# Patient Record
Sex: MALE | Race: ASIAN | Employment: UNEMPLOYED | URBAN - METROPOLITAN AREA
[De-identification: names, ages, dates, MRNs, and addresses within clinical notes are randomized per-mention and may not be internally consistent; named-entity substitution may affect disease eponyms.]

---

## 2017-03-31 ENCOUNTER — HOSPITAL ENCOUNTER (EMERGENCY)
Age: 52
Discharge: HOME | End: 2017-04-01
Attending: EMERGENCY MEDICINE
Payer: MEDICAID

## 2017-03-31 ENCOUNTER — APPOINTMENT (OUTPATIENT)
Dept: CT IMAGING | Age: 52
End: 2017-03-31
Attending: EMERGENCY MEDICINE
Payer: MEDICAID

## 2017-03-31 VITALS
SYSTOLIC BLOOD PRESSURE: 130 MMHG | OXYGEN SATURATION: 97 % | TEMPERATURE: 98.4 F | HEART RATE: 90 BPM | DIASTOLIC BLOOD PRESSURE: 69 MMHG | RESPIRATION RATE: 15 BRPM

## 2017-03-31 DIAGNOSIS — K83.8 PNEUMOBILIA: ICD-10-CM

## 2017-03-31 DIAGNOSIS — L02.91 PHLEGMON: ICD-10-CM

## 2017-03-31 DIAGNOSIS — R73.9 HYPERGLYCEMIA: ICD-10-CM

## 2017-03-31 DIAGNOSIS — E86.0 DEHYDRATION: ICD-10-CM

## 2017-03-31 DIAGNOSIS — R10.9 RIGHT SIDED ABDOMINAL PAIN: ICD-10-CM

## 2017-03-31 DIAGNOSIS — R10.13 ABDOMINAL PAIN, EPIGASTRIC: Primary | ICD-10-CM

## 2017-03-31 LAB
ALBUMIN SERPL BCP-MCNC: 3.8 G/DL (ref 3.4–5)
ALBUMIN/GLOB SERPL: 0.7 {RATIO} (ref 0.8–1.7)
ALP SERPL-CCNC: 206 U/L (ref 45–117)
ALT SERPL-CCNC: 21 U/L (ref 16–61)
AMPHET UR QL SCN: NEGATIVE
ANION GAP BLD CALC-SCNC: 10 MMOL/L (ref 3–18)
ANION GAP BLD CALC-SCNC: 15 MMOL/L (ref 10–20)
ANION GAP BLD CALC-SCNC: 17 MMOL/L (ref 10–20)
APPEARANCE UR: CLEAR
AST SERPL W P-5'-P-CCNC: 11 U/L (ref 15–37)
BACTERIA URNS QL MICRO: NEGATIVE /HPF
BARBITURATES UR QL SCN: NEGATIVE
BASOPHILS # BLD AUTO: 0 K/UL (ref 0–0.06)
BASOPHILS # BLD: 0 % (ref 0–2)
BENZODIAZ UR QL: NEGATIVE
BILIRUB SERPL-MCNC: 0.3 MG/DL (ref 0.2–1)
BILIRUB UR QL: NEGATIVE
BUN BLD-MCNC: 20 MG/DL (ref 7–18)
BUN BLD-MCNC: 27 MG/DL (ref 7–18)
BUN SERPL-MCNC: 23 MG/DL (ref 7–18)
BUN/CREAT SERPL: 15 (ref 12–20)
CA-I BLD-MCNC: 0.94 MMOL/L (ref 1.12–1.32)
CA-I BLD-MCNC: 1.2 MMOL/L (ref 1.12–1.32)
CALCIUM SERPL-MCNC: 9.8 MG/DL (ref 8.5–10.1)
CANNABINOIDS UR QL SCN: NEGATIVE
CHLORIDE BLD-SCNC: 103 MMOL/L (ref 100–108)
CHLORIDE BLD-SCNC: 107 MMOL/L (ref 100–108)
CHLORIDE SERPL-SCNC: 99 MMOL/L (ref 100–108)
CO2 BLD-SCNC: 19 MMOL/L (ref 19–24)
CO2 BLD-SCNC: 21 MMOL/L (ref 19–24)
CO2 SERPL-SCNC: 22 MMOL/L (ref 21–32)
COCAINE UR QL SCN: NEGATIVE
COLOR UR: YELLOW
CREAT SERPL-MCNC: 1.5 MG/DL (ref 0.6–1.3)
CREAT UR-MCNC: 1 MG/DL (ref 0.6–1.3)
CREAT UR-MCNC: 1.1 MG/DL (ref 0.6–1.3)
DIFFERENTIAL METHOD BLD: ABNORMAL
EOSINOPHIL # BLD: 0.3 K/UL (ref 0–0.4)
EOSINOPHIL NFR BLD: 2 % (ref 0–5)
EPITH CASTS URNS QL MICRO: NORMAL /LPF (ref 0–5)
ERYTHROCYTE [DISTWIDTH] IN BLOOD BY AUTOMATED COUNT: 14.2 % (ref 11.6–14.5)
GLOBULIN SER CALC-MCNC: 5.1 G/DL (ref 2–4)
GLUCOSE BLD STRIP.AUTO-MCNC: 256 MG/DL (ref 74–106)
GLUCOSE BLD STRIP.AUTO-MCNC: 257 MG/DL (ref 74–106)
GLUCOSE SERPL-MCNC: 321 MG/DL (ref 74–99)
GLUCOSE UR STRIP.AUTO-MCNC: >1000 MG/DL
HCT VFR BLD AUTO: 39.7 % (ref 36–48)
HCT VFR BLD CALC: 36 % (ref 36–49)
HCT VFR BLD CALC: 38 % (ref 36–49)
HDSCOM,HDSCOM: ABNORMAL
HGB BLD-MCNC: 12.2 G/DL (ref 12–16)
HGB BLD-MCNC: 12.9 G/DL (ref 12–16)
HGB BLD-MCNC: 13.2 G/DL (ref 13–16)
HGB UR QL STRIP: NEGATIVE
KETONES UR QL STRIP.AUTO: NEGATIVE MG/DL
LEUKOCYTE ESTERASE UR QL STRIP.AUTO: NEGATIVE
LIPASE SERPL-CCNC: 53 U/L (ref 73–393)
LYMPHOCYTES # BLD AUTO: 16 % (ref 21–52)
LYMPHOCYTES # BLD: 1.7 K/UL (ref 0.9–3.6)
MCH RBC QN AUTO: 27.8 PG (ref 24–34)
MCHC RBC AUTO-ENTMCNC: 33.2 G/DL (ref 31–37)
MCV RBC AUTO: 83.8 FL (ref 74–97)
METHADONE UR QL: NEGATIVE
MONOCYTES # BLD: 0.9 K/UL (ref 0.05–1.2)
MONOCYTES NFR BLD AUTO: 9 % (ref 3–10)
NEUTS SEG # BLD: 7.7 K/UL (ref 1.8–8)
NEUTS SEG NFR BLD AUTO: 73 % (ref 40–73)
NITRITE UR QL STRIP.AUTO: NEGATIVE
OPIATES UR QL: POSITIVE
PCP UR QL: NEGATIVE
PH UR STRIP: 5.5 [PH] (ref 5–8)
PLATELET # BLD AUTO: 319 K/UL (ref 135–420)
PMV BLD AUTO: 9 FL (ref 9.2–11.8)
POTASSIUM BLD-SCNC: 4.1 MMOL/L (ref 3.5–5.5)
POTASSIUM BLD-SCNC: 7.5 MMOL/L (ref 3.5–5.5)
POTASSIUM SERPL-SCNC: 4.2 MMOL/L (ref 3.5–5.5)
PROT SERPL-MCNC: 8.9 G/DL (ref 6.4–8.2)
PROT UR STRIP-MCNC: 30 MG/DL
RBC # BLD AUTO: 4.74 M/UL (ref 4.7–5.5)
RBC #/AREA URNS HPF: 0 /HPF (ref 0–5)
SODIUM BLD-SCNC: 132 MMOL/L (ref 136–145)
SODIUM BLD-SCNC: 136 MMOL/L (ref 136–145)
SODIUM SERPL-SCNC: 131 MMOL/L (ref 136–145)
SP GR UR REFRACTOMETRY: >1.03 (ref 1–1.03)
UROBILINOGEN UR QL STRIP.AUTO: 0.2 EU/DL (ref 0.2–1)
WBC # BLD AUTO: 10.6 K/UL (ref 4.6–13.2)
WBC URNS QL MICRO: NORMAL /HPF (ref 0–4)

## 2017-03-31 PROCEDURE — 85025 COMPLETE CBC W/AUTO DIFF WBC: CPT | Performed by: EMERGENCY MEDICINE

## 2017-03-31 PROCEDURE — 74011250637 HC RX REV CODE- 250/637: Performed by: EMERGENCY MEDICINE

## 2017-03-31 PROCEDURE — 81001 URINALYSIS AUTO W/SCOPE: CPT | Performed by: EMERGENCY MEDICINE

## 2017-03-31 PROCEDURE — 80047 BASIC METABLC PNL IONIZED CA: CPT

## 2017-03-31 PROCEDURE — 74011250636 HC RX REV CODE- 250/636: Performed by: EMERGENCY MEDICINE

## 2017-03-31 PROCEDURE — 74177 CT ABD & PELVIS W/CONTRAST: CPT

## 2017-03-31 PROCEDURE — 80053 COMPREHEN METABOLIC PANEL: CPT | Performed by: EMERGENCY MEDICINE

## 2017-03-31 PROCEDURE — 96375 TX/PRO/DX INJ NEW DRUG ADDON: CPT

## 2017-03-31 PROCEDURE — 80307 DRUG TEST PRSMV CHEM ANLYZR: CPT | Performed by: EMERGENCY MEDICINE

## 2017-03-31 PROCEDURE — 83690 ASSAY OF LIPASE: CPT | Performed by: EMERGENCY MEDICINE

## 2017-03-31 PROCEDURE — 99285 EMERGENCY DEPT VISIT HI MDM: CPT

## 2017-03-31 PROCEDURE — 74011636320 HC RX REV CODE- 636/320: Performed by: EMERGENCY MEDICINE

## 2017-03-31 PROCEDURE — 96374 THER/PROPH/DIAG INJ IV PUSH: CPT

## 2017-03-31 PROCEDURE — 96361 HYDRATE IV INFUSION ADD-ON: CPT

## 2017-03-31 RX ORDER — CHOLECALCIFEROL (VITAMIN D3) 125 MCG
2000 CAPSULE ORAL DAILY
Status: ON HOLD | COMMUNITY
End: 2017-08-09

## 2017-03-31 RX ORDER — POTASSIUM CITRATE 10 MEQ/1
20 TABLET, EXTENDED RELEASE ORAL
COMMUNITY
End: 2017-07-17

## 2017-03-31 RX ORDER — MIDODRINE HYDROCHLORIDE 10 MG/1
TABLET ORAL 3 TIMES DAILY
Status: ON HOLD | COMMUNITY
End: 2017-08-09

## 2017-03-31 RX ORDER — HYDROCODONE BITARTRATE AND ACETAMINOPHEN 5; 325 MG/1; MG/1
1 TABLET ORAL ONCE
Status: COMPLETED | OUTPATIENT
Start: 2017-03-31 | End: 2017-03-31

## 2017-03-31 RX ORDER — ONDANSETRON 2 MG/ML
4 INJECTION INTRAMUSCULAR; INTRAVENOUS
Status: COMPLETED | OUTPATIENT
Start: 2017-03-31 | End: 2017-03-31

## 2017-03-31 RX ORDER — INSULIN LISPRO 100 [IU]/ML
12 INJECTION, SOLUTION INTRAVENOUS; SUBCUTANEOUS
Status: ON HOLD | COMMUNITY
End: 2017-08-09

## 2017-03-31 RX ORDER — LORAZEPAM 0.5 MG/1
TABLET ORAL
Status: ON HOLD | COMMUNITY
End: 2017-08-09

## 2017-03-31 RX ORDER — LIDOCAINE HYDROCHLORIDE 20 MG/ML
JELLY TOPICAL AS NEEDED
COMMUNITY
End: 2017-07-17

## 2017-03-31 RX ORDER — LANOLIN ALCOHOL/MO/W.PET/CERES
3 CREAM (GRAM) TOPICAL
Status: ON HOLD | COMMUNITY
End: 2017-08-09

## 2017-03-31 RX ORDER — SUCRALFATE 1 G/1
1 TABLET ORAL 4 TIMES DAILY
Status: ON HOLD | COMMUNITY
End: 2017-08-09

## 2017-03-31 RX ORDER — OXYCODONE AND ACETAMINOPHEN 10; 325 MG/1; MG/1
TABLET ORAL
COMMUNITY
End: 2017-07-17

## 2017-03-31 RX ORDER — MORPHINE SULFATE 60 MG/1
TABLET, FILM COATED, EXTENDED RELEASE ORAL EVERY 12 HOURS
Status: ON HOLD | COMMUNITY
End: 2017-08-05

## 2017-03-31 RX ORDER — MORPHINE SULFATE 4 MG/ML
4 INJECTION, SOLUTION INTRAMUSCULAR; INTRAVENOUS
Status: COMPLETED | OUTPATIENT
Start: 2017-03-31 | End: 2017-03-31

## 2017-03-31 RX ADMIN — SODIUM CHLORIDE 500 ML: 900 INJECTION, SOLUTION INTRAVENOUS at 21:43

## 2017-03-31 RX ADMIN — IOPAMIDOL 100 ML: 612 INJECTION, SOLUTION INTRAVENOUS at 20:42

## 2017-03-31 RX ADMIN — HYDROCODONE BITARTRATE AND ACETAMINOPHEN 1 TABLET: 5; 325 TABLET ORAL at 21:45

## 2017-03-31 RX ADMIN — ONDANSETRON 4 MG: 2 INJECTION INTRAMUSCULAR; INTRAVENOUS at 20:15

## 2017-03-31 RX ADMIN — Medication 4 MG: at 20:15

## 2017-03-31 RX ADMIN — SODIUM CHLORIDE 1000 ML: 900 INJECTION, SOLUTION INTRAVENOUS at 21:22

## 2017-03-31 NOTE — ED NOTES
Patient states he has burning from a previous NATALIE drain site. Patient states that part of the NATALIE drain was left behind. From previous site urology is aware and it is planned on being removed some time in May. Purposeful rounding completed:    Side rails up x 1:  YES  Bed low and wheels and locked: YES  Call bell in reach: YES  Comfort addressed: YES    Toileting needs addressed: YES  Plan of care reviewed/updated with patient and or family members: YES  IV site assessed: N/A  Pain assessed and addressed: YES, 10.

## 2017-03-31 NOTE — ED PROVIDER NOTES
HPI Comments: 7:26 PM Evita Montano is a 46 y.o. male with noted PMH who presents to the ED via EMS c/o R>L sided abdominal pain X 2 days. He states the R side of his abdomen mccormick. Pt is a resident at [address] [place] [address] and he states that the nursing removed his colectomy bag, because the contents of the bag was filling up to quickly. He also state that last week he had radiological imaging done at his GI office and they saw pieces of a NATALIE drain left behind in his abdomen. He states that \"the drain fell out a long time ago, and it was not working\". Today, the patient presents with dark brown fluids coming out of his stoma located on the RLQ side of his abdomen. He reports having an abscess in his mid-abdomen area that drains out of his stoma. He states \"its not poop, it's drainage from the abscess\". Pt states that he went to see GI, Dr. Cameron Hutson last week and he did not have his colostomy bag on, \"because the area was just spotting\". He reports having a gauze placed on top of the stoma. He reports that his GI doctor thinks he needs abdominal surgery to remove the rest of his NATALIE tube. He also c/o decreased appetite, vomiting last night but none today, and nausea is present. He denies fever, trauma, cp, sob, constipation and diarrhea. No other associated symptoms or modifying factors at this time. The history is provided by the patient.         Past Medical History:   Diagnosis Date    Abdominal pain, generalized     Chronic pancreatitis ([address]     Diabetes ([facility]     Encounter for long-term (current) use of other medications     Essential hypertension     Pain in the abdomen 11/2/2010    Pancreatitis     Tobacco abuse        Past Surgical History:   Procedure Laterality Date    HX CHOLECYSTECTOMY           Family History:   Problem Relation Age of Onset    Heart Disease Father        Social History     Social History    Marital status: LEGALLY      Spouse name: N/A    Number of children: N/A    Years of education: N/A     Occupational History    Not on file. Social History Main Topics    Smoking status: Former Smoker     Packs/day: 0.50     Years: 0.00     Types: Cigarettes    Smokeless tobacco: Not on file    Alcohol use No    Drug use: No    Sexual activity: Yes     Birth control/ protection: None     Other Topics Concern    Not on file     Social History Narrative         ALLERGIES: Ampicillin-sulbactam; Pcn [penicillins]; Piperacillin-tazobactam; Pollen extracts; and Seafood [shellfish containing products]    Review of Systems   Constitutional: Positive for appetite change (decreased). Negative for chills, fatigue and fever. HENT: Negative for sore throat. Eyes: Negative for redness and visual disturbance. Respiratory: Negative for cough, shortness of breath and wheezing. Cardiovascular: Negative for chest pain, palpitations and leg swelling. Gastrointestinal: Positive for abdominal pain, nausea and vomiting. Negative for abdominal distention, blood in stool, constipation and diarrhea. Endocrine: Negative for polyuria. Genitourinary: Negative for difficulty urinating and dysuria. Musculoskeletal: Negative for arthralgias, myalgias, neck pain and neck stiffness. Skin: Negative for rash. Neurological: Negative for dizziness and headaches. All other systems reviewed and are negative. Vitals:    03/31/17 1841 03/31/17 1845 03/31/17 1900 03/31/17 1915   BP:  (!) 151/102 (!) 178/155 (!) 196/168   Pulse:  (!) 108 (!) 102 (!) 109   Resp:  18 19 16   Temp: 98.4 °F (36.9 °C)      SpO2:  99% 98% 98%            Physical Exam   Constitutional: He is oriented to person, place, and time. He appears well-developed and well-nourished. No distress. HENT:   Head: Normocephalic and atraumatic. Nose: Nose normal.   Mouth/Throat: Oropharynx is clear and moist and mucous membranes are normal.   Eyes: Conjunctivae are normal. Pupils are equal, round, and reactive to light. No scleral icterus. Neck: Normal range of motion. Neck supple. Cardiovascular: Intact distal pulses. Tachycardia present. Capillary refill < 3 seconds  No lower extremity edema   Pulmonary/Chest: Effort normal and breath sounds normal. No respiratory distress. He has no wheezes. 17 respiration rate on monitor  Lungs clear   Abdominal: Soft. Bowel sounds are normal. He exhibits no distension. There is tenderness in the epigastric area. There is no guarding. Pink stoma on R side of abdomen. It has brownish material seeping out of stoma. There is tenderness around the stoma area. No pus drainage at stoma site. There is no surrounding erythema. Musculoskeletal: Normal range of motion. He exhibits no edema. Lymphadenopathy:     He has no cervical adenopathy. Neurological: He is alert and oriented to person, place, and time. No cranial nerve deficit. He exhibits normal muscle tone. Skin: Skin is warm and dry. He is not diaphoretic. Psychiatric: Thought content normal.   Nursing note and vitals reviewed. MDM  Number of Diagnoses or Management Options  Abdominal pain, epigastric:   Dehydration:   Hyperglycemia:   Phlegmon:   Pneumobilia:   Right sided abdominal pain:   Diagnosis management comments: ddx infectious, metabolic, dehydration, phlegmon, pancreatitis, biliary    Labs, CT abd, analgesia, IVF  , CO2 wnl, Cr 1.5  Reassessed and pain improving. I have reassessed the patient. I have discussed the workup, results and plan with the patient and patient is in agreement. Patient is feeling better. Patient was discharge in stable condition. Patient was given outpatient follow up. Patient is to return to emergency department if any new or worsening condition.         Amount and/or Complexity of Data Reviewed  Clinical lab tests: ordered and reviewed  Tests in the radiology section of CPT®: ordered and reviewed  Tests in the medicine section of CPT®: ordered and reviewed  Discussion of test results with the performing providers: yes  Discuss the patient with other providers: yes    Risk of Complications, Morbidity, and/or Mortality  Presenting problems: moderate  Diagnostic procedures: moderate  Management options: moderate    Patient Progress  Patient progress: improved    ED Course       Procedures  Vitals:  Patient Vitals for the past 12 hrs:   Temp Pulse Resp BP SpO2   03/31/17 1915 - (!) 109 16 (!) 196/168 98 %   03/31/17 1900 - (!) 102 19 (!) 178/155 98 %   03/31/17 1845 - (!) 108 18 (!) 151/102 99 %   03/31/17 1841 98.4 °F (36.9 °C) - - - -     Medications ordered:   Medications   morphine injection 4 mg (4 mg IntraVENous Given 3/31/17 2015)   ondansetron (ZOFRAN) injection 4 mg (4 mg IntraVENous Given 3/31/17 2015)   iopamidol (ISOVUE 300) 61 % contrast injection 100 mL (100 mL IntraVENous Given 3/31/17 2042)   sodium chloride 0.9 % bolus infusion 1,000 mL (1,000 mL IntraVENous New Bag 3/31/17 2122)   HYDROcodone-acetaminophen (NORCO) 5-325 mg per tablet 1 Tab (1 Tab Oral Given 3/31/17 2145)   sodium chloride 0.9 % bolus infusion 500 mL (500 mL IntraVENous New Bag 3/31/17 2143)         Lab findings:  Recent Results (from the past 12 hour(s))   CBC WITH AUTOMATED DIFF    Collection Time: 03/31/17  8:00 PM   Result Value Ref Range    WBC 10.6 4.6 - 13.2 K/uL    RBC 4.74 4.70 - 5.50 M/uL    HGB 13.2 13.0 - 16.0 g/dL    HCT 39.7 36.0 - 48.0 %    MCV 83.8 74.0 - 97.0 FL    MCH 27.8 24.0 - 34.0 PG    MCHC 33.2 31.0 - 37.0 g/dL    RDW 14.2 11.6 - 14.5 %    PLATELET 235 116 - 089 K/uL    MPV 9.0 (L) 9.2 - 11.8 FL    NEUTROPHILS 73 40 - 73 %    LYMPHOCYTES 16 (L) 21 - 52 %    MONOCYTES 9 3 - 10 %    EOSINOPHILS 2 0 - 5 %    BASOPHILS 0 0 - 2 %    ABS. NEUTROPHILS 7.7 1.8 - 8.0 K/UL    ABS. LYMPHOCYTES 1.7 0.9 - 3.6 K/UL    ABS. MONOCYTES 0.9 0.05 - 1.2 K/UL    ABS. EOSINOPHILS 0.3 0.0 - 0.4 K/UL    ABS.  BASOPHILS 0.0 0.0 - 0.06 K/UL    DF AUTOMATED     METABOLIC PANEL, COMPREHENSIVE Collection Time: 03/31/17  8:00 PM   Result Value Ref Range    Sodium 131 (L) 136 - 145 mmol/L    Potassium 4.2 3.5 - 5.5 mmol/L    Chloride 99 (L) 100 - 108 mmol/L    CO2 22 21 - 32 mmol/L    Anion gap 10 3.0 - 18 mmol/L    Glucose 321 (H) 74 - 99 mg/dL    BUN 23 (H) 7.0 - 18 MG/DL    Creatinine 1.50 (H) 0.6 - 1.3 MG/DL    BUN/Creatinine ratio 15 12 - 20      GFR est AA 60 (L) >60 ml/min/1.73m2    GFR est non-AA 49 (L) >60 ml/min/1.73m2    Calcium 9.8 8.5 - 10.1 MG/DL    Bilirubin, total 0.3 0.2 - 1.0 MG/DL    ALT (SGPT) 21 16 - 61 U/L    AST (SGOT) 11 (L) 15 - 37 U/L    Alk.  phosphatase 206 (H) 45 - 117 U/L    Protein, total 8.9 (H) 6.4 - 8.2 g/dL    Albumin 3.8 3.4 - 5.0 g/dL    Globulin 5.1 (H) 2.0 - 4.0 g/dL    A-G Ratio 0.7 (L) 0.8 - 1.7     LIPASE    Collection Time: 03/31/17  8:00 PM   Result Value Ref Range    Lipase 53 (L) 73 - 393 U/L   URINALYSIS W/ RFLX MICROSCOPIC    Collection Time: 03/31/17  8:18 PM   Result Value Ref Range    Color YELLOW      Appearance CLEAR      Specific gravity >1.030 (H) 1.005 - 1.030    pH (UA) 5.5 5.0 - 8.0      Protein 30 (A) NEG mg/dL    Glucose >1000 (A) NEG mg/dL    Ketone NEGATIVE  NEG mg/dL    Bilirubin NEGATIVE  NEG      Blood NEGATIVE  NEG      Urobilinogen 0.2 0.2 - 1.0 EU/dL    Nitrites NEGATIVE  NEG      Leukocyte Esterase NEGATIVE  NEG     DRUG SCREEN, URINE    Collection Time: 03/31/17  8:18 PM   Result Value Ref Range    BENZODIAZEPINE NEGATIVE  NEG      BARBITURATES NEGATIVE  NEG      THC (TH-CANNABINOL) NEGATIVE  NEG      OPIATES POSITIVE (A) NEG      PCP(PHENCYCLIDINE) NEGATIVE  NEG      COCAINE NEGATIVE  NEG      AMPHETAMINE NEGATIVE  NEG      METHADONE NEGATIVE       HDSCOM (NOTE)    URINE MICROSCOPIC ONLY    Collection Time: 03/31/17  8:18 PM   Result Value Ref Range    WBC 3 to 6 0 - 4 /hpf    RBC 0 0 - 5 /hpf    Epithelial cells FEW 0 - 5 /lpf    Bacteria NEGATIVE  NEG /hpf   POC CHEM8    Collection Time: 03/31/17 11:05 PM   Result Value Ref Range    CO2 (POC) 19 19 - 24 MMOL/L    Glucose (POC) 256 (H) 74 - 106 MG/DL    BUN (POC) 27 (H) 7 - 18 MG/DL    Creatinine (POC) 1.1 0.6 - 1.3 MG/DL    GFR-AA (POC) >60 >60 ml/min/1.73m2    GFR, non-AA (POC) >60 >60 ml/min/1.73m2    Sodium (POC) 132 (L) 136 - 145 MMOL/L    Potassium (POC) 7.5 (HH) 3.5 - 5.5 MMOL/L    Calcium, ionized (POC) 0.94 (L) 1.12 - 1.32 MMOL/L    Chloride (POC) 107 100 - 108 MMOL/L    Anion gap (POC) 15 10 - 20      Hematocrit (POC) 36 36 - 49 %    Hemoglobin (POC) 12.2 12 - 16 G/DL   POC CHEM8    Collection Time: 03/31/17 11:20 PM   Result Value Ref Range    CO2 (POC) 21 19 - 24 MMOL/L    Glucose (POC) 257 (H) 74 - 106 MG/DL    BUN (POC) 20 (H) 7 - 18 MG/DL    Creatinine (POC) 1.0 0.6 - 1.3 MG/DL    GFR-AA (POC) >60 >60 ml/min/1.73m2    GFR, non-AA (POC) >60 >60 ml/min/1.73m2    Sodium (POC) 136 136 - 145 MMOL/L    Potassium (POC) 4.1 3.5 - 5.5 MMOL/L    Calcium, ionized (POC) 1.20 1. 12 - 1.32 MMOL/L    Chloride (POC) 103 100 - 108 MMOL/L    Anion gap (POC) 17 10 - 20      Hematocrit (POC) 38 36 - 49 %    Hemoglobin (POC) 12.9 12 - 16 G/DL     X-Ray, CT or other radiology findings or impressions:  CT ABD PELV W CONT     Findings:  -Right paracolic gutter phlegmon fluid may also tract into the distal psoas muscle    Findings:  Increased amount of pneumobilia compared with prior exam.  Stranding in the right paracolic gutter with locules of air, findings may suggest a residual phelgmon in this region, potentially measuring up to 4 x 5 cm.   -decreased enhancement in this are since prior exam 2016  -no enhancing collection to definitively suggest abscess  -several dystophic calcifications also noted in this area  -surrounding mesentric adenopathy, likely reactive  -appendic not visualized  -no bowel obstruction  -scarring/atelecatsis in the lingula and RLL   -hepatic steatosis  -cholecystectomy  -again noted pancreatic stent  -pancreatic calcifications likely related to chronic pancreatitis  -renal cyst, non-obstructive renal calculi  -scarring in the left kidney  -AVN of the femoral heads     Progress notes, Consult notes or additional Procedure notes:   10:20 PM Consult: Discussed care with Dr. Monty Price (GI). Standard discussion; including history of patients chief complaint, available diagnostic results, and treatment course. Recommends applying a colectomy like collecting bag over the stoma area and have the patient follow up with Dr. Jenni Aviles as an outpatient. 11:24 PM Placed a collecting bag over stoma. Patient can drain the bag throughout the day when it fills. I will have him follow up with GI. Repeat blood sugar was improved to 256 from 321. BUN is now 20 and CREATININE is 1.0, it has significantly improved from: BUN 23 and CREATININE 1.5. Repeat Sodium in now normal at 136.     11:35 PM I have reassessed the patient and discussed their results and diagnosis. Pt will be discharged in stable condition. Patient is to return to emergency department if any new or worsening condition. Patient understands and verbalizes agreement with plan. Disposition:  Diagnosis:   1. Abdominal pain, epigastric    2. Right sided abdominal pain    3. Phlegmon    4. Pneumobilia    5. Hyperglycemia    6. Dehydration      Disposition: Discharged    Follow-up Information     Follow up With Details Comments Contact Info    Tereso Newman MD Call in 2 days follow up [address] [place] [address] of [address] [facility] EMERGENCY DEPT Go to As needed, If symptoms worsen [id number]  [phone]            Patient's Medications   Start Taking    No medications on file   Continue Taking    AMITRIPTYLINE (ELAVIL) 100 MG TABLET    Take 1 Tab by mouth nightly. CHOLECALCIFEROL, VITAMIN D3, 2,000 UNIT TAB    Take  by mouth. FERROUS FUMARATE 325 MG (106 MG IRON) TAB    Take 1 Tab by mouth daily.     FOLIC ACID (FOLVITE) 1 MG TABLET    Take 0.5 Tabs by mouth daily. INSULIN LISPRO (HUMALOG) 100 UNIT/ML INJECTION    by SubCUTAneous route. LIDOCAINE (XYLOCAINE) 2 % JELLY    Apply  to affected area as needed. LORAZEPAM (ATIVAN) 0.5 MG TABLET    Take  by mouth. MELATONIN 3 MG TABLET    Take 3 mg by mouth. MIDODRINE (PROAMITINE) 10 MG TABLET    Take  by mouth three (3) times daily. MORPHINE CR (MS CONTIN) 60 MG CR TABLET    Take  by mouth every twelve (12) hours. OMEPRAZOLE (PRILOSEC) 40 MG CAPSULE    Take 1 Cap by mouth daily. ONDANSETRON (ZOFRAN ODT) 4 MG DISINTEGRATING TABLET    Take 1 Tab by mouth every eight (8) hours as needed for Nausea. OXYCODONE-ACETAMINOPHEN (PERCOCET)  MG PER TABLET    Take  by mouth. POTASSIUM CITRATE (UROCIT-K10) 10 MEQ (1,080 MG) TBER    Take 20 mEq by mouth. PROMETHAZINE (PHENERGAN) 25 MG TABLET    Take 1 Tab by mouth every six (6) hours as needed for Nausea. SUCRALFATE (CARAFATE) 1 GRAM TABLET    Take 1 g by mouth four (4) times daily. These Medications have changed    No medications on file   Stop Taking    AMYLASE-LIPASE-PROTEASE (CREON) 24,000-76,000 -120,000 UNIT CAPSULE    Take 2 Caps by mouth Before breakfast, lunch, and dinner. FENTANYL (DURAGESIC) 12 MCG/HR PATCH    1 Patch by TransDERmal route every seventy-two (72) hours. Max Daily Amount: 1 Patch. INSULIN GLARGINE (LANTUS) 100 UNIT/ML INJECTION    15 Units by SubCUTAneous route nightly. SCRIBE ATTESTATION STATEMENT  Documented by: Vilma Clancy for, and in the presence of, Doris Lugo DO 7:27 PM    Signed by: Jose M Juan, 03/31/17 7:27 PM    PROVIDER ATTESTATION STATEMENT  I personally performed the services described in the documentation, reviewed the documentation, as recorded by the scribe in my presence, and it accurately and completely records my words and actions.   Doris Lugo DO

## 2017-04-01 NOTE — DISCHARGE INSTRUCTIONS
Dehydration: Care Instructions  Your Care Instructions  Dehydration happens when your body loses too much fluid. This might happen when you do not drink enough water or you lose large amounts of fluids from your body because of diarrhea, vomiting, or sweating. Severe dehydration can be life-threatening. Water and minerals called electrolytes help put your body fluids back in balance. Learn the early signs of fluid loss, and drink more fluids to prevent dehydration. Follow-up care is a key part of your treatment and safety. Be sure to make and go to all appointments, and call your doctor if you are having problems. It's also a good idea to know your test results and keep a list of the medicines you take. How can you care for yourself at home? · To prevent dehydration, drink plenty of fluids, enough so that your urine is light yellow or clear like water. Choose water and other caffeine-free clear liquids until you feel better. If you have kidney, heart, or liver disease and have to limit fluids, talk with your doctor before you increase the amount of fluids you drink. · If you do not feel like eating or drinking, try taking small sips of water, sports drinks, or other rehydration drinks. · Get plenty of rest.  To prevent dehydration  · Add more fluids to your diet and daily routine, unless your doctor has told you not to. · During hot weather, drink more fluids. Drink even more fluids if you exercise a lot. Stay away from drinks with alcohol or caffeine. · Watch for the symptoms of dehydration. These include:  ¨ A dry, sticky mouth. ¨ Dark yellow urine, and not much of it. ¨ Dry and sunken eyes. ¨ Feeling very tired. · Learn what problems can lead to dehydration. These include:  ¨ Diarrhea, fever, and vomiting. ¨ Any illness with a fever, such as pneumonia or the flu. ¨ Activities that cause heavy sweating, such as endurance races and heavy outdoor work in hot or humid weather.   ¨ Alcohol or drug abuse or withdrawal.  ¨ Certain medicines, such as cold and allergy pills (antihistamines), diet pills (diuretics), and laxatives. ¨ Certain diseases, such as diabetes, cancer, and heart or kidney disease. When should you call for help? Call 911 anytime you think you may need emergency care. For example, call if:  · You passed out (lost consciousness). Call your doctor now or seek immediate medical care if:  · You are confused and cannot think clearly. · You are dizzy or lightheaded, or you feel like you may faint. · You have signs of needing more fluids. You have sunken eyes and a dry mouth, and you pass only a little dark urine. · You cannot keep fluids down. Watch closely for changes in your health, and be sure to contact your doctor if:  · You are not making tears. · Your skin is very dry and sags slowly back into place after you pinch it. · Your mouth and eyes are very dry. Where can you learn more? Go to http://thiago-mikhail.info/. Enter K997 in the search box to learn more about \"Dehydration: Care Instructions. \"  Current as of: May 27, 2016  Content Version: 11.2  © 0273-2066 [employer]. Care instructions adapted under license by [employer] (which disclaims liability or warranty for this information). If you have questions about a medical condition or this instruction, always ask your healthcare professional. [place] [address] any warranty or liability for your use of this information. Abdominal Pain: Care Instructions  Your Care Instructions    Abdominal pain has many possible causes. Some aren't serious and get better on their own in a few days. Others need more testing and treatment. If your pain continues or gets worse, you need to be rechecked and may need more tests to find out what is wrong. You may need surgery to correct the problem.   Don't ignore new symptoms, such as fever, nausea and vomiting, urination problems, pain that gets worse, and dizziness. These may be signs of a more serious problem. Your doctor may have recommended a follow-up visit in the next 8 to 12 hours. If you are not getting better, you may need more tests or treatment. The doctor has checked you carefully, but problems can develop later. If you notice any problems or new symptoms, get medical treatment right away. Follow-up care is a key part of your treatment and safety. Be sure to make and go to all appointments, and call your doctor if you are having problems. It's also a good idea to know your test results and keep a list of the medicines you take. How can you care for yourself at home? · Rest until you feel better. · To prevent dehydration, drink plenty of fluids, enough so that your urine is light yellow or clear like water. Choose water and other caffeine-free clear liquids until you feel better. If you have kidney, heart, or liver disease and have to limit fluids, talk with your doctor before you increase the amount of fluids you drink. · If your stomach is upset, eat mild foods, such as rice, dry toast or crackers, bananas, and applesauce. Try eating several small meals instead of two or three large ones. · Wait until 48 hours after all symptoms have gone away before you have spicy foods, alcohol, and drinks that contain caffeine. · Do not eat foods that are high in fat. · Avoid anti-inflammatory medicines such as aspirin, ibuprofen (Advil, Motrin), and naproxen (Aleve). These can cause stomach upset. Talk to your doctor if you take daily aspirin for another health problem. When should you call for help? Call 911 anytime you think you may need emergency care. For example, call if:  · You passed out (lost consciousness). · You pass maroon or very bloody stools. · You vomit blood or what looks like coffee grounds. · You have new, severe belly pain.   Call your doctor now or seek immediate medical care if:  · Your pain gets worse, especially if it becomes focused in one area of your belly. · You have a new or higher fever. · Your stools are black and look like tar, or they have streaks of blood. · You have unexpected vaginal bleeding. · You have symptoms of a urinary tract infection. These may include:  ¨ Pain when you urinate. ¨ Urinating more often than usual.  ¨ Blood in your urine. · You are dizzy or lightheaded, or you feel like you may faint. Watch closely for changes in your health, and be sure to contact your doctor if:  · You are not getting better after 1 day (24 hours). Where can you learn more? Go to http://thiago-mikhail.info/. Enter X736 in the search box to learn more about \"Abdominal Pain: Care Instructions. \"  Current as of: May 27, 2016  Content Version: 11.2  © 2219-8713 [employer]. Care instructions adapted under license by [employer] (which disclaims liability or warranty for this information). If you have questions about a medical condition or this instruction, always ask your healthcare professional. [address] any warranty or liability for your use of this information. Learning About High Blood Sugar  What is high blood sugar? Your body turns the food you eat into glucose (sugar), which it uses for energy. But if your body isn't able to use the sugar right away, it can build up in your blood and lead to high blood sugar. When the amount of sugar in your blood stays too high for too much of the time, you may have diabetes. Diabetes is a disease that can cause serious health problems. The good news is that lifestyle changes may help you get your blood sugar back to normal and avoid or delay diabetes. What causes high blood sugar? Sugar (glucose) can build up in your blood if you:  · Are overweight. · Have a family history of diabetes. · Take certain medicines, such as steroids. What are the symptoms?   Having high blood sugar may not cause any symptoms at all. Or it may make you feel very thirsty or very hungry. You may also urinate more often than usual, have blurry vision, or lose weight without trying. How is high blood sugar treated? You can take steps to lower your blood sugar level if you understand what makes it get higher. Your doctor may want you to learn how to test your blood sugar level at home. Then you can see how illness, stress, or different kinds of food or medicine raise or lower your blood sugar level. Other tests may be needed to see if you have diabetes. How can you prevent high blood sugar? · Watch your weight. If you're overweight, losing just a small amount of weight may help. Reducing fat around your waist is most important. · Limit the amount of calories, sweets, and unhealthy fat you eat. Ask your doctor if a dietitian can help you. A registered dietitian can help you create meal plans that fit your lifestyle. · Get at least 30 minutes of exercise on most days of the week. Exercise helps control your blood sugar. It also helps you maintain a healthy weight. Walking is a good choice. You also may want to do other activities, such as running, swimming, cycling, or playing tennis or team sports. · If your doctor prescribed medicines, take them exactly as prescribed. Call your doctor if you think you are having a problem with your medicine. You will get more details on the specific medicines your doctor prescribes. Follow-up care is a key part of your treatment and safety. Be sure to make and go to all appointments, and call your doctor if you are having problems. It's also a good idea to know your test results and keep a list of the medicines you take. Where can you learn more? Go to http://thiago-mikhail.info/. Enter O108 in the search box to learn more about \"Learning About High Blood Sugar. \"  Current as of: May 23, 2016  Content Version: 11.2  © 5210-7662 [employer], [facility].  Care instructions adapted under license by [employer] (which disclaims liability or warranty for this information). If you have questions about a medical condition or this instruction, always ask your healthcare professional. [address] any warranty or liability for your use of this information.

## 2017-04-04 ENCOUNTER — PATIENT OUTREACH (OUTPATIENT)
Dept: INTERNAL MEDICINE CLINIC | Age: 52
End: 2017-04-04
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
## 2017-04-04 NOTE — PROGRESS NOTES
[facility] Transitions of care Episode of [facility] 5/31/16 - 6/14/16. Pt has not been seen by our office. Pt was transferred to [facility].

## 2017-07-17 PROBLEM — K52.9 COLITIS, ACUTE: Status: ACTIVE | Noted: 2017-07-17

## 2017-07-17 PROBLEM — E10.10 DKA, TYPE 1 (HCC): Status: ACTIVE | Noted: 2017-07-17

## 2017-08-05 PROBLEM — E86.0 DEHYDRATION: Status: ACTIVE | Noted: 2017-08-05

## 2017-08-05 PROBLEM — R11.10 VOMITING: Status: ACTIVE | Noted: 2017-08-05

## 2017-08-05 PROBLEM — E87.20 LACTIC ACIDOSIS: Status: ACTIVE | Noted: 2017-08-05

## 2018-04-15 PROBLEM — K86.0 CHRONIC PANCREATITIS DUE TO ACUTE ALCOHOL INTOXICATION (HCC): Status: ACTIVE | Noted: 2018-04-15

## 2018-04-15 PROBLEM — K70.10 ACUTE ALCOHOLIC HEPATITIS: Status: ACTIVE | Noted: 2018-04-15

## 2018-04-15 PROBLEM — F10.929 CHRONIC PANCREATITIS DUE TO ACUTE ALCOHOL INTOXICATION (HCC): Status: ACTIVE | Noted: 2018-04-15

## 2018-04-15 PROBLEM — E11.10 DKA (DIABETIC KETOACIDOSES): Status: ACTIVE | Noted: 2018-04-15

## 2018-04-24 PROBLEM — K52.9 ENTERITIS: Status: ACTIVE | Noted: 2018-04-24

## 2018-05-22 ENCOUNTER — HOSPITAL ENCOUNTER (INPATIENT)
Age: 53
LOS: 9 days | Discharge: HOME | DRG: 420 | End: 2018-05-31
Attending: EMERGENCY MEDICINE | Admitting: FAMILY MEDICINE
Payer: MEDICAID

## 2018-05-22 ENCOUNTER — APPOINTMENT (OUTPATIENT)
Dept: CT IMAGING | Age: 53
DRG: 420 | End: 2018-05-22
Attending: EMERGENCY MEDICINE
Payer: MEDICAID

## 2018-05-22 ENCOUNTER — APPOINTMENT (OUTPATIENT)
Dept: ULTRASOUND IMAGING | Age: 53
DRG: 420 | End: 2018-05-22
Attending: EMERGENCY MEDICINE
Payer: MEDICAID

## 2018-05-22 DIAGNOSIS — N17.9 ACUTE KIDNEY INJURY (HCC): ICD-10-CM

## 2018-05-22 DIAGNOSIS — G89.29 CHRONIC ABDOMINAL PAIN: Primary | ICD-10-CM

## 2018-05-22 DIAGNOSIS — K86.0 ALCOHOL-INDUCED CHRONIC PANCREATITIS (HCC): Chronic | ICD-10-CM

## 2018-05-22 DIAGNOSIS — R73.9 ACUTE HYPERGLYCEMIA: ICD-10-CM

## 2018-05-22 DIAGNOSIS — R10.9 CHRONIC ABDOMINAL PAIN: Primary | ICD-10-CM

## 2018-05-22 DIAGNOSIS — E86.0 DEHYDRATION: ICD-10-CM

## 2018-05-22 PROBLEM — N18.9 CHRONIC RENAL INSUFFICIENCY: Status: ACTIVE | Noted: 2018-05-22

## 2018-05-22 LAB
ADMINISTERED INITIALS, ADMINIT: NORMAL
ALBUMIN SERPL-MCNC: 3 G/DL (ref 3.4–5)
ALBUMIN/GLOB SERPL: 0.6 {RATIO} (ref 0.8–1.7)
ALP SERPL-CCNC: 232 U/L (ref 45–117)
ALT SERPL-CCNC: 45 U/L (ref 16–61)
AMPHET UR QL SCN: NEGATIVE
ANION GAP BLD CALC-SCNC: 18 MMOL/L (ref 10–20)
ANION GAP SERPL CALC-SCNC: 11 MMOL/L (ref 3–18)
ANION GAP SERPL CALC-SCNC: 13 MMOL/L (ref 3–18)
APPEARANCE UR: ABNORMAL
ARTERIAL PATENCY WRIST A: ABNORMAL
AST SERPL-CCNC: 46 U/L (ref 15–37)
BACTERIA URNS QL MICRO: ABNORMAL /HPF
BARBITURATES UR QL SCN: NEGATIVE
BASE DEFICIT BLD-SCNC: 16 MMOL/L
BASE DEFICIT BLD-SCNC: 17 MMOL/L
BASE DEFICIT BLDV-SCNC: 10 MMOL/L
BASOPHILS # BLD: 0 K/UL (ref 0–0.06)
BASOPHILS # BLD: 0 K/UL (ref 0–0.06)
BASOPHILS NFR BLD: 0 % (ref 0–2)
BASOPHILS NFR BLD: 0 % (ref 0–2)
BDY SITE: ABNORMAL
BENZODIAZ UR QL: NEGATIVE
BILIRUB SERPL-MCNC: 0.6 MG/DL (ref 0.2–1)
BILIRUB UR QL: NEGATIVE
BODY TEMPERATURE: 97.5
BUN BLD-MCNC: 13 MG/DL (ref 7–18)
BUN SERPL-MCNC: 12 MG/DL (ref 7–18)
BUN SERPL-MCNC: 12 MG/DL (ref 7–18)
BUN/CREAT SERPL: 5 (ref 12–20)
BUN/CREAT SERPL: 6 (ref 12–20)
CA-I BLD-MCNC: 1.14 MMOL/L (ref 1.12–1.32)
CALCIUM SERPL-MCNC: 6.9 MG/DL (ref 8.5–10.1)
CALCIUM SERPL-MCNC: 8.4 MG/DL (ref 8.5–10.1)
CANNABINOIDS UR QL SCN: NEGATIVE
CHLORIDE BLD-SCNC: 103 MMOL/L (ref 100–108)
CHLORIDE SERPL-SCNC: 100 MMOL/L (ref 100–108)
CHLORIDE SERPL-SCNC: 112 MMOL/L (ref 100–108)
CO2 BLD-SCNC: 16 MMOL/L (ref 19–24)
CO2 SERPL-SCNC: 14 MMOL/L (ref 21–32)
CO2 SERPL-SCNC: 17 MMOL/L (ref 21–32)
COCAINE UR QL SCN: NEGATIVE
COLOR UR: ABNORMAL
CREAT SERPL-MCNC: 1.93 MG/DL (ref 0.6–1.3)
CREAT SERPL-MCNC: 2.56 MG/DL (ref 0.6–1.3)
CREAT UR-MCNC: 2.5 MG/DL (ref 0.6–1.3)
D50 ADMINISTERED, D50ADM: 0 ML
D50 ORDER, D50ORD: 0 ML
DIFFERENTIAL METHOD BLD: ABNORMAL
DIFFERENTIAL METHOD BLD: ABNORMAL
EOSINOPHIL # BLD: 0.1 K/UL (ref 0–0.4)
EOSINOPHIL # BLD: 0.1 K/UL (ref 0–0.4)
EOSINOPHIL NFR BLD: 0 % (ref 0–5)
EOSINOPHIL NFR BLD: 1 % (ref 0–5)
EPITH CASTS URNS QL MICRO: ABNORMAL /LPF (ref 0–5)
ERYTHROCYTE [DISTWIDTH] IN BLOOD BY AUTOMATED COUNT: 13 % (ref 11.6–14.5)
ERYTHROCYTE [DISTWIDTH] IN BLOOD BY AUTOMATED COUNT: 13.1 % (ref 11.6–14.5)
EST. AVERAGE GLUCOSE BLD GHB EST-MCNC: 235 MG/DL
ETHANOL SERPL-MCNC: <3 MG/DL (ref 0–3)
GAS FLOW.O2 O2 DELIVERY SYS: ABNORMAL L/MIN
GLOBULIN SER CALC-MCNC: 4.7 G/DL (ref 2–4)
GLUCOSE BLD STRIP.AUTO-MCNC: 107 MG/DL (ref 70–110)
GLUCOSE BLD STRIP.AUTO-MCNC: 113 MG/DL (ref 70–110)
GLUCOSE BLD STRIP.AUTO-MCNC: 124 MG/DL (ref 70–110)
GLUCOSE BLD STRIP.AUTO-MCNC: 142 MG/DL (ref 70–110)
GLUCOSE BLD STRIP.AUTO-MCNC: 196 MG/DL (ref 70–110)
GLUCOSE BLD STRIP.AUTO-MCNC: 221 MG/DL (ref 70–110)
GLUCOSE BLD STRIP.AUTO-MCNC: 224 MG/DL (ref 70–110)
GLUCOSE BLD STRIP.AUTO-MCNC: 245 MG/DL (ref 70–110)
GLUCOSE BLD STRIP.AUTO-MCNC: 258 MG/DL (ref 70–110)
GLUCOSE BLD STRIP.AUTO-MCNC: 331 MG/DL (ref 70–110)
GLUCOSE BLD STRIP.AUTO-MCNC: 367 MG/DL (ref 70–110)
GLUCOSE BLD STRIP.AUTO-MCNC: 573 MG/DL (ref 74–106)
GLUCOSE SERPL-MCNC: 312 MG/DL (ref 74–99)
GLUCOSE SERPL-MCNC: 590 MG/DL (ref 74–99)
GLUCOSE UR STRIP.AUTO-MCNC: >1000 MG/DL
GLUCOSE, GLC: 107 MG/DL
GLUCOSE, GLC: 124 MG/DL
GLUCOSE, GLC: 142 MG/DL
GLUCOSE, GLC: 196 MG/DL
GLUCOSE, GLC: 224 MG/DL
GLUCOSE, GLC: 241 MG/DL
GLUCOSE, GLC: 245 MG/DL
GLUCOSE, GLC: 331 MG/DL
GRAN CASTS URNS QL MICRO: ABNORMAL /LPF
HBA1C MFR BLD: 9.8 % (ref 4.2–5.6)
HCO3 BLD-SCNC: 10.4 MMOL/L (ref 22–26)
HCO3 BLD-SCNC: 10.6 MMOL/L (ref 22–26)
HCO3 BLDV-SCNC: 15.5 MMOL/L (ref 23–28)
HCT VFR BLD AUTO: 28.7 % (ref 36–48)
HCT VFR BLD AUTO: 32.3 % (ref 36–48)
HCT VFR BLD CALC: 36 % (ref 36–49)
HDSCOM,HDSCOM: ABNORMAL
HGB BLD-MCNC: 11 G/DL (ref 13–16)
HGB BLD-MCNC: 12.2 G/DL (ref 12–16)
HGB BLD-MCNC: 9.5 G/DL (ref 13–16)
HGB UR QL STRIP: ABNORMAL
HIGH TARGET, HITG: 180 MG/DL
HYALINE CASTS URNS QL MICRO: ABNORMAL /LPF (ref 0–2)
INSULIN ADMINSTERED, INSADM: 0 UNITS/HOUR
INSULIN ADMINSTERED, INSADM: 0.6 UNITS/HOUR
INSULIN ADMINSTERED, INSADM: 1.8 UNITS/HOUR
INSULIN ADMINSTERED, INSADM: 1.9 UNITS/HOUR
INSULIN ADMINSTERED, INSADM: 2.7 UNITS/HOUR
INSULIN ADMINSTERED, INSADM: 5.4 UNITS/HOUR
INSULIN ORDER, INSORD: 0 UNITS/HOUR
INSULIN ORDER, INSORD: 0.6 UNITS/HOUR
INSULIN ORDER, INSORD: 1.8 UNITS/HOUR
INSULIN ORDER, INSORD: 1.9 UNITS/HOUR
INSULIN ORDER, INSORD: 2.7 UNITS/HOUR
INSULIN ORDER, INSORD: 5.4 UNITS/HOUR
KETONES UR QL STRIP.AUTO: ABNORMAL MG/DL
LEUKOCYTE ESTERASE UR QL STRIP.AUTO: ABNORMAL
LIPASE SERPL-CCNC: 45 U/L (ref 73–393)
LOW TARGET, LOT: 140 MG/DL
LYMPHOCYTES # BLD: 1.8 K/UL (ref 0.9–3.6)
LYMPHOCYTES # BLD: 1.8 K/UL (ref 0.9–3.6)
LYMPHOCYTES NFR BLD: 12 % (ref 21–52)
LYMPHOCYTES NFR BLD: 15 % (ref 21–52)
MAGNESIUM SERPL-MCNC: 1.6 MG/DL (ref 1.6–2.6)
MCH RBC QN AUTO: 30.8 PG (ref 24–34)
MCH RBC QN AUTO: 31.5 PG (ref 24–34)
MCHC RBC AUTO-ENTMCNC: 33.1 G/DL (ref 31–37)
MCHC RBC AUTO-ENTMCNC: 34.1 G/DL (ref 31–37)
MCV RBC AUTO: 92.6 FL (ref 74–97)
MCV RBC AUTO: 93.2 FL (ref 74–97)
METHADONE UR QL: NEGATIVE
MINUTES UNTIL NEXT BG, NBG: 60 MIN
MONOCYTES # BLD: 1 K/UL (ref 0.05–1.2)
MONOCYTES # BLD: 1.5 K/UL (ref 0.05–1.2)
MONOCYTES NFR BLD: 10 % (ref 3–10)
MONOCYTES NFR BLD: 8 % (ref 3–10)
MULTIPLIER, MUL: 0
MULTIPLIER, MUL: 0.01
MULTIPLIER, MUL: 0.02
MULTIPLIER, MUL: 0.02
NEUTS SEG # BLD: 11.9 K/UL (ref 1.8–8)
NEUTS SEG # BLD: 9.3 K/UL (ref 1.8–8)
NEUTS SEG NFR BLD: 77 % (ref 40–73)
NEUTS SEG NFR BLD: 77 % (ref 40–73)
NITRITE UR QL STRIP.AUTO: NEGATIVE
O2/TOTAL GAS SETTING VFR VENT: 0.21 %
OPIATES UR QL: POSITIVE
ORDER INITIALS, ORDINIT: NORMAL
PCO2 BLD: 22.8 MMHG (ref 35–45)
PCO2 BLD: 23.3 MMHG (ref 35–45)
PCO2 BLDV: 25.2 MMHG (ref 41–51)
PCP UR QL: NEGATIVE
PH BLD: 7.25 [PH] (ref 7.35–7.45)
PH BLD: 7.27 [PH] (ref 7.35–7.45)
PH BLDV: 7.39 [PH] (ref 7.32–7.42)
PH UR STRIP: 5.5 [PH] (ref 5–8)
PLATELET # BLD AUTO: 197 K/UL (ref 135–420)
PLATELET # BLD AUTO: 287 K/UL (ref 135–420)
PMV BLD AUTO: 10.3 FL (ref 9.2–11.8)
PMV BLD AUTO: 10.8 FL (ref 9.2–11.8)
PO2 BLD: 113 MMHG (ref 80–100)
PO2 BLD: 91 MMHG (ref 80–100)
PO2 BLDV: 25 MMHG (ref 25–40)
POTASSIUM BLD-SCNC: 4 MMOL/L (ref 3.5–5.5)
POTASSIUM SERPL-SCNC: 3.8 MMOL/L (ref 3.5–5.5)
POTASSIUM SERPL-SCNC: 4 MMOL/L (ref 3.5–5.5)
PROT SERPL-MCNC: 7.7 G/DL (ref 6.4–8.2)
PROT UR STRIP-MCNC: 100 MG/DL
RBC # BLD AUTO: 3.08 M/UL (ref 4.7–5.5)
RBC # BLD AUTO: 3.49 M/UL (ref 4.7–5.5)
RBC #/AREA URNS HPF: ABNORMAL /HPF (ref 0–5)
SAO2 % BLD: 96 % (ref 92–97)
SAO2 % BLD: 98 % (ref 92–97)
SAO2 % BLDV: 48 % (ref 65–88)
SERVICE CMNT-IMP: ABNORMAL
SODIUM BLD-SCNC: 132 MMOL/L (ref 136–145)
SODIUM SERPL-SCNC: 130 MMOL/L (ref 136–145)
SODIUM SERPL-SCNC: 137 MMOL/L (ref 136–145)
SP GR UR REFRACTOMETRY: 1.03 (ref 1–1.03)
SPECIMEN TYPE: ABNORMAL
TOTAL RESP. RATE, ITRR: 20
UROBILINOGEN UR QL STRIP.AUTO: 1 EU/DL (ref 0.2–1)
WBC # BLD AUTO: 12.2 K/UL (ref 4.6–13.2)
WBC # BLD AUTO: 15.3 K/UL (ref 4.6–13.2)
WBC URNS QL MICRO: ABNORMAL /HPF (ref 0–4)
YEAST URNS QL MICRO: ABNORMAL

## 2018-05-22 PROCEDURE — 82330 ASSAY OF CALCIUM: CPT | Performed by: NURSE PRACTITIONER

## 2018-05-22 PROCEDURE — 74011250637 HC RX REV CODE- 250/637: Performed by: EMERGENCY MEDICINE

## 2018-05-22 PROCEDURE — 74011250636 HC RX REV CODE- 250/636

## 2018-05-22 PROCEDURE — 74011000250 HC RX REV CODE- 250: Performed by: EMERGENCY MEDICINE

## 2018-05-22 PROCEDURE — 83735 ASSAY OF MAGNESIUM: CPT | Performed by: EMERGENCY MEDICINE

## 2018-05-22 PROCEDURE — 76705 ECHO EXAM OF ABDOMEN: CPT

## 2018-05-22 PROCEDURE — 87077 CULTURE AEROBIC IDENTIFY: CPT | Performed by: EMERGENCY MEDICINE

## 2018-05-22 PROCEDURE — 83036 HEMOGLOBIN GLYCOSYLATED A1C: CPT | Performed by: EMERGENCY MEDICINE

## 2018-05-22 PROCEDURE — 83036 HEMOGLOBIN GLYCOSYLATED A1C: CPT | Performed by: FAMILY MEDICINE

## 2018-05-22 PROCEDURE — 74011000258 HC RX REV CODE- 258: Performed by: EMERGENCY MEDICINE

## 2018-05-22 PROCEDURE — 80048 BASIC METABOLIC PNL TOTAL CA: CPT | Performed by: EMERGENCY MEDICINE

## 2018-05-22 PROCEDURE — 82803 BLOOD GASES ANY COMBINATION: CPT

## 2018-05-22 PROCEDURE — 82962 GLUCOSE BLOOD TEST: CPT

## 2018-05-22 PROCEDURE — 96365 THER/PROPH/DIAG IV INF INIT: CPT

## 2018-05-22 PROCEDURE — 93005 ELECTROCARDIOGRAM TRACING: CPT

## 2018-05-22 PROCEDURE — 74011250636 HC RX REV CODE- 250/636: Performed by: EMERGENCY MEDICINE

## 2018-05-22 PROCEDURE — 96376 TX/PRO/DX INJ SAME DRUG ADON: CPT

## 2018-05-22 PROCEDURE — 74011250637 HC RX REV CODE- 250/637: Performed by: NURSE PRACTITIONER

## 2018-05-22 PROCEDURE — 74176 CT ABD & PELVIS W/O CONTRAST: CPT

## 2018-05-22 PROCEDURE — 74011636637 HC RX REV CODE- 636/637: Performed by: EMERGENCY MEDICINE

## 2018-05-22 PROCEDURE — 87086 URINE CULTURE/COLONY COUNT: CPT | Performed by: EMERGENCY MEDICINE

## 2018-05-22 PROCEDURE — 82010 KETONE BODYS QUAN: CPT | Performed by: NURSE PRACTITIONER

## 2018-05-22 PROCEDURE — 77030032490 HC SLV COMPR SCD KNE COVD -B

## 2018-05-22 PROCEDURE — 85025 COMPLETE CBC W/AUTO DIFF WBC: CPT | Performed by: EMERGENCY MEDICINE

## 2018-05-22 PROCEDURE — 65660000005 HC RM ICU NEURO INTERMED STEPDOWN

## 2018-05-22 PROCEDURE — 81001 URINALYSIS AUTO W/SCOPE: CPT | Performed by: EMERGENCY MEDICINE

## 2018-05-22 PROCEDURE — 83690 ASSAY OF LIPASE: CPT | Performed by: EMERGENCY MEDICINE

## 2018-05-22 PROCEDURE — 74011000258 HC RX REV CODE- 258: Performed by: FAMILY MEDICINE

## 2018-05-22 PROCEDURE — 96366 THER/PROPH/DIAG IV INF ADDON: CPT

## 2018-05-22 PROCEDURE — 87186 SC STD MICRODIL/AGAR DIL: CPT | Performed by: EMERGENCY MEDICINE

## 2018-05-22 PROCEDURE — 99285 EMERGENCY DEPT VISIT HI MDM: CPT

## 2018-05-22 PROCEDURE — 96375 TX/PRO/DX INJ NEW DRUG ADDON: CPT

## 2018-05-22 PROCEDURE — 36415 COLL VENOUS BLD VENIPUNCTURE: CPT | Performed by: NURSE PRACTITIONER

## 2018-05-22 PROCEDURE — 80047 BASIC METABLC PNL IONIZED CA: CPT

## 2018-05-22 PROCEDURE — 80307 DRUG TEST PRSMV CHEM ANLYZR: CPT | Performed by: EMERGENCY MEDICINE

## 2018-05-22 PROCEDURE — 80053 COMPREHEN METABOLIC PANEL: CPT | Performed by: EMERGENCY MEDICINE

## 2018-05-22 PROCEDURE — 36600 WITHDRAWAL OF ARTERIAL BLOOD: CPT

## 2018-05-22 PROCEDURE — 74011250636 HC RX REV CODE- 250/636: Performed by: NURSE PRACTITIONER

## 2018-05-22 PROCEDURE — 96361 HYDRATE IV INFUSION ADD-ON: CPT

## 2018-05-22 PROCEDURE — 74011250636 HC RX REV CODE- 250/636: Performed by: FAMILY MEDICINE

## 2018-05-22 PROCEDURE — 80048 BASIC METABOLIC PNL TOTAL CA: CPT | Performed by: FAMILY MEDICINE

## 2018-05-22 RX ORDER — HEPARIN 100 UNIT/ML
500 SYRINGE INTRAVENOUS AS NEEDED
Status: DISCONTINUED | OUTPATIENT
Start: 2018-05-22 | End: 2018-05-22

## 2018-05-22 RX ORDER — SODIUM CHLORIDE 9 MG/ML
125 INJECTION, SOLUTION INTRAVENOUS CONTINUOUS
Status: DISCONTINUED | OUTPATIENT
Start: 2018-05-22 | End: 2018-05-23

## 2018-05-22 RX ORDER — DEXTROSE 50 % IN WATER (D50W) INTRAVENOUS SYRINGE
25-50 AS NEEDED
Status: DISCONTINUED | OUTPATIENT
Start: 2018-05-22 | End: 2018-05-31 | Stop reason: HOSPADM

## 2018-05-22 RX ORDER — LORAZEPAM 2 MG/ML
2 INJECTION INTRAMUSCULAR
Status: DISCONTINUED | OUTPATIENT
Start: 2018-05-22 | End: 2018-05-31 | Stop reason: HOSPADM

## 2018-05-22 RX ORDER — LORAZEPAM 1 MG/1
2 TABLET ORAL
Status: DISCONTINUED | OUTPATIENT
Start: 2018-05-22 | End: 2018-05-31 | Stop reason: HOSPADM

## 2018-05-22 RX ORDER — FENTANYL CITRATE 50 UG/ML
25 INJECTION, SOLUTION INTRAMUSCULAR; INTRAVENOUS
Status: COMPLETED | OUTPATIENT
Start: 2018-05-22 | End: 2018-05-22

## 2018-05-22 RX ORDER — HEPARIN 100 UNIT/ML
500 SYRINGE INTRAVENOUS AS NEEDED
Status: DISCONTINUED | OUTPATIENT
Start: 2018-05-22 | End: 2018-05-31 | Stop reason: HOSPADM

## 2018-05-22 RX ORDER — SODIUM CHLORIDE 0.9 % (FLUSH) 0.9 %
5-10 SYRINGE (ML) INJECTION EVERY 8 HOURS
Status: DISCONTINUED | OUTPATIENT
Start: 2018-05-22 | End: 2018-05-31 | Stop reason: HOSPADM

## 2018-05-22 RX ORDER — LORAZEPAM 2 MG/ML
1 INJECTION INTRAMUSCULAR
Status: DISCONTINUED | OUTPATIENT
Start: 2018-05-22 | End: 2018-05-31 | Stop reason: HOSPADM

## 2018-05-22 RX ORDER — SODIUM CHLORIDE 0.9 % (FLUSH) 0.9 %
5-10 SYRINGE (ML) INJECTION AS NEEDED
Status: DISCONTINUED | OUTPATIENT
Start: 2018-05-22 | End: 2018-05-31 | Stop reason: HOSPADM

## 2018-05-22 RX ORDER — ONDANSETRON 2 MG/ML
4 INJECTION INTRAMUSCULAR; INTRAVENOUS
Status: COMPLETED | OUTPATIENT
Start: 2018-05-22 | End: 2018-05-22

## 2018-05-22 RX ORDER — HYDROMORPHONE HYDROCHLORIDE 1 MG/ML
0.5 INJECTION, SOLUTION INTRAMUSCULAR; INTRAVENOUS; SUBCUTANEOUS
Status: DISCONTINUED | OUTPATIENT
Start: 2018-05-22 | End: 2018-05-23

## 2018-05-22 RX ORDER — DIPHENHYDRAMINE HYDROCHLORIDE 50 MG/ML
25 INJECTION, SOLUTION INTRAMUSCULAR; INTRAVENOUS
Status: COMPLETED | OUTPATIENT
Start: 2018-05-22 | End: 2018-05-22

## 2018-05-22 RX ORDER — HYDROCODONE BITARTRATE AND ACETAMINOPHEN 5; 325 MG/1; MG/1
2 TABLET ORAL
Status: COMPLETED | OUTPATIENT
Start: 2018-05-22 | End: 2018-05-22

## 2018-05-22 RX ORDER — HEPARIN SODIUM (PORCINE) LOCK FLUSH IV SOLN 100 UNIT/ML 100 UNIT/ML
500 SOLUTION INTRAVENOUS AS NEEDED
Status: DISCONTINUED | OUTPATIENT
Start: 2018-05-22 | End: 2018-05-22

## 2018-05-22 RX ORDER — SUCRALFATE 1 G/1
1 TABLET ORAL 4 TIMES DAILY
Qty: 30 TAB | Refills: 0 | Status: SHIPPED | OUTPATIENT
Start: 2018-05-22 | End: 2018-10-16

## 2018-05-22 RX ORDER — HYDROMORPHONE HYDROCHLORIDE 2 MG/ML
1 INJECTION, SOLUTION INTRAMUSCULAR; INTRAVENOUS; SUBCUTANEOUS
Status: ACTIVE | OUTPATIENT
Start: 2018-05-22 | End: 2018-05-22

## 2018-05-22 RX ORDER — LORAZEPAM 2 MG/ML
3 INJECTION INTRAMUSCULAR
Status: DISCONTINUED | OUTPATIENT
Start: 2018-05-22 | End: 2018-05-31 | Stop reason: HOSPADM

## 2018-05-22 RX ORDER — ONDANSETRON 2 MG/ML
4 INJECTION INTRAMUSCULAR; INTRAVENOUS
Status: DISCONTINUED | OUTPATIENT
Start: 2018-05-22 | End: 2018-05-31 | Stop reason: HOSPADM

## 2018-05-22 RX ORDER — LORAZEPAM 1 MG/1
1 TABLET ORAL
Status: DISCONTINUED | OUTPATIENT
Start: 2018-05-22 | End: 2018-05-31 | Stop reason: HOSPADM

## 2018-05-22 RX ORDER — DEXTROSE MONOHYDRATE AND SODIUM CHLORIDE 5; .45 G/100ML; G/100ML
75 INJECTION, SOLUTION INTRAVENOUS CONTINUOUS
Status: DISCONTINUED | OUTPATIENT
Start: 2018-05-22 | End: 2018-05-31 | Stop reason: HOSPADM

## 2018-05-22 RX ORDER — HYDROCODONE BITARTRATE AND ACETAMINOPHEN 5; 325 MG/1; MG/1
1 TABLET ORAL
Qty: 6 TAB | Refills: 0 | Status: SHIPPED | OUTPATIENT
Start: 2018-05-22 | End: 2018-05-31

## 2018-05-22 RX ORDER — HYDROMORPHONE HYDROCHLORIDE 1 MG/ML
1 INJECTION, SOLUTION INTRAMUSCULAR; INTRAVENOUS; SUBCUTANEOUS ONCE
Status: COMPLETED | OUTPATIENT
Start: 2018-05-22 | End: 2018-05-22

## 2018-05-22 RX ORDER — ACETAMINOPHEN 325 MG/1
650 TABLET ORAL
Status: DISCONTINUED | OUTPATIENT
Start: 2018-05-22 | End: 2018-05-31 | Stop reason: HOSPADM

## 2018-05-22 RX ORDER — HEPARIN SODIUM 5000 [USP'U]/ML
5000 INJECTION, SOLUTION INTRAVENOUS; SUBCUTANEOUS EVERY 8 HOURS
Status: DISCONTINUED | OUTPATIENT
Start: 2018-05-22 | End: 2018-05-31 | Stop reason: HOSPADM

## 2018-05-22 RX ORDER — MAGNESIUM SULFATE HEPTAHYDRATE 40 MG/ML
2 INJECTION, SOLUTION INTRAVENOUS ONCE
Status: COMPLETED | OUTPATIENT
Start: 2018-05-22 | End: 2018-05-22

## 2018-05-22 RX ORDER — FENTANYL CITRATE 50 UG/ML
50 INJECTION, SOLUTION INTRAMUSCULAR; INTRAVENOUS
Status: DISCONTINUED | OUTPATIENT
Start: 2018-05-22 | End: 2018-05-22

## 2018-05-22 RX ORDER — HYDROMORPHONE HYDROCHLORIDE 1 MG/ML
INJECTION, SOLUTION INTRAMUSCULAR; INTRAVENOUS; SUBCUTANEOUS
Status: COMPLETED
Start: 2018-05-22 | End: 2018-05-22

## 2018-05-22 RX ORDER — PROMETHAZINE HYDROCHLORIDE 25 MG/ML
INJECTION, SOLUTION INTRAMUSCULAR; INTRAVENOUS
Status: DISPENSED
Start: 2018-05-22 | End: 2018-05-22

## 2018-05-22 RX ORDER — PHENOBARBITAL WITH BELLADONNA ALKALOIDS 16.2; .1037; .0194; .0065 MG/5ML; MG/5ML; MG/5ML; MG/5ML
ELIXIR ORAL
Status: DISPENSED
Start: 2018-05-22 | End: 2018-05-22

## 2018-05-22 RX ORDER — MAGNESIUM SULFATE 100 %
4 CRYSTALS MISCELLANEOUS AS NEEDED
Status: DISCONTINUED | OUTPATIENT
Start: 2018-05-22 | End: 2018-05-31 | Stop reason: HOSPADM

## 2018-05-22 RX ORDER — LANOLIN ALCOHOL/MO/W.PET/CERES
325 CREAM (GRAM) TOPICAL 2 TIMES DAILY
Status: DISCONTINUED | OUTPATIENT
Start: 2018-05-22 | End: 2018-05-31 | Stop reason: HOSPADM

## 2018-05-22 RX ORDER — LIDOCAINE HYDROCHLORIDE 20 MG/ML
SOLUTION OROPHARYNGEAL
Status: DISPENSED
Start: 2018-05-22 | End: 2018-05-22

## 2018-05-22 RX ORDER — FENTANYL CITRATE 50 UG/ML
INJECTION, SOLUTION INTRAMUSCULAR; INTRAVENOUS
Status: DISPENSED
Start: 2018-05-22 | End: 2018-05-22

## 2018-05-22 RX ORDER — MAG HYDROX/ALUMINUM HYD/SIMETH 200-200-20
SUSPENSION, ORAL (FINAL DOSE FORM) ORAL
Status: DISPENSED
Start: 2018-05-22 | End: 2018-05-22

## 2018-05-22 RX ORDER — HYDROMORPHONE HYDROCHLORIDE 1 MG/ML
0.2 INJECTION, SOLUTION INTRAMUSCULAR; INTRAVENOUS; SUBCUTANEOUS
Status: DISCONTINUED | OUTPATIENT
Start: 2018-05-22 | End: 2018-05-22

## 2018-05-22 RX ORDER — HEPARIN 100 UNIT/ML
SYRINGE INTRAVENOUS
Status: DISPENSED
Start: 2018-05-22 | End: 2018-05-22

## 2018-05-22 RX ADMIN — DIPHENHYDRAMINE HYDROCHLORIDE 25 MG: 50 INJECTION, SOLUTION INTRAMUSCULAR; INTRAVENOUS at 06:27

## 2018-05-22 RX ADMIN — HYDROMORPHONE HYDROCHLORIDE 0.5 MG: 1 INJECTION, SOLUTION INTRAMUSCULAR; INTRAVENOUS; SUBCUTANEOUS at 22:11

## 2018-05-22 RX ADMIN — SODIUM CHLORIDE 1000 ML: 900 INJECTION, SOLUTION INTRAVENOUS at 01:19

## 2018-05-22 RX ADMIN — SODIUM CHLORIDE 1000 ML: 900 INJECTION, SOLUTION INTRAVENOUS at 06:00

## 2018-05-22 RX ADMIN — AMITRIPTYLINE HYDROCHLORIDE 75 MG: 50 TABLET, FILM COATED ORAL at 22:18

## 2018-05-22 RX ADMIN — HEPARIN SODIUM (PORCINE) LOCK FLUSH IV SOLN 100 UNIT/ML 500 UNITS: 100 SOLUTION at 05:30

## 2018-05-22 RX ADMIN — Medication 10 ML: at 13:18

## 2018-05-22 RX ADMIN — Medication 1 MG: at 08:59

## 2018-05-22 RX ADMIN — MAGNESIUM SULFATE HEPTAHYDRATE 2 G: 40 INJECTION, SOLUTION INTRAVENOUS at 02:07

## 2018-05-22 RX ADMIN — SODIUM CHLORIDE 1000 ML: 900 INJECTION, SOLUTION INTRAVENOUS at 02:48

## 2018-05-22 RX ADMIN — ONDANSETRON 4 MG: 2 INJECTION INTRAMUSCULAR; INTRAVENOUS at 17:43

## 2018-05-22 RX ADMIN — SODIUM CHLORIDE 1.8 UNITS/HR: 900 INJECTION, SOLUTION INTRAVENOUS at 10:19

## 2018-05-22 RX ADMIN — FENTANYL CITRATE 25 MCG: 50 INJECTION, SOLUTION INTRAMUSCULAR; INTRAVENOUS at 01:18

## 2018-05-22 RX ADMIN — PHENOBARBITAL, HYOSCYAMINE SULFATE, ATROPINE SULFATE, SCOPOLAMINE HYDROBROMIDE 50 ML: 16.2; .1037; .0194; .0065 ELIXIR ORAL at 03:45

## 2018-05-22 RX ADMIN — DEXTROSE MONOHYDRATE AND SODIUM CHLORIDE 150 ML/HR: 5; .45 INJECTION, SOLUTION INTRAVENOUS at 20:47

## 2018-05-22 RX ADMIN — PROMETHAZINE HYDROCHLORIDE 25 MG: 25 INJECTION INTRAMUSCULAR; INTRAVENOUS at 06:04

## 2018-05-22 RX ADMIN — SODIUM CHLORIDE 5.4 UNITS/HR: 900 INJECTION, SOLUTION INTRAVENOUS at 09:06

## 2018-05-22 RX ADMIN — PROMETHAZINE HYDROCHLORIDE 25 MG: 25 INJECTION INTRAMUSCULAR; INTRAVENOUS at 01:19

## 2018-05-22 RX ADMIN — Medication 1 MG: at 06:27

## 2018-05-22 RX ADMIN — HYDROCODONE BITARTRATE AND ACETAMINOPHEN 2 TABLET: 5; 325 TABLET ORAL at 03:41

## 2018-05-22 RX ADMIN — Medication 10 ML: at 22:18

## 2018-05-22 RX ADMIN — Medication 0.2 MG: at 13:16

## 2018-05-22 RX ADMIN — ONDANSETRON 4 MG: 2 SOLUTION INTRAMUSCULAR; INTRAVENOUS at 08:59

## 2018-05-22 RX ADMIN — HEPARIN SODIUM 5000 UNITS: 5000 INJECTION, SOLUTION INTRAVENOUS; SUBCUTANEOUS at 21:30

## 2018-05-22 RX ADMIN — SODIUM CHLORIDE 125 ML/HR: 900 INJECTION, SOLUTION INTRAVENOUS at 09:16

## 2018-05-22 RX ADMIN — HEPARIN SODIUM 5000 UNITS: 5000 INJECTION, SOLUTION INTRAVENOUS; SUBCUTANEOUS at 13:14

## 2018-05-22 RX ADMIN — HYDROMORPHONE HYDROCHLORIDE 0.5 MG: 1 INJECTION, SOLUTION INTRAMUSCULAR; INTRAVENOUS; SUBCUTANEOUS at 17:42

## 2018-05-22 RX ADMIN — INSULIN HUMAN 12 UNITS: 100 INJECTION, SOLUTION PARENTERAL at 01:22

## 2018-05-22 RX ADMIN — FOLIC ACID: 5 INJECTION, SOLUTION INTRAMUSCULAR; INTRAVENOUS; SUBCUTANEOUS at 02:48

## 2018-05-22 NOTE — IP AVS SNAPSHOT
[address] [address] 
[phone] Patient: Patti Khalil 
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 
[phone] Your Scheduled Appointments 2018  9:00 AM EDT TRANSITIONAL CARE MANAGEMENT with Priscilla Patino MD  
[facility]) [address] [id number]  
[phone] Discharge Orders None A check vidhi indicates which time of day the medication should be taken. My Medications START taking these medications Instructions Each Dose to Equal  
 Morning Noon Evening Bedtime  
 oxyCODONE-acetaminophen 5-325 mg per tablet Commonly known as:  PERCOCET Take 1 Tab by mouth every four (4) hours as needed for Pain. Max Daily Amount: 6 Tabs. 1 Tab CHANGE how you take these medications Instructions Each Dose to Equal  
 Morning Noon Evening Bedtime  
 insulin glargine 100 unit/mL injection Commonly known as:  LANTUS U-100 INSULIN What changed:  Another medication with the same name was removed.  Continue taking this medication, and follow the directions you see here. Your next dose is:  Evening 25 Units by SubCUTAneous route two (2) times a day. Indications: type 2 diabetes mellitus 25 Units CONTINUE taking these medications Instructions Each Dose to Equal  
 Morning Noon Evening Bedtime  
 amitriptyline 75 mg tablet Commonly known as:  ELAVIL Your next dose is: Tonight Take 1 Tab by mouth nightly. Indications: Depression 75 mg  
    
   
   
   
  
  
 ascorbic acid (vitamin C) 500 mg tablet Commonly known as:  VITAMIN C Your next dose is:  Tomorrow Take 1 Tab by mouth daily. Indications: VITAMIN C DEFICIENCY  
 500 mg  
    
  
   
   
   
  
 cholecalciferol (vitamin D3) 2,000 unit Tab Your next dose is:  Tomorrow Take 1 Tab by mouth daily. Indications: PREVENTION OF VITAMIN D DEFICIENCY  
 2000 Units CREON 12,000-38,000 -60,000 unit capsule Generic drug:  lipase-protease-amylase Your next dose is:  Evening Take 2 Caps by mouth three (3) times daily (with meals). Indications: exocrine pancreatic insufficiency, dosage change 2 Cap  
    
  
   
  
   
  
   
  
 cyanocobalamin 1,000 mcg sublingual tablet Commonly known as:  VITAMIN B-12 Your next dose is:  Tomorrow Take 2 Tabs by mouth daily. Indications: PREVENTION OF VITAMIN B12 DEFICIENCY  
 2000 mcg  
    
  
   
   
   
  
 ferrous sulfate 325 mg (65 mg iron) tablet Your next dose is:  Evening Take 1 Tab by mouth two (2) times a day. Indications: IRON DEFICIENCY ANEMIA  
 325 mg  
    
  
   
   
  
   
  
 folic acid 586 mcg tablet Your next dose is:  Tomorrow Take 1 Tab by mouth daily. 0.4 mg  
    
  
   
   
   
  
 insulin lispro 100 unit/mL injection Commonly known as:  HUMALOG  
   
 12 Units by SubCUTAneous route nightly. Indications: type 2 diabetes mellitus 12 Units melatonin 3 mg tablet Your next dose is: Tonight Take 1 Tab by mouth nightly. 3 mg  
    
   
   
   
  
  
 midodrine 10 mg tablet Commonly known as:  Vishnu Sails Your next dose is:  Evening Take 1 Tab by mouth three (3) times daily. Indications: Symptomatic Orthostatic Hypotension 10 mg  
    
  
   
  
   
  
   
  
 ondansetron 4 mg disintegrating tablet Commonly known as:  ZOFRAN ODT Take 1 Tab by mouth every eight (8) hours as needed for Nausea. 4 mg  
    
   
   
   
  
 pantoprazole 40 mg tablet Commonly known as:  PROTONIX Your next dose is:  Evening Take 1 Tab by mouth two (2) times a day. Indications: EROSIVE ESOPHAGITIS, gastroesophageal reflux disease 40 mg  
    
  
   
   
  
   
  
 promethazine 25 mg tablet Commonly known as:  PHENERGAN Take 1 Tab by mouth every six (6) hours as needed. 25 mg  
    
   
   
   
  
 sucralfate 1 gram tablet Commonly known as:  Donzell Handom Your next dose is:  Evening Take 1 Tab by mouth four (4) times daily. Indications: PREVENTION OF STRESS ULCER  
 1 g  
    
  
   
  
   
  
   
  
  
  
STOP taking these medications   
 acetaminophen 325 mg tablet Commonly known as:  TYLENOL  
   
  
 LORazepam 0.5 mg tablet Commonly known as:  ATIVAN  
   
  
 morphine CR 60 mg CR tablet Commonly known as:  MS CONTIN Where to Get Your Medications Information on where to get these meds will be given to you by the nurse or doctor. ! Ask your nurse or doctor about these medications  
  ondansetron 4 mg disintegrating tablet  
 oxyCODONE-acetaminophen 5-325 mg per tablet  
 promethazine 25 mg tablet  
 sucralfate 1 gram tablet Opioid Education  Prescription Opioids: What You Need to Know: 
 
 
What to do at Home: 
Recommended activity: Activity as tolerated, If you experience any of the following symptoms chest pain, nausea, vomiting or bleeding, please follow up with PCP. *  Please give a list of your current medications to your Primary Care Provider. *  Please update this list whenever your medications are discontinued, doses are 
    changed, or new medications (including over-the-counter products) are added. *  Please carry medication information at all times in case of emergency situations. These are general instructions for a healthy lifestyle: No smoking/ No tobacco products/ Avoid exposure to second hand smoke Surgeon General's Warning:  Quitting smoking now greatly reduces serious risk to your health. Obesity, smoking, and sedentary lifestyle greatly increases your risk for illness A healthy diet, regular physical exercise & weight monitoring are important for maintaining a healthy lifestyle You may be retaining fluid if you have a history of heart failure or if you experience any of the following symptoms:  Weight gain of 3 pounds or more overnight or 5 pounds in a week, increased swelling in our hands or feet or shortness of breath while lying flat in bed. Please call your doctor as soon as you notice any of these symptoms; do not wait until your next office visit. Recognize signs and symptoms of STROKE: 
 
F-face looks uneven A-arms unable to move or move unevenly S-speech slurred or non-existent T-time-call 911 as soon as signs and symptoms begin-DO NOT go Back to bed or wait to see if you get better-TIME IS BRAIN. Warning Signs of HEART ATTACK Call 911 if you have these symptoms: 
? Chest discomfort.  Most heart attacks involve discomfort in the center of the chest that lasts more than a few minutes, or that goes away and comes back. It can feel like uncomfortable pressure, squeezing, fullness, or pain. ? Discomfort in other areas of the upper body. Symptoms can include pain or discomfort in one or both arms, the back, neck, jaw, or stomach. ? Shortness of breath with or without chest discomfort. ? Other signs may include breaking out in a cold sweat, nausea, or lightheadedness. Don't wait more than five minutes to call [address]! Fast action can save your life. Calling 911 is almost always the fastest way to get lifesaving treatment. Emergency Medical Services staff can begin treatment when they arrive  up to an hour sooner than if someone gets to the hospital by car. The discharge information has been reviewed with the patient. The patient verbalized understanding. Discharge medications reviewed with the patient and appropriate educational materials and side effects teaching were provided. ___________________________________________________________________________________________________________________________________ Pancreatitis: Care Instructions Your Care Instructions The pancreas is an organ behind the stomach. It makes hormones and enzymes to help your body digest food. But if these enzymes attack the pancreas, it can get inflamed. This is called pancreatitis. Most cases are caused by gallstones or by heavy alcohol use. If you take care of yourself at home, it will help you get better. It will also help you avoid more problems with your pancreas. Follow-up care is a key part of your treatment and safety. Be sure to make and go to all appointments, and call your doctor if you are having problems. It's also a good idea to know your test results and keep a list of the medicines you take. How can you care for yourself at home? · Drink clear liquids and eat bland foods until you feel better.  Diann Perez foods include rice, dry toast, and crackers. They also include bananas and applesauce. · Eat a low-fat diet until your doctor says your pancreas is healed. · Do not drink alcohol. Tell your doctor if you need help to quit. Counseling, support groups, and sometimes medicines can help you stay sober. · Be safe with medicines. Read and follow all instructions on the label. ¨ If the doctor gave you a prescription medicine for pain, take it as prescribed. ¨ If you are not taking a prescription pain medicine, ask your doctor if you can take an over-the-counter medicine. · If your doctor prescribed antibiotics, take them as directed. Do not stop taking them just because you feel better. You need to take the full course of antibiotics. · Get extra rest until you feel better. To prevent future problems with your pancreas · Do not drink alcohol. · Tell your doctors and pharmacist that you've had pancreatitis. They can help you avoid medicines that may cause this problem again. When should you call for help? Call 911 anytime you think you may need emergency care. For example, call if: 
? · You vomit blood or what looks like coffee grounds. ? · Your stools are maroon or very bloody. ?Call your doctor now or seek immediate medical care if: 
? · You have new or worse belly pain. ? · Your stools are black and look like tar, or they have streaks of blood. ? · You are vomiting. ? Watch closely for changes in your health, and be sure to contact your doctor if: 
? · You do not get better as expected. Where can you learn more? Go to http://thiago-mikhail.info/. Enter D016 in the search box to learn more about \"Pancreatitis: Care Instructions. \" Current as of: May 12, 2017 Content Version: 11.4 © 0767-1241 [employer].  Care instructions adapted under license by [employer] (which disclaims liability or warranty for this information). If you have questions about a medical condition or this instruction, always ask your healthcare professional. [address] any warranty or liability for your use of this information. Introducing [facility]! [facility] introduces [email or web address] patient portal. Now you can access parts of your medical record, email your doctor's office, and request medication refills online. 1. In your internet browser, go to [email or web address] 2. Click on the First Time User? Click Here link in the Sign In box. You will see the New Member Sign Up page. 3. Enter your [email or web address] Access Code exactly as it appears below. You will not need to use this code after youve completed the sign-up process. If you do not sign up before the expiration date, you must request a new code. · [email or web address] Access Code: [email or web address] Expires: 7/14/2018  5:19 PM 
 
4. Enter the last four digits of your Social Security Number (xxxx) and Date of Birth (mm/dd/yyyy) as indicated and click Submit. You will be taken to the next sign-up page. 5. Create a [email or web address] ID. This will be your [email or web address] login ID and cannot be changed, so think of one that is secure and easy to remember. 6. Create a [email or web address] password. You can change your password at any time. 7. Enter your Password Reset Question and Answer. This can be used at a later time if you forget your password. 8. Enter your e-mail address. You will receive e-mail notification when new information is available in [address] 9. Click Sign Up. You can now view and download portions of your medical record. 10. Click the Download Summary menu link to download a portable copy of your medical information. If you have questions, please visit the Frequently Asked Questions section of the [email or web address] website. Remember, [email or web address] is NOT to be used for urgent needs. For medical emergencies, dial 911. Now available from your iPhone and Android! Introducing Michael Cherry As a [facility] patient, I wanted to make you aware of our electronic visit tool called Michael Cherry. [facility] allows you to connect within minutes with a medical provider 24 hours a day, seven days a week via a mobile device or tablet or logging into a secure website from your computer. You can access Michael Cherry from anywhere in the [place]. A virtual visit might be right for you when you have a simple condition and feel like you just dont want to get out of bed, or cant get away from work for an appointment, when your regular [facility] provider is not available (evenings, weekends or holidays), or when youre out of town and need minor care. Electronic visits cost only $49 and if the [facility] provider determines a prescription is needed to treat your condition, one can be electronically transmitted to a nearby pharmacy*. Please take a moment to enroll today if you have not already done so. The enrollment process is free and takes just a few minutes. To enroll, please download the [facility] akira to your tablet or phone, or visit [email or web address] to enroll on your computer. And, as an [address] [place] [address] patient with a [employer] account, the results of your visits will be scanned into your electronic medical record and your primary care provider will be able to view the scanned results. We urge you to continue to see your regular [facility] provider for your ongoing medical care. And while your primary care provider may not be the one available when you seek a Micheal Cherry virtual visit, the peace of mind you get from getting a real diagnosis real time can be priceless. For more information on Michael Cherry, view our Frequently Asked Questions (FAQs) at [email or web address]. Sincerely, 
 
Eleanor Ardon MD 
Chief Medical Officer [employer] *:  certain medications cannot be prescribed via Michael Cherry Providers Seen During Your Hospitalization Provider Specialty Primary office phone Mallory Keane MD Emergency Medicine [phone] Shoshana Koyanagi, MD Emergency Medicine [phone] Sia Ramírez DO Internal Medicine [phone] Seun Paige MD Internal Medicine [phone] Noel Gottlieb MD [facility] [phone] Janette Cruz MD [facility] [phone] Marko Evans MD Internal Medicine [phone] Your Primary Care Physician (PCP) Primary Care Physician Office Phone Office Fax [address] [address] [phone] You are allergic to the following Allergen Reactions Ampicillin-Sulbactam Rash Pcn (Penicillins) Itching Hives Piperacillin-Tazobactam Rash Pollen Extracts Rash Seafood (Shellfish Containing Products) Hives Other reaction(s): unknown Has tolerated iohexol (iodine-containing contrast) Only shrimp Shrimp Recent Documentation Height Weight BMI Smoking Status 1.829 m 61.2 kg 18.31 kg/m2 Current Every Day Smoker Emergency Contacts Name Discharge Info Relation Home Work Mobile Daphnie Carbajal DISCHARGE CAREGIVER [3] Guardian [19] [phone] Patient Belongings The following personal items are in your possession at time of discharge: 
     Visual Aid: None             Clothing: At bedside, Footwear, Pants, Shirt, Socks Discharge Instructions Attachments/References CHRONIC PAIN (ENGLISH) DEHYDRATION (ENGLISH) HYPERGLYCEMIA: GENERAL INFO (ENGLISH) GASTRITIS (ENGLISH) BILIARY DRAIN CARE: GENERAL INFO (ENGLISH) Patient Handouts Chronic Pain: Care Instructions Your Care Instructions Chronic pain is pain that lasts a long time (months or even years) and may or may not have a clear cause. It is different from acute pain, which usually does have a clear cause-like an injury or illness-and gets better over time. Chronic pain: 
· Lasts over time but may vary from day to day. · Does not go away despite efforts to end it. · May disrupt your sleep and lead to fatigue. · May cause depression or anxiety. · May make your muscles tense, causing more pain. · Can disrupt your work, hobbies, home life, and relationships with friends and family. Chronic pain is a very real condition. It is not just in your head. Treatment can help and usually includes several methods used together, such as medicines, physical therapy, exercise, and other treatments. Learning how to relax and changing negative thought patterns can also help you cope. Chronic pain is complex. Taking an active role in your treatment will help you better manage your pain. Tell your doctor if you have trouble dealing with your pain. You may have to try several things before you find what works best for you. Follow-up care is a key part of your treatment and safety. Be sure to make and go to all appointments, and call your doctor if you are having problems. It's also a good idea to know your test results and keep a list of the medicines you take. How can you care for yourself at home? · Pace yourself. Break up large jobs into smaller tasks. Save harder tasks for days when you have less pain, or go back and forth between hard tasks and easier ones. Take rest breaks. · Relax, and reduce stress. Relaxation techniques such as deep breathing or meditation can help. · Keep moving. Gentle, daily exercise can help reduce pain over the long run. Try low- or no-impact exercises such as walking, swimming, and stationary biking. Do stretches to stay flexible. · Try heat, cold packs, and massage. · Get enough sleep. Chronic pain can make you tired and drain your energy. Talk with your doctor if you have trouble sleeping because of pain. · Think positive. Your thoughts can affect your pain level. Do things that you enjoy to distract yourself when you have pain instead of focusing on the pain. See a movie, read a book, listen to music, or spend time with a friend. · If you think you are depressed, talk to your doctor about treatment. · Keep a daily pain diary. Record how your moods, thoughts, sleep patterns, activities, and medicine affect your pain. You may find that your pain is worse during or after certain activities or when you are feeling a certain emotion. Having a record of your pain can help you and your doctor find the best ways to treat your pain. · Take pain medicines exactly as directed. ¨ If the doctor gave you a prescription medicine for pain, take it as prescribed. ¨ If you are not taking a prescription pain medicine, ask your doctor if you can take an over-the-counter medicine. Reducing constipation caused by pain medicine · Include fruits, vegetables, beans, and whole grains in your diet each day. These foods are high in fiber. · Drink plenty of fluids, enough so that your urine is light yellow or clear like water. If you have kidney, heart, or liver disease and have to limit fluids, talk with your doctor before you increase the amount of fluids you drink. · If your doctor recommends it, get more exercise. Walking is a good choice. Bit by bit, increase the amount you walk every day. Try for at least 30 minutes on most days of the week. · Schedule time each day for a bowel movement. A daily routine may help. Take your time and do not strain when having a bowel movement. When should you call for help? Call your doctor now or seek immediate medical care if: 
? · Your pain gets worse or is out of control. ? · You feel down or blue, or you do not enjoy things like you once did. You may be depressed, which is common in people with chronic pain. Depression can be treated. ? · You have vomiting or cramps for more than 2 hours. ? Watch closely for changes in your health, and be sure to contact your doctor if: 
? · You cannot sleep because of pain. ? · You are very worried or anxious about your pain. ? · You have trouble taking your pain medicine. ? · You have any concerns about your pain medicine. ? · You have trouble with bowel movements, such as: 
¨ No bowel movement in 3 days. ¨ Blood in the anal area, in your stool, or on the toilet paper. ¨ Diarrhea for more than 24 hours. Where can you learn more? Go to http://thiago-mikhail.info/. Enter N004 in the search box to learn more about \"Chronic Pain: Care Instructions. \" Current as of: October 14, 2016 Content Version: 11.4 © 1128-9190 [employer]. Care instructions adapted under license by [employer] (which disclaims liability or warranty for this information). If you have questions about a medical condition or this instruction, always ask your healthcare professional. [address] [address] any warranty or liability for your use of this information. Dehydration: Care Instructions Your Care Instructions Dehydration happens when your body loses too much fluid. This might happen when you do not drink enough water or you lose large amounts of fluids from your body because of diarrhea, vomiting, or sweating. Severe dehydration can be life-threatening. Water and minerals called electrolytes help put your body fluids back in balance. Learn the early signs of fluid loss, and drink more fluids to prevent dehydration. Follow-up care is a key part of your treatment and safety. Be sure to make and go to all appointments, and call your doctor if you are having problems. It's also a good idea to know your test results and keep a list of the medicines you take. How can you care for yourself at home? · To prevent dehydration, drink plenty of fluids, enough so that your urine is light yellow or clear like water. Choose water and other caffeine-free clear liquids until you feel better. If you have kidney, heart, or liver disease and have to limit fluids, talk with your doctor before you increase the amount of fluids you drink. · If you do not feel like eating or drinking, try taking small sips of water, sports drinks, or other rehydration drinks. · Get plenty of rest. 
To prevent dehydration · Add more fluids to your diet and daily routine, unless your doctor has told you not to. · During hot weather, drink more fluids. Drink even more fluids if you exercise a lot. Stay away from drinks with alcohol or caffeine. · Watch for the symptoms of dehydration. These include: ¨ A dry, sticky mouth. ¨ Dark yellow urine, and not much of it. ¨ Dry and sunken eyes. ¨ Feeling very tired. · Learn what problems can lead to dehydration. These include: ¨ Diarrhea, fever, and vomiting. ¨ Any illness with a fever, such as pneumonia or the flu. ¨ Activities that cause heavy sweating, such as endurance races and heavy outdoor work in hot or humid weather. ¨ Alcohol or drug abuse or withdrawal. 
¨ Certain medicines, such as cold and allergy pills (antihistamines), diet pills (diuretics), and laxatives. ¨ Certain diseases, such as diabetes, cancer, and heart or kidney disease. When should you call for help? Call 911 anytime you think you may need emergency care. For example, call if: 
? · You passed out (lost consciousness). ?Call your doctor now or seek immediate medical care if: 
? · You are confused and cannot think clearly. ? · You are dizzy or lightheaded, or you feel like you may faint. ? · You have signs of needing more fluids. You have sunken eyes and a dry mouth, and you pass only a little dark urine. ? · You cannot keep fluids down. ?Watch closely for changes in your health, and be sure to contact your doctor if: 
? · You are not making tears. ? · Your skin is very dry and sags slowly back into place after you pinch it. ? · Your mouth and eyes are very dry. Where can you learn more? Go to http://thiago-mkihail.info/. Enter B828 in the search box to learn more about \"Dehydration: Care Instructions. \" Current as of: March 20, 2017 Content Version: 11.4 © 1339-8792 [employer]. Care instructions adapted under license by [employer] (which disclaims liability or warranty for this information). If you have questions about a medical condition or this instruction, always ask your healthcare professional. [address] any warranty or liability for your use of this information. Learning About High Blood Sugar What is high blood sugar? Your body turns the food you eat into glucose (sugar), which it uses for energy. But if your body isn't able to use the sugar right away, it can build up in your blood and lead to high blood sugar. When the amount of sugar in your blood stays too high for too much of the time, you may have diabetes. Diabetes is a disease that can cause serious health problems. The good news is that lifestyle changes may help you get your blood sugar back to normal and avoid or delay diabetes. What causes high blood sugar? Sugar (glucose) can build up in your blood if you: · Are overweight. · Have a family history of diabetes. · Take certain medicines, such as steroids. What are the symptoms? Having high blood sugar may not cause any symptoms at all. Or it may make you feel very thirsty or very hungry. You may also urinate more often than usual, have blurry vision, or lose weight without trying. How is high blood sugar treated?  
You can take steps to lower your blood sugar level if you understand what makes it get higher. Your doctor may want you to learn how to test your blood sugar level at home. Then you can see how illness, stress, or different kinds of food or medicine raise or lower your blood sugar level. Other tests may be needed to see if you have diabetes. How can you prevent high blood sugar? · Watch your weight. If you're overweight, losing just a small amount of weight may help. Reducing fat around your waist is most important. · Limit the amount of calories, sweets, and unhealthy fat you eat. Ask your doctor if a dietitian can help you. A registered dietitian can help you create meal plans that fit your lifestyle. · Get at least 30 minutes of exercise on most days of the week. Exercise helps control your blood sugar. It also helps you maintain a healthy weight. Walking is a good choice. You also may want to do other activities, such as running, swimming, cycling, or playing tennis or team sports. · If your doctor prescribed medicines, take them exactly as prescribed. Call your doctor if you think you are having a problem with your medicine. You will get more details on the specific medicines your doctor prescribes. Follow-up care is a key part of your treatment and safety. Be sure to make and go to all appointments, and call your doctor if you are having problems. It's also a good idea to know your test results and keep a list of the medicines you take. Where can you learn more? Go to http://thiago-mikhail.info/. Enter O108 in the search box to learn more about \"Learning About High Blood Sugar. \" Current as of: March 13, 2017 Content Version: 11.4 © 9968-9187 [employer]. Care instructions adapted under license by [employer] (which disclaims liability or warranty for this information).  If you have questions about a medical condition or this instruction, always ask your healthcare professional. [facility] disclaims any warranty or liability for your use of this information. Gastritis: Care Instructions Your Care Instructions Gastritis is a sore and upset stomach. It happens when something irritates the stomach lining. Many things can cause it. These include an infection such as the flu or something you ate or drank. Medicines or a sore on the lining of the stomach (ulcer) also can cause it. Your belly may bloat and ache. You may belch, vomit, and feel sick to your stomach. You should be able to relieve the problem by taking medicine. And it may help to change your diet. If gastritis lasts, your doctor may prescribe medicine. Follow-up care is a key part of your treatment and safety. Be sure to make and go to all appointments, and call your doctor if you are having problems. It's also a good idea to know your test results and keep a list of the medicines you take. How can you care for yourself at home? · If your doctor prescribed antibiotics, take them as directed. Do not stop taking them just because you feel better. You need to take the full course of antibiotics. · Be safe with medicines. If your doctor prescribed medicine to decrease stomach acid, take it as directed. Call your doctor if you think you are having a problem with your medicine. · Do not take any other medicine, including over-the-counter pain relievers, without talking to your doctor first. 
· If your doctor recommends over-the-counter medicine to reduce stomach acid, such as Pepcid AC, Prilosec, Tagamet HB, or Zantac 75, follow the directions on the label. · Drink plenty of fluids (enough so that your urine is light yellow or clear like water) to prevent dehydration. Choose water and other caffeine-free clear liquids. If you have kidney, heart, or liver disease and have to limit fluids, talk with your doctor before you increase the amount of fluids you drink. · Limit how much alcohol you drink. · Avoid coffee, tea, cola drinks, chocolate, and other foods with caffeine. They increase stomach acid. When should you call for help? Call 911 anytime you think you may need emergency care. For example, call if: 
? · You vomit blood or what looks like coffee grounds. ? · You pass maroon or very bloody stools. ?Call your doctor now or seek immediate medical care if: 
? · You start breathing fast and have not produced urine in the last 8 hours. ? · You cannot keep fluids down. ? Watch closely for changes in your health, and be sure to contact your doctor if: 
? · You do not get better as expected. Where can you learn more? Go to http://thiago-mikhail.info/. Enter [id number] in the search box to learn more about \"Gastritis: Care Instructions. \" Current as of: May 12, 2017 Content Version: 11.4 © 2292-2683 [employer]. Care instructions adapted under license by [employer] (which disclaims liability or warranty for this information). If you have questions about a medical condition or this instruction, always ask your healthcare professional. [place] [address] any warranty or liability for your use of this information. Learning About [address] What is a biliary drain? A biliary drain allows bile to flow out from a blocked bile duct into a collection bag outside the body. Bile is a liquid made by the liver. It helps digest fats. Blocked or narrowed bile ducts can stop the flow of bile and cause yellowing of the skin (jaundice) or an infection of the liver. The drain is a thin plastic tube (catheter) that the doctor places in the bile duct. From there the tube passes out through a drain site on your skin and into a collection bag. The bag may be attached to a belt or strapped to the leg. About 2 to 4 cups of bile will collect in the bag each day. The amount should be fairly constant from day to day. The bile that comes out of the drain may look bloody at first, but it will soon change to its normal yellow-green color. How long the drain stays in place depends on what caused the problem with your bile duct. Your doctor will discuss this with you. How can you care for your drain at home? Your doctor or ostomy nurse can answer any questions you have about caring for your drain or bag. Caring for the drain site You may have a bandage on your skin where the tube comes out of your body. Your doctor will tell you how often to change it. To change the bandage: 
1. Wash your hands with soap and water. 2. Take off the bandage from around the drain site. 3. Clean the drain site and the skin around it with soap and water. Use gauze or a cotton swab. 4. When the site is dry, you can put on a new bandage. First, cut a slit in the bandage, and then fit it around the drain site. Caring for the tube To keep the tube clear, flush it with sterile saline. Your doctor will tell you how and when to do this. Caring for the bag 
Empty the bile from your bag when it's about 2/3 full, or at least once a day. 1. Wash your hands with soap and water. 2. If your doctor requested it, make a note of the amount of bile in the bag. 
3. Open the drainage port at the bottom of the bag. 
4. Empty the contents of the bag into the toilet. 5. Clean the drainage port with soap and water, and close it. 6. Wash your hands again with soap and water. If the bag breaks or tears, replace it as soon as possible. When should you call for help? Call your doctor now or seek immediate medical care if: 
· You have symptoms of infection, such as: 
¨ Increased pain, swelling, warmth, or redness. ¨ Red streaks leading from the drain site. ¨ Pus draining from the drain site. ¨ A fever. · There is a new or increasing yellow tint to your skin or the whites of your eyes. · You see a sudden change in the color or smell of the drainage. · The tube is coming loose at the drain site. · You have new or worse belly pain. · You have a fever. · You are vomiting. · You cannot pass stools or gas. Watch closely for changes in your health, and be sure to contact your doctor if: 
· Drainage stops coming out of the tube. · You do not get better as expected. Follow-up care is a key part of your treatment and safety. Be sure to make and go to all appointments, and call your doctor if you are having problems. It's also a good idea to know your test results and keep a list of the medicines you take. Where can you learn more? Go to http://thiago-mikhail.info/. Enter R750 in the search box to learn more about \"Learning About Biliary Drain Care. \" Current as of: May 12, 2017 Content Version: 11.4 © 1397-5867 Healthwise, Incorporated. Care instructions adapted under license by [employer] (which disclaims liability or warranty for this information). If you have questions about a medical condition or this instruction, always ask your healthcare professional. [address] any warranty or liability for your use of this information. Please provide this summary of care documentation to your next provider. Signatures-by signing, you are acknowledging that this After Visit Summary has been reviewed with you and you have received a copy. Patient Signature:  ____________________________________________________________ Date:  ____________________________________________________________  
  
Chris Newby Provider Signature:  ____________________________________________________________ Date:  ____________________________________________________________

## 2018-05-22 NOTE — ED TRIAGE NOTES
Patient arrived via EMS. Patient found sitting against wall at [address]. C/O feeling weak, has n/v/d and abdominal pain x 3-4 days.

## 2018-05-22 NOTE — ED NOTES
Changed patient's linen, NATALIE drain to right flank area with yellow drainage coming from around tube, DR Fremont Schwab to bedside and aware.

## 2018-05-22 NOTE — ED NOTES
TRANSFER - ED to INPATIENT REPORT:    SBAR report made available to receiving floor on this patient being transferred to  [phone]  for routine progression of care       Admitting diagnosis Hyperglycemia    Information from the following report(s) ED Summary was made available to receiving floor. Lines:   Venous Access Device left chest mediport  Upper chest (subclavicular area), left (Active)   Central Line Being Utilized Yes 5/22/2018  1:00 AM   Site Assessment Clean, dry, & intact 5/22/2018  1:00 AM   Dressing Status Clean, dry, & intact 5/22/2018  1:00 AM   Dressing Type Transparent 5/22/2018  1:00 AM   Date Accessed (Medial Site) 05/22/18 5/22/2018  1:00 AM   Access Time (Medial Site) 0100 5/22/2018  1:00 AM   Access Needle Length (Medial Site) 0.75 inches 5/22/2018  1:00 AM   Positive Blood Return (Medial Site) Yes 5/22/2018  1:00 AM   Action Taken (Medial Site) Blood drawn;Flushed 5/22/2018  1:00 AM        Medication list unable to confirm    Opportunity for questions and clarification was provided.       Patient is oriented to time, place, person and situation High alert med  Patient is  continent and ambulatory without assist     Valuables transported with patient     Patient transported with:   Registered Nurse  Tech

## 2018-05-22 NOTE — ED NOTES
6:18 AM :Pt care assumed from Dr. Lolly Dillard , ED provider. Pt complaint(s), current treatment plan, progression and available diagnostic results have been discussed thoroughly. Rounding occurred: yes  Intended Disposition: TBD   Pending diagnostic reports and/or labs (please list): follow up on labs    Provider Notes/Consults:  Consult:  Discussed care with Dr. Liss Muñoz, Specialty: Hospitalist  Standard discussion; including history of patients chief complaint, available diagnostic results, and treatment course. Agrees with admission  8:31 AM, 5/22/2018     IV blood draw using US guidance     Labs reviewed CO2 decreasing will start glucose stabilizer continue fluids consider admission  Diagnosis:   1. Chronic abdominal pain    2. Acute kidney injury ([address]    3. Dehydration    4. Acute hyperglycemia          Disposition: admit      Follow-up Information     Follow up With Details Comments Contact Info    follow up with your regular doctors as soon as possible             Patient's Medications   Start Taking    HYDROCODONE-ACETAMINOPHEN (NORCO) 5-325 MG PER TABLET    Take 1 Tab by mouth every six (6) hours as needed for Pain. Max Daily Amount: 4 Tabs. Continue Taking    AMITRIPTYLINE (ELAVIL) 75 MG TABLET    Take 1 Tab by mouth nightly. Indications: Depression    ASCORBIC ACID, VITAMIN C, (VITAMIN C) 500 MG TABLET    Take 1 Tab by mouth daily. Indications: VITAMIN C DEFICIENCY    CHOLECALCIFEROL, VITAMIN D3, 2,000 UNIT TAB    Take 1 Tab by mouth daily. Indications: PREVENTION OF VITAMIN D DEFICIENCY    CYANOCOBALAMIN (VITAMIN B-12) 1,000 MCG SUBLINGUAL TABLET    Take 2 Tabs by mouth daily. Indications: PREVENTION OF VITAMIN B12 DEFICIENCY    FERROUS SULFATE 325 MG (65 MG IRON) TABLET    Take 1 Tab by mouth two (2) times a day. Indications: IRON DEFICIENCY ANEMIA    FOLIC ACID 027 MCG TABLET    Take 1 Tab by mouth daily.     INSULIN GLARGINE (LANTUS) 100 UNIT/ML INJECTION    25 Units by SubCUTAneous route two (2) times a day. Indications: type 2 diabetes mellitus    INSULIN GLARGINE (LANTUS) 100 UNIT/ML INJECTION    25 Units by SubCUTAneous route two (2) times a day. INSULIN LISPRO (HUMALOG) 100 UNIT/ML INJECTION    12 Units by SubCUTAneous route nightly. Indications: type 2 diabetes mellitus    LIPASE-PROTEASE-AMYLASE (CREON) 12,000-38,000 -60,000 UNIT CAPSULE    Take 2 Caps by mouth three (3) times daily (with meals). Indications: exocrine pancreatic insufficiency, dosage change    MELATONIN 3 MG TABLET    Take 1 Tab by mouth nightly. MIDODRINE (PROAMITINE) 10 MG TABLET    Take 1 Tab by mouth three (3) times daily. Indications: Symptomatic Orthostatic Hypotension    ONDANSETRON (ZOFRAN ODT) 4 MG DISINTEGRATING TABLET    Take 1 Tab by mouth every eight (8) hours as needed for Nausea. PANTOPRAZOLE (PROTONIX) 40 MG TABLET    Take 1 Tab by mouth two (2) times a day. Indications: EROSIVE ESOPHAGITIS, gastroesophageal reflux disease    PROMETHAZINE (PHENERGAN) 25 MG TABLET    Take 1 Tab by mouth every six (6) hours as needed. These Medications have changed    Modified Medication Previous Medication    SUCRALFATE (CARAFATE) 1 GRAM TABLET sucralfate (CARAFATE) 1 gram tablet       Take 1 Tab by mouth four (4) times daily. Indications: PREVENTION OF STRESS ULCER    Take 1 Tab by mouth four (4) times daily. Indications: PREVENTION OF STRESS ULCER   Stop Taking    ACETAMINOPHEN (TYLENOL) 325 MG TABLET    Take 1 Tab by mouth every four (4) hours as needed for Pain. LORAZEPAM (ATIVAN) 0.5 MG TABLET    Take 1 Tab by mouth every eight (8) hours as needed. Max Daily Amount: 1.5 mg. MORPHINE CR (MS CONTIN) 60 MG CR TABLET    Take 1 Tab by mouth every twelve (12) hours. Max Daily Amount: 120 mg.

## 2018-05-22 NOTE — H&P
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 He notes N/V however no hematemesis. He notes he has been taking his insulin. Does not see GI and per [facility]. Upon arrival patient was found to have BS in 500's. He deneis any sob, cp, fever, chills. [address] will be admitted for further evaluation. Past Medical History:   Diagnosis Date    Abdominal pain, generalized     Chronic pancreatitis ([address]     Diabetes ([facility] [address]     hypothyroid    Encounter for long-term (current) use of other medications     Essential hypertension     ETOH abuse     GERD (gastroesophageal reflux disease)     Heart failure (HCC)     Hyponatremia     Pain in the abdomen 11/2/2010    Pancreatitis     w/ abscess and pseudocyst    Pancreatitis     Psychiatric disorder     depression, anxiety    Tobacco abuse       Past Surgical History:   Procedure Laterality Date    HX ABDOMINAL LAPAROSCOPY      PANCREATIC STENT    HX CHOLECYSTECTOMY        Prior to Admission medications    Medication Sig Start Date End Date Taking? Authorizing Provider   sucralfate (CARAFATE) 1 gram tablet Take 1 Tab by mouth four (4) times daily. Indications: PREVENTION OF STRESS ULCER 5/22/18  Yes Leroy Thompson MD   HYDROcodone-acetaminophen [facility]) 5-325 mg per tablet Take 1 Tab by mouth every six (6) hours as needed for Pain. Max Daily Amount: 4 Tabs. 5/22/18  Yes Leroy Thompson MD   ondansetron (ZOFRAN ODT) 4 mg disintegrating tablet Take 1 Tab by mouth every eight (8) hours as needed for Nausea.  5/21/18   Sydni Kathleen MD   promethazine (PHENERGAN) 25 mg tablet Take 1 Tab by mouth every six (6) hours as needed. 5/21/18   Candy Davenport MD   insulin glargine (LANTUS) 100 unit/mL injection 25 Units by SubCUTAneous route two (2) times a day. 4/26/18   Darlene Olson DO   amitriptyline (ELAVIL) 75 mg tablet Take 1 Tab by mouth nightly. Indications: Depression 8/9/17   Sailaja Hendrickson NP   ascorbic acid, vitamin C, (VITAMIN C) 500 mg tablet Take 1 Tab by mouth daily. Indications: VITAMIN C DEFICIENCY 8/9/17   Sailaja Hendrickson NP   cholecalciferol, vitamin D3, 2,000 unit tab Take 1 Tab by mouth daily. Indications: PREVENTION OF VITAMIN D DEFICIENCY 8/9/17   Sailaja Hendrickson NP   cyanocobalamin (VITAMIN B-12) 1,000 mcg sublingual tablet Take 2 Tabs by mouth daily. Indications: PREVENTION OF VITAMIN B12 DEFICIENCY 8/9/17   Sailaja Hendrickson NP   ferrous sulfate 325 mg (65 mg iron) tablet Take 1 Tab by mouth two (2) times a day. Indications: IRON DEFICIENCY ANEMIA 8/9/17   Sailaja Hendrickson NP   folic acid 093 mcg tablet Take 1 Tab by mouth daily. 8/9/17   Sailaja Hendrickson NP   insulin glargine (LANTUS) 100 unit/mL injection 25 Units by SubCUTAneous route two (2) times a day. Indications: type 2 diabetes mellitus 8/9/17   Sailaja Hendrickson NP   insulin lispro (HUMALOG) 100 unit/mL injection 12 Units by SubCUTAneous route nightly. Indications: type 2 diabetes mellitus 8/9/17   Sailaja Hendrickson NP   melatonin 3 mg tablet Take 1 Tab by mouth nightly. 8/9/17   Sailaja Hendrickson NP   midodrine (PROAMITINE) 10 mg tablet Take 1 Tab by mouth three (3) times daily. Indications: Symptomatic Orthostatic Hypotension 8/9/17   Sailaja Hendrickson NP   lipase-protease-amylase (CREON) 12,000-38,000 -60,000 unit capsule Take 2 Caps by mouth three (3) times daily (with meals).  Indications: exocrine pancreatic insufficiency, dosage change 7/28/17   Menard Riser, NP   pantoprazole (PROTONIX) 40 mg tablet Take 1 Tab by mouth two (2) times a day. Indications: EROSIVE ESOPHAGITIS, gastroesophageal reflux disease 7/28/17   Helga Life, NP     Allergies   Allergen Reactions    Ampicillin-Sulbactam Rash    Pcn [Penicillins] Itching and Hives    Piperacillin-Tazobactam Rash    Pollen Extracts Rash    Seafood [Shellfish Containing Products] Hives     Other reaction(s): unknown  Has tolerated iohexol (iodine-containing contrast)  Only shrimp  Shrimp      Social History   Substance Use Topics    Smoking status: Current Every Day Smoker     Packs/day: 0.50     Years: 0.00     Types: Cigarettes    Smokeless tobacco: Never Used    Alcohol use Yes      Family History   Problem Relation Age of Onset    Heart Disease Father       Review of Systems   Constitutional: Positive for appetite change. Negative for diaphoresis, fatigue and fever. HENT: Negative. Eyes: Negative. Respiratory: Negative. Cardiovascular: Negative. Gastrointestinal: Positive for abdominal pain, diarrhea, nausea and vomiting. Negative for anal bleeding, blood in stool, constipation and rectal pain. Genitourinary: Negative. Skin: Negative. Neurological: Negative. Psychiatric/Behavioral: Negative.         Objective:          Patient Vitals for the past 8 hrs:   BP Pulse Resp SpO2   05/22/18 0902 113/74 (!) 102 19 98 %   05/22/18 0700 115/80 (!) 104 22 100 %   05/22/18 0645 107/79 (!) 108 22 100 %   05/22/18 0630 110/75 (!) 112 18 99 %   05/22/18 0615 112/76 (!) 107 23 100 %   05/22/18 0600 107/79 (!) 109 28 100 %   05/22/18 0545 109/72 (!) 101 21 100 %   05/22/18 0530 - 99 22 100 %   05/22/18 0515 102/69 98 25 100 %   05/22/18 0500 111/74 (!) 102 21 100 %   05/22/18 0445 114/73 99 23 100 %   05/22/18 0430 119/78 97 26 100 %   05/22/18 0415 122/77 96 24 100 %   05/22/18 0400 120/74 (!) 103 27 100 %   05/22/18 0345 133/78 (!) 104 22 100 %   05/22/18 0330 99/74 (!) 112 19 100 %   05/22/18 0315 110/76 (!) 104 28 100 %   05/22/18 0300 106/75 (!) 109 28 100 %   05/22/18 0245 109/70 (!) 112 28 100 %   05/22/18 0215 105/70 (!) 111 (!) 32 100 %   05/22/18 0200 99/68 (!) 123 23 100 %   05/22/18 0145 94/63 (!) 128 27 100 %     Physical Exam   Constitutional: He is oriented to person, place, and time. No distress. Eyes: Pupils are equal, round, and reactive to light. Cardiovascular: Regular rhythm. Tachycardia present. Abdominal: He exhibits no distension. There is tenderness. There is no rebound and no guarding. Musculoskeletal: Normal range of motion. Neurological: He is alert and oriented to person, place, and time. Skin: Skin is warm and dry. He is not diaphoretic. Labs:    Recent Results (from the past 24 hour(s))   CBC WITH AUTOMATED DIFF    Collection Time: 05/22/18  1:00 AM   Result Value Ref Range    WBC 15.3 (H) 4.6 - 13.2 K/uL    RBC 3.49 (L) 4.70 - 5.50 M/uL    HGB 11.0 (L) 13.0 - 16.0 g/dL    HCT 32.3 (L) 36.0 - 48.0 %    MCV 92.6 74.0 - 97.0 FL    MCH 31.5 24.0 - 34.0 PG    MCHC 34.1 31.0 - 37.0 g/dL    RDW 13.0 11.6 - 14.5 %    PLATELET 259 907 - 055 K/uL    MPV 10.8 9.2 - 11.8 FL    NEUTROPHILS 77 (H) 40 - 73 %    LYMPHOCYTES 12 (L) 21 - 52 %    MONOCYTES 10 3 - 10 %    EOSINOPHILS 1 0 - 5 %    BASOPHILS 0 0 - 2 %    ABS. NEUTROPHILS 11.9 (H) 1.8 - 8.0 K/UL    ABS. LYMPHOCYTES 1.8 0.9 - 3.6 K/UL    ABS. MONOCYTES 1.5 (H) 0.05 - 1.2 K/UL    ABS. EOSINOPHILS 0.1 0.0 - 0.4 K/UL    ABS.  BASOPHILS 0.0 0.0 - 0.06 K/UL    DF AUTOMATED     METABOLIC PANEL, COMPREHENSIVE    Collection Time: 05/22/18  1:00 AM   Result Value Ref Range    Sodium 130 (L) 136 - 145 mmol/L    Potassium 4.0 3.5 - 5.5 mmol/L    Chloride 100 100 - 108 mmol/L    CO2 17 (L) 21 - 32 mmol/L    Anion gap 13 3.0 - 18 mmol/L    Glucose 590 (HH) 74 - 99 mg/dL    BUN 12 7.0 - 18 MG/DL    Creatinine 2.56 (H) 0.6 - 1.3 MG/DL    BUN/Creatinine ratio 5 (L) 12 - 20      GFR est AA 32 (L) >60 ml/min/1.73m2    GFR est non-AA 27 (L) >60 ml/min/1.73m2    Calcium 8.4 (L) 8.5 - 10.1 MG/DL Bilirubin, total 0.6 0.2 - 1.0 MG/DL    ALT (SGPT) 45 16 - 61 U/L    AST (SGOT) 46 (H) 15 - 37 U/L    Alk. phosphatase 232 (H) 45 - 117 U/L    Protein, total 7.7 6.4 - 8.2 g/dL    Albumin 3.0 (L) 3.4 - 5.0 g/dL    Globulin 4.7 (H) 2.0 - 4.0 g/dL    A-G Ratio 0.6 (L) 0.8 - 1.7     LIPASE    Collection Time: 05/22/18  1:00 AM   Result Value Ref Range    Lipase 45 (L) 73 - 393 U/L   ETHYL ALCOHOL    Collection Time: 05/22/18  1:00 AM   Result Value Ref Range    ALCOHOL(ETHYL),SERUM <3 0 - 3 MG/DL   MAGNESIUM    Collection Time: 05/22/18  1:00 AM   Result Value Ref Range    Magnesium 1.6 1.6 - 2.6 mg/dL   POC VENOUS BLOOD GAS    Collection Time: 05/22/18  1:10 AM   Result Value Ref Range    Device: ROOM AIR      FIO2 (POC) 0.21 %    pH, venous (POC) 7.393 7.32 - 7.42      pCO2, venous (POC) 25.2 (L) 41 - 51 MMHG    pO2, venous (POC) 25 25 - 40 mmHg    HCO3, venous (POC) 15.5 (L) 23.0 - 28.0 MMOL/L    sO2, venous (POC) 48 (L) 65 - 88 %    Base deficit, venous (POC) 10 mmol/L    Allens test (POC) N/A      Total resp. rate 20      Site OTHER      Patient temp.  97.5      Specimen type (POC) VENOUS BLOOD      Performed by Dora Marshall    [facility]    Collection Time: 05/22/18  1:13 AM   Result Value Ref Range    CO2, POC 16 (L) 19 - 24 MMOL/L    Glucose,  (HH) 74 - 106 MG/DL    BUN, POC 13 7 - 18 MG/DL    Creatinine, POC 2.5 (H) 0.6 - 1.3 MG/DL    GFRAA, POC 33 (L) >60 ml/min/1.73m2    GFRNA, POC 27 (L) >60 ml/min/1.73m2    Sodium,  (L) 136 - 145 MMOL/L    Potassium, POC 4.0 3.5 - 5.5 MMOL/L    Calcium, ionized (POC) 1.14 1.12 - 1.32 mmol/L    Chloride,  100 - 108 MMOL/L    Anion gap, POC 18 10 - 20      Hematocrit, POC 36 36 - 49 %    Hemoglobin, POC 12.2 12 - 16 G/DL   EKG, 12 LEAD, INITIAL    Collection Time: 05/22/18  2:00 AM   Result Value Ref Range    Ventricular Rate 123 BPM    Atrial Rate 123 BPM    P-R Interval 138 ms    QRS Duration 80 ms    Q-T Interval 326 ms    QTC Calculation (Bezet) 466 ms    Calculated P Axis 75 degrees    Calculated R Axis -56 degrees    Calculated T Axis 85 degrees    Diagnosis       Sinus tachycardia  Possible Left atrial enlargement  Left axis deviation  Nonspecific ST and T wave abnormality  Abnormal ECG  When compared with ECG of 15-APR-2016 19:42,  T wave inversion more evident in Anterolateral leads     GLUCOSE, POC    Collection Time: 05/22/18  2:46 AM   Result Value Ref Range    Glucose (POC) 367 (H) 70 - 110 mg/dL   URINALYSIS W/ RFLX MICROSCOPIC    Collection Time: 05/22/18  3:35 AM   Result Value Ref Range    Color DARK YELLOW      Appearance CLOUDY      Specific gravity 1.026 1.005 - 1.030      pH (UA) 5.5 5.0 - 8.0      Protein 100 (A) NEG mg/dL    Glucose >1000 (A) NEG mg/dL    Ketone TRACE (A) NEG mg/dL    Bilirubin NEGATIVE  NEG      Blood SMALL (A) NEG      Urobilinogen 1.0 0.2 - 1.0 EU/dL    Nitrites NEGATIVE  NEG      Leukocyte Esterase TRACE (A) NEG     DRUG SCREEN, URINE    Collection Time: 05/22/18  3:35 AM   Result Value Ref Range    BENZODIAZEPINES NEGATIVE  NEG      BARBITURATES NEGATIVE  NEG      THC (TH-CANNABINOL) NEGATIVE  NEG      OPIATES POSITIVE (A) NEG      PCP(PHENCYCLIDINE) NEGATIVE  NEG      COCAINE NEGATIVE  NEG      AMPHETAMINES NEGATIVE  NEG      METHADONE NEGATIVE  NEG      HDSCOM (NOTE)    URINE MICROSCOPIC ONLY    Collection Time: 05/22/18  3:35 AM   Result Value Ref Range    WBC 11 to 20 0 - 4 /hpf    RBC 4 to 10 0 - 5 /hpf    Epithelial cells FEW 0 - 5 /lpf    Bacteria FEW (A) NEG /hpf    Hyaline cast 11 to 20 0 - 2 /lpf    Granular cast 4 to 10 NEG /lpf    Yeast FEW (A) NEG     CBC WITH AUTOMATED DIFF    Collection Time: 05/22/18  7:15 AM   Result Value Ref Range    WBC 12.2 4.6 - 13.2 K/uL    RBC 3.08 (L) 4.70 - 5.50 M/uL    HGB 9.5 (L) 13.0 - 16.0 g/dL    HCT 28.7 (L) 36.0 - 48.0 %    MCV 93.2 74.0 - 97.0 FL    MCH 30.8 24.0 - 34.0 PG    MCHC 33.1 31.0 - 37.0 g/dL    RDW 13.1 11.6 - 14.5 %    PLATELET 916 549 - 483 K/uL    MPV 10.3 9.2 - 11.8 FL    NEUTROPHILS 77 (H) 40 - 73 %    LYMPHOCYTES 15 (L) 21 - 52 %    MONOCYTES 8 3 - 10 %    EOSINOPHILS 0 0 - 5 %    BASOPHILS 0 0 - 2 %    ABS. NEUTROPHILS 9.3 (H) 1.8 - 8.0 K/UL    ABS. LYMPHOCYTES 1.8 0.9 - 3.6 K/UL    ABS. MONOCYTES 1.0 0.05 - 1.2 K/UL    ABS. EOSINOPHILS 0.1 0.0 - 0.4 K/UL    ABS.  BASOPHILS 0.0 0.0 - 0.06 K/UL    DF AUTOMATED     METABOLIC PANEL, BASIC    Collection Time: 05/22/18  7:15 AM   Result Value Ref Range    Sodium 137 136 - 145 mmol/L    Potassium 3.8 3.5 - 5.5 mmol/L    Chloride 112 (H) 100 - 108 mmol/L    CO2 14 (L) 21 - 32 mmol/L    Anion gap 11 3.0 - 18 mmol/L    Glucose 312 (H) 74 - 99 mg/dL    BUN 12 7.0 - 18 MG/DL    Creatinine 1.93 (H) 0.6 - 1.3 MG/DL    BUN/Creatinine ratio 6 (L) 12 - 20      GFR est AA 45 (L) >60 ml/min/1.73m2    GFR est non-AA 37 (L) >60 ml/min/1.73m2    Calcium 6.9 (L) 8.5 - 10.1 MG/DL   HEMOGLOBIN A1C WITH EAG    Collection Time: 05/22/18  7:15 AM   Result Value Ref Range    Hemoglobin A1c 9.8 (H) 4.2 - 5.6 %    Est. average glucose 235 mg/dL   GLUCOSE, POC    Collection Time: 05/22/18  8:37 AM   Result Value Ref Range    Glucose (POC) 331 (H) 70 - 110 mg/dL   GLUCOSTABILIZER    Collection Time: 05/22/18  8:54 AM   Result Value Ref Range    Glucose 331 mg/dL    Insulin order 5.4 units/hour    Insulin adminstered 5.4 units/hour    Multiplier 0.020     Low target 140 mg/dL    High target 180 mg/dL    D50 order 0.0 ml    D50 administered 0.00 ml    Minutes until next BG 60 min    Order initials jaycob     Administered initials lr            Assessment:  Principal Problem:    Hyperglycemia (5/22/2018)    Active Problems:    Chronic pancreatitis (HCC) ()      Overview: Continue PPI IV      IDDM (insulin dependent diabetes mellitus) ([facility] [address] (8/31/2013)      Abdominal pain (11/16/2015)      Moderate protein-calorie malnutrition (HCC) (11/21/2015)      Pancreatic fistula (3/23/2016)      H/O ETOH abuse (3/23/2016)      Dehydration (8/5/2017) Vomiting (8/5/2017)      Acute alcoholic hepatitis (8/49/2842)      Chronic pancreatitis due to acute alcohol intoxication ([facility] [address] (4/15/2018)      Chronic renal insufficiency (5/22/2018)        Plan:    Hyperglycemia with DKA  -BS improved was placed on insulin gtt by ED  -trace ketones with serum c02 14. GAP 11.  However ABG noting acidotic  -obtain beta hydroxy  -serial BMP  -IVF  -mulitple admissions for DKA and noncomliance    Chronic abd pain from chronic pancreatitis 2/2 alcohol  -minimize narcotics givne hx of narcotic abuse  -IVF  -NPO except meds  -antiemtics  -lipase ok  -CT abd shows no acute change    Hx of Duodenal cutaneous fistula  -hx of s/p indwelling draiange cath  -ct abd notes no change or obstruction  -wound care    ETOH  -CIWA  -folate, b12, thiamine    CHRISTI  -improved with fluids  -monitor  -IVF    Hypocalcemia  -will get Ionized CA    DVT prophylaxis  -scd/teds  -heparin sq    Signed:  Radha Walker NP 5/22/2018

## 2018-05-22 NOTE — ED NOTES
TRANSFER - ED to INPATIENT REPORT:    SBAR report made available to receiving floor on this patient being transferred to [facility] ICU [id number]  for routine progression of care       Admitting diagnosis Hyperglycemia    Information from the following report(s) ED Summary was made available to receiving floor. Lines:   Venous Access Device left chest mediport  Upper chest (subclavicular area), left (Active)   Central Line Being Utilized Yes 5/22/2018  1:00 AM   Site Assessment Clean, dry, & intact 5/22/2018  1:00 AM   Dressing Status Clean, dry, & intact 5/22/2018  1:00 AM   Dressing Type Transparent 5/22/2018  1:00 AM   Date Accessed (Medial Site) 05/22/18 5/22/2018  1:00 AM   Access Time (Medial Site) 0100 5/22/2018  1:00 AM   Access Needle Length (Medial Site) 0.75 inches 5/22/2018  1:00 AM   Positive Blood Return (Medial Site) Yes 5/22/2018  1:00 AM   Action Taken (Medial Site) Blood drawn;Flushed 5/22/2018  1:00 AM        Medication list unable to confirm    Opportunity for questions and clarification was provided.       Patient is oriented to time, place, person and situation N/A  Patient is  continent and ambulatory with assist     Valuables transported with patient     Patient transported with:   Monitor  Registered Nurse  Tech

## 2018-05-22 NOTE — PROGRESS NOTES
This is not a readmission for [facility]. Date of previous inpatient admission/ ED visit? 5/21/18- [facility] ED; 4/25/18- [facility] (IP); 4/15- [facility]- (IP)  What brought the patient back to ED? C/O feeling weak, has n/v/d and abdominal pain x 3-4 days    Did patient decline recommended services during last admission/ ED visit (if yes, what)? [facility]    Has patient seen a provider since their last inpatient admission/ED visit (if yes, when)? Unknown when saw PCP last. Chart review indicates noncompliant with follow up appointments    CM Interventions:  From previous inpatient admission/ED visit: None.  Pt declined Home Health  From current inpatient admission/ED visit: To be determined    Flako Jorgensen RN  Care-manager  High Risk and Readmissions Coordinator  [phone]  [phone]

## 2018-05-22 NOTE — IP AVS SNAPSHOT
[address] [address] 
[phone] Patient: Christos Shipley 
MRN: [id number] :1965 A check vidhi indicates which time of day the medication should be taken. My Medications START taking these medications Instructions Each Dose to Equal  
 Morning Noon Evening Bedtime  
 oxyCODONE-acetaminophen 5-325 mg per tablet Commonly known as:  PERCOCET Take 1 Tab by mouth every four (4) hours as needed for Pain. Max Daily Amount: 6 Tabs. 1 Tab CHANGE how you take these medications Instructions Each Dose to Equal  
 Morning Noon Evening Bedtime  
 insulin glargine 100 unit/mL injection Commonly known as:  LANTUS U-100 INSULIN What changed:  Another medication with the same name was removed. Continue taking this medication, and follow the directions you see here. Your next dose is:  Evening 25 Units by SubCUTAneous route two (2) times a day. Indications: type 2 diabetes mellitus 25 Units CONTINUE taking these medications Instructions Each Dose to Equal  
 Morning Noon Evening Bedtime  
 amitriptyline 75 mg tablet Commonly known as:  ELAVIL Your next dose is: Tonight Take 1 Tab by mouth nightly. Indications: Depression 75 mg  
    
   
   
   
  
  
 ascorbic acid (vitamin C) 500 mg tablet Commonly known as:  VITAMIN C Your next dose is:  Tomorrow Take 1 Tab by mouth daily. Indications: VITAMIN C DEFICIENCY  
 500 mg  
    
  
   
   
   
  
 cholecalciferol (vitamin D3) 2,000 unit Tab Your next dose is:  Tomorrow Take 1 Tab by mouth daily. Indications: PREVENTION OF VITAMIN D DEFICIENCY  
 2000 Units CREON 12,000-38,000 -60,000 unit capsule Generic drug:  lipase-protease-amylase Your next dose is:  Evening Take 2 Caps by mouth three (3) times daily (with meals). Indications: exocrine pancreatic insufficiency, dosage change 2 Cap  
    
  
   
  
   
  
   
  
 cyanocobalamin 1,000 mcg sublingual tablet Commonly known as:  VITAMIN B-12 Your next dose is:  Tomorrow Take 2 Tabs by mouth daily. Indications: PREVENTION OF VITAMIN B12 DEFICIENCY  
 2000 mcg  
    
  
   
   
   
  
 ferrous sulfate 325 mg (65 mg iron) tablet Your next dose is:  Evening Take 1 Tab by mouth two (2) times a day. Indications: IRON DEFICIENCY ANEMIA  
 325 mg  
    
  
   
   
  
   
  
 folic acid 910 mcg tablet Your next dose is:  Tomorrow Take 1 Tab by mouth daily. 0.4 mg  
    
  
   
   
   
  
 insulin lispro 100 unit/mL injection Commonly known as:  HUMALOG  
   
 12 Units by SubCUTAneous route nightly. Indications: type 2 diabetes mellitus 12 Units  
    
   
   
   
  
 melatonin 3 mg tablet Your next dose is: Tonight Take 1 Tab by mouth nightly. 3 mg  
    
   
   
   
  
  
 midodrine 10 mg tablet Commonly known as:  Keren Swain Your next dose is:  Evening Take 1 Tab by mouth three (3) times daily. Indications: Symptomatic Orthostatic Hypotension 10 mg  
    
  
   
  
   
  
   
  
 ondansetron 4 mg disintegrating tablet Commonly known as:  ZOFRAN ODT Take 1 Tab by mouth every eight (8) hours as needed for Nausea. 4 mg  
    
   
   
   
  
 pantoprazole 40 mg tablet Commonly known as:  PROTONIX Your next dose is:  Evening Take 1 Tab by mouth two (2) times a day. Indications: EROSIVE ESOPHAGITIS, gastroesophageal reflux disease 40 mg  
    
  
   
   
  
   
  
 promethazine 25 mg tablet Commonly known as:  PHENERGAN Take 1 Tab by mouth every six (6) hours as needed. 25 mg  
    
   
   
   
  
 sucralfate 1 gram tablet Commonly known as:  Derick Dec Your next dose is:  Evening Take 1 Tab by mouth four (4) times daily. Indications: PREVENTION OF STRESS ULCER  
 1 g  
    
  
   
  
   
  
   
  
  
  
STOP taking these medications   
 acetaminophen 325 mg tablet Commonly known as:  TYLENOL  
   
  
 LORazepam 0.5 mg tablet Commonly known as:  ATIVAN  
   
  
 morphine CR 60 mg CR tablet Commonly known as:  MS CONTIN Where to Get Your Medications Information on where to get these meds will be given to you by the nurse or doctor. ! Ask your nurse or doctor about these medications  
  ondansetron 4 mg disintegrating tablet  
 oxyCODONE-acetaminophen 5-325 mg per tablet  
 promethazine 25 mg tablet  
 sucralfate 1 gram tablet

## 2018-05-22 NOTE — ED PROVIDER NOTES
HPI Comments: Jonathan Melissa is a 46 y.o. Male with ho chronic pancreatitis, duodenal cutaneous fistula, iddm with c/o not being able to keep anything down for several days with continued upper abd pain. States he has been taking his insulin. Seen at [place] [id number] yesterday for same. Labs, done. Glucose improved. Dc home tolerating po. Sx are constant, severe worse with po intake. Admitted in April to [facility] for dka with no f/u since. Does not see gi either    The history is provided by the patient and medical records. Past Medical History:   Diagnosis Date    Abdominal pain, generalized     [facility] ([facility] [address]     Diabetes ([facility] [address]     hypothyroid    Encounter for long-term (current) use of other medications     Essential hypertension     ETOH abuse     GERD (gastroesophageal reflux disease)     Heart failure (HCC)     Hyponatremia     Pain in the abdomen 11/2/2010    Pancreatitis     w/ abscess and pseudocyst    Pancreatitis     Psychiatric disorder     depression, anxiety    Tobacco abuse        Past Surgical History:   Procedure Laterality Date    HX ABDOMINAL LAPAROSCOPY      PANCREATIC STENT    HX CHOLECYSTECTOMY           Family History:   Problem Relation Age of Onset    Heart Disease Father        Social History     Social History    Marital status: UNKNOWN     Spouse name: N/A    Number of children: N/A    Years of education: N/A     Occupational History    Not on file. Social History Main Topics    Smoking status: Current Every Day Smoker     Packs/day: 0.50     Years: 0.00     Types: Cigarettes    Smokeless tobacco: Never Used    Alcohol use Yes    Drug use: No    Sexual activity: Yes     Birth control/ protection: None     Other Topics Concern    Not on file     Social History Narrative         ALLERGIES: Ampicillin-sulbactam; Pcn [penicillins];  Piperacillin-tazobactam; Pollen extracts; and Seafood [shellfish containing products]    Review of Systems   Constitutional: Positive for appetite change. Negative for fever. HENT: Positive for trouble swallowing. Eyes: Negative for visual disturbance. Respiratory: Positive for shortness of breath. Cardiovascular: Negative for leg swelling. Gastrointestinal: Positive for abdominal pain. Endocrine: Negative for polyuria. Genitourinary: Negative for difficulty urinating. Musculoskeletal: Positive for gait problem (due to generalized weakness). Skin: Positive for wound. Pallor: chronic draining wound to right abd for which he was offered bag, other drainage device but refused. Allergic/Immunologic: Positive for food allergies. Neurological: Positive for weakness (generalized) and light-headedness. Psychiatric/Behavioral: Positive for sleep disturbance. Vitals:    05/22/18 0300 05/22/18 0315 05/22/18 0330 05/22/18 0345   BP: 106/75 110/76 99/74 133/78   Pulse: (!) 109 (!) 104 (!) 112 (!) 104   Resp: 28 28 19 22   Temp:       SpO2: 100% 100% 100% 100%   Weight:       Height:                Physical Exam   Constitutional: He is oriented to person, place, and time. Non-toxic appearance. He has a sickly appearance. He appears ill. He appears distressed. Chronic ill appearance     HENT:   Head: Normocephalic and atraumatic. Right Ear: External ear normal.   Left Ear: External ear normal.   Nose: Nose normal.   Mouth/Throat: Oropharynx is clear and moist. No oropharyngeal exudate. Eyes: Conjunctivae are normal.   Neck: Normal range of motion. Cardiovascular: Regular rhythm, normal heart sounds and intact distal pulses. Tachycardia present. Pulmonary/Chest: Effort normal and breath sounds normal. No respiratory distress. He has no wheezes. Abdominal: Soft. He exhibits no distension and no mass. There is tenderness in the epigastric area. There is guarding. There is no rebound. Musculoskeletal: Normal range of motion. He exhibits no edema.    Neurological: He is alert and oriented to person, place, and time. Skin: Skin is warm and dry. He is not diaphoretic. Psychiatric: His behavior is normal.   Nursing note and vitals reviewed.        [facility]      ED Course       Procedures  Vitals:  Patient Vitals for the past 12 hrs:   Temp Pulse Resp BP SpO2   05/22/18 0345 - (!) 104 22 133/78 100 %   05/22/18 0330 - (!) 112 19 99/74 100 %   05/22/18 0315 - (!) 104 28 110/76 100 %   05/22/18 0300 - (!) 109 28 106/75 100 %   05/22/18 0245 - (!) 112 28 109/70 100 %   05/22/18 0215 - (!) 111 (!) 32 105/70 100 %   05/22/18 0200 - (!) 123 23 99/68 100 %   05/22/18 0145 - (!) 128 27 94/63 100 %   05/22/18 0130 - (!) 129 22 (!) 89/59 100 %   05/22/18 0115 - (!) 126 23 - 100 %   05/22/18 0100 - (!) 128 23 (!) 79/57 100 %   05/22/18 0053 - (!) 133 23 - 96 %   05/22/18 0050 97.5 °F (36.4 °C) - - (!) 81/55 -         Medications ordered:   Medications   promethazine (PHENERGAN) 25 mg/mL injection (  Canceled Entry 5/22/18 0137)   fentaNYL citrate (PF) 50 mcg/mL injection (  Canceled Entry 5/22/18 0137)   insulin regular (NOVOLIN R, HUMULIN R) 100 unit/mL injection (  Canceled Entry 5/22/18 0137)   lidocaine (XYLOCAINE) 2 % viscous solution (  Canceled Entry 5/22/18 0344)   phenobarb-hyoscy-atropine-scop (DONNATAL) 16.2 mg-0.1037 mg/5 mL (5 mL) elixir (  Canceled Entry 5/22/18 0344)   alum-mag hydroxide-simeth (MYLANTA) 200-200-20 mg/5 mL oral suspension (  Canceled Entry 5/22/18 0344)   heparin (porcine) 100 unit/mL injection 500 Units (not administered)   heparin (porcine) pf 100 unit/mL (not administered)   sodium chloride 0.9 % bolus infusion 1,000 mL (not administered)   promethazine (PHENERGAN) 25 mg in 0.9% sodium chloride 50 mL IVPB (25 mg IntraVENous Given 5/22/18 0604)   diphenhydrAMINE (BENADRYL) injection 25 mg (not administered)   HYDROmorphone (DILAUDID) injection 1 mg (not administered)   promethazine (PHENERGAN) 25 mg in 0.9% sodium chloride 50 mL IVPB (25 mg IntraVENous Given 5/22/18 0119)   fentaNYL citrate (PF) injection 25 mcg (25 mcg IntraVENous Given 5/22/18 0118)   sodium chloride 0.9 % bolus infusion 1,000 mL (0 mL IntraVENous IV Completed 5/22/18 0245)     Followed by   sodium chloride 0.9 % bolus infusion 1,000 mL (1,000 mL IntraVENous New Bag 5/22/18 0248)   0.9% sodium chloride 1,000 mL with mvi, adult no. 4 with vit K 10 mL, thiamine 170 mg, folic acid 1 mg infusion ( IntraVENous New Bag 5/22/18 0248)   insulin regular (NOVOLIN R, HUMULIN R) injection 12 Units (12 Units IntraVENous Given 5/22/18 0122)   magnesium sulfate 2 g/50 ml IVPB (premix or compounded) (0 g IntraVENous IV Completed 5/22/18 0222)   mylanta/donnatal/viscous lidocaine (GI COCKTAIL) (50 mL Oral Given 5/22/18 0345)   HYDROcodone-acetaminophen (NORCO) 5-325 mg per tablet 2 Tab (2 Tabs Oral Given 5/22/18 0341)         Lab findings:  Recent Results (from the past 12 hour(s))   CBC WITH AUTOMATED DIFF    Collection Time: 05/22/18  1:00 AM   Result Value Ref Range    WBC 15.3 (H) 4.6 - 13.2 K/uL    RBC 3.49 (L) 4.70 - 5.50 M/uL    HGB 11.0 (L) 13.0 - 16.0 g/dL    HCT 32.3 (L) 36.0 - 48.0 %    MCV 92.6 74.0 - 97.0 FL    MCH 31.5 24.0 - 34.0 PG    MCHC 34.1 31.0 - 37.0 g/dL    RDW 13.0 11.6 - 14.5 %    PLATELET 392 678 - 916 K/uL    MPV 10.8 9.2 - 11.8 FL    NEUTROPHILS 77 (H) 40 - 73 %    LYMPHOCYTES 12 (L) 21 - 52 %    MONOCYTES 10 3 - 10 %    EOSINOPHILS 1 0 - 5 %    BASOPHILS 0 0 - 2 %    ABS. NEUTROPHILS 11.9 (H) 1.8 - 8.0 K/UL    ABS. LYMPHOCYTES 1.8 0.9 - 3.6 K/UL    ABS. MONOCYTES 1.5 (H) 0.05 - 1.2 K/UL    ABS. EOSINOPHILS 0.1 0.0 - 0.4 K/UL    ABS.  BASOPHILS 0.0 0.0 - 0.06 K/UL    DF AUTOMATED     METABOLIC PANEL, COMPREHENSIVE    Collection Time: 05/22/18  1:00 AM   Result Value Ref Range    Sodium 130 (L) 136 - 145 mmol/L    Potassium 4.0 3.5 - 5.5 mmol/L    Chloride 100 100 - 108 mmol/L    CO2 17 (L) 21 - 32 mmol/L    Anion gap 13 3.0 - 18 mmol/L    Glucose 590 (HH) 74 - 99 mg/dL    BUN 12 7.0 - 18 MG/DL    Creatinine 2.56 (H) 0.6 - 1.3 MG/DL    BUN/Creatinine ratio 5 (L) 12 - 20      GFR est AA 32 (L) >60 ml/min/1.73m2    GFR est non-AA 27 (L) >60 ml/min/1.73m2    Calcium 8.4 (L) 8.5 - 10.1 MG/DL    Bilirubin, total 0.6 0.2 - 1.0 MG/DL    ALT (SGPT) 45 16 - 61 U/L    AST (SGOT) 46 (H) 15 - 37 U/L    Alk. phosphatase 232 (H) 45 - 117 U/L    Protein, total 7.7 6.4 - 8.2 g/dL    Albumin 3.0 (L) 3.4 - 5.0 g/dL    Globulin 4.7 (H) 2.0 - 4.0 g/dL    A-G Ratio 0.6 (L) 0.8 - 1.7     LIPASE    Collection Time: 05/22/18  1:00 AM   Result Value Ref Range    Lipase 45 (L) 73 - 393 U/L   ETHYL ALCOHOL    Collection Time: 05/22/18  1:00 AM   Result Value Ref Range    ALCOHOL(ETHYL),SERUM <3 0 - 3 MG/DL   MAGNESIUM    Collection Time: 05/22/18  1:00 AM   Result Value Ref Range    Magnesium 1.6 1.6 - 2.6 mg/dL   POC VENOUS BLOOD GAS    Collection Time: 05/22/18  1:10 AM   Result Value Ref Range    Device: ROOM AIR      FIO2 (POC) 0.21 %    pH, venous (POC) 7.393 7.32 - 7.42      pCO2, venous (POC) 25.2 (L) 41 - 51 MMHG    pO2, venous (POC) 25 25 - 40 mmHg    HCO3, venous (POC) 15.5 (L) 23.0 - 28.0 MMOL/L    sO2, venous (POC) 48 (L) 65 - 88 %    Base deficit, venous (POC) 10 mmol/L    Allens test (POC) N/A      Total resp. rate 20      Site OTHER      Patient temp.  97.5      Specimen type (POC) VENOUS BLOOD      Performed by aCrrillo Limon    [facility]    Collection Time: 05/22/18  1:13 AM   Result Value Ref Range    CO2, POC 16 (L) 19 - 24 MMOL/L    Glucose,  (HH) 74 - 106 MG/DL    BUN, POC 13 7 - 18 MG/DL    Creatinine, POC 2.5 (H) 0.6 - 1.3 MG/DL    GFRAA, POC 33 (L) >60 ml/min/1.73m2    GFRNA, POC 27 (L) >60 ml/min/1.73m2    Sodium,  (L) 136 - 145 MMOL/L    Potassium, POC 4.0 3.5 - 5.5 MMOL/L    Calcium, ionized (POC) 1.14 1.12 - 1.32 mmol/L    Chloride,  100 - 108 MMOL/L    Anion gap, POC 18 10 - 20      Hematocrit, POC 36 36 - 49 %    Hemoglobin, POC 12.2 12 - 16 G/DL   EKG, 12 LEAD, INITIAL    Collection Time: 05/22/18  2:00 AM Result Value Ref Range    Ventricular Rate 123 BPM    Atrial Rate 123 BPM    P-R Interval 138 ms    QRS Duration 80 ms    Q-T Interval 326 ms    QTC Calculation (Bezet) 466 ms    Calculated P Axis 75 degrees    Calculated R Axis -56 degrees    Calculated T Axis 85 degrees    Diagnosis       Sinus tachycardia  Possible Left atrial enlargement  Left axis deviation  Nonspecific ST and T wave abnormality  Abnormal ECG  When compared with ECG of 15-APR-2016 19:42,  T wave inversion more evident in Anterolateral leads     GLUCOSE, POC    Collection Time: 05/22/18  2:46 AM   Result Value Ref Range    Glucose (POC) 367 (H) 70 - 110 mg/dL   URINALYSIS W/ RFLX MICROSCOPIC    Collection Time: 05/22/18  3:35 AM   Result Value Ref Range    Color DARK YELLOW      Appearance CLOUDY      Specific gravity 1.026 1.005 - 1.030      pH (UA) 5.5 5.0 - 8.0      Protein 100 (A) NEG mg/dL    Glucose >1000 (A) NEG mg/dL    Ketone TRACE (A) NEG mg/dL    Bilirubin NEGATIVE  NEG      Blood SMALL (A) NEG      Urobilinogen 1.0 0.2 - 1.0 EU/dL    Nitrites NEGATIVE  NEG      Leukocyte Esterase TRACE (A) NEG     DRUG SCREEN, URINE    Collection Time: 05/22/18  3:35 AM   Result Value Ref Range    BENZODIAZEPINES NEGATIVE  NEG      BARBITURATES NEGATIVE  NEG      THC (TH-CANNABINOL) NEGATIVE  NEG      OPIATES POSITIVE (A) NEG      PCP(PHENCYCLIDINE) NEGATIVE  NEG      COCAINE NEGATIVE  NEG      AMPHETAMINES NEGATIVE  NEG      METHADONE NEGATIVE  NEG      HDSCOM (NOTE)    URINE MICROSCOPIC ONLY    Collection Time: 05/22/18  3:35 AM   Result Value Ref Range    WBC 11 to 20 0 - 4 /hpf    RBC 4 to 10 0 - 5 /hpf    Epithelial cells FEW 0 - 5 /lpf    Bacteria FEW (A) NEG /hpf    Hyaline cast 11 to 20 0 - 2 /lpf    Granular cast 4 to 10 NEG /lpf    Yeast FEW (A) NEG         EKG interpretation by ED Physician:  Sinus tach with ns tw st changes  Rate 123, pr 138, qtc 466  Not sig changed      X-Ray, CT or other radiology findings or impressions:  CT ABD PELV WO CONT    (Results Pending)   c/w most recent at [facility] sig improved with dec in size of fluid collection    Progress notes, Consult notes or additional Procedure notes:   Bp, improved. Hr decreased. Doubt dka, need for admission, no acute infection, glucose improved  Will recheck labs and if improved can be d/c home  Already has rx for carafate, diflucan, ppi. Given rx for 20 norco on 5/16  I have discussed with patient and/or family/sig other the results, interpretation of any imaging if performed, suspected diagnosis and treatment plan to include instructions regarding the diagnoses listed to which understanding was expressed with all questions answered    ED Critical Care Note    System at risk for life threatening failure: cardiac, pulm, gi  Associated problems: hyperglycemia, renal insuffiency, tachycardia    Critical Care services provided: bedside management, consult, documentation,   Excluded procedures (time not included in critical care): ecg interp    Total Critical Care Time (in minutes) 46      Will turn over to dr Ángel Moe to f/u on labs; if showing improvement can be d/c home      Reevaluation of patient:   stable    Disposition:  Diagnosis:   1. Chronic abdominal pain    2. Acute kidney injury ([address]    3. Dehydration    4. Acute hyperglycemia        Disposition: home    Follow-up Information     Follow up With Details Comments Contact Info    follow up with your regular doctors as soon as possible               Patient's Medications   Start Taking    HYDROCODONE-ACETAMINOPHEN (NORCO) 5-325 MG PER TABLET    Take 1 Tab by mouth every six (6) hours as needed for Pain. Max Daily Amount: 4 Tabs. Continue Taking    AMITRIPTYLINE (ELAVIL) 75 MG TABLET    Take 1 Tab by mouth nightly. Indications: Depression    ASCORBIC ACID, VITAMIN C, (VITAMIN C) 500 MG TABLET    Take 1 Tab by mouth daily. Indications: VITAMIN C DEFICIENCY    CHOLECALCIFEROL, VITAMIN D3, 2,000 UNIT TAB    Take 1 Tab by mouth daily. Indications: PREVENTION OF VITAMIN D DEFICIENCY    CYANOCOBALAMIN (VITAMIN B-12) 1,000 MCG SUBLINGUAL TABLET    Take 2 Tabs by mouth daily. Indications: PREVENTION OF VITAMIN B12 DEFICIENCY    FERROUS SULFATE 325 MG (65 MG IRON) TABLET    Take 1 Tab by mouth two (2) times a day. Indications: IRON DEFICIENCY ANEMIA    FOLIC ACID 083 MCG TABLET    Take 1 Tab by mouth daily. INSULIN GLARGINE (LANTUS) 100 UNIT/ML INJECTION    25 Units by SubCUTAneous route two (2) times a day. Indications: type 2 diabetes mellitus    INSULIN GLARGINE (LANTUS) 100 UNIT/ML INJECTION    25 Units by SubCUTAneous route two (2) times a day. INSULIN LISPRO (HUMALOG) 100 UNIT/ML INJECTION    12 Units by SubCUTAneous route nightly. Indications: type 2 diabetes mellitus    LIPASE-PROTEASE-AMYLASE (CREON) 12,000-38,000 -60,000 UNIT CAPSULE    Take 2 Caps by mouth three (3) times daily (with meals). Indications: exocrine pancreatic insufficiency, dosage change    MELATONIN 3 MG TABLET    Take 1 Tab by mouth nightly. MIDODRINE (PROAMITINE) 10 MG TABLET    Take 1 Tab by mouth three (3) times daily. Indications: Symptomatic Orthostatic Hypotension    ONDANSETRON (ZOFRAN ODT) 4 MG DISINTEGRATING TABLET    Take 1 Tab by mouth every eight (8) hours as needed for Nausea. PANTOPRAZOLE (PROTONIX) 40 MG TABLET    Take 1 Tab by mouth two (2) times a day. Indications: EROSIVE ESOPHAGITIS, gastroesophageal reflux disease    PROMETHAZINE (PHENERGAN) 25 MG TABLET    Take 1 Tab by mouth every six (6) hours as needed. These Medications have changed    Modified Medication Previous Medication    SUCRALFATE (CARAFATE) 1 GRAM TABLET sucralfate (CARAFATE) 1 gram tablet       Take 1 Tab by mouth four (4) times daily. Indications: PREVENTION OF STRESS ULCER    Take 1 Tab by mouth four (4) times daily. Indications: PREVENTION OF STRESS ULCER   Stop Taking    ACETAMINOPHEN (TYLENOL) 325 MG TABLET    Take 1 Tab by mouth every four (4) hours as needed for Pain. LORAZEPAM (ATIVAN) 0.5 MG TABLET    Take 1 Tab by mouth every eight (8) hours as needed. Max Daily Amount: 1.5 mg. MORPHINE CR (MS CONTIN) 60 MG CR TABLET    Take 1 Tab by mouth every twelve (12) hours. Max Daily Amount: 120 mg.

## 2018-05-22 NOTE — PROGRESS NOTES
Care Management Interventions  PCP Verified by CM: Yes  Palliative Care Criteria Met (RRAT>21 & CHF Dx)?: No  Transition of Care Consult (CM Consult): Discharge Planning  Physical Therapy Consult: No  Occupational Therapy Consult: No  Speech Therapy Consult: No  Current Support Network:  (mother)  Plan discussed with Pt/Family/Caregiver: Yes    Reason for Readmission:     Acute hyperglycemia         RRAT Score and Risk Level:     22     Level of Readmission:    high      Care Conference scheduled:          Resources/supports as identified by patient/family:   He stated he lives with his mother but has a guardian from [place] [phone]       Top Challenges facing patient (as identified by patient/family and CM): Finances/Medication cost?   [address] Complete Care  Transportation     Medicaid cab  Support system or lack thereof? Living arrangements? Lives with mother per patient   Self-care/ADLs/Cognition? Independent with ADL's per patient       Current Advanced Directive/Advance Care Plan: On file but is [employer] about [address] for utilizing home health:   Has used home health in the past              Likelihood of additional readmission:   high             Transition of Care Plan:    Based on readmission, the patient's previous Plan of Care   has been evaluated and/or modified. The current Transition of Care Plan is:        Spoke with patient in ED, he stated that he lives with his mother but has [address] guardian Jimena Barcenas  [phone]. Per past notes patient has a hx of chronic pancreatitis, ETOH and narcotic seeking behavior. He noncompliant with his blood sugars and frequently seen with DKA. He has fistulas and drains he his noncompliant with follow up appointments. He was recently admitted to [facility] 4/24/18 to 4/26/18. He is independent with ADL's and has no DME's at this time.  He verified his address, insurance and phone # as correct on the facesheet. Patient has designated _____Sarahy cobb_____S___Guardian _[phone].__________ to participate in his/her discharge plan and to receive any needed information. He plans to return home upon discharge and transportation will be provided by family or medicaid cab.

## 2018-05-22 NOTE — PROGRESS NOTES
Problem: Discharge Planning  Goal: *Discharge to safe environment  Outcome: Progressing Towards Goal  Home possible hh if recommended

## 2018-05-22 NOTE — ED NOTES
Assumed care of patient, received report from ADILSON ORTIZ. Patient resting in bed on monitor with no complaints at this time.

## 2018-05-23 LAB
ADMINISTERED INITIALS, ADMINIT: NORMAL
ANION GAP SERPL CALC-SCNC: 10 MMOL/L (ref 3–18)
ANION GAP SERPL CALC-SCNC: 8 MMOL/L (ref 3–18)
ATRIAL RATE: 123 BPM
BUN SERPL-MCNC: 11 MG/DL (ref 7–18)
BUN SERPL-MCNC: 9 MG/DL (ref 7–18)
BUN/CREAT SERPL: 7 (ref 12–20)
BUN/CREAT SERPL: 7 (ref 12–20)
CA-I SERPL-SCNC: 1.21 MMOL/L (ref 1.12–1.32)
CALCIUM SERPL-MCNC: 7.2 MG/DL (ref 8.5–10.1)
CALCIUM SERPL-MCNC: 7.8 MG/DL (ref 8.5–10.1)
CALCULATED P AXIS, ECG09: 75 DEGREES
CALCULATED R AXIS, ECG10: -56 DEGREES
CALCULATED T AXIS, ECG11: 85 DEGREES
CHLORIDE SERPL-SCNC: 116 MMOL/L (ref 100–108)
CHLORIDE SERPL-SCNC: 119 MMOL/L (ref 100–108)
CO2 SERPL-SCNC: 15 MMOL/L (ref 21–32)
CO2 SERPL-SCNC: 15 MMOL/L (ref 21–32)
CREAT SERPL-MCNC: 1.23 MG/DL (ref 0.6–1.3)
CREAT SERPL-MCNC: 1.58 MG/DL (ref 0.6–1.3)
D50 ADMINISTERED, D50ADM: 0 ML
D50 ORDER, D50ORD: 0 ML
DIAGNOSIS, 93000: NORMAL
EST. AVERAGE GLUCOSE BLD GHB EST-MCNC: 229 MG/DL
GLUCOSE BLD STRIP.AUTO-MCNC: 107 MG/DL (ref 70–110)
GLUCOSE BLD STRIP.AUTO-MCNC: 126 MG/DL (ref 70–110)
GLUCOSE BLD STRIP.AUTO-MCNC: 132 MG/DL (ref 70–110)
GLUCOSE BLD STRIP.AUTO-MCNC: 133 MG/DL (ref 70–110)
GLUCOSE BLD STRIP.AUTO-MCNC: 145 MG/DL (ref 70–110)
GLUCOSE BLD STRIP.AUTO-MCNC: 170 MG/DL (ref 70–110)
GLUCOSE BLD STRIP.AUTO-MCNC: 176 MG/DL (ref 70–110)
GLUCOSE BLD STRIP.AUTO-MCNC: 180 MG/DL (ref 70–110)
GLUCOSE BLD STRIP.AUTO-MCNC: 216 MG/DL (ref 70–110)
GLUCOSE BLD STRIP.AUTO-MCNC: 226 MG/DL (ref 70–110)
GLUCOSE BLD STRIP.AUTO-MCNC: 271 MG/DL (ref 70–110)
GLUCOSE BLD STRIP.AUTO-MCNC: 72 MG/DL (ref 70–110)
GLUCOSE BLD STRIP.AUTO-MCNC: 90 MG/DL (ref 70–110)
GLUCOSE SERPL-MCNC: 105 MG/DL (ref 74–99)
GLUCOSE SERPL-MCNC: 63 MG/DL (ref 74–99)
GLUCOSE, GLC: 126 MG/DL
GLUCOSE, GLC: 132 MG/DL
GLUCOSE, GLC: 133 MG/DL
GLUCOSE, GLC: 145 MG/DL
GLUCOSE, GLC: 170 MG/DL
GLUCOSE, GLC: 180 MG/DL
GLUCOSE, GLC: 216 MG/DL
GLUCOSE, GLC: 226 MG/DL
GLUCOSE, GLC: 271 MG/DL
GLUCOSE, GLC: 72 MG/DL
GLUCOSE, GLC: 90 MG/DL
HBA1C MFR BLD: 9.6 % (ref 4.2–5.6)
HIGH TARGET, HITG: 180 MG/DL
INSULIN ADMINSTERED, INSADM: 0 UNITS/HOUR
INSULIN ADMINSTERED, INSADM: 0.1 UNITS/HOUR
INSULIN ADMINSTERED, INSADM: 1.4 UNITS/HOUR
INSULIN ADMINSTERED, INSADM: 2.1 UNITS/HOUR
INSULIN ADMINSTERED, INSADM: 3.3 UNITS/HOUR
INSULIN ADMINSTERED, INSADM: 4.7 UNITS/HOUR
INSULIN ORDER, INSORD: 0 UNITS/HOUR
INSULIN ORDER, INSORD: 0.1 UNITS/HOUR
INSULIN ORDER, INSORD: 1.4 UNITS/HOUR
INSULIN ORDER, INSORD: 2.1 UNITS/HOUR
INSULIN ORDER, INSORD: 3.3 UNITS/HOUR
INSULIN ORDER, INSORD: 4.7 UNITS/HOUR
LOW TARGET, LOT: 140 MG/DL
MINUTES UNTIL NEXT BG, NBG: 60 MIN
MULTIPLIER, MUL: 0
MULTIPLIER, MUL: 0.01
MULTIPLIER, MUL: 0.01
MULTIPLIER, MUL: 0.02
MULTIPLIER, MUL: 0.02
MULTIPLIER, MUL: 0.03
ORDER INITIALS, ORDINIT: NORMAL
P-R INTERVAL, ECG05: 138 MS
POTASSIUM SERPL-SCNC: 3.6 MMOL/L (ref 3.5–5.5)
POTASSIUM SERPL-SCNC: 3.7 MMOL/L (ref 3.5–5.5)
Q-T INTERVAL, ECG07: 326 MS
QRS DURATION, ECG06: 80 MS
QTC CALCULATION (BEZET), ECG08: 466 MS
SODIUM SERPL-SCNC: 141 MMOL/L (ref 136–145)
SODIUM SERPL-SCNC: 142 MMOL/L (ref 136–145)
VENTRICULAR RATE, ECG03: 123 BPM

## 2018-05-23 PROCEDURE — 65660000005 HC RM ICU NEURO INTERMED STEPDOWN

## 2018-05-23 PROCEDURE — 87077 CULTURE AEROBIC IDENTIFY: CPT | Performed by: NURSE PRACTITIONER

## 2018-05-23 PROCEDURE — 74011250637 HC RX REV CODE- 250/637: Performed by: NURSE PRACTITIONER

## 2018-05-23 PROCEDURE — 87186 SC STD MICRODIL/AGAR DIL: CPT | Performed by: NURSE PRACTITIONER

## 2018-05-23 PROCEDURE — 74011250636 HC RX REV CODE- 250/636: Performed by: FAMILY MEDICINE

## 2018-05-23 PROCEDURE — 74011636637 HC RX REV CODE- 636/637: Performed by: FAMILY MEDICINE

## 2018-05-23 PROCEDURE — 74011000250 HC RX REV CODE- 250: Performed by: FAMILY MEDICINE

## 2018-05-23 PROCEDURE — 82962 GLUCOSE BLOOD TEST: CPT

## 2018-05-23 PROCEDURE — 74011000258 HC RX REV CODE- 258: Performed by: FAMILY MEDICINE

## 2018-05-23 PROCEDURE — 77030032490 HC SLV COMPR SCD KNE COVD -B

## 2018-05-23 PROCEDURE — 74011250636 HC RX REV CODE- 250/636: Performed by: NURSE PRACTITIONER

## 2018-05-23 PROCEDURE — 87070 CULTURE OTHR SPECIMN AEROBIC: CPT | Performed by: NURSE PRACTITIONER

## 2018-05-23 PROCEDURE — 80048 BASIC METABOLIC PNL TOTAL CA: CPT | Performed by: NURSE PRACTITIONER

## 2018-05-23 RX ORDER — PROMETHAZINE HYDROCHLORIDE 25 MG/ML
25 INJECTION, SOLUTION INTRAMUSCULAR; INTRAVENOUS
Status: DISCONTINUED | OUTPATIENT
Start: 2018-05-23 | End: 2018-05-27

## 2018-05-23 RX ORDER — INSULIN GLARGINE 100 [IU]/ML
10 INJECTION, SOLUTION SUBCUTANEOUS DAILY
Status: DISCONTINUED | OUTPATIENT
Start: 2018-05-23 | End: 2018-05-24

## 2018-05-23 RX ORDER — LEVOFLOXACIN 5 MG/ML
750 INJECTION, SOLUTION INTRAVENOUS EVERY 24 HOURS
Status: DISCONTINUED | OUTPATIENT
Start: 2018-05-23 | End: 2018-05-27

## 2018-05-23 RX ORDER — METRONIDAZOLE 500 MG/100ML
500 INJECTION, SOLUTION INTRAVENOUS EVERY 8 HOURS
Status: DISCONTINUED | OUTPATIENT
Start: 2018-05-23 | End: 2018-05-24

## 2018-05-23 RX ORDER — HYDROMORPHONE HYDROCHLORIDE 1 MG/ML
0.5 INJECTION, SOLUTION INTRAMUSCULAR; INTRAVENOUS; SUBCUTANEOUS
Status: DISCONTINUED | OUTPATIENT
Start: 2018-05-23 | End: 2018-05-26

## 2018-05-23 RX ORDER — INSULIN LISPRO 100 [IU]/ML
INJECTION, SOLUTION INTRAVENOUS; SUBCUTANEOUS
Status: DISCONTINUED | OUTPATIENT
Start: 2018-05-23 | End: 2018-05-23

## 2018-05-23 RX ORDER — INSULIN LISPRO 100 [IU]/ML
INJECTION, SOLUTION INTRAVENOUS; SUBCUTANEOUS EVERY 6 HOURS
Status: DISCONTINUED | OUTPATIENT
Start: 2018-05-23 | End: 2018-05-30

## 2018-05-23 RX ORDER — MAGNESIUM SULFATE 100 %
4 CRYSTALS MISCELLANEOUS AS NEEDED
Status: DISCONTINUED | OUTPATIENT
Start: 2018-05-23 | End: 2018-05-23 | Stop reason: SDUPTHER

## 2018-05-23 RX ORDER — DEXTROSE 50 % IN WATER (D50W) INTRAVENOUS SYRINGE
25-50 AS NEEDED
Status: DISCONTINUED | OUTPATIENT
Start: 2018-05-23 | End: 2018-05-23 | Stop reason: SDUPTHER

## 2018-05-23 RX ADMIN — PROMETHAZINE HYDROCHLORIDE 25 MG: 25 INJECTION, SOLUTION INTRAMUSCULAR; INTRAVENOUS at 22:18

## 2018-05-23 RX ADMIN — METRONIDAZOLE 500 MG: 500 INJECTION, SOLUTION INTRAVENOUS at 17:27

## 2018-05-23 RX ADMIN — HYDROMORPHONE HYDROCHLORIDE 0.5 MG: 1 INJECTION, SOLUTION INTRAMUSCULAR; INTRAVENOUS; SUBCUTANEOUS at 02:28

## 2018-05-23 RX ADMIN — FERROUS SULFATE TAB 325 MG (65 MG ELEMENTAL FE) 325 MG: 325 (65 FE) TAB at 17:21

## 2018-05-23 RX ADMIN — HYDROMORPHONE HYDROCHLORIDE 0.5 MG: 1 INJECTION, SOLUTION INTRAMUSCULAR; INTRAVENOUS; SUBCUTANEOUS at 18:59

## 2018-05-23 RX ADMIN — PANCRELIPASE 2 CAPSULE: 10000; 34000; 55000 CAPSULE, DELAYED RELEASE ORAL at 10:01

## 2018-05-23 RX ADMIN — HYDROMORPHONE HYDROCHLORIDE 0.5 MG: 1 INJECTION, SOLUTION INTRAMUSCULAR; INTRAVENOUS; SUBCUTANEOUS at 10:01

## 2018-05-23 RX ADMIN — Medication 10 ML: at 21:18

## 2018-05-23 RX ADMIN — FERROUS SULFATE TAB 325 MG (65 MG ELEMENTAL FE) 325 MG: 325 (65 FE) TAB at 10:01

## 2018-05-23 RX ADMIN — PANCRELIPASE 2 CAPSULE: 10000; 34000; 55000 CAPSULE, DELAYED RELEASE ORAL at 13:13

## 2018-05-23 RX ADMIN — HYDROMORPHONE HYDROCHLORIDE 0.5 MG: 1 INJECTION, SOLUTION INTRAMUSCULAR; INTRAVENOUS; SUBCUTANEOUS at 13:00

## 2018-05-23 RX ADMIN — Medication 10 ML: at 17:21

## 2018-05-23 RX ADMIN — HYDROMORPHONE HYDROCHLORIDE 0.5 MG: 1 INJECTION, SOLUTION INTRAMUSCULAR; INTRAVENOUS; SUBCUTANEOUS at 22:19

## 2018-05-23 RX ADMIN — HEPARIN SODIUM 5000 UNITS: 5000 INJECTION, SOLUTION INTRAVENOUS; SUBCUTANEOUS at 21:17

## 2018-05-23 RX ADMIN — HYDROMORPHONE HYDROCHLORIDE 0.5 MG: 1 INJECTION, SOLUTION INTRAMUSCULAR; INTRAVENOUS; SUBCUTANEOUS at 16:03

## 2018-05-23 RX ADMIN — Medication 10 ML: at 17:20

## 2018-05-23 RX ADMIN — INSULIN GLARGINE 10 UNITS: 100 INJECTION, SOLUTION SUBCUTANEOUS at 10:02

## 2018-05-23 RX ADMIN — DEXTROSE MONOHYDRATE AND SODIUM CHLORIDE 150 ML/HR: 5; .45 INJECTION, SOLUTION INTRAVENOUS at 03:46

## 2018-05-23 RX ADMIN — INSULIN LISPRO 2 UNITS: 100 INJECTION, SOLUTION INTRAVENOUS; SUBCUTANEOUS at 13:21

## 2018-05-23 RX ADMIN — Medication 10 ML: at 06:31

## 2018-05-23 RX ADMIN — AMITRIPTYLINE HYDROCHLORIDE 75 MG: 50 TABLET, FILM COATED ORAL at 21:18

## 2018-05-23 RX ADMIN — METRONIDAZOLE 500 MG: 500 INJECTION, SOLUTION INTRAVENOUS at 21:18

## 2018-05-23 RX ADMIN — LEVOFLOXACIN 750 MG: 5 INJECTION, SOLUTION INTRAVENOUS at 18:56

## 2018-05-23 RX ADMIN — HYDROMORPHONE HYDROCHLORIDE 0.5 MG: 1 INJECTION, SOLUTION INTRAMUSCULAR; INTRAVENOUS; SUBCUTANEOUS at 06:31

## 2018-05-23 RX ADMIN — HEPARIN SODIUM 5000 UNITS: 5000 INJECTION, SOLUTION INTRAVENOUS; SUBCUTANEOUS at 04:49

## 2018-05-23 RX ADMIN — PROMETHAZINE HYDROCHLORIDE 25 MG: 25 INJECTION, SOLUTION INTRAMUSCULAR; INTRAVENOUS at 10:01

## 2018-05-23 RX ADMIN — HEPARIN SODIUM 5000 UNITS: 5000 INJECTION, SOLUTION INTRAVENOUS; SUBCUTANEOUS at 13:00

## 2018-05-23 NOTE — ROUTINE PROCESS
0740: Bedside shift change report given to Kiana Mills RN (oncoming nurse) by Pollo Sargent RN (offgoing nurse). Report included the following information SBAR, Kardex, ED Summary, Procedure Summary, Intake/Output, MAR, Accordion, Recent Results and Med Rec Status. 8782: Pt writhing in pain, stating the site where his bili drain was is burning. The site is squirting yellow drainage, unable to contain effectively, skin is macerated, red, irritated. Gauze applied. 0007: Dr. Destin Toney paged. [phone]: Paged again, orders received. 1900: Bedside shift change report given to TAMIKA Rivas RN  (oncoming nurse) by Kiana Mills RN (offgoing nurse). Report included the following information SBAR, Kardex, ED Summary, Procedure Summary, Intake/Output, MAR, Accordion, Recent Results and Med Rec Status.

## 2018-05-23 NOTE — WOUND CARE
Received IP WOUND CARE CONSULT per [place] NP, reviewed EMR, visited patient. Spoke with Jolie Hall RN, and she reported that the tube is out. She has been trying to contact [facility] and IR. Currently dressing is intact, but she reports the opening is leaking bile fluid. Offered dressing assistance but at this time the patient needs further intervention. Will follow up with patient in the afternoon. Encouraged Jolie Hall to call Wound Clinic for questions or assistance.

## 2018-05-23 NOTE — PROGRESS NOTES
Progress Note      Patient: Jonathan Melissa               Sex: male          DOA: 5/22/2018       YOB: 1965      Age:  46 y.o.        LOS:  LOS: 1 day               Subjective:   Jonathan Melissa is a 46 y.o. male  who presents with abdominal pain , nausea and vomiting. He was admitted with mild DKA. Now controlled. Patient reports that his NATALIE drain was not draining well however it fell out this AM. He continues to have pain LLQ. No fever, chills, sob, chest pain. Surgery consulted. Objective:      Visit Vitals    BP 97/67    Pulse 85    Temp 97.8 °F (36.6 °C)    Resp 18    Ht 6' (1.829 m)    Wt 61.2 kg (135 lb)    SpO2 100%    BMI 18.31 kg/m2           Physical Exam   Constitutional: He is oriented to person, place, and time. He appears well-developed. He appears distressed. HENT:   Head: Normocephalic and atraumatic. Mouth/Throat: No oropharyngeal exudate. Eyes: Conjunctivae are normal. No scleral icterus. Neck: Neck supple. Cardiovascular: Normal rate, regular rhythm and normal heart sounds. No murmur heard. Pulmonary/Chest: Effort normal and breath sounds normal. No respiratory distress. He has no wheezes. He has no rales. Abdominal: Soft. Bowel sounds are normal. He exhibits no distension. There is no tenderness. There is no guarding. NATALIE drain RLQ dislogded   Musculoskeletal: He exhibits no edema. Neurological: He is alert and oriented to person, place, and time. Skin: Skin is warm. No rash noted. No erythema. No pallor. Psychiatric: He has a normal mood and affect.        Intake and Output:  Current Shift:     Last three shifts:  05/21 1901 - 05/23 0700  In: 1548.1 [I.V.:1548.1]  Out: 400 [Urine:400]    Recent Results (from the past 48 hour(s))   CBC WITH AUTOMATED DIFF    Collection Time: 05/22/18  1:00 AM   Result Value Ref Range    WBC 15.3 (H) 4.6 - 13.2 K/uL    RBC 3.49 (L) 4.70 - 5.50 M/uL    HGB 11.0 (L) 13.0 - 16.0 g/dL    HCT 32.3 (L) 36.0 - 48.0 % MCV 92.6 74.0 - 97.0 FL    MCH 31.5 24.0 - 34.0 PG    MCHC 34.1 31.0 - 37.0 g/dL    RDW 13.0 11.6 - 14.5 %    PLATELET 104 450 - 945 K/uL    MPV 10.8 9.2 - 11.8 FL    NEUTROPHILS 77 (H) 40 - 73 %    LYMPHOCYTES 12 (L) 21 - 52 %    MONOCYTES 10 3 - 10 %    EOSINOPHILS 1 0 - 5 %    BASOPHILS 0 0 - 2 %    ABS. NEUTROPHILS 11.9 (H) 1.8 - 8.0 K/UL    ABS. LYMPHOCYTES 1.8 0.9 - 3.6 K/UL    ABS. MONOCYTES 1.5 (H) 0.05 - 1.2 K/UL    ABS. EOSINOPHILS 0.1 0.0 - 0.4 K/UL    ABS. BASOPHILS 0.0 0.0 - 0.06 K/UL    DF AUTOMATED     METABOLIC PANEL, COMPREHENSIVE    Collection Time: 05/22/18  1:00 AM   Result Value Ref Range    Sodium 130 (L) 136 - 145 mmol/L    Potassium 4.0 3.5 - 5.5 mmol/L    Chloride 100 100 - 108 mmol/L    CO2 17 (L) 21 - 32 mmol/L    Anion gap 13 3.0 - 18 mmol/L    Glucose 590 (HH) 74 - 99 mg/dL    BUN 12 7.0 - 18 MG/DL    Creatinine 2.56 (H) 0.6 - 1.3 MG/DL    BUN/Creatinine ratio 5 (L) 12 - 20      GFR est AA 32 (L) >60 ml/min/1.73m2    GFR est non-AA 27 (L) >60 ml/min/1.73m2    Calcium 8.4 (L) 8.5 - 10.1 MG/DL    Bilirubin, total 0.6 0.2 - 1.0 MG/DL    ALT (SGPT) 45 16 - 61 U/L    AST (SGOT) 46 (H) 15 - 37 U/L    Alk.  phosphatase 232 (H) 45 - 117 U/L    Protein, total 7.7 6.4 - 8.2 g/dL    Albumin 3.0 (L) 3.4 - 5.0 g/dL    Globulin 4.7 (H) 2.0 - 4.0 g/dL    A-G Ratio 0.6 (L) 0.8 - 1.7     LIPASE    Collection Time: 05/22/18  1:00 AM   Result Value Ref Range    Lipase 45 (L) 73 - 393 U/L   ETHYL ALCOHOL    Collection Time: 05/22/18  1:00 AM   Result Value Ref Range    ALCOHOL(ETHYL),SERUM <3 0 - 3 MG/DL   MAGNESIUM    Collection Time: 05/22/18  1:00 AM   Result Value Ref Range    Magnesium 1.6 1.6 - 2.6 mg/dL   POC VENOUS BLOOD GAS    Collection Time: 05/22/18  1:10 AM   Result Value Ref Range    Device: ROOM AIR      FIO2 (POC) 0.21 %    pH, venous (POC) 7.393 7.32 - 7.42      pCO2, venous (POC) 25.2 (L) 41 - 51 MMHG    pO2, venous (POC) 25 25 - 40 mmHg    HCO3, venous (POC) 15.5 (L) 23.0 - 28.0 MMOL/L sO2, venous (POC) 48 (L) 65 - 88 %    Base deficit, venous (POC) 10 mmol/L    Allens test (POC) N/A      Total resp. rate 20      Site OTHER      Patient temp.  97.5      Specimen type (POC) VENOUS BLOOD      Performed by Martha Lucas    [facility]    Collection Time: 05/22/18  1:13 AM   Result Value Ref Range    CO2, POC 16 (L) 19 - 24 MMOL/L    Glucose,  (HH) 74 - 106 MG/DL    BUN, POC 13 7 - 18 MG/DL    Creatinine, POC 2.5 (H) 0.6 - 1.3 MG/DL    GFRAA, POC 33 (L) >60 ml/min/1.73m2    GFRNA, POC 27 (L) >60 ml/min/1.73m2    Sodium,  (L) 136 - 145 MMOL/L    Potassium, POC 4.0 3.5 - 5.5 MMOL/L    Calcium, ionized (POC) 1.14 1.12 - 1.32 mmol/L    Chloride,  100 - 108 MMOL/L    Anion gap, POC 18 10 - 20      Hematocrit, POC 36 36 - 49 %    Hemoglobin, POC 12.2 12 - 16 G/DL   EKG, 12 LEAD, INITIAL    Collection Time: 05/22/18  2:00 AM   Result Value Ref Range    Ventricular Rate 123 BPM    Atrial Rate 123 BPM    P-R Interval 138 ms    QRS Duration 80 ms    Q-T Interval 326 ms    QTC Calculation (Bezet) 466 ms    Calculated P Axis 75 degrees    Calculated R Axis -56 degrees    Calculated T Axis 85 degrees    Diagnosis       Sinus tachycardia  Possible Left atrial enlargement  Left axis deviation  Nonspecific ST and T wave abnormality  Abnormal ECG  When compared with ECG of 15-APR-2016 19:42,  T wave inversion more evident in Anterolateral leads     GLUCOSE, POC    Collection Time: 05/22/18  2:46 AM   Result Value Ref Range    Glucose (POC) 367 (H) 70 - 110 mg/dL   URINALYSIS W/ RFLX MICROSCOPIC    Collection Time: 05/22/18  3:35 AM   Result Value Ref Range    Color DARK YELLOW      Appearance CLOUDY      Specific gravity 1.026 1.005 - 1.030      pH (UA) 5.5 5.0 - 8.0      Protein 100 (A) NEG mg/dL    Glucose >1000 (A) NEG mg/dL    Ketone TRACE (A) NEG mg/dL    Bilirubin NEGATIVE  NEG      Blood SMALL (A) NEG      Urobilinogen 1.0 0.2 - 1.0 EU/dL    Nitrites NEGATIVE  NEG      Leukocyte Esterase TRACE (A) NEG     DRUG SCREEN, URINE    Collection Time: 05/22/18  3:35 AM   Result Value Ref Range    BENZODIAZEPINES NEGATIVE  NEG      BARBITURATES NEGATIVE  NEG      THC (TH-CANNABINOL) NEGATIVE  NEG      OPIATES POSITIVE (A) NEG      PCP(PHENCYCLIDINE) NEGATIVE  NEG      COCAINE NEGATIVE  NEG      AMPHETAMINES NEGATIVE  NEG      METHADONE NEGATIVE  NEG      HDSCOM (NOTE)    URINE MICROSCOPIC ONLY    Collection Time: 05/22/18  3:35 AM   Result Value Ref Range    WBC 11 to 20 0 - 4 /hpf    RBC 4 to 10 0 - 5 /hpf    Epithelial cells FEW 0 - 5 /lpf    Bacteria FEW (A) NEG /hpf    Hyaline cast 11 to 20 0 - 2 /lpf    Granular cast 4 to 10 NEG /lpf    Yeast FEW (A) NEG     CBC WITH AUTOMATED DIFF    Collection Time: 05/22/18  7:15 AM   Result Value Ref Range    WBC 12.2 4.6 - 13.2 K/uL    RBC 3.08 (L) 4.70 - 5.50 M/uL    HGB 9.5 (L) 13.0 - 16.0 g/dL    HCT 28.7 (L) 36.0 - 48.0 %    MCV 93.2 74.0 - 97.0 FL    MCH 30.8 24.0 - 34.0 PG    MCHC 33.1 31.0 - 37.0 g/dL    RDW 13.1 11.6 - 14.5 %    PLATELET 802 255 - 974 K/uL    MPV 10.3 9.2 - 11.8 FL    NEUTROPHILS 77 (H) 40 - 73 %    LYMPHOCYTES 15 (L) 21 - 52 %    MONOCYTES 8 3 - 10 %    EOSINOPHILS 0 0 - 5 %    BASOPHILS 0 0 - 2 %    ABS. NEUTROPHILS 9.3 (H) 1.8 - 8.0 K/UL    ABS. LYMPHOCYTES 1.8 0.9 - 3.6 K/UL    ABS. MONOCYTES 1.0 0.05 - 1.2 K/UL    ABS. EOSINOPHILS 0.1 0.0 - 0.4 K/UL    ABS.  BASOPHILS 0.0 0.0 - 0.06 K/UL    DF AUTOMATED     METABOLIC PANEL, BASIC    Collection Time: 05/22/18  7:15 AM   Result Value Ref Range    Sodium 137 136 - 145 mmol/L    Potassium 3.8 3.5 - 5.5 mmol/L    Chloride 112 (H) 100 - 108 mmol/L    CO2 14 (L) 21 - 32 mmol/L    Anion gap 11 3.0 - 18 mmol/L    Glucose 312 (H) 74 - 99 mg/dL    BUN 12 7.0 - 18 MG/DL    Creatinine 1.93 (H) 0.6 - 1.3 MG/DL    BUN/Creatinine ratio 6 (L) 12 - 20      GFR est AA 45 (L) >60 ml/min/1.73m2    GFR est non-AA 37 (L) >60 ml/min/1.73m2    Calcium 6.9 (L) 8.5 - 10.1 MG/DL   HEMOGLOBIN A1C WITH EAG Collection Time: 05/22/18  7:15 AM   Result Value Ref Range    Hemoglobin A1c 9.8 (H) 4.2 - 5.6 %    Est. average glucose 235 mg/dL   GLUCOSE, POC    Collection Time: 05/22/18  8:37 AM   Result Value Ref Range    Glucose (POC) 331 (H) 70 - 110 mg/dL   GLUCOSTABILIZER    Collection Time: 05/22/18  8:54 AM   Result Value Ref Range    Glucose 331 mg/dL    Insulin order 5.4 units/hour    Insulin adminstered 5.4 units/hour    Multiplier 0.020     Low target 140 mg/dL    High target 180 mg/dL    D50 order 0.0 ml    D50 administered 0.00 ml    Minutes until next BG 60 min    Order initials jaycob     Administered initials lr    GLUCOSTABILIZER    Collection Time: 05/22/18 10:17 AM   Result Value Ref Range    Glucose 241 mg/dL    Insulin order 1.8 units/hour    Insulin adminstered 1.8 units/hour    Multiplier 0.010     Low target 140 mg/dL    High target 180 mg/dL    D50 order 0.0 ml    D50 administered 0.00 ml    Minutes until next BG 60 min    Order initials lh     Administered initials lh    POC G3    Collection Time: 05/22/18 10:51 AM   Result Value Ref Range    Device: ROOM AIR      FIO2 (POC) 0.21 %    pH (POC) 7.255 (L) 7.35 - 7.45      pCO2 (POC) 23.3 (L) 35.0 - 45.0 MMHG    pO2 (POC) 91 80 - 100 MMHG    HCO3 (POC) 10.4 (L) 22 - 26 MMOL/L    sO2 (POC) 96 92 - 97 %    Base deficit (POC) 17 mmol/L    Allens test (POC) N/A      Total resp. rate 20      Site RIGHT RADIAL      Patient temp.  97.5      Specimen type (POC) ARTERIAL      Performed by Cherylene Civatte, POC    Collection Time: 05/22/18 11:26 AM   Result Value Ref Range    Glucose (POC) 142 (H) 70 - 110 mg/dL   GLUCOSTABILIZER    Collection Time: 05/22/18 11:29 AM   Result Value Ref Range    Glucose 142 mg/dL    Insulin order 0.0 units/hour    Insulin adminstered 0.0 units/hour    Multiplier 0.000     Low target 140 mg/dL    High target 180 mg/dL    D50 order 0.0 ml    D50 administered 0.00 ml    Minutes until next BG 60 min    Order initials lh/akm Administered initials lh/akm    GLUCOSE, POC    Collection Time: 05/22/18 12:47 PM   Result Value Ref Range    Glucose (POC) 113 (H) 70 - 110 mg/dL   POC G3    Collection Time: 05/22/18  4:53 PM   Result Value Ref Range    Device: ROOM AIR      FIO2 (POC) 0.21 %    pH (POC) 7.271 (L) 7.35 - 7.45      pCO2 (POC) 22.8 (L) 35.0 - 45.0 MMHG    pO2 (POC) 113 (H) 80 - 100 MMHG    HCO3 (POC) 10.6 (L) 22 - 26 MMOL/L    sO2 (POC) 98 (H) 92 - 97 %    Base deficit (POC) 16 mmol/L    Allens test (POC) N/A      Total resp. rate 20      Site RIGHT RADIAL      Patient temp.  97.5      Specimen type (POC) ARTERIAL      Performed by Maty Bansal, POC    Collection Time: 05/22/18  5:47 PM   Result Value Ref Range    Glucose (POC) 221 (H) 70 - 110 mg/dL   GLUCOSE, POC    Collection Time: 05/22/18  6:39 PM   Result Value Ref Range    Glucose (POC) 224 (H) 70 - 110 mg/dL   GLUCOSTABILIZER    Collection Time: 05/22/18  7:00 PM   Result Value Ref Range    Glucose 224 mg/dL    Insulin order 0.0 units/hour    Insulin adminstered 0.0 units/hour    Multiplier 0.000     Low target 140 mg/dL    High target 180 mg/dL    D50 order 0.0 ml    D50 administered 0.00 ml    Minutes until next BG 60 min    Order initials lh     Administered initials lh    GLUCOSE, POC    Collection Time: 05/22/18  8:17 PM   Result Value Ref Range    Glucose (POC) 245 (H) 70 - 110 mg/dL   GLUCOSTABILIZER    Collection Time: 05/22/18  8:18 PM   Result Value Ref Range    Glucose 245 mg/dL    Insulin order 1.9 units/hour    Insulin adminstered 1.9 units/hour    Multiplier 0.010     Low target 140 mg/dL    High target 180 mg/dL    D50 order 0.0 ml    D50 administered 0.00 ml    Minutes until next BG 60 min    Order initials abh     Administered initials abh    GLUCOSE, POC    Collection Time: 05/22/18  8:30 PM   Result Value Ref Range    Glucose (POC) 258 (H) 70 - 110 mg/dL   GLUCOSE, POC    Collection Time: 05/22/18  9:20 PM   Result Value Ref Range Glucose (POC) 196 (H) 70 - 110 mg/dL   GLUCOSTABILIZER    Collection Time: 05/22/18  9:20 PM   Result Value Ref Range    Glucose 196 mg/dL    Insulin order 2.7 units/hour    Insulin adminstered 2.7 units/hour    Multiplier 0.020     Low target 140 mg/dL    High target 180 mg/dL    D50 order 0.0 ml    D50 administered 0.00 ml    Minutes until next BG 60 min    Order initials abh     Administered initials abh    GLUCOSE, POC    Collection Time: 05/22/18 10:20 PM   Result Value Ref Range    Glucose (POC) 124 (H) 70 - 110 mg/dL   GLUCOSTABILIZER    Collection Time: 05/22/18 10:21 PM   Result Value Ref Range    Glucose 124 mg/dL    Insulin order 0.6 units/hour    Insulin adminstered 0.6 units/hour    Multiplier 0.010     Low target 140 mg/dL    High target 180 mg/dL    D50 order 0.0 ml    D50 administered 0.00 ml    Minutes until next BG 60 min    Order initials abh     Administered initials abh    GLUCOSE, POC    Collection Time: 05/22/18 11:19 PM   Result Value Ref Range    Glucose (POC) 107 70 - 110 mg/dL   GLUCOSTABILIZER    Collection Time: 05/22/18 11:22 PM   Result Value Ref Range    Glucose 107 mg/dL    Insulin order 0.0 units/hour    Insulin adminstered 0.0 units/hour    Multiplier 0.000     Low target 140 mg/dL    High target 180 mg/dL    D50 order 0.0 ml    D50 administered 0.00 ml    Minutes until next BG 60 min    Order initials abh     Administered initials abh    CALCIUM, IONIZED    Collection Time: 05/22/18 11:30 PM   Result Value Ref Range    Ionized Calcium 1.21 1.12 - 9.98 MMOL/L   METABOLIC PANEL, BASIC    Collection Time: 05/22/18 11:30 PM   Result Value Ref Range    Sodium 141 136 - 145 mmol/L    Potassium 3.7 3.5 - 5.5 mmol/L    Chloride 116 (H) 100 - 108 mmol/L    CO2 15 (L) 21 - 32 mmol/L    Anion gap 10 3.0 - 18 mmol/L    Glucose 105 (H) 74 - 99 mg/dL    BUN 11 7.0 - 18 MG/DL    Creatinine 1.58 (H) 0.6 - 1.3 MG/DL    BUN/Creatinine ratio 7 (L) 12 - 20      GFR est AA 56 (L) >60 ml/min/1.73m2 GFR est non-AA 46 (L) >60 ml/min/1.73m2    Calcium 7.8 (L) 8.5 - 10.1 MG/DL   GLUCOSE, POC    Collection Time: 05/23/18 12:20 AM   Result Value Ref Range    Glucose (POC) 126 (H) 70 - 110 mg/dL   GLUCOSTABILIZER    Collection Time: 05/23/18 12:22 AM   Result Value Ref Range    Glucose 126 mg/dL    Insulin order 0.0 units/hour    Insulin adminstered 0.0 units/hour    Multiplier 0.000     Low target 140 mg/dL    High target 180 mg/dL    D50 order 0.0 ml    D50 administered 0.00 ml    Minutes until next BG 60 min    Order initials abh     Administered initials abh    GLUCOSE, POC    Collection Time: 05/23/18  1:26 AM   Result Value Ref Range    Glucose (POC) 145 (H) 70 - 110 mg/dL   GLUCOSTABILIZER    Collection Time: 05/23/18  1:26 AM   Result Value Ref Range    Glucose 145 mg/dL    Insulin order 0.0 units/hour    Insulin adminstered 0.0 units/hour    Multiplier 0.000     Low target 140 mg/dL    High target 180 mg/dL    D50 order 0.0 ml    D50 administered 0.00 ml    Minutes until next BG 60 min    Order initials abh     Administered initials abh    GLUCOSE, POC    Collection Time: 05/23/18  2:22 AM   Result Value Ref Range    Glucose (POC) 180 (H) 70 - 110 mg/dL   GLUCOSTABILIZER    Collection Time: 05/23/18  2:27 AM   Result Value Ref Range    Glucose 180 mg/dL    Insulin order 0.0 units/hour    Insulin adminstered 0.0 units/hour    Multiplier 0.000     Low target 140 mg/dL    High target 180 mg/dL    D50 order 0.0 ml    D50 administered 0.00 ml    Minutes until next BG 60 min    Order initials abh     Administered initials abh    GLUCOSE, POC    Collection Time: 05/23/18  3:41 AM   Result Value Ref Range    Glucose (POC) 271 (H) 70 - 110 mg/dL   GLUCOSTABILIZER    Collection Time: 05/23/18  3:42 AM   Result Value Ref Range    Glucose 271 mg/dL    Insulin order 2.1 units/hour    Insulin adminstered 2.1 units/hour    Multiplier 0.010     Low target 140 mg/dL    High target 180 mg/dL    D50 order 0.0 ml    D50 administered 0.00 ml    Minutes until next BG 60 min    Order initials abh     Administered initials abh    GLUCOSE, POC    Collection Time: 05/23/18  4:40 AM   Result Value Ref Range    Glucose (POC) 226 (H) 70 - 110 mg/dL   GLUCOSTABILIZER    Collection Time: 05/23/18  4:42 AM   Result Value Ref Range    Glucose 226 mg/dL    Insulin order 3.3 units/hour    Insulin adminstered 3.3 units/hour    Multiplier 0.020     Low target 140 mg/dL    High target 180 mg/dL    D50 order 0.0 ml    D50 administered 0.00 ml    Minutes until next BG 60 min    Order initials abh     Administered initials sbh    GLUCOSE, POC    Collection Time: 05/23/18  5:34 AM   Result Value Ref Range    Glucose (POC) 216 (H) 70 - 110 mg/dL   GLUCOSTABILIZER    Collection Time: 05/23/18  5:43 AM   Result Value Ref Range    Glucose 216 mg/dL    Insulin order 4.7 units/hour    Insulin adminstered 4.7 units/hour    Multiplier 0.030     Low target 140 mg/dL    High target 180 mg/dL    D50 order 0.0 ml    D50 administered 0.00 ml    Minutes until next BG 60 min    Order initials abh     Administered initials abh    GLUCOSE, POC    Collection Time: 05/23/18  6:41 AM   Result Value Ref Range    Glucose (POC) 132 (H) 70 - 110 mg/dL   GLUCOSTABILIZER    Collection Time: 05/23/18  6:43 AM   Result Value Ref Range    Glucose 132 mg/dL    Insulin order 1.4 units/hour    Insulin adminstered 1.4 units/hour    Multiplier 0.020     Low target 140 mg/dL    High target 180 mg/dL    D50 order 0.0 ml    D50 administered 0.00 ml    Minutes until next BG 60 min    Order initials abh     Administered initials abh          XRays were reviewed in past 24 hours    Medications Reviewed      Continued hospitalization is indicated due to dislodged NATALIE drain and Hyperglycemia .       Assessment/Plan     Principal Problem:    Hyperglycemia (5/22/2018)    Active Problems:    Chronic pancreatitis (HCC) ()      Overview: Continue PPI IV      IDDM (insulin dependent diabetes mellitus) ([facility] [address] (8/31/2013)      Abdominal pain (11/16/2015)      Moderate protein-calorie malnutrition ([address] (11/21/2015)      Pancreatic fistula (3/23/2016)      H/O ETOH abuse (3/23/2016)      Dehydration (8/5/2017)      Vomiting (8/5/2017)      Acute alcoholic hepatitis (1/24/7245)      Chronic pancreatitis due to acute alcohol intoxication ([facility] [address] (4/15/2018)      CHRISTI (acute kidney injury) ([facility] [address] (5/22/2018)      Dislodged NATALIE drain     Mild DKA   - RESOLVED   - Lantus   - SSI    Chronic pancreatitis   - Zenpep    Chronic abdominal pain  - pain control prn    Dislodged NATALIE drain  - Surgery consulted     CHRISTI  - RESOLVED     Resume chronic med as appropriate     DVT prophylaxis     Full code     Lima Bazan MD  May 23, 2018

## 2018-05-23 NOTE — DIABETES MGMT
NUTRITIONAL ASSESSMENT GLYCEMIC CONTROL/ PLAN OF CARE     Joseph Ya           46 y.o.           5/22/2018                 1. Chronic abdominal pain    2. Acute kidney injury ([address]    3. Dehydration    4. Acute hyperglycemia       INTERVENTIONS/PLAN:   1. Monitor glycemic control, labs, weights and diet progression. 2.  Pt will need prescription for glucometer and test strips at discharge. ASSESSMENT:   Nutritional Status:  Pt is 76% ideal wt with moderately depleted fat and somatic protein stores. Documented weights indicate pt has lost 10 lbs within past 2 months (7% weight loss). Documented weights show weight of 164 lbs on 7/18/17. Nutrition Diagnoses: Altered GI function due to chronic pancreatitis as evidenced by N/V/abdominal pain/use of Creon. Inadequate oral food and beverage intake due to abdominal pain/insulin drip as evidenced by  NPO orders. Underweight due to inadequate energy  intake as evidenced by BMI of 18.3 kg/m2. Diabetes Management:   Pt known from previous admissions and has received diabetes education at these admissions. Pt states his insurance covers his Lantus and Humalog pens but he is almost out of insulin. He states he has been taking his insulins daily as directed. Pt relates that he currently does not have a PCP; he is unclear as to when he last saw a PCP. D/w MD who plans to have case management make arrangements for PCP upon discharge. Unsure how pt has made his insulin last this long without obtaining an updated prescription. Noted pt received prescription for Lantus vial (25 units BID) at last admission at [facility] on 4/26/18 but no documentation of pt having prescription filled per chart review. He states he does not currently have glucometer and that he \"lost it\" when he moved about 2 weeks ago. Difficult at this time to sort out pt's home diabetes management and cause for poor glycemic control. Recent blood glucose:        Within target range (non-ICU: <140; ICU<180): [x] Yes   []  No    Current Insulin regimen:   GlucoStabilizer (last setting at 0 units/hr) and being transitioned to [place] insulin with pt just receiving his first injection of basal insulin (10 units Lantus/day) and orders written for corrective lispro,normal insulin sensitivity,  [facility] . Home medication/insulin regimen: per pt:  Lantus 25 units/day and sliding scale lispro  HbA1c: 9.8% - ave BG has been ~ 234 mg/dL over past 3 months. Adequate glycemic control PTA:  [] Yes  [x] No       SUBJECTIVE/OBJECTIVE:   Information obtained from: chart review, RN; at first attempt to obtain diet/weight history pt was yelling, relating he is was in pain. Pt now able to report diet/wt history and states he has lost \"tons of weight\" in past year. He reports his usual wt is 180 lbs and he last weight this amount about 1 year ago. He is allergic to shrimp. He lives with his mother who prepares meals. Pt insists he is taking his Creon with meals. Pt known from previous multiple hospital admissions for acute on chronic pancreatitis, abdominal pain, elevated glucose/DKA with PMHx of  pancreaticoduodenal cutaneous fistula, non-compliance and drug seeking issues.  Pt presented to ED with c/o nausea, vomiting and abdominal pain x 3 days and hyperglycemia. Diet: NPO    No data found.     Medications: [x]                Reviewed  Pertinent:  Creon with meals, FeSO4,    IVF:  D5 1/2 NS at 150 ml/hr    Most Recent POC Glucose:   Recent Labs      05/23/18   0825  05/22/18   2330  05/22/18   0715  05/22/18   0100   GLU  63*  105*  312*  590*         Labs:   Lab Results   Component Value Date/Time    Hemoglobin A1c 9.8 (H) 05/22/2018 07:15 AM     Lab Results   Component Value Date/Time    Sodium 142 05/23/2018 08:25 AM    Potassium 3.6 05/23/2018 08:25 AM    Chloride 119 (H) 05/23/2018 08:25 AM    CO2 15 (L) 05/23/2018 08:25 AM    Anion gap 8 05/23/2018 08:25 AM    Glucose 63 (L) 05/23/2018 08:25 AM    BUN 9 05/23/2018 08:25 AM    Creatinine 1.23 05/23/2018 08:25 AM    Calcium 7.2 (L) 05/23/2018 08:25 AM    Magnesium 1.6 05/22/2018 01:00 AM    Phosphorus 3.1 04/19/2018 09:39 AM    Albumin 3.0 (L) 05/22/2018 01:00 AM       Anthropometrics: IBW : 80.7 kg (178 lb), % IBW (Calculated): 75.84 %, BMI (calculated): 18.3  Wt Readings from Last 1 Encounters:   05/22/18 61.2 kg (135 lb)      Ht Readings from Last 1 Encounters:   05/22/18 6' (1.829 m)     Last Weight Metrics:  Weight Loss Metrics 5/22/2018 5/21/2018 4/24/2018 4/16/2018 8/5/2017 7/18/2017 6/14/2016   Today's Wt 135 lb 130 lb 130 lb 145 lb 11.6 oz 164 lb 164 lb 14.5 oz 138 lb 7.2 oz   BMI 18.31 kg/m2 17.15 kg/m2 17.15 kg/m2 19.23 kg/m2 22.24 kg/m2 22.37 kg/m2 18.77 kg/m2       Estimated Nutrition Needs:  2250 Kcals/day, Protein (g): 92 g Fluid (ml): 2300 ml  Based on:   [x]          Actual BW    []          ABW   []            Adjusted BW         Nutrition Interventions:  None at this time  Goal:   Blood glucose will be within target range of  mg/dL by 5/26/18. Provision of adequate nutrition by 5/28/18.          Nutrition Monitoring and Evaluation      []     Monitor po intake on meal rounds  [x]     Continue inpatient monitoring and intervention  []     Other:      Nutrition Risk:  [x]   High     []  Moderate    []  Minimal/Uncompromised    Latesha Sanford RD, CDE   Office:  [address] [place] [address] Pager:  [phone]

## 2018-05-23 NOTE — CONSULTS
Consult Note    Patient: Edwar Meyers               Sex: male          DOA: 5/22/2018         YOB: 1965      Age:  46 y.o.        LOS:  LOS: 1 day              HPI:     Edwar Meyers is a 46 y.o. Male with a history of chronic pancreatitis, chronic alcoholism, duodenal cutaneous fistula, and insulin dependent diabetes. The patient presents to the ED yesterday with a C/O food and fluid intolerance for one week, upper abdominal pain, and localized skin irritation at right lower quadrant. He was evaluated at [facility] for similar complaints of pain and vomiting, ultimately discharged home after diagnostic exam po challenge. The patient had a drain placed at [facility] for duodenalpancreatic fistula August 14, 2017. He report drained dislodged approximately 4 months ago, never evaluated for reinsertion. He report moderate drainage at right lower quadrant after drained dislodged which is now causing localized skin irritation and burning.       Past Medical History:   Diagnosis Date    Abdominal pain, generalized     Chronic pancreatitis ([address]     Diabetes ([address]     hypothyroid    Encounter for long-term (current) use of other medications     Essential hypertension     ETOH abuse     GERD (gastroesophageal reflux disease)     Heart failure (HCC)     Hyponatremia     Pain in the abdomen 11/2/2010    Pancreatitis     w/ abscess and pseudocyst    Pancreatitis     Psychiatric disorder     depression, anxiety    Tobacco abuse        Past Surgical History:   Procedure Laterality Date    HX ABDOMINAL LAPAROSCOPY      PANCREATIC STENT    HX CHOLECYSTECTOMY         Family History   Problem Relation Age of Onset    Heart Disease Father        Social History     Social History    Marital status: UNKNOWN     Spouse name: N/A    Number of children: N/A    Years of education: N/A     Social History Main Topics    Smoking status: Current Every Day Smoker     Packs/day: 0.50     Years: 0.00 Types: Cigarettes    Smokeless tobacco: Never Used    Alcohol use Yes    Drug use: No    Sexual activity: Yes     Birth control/ protection: None     Other Topics Concern    Not on file     Social History Narrative       Prior to Admission medications    Medication Sig Start Date End Date Taking? Authorizing Provider   sucralfate (CARAFATE) 1 gram tablet Take 1 Tab by mouth four (4) times daily. Indications: PREVENTION OF STRESS ULCER 5/22/18  Yes Fely Cummings MD   HYDROcodone-acetaminophen [facility]) 5-325 mg per tablet Take 1 Tab by mouth every six (6) hours as needed for Pain. Max Daily Amount: 4 Tabs. 5/22/18  Yes Fely Cummings MD   ondansetron (ZOFRAN ODT) 4 mg disintegrating tablet Take 1 Tab by mouth every eight (8) hours as needed for Nausea. 5/21/18   Sulaiman Gerardo MD   promethazine (PHENERGAN) 25 mg tablet Take 1 Tab by mouth every six (6) hours as needed. 5/21/18   Sulaiman Gerardo MD   insulin glargine (LANTUS) 100 unit/mL injection 25 Units by SubCUTAneous route two (2) times a day. 4/26/18   Shelby Ko DO   amitriptyline (ELAVIL) 75 mg tablet Take 1 Tab by mouth nightly. Indications: Depression 8/9/17   Giovany Li NP   ascorbic acid, vitamin C, (VITAMIN C) 500 mg tablet Take 1 Tab by mouth daily. Indications: VITAMIN C DEFICIENCY 8/9/17   Giovany Li NP   cholecalciferol, vitamin D3, 2,000 unit tab Take 1 Tab by mouth daily. Indications: PREVENTION OF VITAMIN D DEFICIENCY 8/9/17   Giovany Li NP   cyanocobalamin (VITAMIN B-12) 1,000 mcg sublingual tablet Take 2 Tabs by mouth daily. Indications: PREVENTION OF VITAMIN B12 DEFICIENCY 8/9/17   Giovany Li NP   ferrous sulfate 325 mg (65 mg iron) tablet Take 1 Tab by mouth two (2) times a day. Indications: IRON DEFICIENCY ANEMIA 8/9/17   Giovany Li NP   folic acid 328 mcg tablet Take 1 Tab by mouth daily.  8/9/17   Giovany Li NP   insulin glargine (LANTUS) 100 unit/mL injection 25 Units by SubCUTAneous route two (2) times a day. Indications: type 2 diabetes mellitus 8/9/17   Ignacio Santana NP   insulin lispro (HUMALOG) 100 unit/mL injection 12 Units by SubCUTAneous route nightly. Indications: type 2 diabetes mellitus 8/9/17   Ignacio Santana NP   melatonin 3 mg tablet Take 1 Tab by mouth nightly. 8/9/17   Ignacio Santana NP   midodrine (PROAMITINE) 10 mg tablet Take 1 Tab by mouth three (3) times daily. Indications: Symptomatic Orthostatic Hypotension 8/9/17   Ignacio Santana NP   lipase-protease-amylase (CREON) 12,000-38,000 -60,000 unit capsule Take 2 Caps by mouth three (3) times daily (with meals). Indications: exocrine pancreatic insufficiency, dosage change 7/28/17   Ignacio Santana NP   pantoprazole (PROTONIX) 40 mg tablet Take 1 Tab by mouth two (2) times a day.  Indications: EROSIVE ESOPHAGITIS, gastroesophageal reflux disease 7/28/17   Ignacio Santana NP       Allergies   Allergen Reactions    Ampicillin-Sulbactam Rash    Pcn [Penicillins] Itching and Hives    Piperacillin-Tazobactam Rash    Pollen Extracts Rash    Seafood [Shellfish Containing Products] Hives     Other reaction(s): unknown  Has tolerated iohexol (iodine-containing contrast)  Only shrimp  Shrimp       Review of Systems  Constitutional: negative  Respiratory: negative  Cardiovascular: positive for tachypnea  Gastrointestinal: positive for nausea, vomiting and abdominal pain  Integument/Breast: positive for redness and swelling right lower quadrant   Neurological: positive for dizziness and weakness  Behavioral/Psychiatric: positive for anxiety      Physical Exam:      Visit Vitals    BP 97/67    Pulse 85    Temp 97.6 °F (36.4 °C)    Resp 18    Ht 6' (1.829 m)    Wt 61.2 kg (135 lb)    SpO2 100%    BMI 18.31 kg/m2       Physical Exam:  Respiratory: positive for shortness of breath  Cardiovascular: positive for tachypnea  Gastrointestinal: positive for nausea, vomiting and abdominal pain  Integument/breast: skin irriation with redness and swelling RLQ    Labs Reviewed: All lab results for the last 24 hours reviewed.     Assessment/Plan     Principal Problem:    Hyperglycemia (5/22/2018)    Active Problems:    Chronic pancreatitis (HCC) ()      Overview: Continue PPI IV      IDDM (insulin dependent diabetes mellitus) ([facility] (8/31/2013)      Abdominal pain (11/16/2015)      Moderate protein-calorie malnutrition ([address] (11/21/2015)      Pancreatic fistula (3/23/2016)      H/O ETOH abuse (3/23/2016)      Dehydration (8/5/2017)      Vomiting (8/5/2017)      Acute alcoholic hepatitis (1/43/9548)      Chronic pancreatitis due to acute alcohol intoxication ([address] (4/15/2018)      CHRISTI (acute kidney injury) ([facility] (5/22/2018)        Plan: Recommendation  Culture wound (RLQ abdomen)  IR for CT guided duodenal fistula drain placement   IV zosyn for possible infection

## 2018-05-23 NOTE — CDMP QUERY
Please clarify if this patient is being treated/managed for:    =>Hyponatremia  =>Other Explanation of clinical findings  =>Unable to Determine (no explanation of clinical findings)    The medical record reflects the following:    Risk: etoh abuse, chronic pancreatitis, dm with dka,     Clinical Indicators: Sod  126     Treatment:  Ivf     Please clarify and document your clinical opinion in the progress notes and discharge summary including the definitive and/or presumptive diagnosis, (suspected or probable), related to the above clinical findings. Please include clinical findings supporting your diagnosis. If you DECLINE this query or would like to communicate with [facility], please utilize the \"[place] message box\" at the TOP of the Progress Note on the right.       Thank you,  Fredy Devries RN/CCDS  [phone]

## 2018-05-23 NOTE — ED NOTES
Patient was taken to [phone] by this nurse. Strip printed. Clothing and shoes were only belongings noted. Patient remains alert and oriented x4.

## 2018-05-23 NOTE — PROGRESS NOTES
05/22/2018 2015 Assumed care of pt after to 2604 from ED via stretcher. To bed and connected to monitor and NBP cuff, pulse ox. Monitor denotes NSR. SBP WNL. Neuro intact. AAO x 4. KHAN x 4. Lungs clear and pt on RA. Abd sodr, flat tender. Pt has rtmlower abd, side with biliary drain in  place and connected to NATALIE drain with small amount of greenish drainage in bulb and dressing over site saturated with bile drainage. Pt is NPO . Voiding in urinal as needed. Rt biliary drain site dressing changed. Skin around area is reddish and inflamed and very tender to touch in area of approximately 6 in x 4 in. Protective barrier cream applied to reddened areas and drainage sponges at drain with ABD pads over that and secured with paper tape. Tolerated procedure and states skin feels better now. Pt on glucostabilizer and Insulin drip as ordered. 2211 Dilaudid 0.5 mg IV given as ordered for abd pain. 2241 Pt resting after pain meds. 2300 Insulin drip on hold as per glucostabilizer. 05/23/2018 0100 Pt voided 400 cc lele urine in urinal. Pt status unchanged. 1961 Dilaudid 0.5 mg IV given as ordered for abd pain. Pt remains NPO. Nurse in to changed rt biliary drain dressing and found drain out and in bed with pt with drainage around site and on pt's side, abd, etc. Pt cleansed. Skin care given and nonadhering dressin applied directly to site and then reinforced with ABD pads and paper tape. Tolerated procedure and states feels better. 0400 Pt refused to have phlebotomist attempt to draw labs more than once. 0631 Dilaudid 0.5 mg IV given as ordered for c/o abd pain. 0701 Resting after earlier meds with both eyes closed.

## 2018-05-23 NOTE — PROGRESS NOTES
Problem: Falls - Risk of  Goal: *Absence of Falls  Document Serena Fall Risk and appropriate interventions in the flowsheet.    Outcome: Progressing Towards Goal  Fall Risk Interventions:            Medication Interventions: Bed/chair exit alarm, Evaluate medications/consider consulting pharmacy    Elimination Interventions: Call light in reach, Patient to call for help with toileting needs, Urinal in reach

## 2018-05-23 NOTE — PROGRESS NOTES
Problem: Pressure Injury - Risk of  Goal: *Prevention of pressure injury  Document Austin Scale and appropriate interventions in the flowsheet.    Outcome: Progressing Towards Goal  Pressure Injury Interventions:       Moisture Interventions: Absorbent underpads, Apply protective barrier, creams and emollients, Contain wound drainage, Moisture barrier    Activity Interventions: Pressure redistribution bed/mattress(bed type)    Mobility Interventions: HOB 30 degrees or less, Pressure redistribution bed/mattress (bed type)    Nutrition Interventions: Document food/fluid/supplement intake

## 2018-05-23 NOTE — ROUTINE PROCESS
0730 Bedside report given to Dara (oncoming nurse) per Noah Khan RN (offgoing nurse) utilizing SBAR, MAR, Kardex, I&O, results review, and EKG interpretation with rate and rhythm.

## 2018-05-23 NOTE — PROGRESS NOTES
[place] [address] NE[facility] SCI ICU  [address] [place] [id number]  [phone]  Colon and Rectal Surgery Progress Note      Patient: Ada Mcneal MRN: [id number]  SSN: [id number]    YOB: 1965  Age: 46 y.o. Sex: male      Admit Date: 5/22/2018    LOS: 1 day       I examined the patient independently and agree with evaluation as documented by the nurse practitioner, Tona Sy, NP    Will need drain replacement. IR drain placement ordered.         Claudia Fox MD, FACS, FASCRS  Colon and Rectal Surgery  [facility]  Office [phone]  Fax     [phone]  5/23/2018  7:40 PM

## 2018-05-23 NOTE — PROGRESS NOTES
attempted to visit patient. He was in pain, he declined visit. Chaplains will continue to follow and provide pastoral care as needed or requested.  recommends bedside caregivers page  on duty if patient shows signs of acute spiritual or emotional distress. The Rev.  [address] [facility] Matthew,   Anders Prasad [address]  [facility] [phone] / [facility] [phone]

## 2018-05-24 ENCOUNTER — APPOINTMENT (OUTPATIENT)
Dept: CARDIAC CATH/INVASIVE PROCEDURES | Age: 53
DRG: 420 | End: 2018-05-24
Attending: FAMILY MEDICINE
Payer: MEDICAID

## 2018-05-24 PROBLEM — N39.0 UTI (URINARY TRACT INFECTION): Status: ACTIVE | Noted: 2018-05-24

## 2018-05-24 LAB
ALBUMIN SERPL-MCNC: 2.1 G/DL (ref 3.4–5)
ALBUMIN/GLOB SERPL: 0.6 {RATIO} (ref 0.8–1.7)
ALP SERPL-CCNC: 155 U/L (ref 45–117)
ALT SERPL-CCNC: 26 U/L (ref 16–61)
ANION GAP SERPL CALC-SCNC: 8 MMOL/L (ref 3–18)
AST SERPL-CCNC: 26 U/L (ref 15–37)
BACTERIA SPEC CULT: ABNORMAL
BILIRUB SERPL-MCNC: 0.3 MG/DL (ref 0.2–1)
BUN SERPL-MCNC: 9 MG/DL (ref 7–18)
BUN/CREAT SERPL: 8 (ref 12–20)
CALCIUM SERPL-MCNC: 7.3 MG/DL (ref 8.5–10.1)
CHLORIDE SERPL-SCNC: 115 MMOL/L (ref 100–108)
CO2 SERPL-SCNC: 18 MMOL/L (ref 21–32)
CREAT SERPL-MCNC: 1.15 MG/DL (ref 0.6–1.3)
ERYTHROCYTE [DISTWIDTH] IN BLOOD BY AUTOMATED COUNT: 13.2 % (ref 11.6–14.5)
GLOBULIN SER CALC-MCNC: 3.4 G/DL (ref 2–4)
GLUCOSE BLD STRIP.AUTO-MCNC: 155 MG/DL (ref 70–110)
GLUCOSE BLD STRIP.AUTO-MCNC: 173 MG/DL (ref 70–110)
GLUCOSE BLD STRIP.AUTO-MCNC: 175 MG/DL (ref 70–110)
GLUCOSE BLD STRIP.AUTO-MCNC: 241 MG/DL (ref 70–110)
GLUCOSE BLD STRIP.AUTO-MCNC: 84 MG/DL (ref 70–110)
GLUCOSE BLD STRIP.AUTO-MCNC: 86 MG/DL (ref 70–110)
GLUCOSE BLD STRIP.AUTO-MCNC: 94 MG/DL (ref 70–110)
GLUCOSE SERPL-MCNC: 72 MG/DL (ref 74–99)
HCT VFR BLD AUTO: 27.2 % (ref 36–48)
HGB BLD-MCNC: 8.9 G/DL (ref 13–16)
MCH RBC QN AUTO: 30.3 PG (ref 24–34)
MCHC RBC AUTO-ENTMCNC: 32.7 G/DL (ref 31–37)
MCV RBC AUTO: 92.5 FL (ref 74–97)
PLATELET # BLD AUTO: 160 K/UL (ref 135–420)
PMV BLD AUTO: 10.1 FL (ref 9.2–11.8)
POTASSIUM SERPL-SCNC: 3.5 MMOL/L (ref 3.5–5.5)
POTASSIUM SERPL-SCNC: 3.6 MMOL/L (ref 3.5–5.5)
PROT SERPL-MCNC: 5.5 G/DL (ref 6.4–8.2)
RBC # BLD AUTO: 2.94 M/UL (ref 4.7–5.5)
SERVICE CMNT-IMP: ABNORMAL
SODIUM SERPL-SCNC: 141 MMOL/L (ref 136–145)
WBC # BLD AUTO: 6.6 K/UL (ref 4.6–13.2)

## 2018-05-24 PROCEDURE — 77030011230 HC DIL VESL COON COOK -B

## 2018-05-24 PROCEDURE — C1769 GUIDE WIRE: HCPCS

## 2018-05-24 PROCEDURE — 74011250636 HC RX REV CODE- 250/636: Performed by: NURSE PRACTITIONER

## 2018-05-24 PROCEDURE — 80053 COMPREHEN METABOLIC PANEL: CPT | Performed by: FAMILY MEDICINE

## 2018-05-24 PROCEDURE — 99152 MOD SED SAME PHYS/QHP 5/>YRS: CPT

## 2018-05-24 PROCEDURE — 74011000250 HC RX REV CODE- 250: Performed by: RADIOLOGY

## 2018-05-24 PROCEDURE — 99153 MOD SED SAME PHYS/QHP EA: CPT

## 2018-05-24 PROCEDURE — 74011250636 HC RX REV CODE- 250/636: Performed by: RADIOLOGY

## 2018-05-24 PROCEDURE — 74011636637 HC RX REV CODE- 636/637: Performed by: FAMILY MEDICINE

## 2018-05-24 PROCEDURE — 82962 GLUCOSE BLOOD TEST: CPT

## 2018-05-24 PROCEDURE — 36415 COLL VENOUS BLD VENIPUNCTURE: CPT | Performed by: FAMILY MEDICINE

## 2018-05-24 PROCEDURE — C1729 CATH, DRAINAGE: HCPCS

## 2018-05-24 PROCEDURE — 74011000250 HC RX REV CODE- 250: Performed by: FAMILY MEDICINE

## 2018-05-24 PROCEDURE — 77030027478 HC TBNG JP W BLB MRTM -B

## 2018-05-24 PROCEDURE — 49405 IMAGE CATH FLUID COLXN VISC: CPT

## 2018-05-24 PROCEDURE — 74011636320 HC RX REV CODE- 636/320: Performed by: RADIOLOGY

## 2018-05-24 PROCEDURE — 85027 COMPLETE CBC AUTOMATED: CPT | Performed by: FAMILY MEDICINE

## 2018-05-24 PROCEDURE — 65270000029 HC RM PRIVATE

## 2018-05-24 PROCEDURE — 74011250637 HC RX REV CODE- 250/637: Performed by: NURSE PRACTITIONER

## 2018-05-24 PROCEDURE — 74011250636 HC RX REV CODE- 250/636

## 2018-05-24 PROCEDURE — 0D2DX0Z CHANGE DRAINAGE DEVICE IN LOWER INTESTINAL TRACT, EXTERNAL APPROACH: ICD-10-PCS | Performed by: RADIOLOGY

## 2018-05-24 PROCEDURE — 74011000258 HC RX REV CODE- 258: Performed by: FAMILY MEDICINE

## 2018-05-24 PROCEDURE — 74011250636 HC RX REV CODE- 250/636: Performed by: FAMILY MEDICINE

## 2018-05-24 RX ORDER — MIDAZOLAM HYDROCHLORIDE 1 MG/ML
1-2 INJECTION, SOLUTION INTRAMUSCULAR; INTRAVENOUS
Status: DISCONTINUED | OUTPATIENT
Start: 2018-05-24 | End: 2018-05-24

## 2018-05-24 RX ORDER — INSULIN GLARGINE 100 [IU]/ML
6 INJECTION, SOLUTION SUBCUTANEOUS DAILY
Status: DISCONTINUED | OUTPATIENT
Start: 2018-05-25 | End: 2018-05-28

## 2018-05-24 RX ORDER — MIDAZOLAM HYDROCHLORIDE 1 MG/ML
INJECTION, SOLUTION INTRAMUSCULAR; INTRAVENOUS
Status: COMPLETED
Start: 2018-05-24 | End: 2018-05-24

## 2018-05-24 RX ORDER — METRONIDAZOLE 500 MG/100ML
500 INJECTION, SOLUTION INTRAVENOUS EVERY 12 HOURS
Status: DISCONTINUED | OUTPATIENT
Start: 2018-05-24 | End: 2018-05-29

## 2018-05-24 RX ORDER — HEPARIN SODIUM 200 [USP'U]/100ML
1000 INJECTION, SOLUTION INTRAVENOUS ONCE
Status: COMPLETED | OUTPATIENT
Start: 2018-05-24 | End: 2018-05-24

## 2018-05-24 RX ORDER — LIDOCAINE HYDROCHLORIDE 10 MG/ML
1-60 INJECTION INFILTRATION; PERINEURAL
Status: DISCONTINUED | OUTPATIENT
Start: 2018-05-24 | End: 2018-05-24

## 2018-05-24 RX ORDER — FENTANYL CITRATE 50 UG/ML
25-100 INJECTION, SOLUTION INTRAMUSCULAR; INTRAVENOUS
Status: DISCONTINUED | OUTPATIENT
Start: 2018-05-24 | End: 2018-05-24

## 2018-05-24 RX ORDER — POTASSIUM CHLORIDE 7.45 MG/ML
10 INJECTION INTRAVENOUS
Status: COMPLETED | OUTPATIENT
Start: 2018-05-24 | End: 2018-05-24

## 2018-05-24 RX ORDER — FENTANYL CITRATE 50 UG/ML
INJECTION, SOLUTION INTRAMUSCULAR; INTRAVENOUS
Status: COMPLETED
Start: 2018-05-24 | End: 2018-05-24

## 2018-05-24 RX ADMIN — PANCRELIPASE 2 CAPSULE: 10000; 34000; 55000 CAPSULE, DELAYED RELEASE ORAL at 16:09

## 2018-05-24 RX ADMIN — ONDANSETRON 4 MG: 2 INJECTION INTRAMUSCULAR; INTRAVENOUS at 08:49

## 2018-05-24 RX ADMIN — MIDAZOLAM HYDROCHLORIDE 0.5 MG: 1 INJECTION, SOLUTION INTRAMUSCULAR; INTRAVENOUS at 10:15

## 2018-05-24 RX ADMIN — PANCRELIPASE 2 CAPSULE: 10000; 34000; 55000 CAPSULE, DELAYED RELEASE ORAL at 12:02

## 2018-05-24 RX ADMIN — HYDROMORPHONE HYDROCHLORIDE 0.5 MG: 1 INJECTION, SOLUTION INTRAMUSCULAR; INTRAVENOUS; SUBCUTANEOUS at 23:11

## 2018-05-24 RX ADMIN — Medication 10 ML: at 22:42

## 2018-05-24 RX ADMIN — LEVOFLOXACIN 750 MG: 5 INJECTION, SOLUTION INTRAVENOUS at 16:09

## 2018-05-24 RX ADMIN — INSULIN LISPRO 2 UNITS: 100 INJECTION, SOLUTION INTRAVENOUS; SUBCUTANEOUS at 23:29

## 2018-05-24 RX ADMIN — HYDROMORPHONE HYDROCHLORIDE 0.5 MG: 1 INJECTION, SOLUTION INTRAMUSCULAR; INTRAVENOUS; SUBCUTANEOUS at 01:20

## 2018-05-24 RX ADMIN — Medication 10 ML: at 06:12

## 2018-05-24 RX ADMIN — FENTANYL CITRATE 25 MCG: 50 INJECTION, SOLUTION INTRAMUSCULAR; INTRAVENOUS at 10:18

## 2018-05-24 RX ADMIN — AMITRIPTYLINE HYDROCHLORIDE 75 MG: 50 TABLET, FILM COATED ORAL at 22:41

## 2018-05-24 RX ADMIN — Medication 10 ML: at 14:43

## 2018-05-24 RX ADMIN — FENTANYL CITRATE 50 MCG: 50 INJECTION, SOLUTION INTRAMUSCULAR; INTRAVENOUS at 10:12

## 2018-05-24 RX ADMIN — POTASSIUM CHLORIDE 10 MEQ: 7.46 INJECTION, SOLUTION INTRAVENOUS at 12:02

## 2018-05-24 RX ADMIN — DEXTROSE MONOHYDRATE AND SODIUM CHLORIDE 75 ML/HR: 5; .45 INJECTION, SOLUTION INTRAVENOUS at 03:09

## 2018-05-24 RX ADMIN — HEPARIN SODIUM 5000 UNITS: 5000 INJECTION, SOLUTION INTRAVENOUS; SUBCUTANEOUS at 22:40

## 2018-05-24 RX ADMIN — HEPARIN SODIUM 5000 UNITS: 5000 INJECTION, SOLUTION INTRAVENOUS; SUBCUTANEOUS at 13:18

## 2018-05-24 RX ADMIN — INSULIN GLARGINE 10 UNITS: 100 INJECTION, SOLUTION SUBCUTANEOUS at 09:00

## 2018-05-24 RX ADMIN — METRONIDAZOLE 500 MG: 500 INJECTION, SOLUTION INTRAVENOUS at 06:12

## 2018-05-24 RX ADMIN — POTASSIUM CHLORIDE 10 MEQ: 7.46 INJECTION, SOLUTION INTRAVENOUS at 07:12

## 2018-05-24 RX ADMIN — HYDROMORPHONE HYDROCHLORIDE 0.5 MG: 1 INJECTION, SOLUTION INTRAMUSCULAR; INTRAVENOUS; SUBCUTANEOUS at 20:00

## 2018-05-24 RX ADMIN — INSULIN LISPRO 2 UNITS: 100 INJECTION, SOLUTION INTRAVENOUS; SUBCUTANEOUS at 00:11

## 2018-05-24 RX ADMIN — HYDROMORPHONE HYDROCHLORIDE 0.5 MG: 1 INJECTION, SOLUTION INTRAMUSCULAR; INTRAVENOUS; SUBCUTANEOUS at 07:21

## 2018-05-24 RX ADMIN — PROMETHAZINE HYDROCHLORIDE 25 MG: 25 INJECTION, SOLUTION INTRAMUSCULAR; INTRAVENOUS at 23:14

## 2018-05-24 RX ADMIN — FENTANYL CITRATE 25 MCG: 50 INJECTION, SOLUTION INTRAMUSCULAR; INTRAVENOUS at 10:15

## 2018-05-24 RX ADMIN — HEPARIN SODIUM 2000 UNITS: 200 INJECTION, SOLUTION INTRAVENOUS at 10:20

## 2018-05-24 RX ADMIN — HYDROMORPHONE HYDROCHLORIDE 0.5 MG: 1 INJECTION, SOLUTION INTRAMUSCULAR; INTRAVENOUS; SUBCUTANEOUS at 10:49

## 2018-05-24 RX ADMIN — FERROUS SULFATE TAB 325 MG (65 MG ELEMENTAL FE) 325 MG: 325 (65 FE) TAB at 18:16

## 2018-05-24 RX ADMIN — HYDROMORPHONE HYDROCHLORIDE 0.5 MG: 1 INJECTION, SOLUTION INTRAMUSCULAR; INTRAVENOUS; SUBCUTANEOUS at 16:43

## 2018-05-24 RX ADMIN — MIDAZOLAM HYDROCHLORIDE 1 MG: 1 INJECTION, SOLUTION INTRAMUSCULAR; INTRAVENOUS at 10:12

## 2018-05-24 RX ADMIN — PANCRELIPASE 2 CAPSULE: 10000; 34000; 55000 CAPSULE, DELAYED RELEASE ORAL at 08:51

## 2018-05-24 RX ADMIN — LIDOCAINE HYDROCHLORIDE 20 ML: 10 INJECTION, SOLUTION INFILTRATION; PERINEURAL at 10:20

## 2018-05-24 RX ADMIN — HYDROMORPHONE HYDROCHLORIDE 0.5 MG: 1 INJECTION, SOLUTION INTRAMUSCULAR; INTRAVENOUS; SUBCUTANEOUS at 04:29

## 2018-05-24 RX ADMIN — POTASSIUM CHLORIDE 10 MEQ: 7.46 INJECTION, SOLUTION INTRAVENOUS at 08:53

## 2018-05-24 RX ADMIN — MIDAZOLAM HYDROCHLORIDE 0.5 MG: 1 INJECTION, SOLUTION INTRAMUSCULAR; INTRAVENOUS at 10:18

## 2018-05-24 RX ADMIN — IOPAMIDOL 15 ML: 612 INJECTION, SOLUTION INTRAVENOUS at 10:29

## 2018-05-24 RX ADMIN — FERROUS SULFATE TAB 325 MG (65 MG ELEMENTAL FE) 325 MG: 325 (65 FE) TAB at 08:51

## 2018-05-24 RX ADMIN — HYDROMORPHONE HYDROCHLORIDE 0.5 MG: 1 INJECTION, SOLUTION INTRAMUSCULAR; INTRAVENOUS; SUBCUTANEOUS at 13:49

## 2018-05-24 RX ADMIN — POTASSIUM CHLORIDE 10 MEQ: 7.46 INJECTION, SOLUTION INTRAVENOUS at 10:49

## 2018-05-24 RX ADMIN — METRONIDAZOLE 500 MG: 500 INJECTION, SOLUTION INTRAVENOUS at 18:16

## 2018-05-24 RX ADMIN — HEPARIN SODIUM 5000 UNITS: 5000 INJECTION, SOLUTION INTRAVENOUS; SUBCUTANEOUS at 04:29

## 2018-05-24 NOTE — PROGRESS NOTES
Physical Exam   Skin:          0745:  Report received from off-going RN, Sarabjit Heredia. Assessment as charted. Pt c/o 9/10 pain at drain site. Skin is bright red, excoriated, very painful. Loose gauze pads in place to catch/absorb drainage. Mepilex applied to bony sacrum, right edge folded over to avoid contact with excoriated skin around drain site. Pt is very pleasant, updated on plan for day after speaking with radiology RN. Pt remains NPO, will hold heparin. 0945:  Pt to IR for drain placement. 1100:  PT returned from IR with drain in place, draining large amts of thick green fluid. Emptied 80cc, recharged NATALIE bulb. Pt medicated with PRN dilaudid for 9/10 pain. Spoke with Damion Almendarez from wound care, suggested removing adhesive drsg and applying zinc to damaged skin and split gauze around drain. Pt has surgical bed orders, waiting bed assignment. 1120: Adhesive drsg removed, though it was not adhering to moist skin. Drain tube secured to intact, undamaged skin on upper thigh. Skin around drain covered with EPC, split gauze applied around drain tubing. Emptied additional 90cc of thick green drainage. 1300:  Drain emptied several more times, drainage is slowing down and transitioning from green/blue to yellow. Split gauze pads changed, very small amount of yellow drainage coming from drain site. Per surgery note, pt given small amount of ice chips. 1400:  Drain flushed with 10cc NS.     1459: TRANSFER - OUT REPORT:    Verbal report given to Sachin Negrete on [address]  being transferred to [facility] for routine progression of care       Report consisted of patients Situation, Background, Assessment and   Recommendations(SBAR). Information from the following report(s) Procedure Summary, Intake/Output, MAR and Recent Results was reviewed with the receiving nurse.     Lines:   Venous Access Device left chest mediport  Upper chest (subclavicular area), left (Active)   Central Line Being Utilized Yes 5/24/2018  8:19 AM   Criteria for Appropriate Use Limited/no vessel suitable for conventional peripheral access 5/24/2018  8:19 AM   Site Assessment Clean, dry, & intact 5/24/2018  2:00 PM   Date of Last Dressing Change 05/22/18 5/24/2018  8:19 AM   Dressing Status Clean, dry, & intact 5/24/2018  8:19 AM   Dressing Type Transparent 5/24/2018  8:19 AM   Action Taken Open ports on tubing capped 5/24/2018  8:19 AM   Date Accessed (Medial Site) 05/22/18 5/24/2018  8:19 AM   Access Time (Medial Site) 0100 5/22/2018  1:00 AM   Access Needle Length (Medial Site) 0.75 inches 5/22/2018  1:00 AM   Positive Blood Return ([facility]) Yes 5/22/2018  1:00 AM   Action Taken ([facility]) Blood drawn;Flushed 5/22/2018  1:00 AM   Alcohol Cap Used Yes 5/24/2018  4:00 AM        Opportunity for questions and clarification was provided.       Patient transported with:   Registered Nurse

## 2018-05-24 NOTE — PROGRESS NOTES
[place] [address] [facility] ICU  [address] [place] [id number]  [phone]  Colon and Rectal Surgery Progress Note      Patient: Eric Mijares MRN: [id number]  SSN: [id number]    YOB: 1965  Age: 46 y.o. Sex: male      Admit Date: 5/22/2018    LOS: 2 days     Subjective:   Drain placed by IR, moderate blue tinged drainage noted in NATALIE drain. Continues to complain of moderate abdominal pain upon palpation with some nausea    Objective:     Vitals:    05/24/18 0800 05/24/18 0900 05/24/18 1123 05/24/18 1200   BP: 115/76 107/68 113/62    Pulse: 82 94     Resp: 18 17 18    Temp: 98.1 °F (36.7 °C)   97.6 °F (36.4 °C)   SpO2: 99% 93% 99%    Weight:       Height:            Intake and Output:  Current Shift: 05/24 0701 - 05/24 1900  In: 200 [I.V.:200]  Out: 260 [Drains:260]  Last three shifts: 05/22 1901 - 05/24 0700  In: 3960.5 [I.V.:3960.5]  Out: 1700 [Urine:1700]    Physical Exam:   GENERAL: alert, cooperative, no distress, appears stated age  LUNG: clear to auscultation bilaterally  HEART: regular rate and rhythm, S1, S2 normal, no murmur, click, rub or gallop  ABDOMEN: soft, tenderness upon palpation, non distended. Bowel sounds normal. No masses,  no organomegaly. NATALIE draining well  SKIN: RLQ redness and pain due to moderate drainage,     Lab/Data Review: All lab results for the last 24 hours reviewed.        Assessment:     Principal Problem:    Hyperglycemia (5/22/2018)    Active Problems:    Chronic pancreatitis (HCC) ()      Overview: Continue PPI IV      IDDM (insulin dependent diabetes mellitus) ([facility] (8/31/2013)      Abdominal pain (11/16/2015)      Moderate protein-calorie malnutrition ([facility] (11/21/2015)      Pancreatic fistula (3/23/2016)      H/O ETOH abuse (3/23/2016)      Dehydration (8/5/2017)      Vomiting (8/5/2017)      Acute alcoholic hepatitis (7/89/6265)      Chronic pancreatitis due to acute alcohol intoxication ([facility] (4/15/2018)      CHRISTI (acute kidney injury) ([facility] (5/22/2018) UTI (urinary tract infection) (5/24/2018)        Plan:   May have ice chips as tolerated  Continue antibiotic therapy  Will continue to follow     Signed By: Brandy Lopez NP        May 24, 2018

## 2018-05-24 NOTE — PROGRESS NOTES
Problem: Falls - Risk of  Goal: *Absence of Falls  Document Serena Fall Risk and appropriate interventions in the flowsheet. Outcome: Progressing Towards Goal  Fall Risk Interventions:            Medication Interventions: Bed/chair exit alarm, Evaluate medications/consider consulting pharmacy    Elimination Interventions: Call light in reach, Patient to call for help with toileting needs, Urinal in reach             Problem: Pressure Injury - Risk of  Goal: *Prevention of pressure injury  Document Austin Scale and appropriate interventions in the flowsheet.    Outcome: Progressing Towards Goal  Pressure Injury Interventions:       Moisture Interventions: Absorbent underpads, Contain wound drainage    Activity Interventions: Assess need for specialty bed, Increase time out of bed, PT/OT evaluation    Mobility Interventions: Assess need for specialty bed, HOB 30 degrees or less, Pressure redistribution bed/mattress (bed type), PT/OT evaluation    Nutrition Interventions: Document food/fluid/supplement intake, Discuss nutritional consult with provider, Offer support with meals,snacks and hydration    Friction and Shear Interventions: HOB 30 degrees or less, Foam dressings/transparent film/skin sealants

## 2018-05-24 NOTE — PROGRESS NOTES
Progress Note      Patient: Daysi Tovar               Sex: male          DOA: 5/22/2018       YOB: 1965      Age:  46 y.o.        LOS:  LOS: 2 days               Subjective:   Daysi Tovar is a 46 y.o. male  who presents with DKA mild now resolved and Abdominal pain. He also had his JAYCOB drain dislodged yesterday and was inserted by IR today and draining blue colored fluid. He is on antibiotics flagyl and Levaquin pending wound culture. He is also UTI. Urine ctx GNR. .      Objective:      Visit Vitals    /76    Pulse 82    Temp 98.1 °F (36.7 °C)    Resp 18    Ht 6' (1.829 m)    Wt 61.2 kg (135 lb)    SpO2 99%    BMI 18.31 kg/m2           Physical Exam:  General:  alert, cooperative, no distress, appears stated age  Lungs:  clear to auscultation bilaterally  Heart:  regular rate and rhythm, S1, S2 normal, no murmur, click, rub or gallop  Abdomen:  soft, non-tender.  Bowel sounds normal. No masses,  no organomegaly, JAYCOB drain draining blue material  Cardiovascular:  Regular rate and rhythm, S1S2 present, without murmur or extra heart sounds, pedal pulses normal and no edema  Skin: discoloration skin RLQ    Intake and Output:  Current Shift:  05/24 0701 - 05/24 1900  In: 100 [I.V.:100]  Out: -   Last three shifts:  05/22 1901 - 05/24 0700  In: 3960.5 [I.V.:3960.5]  Out: 1700 [Urine:1700]    Recent Results (from the past 48 hour(s))   GLUCOSTABILIZER    Collection Time: 05/22/18  8:54 AM   Result Value Ref Range    Glucose 331 mg/dL    Insulin order 5.4 units/hour    Insulin adminstered 5.4 units/hour    Multiplier 0.020     Low target 140 mg/dL    High target 180 mg/dL    D50 order 0.0 ml    D50 administered 0.00 ml    Minutes until next BG 60 min    Order initials jaycob     Administered initials lr    GLUCOSTABILIZER    Collection Time: 05/22/18 10:17 AM   Result Value Ref Range    Glucose 241 mg/dL    Insulin order 1.8 units/hour    Insulin adminstered 1.8 units/hour    Multiplier 0.010     Low target 140 mg/dL    High target 180 mg/dL    D50 order 0.0 ml    D50 administered 0.00 ml    Minutes until next BG 60 min    Order initials      Administered initials     POC G3    Collection Time: 05/22/18 10:51 AM   Result Value Ref Range    Device: ROOM AIR      FIO2 (POC) 0.21 %    pH (POC) 7.255 (L) 7.35 - 7.45      pCO2 (POC) 23.3 (L) 35.0 - 45.0 MMHG    pO2 (POC) 91 80 - 100 MMHG    HCO3 (POC) 10.4 (L) 22 - 26 MMOL/L    sO2 (POC) 96 92 - 97 %    Base deficit (POC) 17 mmol/L    Allens test (POC) N/A      Total resp. rate 20      Site RIGHT RADIAL      Patient temp. 97.5      Specimen type (POC) ARTERIAL      Performed by Revonda Door, POC    Collection Time: 05/22/18 11:26 AM   Result Value Ref Range    Glucose (POC) 142 (H) 70 - 110 mg/dL   GLUCOSTABILIZER    Collection Time: 05/22/18 11:29 AM   Result Value Ref Range    Glucose 142 mg/dL    Insulin order 0.0 units/hour    Insulin adminstered 0.0 units/hour    Multiplier 0.000     Low target 140 mg/dL    High target 180 mg/dL    D50 order 0.0 ml    D50 administered 0.00 ml    Minutes until next BG 60 min    Order initials /akm     Administered initials /akm    GLUCOSE, POC    Collection Time: 05/22/18 12:47 PM   Result Value Ref Range    Glucose (POC) 113 (H) 70 - 110 mg/dL   POC G3    Collection Time: 05/22/18  4:53 PM   Result Value Ref Range    Device: ROOM AIR      FIO2 (POC) 0.21 %    pH (POC) 7.271 (L) 7.35 - 7.45      pCO2 (POC) 22.8 (L) 35.0 - 45.0 MMHG    pO2 (POC) 113 (H) 80 - 100 MMHG    HCO3 (POC) 10.6 (L) 22 - 26 MMOL/L    sO2 (POC) 98 (H) 92 - 97 %    Base deficit (POC) 16 mmol/L    Allens test (POC) N/A      Total resp. rate 20      Site RIGHT RADIAL      Patient temp.  97.5      Specimen type (POC) ARTERIAL      Performed by Revonda Door, POC    Collection Time: 05/22/18  5:47 PM   Result Value Ref Range    Glucose (POC) 221 (H) 70 - 110 mg/dL   GLUCOSE, POC    Collection Time: 05/22/18 6:39 PM   Result Value Ref Range    Glucose (POC) 224 (H) 70 - 110 mg/dL   GLUCOSTABILIZER    Collection Time: 05/22/18  7:00 PM   Result Value Ref Range    Glucose 224 mg/dL    Insulin order 0.0 units/hour    Insulin adminstered 0.0 units/hour    Multiplier 0.000     Low target 140 mg/dL    High target 180 mg/dL    D50 order 0.0 ml    D50 administered 0.00 ml    Minutes until next BG 60 min    Order initials lh     Administered initials lh    GLUCOSE, POC    Collection Time: 05/22/18  8:17 PM   Result Value Ref Range    Glucose (POC) 245 (H) 70 - 110 mg/dL   GLUCOSTABILIZER    Collection Time: 05/22/18  8:18 PM   Result Value Ref Range    Glucose 245 mg/dL    Insulin order 1.9 units/hour    Insulin adminstered 1.9 units/hour    Multiplier 0.010     Low target 140 mg/dL    High target 180 mg/dL    D50 order 0.0 ml    D50 administered 0.00 ml    Minutes until next BG 60 min    Order initials abh     Administered initials abh    GLUCOSE, POC    Collection Time: 05/22/18  8:30 PM   Result Value Ref Range    Glucose (POC) 258 (H) 70 - 110 mg/dL   GLUCOSE, POC    Collection Time: 05/22/18  9:20 PM   Result Value Ref Range    Glucose (POC) 196 (H) 70 - 110 mg/dL   GLUCOSTABILIZER    Collection Time: 05/22/18  9:20 PM   Result Value Ref Range    Glucose 196 mg/dL    Insulin order 2.7 units/hour    Insulin adminstered 2.7 units/hour    Multiplier 0.020     Low target 140 mg/dL    High target 180 mg/dL    D50 order 0.0 ml    D50 administered 0.00 ml    Minutes until next BG 60 min    Order initials abh     Administered initials abh    GLUCOSE, POC    Collection Time: 05/22/18 10:20 PM   Result Value Ref Range    Glucose (POC) 124 (H) 70 - 110 mg/dL   GLUCOSTABILIZER    Collection Time: 05/22/18 10:21 PM   Result Value Ref Range    Glucose 124 mg/dL    Insulin order 0.6 units/hour    Insulin adminstered 0.6 units/hour    Multiplier 0.010     Low target 140 mg/dL    High target 180 mg/dL    D50 order 0.0 ml    D50 administered 0.00 ml    Minutes until next BG 60 min    Order initials abh     Administered initials abh    GLUCOSE, POC    Collection Time: 05/22/18 11:19 PM   Result Value Ref Range    Glucose (POC) 107 70 - 110 mg/dL   GLUCOSTABILIZER    Collection Time: 05/22/18 11:22 PM   Result Value Ref Range    Glucose 107 mg/dL    Insulin order 0.0 units/hour    Insulin adminstered 0.0 units/hour    Multiplier 0.000     Low target 140 mg/dL    High target 180 mg/dL    D50 order 0.0 ml    D50 administered 0.00 ml    Minutes until next BG 60 min    Order initials abh     Administered initials abh    CALCIUM, IONIZED    Collection Time: 05/22/18 11:30 PM   Result Value Ref Range    Ionized Calcium 1.21 1.12 - 3.20 MMOL/L   METABOLIC PANEL, BASIC    Collection Time: 05/22/18 11:30 PM   Result Value Ref Range    Sodium 141 136 - 145 mmol/L    Potassium 3.7 3.5 - 5.5 mmol/L    Chloride 116 (H) 100 - 108 mmol/L    CO2 15 (L) 21 - 32 mmol/L    Anion gap 10 3.0 - 18 mmol/L    Glucose 105 (H) 74 - 99 mg/dL    BUN 11 7.0 - 18 MG/DL    Creatinine 1.58 (H) 0.6 - 1.3 MG/DL    BUN/Creatinine ratio 7 (L) 12 - 20      GFR est AA 56 (L) >60 ml/min/1.73m2    GFR est non-AA 46 (L) >60 ml/min/1.73m2    Calcium 7.8 (L) 8.5 - 10.1 MG/DL   GLUCOSE, POC    Collection Time: 05/23/18 12:20 AM   Result Value Ref Range    Glucose (POC) 126 (H) 70 - 110 mg/dL   GLUCOSTABILIZER    Collection Time: 05/23/18 12:22 AM   Result Value Ref Range    Glucose 126 mg/dL    Insulin order 0.0 units/hour    Insulin adminstered 0.0 units/hour    Multiplier 0.000     Low target 140 mg/dL    High target 180 mg/dL    D50 order 0.0 ml    D50 administered 0.00 ml    Minutes until next BG 60 min    Order initials abh     Administered initials abh    GLUCOSE, POC    Collection Time: 05/23/18  1:26 AM   Result Value Ref Range    Glucose (POC) 145 (H) 70 - 110 mg/dL   GLUCOSTABILIZER    Collection Time: 05/23/18  1:26 AM   Result Value Ref Range    Glucose 145 mg/dL    Insulin order 0.0 units/hour    Insulin adminstered 0.0 units/hour    Multiplier 0.000     Low target 140 mg/dL    High target 180 mg/dL    D50 order 0.0 ml    D50 administered 0.00 ml    Minutes until next BG 60 min    Order initials abh     Administered initials abh    GLUCOSE, POC    Collection Time: 05/23/18  2:22 AM   Result Value Ref Range    Glucose (POC) 180 (H) 70 - 110 mg/dL   GLUCOSTABILIZER    Collection Time: 05/23/18  2:27 AM   Result Value Ref Range    Glucose 180 mg/dL    Insulin order 0.0 units/hour    Insulin adminstered 0.0 units/hour    Multiplier 0.000     Low target 140 mg/dL    High target 180 mg/dL    D50 order 0.0 ml    D50 administered 0.00 ml    Minutes until next BG 60 min    Order initials abh     Administered initials abh    GLUCOSE, POC    Collection Time: 05/23/18  3:41 AM   Result Value Ref Range    Glucose (POC) 271 (H) 70 - 110 mg/dL   GLUCOSTABILIZER    Collection Time: 05/23/18  3:42 AM   Result Value Ref Range    Glucose 271 mg/dL    Insulin order 2.1 units/hour    Insulin adminstered 2.1 units/hour    Multiplier 0.010     Low target 140 mg/dL    High target 180 mg/dL    D50 order 0.0 ml    D50 administered 0.00 ml    Minutes until next BG 60 min    Order initials abh     Administered initials abh    GLUCOSE, POC    Collection Time: 05/23/18  4:40 AM   Result Value Ref Range    Glucose (POC) 226 (H) 70 - 110 mg/dL   GLUCOSTABILIZER    Collection Time: 05/23/18  4:42 AM   Result Value Ref Range    Glucose 226 mg/dL    Insulin order 3.3 units/hour    Insulin adminstered 3.3 units/hour    Multiplier 0.020     Low target 140 mg/dL    High target 180 mg/dL    D50 order 0.0 ml    D50 administered 0.00 ml    Minutes until next BG 60 min    Order initials abh     Administered initials sbh    GLUCOSE, POC    Collection Time: 05/23/18  5:34 AM   Result Value Ref Range    Glucose (POC) 216 (H) 70 - 110 mg/dL   GLUCOSTABILIZER    Collection Time: 05/23/18  5:43 AM   Result Value Ref Range    Glucose 216 mg/dL    Insulin order 4.7 units/hour    Insulin adminstered 4.7 units/hour    Multiplier 0.030     Low target 140 mg/dL    High target 180 mg/dL    D50 order 0.0 ml    D50 administered 0.00 ml    Minutes until next BG 60 min    Order initials abh     Administered initials abh    GLUCOSE, POC    Collection Time: 05/23/18  6:41 AM   Result Value Ref Range    Glucose (POC) 132 (H) 70 - 110 mg/dL   GLUCOSTABILIZER    Collection Time: 05/23/18  6:43 AM   Result Value Ref Range    Glucose 132 mg/dL    Insulin order 1.4 units/hour    Insulin adminstered 1.4 units/hour    Multiplier 0.020     Low target 140 mg/dL    High target 180 mg/dL    D50 order 0.0 ml    D50 administered 0.00 ml    Minutes until next BG 60 min    Order initials abh     Administered initials abh    GLUCOSE, POC    Collection Time: 05/23/18  7:44 AM   Result Value Ref Range    Glucose (POC) 72 70 - 110 mg/dL   GLUCOSTABILIZER    Collection Time: 05/23/18  7:44 AM   Result Value Ref Range    Glucose 72 mg/dL    Insulin order 0.1 units/hour    Insulin adminstered 0.1 units/hour    Multiplier 0.010     Low target 140 mg/dL    High target 180 mg/dL    D50 order 0.0 ml    D50 administered 0.00 ml    Minutes until next BG 60 min    Order initials cbg     Administered initials cbg    METABOLIC PANEL, BASIC    Collection Time: 05/23/18  8:25 AM   Result Value Ref Range    Sodium 142 136 - 145 mmol/L    Potassium 3.6 3.5 - 5.5 mmol/L    Chloride 119 (H) 100 - 108 mmol/L    CO2 15 (L) 21 - 32 mmol/L    Anion gap 8 3.0 - 18 mmol/L    Glucose 63 (L) 74 - 99 mg/dL    BUN 9 7.0 - 18 MG/DL    Creatinine 1.23 0.6 - 1.3 MG/DL    BUN/Creatinine ratio 7 (L) 12 - 20      GFR est AA >60 >60 ml/min/1.73m2    GFR est non-AA >60 >60 ml/min/1.73m2    Calcium 7.2 (L) 8.5 - 10.1 MG/DL   GLUCOSE, POC    Collection Time: 05/23/18  9:01 AM   Result Value Ref Range    Glucose (POC) 85 70 - 110 mg/dL   GLUCOSE, POC    Collection Time: 05/23/18  9:05 AM   Result Value Ref Range Glucose (POC) 90 70 - 110 mg/dL   GLUCOSTABILIZER    Collection Time: 05/23/18  9:05 AM   Result Value Ref Range    Glucose 90 mg/dL    Insulin order 0.0 units/hour    Insulin adminstered 0.0 units/hour    Multiplier 0.000     Low target 140 mg/dL    High target 180 mg/dL    D50 order 0.0 ml    D50 administered 0.00 ml    Minutes until next BG 60 min    Order initials as     Administered initials as    GLUCOSE, POC    Collection Time: 05/23/18 10:04 AM   Result Value Ref Range    Glucose (POC) 133 (H) 70 - 110 mg/dL   GLUCOSTABILIZER    Collection Time: 05/23/18 10:04 AM   Result Value Ref Range    Glucose 133 mg/dL    Insulin order 0.0 units/hour    Insulin adminstered 0.0 units/hour    Multiplier 0.000     Low target 140 mg/dL    High target 180 mg/dL    D50 order 0.0 ml    D50 administered 0.00 ml    Minutes until next BG 60 min    Order initials NA     Administered initials NA    GLUCOSE, POC    Collection Time: 05/23/18 11:11 AM   Result Value Ref Range    Glucose (POC) 170 (H) 70 - 110 mg/dL   GLUCOSTABILIZER    Collection Time: 05/23/18 11:12 AM   Result Value Ref Range    Glucose 170 mg/dL    Insulin order 0.0 units/hour    Insulin adminstered 0.0 units/hour    Multiplier 0.000     Low target 140 mg/dL    High target 180 mg/dL    D50 order 0.0 ml    D50 administered 0.00 ml    Minutes until next BG 60 min    Order initials cbg     Administered initials cbg    GLUCOSE, POC    Collection Time: 05/23/18 12:18 PM   Result Value Ref Range    Glucose (POC) 176 (H) 70 - 110 mg/dL   GLUCOSE, POC    Collection Time: 05/23/18  5:19 PM   Result Value Ref Range    Glucose (POC) 107 70 - 110 mg/dL   GLUCOSE, POC    Collection Time: 05/24/18 12:03 AM   Result Value Ref Range    Glucose (POC) 175 (H) 70 - 110 mg/dL   CBC W/O DIFF    Collection Time: 05/24/18  3:55 AM   Result Value Ref Range    WBC 6.6 4.6 - 13.2 K/uL    RBC 2.94 (L) 4.70 - 5.50 M/uL    HGB 8.9 (L) 13.0 - 16.0 g/dL    HCT 27.2 (L) 36.0 - 48.0 %    MCV 92.5 74.0 - 97.0 FL    MCH 30.3 24.0 - 34.0 PG    MCHC 32.7 31.0 - 37.0 g/dL    RDW 13.2 11.6 - 14.5 %    PLATELET 864 755 - 572 K/uL    MPV 10.1 9.2 - 93.8 FL   METABOLIC PANEL, COMPREHENSIVE    Collection Time: 05/24/18  3:55 AM   Result Value Ref Range    Sodium 141 136 - 145 mmol/L    Potassium 3.5 3.5 - 5.5 mmol/L    Chloride 115 (H) 100 - 108 mmol/L    CO2 18 (L) 21 - 32 mmol/L    Anion gap 8 3.0 - 18 mmol/L    Glucose 72 (L) 74 - 99 mg/dL    BUN 9 7.0 - 18 MG/DL    Creatinine 1.15 0.6 - 1.3 MG/DL    BUN/Creatinine ratio 8 (L) 12 - 20      GFR est AA >60 >60 ml/min/1.73m2    GFR est non-AA >60 >60 ml/min/1.73m2    Calcium 7.3 (L) 8.5 - 10.1 MG/DL    Bilirubin, total 0.3 0.2 - 1.0 MG/DL    ALT (SGPT) 26 16 - 61 U/L    AST (SGOT) 26 15 - 37 U/L    Alk. phosphatase 155 (H) 45 - 117 U/L    Protein, total 5.5 (L) 6.4 - 8.2 g/dL    Albumin 2.1 (L) 3.4 - 5.0 g/dL    Globulin 3.4 2.0 - 4.0 g/dL    A-G Ratio 0.6 (L) 0.8 - 1.7     GLUCOSE, POC    Collection Time: 05/24/18  6:11 AM   Result Value Ref Range    Glucose (POC) 94 70 - 110 mg/dL   GLUCOSE, POC    Collection Time: 05/24/18  7:46 AM   Result Value Ref Range    Glucose (POC) 84 70 - 110 mg/dL           XRays were reviewed in past 24 hours    Medications Reviewed      Continued hospitalization is indicated due to NATALIE drain dislodgement, UTI, Mild DKA resolved, abdominal pain.       Assessment/Plan     Principal Problem:    Hyperglycemia (5/22/2018)    Active Problems:    Chronic pancreatitis (HCC) ()      Overview: Continue PPI IV      IDDM (insulin dependent diabetes mellitus) ([facility] (8/31/2013)      Abdominal pain (11/16/2015)      Moderate protein-calorie malnutrition ([facility] (11/21/2015)      Pancreatic fistula (3/23/2016)      H/O ETOH abuse (3/23/2016)      Dehydration (8/5/2017)      Vomiting (8/5/2017)      Acute alcoholic hepatitis (2/62/7220)      Chronic pancreatitis due to acute alcohol intoxication ([facility] (4/15/2018)      CHRISTI (acute kidney injury) ([facility] (5/22/2018)      UTI (urinary tract infection) (5/24/2018)         Dislodged NATALIE drain   - IR Inserted NATALIE drain  - Surgery following    Abdominal pain   - r/o intraabdominal infection  - flagyl and Levaquin pending wound culture     UTI   - Culture + Klebsiella pneumoniae  - Levaquin every day x 5 d     Mild DKA   - RESOLVED   - Lantus   - SSI     Chronic pancreatitis   - Zenpep     Chronic abdominal pain  - pain control prn       CHRISTI  - RESOLVED      Resume chronic med as appropriate      DVT prophylaxis      Full code     Vee Adorno MD  May 24, 2018

## 2018-05-24 NOTE — ROUTINE PROCESS
1530 received pt from ICU.    1920 Bedside and Verbal shift change report given to nancy bhatia (oncoming nurse) by Jaylyn Shelton RN   (offgoing nurse). Report included the following information Kardex, MAR and Recent Results.

## 2018-05-24 NOTE — PROGRESS NOTES
Clinical Pharmacy Note: Metronidazole Dosing    Please note that the metronidazole dose for Josefina Evans has been changed to 500 mg IV q12h per [facility]-approved protocol. Please contact the pharmacy with any questions.     Lashell Lane, PHARMD

## 2018-05-24 NOTE — PROGRESS NOTES
The patient had a drain placed at [facility] for duodenalpancreatic fistula August 14, 2017. He report drained dislodged approximately 4 months ago, never evaluated for reinsertion. Patient for a IR today for drain reinsertion. Discharge plan is to return home with home care as indicated.  Kayode Mcdonald RN

## 2018-05-24 NOTE — PROGRESS NOTES
Problem: Falls - Risk of  Goal: *Absence of Falls  Document Serena Fall Risk and appropriate interventions in the flowsheet.    Outcome: Progressing Towards Goal  Fall Risk Interventions:            Medication Interventions: Evaluate medications/consider consulting pharmacy    Elimination Interventions: Call light in reach, Patient to call for help with toileting needs, Urinal in reach

## 2018-05-24 NOTE — DIABETES MGMT
GLYCEMIC CONTROL AND NUTRITION:  Blood glucose < 100 mg/dL today. Suggest lowering basal insulin from 10 units Lantus/day to 6 units Lantus/day.           Sawyer Reveles RD, CDE   Office:  [address] [place] [address] Pager:  [phone]

## 2018-05-24 NOTE — PROGRESS NOTES
2000 Assumed care of pt after bedside report with pt lying in bed with HOB elevated. Neuro intact. AAO x 4. KHAN x 4. Monitor denotes NSR. Lungs clear and pt on RA. Abd flat, soft, tender with rt abd, flank area of open fistual and drain site with dressing in place and site draining bile drainage. Pt voiding in urinal as needed. 2030 Nurse notified per  that previous wound culture specimen needed to be recollected. 2200 Rt abd dressing change, gown changed and bed pads changed due to bile drainage from site. Wound culture recollected and to lab. 2219 Pt c/o abd pain at site. Dilaudid 0.5 mg IV given as orderd. Also c/o nausea. Phenergan 25 mg IM given as ordered. 05/24/2018 0000 Accucheck result 175. Lispro insulin 2 units SQ given as per sliding scale protocol. 0120 Pt c/o abd pain and requests pain med. Dilaudid 0.5 mg IV given as ordered. 0200 Rt abd fistula, drain site dressing changed due to saturation with greenish drainage. DSD with gauze and ABD pads applied. Pt tolerated with minimal discomfort. 0400 AM labs drawn and sent to lab. 0429 Dilaudid 0.5 mg IV given for abd pain. Rt abd dressing changed due to saturation. Skin surrounding fistula site is reddened, discolored, inflamed, and very painful to touch. 0600 Accucheck result 94. No Lispro insulin given as per sliding scale coverage. 0715 Pt states that rt abd dsg came off and it is lying in the bed. Pt c/o rt abd pain and requests pain meds. 1204 Dilaudid 0.5 mg IV given for pain. Rt abd dressing changed and reaplied. Bed pads changed, linen changed, and gown changed. Tolerated fair. This nurse unable to complete PTA meds due to pt does not know names of meds and advised pt to have mother bring to hospital when she comes today.

## 2018-05-24 NOTE — PROCEDURES
Vascular & Interventional Radiology Brief Procedure Note    Interventional Radiologist: Radha Romero    Pre-operative Diagnosis: Abscess catheter pulled out    Post-operative Diagnosis: Same as pre-operative diagnosis    Procedure(s) Performed:  Replacement of drainage catheter    Anesthesia:  Local and Moderate Sedation    Findings: Fistulous tract to bowel opacified. New 15 F APD placed behind the right colon, at the same location as previous catheter. Recommend FU CT prior to DC. Complications: None    Estimated Blood Loss:  minimal    Tubes and Drains: None    Specimens: None    Condition: Good    Disposition: To floor    Leonel Romero, [address] [place] [address] [facility]    5/24/2018

## 2018-05-24 NOTE — PROGRESS NOTES
Problem: Pressure Injury - Risk of  Goal: *Prevention of pressure injury  Document Austin Scale and appropriate interventions in the flowsheet.    Outcome: Progressing Towards Goal  Pressure Injury Interventions:       Moisture Interventions: Absorbent underpads, Contain wound drainage    Activity Interventions: Pressure redistribution bed/mattress(bed type)    Mobility Interventions: HOB 30 degrees or less, Pressure redistribution bed/mattress (bed type)    Nutrition Interventions: Document food/fluid/supplement intake

## 2018-05-25 LAB
ALBUMIN SERPL-MCNC: 2.1 G/DL (ref 3.4–5)
ALBUMIN/GLOB SERPL: 0.7 {RATIO} (ref 0.8–1.7)
ALP SERPL-CCNC: 147 U/L (ref 45–117)
ALT SERPL-CCNC: 24 U/L (ref 16–61)
ANION GAP SERPL CALC-SCNC: 8 MMOL/L (ref 3–18)
AST SERPL-CCNC: 32 U/L (ref 15–37)
B-OH-BUTYR SERPL-MCNC: 7.4 MG/DL
BILIRUB SERPL-MCNC: 0.3 MG/DL (ref 0.2–1)
BUN SERPL-MCNC: 8 MG/DL (ref 7–18)
BUN/CREAT SERPL: 7 (ref 12–20)
CALCIUM SERPL-MCNC: 7.2 MG/DL (ref 8.5–10.1)
CHLORIDE SERPL-SCNC: 114 MMOL/L (ref 100–108)
CO2 SERPL-SCNC: 20 MMOL/L (ref 21–32)
CREAT SERPL-MCNC: 1.08 MG/DL (ref 0.6–1.3)
ERYTHROCYTE [DISTWIDTH] IN BLOOD BY AUTOMATED COUNT: 13.8 % (ref 11.6–14.5)
GLOBULIN SER CALC-MCNC: 3.2 G/DL (ref 2–4)
GLUCOSE BLD STRIP.AUTO-MCNC: 116 MG/DL (ref 70–110)
GLUCOSE BLD STRIP.AUTO-MCNC: 149 MG/DL (ref 70–110)
GLUCOSE BLD STRIP.AUTO-MCNC: 163 MG/DL (ref 70–110)
GLUCOSE BLD STRIP.AUTO-MCNC: 197 MG/DL (ref 70–110)
GLUCOSE BLD STRIP.AUTO-MCNC: 85 MG/DL (ref 70–110)
GLUCOSE SERPL-MCNC: 72 MG/DL (ref 74–99)
HCT VFR BLD AUTO: 24.7 % (ref 36–48)
HGB BLD-MCNC: 7.9 G/DL (ref 13–16)
MCH RBC QN AUTO: 30 PG (ref 24–34)
MCHC RBC AUTO-ENTMCNC: 32 G/DL (ref 31–37)
MCV RBC AUTO: 93.9 FL (ref 74–97)
PLATELET # BLD AUTO: 158 K/UL (ref 135–420)
PMV BLD AUTO: 10.3 FL (ref 9.2–11.8)
POTASSIUM SERPL-SCNC: 3.9 MMOL/L (ref 3.5–5.5)
PROT SERPL-MCNC: 5.3 G/DL (ref 6.4–8.2)
RBC # BLD AUTO: 2.63 M/UL (ref 4.7–5.5)
SODIUM SERPL-SCNC: 142 MMOL/L (ref 136–145)
WBC # BLD AUTO: 4.9 K/UL (ref 4.6–13.2)

## 2018-05-25 PROCEDURE — 85027 COMPLETE CBC AUTOMATED: CPT | Performed by: FAMILY MEDICINE

## 2018-05-25 PROCEDURE — 74011636637 HC RX REV CODE- 636/637: Performed by: FAMILY MEDICINE

## 2018-05-25 PROCEDURE — 82962 GLUCOSE BLOOD TEST: CPT

## 2018-05-25 PROCEDURE — 74011000258 HC RX REV CODE- 258: Performed by: FAMILY MEDICINE

## 2018-05-25 PROCEDURE — 80053 COMPREHEN METABOLIC PANEL: CPT | Performed by: FAMILY MEDICINE

## 2018-05-25 PROCEDURE — 74011250636 HC RX REV CODE- 250/636: Performed by: FAMILY MEDICINE

## 2018-05-25 PROCEDURE — 65270000029 HC RM PRIVATE

## 2018-05-25 PROCEDURE — 74011250637 HC RX REV CODE- 250/637: Performed by: NURSE PRACTITIONER

## 2018-05-25 PROCEDURE — 36415 COLL VENOUS BLD VENIPUNCTURE: CPT | Performed by: FAMILY MEDICINE

## 2018-05-25 PROCEDURE — 74011250636 HC RX REV CODE- 250/636: Performed by: NURSE PRACTITIONER

## 2018-05-25 PROCEDURE — 74011000250 HC RX REV CODE- 250: Performed by: FAMILY MEDICINE

## 2018-05-25 RX ORDER — HYDROCODONE BITARTRATE AND ACETAMINOPHEN 5; 325 MG/1; MG/1
1 TABLET ORAL
Status: DISCONTINUED | OUTPATIENT
Start: 2018-05-25 | End: 2018-05-31 | Stop reason: HOSPADM

## 2018-05-25 RX ADMIN — PANCRELIPASE 2 CAPSULE: 10000; 34000; 55000 CAPSULE, DELAYED RELEASE ORAL at 16:06

## 2018-05-25 RX ADMIN — Medication 10 ML: at 13:21

## 2018-05-25 RX ADMIN — HYDROMORPHONE HYDROCHLORIDE 0.5 MG: 1 INJECTION, SOLUTION INTRAMUSCULAR; INTRAVENOUS; SUBCUTANEOUS at 08:06

## 2018-05-25 RX ADMIN — HYDROMORPHONE HYDROCHLORIDE 0.5 MG: 1 INJECTION, SOLUTION INTRAMUSCULAR; INTRAVENOUS; SUBCUTANEOUS at 05:22

## 2018-05-25 RX ADMIN — DEXTROSE MONOHYDRATE AND SODIUM CHLORIDE 75 ML/HR: 5; .45 INJECTION, SOLUTION INTRAVENOUS at 00:08

## 2018-05-25 RX ADMIN — Medication 10 ML: at 13:22

## 2018-05-25 RX ADMIN — HYDROMORPHONE HYDROCHLORIDE 0.5 MG: 1 INJECTION, SOLUTION INTRAMUSCULAR; INTRAVENOUS; SUBCUTANEOUS at 11:12

## 2018-05-25 RX ADMIN — Medication 10 ML: at 21:43

## 2018-05-25 RX ADMIN — INSULIN LISPRO 2 UNITS: 100 INJECTION, SOLUTION INTRAVENOUS; SUBCUTANEOUS at 18:08

## 2018-05-25 RX ADMIN — PROMETHAZINE HYDROCHLORIDE 25 MG: 25 INJECTION, SOLUTION INTRAMUSCULAR; INTRAVENOUS at 08:07

## 2018-05-25 RX ADMIN — SODIUM CHLORIDE 1750 MG: 900 INJECTION, SOLUTION INTRAVENOUS at 12:06

## 2018-05-25 RX ADMIN — METRONIDAZOLE 500 MG: 500 INJECTION, SOLUTION INTRAVENOUS at 17:41

## 2018-05-25 RX ADMIN — FERROUS SULFATE TAB 325 MG (65 MG ELEMENTAL FE) 325 MG: 325 (65 FE) TAB at 17:20

## 2018-05-25 RX ADMIN — FERROUS SULFATE TAB 325 MG (65 MG ELEMENTAL FE) 325 MG: 325 (65 FE) TAB at 08:06

## 2018-05-25 RX ADMIN — INSULIN GLARGINE 6 UNITS: 100 INJECTION, SOLUTION SUBCUTANEOUS at 11:12

## 2018-05-25 RX ADMIN — HEPARIN SODIUM 5000 UNITS: 5000 INJECTION, SOLUTION INTRAVENOUS; SUBCUTANEOUS at 05:24

## 2018-05-25 RX ADMIN — SODIUM CHLORIDE 1000 MG: 900 INJECTION, SOLUTION INTRAVENOUS at 22:32

## 2018-05-25 RX ADMIN — HYDROMORPHONE HYDROCHLORIDE 0.5 MG: 1 INJECTION, SOLUTION INTRAMUSCULAR; INTRAVENOUS; SUBCUTANEOUS at 17:19

## 2018-05-25 RX ADMIN — INSULIN LISPRO 2 UNITS: 100 INJECTION, SOLUTION INTRAVENOUS; SUBCUTANEOUS at 13:20

## 2018-05-25 RX ADMIN — HEPARIN SODIUM 5000 UNITS: 5000 INJECTION, SOLUTION INTRAVENOUS; SUBCUTANEOUS at 12:06

## 2018-05-25 RX ADMIN — PROMETHAZINE HYDROCHLORIDE 25 MG: 25 INJECTION, SOLUTION INTRAMUSCULAR; INTRAVENOUS at 20:28

## 2018-05-25 RX ADMIN — HYDROMORPHONE HYDROCHLORIDE 0.5 MG: 1 INJECTION, SOLUTION INTRAMUSCULAR; INTRAVENOUS; SUBCUTANEOUS at 02:14

## 2018-05-25 RX ADMIN — Medication 10 ML: at 05:25

## 2018-05-25 RX ADMIN — LEVOFLOXACIN 750 MG: 5 INJECTION, SOLUTION INTRAVENOUS at 16:11

## 2018-05-25 RX ADMIN — AMITRIPTYLINE HYDROCHLORIDE 75 MG: 50 TABLET, FILM COATED ORAL at 21:42

## 2018-05-25 RX ADMIN — HYDROMORPHONE HYDROCHLORIDE 0.5 MG: 1 INJECTION, SOLUTION INTRAMUSCULAR; INTRAVENOUS; SUBCUTANEOUS at 14:21

## 2018-05-25 RX ADMIN — HYDROMORPHONE HYDROCHLORIDE 0.5 MG: 1 INJECTION, SOLUTION INTRAMUSCULAR; INTRAVENOUS; SUBCUTANEOUS at 23:51

## 2018-05-25 RX ADMIN — HEPARIN SODIUM 5000 UNITS: 5000 INJECTION, SOLUTION INTRAVENOUS; SUBCUTANEOUS at 21:43

## 2018-05-25 RX ADMIN — METRONIDAZOLE 500 MG: 500 INJECTION, SOLUTION INTRAVENOUS at 05:25

## 2018-05-25 RX ADMIN — HYDROMORPHONE HYDROCHLORIDE 0.5 MG: 1 INJECTION, SOLUTION INTRAMUSCULAR; INTRAVENOUS; SUBCUTANEOUS at 20:28

## 2018-05-25 NOTE — ROUTINE PROCESS
Bedside and Verbal shift change report given to Kelsie Nicholas (oncoming nurse) by Jennifer Barrios RN   (offgoing nurse). Report included the following information SBAR, Kardex, Intake/Output and MAR.

## 2018-05-25 NOTE — DIABETES MGMT
NUTRITIONAL ASSESSMENT GLYCEMIC CONTROL/ PLAN OF CARE     Joseph Ya           46 y.o.           5/22/2018                 1. Chronic abdominal pain    2. Acute kidney injury ([address]    3. Dehydration    4. Acute hyperglycemia       INTERVENTIONS/PLAN:   1. Monitor glycemic control, labs, weights and diet progression. 2.  Provided pt with Aga Matrix glucometer and 50 test strips to tie pt over until he obtains a new meter. Discussed retail location where more test strips can be purchased. 3.  Pt will need prescription for glucometer and test strips at discharge. ASSESSMENT:   Nutritional Status:  Pt is 76% ideal wt with moderately depleted fat and somatic protein stores. Documented weights indicate pt has lost 10 lbs within past 2 months (7% weight loss). Documented weights show weight of 164 lbs on 7/18/17 (now 135 lbs). Nutrition Diagnoses: Altered GI function due to chronic pancreatitis as evidenced by N/V/abdominal pain/use of Creon. Inadequate oral food and beverage intake due to abdominal pain/insulin drip as evidenced by clear liquid orders. Underweight due to inadequate energy  intake as evidenced by BMI of 18.3 kg/m2. Diabetes Management:   5/24/18:  Pt known from previous admissions and has received diabetes education at these admissions. Pt states his insurance covers his Lantus and Humalog pens but he is almost out of insulin. He states he has been taking his insulins daily as directed. Pt relates that he currently does not have a PCP; he is unclear as to when he last saw a PCP. D/w MD who plans to have case management make arrangements for PCP upon discharge. Unsure how pt has made his insulin last this long without obtaining an updated prescription. Noted pt received prescription for Lantus vial (25 units BID) at last admission at [facility] on 4/26/18 but no documentation of pt having prescription filled per chart review.   He states he does not currently have glucometer and that he \"lost it\" when he moved about 2 weeks ago. Difficult at this time to sort out pt's home diabetes management and cause for poor glycemic control. 5/25/18:  Pt states he has not been checking his BG due to not being able to find his meter. Discussed importance of glucose monitoring and medication compliance. Provided pt with Aga Matirx meter and 50 test strips in event pt does not obtain a new meter in a timely fashion at discharge. Pt states he has an ample supply of lancets at home. Pt again states he has a good supply of insulin pens at home (both Lantus nad Humalog pens). Recent blood glucose:     5/25/18:  Lab- 72;  POC - 116, 163  5/24/18:  175, 84, 86, 155, 173 - received 14 unit insulin (10 units Lantus and 4 units corrective lispro)  Within target range (non-ICU: <140; ICU<180): [x] Yes   []  No    Current Insulin regimen:   Lantus 7 units/day  Corrective lipsro normal insulin sensitivityACHS,   Home medication/insulin regimen: per pt:  Lantus 25 units/day BID and sliding scale lispro  HbA1c: 9.8% - ave BG has been ~ 234 mg/dL over past 3 months. Adequate glycemic control PTA:  [] Yes  [x] No       SUBJECTIVE/OBJECTIVE:   Information obtained from: chart review, pt  5/24/18:  Pt reported he has lost \"tons of weight\" in past year. He reports his usual wt is 180 lbs and he last weight this amount about 1 year ago. He is allergic to shrimp. He lives with his mother who prepares meals. Pt insists he is taking his Creon with meals. 5/25/18:  Pt reports he is felling much better today. Pt known from previous multiple hospital admissions for acute on chronic pancreatitis, abdominal pain, elevated glucose/DKA with PMHx of  pancreaticoduodenal cutaneous fistula, non-compliance and drug seeking issues.  Pt presented to ED with c/o nausea, vomiting and abdominal pain x 3 days and hyperglycemia. Diet: orders written for clear liquids    No data found.     Medications: [x] Reviewed  Pertinent:  Creon with meals, FeSO4,    IVF:  D5 1/2 NS at 150 ml/hr    Most Recent POC Glucose:   Recent Labs      05/25/18   0337  05/24/18   0355  05/23/18   0825  05/22/18   2330   GLU  72*  72*  63*  105*         Labs:   Lab Results   Component Value Date/Time    Hemoglobin A1c 9.8 (H) 05/22/2018 07:15 AM     Lab Results   Component Value Date/Time    Sodium 142 05/25/2018 03:37 AM    Potassium 3.9 05/25/2018 03:37 AM    Chloride 114 (H) 05/25/2018 03:37 AM    CO2 20 (L) 05/25/2018 03:37 AM    Anion gap 8 05/25/2018 03:37 AM    Glucose 72 (L) 05/25/2018 03:37 AM    BUN 8 05/25/2018 03:37 AM    Creatinine 1.08 05/25/2018 03:37 AM    Calcium 7.2 (L) 05/25/2018 03:37 AM    Magnesium 1.6 05/22/2018 01:00 AM    Phosphorus 3.1 04/19/2018 09:39 AM    Albumin 2.1 (L) 05/25/2018 03:37 AM       Anthropometrics: IBW : 80.7 kg (178 lb), % IBW (Calculated): 75.84 %, BMI (calculated): 18.3  Wt Readings from Last 1 Encounters:   05/22/18 61.2 kg (135 lb)      Ht Readings from Last 1 Encounters:   05/22/18 6' (1.829 m)     Last Weight Metrics:  Weight Loss Metrics 5/22/2018 5/21/2018 4/24/2018 4/16/2018 8/5/2017 7/18/2017 6/14/2016   Today's Wt 135 lb 130 lb 130 lb 145 lb 11.6 oz 164 lb 164 lb 14.5 oz 138 lb 7.2 oz   BMI 18.31 kg/m2 17.15 kg/m2 17.15 kg/m2 19.23 kg/m2 22.24 kg/m2 22.37 kg/m2 18.77 kg/m2       Estimated Nutrition Needs:  2250 Kcals/day, Protein (g): 92 g Fluid (ml): 2300 ml  Based on:   [x]          Actual BW    []          ABW   []            Adjusted BW         Nutrition Interventions:  None at this time  Goal:   Blood glucose will be within target range of  mg/dL by 5/26/18. Pt will consume >75% meals by 5/28/18.          Nutrition Monitoring and Evaluation      [x]     Monitor po intake on meal rounds  [x]     Continue inpatient monitoring and intervention  []     Other:      Nutrition Risk:  []   High     [x]  Moderate    []  Minimal/Uncompromised    Niki Marte RD, CDE   Office: [address] [place] [address] Pager:  [phone]

## 2018-05-25 NOTE — PROGRESS NOTES
Problem: Falls - Risk of  Goal: *Absence of Falls  Document Serena Fall Risk and appropriate interventions in the flowsheet.    Outcome: Progressing Towards Goal  Fall Risk Interventions:  Mobility Interventions: Patient to call before getting OOB         Medication Interventions: Patient to call before getting OOB, Teach patient to arise slowly    Elimination Interventions: Call light in reach, Patient to call for help with toileting needs

## 2018-05-25 NOTE — PROGRESS NOTES
[address] [facility]  [address] [address]  [phone]  Colon and Rectal Surgery Progress Note      Patient: Lizbeth Rosen MRN: [id number]  SSN: [id number]    YOB: 1965  Age: 46 y.o. Sex: male      Admit Date: 5/22/2018    LOS: 3 days     Subjective:   Continues to complain of pain and burning RLQ  \"Hungry\"    Objective:     Vitals:    05/24/18 2009 05/25/18 0522 05/25/18 0856 05/25/18 1204   BP: 113/77 102/67 102/65 102/70   Pulse: 96 72 89 88   Resp: 18 18 18 18   Temp: 97.9 °F (36.6 °C) 97.4 °F (36.3 °C) 98.2 °F (36.8 °C) 98.3 °F (36.8 °C)   SpO2: 99% 94% 97% 97%   Weight:       Height:            Intake and Output:  Current Shift: 05/25 0701 - 05/25 1900  In: -   Out: 600 [Urine:400; Drains:200]  Last three shifts: 05/23 1901 - 05/25 0700  In: 2997.5 [I.V.:2997.5]  Out: 7844 [Urine:2690; Drains:500]    Physical Exam:   GENERAL: alert, cooperative, no distress, appears stated age  LUNG: clear to auscultation bilaterally  HEART: regular rate and rhythm, S1, S2 normal, no murmur, click, rub or gallop  ABDOMEN: soft, mild abdominal tenderness upon palpation. Bowel sounds normal. No masses,  no organomegaly  SKIN: erythema noted RLQ due to drainage    Lab/Data Review: All lab results for the last 24 hours reviewed.        Assessment:     Principal Problem:    Hyperglycemia (5/22/2018)    Active Problems:    Chronic pancreatitis (HCC) ()      Overview: Continue PPI IV      IDDM (insulin dependent diabetes mellitus) ([facility] (8/31/2013)      Abdominal pain (11/16/2015)      Moderate protein-calorie malnutrition ([address] (11/21/2015)      Pancreatic fistula (3/23/2016)      H/O ETOH abuse (3/23/2016)      Dehydration (8/5/2017)      Vomiting (8/5/2017)      Acute alcoholic hepatitis (5/70/1464)      Chronic pancreatitis due to acute alcohol intoxication ([facility] (4/15/2018)      CHRISTI (acute kidney injury) ([facility] (5/22/2018)      UTI (urinary tract infection) (5/24/2018)        Plan:   Advance to clear liquid diet  No surgical intervention warranted at this time, will follow as needed.      Signed By: Arturo Rodriguez NP        May 25, 2018

## 2018-05-25 NOTE — PROGRESS NOTES
Pharmacy Dosing Services: Vancomycin    Indication: SSTI    Day of therapy: 0    Other Antimicrobials (Include dose, start day & day of therapy):   -Levofloxacin 750mg IV Q24H      Loading dose (date given): 1750mg  Current Maintenance dose: New Start    Goal Vancomycin Level: 15-20  (Trough 15-20 for most infections, 20 for meningitis/osteomyelitis, pre-HD level ~25)    Vancomycin Level (if drawn): New Start     Significant Cultures:    Urine - klebsiella pneumoniae   Wound - pending  Renal function stable? (unstable defined as SCr increase of 0.5 mg/dL or > 50% increase from baseline, whichever is greater) (Y/N): Y     CAPD, Hemodialysis or Renal Replacement Therapy (Y/N): N     Recent Labs      18   0337  18   0355  18   0825   CREA  1.08  1.15  1.23   BUN  8  9  9   WBC  4.9  6.6   --      Temp (24hrs), Av.8 °F (36.6 °C), Min:97.4 °F (36.3 °C), Max:98.2 °F (36.8 °C)    Creatinine Clearance (Creatinine Clearance (ml/min)): ~70 mL/min    Regimen assessment: New Start, will continue to monitor renal function due to previous instability  Maintenance dose: 1000mg IV Q12H  Next scheduled level:  @ 0930       Pharmacy will follow daily and adjust medications as appropriate for renal function and/or serum levels.     Thank you,  Shari Culp, PHARMD

## 2018-05-25 NOTE — PROGRESS NOTES
Problem: Pressure Injury - Risk of  Goal: *Prevention of pressure injury  Document Austin Scale and appropriate interventions in the flowsheet.    Outcome: Progressing Towards Goal  Pressure Injury Interventions:       Moisture Interventions: Absorbent underpads, Moisture barrier    Activity Interventions: Increase time out of bed, Pressure redistribution bed/mattress(bed type)    Mobility Interventions: HOB 30 degrees or less, Pressure redistribution bed/mattress (bed type)    Nutrition Interventions: Document food/fluid/supplement intake    Friction and Shear Interventions: HOB 30 degrees or less

## 2018-05-25 NOTE — PROGRESS NOTES
1908:  Received patient in bed awake,alert and oriented x4. No signs of distress. Bed low and locked. Call bell within reach. Will monitor. 0030: In bed resting quietly,no other concerns at this time. Call bell within reach. Will monitor. 0249: In bed resting quietly,patient  was offered oral pain medication aside from taking iv pain medication,pt refused  And  want to have IV pain medication for pain. Betha Lute 0: Flushed NATALIE 10 cc normal saline,tolerated well.  0600: Mediport is flushing well ,IV fluids is running well, but no blood return,no swelling,no redness  around the area,lungs is clear, will do draw blood peripherally,pt agreed . 0630: In bed watching television,no complaints voiced out,call bell  within reach. Will monitor

## 2018-05-25 NOTE — ANCILLARY DISCHARGE INSTRUCTIONS
[facility] does not accept patient's insurance.  Follow up appointment scheduled with Dr. Cherry Mccarthy on 5/29/18 @ 5:30pm.

## 2018-05-25 NOTE — PROGRESS NOTES
Problem: Falls - Risk of  Goal: *Absence of Falls  Document Serena Fall Risk and appropriate interventions in the flowsheet. Outcome: Progressing Towards Goal  Fall Risk Interventions:  Mobility Interventions: Communicate number of staff needed for ambulation/transfer, Patient to call before getting OOB         Medication Interventions: Patient to call before getting OOB, Evaluate medications/consider consulting pharmacy    Elimination Interventions: Patient to call for help with toileting needs, Call light in reach             Problem: Pressure Injury - Risk of  Goal: *Prevention of pressure injury  Document Austin Scale and appropriate interventions in the flowsheet.    Outcome: Progressing Towards Goal  Pressure Injury Interventions:       Moisture Interventions: Absorbent underpads    Activity Interventions: Increase time out of bed, Pressure redistribution bed/mattress(bed type)    Mobility Interventions: HOB 30 degrees or less, Pressure redistribution bed/mattress (bed type)    Nutrition Interventions: Document food/fluid/supplement intake    Friction and Shear Interventions: HOB 30 degrees or less

## 2018-05-25 NOTE — PROGRESS NOTES
[address] [facility]  [address] [place] [address]  [phone]  Colon and Rectal Surgery Progress Note      Patient: Salvatore Howard MRN: [id number]  SSN: [id number]    YOB: 1965  Age: 46 y.o. Sex: male      Admit Date: 5/22/2018    LOS: 3 days       I examined the patient independently and agree with evaluation as documented by the nurse practitioner, SUJATHA Thomas working well. The patient presents with a very complicated pancreatitis with pancreaticoduodenal fistula. For now no need for any urgent surgical intervention since the drain is controlling the fistula. The patient will eventually require major surgical repair procedure in the future, hopefully in a tertiary medical facility. Thank you for allowing me to have participated in the patient's care.               Lazara Castorena MD, FACS, FASCRS  Colon and Rectal Surgery  [facility]  Office [phone]  Fax     [phone]  5/25/2018  3:03 PM

## 2018-05-25 NOTE — PROGRESS NOTES
1935 Received bedside and berbal shift change report from [facility], RN report included the following information SBAR, Kardex, Intake/Output and MAR. Pt laying in the bed and watching tv a&o voiced no respiratory distress nor discomfort but voiced pain/burning sensation to drain site d/t leakage around NATALIE drain. Assessed and noted with yellowish drainage around NATALIE site, per report ok to apply Zinc barrier cream and split gauze to the site, presently applied by off-going nurse. Pt stable no acute distress call bell within reach. 2241 Due meds tolerated without complaint, no acute distress call bell within reach. 2338 Drain flushed with 10 mL of NS.       0214 Pt remain stable no acute distress cont. Complaining pain medicated as needed with available prn. Drain site care done applied zinc barrier cream and new split gauze, pt tolerated with minimal discomfort. Call bell within reach will continue to monitor. 0515 NATALIE drained 110 mL dark green, pt continue complaining burning sensation to the site d/t leakage around the drain      0628 Bedside and Verbal shift change report given to Lola Ambrose RN (oncoming nurse) by Terry Meraz RN (offgoing nurse). Report included the following information SBAR, Kardex, Intake/Output and MAR.

## 2018-05-25 NOTE — PROGRESS NOTES
Progress Note      Patient: Jonathan Melissa               Sex: male          DOA: 5/22/2018       YOB: 1965      Age:  46 y.o.        LOS:  LOS: 3 days               Subjective:   Jonathan Melissa is a 46 y.o. male  who presents with mild DKA and abdominal pain, nausea and vomiting. DKA now resolved however he had dislodged NATALIE drain Reinserted by IR 5/24 and draining well. Patient has UTI + Klebsiella Pneumoniae on Rocephin. Wound culture + GPC will start Vancomycin pending isolates. Objective:      Visit Vitals    /67    Pulse 72    Temp 97.4 °F (36.3 °C)    Resp 18    Ht 6' (1.829 m)    Wt 61.2 kg (135 lb)    SpO2 94%    BMI 18.31 kg/m2         Physical Exam:  General:  alert, cooperative, no distress, appears stated age  Lungs:  clear to auscultation bilaterally  Heart:  regular rate and rhythm, S1, S2 normal, no murmur, click, rub or gallop  Abdomen:  soft, non-tender.  Bowel sounds normal. No masses,  no organomegaly, NATALIE drain draining blue material  Cardiovascular:  Regular rate and rhythm, S1S2 present, without murmur or extra heart sounds, pedal pulses normal and no edema  Skin: discoloration skin RLQ    Intake and Output:  Current Shift:     Last three shifts:  05/23 1901 - 05/25 0700  In: 2997.5 [I.V.:2997.5]  Out: 7874 [Urine:2690; Drains:500]    Recent Results (from the past 48 hour(s))   GLUCOSE, POC    Collection Time: 05/23/18  9:01 AM   Result Value Ref Range    Glucose (POC) 85 70 - 110 mg/dL   GLUCOSE, POC    Collection Time: 05/23/18  9:05 AM   Result Value Ref Range    Glucose (POC) 90 70 - 110 mg/dL   GLUCOSTABILIZER    Collection Time: 05/23/18  9:05 AM   Result Value Ref Range    Glucose 90 mg/dL    Insulin order 0.0 units/hour    Insulin adminstered 0.0 units/hour    Multiplier 0.000     Low target 140 mg/dL    High target 180 mg/dL    D50 order 0.0 ml    D50 administered 0.00 ml    Minutes until next BG 60 min    Order initials as     Administered initials as    GLUCOSE, POC    Collection Time: 05/23/18 10:04 AM   Result Value Ref Range    Glucose (POC) 133 (H) 70 - 110 mg/dL   GLUCOSTABILIZER    Collection Time: 05/23/18 10:04 AM   Result Value Ref Range    Glucose 133 mg/dL    Insulin order 0.0 units/hour    Insulin adminstered 0.0 units/hour    Multiplier 0.000     Low target 140 mg/dL    High target 180 mg/dL    D50 order 0.0 ml    D50 administered 0.00 ml    Minutes until next BG 60 min    Order initials NA     Administered initials NA    GLUCOSE, POC    Collection Time: 05/23/18 11:11 AM   Result Value Ref Range    Glucose (POC) 170 (H) 70 - 110 mg/dL   GLUCOSTABILIZER    Collection Time: 05/23/18 11:12 AM   Result Value Ref Range    Glucose 170 mg/dL    Insulin order 0.0 units/hour    Insulin adminstered 0.0 units/hour    Multiplier 0.000     Low target 140 mg/dL    High target 180 mg/dL    D50 order 0.0 ml    D50 administered 0.00 ml    Minutes until next BG 60 min    Order initials cbg     Administered initials cbg    GLUCOSE, POC    Collection Time: 05/23/18 12:18 PM   Result Value Ref Range    Glucose (POC) 176 (H) 70 - 110 mg/dL   GLUCOSE, POC    Collection Time: 05/23/18  5:19 PM   Result Value Ref Range    Glucose (POC) 107 70 - 110 mg/dL   CULTURE, WOUND W GRAM STAIN    Collection Time: 05/23/18  9:30 PM   Result Value Ref Range    Special Requests: NO SPECIAL REQUESTS      GRAM STAIN MODERATE WBC'S      GRAM STAIN MODERATE YEAST      GRAM STAIN FEW GRAM POSITIVE COCCI      GRAM STAIN FEW GRAM NEGATIVE RODS      Culture result: PENDING    GLUCOSE, POC    Collection Time: 05/24/18 12:03 AM   Result Value Ref Range    Glucose (POC) 175 (H) 70 - 110 mg/dL   CBC W/O DIFF    Collection Time: 05/24/18  3:55 AM   Result Value Ref Range    WBC 6.6 4.6 - 13.2 K/uL    RBC 2.94 (L) 4.70 - 5.50 M/uL    HGB 8.9 (L) 13.0 - 16.0 g/dL    HCT 27.2 (L) 36.0 - 48.0 %    MCV 92.5 74.0 - 97.0 FL    MCH 30.3 24.0 - 34.0 PG    MCHC 32.7 31.0 - 37.0 g/dL    RDW 13.2 11.6 - 14.5 %    PLATELET 865 849 - 297 K/uL    MPV 10.1 9.2 - 42.1 FL   METABOLIC PANEL, COMPREHENSIVE    Collection Time: 05/24/18  3:55 AM   Result Value Ref Range    Sodium 141 136 - 145 mmol/L    Potassium 3.5 3.5 - 5.5 mmol/L    Chloride 115 (H) 100 - 108 mmol/L    CO2 18 (L) 21 - 32 mmol/L    Anion gap 8 3.0 - 18 mmol/L    Glucose 72 (L) 74 - 99 mg/dL    BUN 9 7.0 - 18 MG/DL    Creatinine 1.15 0.6 - 1.3 MG/DL    BUN/Creatinine ratio 8 (L) 12 - 20      GFR est AA >60 >60 ml/min/1.73m2    GFR est non-AA >60 >60 ml/min/1.73m2    Calcium 7.3 (L) 8.5 - 10.1 MG/DL    Bilirubin, total 0.3 0.2 - 1.0 MG/DL    ALT (SGPT) 26 16 - 61 U/L    AST (SGOT) 26 15 - 37 U/L    Alk.  phosphatase 155 (H) 45 - 117 U/L    Protein, total 5.5 (L) 6.4 - 8.2 g/dL    Albumin 2.1 (L) 3.4 - 5.0 g/dL    Globulin 3.4 2.0 - 4.0 g/dL    A-G Ratio 0.6 (L) 0.8 - 1.7     GLUCOSE, POC    Collection Time: 05/24/18  6:11 AM   Result Value Ref Range    Glucose (POC) 94 70 - 110 mg/dL   GLUCOSE, POC    Collection Time: 05/24/18  7:46 AM   Result Value Ref Range    Glucose (POC) 84 70 - 110 mg/dL   GLUCOSE, POC    Collection Time: 05/24/18 11:31 AM   Result Value Ref Range    Glucose (POC) 86 70 - 110 mg/dL   POTASSIUM    Collection Time: 05/24/18  5:03 PM   Result Value Ref Range    Potassium 3.6 3.5 - 5.5 mmol/L   GLUCOSE, POC    Collection Time: 05/24/18  5:24 PM   Result Value Ref Range    Glucose (POC) 155 (H) 70 - 110 mg/dL   GLUCOSE, POC    Collection Time: 05/24/18 11:16 PM   Result Value Ref Range    Glucose (POC) 173 (H) 70 - 110 mg/dL   CBC W/O DIFF    Collection Time: 05/25/18  3:37 AM   Result Value Ref Range    WBC 4.9 4.6 - 13.2 K/uL    RBC 2.63 (L) 4.70 - 5.50 M/uL    HGB 7.9 (L) 13.0 - 16.0 g/dL    HCT 24.7 (L) 36.0 - 48.0 %    MCV 93.9 74.0 - 97.0 FL    MCH 30.0 24.0 - 34.0 PG    MCHC 32.0 31.0 - 37.0 g/dL    RDW 13.8 11.6 - 14.5 %    PLATELET 207 639 - 864 K/uL    MPV 10.3 9.2 - 06.4 FL   METABOLIC PANEL, COMPREHENSIVE    Collection Time: 05/25/18  3:37 AM   Result Value Ref Range    Sodium 142 136 - 145 mmol/L    Potassium 3.9 3.5 - 5.5 mmol/L    Chloride 114 (H) 100 - 108 mmol/L    CO2 20 (L) 21 - 32 mmol/L    Anion gap 8 3.0 - 18 mmol/L    Glucose 72 (L) 74 - 99 mg/dL    BUN 8 7.0 - 18 MG/DL    Creatinine 1.08 0.6 - 1.3 MG/DL    BUN/Creatinine ratio 7 (L) 12 - 20      GFR est AA >60 >60 ml/min/1.73m2    GFR est non-AA >60 >60 ml/min/1.73m2    Calcium 7.2 (L) 8.5 - 10.1 MG/DL    Bilirubin, total 0.3 0.2 - 1.0 MG/DL    ALT (SGPT) 24 16 - 61 U/L    AST (SGOT) 32 15 - 37 U/L    Alk.  phosphatase 147 (H) 45 - 117 U/L    Protein, total 5.3 (L) 6.4 - 8.2 g/dL    Albumin 2.1 (L) 3.4 - 5.0 g/dL    Globulin 3.2 2.0 - 4.0 g/dL    A-G Ratio 0.7 (L) 0.8 - 1.7     GLUCOSE, POC    Collection Time: 05/25/18  6:38 AM   Result Value Ref Range    Glucose (POC) 116 (H) 70 - 110 mg/dL         XRays were reviewed in past 24 hours    Medications Reviewed      Continued hospitalization is indicated due to NATALIE drain dislodgement, UTI, Mild DKA resolved, abdominal pain      Assessment/Plan     Principal Problem:    Hyperglycemia (5/22/2018)    Active Problems:    Chronic pancreatitis (HCC) ()      Overview: Continue PPI IV      IDDM (insulin dependent diabetes mellitus) ([facility] (8/31/2013)      Abdominal pain (11/16/2015)      Moderate protein-calorie malnutrition ([address] (11/21/2015)      Pancreatic fistula (3/23/2016)      H/O ETOH abuse (3/23/2016)      Dehydration (8/5/2017)      Vomiting (8/5/2017)      Acute alcoholic hepatitis (9/37/2392)      Chronic pancreatitis due to acute alcohol intoxication ([facility] (4/15/2018)      CHRISTI (acute kidney injury) ([address] (5/22/2018)      UTI (urinary tract infection) (5/24/2018)        Dislodged NATALIE drain   - Per pancreaticoduodenal fistula   - IR Inserted NATALIE drain and draining with some leak  - Surgery following     Abdominal pain   - r/o intraabdominal infection  - flagyl and Levaquin pending wound culture + GNR      UTI   - Culture + Klebsiella pneumoniae  - Levaquin every day x 5 d      Mild DKA / IDDM  - Resolved  - Lantus   - SSI      Chronic pancreatitis   - Chromic ETOH  - Zenpep      Chronic abdominal pain  - pain control prn        CHRISTI  - RESOLVED       Resume chronic med as appropriate       DVT prophylaxis       Full code       Rowan John MD  May 25, 2018

## 2018-05-25 NOTE — PROGRESS NOTES
1505 bedside shift report received from RORY Amado,patient lying in bed quietly  jaycob emptied, protective cream applied to side, 4x4 applied   patient only took sips of clear liquids, stated when he drank broth it caused stomach pain  1815 lying in bed watching tv

## 2018-05-26 LAB
ANION GAP SERPL CALC-SCNC: 11 MMOL/L (ref 3–18)
BUN SERPL-MCNC: 7 MG/DL (ref 7–18)
BUN/CREAT SERPL: 7 (ref 12–20)
CALCIUM SERPL-MCNC: 6.9 MG/DL (ref 8.5–10.1)
CHLORIDE SERPL-SCNC: 112 MMOL/L (ref 100–108)
CO2 SERPL-SCNC: 20 MMOL/L (ref 21–32)
CREAT SERPL-MCNC: 1.03 MG/DL (ref 0.6–1.3)
ERYTHROCYTE [DISTWIDTH] IN BLOOD BY AUTOMATED COUNT: 14.1 % (ref 11.6–14.5)
GLUCOSE BLD STRIP.AUTO-MCNC: 180 MG/DL (ref 70–110)
GLUCOSE BLD STRIP.AUTO-MCNC: 191 MG/DL (ref 70–110)
GLUCOSE BLD STRIP.AUTO-MCNC: 194 MG/DL (ref 70–110)
GLUCOSE BLD STRIP.AUTO-MCNC: 208 MG/DL (ref 70–110)
GLUCOSE BLD STRIP.AUTO-MCNC: 252 MG/DL (ref 70–110)
GLUCOSE SERPL-MCNC: 170 MG/DL (ref 74–99)
HCT VFR BLD AUTO: 25.8 % (ref 36–48)
HGB BLD-MCNC: 8.2 G/DL (ref 13–16)
MCH RBC QN AUTO: 30.9 PG (ref 24–34)
MCHC RBC AUTO-ENTMCNC: 31.8 G/DL (ref 31–37)
MCV RBC AUTO: 97.4 FL (ref 74–97)
PLATELET # BLD AUTO: 141 K/UL (ref 135–420)
PMV BLD AUTO: 10.1 FL (ref 9.2–11.8)
POTASSIUM SERPL-SCNC: 3.2 MMOL/L (ref 3.5–5.5)
RBC # BLD AUTO: 2.65 M/UL (ref 4.7–5.5)
SODIUM SERPL-SCNC: 143 MMOL/L (ref 136–145)
WBC # BLD AUTO: 3.9 K/UL (ref 4.6–13.2)

## 2018-05-26 PROCEDURE — 74011000258 HC RX REV CODE- 258: Performed by: FAMILY MEDICINE

## 2018-05-26 PROCEDURE — 74011250636 HC RX REV CODE- 250/636: Performed by: NURSE PRACTITIONER

## 2018-05-26 PROCEDURE — 65270000029 HC RM PRIVATE

## 2018-05-26 PROCEDURE — 74011250636 HC RX REV CODE- 250/636: Performed by: FAMILY MEDICINE

## 2018-05-26 PROCEDURE — 77030019604 HC DRSG WND CA ALG S&N -A

## 2018-05-26 PROCEDURE — 74011250637 HC RX REV CODE- 250/637: Performed by: FAMILY MEDICINE

## 2018-05-26 PROCEDURE — 85027 COMPLETE CBC AUTOMATED: CPT | Performed by: FAMILY MEDICINE

## 2018-05-26 PROCEDURE — 74011250637 HC RX REV CODE- 250/637: Performed by: HOSPITALIST

## 2018-05-26 PROCEDURE — 82962 GLUCOSE BLOOD TEST: CPT

## 2018-05-26 PROCEDURE — 74011250636 HC RX REV CODE- 250/636: Performed by: HOSPITALIST

## 2018-05-26 PROCEDURE — 77030011251 HC DRSG HYDRCOIL S&N -A

## 2018-05-26 PROCEDURE — 80048 BASIC METABOLIC PNL TOTAL CA: CPT | Performed by: FAMILY MEDICINE

## 2018-05-26 PROCEDURE — 74011250637 HC RX REV CODE- 250/637: Performed by: NURSE PRACTITIONER

## 2018-05-26 PROCEDURE — 74011000250 HC RX REV CODE- 250: Performed by: FAMILY MEDICINE

## 2018-05-26 PROCEDURE — 36415 COLL VENOUS BLD VENIPUNCTURE: CPT | Performed by: FAMILY MEDICINE

## 2018-05-26 PROCEDURE — 74011636637 HC RX REV CODE- 636/637: Performed by: FAMILY MEDICINE

## 2018-05-26 RX ORDER — FLUCONAZOLE 2 MG/ML
200 INJECTION, SOLUTION INTRAVENOUS DAILY
Status: DISCONTINUED | OUTPATIENT
Start: 2018-05-26 | End: 2018-05-29

## 2018-05-26 RX ORDER — HYDROMORPHONE HYDROCHLORIDE 1 MG/ML
1 INJECTION, SOLUTION INTRAMUSCULAR; INTRAVENOUS; SUBCUTANEOUS
Status: DISCONTINUED | OUTPATIENT
Start: 2018-05-26 | End: 2018-05-31 | Stop reason: HOSPADM

## 2018-05-26 RX ORDER — POTASSIUM CHLORIDE 20 MEQ/1
40 TABLET, EXTENDED RELEASE ORAL 2 TIMES DAILY
Status: DISCONTINUED | OUTPATIENT
Start: 2018-05-26 | End: 2018-05-31

## 2018-05-26 RX ADMIN — DEXTROSE MONOHYDRATE AND SODIUM CHLORIDE 75 ML/HR: 5; .45 INJECTION, SOLUTION INTRAVENOUS at 10:53

## 2018-05-26 RX ADMIN — FERROUS SULFATE TAB 325 MG (65 MG ELEMENTAL FE) 325 MG: 325 (65 FE) TAB at 17:11

## 2018-05-26 RX ADMIN — Medication 1 MG: at 12:45

## 2018-05-26 RX ADMIN — HEPARIN SODIUM 5000 UNITS: 5000 INJECTION, SOLUTION INTRAVENOUS; SUBCUTANEOUS at 12:43

## 2018-05-26 RX ADMIN — POTASSIUM CHLORIDE 40 MEQ: 1500 TABLET, EXTENDED RELEASE ORAL at 12:45

## 2018-05-26 RX ADMIN — INSULIN LISPRO 0.04 UNITS: 100 INJECTION, SOLUTION INTRAVENOUS; SUBCUTANEOUS at 23:42

## 2018-05-26 RX ADMIN — Medication 1 MG: at 20:17

## 2018-05-26 RX ADMIN — Medication 10 ML: at 17:01

## 2018-05-26 RX ADMIN — HYDROMORPHONE HYDROCHLORIDE 0.5 MG: 1 INJECTION, SOLUTION INTRAMUSCULAR; INTRAVENOUS; SUBCUTANEOUS at 05:50

## 2018-05-26 RX ADMIN — LEVOFLOXACIN 750 MG: 5 INJECTION, SOLUTION INTRAVENOUS at 17:00

## 2018-05-26 RX ADMIN — INSULIN LISPRO 2 UNITS: 100 INJECTION, SOLUTION INTRAVENOUS; SUBCUTANEOUS at 06:33

## 2018-05-26 RX ADMIN — Medication 1 MG: at 23:42

## 2018-05-26 RX ADMIN — HYDROMORPHONE HYDROCHLORIDE 0.5 MG: 1 INJECTION, SOLUTION INTRAMUSCULAR; INTRAVENOUS; SUBCUTANEOUS at 02:57

## 2018-05-26 RX ADMIN — INSULIN GLARGINE 6 UNITS: 100 INJECTION, SOLUTION SUBCUTANEOUS at 09:09

## 2018-05-26 RX ADMIN — FLUCONAZOLE 200 MG: 2 INJECTION, SOLUTION INTRAVENOUS at 12:46

## 2018-05-26 RX ADMIN — HYDROMORPHONE HYDROCHLORIDE 0.5 MG: 1 INJECTION, SOLUTION INTRAMUSCULAR; INTRAVENOUS; SUBCUTANEOUS at 09:26

## 2018-05-26 RX ADMIN — METRONIDAZOLE 500 MG: 500 INJECTION, SOLUTION INTRAVENOUS at 05:50

## 2018-05-26 RX ADMIN — Medication 10 ML: at 05:50

## 2018-05-26 RX ADMIN — HYDROCODONE BITARTRATE AND ACETAMINOPHEN 1 TABLET: 5; 325 TABLET ORAL at 14:57

## 2018-05-26 RX ADMIN — HEPARIN SODIUM 5000 UNITS: 5000 INJECTION, SOLUTION INTRAVENOUS; SUBCUTANEOUS at 05:30

## 2018-05-26 RX ADMIN — AMITRIPTYLINE HYDROCHLORIDE 75 MG: 50 TABLET, FILM COATED ORAL at 21:46

## 2018-05-26 RX ADMIN — INSULIN LISPRO 2 UNITS: 100 INJECTION, SOLUTION INTRAVENOUS; SUBCUTANEOUS at 18:15

## 2018-05-26 RX ADMIN — PANCRELIPASE 2 CAPSULE: 10000; 34000; 55000 CAPSULE, DELAYED RELEASE ORAL at 14:56

## 2018-05-26 RX ADMIN — INSULIN LISPRO 2 UNITS: 100 INJECTION, SOLUTION INTRAVENOUS; SUBCUTANEOUS at 12:44

## 2018-05-26 RX ADMIN — POTASSIUM CHLORIDE 40 MEQ: 1500 TABLET, EXTENDED RELEASE ORAL at 17:11

## 2018-05-26 RX ADMIN — HEPARIN SODIUM 5000 UNITS: 5000 INJECTION, SOLUTION INTRAVENOUS; SUBCUTANEOUS at 20:17

## 2018-05-26 RX ADMIN — HYDROCODONE BITARTRATE AND ACETAMINOPHEN 1 TABLET: 5; 325 TABLET ORAL at 21:46

## 2018-05-26 RX ADMIN — Medication 1 MG: at 16:52

## 2018-05-26 RX ADMIN — PANCRELIPASE 2 CAPSULE: 10000; 34000; 55000 CAPSULE, DELAYED RELEASE ORAL at 17:01

## 2018-05-26 RX ADMIN — ONDANSETRON 4 MG: 2 INJECTION INTRAMUSCULAR; INTRAVENOUS at 09:08

## 2018-05-26 RX ADMIN — METRONIDAZOLE 500 MG: 500 INJECTION, SOLUTION INTRAVENOUS at 18:36

## 2018-05-26 RX ADMIN — HYDROCODONE BITARTRATE AND ACETAMINOPHEN 1 TABLET: 5; 325 TABLET ORAL at 10:36

## 2018-05-26 RX ADMIN — Medication 10 ML: at 21:47

## 2018-05-26 RX ADMIN — SODIUM CHLORIDE 1000 MG: 900 INJECTION, SOLUTION INTRAVENOUS at 11:30

## 2018-05-26 RX ADMIN — PANCRELIPASE 2 CAPSULE: 10000; 34000; 55000 CAPSULE, DELAYED RELEASE ORAL at 09:06

## 2018-05-26 RX ADMIN — FERROUS SULFATE TAB 325 MG (65 MG ELEMENTAL FE) 325 MG: 325 (65 FE) TAB at 09:05

## 2018-05-26 NOTE — PROGRESS NOTES
Progress Note      Patient: Khushi Qureshi               Sex: male          DOA: 5/22/2018       YOB: 1965      Age:  46 y.o.        LOS:  LOS: 4 days             CHIEF COMPLAINT:    Subjective:     He complains of persistent mid abdominal pain but denies fever, chills, CP, or SOB. Objective:      Visit Vitals    /76 (BP 1 Location: Right arm, BP Patient Position: At rest)    Pulse 93    Temp 98.2 °F (36.8 °C)    Resp 18    Ht 6' (1.829 m)    Wt 135 lb (61.2 kg)    SpO2 95%    BMI 18.31 kg/m2       Physical Exam:  GEN: AAO X 3, NAD  CVS: Normal S1S2, RRR and Mediport on left chest  RESP: CTAB  ABD: Soft, mid abdominal tenderness noted, NATALIE drain in place and draining, +BS  EXT: No C/C/E  NEURO: CN 2 - 12 intact with no focal deficits noted.   Skin: Some erythema and discoloration of skin on RLQ from drainage      Lab/Data Reviewed:  CMP:   Lab Results   Component Value Date/Time     05/26/2018 09:04 AM    K 3.2 (L) 05/26/2018 09:04 AM     (H) 05/26/2018 09:04 AM    CO2 20 (L) 05/26/2018 09:04 AM    AGAP 11 05/26/2018 09:04 AM     (H) 05/26/2018 09:04 AM    BUN 7 05/26/2018 09:04 AM    CREA 1.03 05/26/2018 09:04 AM    GFRAA >60 05/26/2018 09:04 AM    GFRNA >60 05/26/2018 09:04 AM    CA 6.9 (L) 05/26/2018 09:04 AM     CBC:   Lab Results   Component Value Date/Time    WBC 3.9 (L) 05/26/2018 09:04 AM    HGB 8.2 (L) 05/26/2018 09:04 AM    HCT 25.8 (L) 05/26/2018 09:04 AM     05/26/2018 09:04 AM           Assessment/Plan     Principal Problem:    Hyperglycemia (5/22/2018)    Active Problems:    Chronic pancreatitis (HCC) ()      Overview: Continue PPI IV      [facility] dependent [facility] mellitus) [facility] (8/31/2013)      Abdominal pain (11/16/2015)      Moderate protein-calorie malnutrition ([address] (11/21/2015)      Pancreatic fistula (3/23/2016)      H/O ETOH abuse (3/23/2016)      Dehydration (8/5/2017)      Vomiting (8/5/2017)      Acute alcoholic hepatitis (4/15/2018)      Chronic pancreatitis due to acute alcohol intoxication ([facility] (4/15/2018)      CHRISTI (acute kidney injury) ([facility] (5/22/2018)      UTI (urinary tract infection) (5/24/2018)        Plan:  Dislodged NATALIE drain   Per pancreaticoduodenal fistula   IR Inserted NATALIE drain and draining with some leak  Colorectal surgery following and appreciate help      Abdominal pain   Intraabdominal infection  Wound culture with non hemolytic strep and yeast  Continue Levaquin and Flagyl  Diflucan started today for yeast  Continue analgesics as needed and Dilaudid increased from 0.5 to 1 mg IV Q 3 as needed for pain      UTI    Klebsiella pneumoniae on UC  Continue Levaquin       IDDM  On Lantus and correctional insulin     Chronic pancreatitis   Chronic ETOH  On Zenpep      Hypokalemia  K at 3.2 today  Replete and BMP in AM      CHRISTI  Resolved      DVT prophylaxis   Heparin      Fidel Perez DO, MPH  Internal Medicine

## 2018-05-26 NOTE — ROUTINE PROCESS
Bedside and Verbal shift change report given to Amira Kyle (oncoming nurse) by Jenita Dandy (offgoing nurse). Report included the following information SBAR, Kardex, MAR and Recent Results.

## 2018-05-26 NOTE — ROUTINE PROCESS
Bedside and Verbal shift change report given to Brianna Layton RN (oncoming nurse) by Nicol Garcia RN (offgoing nurse). Report included the following information SBAR, Kardex and MAR.

## 2018-05-26 NOTE — PROGRESS NOTES
0800  Received pt on bed, right NATALIE intact , skin around NATALIE excoriated around NATALIE almost there is a hole, told her I will put sterile dressing  Around, to apply cream and cover with  Duederm, pt refused he said it was not been done before he wants it  open, because the doctor had  not told him to place cover. Explained to  prevent infection, he wants it open. 1000  NATALIE continuous drainage , greenish there was one time it was reddish after eating jello. 1200  respiration regular, pulse ox 97 %, medicated with oral alternating with IV , per pt relief  for 2 hours, but looks he is comfortable. 1400  mediport  Infusing well, flushed well no blood return, no redness and no swelling at site, lungs clear, informed about the transfer to  3000. Flushed NATALIE with 10 cc with out resistance.

## 2018-05-26 NOTE — ROUTINE PROCESS
Patient transferred to [id number] from [facility] via [facility] - alert and oriented - known from previous admissions.   c/o constant pain in abdomen - has NATALIE drain from RLQ with light green drainage - settled in bed     1700 - medicated for pain and nausea per PRN orders

## 2018-05-26 NOTE — PROGRESS NOTES
Shift report given by Suzan Echols RN. Pt in bed w HOB elevated, bed locked at lowest position. Pt NATALIE drain to right flank area is intact, drainage noted on skin around NATALIE drain. Skin around NATALIE drain is raw, erythemic and tender. Mediport to Left upper chest IV infusing, flushed and patent, in intact, no swelling or erythema noted, no blood return noted. Pt denies SOB, dressing is clean, dry and intact. 0930 - NATALIE drainage red due to eating red jello. 1000 - NATALIE drainage green. 1040 - Dressing change done. Pt refused Duoderm cream application and tegraderm. 4X4 guaze and ABD pad and tape applied to site. Pt tolerated well. 1617 - Report given to Bonnie Graham RN on 3rd floor. Pt to be transported to room 3025.    1640 - Pt transferred off floor via wheelchair to 3rd floor.

## 2018-05-27 LAB
ALBUMIN SERPL-MCNC: 2.5 G/DL (ref 3.4–5)
ALBUMIN/GLOB SERPL: 0.6 {RATIO} (ref 0.8–1.7)
ALP SERPL-CCNC: 146 U/L (ref 45–117)
ALT SERPL-CCNC: 20 U/L (ref 16–61)
ANION GAP SERPL CALC-SCNC: 9 MMOL/L (ref 3–18)
AST SERPL-CCNC: 20 U/L (ref 15–37)
BASOPHILS # BLD: 0 K/UL (ref 0–0.06)
BASOPHILS NFR BLD: 0 % (ref 0–2)
BILIRUB SERPL-MCNC: 0.3 MG/DL (ref 0.2–1)
BUN SERPL-MCNC: 10 MG/DL (ref 7–18)
BUN/CREAT SERPL: 8 (ref 12–20)
CALCIUM SERPL-MCNC: 7.9 MG/DL (ref 8.5–10.1)
CHLORIDE SERPL-SCNC: 108 MMOL/L (ref 100–108)
CO2 SERPL-SCNC: 25 MMOL/L (ref 21–32)
CREAT SERPL-MCNC: 1.19 MG/DL (ref 0.6–1.3)
DATE LAST DOSE: ABNORMAL
DIFFERENTIAL METHOD BLD: ABNORMAL
EOSINOPHIL # BLD: 0.2 K/UL (ref 0–0.4)
EOSINOPHIL NFR BLD: 4 % (ref 0–5)
ERYTHROCYTE [DISTWIDTH] IN BLOOD BY AUTOMATED COUNT: 14.6 % (ref 11.6–14.5)
GLOBULIN SER CALC-MCNC: 4.2 G/DL (ref 2–4)
GLUCOSE BLD STRIP.AUTO-MCNC: 100 MG/DL (ref 70–110)
GLUCOSE BLD STRIP.AUTO-MCNC: 234 MG/DL (ref 70–110)
GLUCOSE BLD STRIP.AUTO-MCNC: 81 MG/DL (ref 70–110)
GLUCOSE SERPL-MCNC: 169 MG/DL (ref 74–99)
HCT VFR BLD AUTO: 29.7 % (ref 36–48)
HGB BLD-MCNC: 9.4 G/DL (ref 13–16)
LYMPHOCYTES # BLD: 1 K/UL (ref 0.9–3.6)
LYMPHOCYTES NFR BLD: 20 % (ref 21–52)
MCH RBC QN AUTO: 30.5 PG (ref 24–34)
MCHC RBC AUTO-ENTMCNC: 31.6 G/DL (ref 31–37)
MCV RBC AUTO: 96.4 FL (ref 74–97)
MONOCYTES # BLD: 0.6 K/UL (ref 0.05–1.2)
MONOCYTES NFR BLD: 11 % (ref 3–10)
NEUTS SEG # BLD: 3.4 K/UL (ref 1.8–8)
NEUTS SEG NFR BLD: 65 % (ref 40–73)
PLATELET # BLD AUTO: 228 K/UL (ref 135–420)
PMV BLD AUTO: 9.8 FL (ref 9.2–11.8)
POTASSIUM SERPL-SCNC: 3.5 MMOL/L (ref 3.5–5.5)
PROT SERPL-MCNC: 6.7 G/DL (ref 6.4–8.2)
RBC # BLD AUTO: 3.08 M/UL (ref 4.7–5.5)
REPORTED DOSE,DOSE: ABNORMAL UNITS
REPORTED DOSE/TIME,TMG: 2200
SODIUM SERPL-SCNC: 142 MMOL/L (ref 136–145)
VANCOMYCIN TROUGH SERPL-MCNC: 23.1 UG/ML (ref 10–20)
WBC # BLD AUTO: 5.2 K/UL (ref 4.6–13.2)

## 2018-05-27 PROCEDURE — 74011250636 HC RX REV CODE- 250/636: Performed by: NURSE PRACTITIONER

## 2018-05-27 PROCEDURE — 74011000250 HC RX REV CODE- 250: Performed by: FAMILY MEDICINE

## 2018-05-27 PROCEDURE — 80202 ASSAY OF VANCOMYCIN: CPT | Performed by: FAMILY MEDICINE

## 2018-05-27 PROCEDURE — 82962 GLUCOSE BLOOD TEST: CPT

## 2018-05-27 PROCEDURE — 74011250636 HC RX REV CODE- 250/636: Performed by: HOSPITALIST

## 2018-05-27 PROCEDURE — 85025 COMPLETE CBC W/AUTO DIFF WBC: CPT | Performed by: HOSPITALIST

## 2018-05-27 PROCEDURE — 74011250636 HC RX REV CODE- 250/636: Performed by: FAMILY MEDICINE

## 2018-05-27 PROCEDURE — 80053 COMPREHEN METABOLIC PANEL: CPT | Performed by: HOSPITALIST

## 2018-05-27 PROCEDURE — 74011000258 HC RX REV CODE- 258: Performed by: FAMILY MEDICINE

## 2018-05-27 PROCEDURE — 74011250637 HC RX REV CODE- 250/637: Performed by: NURSE PRACTITIONER

## 2018-05-27 PROCEDURE — 74011250637 HC RX REV CODE- 250/637: Performed by: HOSPITALIST

## 2018-05-27 PROCEDURE — 74011000258 HC RX REV CODE- 258: Performed by: HOSPITALIST

## 2018-05-27 PROCEDURE — 36415 COLL VENOUS BLD VENIPUNCTURE: CPT | Performed by: HOSPITALIST

## 2018-05-27 PROCEDURE — 65270000029 HC RM PRIVATE

## 2018-05-27 PROCEDURE — 74011636637 HC RX REV CODE- 636/637: Performed by: FAMILY MEDICINE

## 2018-05-27 RX ORDER — HEPARIN 100 UNIT/ML
SYRINGE INTRAVENOUS
Status: DISPENSED
Start: 2018-05-27 | End: 2018-05-27

## 2018-05-27 RX ORDER — PROMETHAZINE HYDROCHLORIDE 25 MG/ML
25 INJECTION, SOLUTION INTRAMUSCULAR; INTRAVENOUS
Status: DISCONTINUED | OUTPATIENT
Start: 2018-05-27 | End: 2018-05-31 | Stop reason: HOSPADM

## 2018-05-27 RX ORDER — HEPARIN 100 UNIT/ML
300 SYRINGE INTRAVENOUS AS NEEDED
Status: DISCONTINUED | OUTPATIENT
Start: 2018-05-27 | End: 2018-05-31 | Stop reason: HOSPADM

## 2018-05-27 RX ADMIN — Medication 10 ML: at 05:50

## 2018-05-27 RX ADMIN — PROMETHAZINE HYDROCHLORIDE 25 MG: 25 INJECTION INTRAMUSCULAR; INTRAVENOUS at 16:46

## 2018-05-27 RX ADMIN — ONDANSETRON 4 MG: 2 INJECTION INTRAMUSCULAR; INTRAVENOUS at 05:49

## 2018-05-27 RX ADMIN — SODIUM CHLORIDE 750 MG: 900 INJECTION, SOLUTION INTRAVENOUS at 17:31

## 2018-05-27 RX ADMIN — PANCRELIPASE 2 CAPSULE: 10000; 34000; 55000 CAPSULE, DELAYED RELEASE ORAL at 09:00

## 2018-05-27 RX ADMIN — Medication 10 ML: at 15:19

## 2018-05-27 RX ADMIN — Medication 1 MG: at 15:16

## 2018-05-27 RX ADMIN — DEXTROSE MONOHYDRATE AND SODIUM CHLORIDE 75 ML/HR: 5; .45 INJECTION, SOLUTION INTRAVENOUS at 11:27

## 2018-05-27 RX ADMIN — FLUCONAZOLE 200 MG: 2 INJECTION, SOLUTION INTRAVENOUS at 09:00

## 2018-05-27 RX ADMIN — Medication 1 MG: at 21:16

## 2018-05-27 RX ADMIN — Medication 1 MG: at 18:30

## 2018-05-27 RX ADMIN — AMITRIPTYLINE HYDROCHLORIDE 75 MG: 50 TABLET, FILM COATED ORAL at 21:15

## 2018-05-27 RX ADMIN — POTASSIUM CHLORIDE 40 MEQ: 1500 TABLET, EXTENDED RELEASE ORAL at 17:33

## 2018-05-27 RX ADMIN — Medication 10 ML: at 21:16

## 2018-05-27 RX ADMIN — INSULIN GLARGINE 6 UNITS: 100 INJECTION, SOLUTION SUBCUTANEOUS at 09:02

## 2018-05-27 RX ADMIN — Medication 1 MG: at 02:42

## 2018-05-27 RX ADMIN — FERROUS SULFATE TAB 325 MG (65 MG ELEMENTAL FE) 325 MG: 325 (65 FE) TAB at 09:00

## 2018-05-27 RX ADMIN — FERROUS SULFATE TAB 325 MG (65 MG ELEMENTAL FE) 325 MG: 325 (65 FE) TAB at 17:00

## 2018-05-27 RX ADMIN — SODIUM CHLORIDE 1000 MG: 900 INJECTION, SOLUTION INTRAVENOUS at 00:39

## 2018-05-27 RX ADMIN — Medication 1 MG: at 12:23

## 2018-05-27 RX ADMIN — Medication 1 MG: at 09:00

## 2018-05-27 RX ADMIN — Medication 300 UNITS: at 05:36

## 2018-05-27 RX ADMIN — GENTAMICIN SULFATE 388 MG: 40 INJECTION, SOLUTION INTRAMUSCULAR; INTRAVENOUS at 19:53

## 2018-05-27 RX ADMIN — HEPARIN SODIUM 5000 UNITS: 5000 INJECTION, SOLUTION INTRAVENOUS; SUBCUTANEOUS at 05:49

## 2018-05-27 RX ADMIN — HEPARIN SODIUM 5000 UNITS: 5000 INJECTION, SOLUTION INTRAVENOUS; SUBCUTANEOUS at 21:15

## 2018-05-27 RX ADMIN — Medication 1 MG: at 05:49

## 2018-05-27 RX ADMIN — METRONIDAZOLE 500 MG: 500 INJECTION, SOLUTION INTRAVENOUS at 05:50

## 2018-05-27 RX ADMIN — PANCRELIPASE 2 CAPSULE: 10000; 34000; 55000 CAPSULE, DELAYED RELEASE ORAL at 17:00

## 2018-05-27 RX ADMIN — POTASSIUM CHLORIDE 40 MEQ: 1500 TABLET, EXTENDED RELEASE ORAL at 09:08

## 2018-05-27 RX ADMIN — ONDANSETRON 4 MG: 2 INJECTION INTRAMUSCULAR; INTRAVENOUS at 12:23

## 2018-05-27 RX ADMIN — INSULIN LISPRO 4 UNITS: 100 INJECTION, SOLUTION INTRAVENOUS; SUBCUTANEOUS at 15:18

## 2018-05-27 NOTE — PROGRESS NOTES
Pharmacy Dosing Services: Vancomycin     Indication: SSTI    Day of therapy: Day 3/ of Vancomycin    Other Antimicrobials (Include dose, start day & day of therapy):   -Levofloxacin 750mg IV Q24H      Loading dose (date given): 1750mg  Current Maintenance dose: 1000mg IV Q12H    Goal Vancomycin Level: 15-20  (Trough 15-20 for most infections, 20 for meningitis/osteomyelitis, pre-HD level ~25)    Vancomycin Level (if drawn):    - 23.1 (12 hours post dose)    Significant Cultures:    Urine - klebsiella pneumoniae   Wound - few ecoli, mod e. Faecalis, few yeast    Renal function stable? (unstable defined as SCr increase of 0.5 mg/dL or > 50% increase from baseline, whichever is greater) (Y/N): Y     CAPD, Hemodialysis or Renal Replacement Therapy (Y/N): N     Recent Labs      18   1240  18   0904  18   0337   CREA  1.19  1.03  1.08   BUN  10  7  8   WBC  5.2  3.9*  4.9     Temp (24hrs), Av.8 °F (36.6 °C), Min:97.4 °F (36.3 °C), Max:98.1 °F (36.7 °C)    Creatinine Clearance (Creatinine Clearance (ml/min)): ~62 mL/min    Regimen assessment: Slightly supratherapeutic, will decrease dose for final 2 days of therapy  Maintenance dose: 750mg IV Q12H  Next scheduled level: Last day of therapy , schedule level for  @ 1530 if therapy extended beyond this date       Pharmacy will follow daily and adjust medications as appropriate for renal function and/or serum levels.     Thank you,  Micheline Marin, PHARMD

## 2018-05-27 NOTE — PROGRESS NOTES
1230 - Called phlebotomy to ask about the vanco trough. She stated that 2 techs tried with no success, but that she would come up to try. Krys from pharmacy notified.

## 2018-05-27 NOTE — PROGRESS NOTES
Problem: Falls - Risk of  Goal: *Absence of Falls  Document Serena Fall Risk and appropriate interventions in the flowsheet.    Fall Risk Interventions:  Mobility Interventions: Patient to call before getting OOB         Medication Interventions: Patient to call before getting OOB    Elimination Interventions: Call light in reach

## 2018-05-27 NOTE — PROGRESS NOTES
2000-no signs of distress. Ordered meds given throughout night. Unremarkable night. Bedside shift change report given to RN Zaheer Diaz (oncoming nurse) by Fox Maravilla (offgoing nurse). Report included the following information SBAR.

## 2018-05-27 NOTE — PROGRESS NOTES
Progress Note      Patient: Emiliano Aguilar               Sex: male          DOA: 5/22/2018       YOB: 1965      Age:  46 y.o.        LOS:  LOS: 5 days             CHIEF COMPLAINT:    Subjective:     He vomited after eating lunch and abdominal pain is improved with Dilaudid increased yesterday and he denies fever, chills, CP, or SOB. Nurse was at bedside.       Objective:      Visit Vitals    /71    Pulse 88    Temp 97.8 °F (36.6 °C)    Resp 16    Ht 6' (1.829 m)    Wt 135 lb (61.2 kg)    SpO2 99%    BMI 18.31 kg/m2       Physical Exam:  GEN: AAO X 3, NAD  CVS: Normal S1S2, RRR and Mediport on left chest  RESP: CTAB  ABD: Soft, non tender, non distended, NATALIE drain in place and draining, +BS  EXT: No C/C/E  NEURO: CN 2 - 12 intact with no focal deficits noted.   Skin: Some erythema and discoloration of skin on RLQ from drainage         Lab/Data Reviewed:  CMP:   Lab Results   Component Value Date/Time     05/27/2018 12:40 PM    K 3.5 05/27/2018 12:40 PM     05/27/2018 12:40 PM    CO2 25 05/27/2018 12:40 PM    AGAP 9 05/27/2018 12:40 PM     (H) 05/27/2018 12:40 PM    BUN 10 05/27/2018 12:40 PM    CREA 1.19 05/27/2018 12:40 PM    GFRAA >60 05/27/2018 12:40 PM    GFRNA >60 05/27/2018 12:40 PM    CA 7.9 (L) 05/27/2018 12:40 PM    ALB 2.5 (L) 05/27/2018 12:40 PM    TP 6.7 05/27/2018 12:40 PM    GLOB 4.2 (H) 05/27/2018 12:40 PM    AGRAT 0.6 (L) 05/27/2018 12:40 PM    SGOT 20 05/27/2018 12:40 PM    ALT 20 05/27/2018 12:40 PM     CBC:   Lab Results   Component Value Date/Time    WBC 5.2 05/27/2018 12:40 PM    HGB 9.4 (L) 05/27/2018 12:40 PM    HCT 29.7 (L) 05/27/2018 12:40 PM     05/27/2018 12:40 PM           Assessment/Plan     Principal Problem:    Hyperglycemia (5/22/2018)    Active Problems:    Chronic pancreatitis (HCC) ()      Overview: Continue PPI IV      IDDM (insulin dependent diabetes mellitus) ([email or web address] (8/31/2013)      Abdominal pain (11/16/2015)      Moderate protein-calorie malnutrition ([facility] (11/21/2015)      Pancreatic fistula (3/23/2016)      H/O ETOH abuse (3/23/2016)      Dehydration (8/5/2017)      Vomiting (8/5/2017)      Acute alcoholic hepatitis (6/58/6318)      Chronic pancreatitis due to acute alcohol intoxication ([facility] (4/15/2018)      CHRISTI (acute kidney injury) ([facility] (5/22/2018)      UTI (urinary tract infection) (5/24/2018)        Plan:  Dislodged NATALIE drain   Per pancreaticoduodenal fistula   IR Inserted NATALIE drain and draining with some leak  Colorectal surgery following and appreciate help      Abdominal pain  - improved    Intraabdominal infection - wound culture with E faecalis, ESBL Ecoli, and yeast  Continue Flagyl and Gentamicin started today for ESBL with pharmacy consulted to dose  Continue Vancomycin for E faecalis with pharmacy dosing  Diflucan started yesterday for yeast  Continue Dilaudid as needed for pain  Consult ID in AM      UTI   Klebsiella pneumoniae on UC  Levaquin discontinued and Gentamicin started today with pharmacy consulted to dose especially as ESBL E coli on wound culture also sensitive to Gentamicin. Of note, pt has allergy to Penicillin      IDDM  On Lantus and correctional insulin      Chronic pancreatitis with chronic ethanol use  On Zenpep and he was counseled to stop drinking.     Nausea and vomiting  Continue Zofran and Phenergan as needed      Hypokalemia  K improved at 3.5 today      CHRISTI  Resolved      DVT prophylaxis   Heparin        Tio Villafuerte DO, MPH  Internal Medicine

## 2018-05-28 LAB
ALBUMIN SERPL-MCNC: 2.4 G/DL (ref 3.4–5)
ALBUMIN/GLOB SERPL: 0.7 {RATIO} (ref 0.8–1.7)
ALP SERPL-CCNC: 133 U/L (ref 45–117)
ALT SERPL-CCNC: 18 U/L (ref 16–61)
ANION GAP SERPL CALC-SCNC: 9 MMOL/L (ref 3–18)
AST SERPL-CCNC: 21 U/L (ref 15–37)
BACTERIA SPEC CULT: ABNORMAL
BASOPHILS # BLD: 0 K/UL (ref 0–0.06)
BASOPHILS NFR BLD: 1 % (ref 0–2)
BILIRUB SERPL-MCNC: 0.3 MG/DL (ref 0.2–1)
BUN SERPL-MCNC: 11 MG/DL (ref 7–18)
BUN/CREAT SERPL: 11 (ref 12–20)
CALCIUM SERPL-MCNC: 7.8 MG/DL (ref 8.5–10.1)
CHLORIDE SERPL-SCNC: 105 MMOL/L (ref 100–108)
CO2 SERPL-SCNC: 31 MMOL/L (ref 21–32)
CREAT SERPL-MCNC: 1.02 MG/DL (ref 0.6–1.3)
DIFFERENTIAL METHOD BLD: ABNORMAL
EOSINOPHIL # BLD: 0.2 K/UL (ref 0–0.4)
EOSINOPHIL NFR BLD: 4 % (ref 0–5)
ERYTHROCYTE [DISTWIDTH] IN BLOOD BY AUTOMATED COUNT: 15 % (ref 11.6–14.5)
GENTAMICIN SERPL-MCNC: 4.6 UG/ML (ref 0.5–10)
GLOBULIN SER CALC-MCNC: 3.3 G/DL (ref 2–4)
GLUCOSE BLD STRIP.AUTO-MCNC: 116 MG/DL (ref 70–110)
GLUCOSE BLD STRIP.AUTO-MCNC: 167 MG/DL (ref 70–110)
GLUCOSE BLD STRIP.AUTO-MCNC: 216 MG/DL (ref 70–110)
GLUCOSE BLD STRIP.AUTO-MCNC: 226 MG/DL (ref 70–110)
GLUCOSE BLD STRIP.AUTO-MCNC: 367 MG/DL (ref 70–110)
GLUCOSE BLD STRIP.AUTO-MCNC: 47 MG/DL (ref 70–110)
GLUCOSE BLD STRIP.AUTO-MCNC: 76 MG/DL (ref 70–110)
GLUCOSE BLD STRIP.AUTO-MCNC: 79 MG/DL (ref 70–110)
GLUCOSE SERPL-MCNC: 75 MG/DL (ref 74–99)
GRAM STN SPEC: ABNORMAL
HCT VFR BLD AUTO: 25.8 % (ref 36–48)
HGB BLD-MCNC: 8.1 G/DL (ref 13–16)
LYMPHOCYTES # BLD: 1.1 K/UL (ref 0.9–3.6)
LYMPHOCYTES NFR BLD: 19 % (ref 21–52)
MCH RBC QN AUTO: 30.7 PG (ref 24–34)
MCHC RBC AUTO-ENTMCNC: 31.4 G/DL (ref 31–37)
MCV RBC AUTO: 97.7 FL (ref 74–97)
MONOCYTES # BLD: 0.7 K/UL (ref 0.05–1.2)
MONOCYTES NFR BLD: 12 % (ref 3–10)
NEUTS SEG # BLD: 3.7 K/UL (ref 1.8–8)
NEUTS SEG NFR BLD: 64 % (ref 40–73)
PLATELET # BLD AUTO: 243 K/UL (ref 135–420)
PMV BLD AUTO: 10 FL (ref 9.2–11.8)
POTASSIUM SERPL-SCNC: 3.7 MMOL/L (ref 3.5–5.5)
PROT SERPL-MCNC: 5.7 G/DL (ref 6.4–8.2)
RBC # BLD AUTO: 2.64 M/UL (ref 4.7–5.5)
SERVICE CMNT-IMP: ABNORMAL
SODIUM SERPL-SCNC: 145 MMOL/L (ref 136–145)
WBC # BLD AUTO: 5.8 K/UL (ref 4.6–13.2)

## 2018-05-28 PROCEDURE — 82962 GLUCOSE BLOOD TEST: CPT

## 2018-05-28 PROCEDURE — 80053 COMPREHEN METABOLIC PANEL: CPT | Performed by: HOSPITALIST

## 2018-05-28 PROCEDURE — 74011000250 HC RX REV CODE- 250: Performed by: FAMILY MEDICINE

## 2018-05-28 PROCEDURE — 80170 ASSAY OF GENTAMICIN: CPT | Performed by: HOSPITALIST

## 2018-05-28 PROCEDURE — 74011000258 HC RX REV CODE- 258: Performed by: HOSPITALIST

## 2018-05-28 PROCEDURE — 74011250637 HC RX REV CODE- 250/637: Performed by: NURSE PRACTITIONER

## 2018-05-28 PROCEDURE — 74011250636 HC RX REV CODE- 250/636: Performed by: NURSE PRACTITIONER

## 2018-05-28 PROCEDURE — 74011636637 HC RX REV CODE- 636/637: Performed by: FAMILY MEDICINE

## 2018-05-28 PROCEDURE — 74011250637 HC RX REV CODE- 250/637: Performed by: EMERGENCY MEDICINE

## 2018-05-28 PROCEDURE — 74011000258 HC RX REV CODE- 258: Performed by: FAMILY MEDICINE

## 2018-05-28 PROCEDURE — 65270000029 HC RM PRIVATE

## 2018-05-28 PROCEDURE — 74011250637 HC RX REV CODE- 250/637: Performed by: FAMILY MEDICINE

## 2018-05-28 PROCEDURE — 74011250636 HC RX REV CODE- 250/636: Performed by: HOSPITALIST

## 2018-05-28 PROCEDURE — 36415 COLL VENOUS BLD VENIPUNCTURE: CPT | Performed by: HOSPITALIST

## 2018-05-28 PROCEDURE — 74011250637 HC RX REV CODE- 250/637: Performed by: HOSPITALIST

## 2018-05-28 PROCEDURE — 85025 COMPLETE CBC W/AUTO DIFF WBC: CPT | Performed by: HOSPITALIST

## 2018-05-28 RX ORDER — INSULIN GLARGINE 100 [IU]/ML
4 INJECTION, SOLUTION SUBCUTANEOUS DAILY
Status: DISCONTINUED | OUTPATIENT
Start: 2018-05-29 | End: 2018-05-31 | Stop reason: HOSPADM

## 2018-05-28 RX ADMIN — SODIUM CHLORIDE 750 MG: 900 INJECTION, SOLUTION INTRAVENOUS at 18:35

## 2018-05-28 RX ADMIN — Medication 10 ML: at 17:34

## 2018-05-28 RX ADMIN — Medication 1 MG: at 20:37

## 2018-05-28 RX ADMIN — Medication 1 MG: at 04:02

## 2018-05-28 RX ADMIN — AMITRIPTYLINE HYDROCHLORIDE 75 MG: 50 TABLET, FILM COATED ORAL at 22:09

## 2018-05-28 RX ADMIN — PANCRELIPASE 2 CAPSULE: 10000; 34000; 55000 CAPSULE, DELAYED RELEASE ORAL at 14:22

## 2018-05-28 RX ADMIN — DEXTROSE MONOHYDRATE AND SODIUM CHLORIDE 75 ML/HR: 5; .45 INJECTION, SOLUTION INTRAVENOUS at 20:36

## 2018-05-28 RX ADMIN — PANCRELIPASE 2 CAPSULE: 10000; 34000; 55000 CAPSULE, DELAYED RELEASE ORAL at 09:11

## 2018-05-28 RX ADMIN — HEPARIN SODIUM 5000 UNITS: 5000 INJECTION, SOLUTION INTRAVENOUS; SUBCUTANEOUS at 04:02

## 2018-05-28 RX ADMIN — FERROUS SULFATE TAB 325 MG (65 MG ELEMENTAL FE) 325 MG: 325 (65 FE) TAB at 09:11

## 2018-05-28 RX ADMIN — HEPARIN SODIUM 5000 UNITS: 5000 INJECTION, SOLUTION INTRAVENOUS; SUBCUTANEOUS at 14:22

## 2018-05-28 RX ADMIN — Medication 1 MG: at 17:32

## 2018-05-28 RX ADMIN — Medication 1 MG: at 11:05

## 2018-05-28 RX ADMIN — POTASSIUM CHLORIDE 40 MEQ: 1500 TABLET, EXTENDED RELEASE ORAL at 17:33

## 2018-05-28 RX ADMIN — Medication 16 G: at 06:27

## 2018-05-28 RX ADMIN — INSULIN GLARGINE 6 UNITS: 100 INJECTION, SOLUTION SUBCUTANEOUS at 09:11

## 2018-05-28 RX ADMIN — INSULIN LISPRO 4 UNITS: 100 INJECTION, SOLUTION INTRAVENOUS; SUBCUTANEOUS at 17:56

## 2018-05-28 RX ADMIN — Medication 1 MG: at 14:22

## 2018-05-28 RX ADMIN — METRONIDAZOLE 500 MG: 500 INJECTION, SOLUTION INTRAVENOUS at 06:01

## 2018-05-28 RX ADMIN — HEPARIN SODIUM 5000 UNITS: 5000 INJECTION, SOLUTION INTRAVENOUS; SUBCUTANEOUS at 20:37

## 2018-05-28 RX ADMIN — POTASSIUM CHLORIDE 40 MEQ: 1500 TABLET, EXTENDED RELEASE ORAL at 09:11

## 2018-05-28 RX ADMIN — SODIUM CHLORIDE 750 MG: 900 INJECTION, SOLUTION INTRAVENOUS at 04:01

## 2018-05-28 RX ADMIN — PROMETHAZINE HYDROCHLORIDE 25 MG: 25 INJECTION INTRAMUSCULAR; INTRAVENOUS at 17:33

## 2018-05-28 RX ADMIN — METRONIDAZOLE 500 MG: 500 INJECTION, SOLUTION INTRAVENOUS at 17:34

## 2018-05-28 RX ADMIN — INSULIN LISPRO 10 UNITS: 100 INJECTION, SOLUTION INTRAVENOUS; SUBCUTANEOUS at 00:40

## 2018-05-28 RX ADMIN — PANCRELIPASE 2 CAPSULE: 10000; 34000; 55000 CAPSULE, DELAYED RELEASE ORAL at 17:33

## 2018-05-28 RX ADMIN — METRONIDAZOLE 500 MG: 500 INJECTION, SOLUTION INTRAVENOUS at 09:11

## 2018-05-28 RX ADMIN — Medication 10 ML: at 15:33

## 2018-05-28 RX ADMIN — HYDROCODONE BITARTRATE AND ACETAMINOPHEN 1 TABLET: 5; 325 TABLET ORAL at 09:52

## 2018-05-28 RX ADMIN — Medication 10 ML: at 06:01

## 2018-05-28 RX ADMIN — Medication 10 ML: at 22:14

## 2018-05-28 RX ADMIN — Medication 1 MG: at 00:40

## 2018-05-28 RX ADMIN — Medication 1 MG: at 07:49

## 2018-05-28 RX ADMIN — FERROUS SULFATE TAB 325 MG (65 MG ELEMENTAL FE) 325 MG: 325 (65 FE) TAB at 17:34

## 2018-05-28 RX ADMIN — GENTAMICIN SULFATE 300 MG: 40 INJECTION, SOLUTION INTRAMUSCULAR; INTRAVENOUS at 22:13

## 2018-05-28 RX ADMIN — FLUCONAZOLE 200 MG: 2 INJECTION, SOLUTION INTRAVENOUS at 09:53

## 2018-05-28 RX ADMIN — Medication 16 G: at 06:00

## 2018-05-28 RX ADMIN — Medication 10 ML: at 22:13

## 2018-05-28 RX ADMIN — PROMETHAZINE HYDROCHLORIDE 25 MG: 25 INJECTION INTRAMUSCULAR; INTRAVENOUS at 11:00

## 2018-05-28 RX ADMIN — HYDROCODONE BITARTRATE AND ACETAMINOPHEN 1 TABLET: 5; 325 TABLET ORAL at 22:21

## 2018-05-28 NOTE — PROGRESS NOTES
1600 - Vancomycin not available from pharmacy. 1700 - called pharmacy to inquire about the vancomycin. They stated it was still downstairs, but that it would be brought up shortly. Leila brought to the floor, together with gentamycin. 1731 - Vanco infusing, but pt has only one access. So other ABX will need retiming, or will be given late. 1953 - Gentamycin infusing. 2015 - Bedside and Verbal shift change report given to Kaykay Sood RN (oncoming nurse) by Kendall Barton RN (offgoing nurse). Report included the following information Intake/Output, Recent Results and Med Rec Status.

## 2018-05-28 NOTE — PROGRESS NOTES
[facility]  Hospitalist Division        Inpatient Daily Progress Note    Daily progress Note    Patient: Margarita Bain MRN: [id number]  [facility]: [id number]    YOB: 1965  Age: 46 y.o.   Sex: male    DOA: 5/22/2018 LOS:  LOS: 6 days                    Chief Complaint:  Pancreaticoduodenal fistula      Assessment/Plan:     Patient Active Problem List   Diagnosis Code    Chronic pancreatitis ([facility] K86.1    Tobacco abuse Z72.0    IDDM (insulin dependent diabetes mellitus) ([facility] E11.9, Z79.4    Gastroparesis K31.84    Perinephric abscess N15.1    Pneumobilia K83.8    Abdominal pain R10.9    Moderate protein-calorie malnutrition ([address] E44.0    Biliary drain displacement T85.520A    Hypokalemia E87.6    Pancreatic fistula K86.89    H/O ETOH abuse Z87.898    Chronic pain G89.29    Sepsis ([facility] A41.9    HCAP (healthcare-associated pneumonia) J18.9    Colitis, acute K52.9    DKA, type 1 ([address] E10.10    Dehydration E86.0    Lactic acidosis E87.2    Vomiting R11.10    Acute alcoholic hepatitis M79.85    DKA (diabetic ketoacidoses) (HCC) E13.10    Chronic pancreatitis due to acute alcohol intoxication ([facility] K86.0    Enteritis K52.9    Hyperglycemia R73.9    Chronic renal insufficiency N18.9    CHRISTI (acute kidney injury) (HCC) N17.9    UTI (urinary tract infection) N39.0       A/P:  Pancreaticoduodenal fistula  - IR replaced drain on 5/24/2018, recommend repeat imaging prior to d/c  - surgery available as needed, will eventually require major surgical repair procedure in the future hopefully in a tertiary medical facility  - continue IV abx, drain care  - ID consult in am    Abdominal Pain  - intra-abdominal infection   - pain control  - wound culture 5/23/2018 with ESBL, ESBL 2nd morphotype, moderate enterococcus faecalis group D, few candida glabrata  - continue IV Gentamycin, Vancomycin, IV Diflucan  - ID consult in am    UTI  - UA 5/22/2018 few bacteria, few yeast, trace leukocyte esteras  - urine culture 90,000 colonies Klebsiella Pneumonia  - continue IV Gentamycin    Diabetes  - correctional SSI, Lantus 4 units daily    Chronic pancreatitis, hx of ETOH abuse  - continue Zenpep  - IVF's, clear liquid diet  - CIWA protocol    DVT Prophylaxis  - Heparin subcutaneously TID, SCD's BLE      TOSIN TurnerP-C  2 [facility]  Hospitalist Division  Pager:  [phone]  Office:  [phone]        Subjective:      C/o abdominal pain and drainage surrounding NATALIE drain RLQ. Unable to tolerate po intake at this time. Objective:      Visit Vitals    /67 (BP 1 Location: Right arm, BP Patient Position: Sitting)    Pulse 98    Temp 98.2 °F (36.8 °C)    Resp 16    Ht 6' (1.829 m)    Wt 61.2 kg (135 lb)    SpO2 100%    BMI 18.31 kg/m2           Physical Exam:  General appearance: alert, cooperative, no distress, appears stated age  Head: Normocephalic, without obvious abnormality, atraumatic  Lungs: clear to auscultation bilaterally  Heart: regular rate and rhythm, S1, S2 normal, no murmur, click, rub or gallop  Abdomen: soft, non tender, non distended. Normoactive bowel sounds. NATALIE drain right quad intact, note drainage surrounding insertion site of drain  Extremities: extremities normal, atraumatic, no cyanosis or edema  Skin: Skin color, texture, turgor normal. No rashes or lesions  Neurologic: Grossly normal  PSY: Mood and affect normal, appropriately behaved        Intake and Output:  Current Shift:  05/28 0701 - 05/28 1900  In: -   Out: 380 [Drains:380]  Last three shifts:  05/26 1901 - 05/28 0700  In: 5102.5 [P.O.:1200;  I.V.:3901.5]  Out: 8760 [Urine:1700; Drains:7060]    Recent Results (from the past 24 hour(s))   CBC WITH AUTOMATED DIFF    Collection Time: 05/27/18 12:40 PM   Result Value Ref Range    WBC 5.2 4.6 - 13.2 K/uL    RBC 3.08 (L) 4.70 - 5.50 M/uL    HGB 9.4 (L) 13.0 - 16.0 g/dL    HCT 29.7 (L) 36.0 - 48.0 %    MCV 96.4 74.0 - 97.0 FL    MCH 30.5 24.0 - 34.0 PG    MCHC 31.6 31.0 - 37.0 g/dL    RDW 14.6 (H) 11.6 - 14.5 %    PLATELET 446 213 - 676 K/uL    MPV 9.8 9.2 - 11.8 FL    NEUTROPHILS 65 40 - 73 %    LYMPHOCYTES 20 (L) 21 - 52 %    MONOCYTES 11 (H) 3 - 10 %    EOSINOPHILS 4 0 - 5 %    BASOPHILS 0 0 - 2 %    ABS. NEUTROPHILS 3.4 1.8 - 8.0 K/UL    ABS. LYMPHOCYTES 1.0 0.9 - 3.6 K/UL    ABS. MONOCYTES 0.6 0.05 - 1.2 K/UL    ABS. EOSINOPHILS 0.2 0.0 - 0.4 K/UL    ABS. BASOPHILS 0.0 0.0 - 0.06 K/UL    DF AUTOMATED     METABOLIC PANEL, COMPREHENSIVE    Collection Time: 05/27/18 12:40 PM   Result Value Ref Range    Sodium 142 136 - 145 mmol/L    Potassium 3.5 3.5 - 5.5 mmol/L    Chloride 108 100 - 108 mmol/L    CO2 25 21 - 32 mmol/L    Anion gap 9 3.0 - 18 mmol/L    Glucose 169 (H) 74 - 99 mg/dL    BUN 10 7.0 - 18 MG/DL    Creatinine 1.19 0.6 - 1.3 MG/DL    BUN/Creatinine ratio 8 (L) 12 - 20      GFR est AA >60 >60 ml/min/1.73m2    GFR est non-AA >60 >60 ml/min/1.73m2    Calcium 7.9 (L) 8.5 - 10.1 MG/DL    Bilirubin, total 0.3 0.2 - 1.0 MG/DL    ALT (SGPT) 20 16 - 61 U/L    AST (SGOT) 20 15 - 37 U/L    Alk.  phosphatase 146 (H) 45 - 117 U/L    Protein, total 6.7 6.4 - 8.2 g/dL    Albumin 2.5 (L) 3.4 - 5.0 g/dL    Globulin 4.2 (H) 2.0 - 4.0 g/dL    A-G Ratio 0.6 (L) 0.8 - 1.7     VANCOMYCIN, TROUGH    Collection Time: 05/27/18 12:40 PM   Result Value Ref Range    Vancomycin,trough 23.1 (HH) 10.0 - 20.0 ug/mL    Reported dose date: [id number]      Reported dose time: 2200      Reported dose: 1000MG UNITS   GLUCOSE, POC    Collection Time: 05/27/18  3:18 PM   Result Value Ref Range    Glucose (POC) 234 (H) 70 - 110 mg/dL   GLUCOSE, POC    Collection Time: 05/27/18  5:54 PM   Result Value Ref Range    Glucose (POC) 81 70 - 110 mg/dL   GLUCOSE, POC    Collection Time: 05/28/18 12:31 AM   Result Value Ref Range    Glucose (POC) 367 (H) 70 - 110 mg/dL   GLUCOSE, POC    Collection Time: 05/28/18  3:40 AM   Result Value Ref Range    Glucose (POC) 79 70 - 110 mg/dL   CBC WITH AUTOMATED DIFF    Collection Time: 05/28/18  4:25 AM   Result Value Ref Range    WBC 5.8 4.6 - 13.2 K/uL    RBC 2.64 (L) 4.70 - 5.50 M/uL    HGB 8.1 (L) 13.0 - 16.0 g/dL    HCT 25.8 (L) 36.0 - 48.0 %    MCV 97.7 (H) 74.0 - 97.0 FL    MCH 30.7 24.0 - 34.0 PG    MCHC 31.4 31.0 - 37.0 g/dL    RDW 15.0 (H) 11.6 - 14.5 %    PLATELET 429 336 - 819 K/uL    MPV 10.0 9.2 - 11.8 FL    NEUTROPHILS 64 40 - 73 %    LYMPHOCYTES 19 (L) 21 - 52 %    MONOCYTES 12 (H) 3 - 10 %    EOSINOPHILS 4 0 - 5 %    BASOPHILS 1 0 - 2 %    ABS. NEUTROPHILS 3.7 1.8 - 8.0 K/UL    ABS. LYMPHOCYTES 1.1 0.9 - 3.6 K/UL    ABS. MONOCYTES 0.7 0.05 - 1.2 K/UL    ABS. EOSINOPHILS 0.2 0.0 - 0.4 K/UL    ABS. BASOPHILS 0.0 0.0 - 0.06 K/UL    DF AUTOMATED     METABOLIC PANEL, COMPREHENSIVE    Collection Time: 05/28/18  4:25 AM   Result Value Ref Range    Sodium 145 136 - 145 mmol/L    Potassium 3.7 3.5 - 5.5 mmol/L    Chloride 105 100 - 108 mmol/L    CO2 31 21 - 32 mmol/L    Anion gap 9 3.0 - 18 mmol/L    Glucose 75 74 - 99 mg/dL    BUN 11 7.0 - 18 MG/DL    Creatinine 1.02 0.6 - 1.3 MG/DL    BUN/Creatinine ratio 11 (L) 12 - 20      GFR est AA >60 >60 ml/min/1.73m2    GFR est non-AA >60 >60 ml/min/1.73m2    Calcium 7.8 (L) 8.5 - 10.1 MG/DL    Bilirubin, total 0.3 0.2 - 1.0 MG/DL    ALT (SGPT) 18 16 - 61 U/L    AST (SGOT) 21 15 - 37 U/L    Alk.  phosphatase 133 (H) 45 - 117 U/L    Protein, total 5.7 (L) 6.4 - 8.2 g/dL    Albumin 2.4 (L) 3.4 - 5.0 g/dL    Globulin 3.3 2.0 - 4.0 g/dL    A-G Ratio 0.7 (L) 0.8 - 1.7     GLUCOSE, POC    Collection Time: 05/28/18  5:56 AM   Result Value Ref Range    Glucose (POC) 47 (LL) 70 - 110 mg/dL   GLUCOSE, POC    Collection Time: 05/28/18  6:24 AM   Result Value Ref Range    Glucose (POC) 76 70 - 110 mg/dL   GLUCOSE, POC    Collection Time: 05/28/18  7:01 AM   Result Value Ref Range    Glucose (POC) 167 (H) 70 - 110 mg/dL   GLUCOSE, POC    Collection Time: 05/28/18  7:28 AM   Result Value Ref Range    Glucose (POC) 226 (H) 70 - 110 mg/dL   GLUCOSE, POC    Collection Time: 05/28/18 11:13 AM   Result Value Ref Range    Glucose (POC) 116 (H) 70 - 110 mg/dL           Lab Results   Component Value Date/Time    Glucose 75 05/28/2018 04:25 AM    Glucose 169 (H) 05/27/2018 12:40 PM    Glucose 170 (H) 05/26/2018 09:04 AM    Glucose 72 (L) 05/25/2018 03:37 AM    Glucose 72 (L) 05/24/2018 03:55 AM        Imaging:  No results found.     Medication List Reviewed:  Current Facility-Administered Medications   Medication Dose Route Frequency    [START ON 5/29/2018] insulin glargine (LANTUS) injection 4 Units  4 Units SubCUTAneous DAILY    vancomycin (VANCOCIN) 750 mg in 0.9% sodium chloride (MBP/ADV) 250 mL ADV  750 mg IntraVENous Q12H    [START ON 5/30/2018] VANCOMYCIN INFORMATION NOTE   Other ONCE    gentamicin (GARAMYCIN) 300 mg in 0.9% sodium chloride 100 mL IVPB  300 mg IntraVENous Q24H    [START ON 5/29/2018] GENTAMICIN INFORMATION NOTE   Other ONCE    GENTAMICIN INFORMATION NOTE   Other Rx Dosing/Monitoring    heparin (porcine) pf 300 Units  300 Units InterCATHeter PRN    promethazine (PHENERGAN) 25 mg in 0.9% sodium chloride 50 mL IVPB  25 mg IntraVENous Q6H PRN    Or    promethazine (PHENERGAN) injection 25 mg  25 mg IntraMUSCular Q6H PRN    potassium chloride (K-DUR, KLOR-CON) SR tablet 40 mEq  40 mEq Oral BID    fluconazole (DIFLUCAN) 200mg/100 mL IVPB (premix)  200 mg IntraVENous DAILY    HYDROmorphone (DILAUDID) injection 1 mg  1 mg IntraVENous Q3H PRN    VANCOMYCIN INFORMATION NOTE 1 Each  1 Each Other Rx Dosing/Monitoring    HYDROcodone-acetaminophen (NORCO) 5-325 mg per tablet 1 Tab  1 Tab Oral Q6H PRN    metroNIDAZOLE (FLAGYL) IVPB premix 500 mg  500 mg IntraVENous Q12H    insulin lispro (HUMALOG) injection   SubCUTAneous Q6H    glucose chewable tablet 16 g  4 Tab Oral PRN    glucagon (GLUCAGEN) injection 1 mg  1 mg IntraMUSCular PRN    dextrose (D50W) injection syrg 12.5-25 g  25-50 mL IntraVENous PRN    amitriptyline (ELAVIL) tablet 75 mg  75 mg Oral QHS    ferrous sulfate tablet 325 mg  325 mg Oral BID    lipase-protease-amylase (ZENPEP 10,000) capsule 2 Cap  2 Cap Oral TID WITH MEALS    sodium chloride (NS) flush 5-10 mL  5-10 mL IntraVENous Q8H    sodium chloride (NS) flush 5-10 mL  5-10 mL IntraVENous PRN    heparin (porcine) injection 5,000 Units  5,000 Units SubCUTAneous Q8H    acetaminophen (TYLENOL) tablet 650 mg  650 mg Oral Q6H PRN    ondansetron (ZOFRAN) injection 4 mg  4 mg IntraVENous Q4H PRN    sodium chloride (NS) flush 5-10 mL  5-10 mL IntraVENous Q8H    sodium chloride (NS) flush 5-10 mL  5-10 mL IntraVENous PRN    LORazepam (ATIVAN) tablet 1 mg  1 mg Oral Q1H PRN    Or    LORazepam (ATIVAN) injection 1 mg  1 mg IntraVENous Q1H PRN    LORazepam (ATIVAN) tablet 2 mg  2 mg Oral Q1H PRN    Or    LORazepam (ATIVAN) injection 2 mg  2 mg IntraVENous Q1H PRN    LORazepam (ATIVAN) injection 3 mg  3 mg IntraVENous Q15MIN PRN    dextrose 5 % - 0.45% NaCl infusion  75 mL/hr IntraVENous CONTINUOUS    heparin (porcine) pf 500 Units  500 Units InterCATHeter PRN

## 2018-05-28 NOTE — PROGRESS NOTES
2000-no signs of distress. Ordered meds given throughout night. Unremarkable night. Bedside shift change report given to RN Juan (oncoming nurse) by Dai Rizvi (offgoing nurse). Report included the following information SBAR.

## 2018-05-28 NOTE — PROGRESS NOTES
Problem: Falls - Risk of  Goal: *Absence of Falls  Document Serena Fall Risk and appropriate interventions in the flowsheet. Outcome: Progressing Towards Goal  Fall Risk Interventions:  Mobility Interventions: Patient to call before getting OOB         Medication Interventions: Patient to call before getting OOB, Teach patient to arise slowly    Elimination Interventions: Call light in reach, Patient to call for help with toileting needs, Urinal in reach             Problem: Pressure Injury - Risk of  Goal: *Prevention of pressure injury  Document Austin Scale and appropriate interventions in the flowsheet.    Outcome: Progressing Towards Goal  Pressure Injury Interventions:       Moisture Interventions: Absorbent underpads    Activity Interventions: Pressure redistribution bed/mattress(bed type)    Mobility Interventions: Pressure redistribution bed/mattress (bed type)    Nutrition Interventions: Document food/fluid/supplement intake, Offer support with meals,snacks and hydration    Friction and Shear Interventions: HOB 30 degrees or less

## 2018-05-28 NOTE — DIABETES MGMT
Glycemic Control:  Recommend decreasing Lantus to 4 units q 24 hrs plus corrective lispro with hypoglycemia. Added Ensure Clear supplements to the clear liquid diet. Soni HERNANDEZ

## 2018-05-28 NOTE — PROGRESS NOTES
[facility] Dosing Services     Pharmacy dosing Gentamicin empirically for treatment of skin and soft tissue infection     Started/Was on an Extended interval dosing with 388 mg IV q24h. Day of Therapy: 1. Day 0: 5/27. Day 1: 5/28    Other Antibiotics: Fluconazole, Metronidazole, Vancomycin    Labs:   Jay Jay Cherry Level: not available. Bun/Serum Creatinine - 1.02 mg/dl   CrCl - 73.3 ml/min  WBC - 5.8  Tmax - 97.8    Cultures:    Wound: few E. Coli x2 (gent BALA <1), E. Faecalis: Vanco (BALA =1)  Urine: Klebsiella Pneu >900k (Gent BALA <1)    Plan: Extended Interval dosing per nomogram.  Patient's wt below IBW. Adjusted to 300 mg q24h. Check 10 hrs level tomorrow, 5/29, 0430. Pharmacy to follow and will make changes to dose and/or frequency based on clinical status. Thanks for the consult.

## 2018-05-28 NOTE — PROGRESS NOTES
Assume pt care, he is alert and oriented, c/o abdominal pain 9/10. PRN pain medication administered  1100: PRN dilaudid 1mg administered for abdominal pain. NATALIE drainage site cleansed with NS, site with yellowish drainage, fowl smelling. 1430: Pain medication administered upon pt's request, NATALIE drain patent and intact, see flow sheet. Bedside shift change report given to [place] RN (oncoming nurse) by May Edmondson RN (offgoing nurse). Report included the following information SBAR, Kardex, Intake/Output, MAR and Recent Results.

## 2018-05-29 LAB
ANION GAP SERPL CALC-SCNC: 6 MMOL/L (ref 3–18)
BUN SERPL-MCNC: 14 MG/DL (ref 7–18)
BUN/CREAT SERPL: 10 (ref 12–20)
CALCIUM SERPL-MCNC: 7.9 MG/DL (ref 8.5–10.1)
CHLORIDE SERPL-SCNC: 103 MMOL/L (ref 100–108)
CO2 SERPL-SCNC: 29 MMOL/L (ref 21–32)
CREAT SERPL-MCNC: 1.37 MG/DL (ref 0.6–1.3)
ERYTHROCYTE [DISTWIDTH] IN BLOOD BY AUTOMATED COUNT: 14.5 % (ref 11.6–14.5)
GENTAMICIN SERPL-MCNC: 4 UG/ML (ref 0.5–10)
GLUCOSE BLD STRIP.AUTO-MCNC: 110 MG/DL (ref 70–110)
GLUCOSE BLD STRIP.AUTO-MCNC: 129 MG/DL (ref 70–110)
GLUCOSE BLD STRIP.AUTO-MCNC: 144 MG/DL (ref 70–110)
GLUCOSE BLD STRIP.AUTO-MCNC: 202 MG/DL (ref 70–110)
GLUCOSE BLD STRIP.AUTO-MCNC: 374 MG/DL (ref 70–110)
GLUCOSE SERPL-MCNC: 104 MG/DL (ref 74–99)
HCT VFR BLD AUTO: 30.2 % (ref 36–48)
HGB BLD-MCNC: 9.4 G/DL (ref 13–16)
LIPASE SERPL-CCNC: 29 U/L (ref 73–393)
MCH RBC QN AUTO: 30.7 PG (ref 24–34)
MCHC RBC AUTO-ENTMCNC: 31.1 G/DL (ref 31–37)
MCV RBC AUTO: 98.7 FL (ref 74–97)
PLATELET # BLD AUTO: 331 K/UL (ref 135–420)
PMV BLD AUTO: 9.4 FL (ref 9.2–11.8)
POTASSIUM SERPL-SCNC: 3.9 MMOL/L (ref 3.5–5.5)
RBC # BLD AUTO: 3.06 M/UL (ref 4.7–5.5)
SODIUM SERPL-SCNC: 138 MMOL/L (ref 136–145)
WBC # BLD AUTO: 7.7 K/UL (ref 4.6–13.2)

## 2018-05-29 PROCEDURE — 74011250636 HC RX REV CODE- 250/636: Performed by: NURSE PRACTITIONER

## 2018-05-29 PROCEDURE — 65270000029 HC RM PRIVATE

## 2018-05-29 PROCEDURE — 85027 COMPLETE CBC AUTOMATED: CPT | Performed by: HOSPITALIST

## 2018-05-29 PROCEDURE — 74011250636 HC RX REV CODE- 250/636: Performed by: HOSPITALIST

## 2018-05-29 PROCEDURE — 74011636637 HC RX REV CODE- 636/637: Performed by: HOSPITALIST

## 2018-05-29 PROCEDURE — 74011000250 HC RX REV CODE- 250: Performed by: FAMILY MEDICINE

## 2018-05-29 PROCEDURE — 80048 BASIC METABOLIC PNL TOTAL CA: CPT | Performed by: HOSPITALIST

## 2018-05-29 PROCEDURE — 83690 ASSAY OF LIPASE: CPT | Performed by: FAMILY MEDICINE

## 2018-05-29 PROCEDURE — 74011636637 HC RX REV CODE- 636/637: Performed by: FAMILY MEDICINE

## 2018-05-29 PROCEDURE — 80170 ASSAY OF GENTAMICIN: CPT | Performed by: HOSPITALIST

## 2018-05-29 PROCEDURE — 74011250637 HC RX REV CODE- 250/637: Performed by: HOSPITALIST

## 2018-05-29 PROCEDURE — 74011250637 HC RX REV CODE- 250/637: Performed by: NURSE PRACTITIONER

## 2018-05-29 PROCEDURE — 82962 GLUCOSE BLOOD TEST: CPT

## 2018-05-29 PROCEDURE — 36415 COLL VENOUS BLD VENIPUNCTURE: CPT | Performed by: HOSPITALIST

## 2018-05-29 PROCEDURE — 74011000258 HC RX REV CODE- 258: Performed by: HOSPITALIST

## 2018-05-29 RX ADMIN — Medication 10 ML: at 21:08

## 2018-05-29 RX ADMIN — Medication 1 MG: at 11:40

## 2018-05-29 RX ADMIN — POTASSIUM CHLORIDE 40 MEQ: 1500 TABLET, EXTENDED RELEASE ORAL at 08:27

## 2018-05-29 RX ADMIN — POTASSIUM CHLORIDE 40 MEQ: 1500 TABLET, EXTENDED RELEASE ORAL at 18:10

## 2018-05-29 RX ADMIN — Medication 10 ML: at 05:16

## 2018-05-29 RX ADMIN — PANCRELIPASE 2 CAPSULE: 10000; 34000; 55000 CAPSULE, DELAYED RELEASE ORAL at 11:39

## 2018-05-29 RX ADMIN — Medication 1 MG: at 08:28

## 2018-05-29 RX ADMIN — Medication 1 MG: at 18:10

## 2018-05-29 RX ADMIN — INSULIN GLARGINE 4 UNITS: 100 INJECTION, SOLUTION SUBCUTANEOUS at 08:35

## 2018-05-29 RX ADMIN — Medication 1 MG: at 00:11

## 2018-05-29 RX ADMIN — FERROUS SULFATE TAB 325 MG (65 MG ELEMENTAL FE) 325 MG: 325 (65 FE) TAB at 08:27

## 2018-05-29 RX ADMIN — HEPARIN SODIUM 5000 UNITS: 5000 INJECTION, SOLUTION INTRAVENOUS; SUBCUTANEOUS at 04:14

## 2018-05-29 RX ADMIN — Medication 10 ML: at 18:10

## 2018-05-29 RX ADMIN — Medication 1 MG: at 14:49

## 2018-05-29 RX ADMIN — INSULIN LISPRO 10 UNITS: 100 INJECTION, SOLUTION INTRAVENOUS; SUBCUTANEOUS at 18:00

## 2018-05-29 RX ADMIN — PANCRELIPASE 2 CAPSULE: 10000; 34000; 55000 CAPSULE, DELAYED RELEASE ORAL at 18:12

## 2018-05-29 RX ADMIN — METRONIDAZOLE 500 MG: 500 INJECTION, SOLUTION INTRAVENOUS at 06:55

## 2018-05-29 RX ADMIN — FERROUS SULFATE TAB 325 MG (65 MG ELEMENTAL FE) 325 MG: 325 (65 FE) TAB at 18:12

## 2018-05-29 RX ADMIN — PROMETHAZINE HYDROCHLORIDE 25 MG: 25 INJECTION INTRAMUSCULAR; INTRAVENOUS at 18:00

## 2018-05-29 RX ADMIN — PROMETHAZINE HYDROCHLORIDE 25 MG: 25 INJECTION INTRAMUSCULAR; INTRAVENOUS at 08:30

## 2018-05-29 RX ADMIN — Medication 300 UNITS: at 04:29

## 2018-05-29 RX ADMIN — HEPARIN SODIUM 5000 UNITS: 5000 INJECTION, SOLUTION INTRAVENOUS; SUBCUTANEOUS at 21:04

## 2018-05-29 RX ADMIN — PROMETHAZINE HYDROCHLORIDE 25 MG: 25 INJECTION INTRAMUSCULAR; INTRAVENOUS at 00:14

## 2018-05-29 RX ADMIN — Medication 1 MG: at 04:14

## 2018-05-29 RX ADMIN — Medication 1 MG: at 21:04

## 2018-05-29 RX ADMIN — FLUCONAZOLE 200 MG: 2 INJECTION, SOLUTION INTRAVENOUS at 10:17

## 2018-05-29 RX ADMIN — INSULIN LISPRO 4 UNITS: 100 INJECTION, SOLUTION INTRAVENOUS; SUBCUTANEOUS at 00:15

## 2018-05-29 RX ADMIN — HEPARIN SODIUM 5000 UNITS: 5000 INJECTION, SOLUTION INTRAVENOUS; SUBCUTANEOUS at 14:47

## 2018-05-29 RX ADMIN — SODIUM CHLORIDE 750 MG: 900 INJECTION, SOLUTION INTRAVENOUS at 05:13

## 2018-05-29 RX ADMIN — PANCRELIPASE 2 CAPSULE: 10000; 34000; 55000 CAPSULE, DELAYED RELEASE ORAL at 08:27

## 2018-05-29 RX ADMIN — AMITRIPTYLINE HYDROCHLORIDE 75 MG: 50 TABLET, FILM COATED ORAL at 21:04

## 2018-05-29 RX ADMIN — Medication 10 ML: at 18:09

## 2018-05-29 NOTE — CONSULTS
Prelim ID Consult (see Dictation [id number] )    Patient: Yesenia Champion CSN: [id number]     YOB: 1965  Age: 46 y.o. 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 -skin around drain irritated from earlier leakage around drain, not cellulitic appearing   -5/23 wd culture ESBL E coli (history of incl 7/2017) E faecalis C glabrata-- suspect colonizers as not septic appearing no abscess afebrile wbc nl drain functioning  ->per Surgery. seen by Dr. Isabela Moon this admission, understand no urgent surgical intervention with drain controlling fistula, but will eventually require major surgical repair procedure, ?  At tertiary center- per Surgery  ->d/w patient concern selection further resistant isolates and toxicity or allergy (already PCN) so stop all abx and monitor off   ->skin protection, drain mgmt per others    h/o recurrent pancreatitis, pseudocysts- h/o EtOH    Chronic abd pain  ->mgmt per IM   IDDM - BS >500 POA ->bs control per IM    Elevated Cr,  1.4 today - range 1.0-2.6 this month apparently, up from yesterday 1.0  -poss multifactorial with DM dehydration contributing but potential for abx contribution also.   ->d/c abx incl vanco and genta and monitor  ->agree with bmp in am   ->per IM        PCN allergy w/ rash recorded      MICROBIOLOGY:   5/22 uctx Kleb 90k Kleb R amp  5/23 Wd ESBL E coli, 2 strains 1 R bactrim both R cipro S GM TM zosyn imipenem, C glabrata    LINES AND CATHETERS:   RUQ drain replaced 5/24  Rick     Thanks -- Dr. Yanni Tillman, Dr. Jamil Thorpe, Dr. Antonetta Brittle, and I will follow with you    JCSchwab, MD  [facility] AT THE [facility]  May 29, 2018  [phone]

## 2018-05-29 NOTE — PROGRESS NOTES
0785 Received report from off going RN. Patient lying in bed. Emptied NATALIE drain of yellowish drainage. Callbell within reach. 8498- Dilaudid 1mg IV given for pain and Phenergan mixed in normal saline given over 15 minutes for nausea  1030- Report given to Nakita Villasenor.

## 2018-05-29 NOTE — PROGRESS NOTES
Problem: Falls - Risk of  Goal: *Absence of Falls  Document Serena Fall Risk and appropriate interventions in the flowsheet. Outcome: Progressing Towards Goal  Fall Risk Interventions:  Mobility Interventions: Patient to call before getting OOB    Mentation Interventions: Door open when patient unattended    Medication Interventions: Patient to call before getting OOB, Teach patient to arise slowly    Elimination Interventions: Call light in reach             Problem: Pressure Injury - Risk of  Goal: *Prevention of pressure injury  Document Austin Scale and appropriate interventions in the flowsheet.    Outcome: Progressing Towards Goal  Pressure Injury Interventions:       Moisture Interventions: Absorbent underpads    Activity Interventions: Pressure redistribution bed/mattress(bed type)    Mobility Interventions: Pressure redistribution bed/mattress (bed type)    Nutrition Interventions: Document food/fluid/supplement intake, Offer support with meals,snacks and hydration    Friction and Shear Interventions: HOB 30 degrees or less

## 2018-05-29 NOTE — ROUTINE PROCESS
Bedside shift change report given to [place] Marie RN (oncoming nurse) by Teresa Tran RN (offgoing nurse). Report included the following information SBAR, Kardex, Intake/Output, MAR and Recent Results.

## 2018-05-29 NOTE — PROGRESS NOTES
Problem: Falls - Risk of  Goal: *Absence of Falls  Document Serena Fall Risk and appropriate interventions in the flowsheet. Outcome: Progressing Towards Goal  Fall Risk Interventions:  Mobility Interventions: Strengthening exercises (ROM-active/passive)    Mentation Interventions: Adequate sleep, hydration, pain control, Door open when patient unattended, Room close to nurse's station    Medication Interventions: Teach patient to arise slowly    Elimination Interventions: Call light in reach, Patient to call for help with toileting needs             Problem: Pressure Injury - Risk of  Goal: *Prevention of pressure injury  Document Austin Scale and appropriate interventions in the flowsheet.    Outcome: Progressing Towards Goal  Pressure Injury Interventions:       Moisture Interventions: Absorbent underpads    Activity Interventions: Pressure redistribution bed/mattress(bed type)    Mobility Interventions: Pressure redistribution bed/mattress (bed type)    Nutrition Interventions: Document food/fluid/supplement intake    Friction and Shear Interventions: HOB 30 degrees or less

## 2018-05-29 NOTE — PROGRESS NOTES
Late Entry from May 25, 2018:  Patient confirms his confirmation of his demographic information.  Hernan Drew RN

## 2018-05-29 NOTE — PROGRESS NOTES
Problem: Falls - Risk of  Goal: *Absence of Falls  Document Serena Fall Risk and appropriate interventions in the flowsheet. Outcome: Progressing Towards Goal  Fall Risk Interventions:  Mobility Interventions: PT Consult for assist device competence, Strengthening exercises (ROM-active/passive)    Mentation Interventions: Door open when patient unattended, Room close to nurse's station    Medication Interventions: Teach patient to arise slowly    Elimination Interventions: Call light in reach, Patient to call for help with toileting needs             Problem: Pressure Injury - Risk of  Goal: *Prevention of pressure injury  Document Austin Scale and appropriate interventions in the flowsheet.    Outcome: Progressing Towards Goal  Pressure Injury Interventions:       Moisture Interventions: Absorbent underpads    Activity Interventions: Pressure redistribution bed/mattress(bed type)    Mobility Interventions: Pressure redistribution bed/mattress (bed type)    Nutrition Interventions: Document food/fluid/supplement intake    Friction and Shear Interventions: HOB 30 degrees or less

## 2018-05-29 NOTE — ROUTINE PROCESS
1945  Bedside and Verbal shift change report given to Caridad Thompson (oncoming nurse) by Navid Mccormack (offgoing nurse). Report included the following information SBAR, Kardex, Intake/Output and MAR. Pt awake and alert at this time    2030  Shift assessment complete and due meds given. Pt c/o pain 9/10, PRN dilaudid given. No further needs at this time. Call bell within reach    0230  Reassessment complete. No change in pt condition    0715  Bedside and Verbal shift change report given to James Polanco (oncoming nurse) by Caridad Thompson (offgoing nurse). Report included the following information SBAR, Kardex, Intake/Output and MAR.

## 2018-05-29 NOTE — PROGRESS NOTES
Progress Note      Patient: Jonathan Melissa               Sex: male          DOA: 5/22/2018       YOB: 1965      Age:  46 y.o.        LOS:  LOS: 7 days               Subjective:   Jonathan Melissa is a 46 y.o. male  who presents with Pancreatitis and pancreaticoduodenal fistula with drainage problem. S/p NATALIE drain placement. Objective:      Visit Vitals    /67    Pulse 91    Temp 97.6 °F (36.4 °C)    Resp 18    Ht 6' (1.829 m)    Wt 61.3 kg (135 lb 2.3 oz)    SpO2 100%    BMI 18.33 kg/m2         Physical Exam:  General appearance: alert, cooperative, no distress, appears stated age  Head: Normocephalic, without obvious abnormality, atraumatic  Lungs: clear to auscultation bilaterally  Heart: regular rate and rhythm, S1, S2 normal, no murmur, click, rub or gallop  Abdomen: soft, non tender, non distended. Normoactive bowel sounds. NATALIE drain right quad intact, note drainage surrounding insertion site of drain  Extremities: extremities normal, atraumatic, no cyanosis or edema  Skin: Skin color, texture, turgor normal. No rashes or lesions  Neurologic: Grossly normal  PSY: Mood and affect normal, appropriately behaved    Intake and Output:  Current Shift:  05/29 0701 - 05/29 1900  In: 240 [P.O.:240]  Out: -   Last three shifts:  05/27 1901 - 05/29 0700  In: 3163 [P.O.:240; I.V.:4300]  Out: 7010 [Urine:1600; Drains:5410]    Recent Results (from the past 48 hour(s))   CBC WITH AUTOMATED DIFF    Collection Time: 05/27/18 12:40 PM   Result Value Ref Range    WBC 5.2 4.6 - 13.2 K/uL    RBC 3.08 (L) 4.70 - 5.50 M/uL    HGB 9.4 (L) 13.0 - 16.0 g/dL    HCT 29.7 (L) 36.0 - 48.0 %    MCV 96.4 74.0 - 97.0 FL    MCH 30.5 24.0 - 34.0 PG    MCHC 31.6 31.0 - 37.0 g/dL    RDW 14.6 (H) 11.6 - 14.5 %    PLATELET 211 341 - 203 K/uL    MPV 9.8 9.2 - 11.8 FL    NEUTROPHILS 65 40 - 73 %    LYMPHOCYTES 20 (L) 21 - 52 %    MONOCYTES 11 (H) 3 - 10 %    EOSINOPHILS 4 0 - 5 %    BASOPHILS 0 0 - 2 %    ABS. NEUTROPHILS 3.4 1.8 - 8.0 K/UL    ABS. LYMPHOCYTES 1.0 0.9 - 3.6 K/UL    ABS. MONOCYTES 0.6 0.05 - 1.2 K/UL    ABS. EOSINOPHILS 0.2 0.0 - 0.4 K/UL    ABS. BASOPHILS 0.0 0.0 - 0.06 K/UL    DF AUTOMATED     METABOLIC PANEL, COMPREHENSIVE    Collection Time: 05/27/18 12:40 PM   Result Value Ref Range    Sodium 142 136 - 145 mmol/L    Potassium 3.5 3.5 - 5.5 mmol/L    Chloride 108 100 - 108 mmol/L    CO2 25 21 - 32 mmol/L    Anion gap 9 3.0 - 18 mmol/L    Glucose 169 (H) 74 - 99 mg/dL    BUN 10 7.0 - 18 MG/DL    Creatinine 1.19 0.6 - 1.3 MG/DL    BUN/Creatinine ratio 8 (L) 12 - 20      GFR est AA >60 >60 ml/min/1.73m2    GFR est non-AA >60 >60 ml/min/1.73m2    Calcium 7.9 (L) 8.5 - 10.1 MG/DL    Bilirubin, total 0.3 0.2 - 1.0 MG/DL    ALT (SGPT) 20 16 - 61 U/L    AST (SGOT) 20 15 - 37 U/L    Alk.  phosphatase 146 (H) 45 - 117 U/L    Protein, total 6.7 6.4 - 8.2 g/dL    Albumin 2.5 (L) 3.4 - 5.0 g/dL    Globulin 4.2 (H) 2.0 - 4.0 g/dL    A-G Ratio 0.6 (L) 0.8 - 1.7     VANCOMYCIN, TROUGH    Collection Time: 05/27/18 12:40 PM   Result Value Ref Range    Vancomycin,trough 23.1 (HH) 10.0 - 20.0 ug/mL    Reported dose date: [id number]      Reported dose time: 2200      Reported dose: 1000MG UNITS   GLUCOSE, POC    Collection Time: 05/27/18  3:18 PM   Result Value Ref Range    Glucose (POC) 234 (H) 70 - 110 mg/dL   GLUCOSE, POC    Collection Time: 05/27/18  5:54 PM   Result Value Ref Range    Glucose (POC) 81 70 - 110 mg/dL   GLUCOSE, POC    Collection Time: 05/28/18 12:31 AM   Result Value Ref Range    Glucose (POC) 367 (H) 70 - 110 mg/dL   GLUCOSE, POC    Collection Time: 05/28/18  3:40 AM   Result Value Ref Range    Glucose (POC) 79 70 - 110 mg/dL   CBC WITH AUTOMATED DIFF    Collection Time: 05/28/18  4:25 AM   Result Value Ref Range    WBC 5.8 4.6 - 13.2 K/uL    RBC 2.64 (L) 4.70 - 5.50 M/uL    HGB 8.1 (L) 13.0 - 16.0 g/dL    HCT 25.8 (L) 36.0 - 48.0 %    MCV 97.7 (H) 74.0 - 97.0 FL    MCH 30.7 24.0 - 34.0 PG    MCHC 31.4 31.0 - 37.0 g/dL    RDW 15.0 (H) 11.6 - 14.5 %    PLATELET 833 844 - 394 K/uL    MPV 10.0 9.2 - 11.8 FL    NEUTROPHILS 64 40 - 73 %    LYMPHOCYTES 19 (L) 21 - 52 %    MONOCYTES 12 (H) 3 - 10 %    EOSINOPHILS 4 0 - 5 %    BASOPHILS 1 0 - 2 %    ABS. NEUTROPHILS 3.7 1.8 - 8.0 K/UL    ABS. LYMPHOCYTES 1.1 0.9 - 3.6 K/UL    ABS. MONOCYTES 0.7 0.05 - 1.2 K/UL    ABS. EOSINOPHILS 0.2 0.0 - 0.4 K/UL    ABS. BASOPHILS 0.0 0.0 - 0.06 K/UL    DF AUTOMATED     METABOLIC PANEL, COMPREHENSIVE    Collection Time: 05/28/18  4:25 AM   Result Value Ref Range    Sodium 145 136 - 145 mmol/L    Potassium 3.7 3.5 - 5.5 mmol/L    Chloride 105 100 - 108 mmol/L    CO2 31 21 - 32 mmol/L    Anion gap 9 3.0 - 18 mmol/L    Glucose 75 74 - 99 mg/dL    BUN 11 7.0 - 18 MG/DL    Creatinine 1.02 0.6 - 1.3 MG/DL    BUN/Creatinine ratio 11 (L) 12 - 20      GFR est AA >60 >60 ml/min/1.73m2    GFR est non-AA >60 >60 ml/min/1.73m2    Calcium 7.8 (L) 8.5 - 10.1 MG/DL    Bilirubin, total 0.3 0.2 - 1.0 MG/DL    ALT (SGPT) 18 16 - 61 U/L    AST (SGOT) 21 15 - 37 U/L    Alk.  phosphatase 133 (H) 45 - 117 U/L    Protein, total 5.7 (L) 6.4 - 8.2 g/dL    Albumin 2.4 (L) 3.4 - 5.0 g/dL    Globulin 3.3 2.0 - 4.0 g/dL    A-G Ratio 0.7 (L) 0.8 - 1.7     GLUCOSE, POC    Collection Time: 05/28/18  5:56 AM   Result Value Ref Range    Glucose (POC) 47 (LL) 70 - 110 mg/dL   GLUCOSE, POC    Collection Time: 05/28/18  6:24 AM   Result Value Ref Range    Glucose (POC) 76 70 - 110 mg/dL   GLUCOSE, POC    Collection Time: 05/28/18  7:01 AM   Result Value Ref Range    Glucose (POC) 167 (H) 70 - 110 mg/dL   GLUCOSE, POC    Collection Time: 05/28/18  7:28 AM   Result Value Ref Range    Glucose (POC) 226 (H) 70 - 110 mg/dL   GENTAMICIN, RANDOM    Collection Time: 05/28/18  8:15 AM   Result Value Ref Range    Gentamicin,random 4.6 0.5 - 10 ug/ml   GLUCOSE, POC    Collection Time: 05/28/18 11:13 AM   Result Value Ref Range    Glucose (POC) 116 (H) 70 - 110 mg/dL   GLUCOSE, POC Collection Time: 05/28/18  5:01 PM   Result Value Ref Range    Glucose (POC) 216 (H) 70 - 110 mg/dL   GLUCOSE, POC    Collection Time: 05/29/18 12:03 AM   Result Value Ref Range    Glucose (POC) 202 (H) 70 - 756 mg/dL   METABOLIC PANEL, BASIC    Collection Time: 05/29/18  6:45 AM   Result Value Ref Range    Sodium 138 136 - 145 mmol/L    Potassium 3.9 3.5 - 5.5 mmol/L    Chloride 103 100 - 108 mmol/L    CO2 29 21 - 32 mmol/L    Anion gap 6 3.0 - 18 mmol/L    Glucose 104 (H) 74 - 99 mg/dL    BUN 14 7.0 - 18 MG/DL    Creatinine 1.37 (H) 0.6 - 1.3 MG/DL    BUN/Creatinine ratio 10 (L) 12 - 20      GFR est AA >60 >60 ml/min/1.73m2    GFR est non-AA 55 (L) >60 ml/min/1.73m2    Calcium 7.9 (L) 8.5 - 10.1 MG/DL   CBC W/O DIFF    Collection Time: 05/29/18  6:45 AM   Result Value Ref Range    WBC 7.7 4.6 - 13.2 K/uL    RBC 3.06 (L) 4.70 - 5.50 M/uL    HGB 9.4 (L) 13.0 - 16.0 g/dL    HCT 30.2 (L) 36.0 - 48.0 %    MCV 98.7 (H) 74.0 - 97.0 FL    MCH 30.7 24.0 - 34.0 PG    MCHC 31.1 31.0 - 37.0 g/dL    RDW 14.5 11.6 - 14.5 %    PLATELET 871 972 - 582 K/uL    MPV 9.4 9.2 - 11.8 FL   GLUCOSE, POC    Collection Time: 05/29/18  6:53 AM   Result Value Ref Range    Glucose (POC) 110 70 - 110 mg/dL   GLUCOSE, POC    Collection Time: 05/29/18  8:02 AM   Result Value Ref Range    Glucose (POC) 144 (H) 70 - 110 mg/dL         XRays were reviewed in past 24 hours    Medications Reviewed      Continued hospitalization is indicated due to ESBL wound infection and UTI.       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)    Active Problems:    Chronic pancreatitis (HCC) ()      Overview: Continue PPI IV      IDDM (insulin dependent diabetes mellitus) ([facility] (8/31/2013)      Abdominal pain (11/16/2015)      Moderate protein-calorie malnutrition ([facility] (11/21/2015)      H/O ETOH abuse (3/23/2016)      Dehydration (8/5/2017)      Vomiting (8/5/2017)      Acute alcoholic hepatitis (8/02/7352)      Chronic pancreatitis due to acute alcohol intoxication ([facility] (4/15/2018)      Hyperglycemia (5/22/2018)      CHRISTI (acute kidney injury) ([facility] (5/22/2018)      UTI (urinary tract infection) (5/24/2018)        A/P:  Pancreaticoduodenal fistula  - IR replaced drain on 5/24/2018, recommend repeat imaging prior to d/c  - surgery available as needed, will eventually require major surgical repair procedure in the future hopefully in a tertiary medical facility  - continue IV abx, drain care  - ID consulted Today     Abdominal Pain  - intra-abdominal infection   - pain control  - wound culture 5/23/2018 with ESBL, ESBL 2nd morphotype, moderate enterococcus faecalis group D, few candida glabrata  - continue IV Gentamycin, Vancomycin, IV Diflucan  - ID consult in am     UTI  - UA 5/22/2018 few bacteria, few yeast, trace leukocyte esteras  - urine culture 90,000 colonies Klebsiella Pneumonia  - continue IV Gentamycin     Diabetes  - correctional SSI, Lantus 4 units daily     Chronic pancreatitis, hx of ETOH abuse  - continue Zenpep  - IVF's, clear liquid diet  - CIWA protocol     DVT Prophylaxis  - Heparin subcutaneously TID, SCD's BLE     Junior Gamez MD  May 29, 2018

## 2018-05-29 NOTE — PROGRESS NOTES
completed follow up visit with patient in room 3025 and a Spiritual assessment of patient was done. Patient says he is doing better just still a bit sore form the surgery. . Chaplains will continue to follow and will provide pastoral care on an as needed/requested basis    Chaplain Alex Will   Board Certified [address] [place] [address]   [phone]

## 2018-05-29 NOTE — MANAGEMENT PLAN
Discharge/Transition Planning    1250: Spoke with patient. He stated he was not living at address listed, but at his Mom's house at [facility] by the [facility] and phone number is [phone] is his cell phone. Notified him his Medicaid  had been trying to get a hold of him and he may lose insurance cause they have been mailing things and calling with no answer. He stated she can call him. He states he no longer is under guardianship as of approx 1 month ago. 1430: Milady  called and she was updated. She wanted a care conference on Thursday at [address] [address] with myself and her supervisor for safe d/c plan. She stated he keeps giving different addresses at different places. Gave her pt room # to speak with patient  [phone]: Notified pt CM would be calling. He now states he has been staying at [address] [facility] on [address] with mother. Gave number to [facility] at [phone]. He states they are only staying for another week and moving with his sister, Dee Hyman in [place]. He does not have address. Asked if he wanted to go back to a facility and he said NO. He can manage it all himself. Pt started appearing quite agitated and phone rang, present CM left room.         Malcolm Sun RN BSN  Outcomes Manager  Pager # [phone]

## 2018-05-29 NOTE — INTERDISCIPLINARY ROUNDS
IDR Summary      Patient: Edwar Meyers MRN: [id number]    Age: 46 y.o.  : 1965     Admit Diagnosis: Hyperglycemia      Problems pertinent to hospital stay:     Consults:Case Management     Testing due for patient today? NO    Nutrition plan:Yes     Mobility needs: Yes      Lines/Tubes:   IV: YES   Needed: YES  Keita: NO  Needed:NO  Central Line: YES Needed: YES      VTE Prophylaxis: Mechanical      Care Management:  Discharge plan:    PCP: Peter Villalpando MD    : NO  Financial concerns:No   Interventions:       LOS: 7 days     Expected days until discharge: TBD       Signed:      ODALIS Quintanilla [facility]  Hospitalist Division  Pager:  [phone]  Office:  [phone]

## 2018-05-30 LAB
ANION GAP SERPL CALC-SCNC: 5 MMOL/L (ref 3–18)
BUN SERPL-MCNC: 13 MG/DL (ref 7–18)
BUN/CREAT SERPL: 12 (ref 12–20)
CALCIUM SERPL-MCNC: 7.8 MG/DL (ref 8.5–10.1)
CHLORIDE SERPL-SCNC: 106 MMOL/L (ref 100–108)
CO2 SERPL-SCNC: 28 MMOL/L (ref 21–32)
CREAT SERPL-MCNC: 1.11 MG/DL (ref 0.6–1.3)
ERYTHROCYTE [DISTWIDTH] IN BLOOD BY AUTOMATED COUNT: 14.6 % (ref 11.6–14.5)
GLUCOSE BLD STRIP.AUTO-MCNC: 124 MG/DL (ref 70–110)
GLUCOSE BLD STRIP.AUTO-MCNC: 225 MG/DL (ref 70–110)
GLUCOSE BLD STRIP.AUTO-MCNC: 232 MG/DL (ref 70–110)
GLUCOSE BLD STRIP.AUTO-MCNC: 343 MG/DL (ref 70–110)
GLUCOSE BLD STRIP.AUTO-MCNC: 61 MG/DL (ref 70–110)
GLUCOSE BLD STRIP.AUTO-MCNC: 99 MG/DL (ref 70–110)
GLUCOSE SERPL-MCNC: 69 MG/DL (ref 74–99)
HCT VFR BLD AUTO: 27 % (ref 36–48)
HGB BLD-MCNC: 8.4 G/DL (ref 13–16)
MCH RBC QN AUTO: 30.4 PG (ref 24–34)
MCHC RBC AUTO-ENTMCNC: 31.1 G/DL (ref 31–37)
MCV RBC AUTO: 97.8 FL (ref 74–97)
PLATELET # BLD AUTO: 271 K/UL (ref 135–420)
PMV BLD AUTO: 9.6 FL (ref 9.2–11.8)
POTASSIUM SERPL-SCNC: 4.5 MMOL/L (ref 3.5–5.5)
RBC # BLD AUTO: 2.76 M/UL (ref 4.7–5.5)
SODIUM SERPL-SCNC: 139 MMOL/L (ref 136–145)
WBC # BLD AUTO: 6.2 K/UL (ref 4.6–13.2)

## 2018-05-30 PROCEDURE — 74011250636 HC RX REV CODE- 250/636: Performed by: HOSPITALIST

## 2018-05-30 PROCEDURE — 74011250637 HC RX REV CODE- 250/637: Performed by: HOSPITALIST

## 2018-05-30 PROCEDURE — 74011250637 HC RX REV CODE- 250/637: Performed by: FAMILY MEDICINE

## 2018-05-30 PROCEDURE — 74011000258 HC RX REV CODE- 258: Performed by: FAMILY MEDICINE

## 2018-05-30 PROCEDURE — 74011636637 HC RX REV CODE- 636/637: Performed by: FAMILY MEDICINE

## 2018-05-30 PROCEDURE — 74011250636 HC RX REV CODE- 250/636: Performed by: NURSE PRACTITIONER

## 2018-05-30 PROCEDURE — 85027 COMPLETE CBC AUTOMATED: CPT | Performed by: HOSPITALIST

## 2018-05-30 PROCEDURE — 82962 GLUCOSE BLOOD TEST: CPT

## 2018-05-30 PROCEDURE — 65270000029 HC RM PRIVATE

## 2018-05-30 PROCEDURE — 74011636637 HC RX REV CODE- 636/637: Performed by: HOSPITALIST

## 2018-05-30 PROCEDURE — 74011000258 HC RX REV CODE- 258: Performed by: HOSPITALIST

## 2018-05-30 PROCEDURE — 74011250637 HC RX REV CODE- 250/637: Performed by: NURSE PRACTITIONER

## 2018-05-30 PROCEDURE — 36415 COLL VENOUS BLD VENIPUNCTURE: CPT | Performed by: HOSPITALIST

## 2018-05-30 PROCEDURE — 80048 BASIC METABOLIC PNL TOTAL CA: CPT | Performed by: HOSPITALIST

## 2018-05-30 RX ORDER — INSULIN LISPRO 100 [IU]/ML
INJECTION, SOLUTION INTRAVENOUS; SUBCUTANEOUS
Status: DISCONTINUED | OUTPATIENT
Start: 2018-05-30 | End: 2018-05-31

## 2018-05-30 RX ADMIN — INSULIN LISPRO 4 UNITS: 100 INJECTION, SOLUTION INTRAVENOUS; SUBCUTANEOUS at 00:10

## 2018-05-30 RX ADMIN — FERROUS SULFATE TAB 325 MG (65 MG ELEMENTAL FE) 325 MG: 325 (65 FE) TAB at 09:49

## 2018-05-30 RX ADMIN — PANCRELIPASE 2 CAPSULE: 10000; 34000; 55000 CAPSULE, DELAYED RELEASE ORAL at 09:48

## 2018-05-30 RX ADMIN — ONDANSETRON 4 MG: 2 INJECTION INTRAMUSCULAR; INTRAVENOUS at 18:21

## 2018-05-30 RX ADMIN — AMITRIPTYLINE HYDROCHLORIDE 75 MG: 50 TABLET, FILM COATED ORAL at 21:20

## 2018-05-30 RX ADMIN — PROMETHAZINE HYDROCHLORIDE 25 MG: 25 INJECTION INTRAMUSCULAR; INTRAVENOUS at 23:39

## 2018-05-30 RX ADMIN — Medication 1 MG: at 00:10

## 2018-05-30 RX ADMIN — PROMETHAZINE HYDROCHLORIDE 25 MG: 25 INJECTION INTRAMUSCULAR; INTRAVENOUS at 06:32

## 2018-05-30 RX ADMIN — PANCRELIPASE 2 CAPSULE: 10000; 34000; 55000 CAPSULE, DELAYED RELEASE ORAL at 12:25

## 2018-05-30 RX ADMIN — HEPARIN SODIUM 5000 UNITS: 5000 INJECTION, SOLUTION INTRAVENOUS; SUBCUTANEOUS at 12:24

## 2018-05-30 RX ADMIN — Medication 1 MG: at 18:22

## 2018-05-30 RX ADMIN — Medication 10 ML: at 18:22

## 2018-05-30 RX ADMIN — Medication 1 MG: at 09:49

## 2018-05-30 RX ADMIN — POTASSIUM CHLORIDE 40 MEQ: 1500 TABLET, EXTENDED RELEASE ORAL at 09:48

## 2018-05-30 RX ADMIN — Medication 10 ML: at 06:32

## 2018-05-30 RX ADMIN — POTASSIUM CHLORIDE 40 MEQ: 1500 TABLET, EXTENDED RELEASE ORAL at 18:21

## 2018-05-30 RX ADMIN — FERROUS SULFATE TAB 325 MG (65 MG ELEMENTAL FE) 325 MG: 325 (65 FE) TAB at 18:21

## 2018-05-30 RX ADMIN — Medication 1 MG: at 06:31

## 2018-05-30 RX ADMIN — Medication 10 ML: at 21:21

## 2018-05-30 RX ADMIN — HEPARIN SODIUM 5000 UNITS: 5000 INJECTION, SOLUTION INTRAVENOUS; SUBCUTANEOUS at 21:20

## 2018-05-30 RX ADMIN — PANCRELIPASE 2 CAPSULE: 10000; 34000; 55000 CAPSULE, DELAYED RELEASE ORAL at 18:21

## 2018-05-30 RX ADMIN — HEPARIN SODIUM 5000 UNITS: 5000 INJECTION, SOLUTION INTRAVENOUS; SUBCUTANEOUS at 06:33

## 2018-05-30 RX ADMIN — HYDROCODONE BITARTRATE AND ACETAMINOPHEN 1 TABLET: 5; 325 TABLET ORAL at 01:23

## 2018-05-30 RX ADMIN — DEXTROSE MONOHYDRATE AND SODIUM CHLORIDE 75 ML/HR: 5; .45 INJECTION, SOLUTION INTRAVENOUS at 22:24

## 2018-05-30 RX ADMIN — INSULIN GLARGINE 4 UNITS: 100 INJECTION, SOLUTION SUBCUTANEOUS at 09:00

## 2018-05-30 RX ADMIN — INSULIN LISPRO 8 UNITS: 100 INJECTION, SOLUTION INTRAVENOUS; SUBCUTANEOUS at 18:21

## 2018-05-30 RX ADMIN — INSULIN LISPRO 4 UNITS: 100 INJECTION, SOLUTION INTRAVENOUS; SUBCUTANEOUS at 12:25

## 2018-05-30 RX ADMIN — Medication 1 MG: at 03:30

## 2018-05-30 RX ADMIN — HYDROCODONE BITARTRATE AND ACETAMINOPHEN 1 TABLET: 5; 325 TABLET ORAL at 14:26

## 2018-05-30 RX ADMIN — Medication 1 MG: at 21:20

## 2018-05-30 RX ADMIN — Medication 1 MG: at 12:24

## 2018-05-30 RX ADMIN — Medication 1 MG: at 15:32

## 2018-05-30 NOTE — ROUTINE PROCESS
1920  Bedside and Verbal shift change report given to Brody Tariq (oncoming nurse) by Violeta Wells (offgoing nurse). Report included the following information SBAR, Kardex, Intake/Output and MAR. Pt awake and alert at this time. 2030  Shift assessment complete. 0200  Reassessment complete. No change in pt condition    Bedside and Verbal shift change report given to Joan Valverde (oncoming nurse) by Brody Tariq (offgoing nurse). Report included the following information SBAR, Kardex, Intake/Output and MAR.

## 2018-05-30 NOTE — INTERDISCIPLINARY ROUNDS
IDR Summary      Patient: Edwar Meyers MRN: [id number]    Age: 46 y.o.  : 1965     Admit Diagnosis: Hyperglycemia      Problems pertinent to hospital stay:     Consults:Case Management     Testing due for patient today? NO    Nutrition plan:Yes     Mobility needs: Yes      Lines/Tubes:   IV: YES   Needed: YES  Keita: NO  Needed:NO  Central Line: YES Needed: YES      VTE Prophylaxis: Mechanical      Care Management:  Discharge plan:    PCP: Peter Villalpando MD    : NO  Financial concerns:No   Interventions:       LOS: 8 days     Expected days until discharge: TBD       Signed:      AIMEE QuintanillaC  [facility]  Hospitalist Division  Pager:  [phone]  Office:  [phone]

## 2018-05-30 NOTE — PROGRESS NOTES
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 -skin around drain irritated from earlier leakage around drain, not cellulitic appearing   -5/23 wd culture ESBL E coli (history of incl 7/2017) E faecalis C glabrata-- suspect colonizers as not septic appearing no abscess afebrile wbc nl drain functioning  ->monitor off abx -- ID wise ok for discharge when ok with all.    ->mgmt plan per Surgery.   Seen by Dr. Fariba Hathaway this admission, understand no urgent surgical intervention with drain controlling fistula, but will eventually require major surgical repair procedure  ->d/w patient concern selection further resistant isolates and toxicity or allergy (already PCN)   ->skin protection, drain mgmt per others    h/o recurrent pancreatitis, pseudocysts- h/o EtOH    Chronic abd pain  ->mgmt per IM   IDDM - BS >500 POA ->bs control per IM    Elevated Cr,  1.4->1.1 today - range 1.0-2.6 this month apparently, up from yesterday 1.0  -poss multifactorial with DM dehydration contributing but risk for abx contribution also.   ->monitor off abx and per IM        PCN allergy w/ rash recorded      MICROBIOLOGY:   5/22 uctx Kleb 90k Kleb R amp  5/23 Wd ESBL E coli, 2 strains 1 R bactrim both R cipro S GM TM zosyn imipenem, C glabrata    LINES AND CATHETERS:   RUQ drain replaced 5/24 23 Vandana Mccauley Problems    Diagnosis Date Noted    UTI (urinary tract infection) 05/24/2018    Hyperglycemia 05/22/2018  CHRISTI (acute kidney injury) ([facility] 05/22/2018    Acute alcoholic hepatitis 32/55/7405    Chronic pancreatitis due to acute alcohol intoxication ([facility] 04/15/2018    Dehydration 08/05/2017    Vomiting 08/05/2017    H/O ETOH abuse 03/23/2016    Duodenal fistula 03/23/2016    Moderate protein-calorie malnutrition ([facility] 11/21/2015    Abdominal pain 11/16/2015    IDDM (insulin dependent diabetes mellitus) ([facility] 08/31/2013    Chronic pancreatitis (HCC)      Continue PPI IV           Subjective: Interval notes reviewed. Pt says appetite better off abx (flagyl, etc), cleaned up says looks good outside but insides still messed up, d/w him understand surgery indicates eventual need of OR, but timing etc deferred to them along with drain mgmt. No fever no rash d/w him Cr better today, off vanco, genta.       Current Facility-Administered Medications   Medication Dose Route Frequency    insulin glargine (LANTUS) injection 4 Units  4 Units SubCUTAneous DAILY    heparin (porcine) pf 300 Units  300 Units InterCATHeter PRN    promethazine (PHENERGAN) 25 mg in 0.9% sodium chloride 50 mL IVPB  25 mg IntraVENous Q6H PRN    Or    promethazine (PHENERGAN) injection 25 mg  25 mg IntraMUSCular Q6H PRN    potassium chloride (K-DUR, KLOR-CON) SR tablet 40 mEq  40 mEq Oral BID    HYDROmorphone (DILAUDID) injection 1 mg  1 mg IntraVENous Q3H PRN    HYDROcodone-acetaminophen (NORCO) 5-325 mg per tablet 1 Tab  1 Tab Oral Q6H PRN    insulin lispro (HUMALOG) injection   SubCUTAneous Q6H    glucose chewable tablet 16 g  4 Tab Oral PRN    glucagon (GLUCAGEN) injection 1 mg  1 mg IntraMUSCular PRN    dextrose (D50W) injection syrg 12.5-25 g  25-50 mL IntraVENous PRN    amitriptyline (ELAVIL) tablet 75 mg  75 mg Oral QHS    ferrous sulfate tablet 325 mg  325 mg Oral BID    lipase-protease-amylase (ZENPEP 10,000) capsule 2 Cap  2 Cap Oral TID WITH MEALS    sodium chloride (NS) flush 5-10 mL  5-10 mL IntraVENous Q8H    sodium chloride (NS) flush 5-10 mL  5-10 mL IntraVENous PRN    heparin (porcine) injection 5,000 Units  5,000 Units SubCUTAneous Q8H    acetaminophen (TYLENOL) tablet 650 mg  650 mg Oral Q6H PRN    ondansetron (ZOFRAN) injection 4 mg  4 mg IntraVENous Q4H PRN    sodium chloride (NS) flush 5-10 mL  5-10 mL IntraVENous Q8H    sodium chloride (NS) flush 5-10 mL  5-10 mL IntraVENous PRN    LORazepam (ATIVAN) tablet 1 mg  1 mg Oral Q1H PRN    Or    LORazepam (ATIVAN) injection 1 mg  1 mg IntraVENous Q1H PRN    LORazepam (ATIVAN) tablet 2 mg  2 mg Oral Q1H PRN    Or    LORazepam (ATIVAN) injection 2 mg  2 mg IntraVENous Q1H PRN    LORazepam (ATIVAN) injection 3 mg  3 mg IntraVENous Q15MIN PRN    dextrose 5 % - 0.45% NaCl infusion  75 mL/hr IntraVENous CONTINUOUS    heparin (porcine) pf 500 Units  500 Units InterCATHeter PRN         Objective:     Visit Vitals    /69 (BP 1 Location: Right arm, BP Patient Position: At rest)    Pulse 88    Temp 98.9 °F (37.2 °C)    Resp 18    Ht 6' (1.829 m)    Wt 61.3 kg (135 lb 2.3 oz)    SpO2 100%    BMI 18.33 kg/m2       Temp (24hrs), Av.1 °F (36.7 °C), Min:97.8 °F (36.6 °C), Max:98.9 °F (37.2 °C)    GEN: pleasant male appears stated age sitting up in bed NAD  HEENT: anicteric  NECK: L chest mediport accessed  CHEST: no rales rhonchi or wheeze  CVS: RRR no murmurs  ABD: non-distended guarding epigastrium unchanged, R lat abd drain bilious colored fluid  EXT: no edema   NEURO: awake alert cooperative     Labs: Results:   Chemistry Recent Labs      18   0451  18   0645  18   0425  18   1240   GLU  69*  104*  75  169*   NA  139  138  145  142   K  4.5  3.9  3.7  3.5   CL  106  103  105  108   CO2  28  29  31  25   BUN  13  14  11  10   CREA  1.11  1.37*  1.02  1.19   CA  7.8*  7.9*  7.8*  7.9*   AGAP  5  6  9  9   BUCR  12  10*  11*  8*   AP   --    --   133*  146*   TP   --    --   5.7*  6.7   ALB   --    --   2.4*  2.5*   GLOB   -- --   3.3  4.2*   AGRAT   --    --   0.7*  0.6*      CBC w/Diff Recent Labs      05/30/18   0451  05/29/18   0645  05/28/18   0425  05/27/18   1240   WBC  6.2  7.7  5.8  5.2   RBC  2.76*  3.06*  2.64*  3.08*   HGB  8.4*  9.4*  8.1*  9.4*   HCT  27.0*  30.2*  25.8*  29.7*   PLT  271  331  243  228   GRANS   --    --   64  65   LYMPH   --    --   19*  20*   EOS   --    --   4  4            Microbiology Results  No results for input(s): SDES, CULT in the last 72 hours.     Vj Braswell MD  May 30, 2018  [facility] AT THE [facility]  [phone]

## 2018-05-30 NOTE — PROGRESS NOTES
Received patient approximately 1100. No signs of distress. Ordered meds given throughout day shift. Unremarkable day. Bedside shift change report given to EVERTON Velasco (oncoming nurse) by Hakeem Melo (offgoing nurse). Report included the following information SBAR.

## 2018-05-30 NOTE — PROGRESS NOTES
0125--Norco 1 tab administered for pain in lower abdomen. Pt rated pain 8 out 10. Will continue to monitor    0331--Medicated for pain.  Will monitor

## 2018-05-30 NOTE — ROUTINE PROCESS
2862- Assumed care. Pt in bed awake. NAD.     0945- Due meds given. Tolerated well. Pt is alert and oriented x 4. C/o of abdominal pain 9/10. Prn pain meds given. No respiratory distress noted. Call light in reach. 1100- Report given to [employer].

## 2018-05-30 NOTE — MANAGEMENT PLAN
Discharge/Transition Planning    1000:  Called and left voicemail for Holden Mcgraw to return present CM call regarding whether [facility] still has guardianship over pt.  1330: Spoke with [facility]. They released guardianship on April 13th. Last she was aware, pt was living with mother, Bishnu Antonio in [facility] the last 3-4 months. Mother had been living at [place] [address] and was discharged due to refusal to pay. Appears pt and mother are motel, \"hopping\". There is no consistent address or phone number in which to track pt. [facility] also expresses pt has strong addiction and seeks IV dilaudid as much as possible.  Would be beneficial for pt to give at least a family member address and phone number in order for communication from his insurance and for follow up appointments with physicians      Elaine Silva RN BSN  Outcomes Manager  Pager # [phone]

## 2018-05-30 NOTE — PROGRESS NOTES
NUTRITION follow-up/Plan of care    RECOMMENDATIONS:     1. GI Soft diet; No concentrated sweets  2. Monitor weight, labs and PO intake  3. RD to follow    GOALS:     1. Met/Ongoing: PO intake meets >75% of protein/calorie needs by 6/4  2. Ongoing: Gradual weight gain (1-2 lb by 6/6)    ASSESSMENT:     Weight status is classified as underweight per BMI of 18.3. Patient with 7% weight loss x past 2 months and 17.7% weight loss over past 10 months. Tolerating diet and with improved intake. Labs noted. BG range from  over past 24 hours. Nutrition recommendations listed. RD to follow. Nutrition Risk:  []   High [x]  Moderate [] Low    SUBJECTIVE/OBJECTIVE:     Pt known from previous multiple hospital admissions for acute on chronic pancreatitis, abdominal pain, elevated glucose/DKA with PMHx of  pancreaticoduodenal cutaneous fistula, non-compliance and drug seeking issues.  Pt presented to ED with c/o nausea, vomiting and abdominal pain x 3 days and hyperglycemia. S/P IR drain placement on 5/24.     5/24/18:  Pt reported he has lost \"tons of weight\" in past year. He reports his usual wt is 180 lbs and he last weight this amount about 1 year ago. He is allergic to shrimp. He lives with his mother who prepares meals. Pt insists he is taking his Creon with meals. 5/25/18:  Pt reports he is felling much better today. 5/30/18: Patient reports improved appetite and happy diet has been advanced. Observed 75% intake of lunch tray. Patient not drinking Ensure Clear supplements and asked for them to be discontinued. Complains of still having abdominal pain and nausea but is controlled with medication. Encouraged adequate intake. Will monitor.     Information Obtained From:   [x] Chart Review  [x] Patient  [] Family/Caregiver  [] Nurse/Physician   [] Patient Rounds/Interdisciplinary Meeting    Diet: GI Soft diet  Patient Vitals for the past 100 hrs:   % Diet Eaten   05/28/18 1801 75 %   05/28/18 1402 75 % 05/26/18 1854 100 %   05/26/18 1243 50 %   05/26/18 0900 50 %     Medications: [x] Reviewed   Encounter Diagnoses     ICD-10-CM ICD-9-CM   1. Chronic abdominal pain R10.9 789.00    G89.29 338.29   2. Acute kidney injury ([facility] N17.9 584.9   3. Dehydration E86.0 276.51   4.  Acute hyperglycemia R73.9 790.29     Past Medical History:   Diagnosis Date    Abdominal pain, generalized     Chronic pancreatitis ([facility]     [facility]     hypothyroid    Encounter for long-term (current) use of other medications     Essential hypertension     ETOH abuse     GERD (gastroesophageal reflux disease)     Heart failure (HCC)     Hyponatremia     Pain in the abdomen 11/2/2010    Pancreatitis     w/ abscess and pseudocyst    Pancreatitis     Psychiatric disorder     depression, anxiety    Tobacco abuse      Labs:  Lab Results   Component Value Date/Time    Sodium 139 05/30/2018 04:51 AM    Potassium 4.5 05/30/2018 04:51 AM    Chloride 106 05/30/2018 04:51 AM    CO2 28 05/30/2018 04:51 AM    Anion gap 5 05/30/2018 04:51 AM    Glucose 69 (L) 05/30/2018 04:51 AM    BUN 13 05/30/2018 04:51 AM    Creatinine 1.11 05/30/2018 04:51 AM    Calcium 7.8 (L) 05/30/2018 04:51 AM    Magnesium 1.6 05/22/2018 01:00 AM    Phosphorus 3.1 04/19/2018 09:39 AM    Albumin 2.4 (L) 05/28/2018 04:25 AM     Anthropometrics: BMI (calculated): 18.3 Last 3 Recorded Weights in this Encounter    05/22/18 0049 05/29/18 0514 05/30/18 0522   Weight: 61.2 kg (135 lb) 61.3 kg (135 lb 2.3 oz) 61.3 kg (135 lb 2.3 oz)    Ht Readings from Last 1 Encounters:   05/22/18 6' (1.829 m)     Weight Loss Metrics 5/22/2018 5/21/2018 4/24/2018 4/16/2018 8/5/2017 7/18/2017 6/14/2016   Today's Wt 135 lb 130 lb 130 lb 145 lb 11.6 oz 164 lb 164 lb 14.5 oz 138 lb 7.2 oz   BMI 18.31 kg/m2 17.15 kg/m2 17.15 kg/m2 19.23 kg/m2 22.24 kg/m2 22.37 kg/m2 18.77 kg/m2      []  Weight Loss  []  Weight Gain  [x]  Weight Stable   []  New wt n/a on record     Estimated Nutrition Needs: 2250 Kcals/day  Protein (g): 92 g    Nutrition Problems Identified:   [x] Suboptimal PO intake   [x] Food Allergies  [] Difficulty chewing/swallowing/poor dentition  [x] Diarrhea   [x] Nausea/Vomiting   [] None  [] Other:     Plan:   [x] Therapeutic Diet  [x]  Obtained/adjusted food preferences/tolerances and/or snacks options   [] Dietary supplements   [] Occupational therapy following for feeding techniques  []  HS snack added   []  Modify diet texture   [x]  Modify diet for food allergies   []  Assist with menu selection   [x]  Monitor PO intake on meal rounds   [x]  Continue inpatient monitoring and intervention   []  Participated in discharge planning/Interdisciplinary rounds/Team meetings   []  Other:     Education Needs:   [] Not appropriate for teaching at this time due to:   [x] Identified and addressed    Nutrition Monitoring and Evaluation:   [x] Continue inpatient monitoring and interventions    [] Other:     Sophie Bullard

## 2018-05-30 NOTE — PROGRESS NOTES
[facility]  Hospitalist Division        Inpatient Daily Progress Note    Daily progress Note    Patient: Nakul Kirkpatrick MRN: [id number]  [facility]: [id number]    YOB: 1965  Age: 46 y.o.   Sex: male    DOA: 5/22/2018 LOS:  LOS: 8 days                    Chief Complaint:  Pancreaticoduodenal fistula      Assessment/Plan:     Patient Active Problem List   Diagnosis Code    Chronic pancreatitis ([facility] K86.1    Tobacco abuse Z72.0    IDDM (insulin dependent diabetes mellitus) ([facility] E11.9, Z79.4    Gastroparesis K31.84    Perinephric abscess N15.1    Pneumobilia K83.8    Abdominal pain R10.9    Moderate protein-calorie malnutrition (HCC) E44.0    Biliary drain displacement T85.520A    Hypokalemia E87.6    Duodenal fistula K31.6    H/O ETOH abuse Z87.898    Chronic pain G89.29    Sepsis ([facility] A41.9    HCAP (healthcare-associated pneumonia) J18.9    Colitis, acute K52.9    DKA, type 1 (HCC) E10.10    Dehydration E86.0    Lactic acidosis E87.2    Vomiting R11.10    Acute alcoholic hepatitis I06.18    DKA (diabetic ketoacidoses) ([facility] E13.10    Chronic pancreatitis due to acute alcohol intoxication ([facility] K86.0    Enteritis K52.9    Hyperglycemia R73.9    Chronic renal insufficiency N18.9    CHRISTI (acute kidney injury) ([facility] N17.9    UTI (urinary tract infection) N39.0       A/P:  Pancreaticoduodenal fistula  - IR replaced drain on 5/24/2018, recommend repeat imaging prior to d/c  - surgery available as needed, will eventually require major surgical repair procedure in the future hopefully in a tertiary medical facility  - ID following, appreciate input  - monitor off abx    Abdominal Pain  - intra-abdominal infection   - pain control  - wound culture 5/23/2018 with ESBL, ESBL 2nd morphotype, moderate enterococcus faecalis group D, few candida glabrata  - ID following, monitor off abx      UTI  - UA 5/22/2018 few bacteria, few yeast, trace leukocyte esteras  - urine culture 90,000 colonies Klebsiella Pneumonia  - completed five days of IV Gentamycin    Diabetes  - correctional SSI, Lantus 4 units daily    Chronic pancreatitis, hx of ETOH abuse  - continue Zenpep  - IVF's, clear liquid diet  - CIWA protocol    DVT Prophylaxis  - Heparin subcutaneously TID, SCD's BLE    Disposition  - d/c tomorrow  - case management following, HOWARD no longer guardian as of April 2018  - pt living at [facility] on [address] in [place] with mother for the remainder of week then he will be moving with his sister Jh Rivas in [place] [address], [facility]  Pager:  [phone]  Office:  [phone]        Subjective:      Advanced to GI soft diet, no difference in abdominal pain or amount of NATALIE drain. Objective:      Visit Vitals    /69 (BP 1 Location: Right arm, BP Patient Position: At rest)    Pulse 88    Temp 98.9 °F (37.2 °C)    Resp 18    Ht 6' (1.829 m)    Wt 61.3 kg (135 lb 2.3 oz)    SpO2 100%    BMI 18.33 kg/m2           Physical Exam:  General appearance: alert, cooperative, no distress, appears stated age  Head: Normocephalic, without obvious abnormality, atraumatic  Lungs: clear to auscultation bilaterally  Heart: regular rate and rhythm, S1, S2 normal, no murmur, click, rub or gallop  Abdomen: soft, non tender, non distended. Normoactive bowel sounds. NATALIE drain right quad intact, note drainage surrounding insertion site of drain  Extremities: extremities normal, atraumatic, no cyanosis or edema  Skin: Skin color, texture, turgor normal. No rashes or lesions  Neurologic: Grossly normal  PSY: Mood and affect normal, appropriately behaved        Intake and Output:  Current Shift:     Last three shifts:  05/28 1901 - 05/30 0700  In: 2640 [P.O.:240;  I.V.:2400]  Out: 2710 [Urine:600; Drains:2110]    Recent Results (from the past 24 hour(s))   GENTAMICIN, RANDOM    Collection Time: 05/29/18  9:30 AM   Result Value Ref Range    Gentamicin,random 4.0 0.5 - 10 ug/ml   GLUCOSE, POC    Collection Time: 05/29/18 11:38 AM   Result Value Ref Range    Glucose (POC) 129 (H) 70 - 110 mg/dL   GLUCOSE, POC    Collection Time: 05/29/18  5:23 PM   Result Value Ref Range    Glucose (POC) 374 (H) 70 - 110 mg/dL   GLUCOSE, POC    Collection Time: 05/30/18 12:02 AM   Result Value Ref Range    Glucose (POC) 225 (H) 70 - 187 mg/dL   METABOLIC PANEL, BASIC    Collection Time: 05/30/18  4:51 AM   Result Value Ref Range    Sodium 139 136 - 145 mmol/L    Potassium 4.5 3.5 - 5.5 mmol/L    Chloride 106 100 - 108 mmol/L    CO2 28 21 - 32 mmol/L    Anion gap 5 3.0 - 18 mmol/L    Glucose 69 (L) 74 - 99 mg/dL    BUN 13 7.0 - 18 MG/DL    Creatinine 1.11 0.6 - 1.3 MG/DL    BUN/Creatinine ratio 12 12 - 20      GFR est AA >60 >60 ml/min/1.73m2    GFR est non-AA >60 >60 ml/min/1.73m2    Calcium 7.8 (L) 8.5 - 10.1 MG/DL   CBC W/O DIFF    Collection Time: 05/30/18  4:51 AM   Result Value Ref Range    WBC 6.2 4.6 - 13.2 K/uL    RBC 2.76 (L) 4.70 - 5.50 M/uL    HGB 8.4 (L) 13.0 - 16.0 g/dL    HCT 27.0 (L) 36.0 - 48.0 %    MCV 97.8 (H) 74.0 - 97.0 FL    MCH 30.4 24.0 - 34.0 PG    MCHC 31.1 31.0 - 37.0 g/dL    RDW 14.6 (H) 11.6 - 14.5 %    PLATELET 516 250 - 525 K/uL    MPV 9.6 9.2 - 11.8 FL   GLUCOSE, POC    Collection Time: 05/30/18  6:27 AM   Result Value Ref Range    Glucose (POC) 99 70 - 110 mg/dL           Lab Results   Component Value Date/Time    Glucose 69 (L) 05/30/2018 04:51 AM    Glucose 104 (H) 05/29/2018 06:45 AM    Glucose 75 05/28/2018 04:25 AM    Glucose 169 (H) 05/27/2018 12:40 PM    Glucose 170 (H) 05/26/2018 09:04 AM        Imaging:  No results found.     Medication List Reviewed:  Current Facility-Administered Medications   Medication Dose Route Frequency    insulin glargine (LANTUS) injection 4 Units  4 Units SubCUTAneous DAILY    heparin (porcine) pf 300 Units  300 Units InterCATHeter PRN    promethazine (PHENERGAN) 25 mg in 0.9% sodium chloride 50 mL IVPB  25 mg IntraVENous Q6H PRN    Or    promethazine (PHENERGAN) injection 25 mg  25 mg IntraMUSCular Q6H PRN    potassium chloride (K-DUR, KLOR-CON) SR tablet 40 mEq  40 mEq Oral BID    HYDROmorphone (DILAUDID) injection 1 mg  1 mg IntraVENous Q3H PRN    HYDROcodone-acetaminophen (NORCO) 5-325 mg per tablet 1 Tab  1 Tab Oral Q6H PRN    insulin lispro (HUMALOG) injection   SubCUTAneous Q6H    glucose chewable tablet 16 g  4 Tab Oral PRN    glucagon (GLUCAGEN) injection 1 mg  1 mg IntraMUSCular PRN    dextrose (D50W) injection syrg 12.5-25 g  25-50 mL IntraVENous PRN    amitriptyline (ELAVIL) tablet 75 mg  75 mg Oral QHS    ferrous sulfate tablet 325 mg  325 mg Oral BID    lipase-protease-amylase (ZENPEP 10,000) capsule 2 Cap  2 Cap Oral TID WITH MEALS    sodium chloride (NS) flush 5-10 mL  5-10 mL IntraVENous Q8H    sodium chloride (NS) flush 5-10 mL  5-10 mL IntraVENous PRN    heparin (porcine) injection 5,000 Units  5,000 Units SubCUTAneous Q8H    acetaminophen (TYLENOL) tablet 650 mg  650 mg Oral Q6H PRN    ondansetron (ZOFRAN) injection 4 mg  4 mg IntraVENous Q4H PRN    sodium chloride (NS) flush 5-10 mL  5-10 mL IntraVENous Q8H    sodium chloride (NS) flush 5-10 mL  5-10 mL IntraVENous PRN    LORazepam (ATIVAN) tablet 1 mg  1 mg Oral Q1H PRN    Or    LORazepam (ATIVAN) injection 1 mg  1 mg IntraVENous Q1H PRN    LORazepam (ATIVAN) tablet 2 mg  2 mg Oral Q1H PRN    Or    LORazepam (ATIVAN) injection 2 mg  2 mg IntraVENous Q1H PRN    LORazepam (ATIVAN) injection 3 mg  3 mg IntraVENous Q15MIN PRN    dextrose 5 % - 0.45% NaCl infusion  75 mL/hr IntraVENous CONTINUOUS    heparin (porcine) pf 500 Units  500 Units InterCATHeter PRN

## 2018-05-31 VITALS
RESPIRATION RATE: 19 BRPM | HEIGHT: 72 IN | BODY MASS INDEX: 18.28 KG/M2 | HEART RATE: 84 BPM | OXYGEN SATURATION: 100 % | DIASTOLIC BLOOD PRESSURE: 67 MMHG | SYSTOLIC BLOOD PRESSURE: 104 MMHG | WEIGHT: 135 LBS | TEMPERATURE: 98.2 F

## 2018-05-31 LAB
ANION GAP SERPL CALC-SCNC: 8 MMOL/L (ref 3–18)
BUN SERPL-MCNC: 13 MG/DL (ref 7–18)
BUN/CREAT SERPL: 11 (ref 12–20)
CALCIUM SERPL-MCNC: 7.8 MG/DL (ref 8.5–10.1)
CHLORIDE SERPL-SCNC: 103 MMOL/L (ref 100–108)
CO2 SERPL-SCNC: 25 MMOL/L (ref 21–32)
CREAT SERPL-MCNC: 1.17 MG/DL (ref 0.6–1.3)
ERYTHROCYTE [DISTWIDTH] IN BLOOD BY AUTOMATED COUNT: 14.5 % (ref 11.6–14.5)
GLUCOSE BLD STRIP.AUTO-MCNC: 139 MG/DL (ref 70–110)
GLUCOSE BLD STRIP.AUTO-MCNC: 280 MG/DL (ref 70–110)
GLUCOSE SERPL-MCNC: 178 MG/DL (ref 74–99)
HCT VFR BLD AUTO: 28.1 % (ref 36–48)
HGB BLD-MCNC: 8.7 G/DL (ref 13–16)
MCH RBC QN AUTO: 30.4 PG (ref 24–34)
MCHC RBC AUTO-ENTMCNC: 31 G/DL (ref 31–37)
MCV RBC AUTO: 98.3 FL (ref 74–97)
PLATELET # BLD AUTO: 258 K/UL (ref 135–420)
PMV BLD AUTO: 9.7 FL (ref 9.2–11.8)
POTASSIUM SERPL-SCNC: 5.1 MMOL/L (ref 3.5–5.5)
RBC # BLD AUTO: 2.86 M/UL (ref 4.7–5.5)
SODIUM SERPL-SCNC: 136 MMOL/L (ref 136–145)
WBC # BLD AUTO: 6.3 K/UL (ref 4.6–13.2)

## 2018-05-31 PROCEDURE — 74011636637 HC RX REV CODE- 636/637: Performed by: INTERNAL MEDICINE

## 2018-05-31 PROCEDURE — 74011250636 HC RX REV CODE- 250/636: Performed by: NURSE PRACTITIONER

## 2018-05-31 PROCEDURE — 74011250637 HC RX REV CODE- 250/637: Performed by: NURSE PRACTITIONER

## 2018-05-31 PROCEDURE — 85027 COMPLETE CBC AUTOMATED: CPT | Performed by: HOSPITALIST

## 2018-05-31 PROCEDURE — 82962 GLUCOSE BLOOD TEST: CPT

## 2018-05-31 PROCEDURE — 74011250637 HC RX REV CODE- 250/637: Performed by: HOSPITALIST

## 2018-05-31 PROCEDURE — 74011636637 HC RX REV CODE- 636/637: Performed by: HOSPITALIST

## 2018-05-31 PROCEDURE — 74011250636 HC RX REV CODE- 250/636: Performed by: HOSPITALIST

## 2018-05-31 PROCEDURE — 80048 BASIC METABOLIC PNL TOTAL CA: CPT | Performed by: HOSPITALIST

## 2018-05-31 PROCEDURE — 74011000258 HC RX REV CODE- 258: Performed by: HOSPITALIST

## 2018-05-31 PROCEDURE — 36415 COLL VENOUS BLD VENIPUNCTURE: CPT | Performed by: HOSPITALIST

## 2018-05-31 PROCEDURE — 74011250637 HC RX REV CODE- 250/637: Performed by: FAMILY MEDICINE

## 2018-05-31 RX ORDER — INSULIN LISPRO 100 [IU]/ML
INJECTION, SOLUTION INTRAVENOUS; SUBCUTANEOUS
Status: DISCONTINUED | OUTPATIENT
Start: 2018-05-31 | End: 2018-05-31 | Stop reason: HOSPADM

## 2018-05-31 RX ORDER — ONDANSETRON 4 MG/1
4 TABLET, ORALLY DISINTEGRATING ORAL
Qty: 9 TAB | Refills: 0 | Status: ON HOLD | OUTPATIENT
Start: 2018-05-31 | End: 2018-09-21

## 2018-05-31 RX ORDER — PROMETHAZINE HYDROCHLORIDE 25 MG/1
25 TABLET ORAL
Qty: 12 TAB | Refills: 0 | Status: ON HOLD | OUTPATIENT
Start: 2018-05-31 | End: 2018-06-19

## 2018-05-31 RX ORDER — OXYCODONE AND ACETAMINOPHEN 5; 325 MG/1; MG/1
1 TABLET ORAL
Qty: 18 TAB | Refills: 0 | Status: SHIPPED | OUTPATIENT
Start: 2018-05-31 | End: 2018-06-09

## 2018-05-31 RX ADMIN — Medication 1 MG: at 11:50

## 2018-05-31 RX ADMIN — PANCRELIPASE 2 CAPSULE: 10000; 34000; 55000 CAPSULE, DELAYED RELEASE ORAL at 08:45

## 2018-05-31 RX ADMIN — INSULIN LISPRO 6 UNITS: 100 INJECTION, SOLUTION INTRAVENOUS; SUBCUTANEOUS at 13:00

## 2018-05-31 RX ADMIN — PANCRELIPASE 2 CAPSULE: 10000; 34000; 55000 CAPSULE, DELAYED RELEASE ORAL at 13:01

## 2018-05-31 RX ADMIN — INSULIN GLARGINE 4 UNITS: 100 INJECTION, SOLUTION SUBCUTANEOUS at 08:47

## 2018-05-31 RX ADMIN — Medication 10 ML: at 07:01

## 2018-05-31 RX ADMIN — PROMETHAZINE HYDROCHLORIDE 25 MG: 25 INJECTION INTRAMUSCULAR; INTRAVENOUS at 08:45

## 2018-05-31 RX ADMIN — HEPARIN SODIUM 5000 UNITS: 5000 INJECTION, SOLUTION INTRAVENOUS; SUBCUTANEOUS at 05:30

## 2018-05-31 RX ADMIN — HYDROCODONE BITARTRATE AND ACETAMINOPHEN 1 TABLET: 5; 325 TABLET ORAL at 03:45

## 2018-05-31 RX ADMIN — FERROUS SULFATE TAB 325 MG (65 MG ELEMENTAL FE) 325 MG: 325 (65 FE) TAB at 08:45

## 2018-05-31 RX ADMIN — POTASSIUM CHLORIDE 40 MEQ: 1500 TABLET, EXTENDED RELEASE ORAL at 08:45

## 2018-05-31 RX ADMIN — Medication 1 MG: at 08:45

## 2018-05-31 RX ADMIN — Medication 1 MG: at 01:54

## 2018-05-31 RX ADMIN — Medication 1 MG: at 05:30

## 2018-05-31 NOTE — DISCHARGE SUMMARY
[facility]    Discharge Summary    Patient: Codi Parsons MRN: [id number]  CSN: [id number]    YOB: 1965  Age: 46 y.o.   Sex: male    DOA: 5/22/2018 LOS:  LOS: 9 days   Discharge Date: 5/31/2018     Admission Diagnoses: Hyperglycemia    Discharge Diagnoses:    Problem List as of 5/31/2018  Date Reviewed: 4/20/2018          Codes Class Noted - Resolved    UTI (urinary tract infection) ICD-10-CM: N39.0  ICD-9-CM: 599.0  5/24/2018 - Present        Hyperglycemia ICD-10-CM: R73.9  ICD-9-CM: 790.29  5/22/2018 - Present        Chronic renal insufficiency ICD-10-CM: N18.9  ICD-9-CM: 585.9  5/22/2018 - Present        CHRISTI (acute kidney injury) ([facility] ICD-10-CM: N17.9  ICD-9-CM: 584.9  5/22/2018 - Present        Enteritis ICD-10-CM: K52.9  ICD-9-CM: 558.9  4/24/2018 - Present        Acute alcoholic hepatitis [facility]-25-NB: K70.10  ICD-9-CM: 571.1  4/15/2018 - Present        DKA (diabetic ketoacidoses) ([facility] ICD-10-CM: E13.10  ICD-9-CM: 250.10  4/15/2018 - Present        Chronic pancreatitis due to acute alcohol intoxication ([facility] ICD-10-CM: K86.0  ICD-9-CM: 577.1, 303.00  4/15/2018 - Present        Dehydration ICD-10-CM: E86.0  ICD-9-CM: 276.51  8/5/2017 - Present        Lactic acidosis ICD-10-CM: E87.2  ICD-9-CM: 276.2  8/5/2017 - Present        Vomiting ICD-10-CM: R11.10  ICD-9-CM: 787.03  8/5/2017 - Present        Colitis, acute ICD-10-CM: K52.9  ICD-9-CM: 558.9  7/17/2017 - Present        DKA, type 1 ([facility] ICD-10-CM: E10.10  ICD-9-CM: 250.13  7/17/2017 - Present        HCAP (healthcare-associated pneumonia) ICD-10-CM: J18.9  ICD-9-CM: 391  5/31/2016 - Present        Sepsis ([address] ICD-10-CM: A41.9  ICD-9-CM: 038.9, 995.91  4/15/2016 - Present        Biliary drain displacement ICD-10-CM: T85.520A  ICD-9-CM: 996.59  3/23/2016 - Present        Hypokalemia ICD-10-CM: E87.6  ICD-9-CM: 276.8  3/23/2016 - Present        * (Principal)Duodenal fistula ICD-10-CM: K31.6  ICD-9-CM: 537.4  3/23/2016 - Present        H/O ETOH abuse (Chronic) ICD-10-CM: F37.798  ICD-9-CM: 305.03  3/23/2016 - Present        Chronic pain (Chronic) ICD-10-CM: K09.12  ICD-9-CM: 338.29  3/23/2016 - Present        Moderate protein-calorie malnutrition (HCC) (Chronic) ICD-10-CM: E44.0  ICD-9-CM: 263.0  11/21/2015 - Present        Abdominal pain ICD-10-CM: R10.9  ICD-9-CM: 789.00  11/16/2015 - Present        Perinephric abscess (Chronic) ICD-10-CM: N15.1  ICD-9-CM: 590.2  10/22/2015 - Present        Pneumobilia (Chronic) ICD-10-CM: K83.8  ICD-9-CM: 576.8  10/22/2015 - Present        Gastroparesis (Chronic) ICD-10-CM: K31.84  ICD-9-CM: 536.3  5/21/2015 - Present        IDDM (insulin dependent diabetes mellitus) ([facility] (Chronic) ICD-10-CM: E11.9, Z79.4  ICD-9-CM: 250.00, V58.67  8/31/2013 - Present        Tobacco abuse (Chronic) ICD-10-CM: Z72.0  ICD-9-CM: 305.1  Unknown - Present        Chronic pancreatitis (HCC) (Chronic) ICD-10-CM: K86.1  ICD-9-CM: 577.1  Unknown - Present    Overview Signed 6/12/2013  9:31 AM by Nellie Cullen MD     Continue PPI IV             RESOLVED: Protein calorie malnutrition ([facility] ICD-10-CM: E46  ICD-9-CM: 263.9  11/19/2015 - 3/23/2016        RESOLVED: HCAP (healthcare-associated pneumonia) ICD-10-CM: J18.9  ICD-9-CM: 486  11/18/2015 - 3/23/2016        RESOLVED: Wrist drop ICD-10-CM: M21.339  ICD-9-CM: 736.05  11/17/2015 - 3/23/2016        RESOLVED: Acute radial nerve palsy of right upper extremity ICD-10-CM: G56.31  ICD-9-CM: 354.3  11/17/2015 - 3/23/2016        RESOLVED: SIRS (systemic inflammatory response syndrome) ([facility] ICD-10-CM: R65.10  ICD-9-CM: 995.90  11/16/2015 - 11/16/2015        RESOLVED: Tachycardia ICD-10-CM: R00.0  ICD-9-CM: 785.0  11/16/2015 - 3/23/2016        RESOLVED: Anemia ICD-10-CM: D64.9  ICD-9-CM: 285.9  11/16/2015 - 3/23/2016        RESOLVED: Hyperglycemia ICD-10-CM: R73.9  ICD-9-CM: 790.29  11/16/2015 - 3/23/2016        RESOLVED: Fever ICD-10-CM: R50.9  ICD-9-CM: 780.60  11/16/2015 - 3/23/2016        RESOLVED: Sepsis ([place] [address].) ICD-10-CM: A41.9  ICD-9-CM: 038.9, 995.91  11/16/2015 - 3/23/2016        RESOLVED: Malnutrition ([place] [address].) (Chronic) ICD-10-CM: E46  ICD-9-CM: 263.9  10/25/2015 - 3/23/2016        RESOLVED: Severe sepsis ([place] [address].) ICD-10-CM: A41.9, R65.20  ICD-9-CM: 038.9, 995.92  10/22/2015 - 11/16/2015        RESOLVED: Hypophosphatemia ICD-10-CM: E83.39  ICD-9-CM: 275.3  5/23/2015 - 11/16/2015        RESOLVED: DKA (diabetic ketoacidoses) ([place] [address] ICD-10-CM: E13.10  ICD-9-CM: 250.10  5/21/2015 - 11/16/2015        RESOLVED: CHRISTI (acute kidney injury) ([place] [address] ICD-10-CM: N17.9  ICD-9-CM: 584.9  5/21/2015 - 11/16/2015        RESOLVED: Acute pancreatitis ICD-10-CM: K85.90  ICD-9-CM: 577.0  5/21/2015 - 11/16/2015        RESOLVED: Hypertriglyceridemia (Chronic) ICD-10-CM: E78.1  ICD-9-CM: 272.1  5/21/2015 - 3/23/2016        RESOLVED: Pneumonia ICD-10-CM: J18.9  ICD-9-CM: 486  5/21/2015 - 11/16/2015        RESOLVED: Abdominal pain ICD-10-CM: R10.9  ICD-9-CM: 789.00  9/21/2013 - 11/16/2015        RESOLVED: DKA (diabetic ketoacidoses) ([place] [address] ICD-10-CM: E13.10  ICD-9-CM: 250.10  8/26/2013 - 8/31/2013        RESOLVED: Drug-seeking behavior ICD-10-CM: Z76.5  ICD-9-CM: 305.90  6/23/2013 - 11/16/2015        RESOLVED: Abdominal abscess ICD-10-CM: LQF1059  ICD-9-CM: FDY2017  6/19/2013 - 11/16/2015    Overview Signed 7/9/2013 10:39 AM by Юлия Barraza MD     Abdominal abscess - most likely infected fluid collection from duodenal fistula related to pancreatic disease. Doing well with drainage and antibiotics. Fistulogram on Friday noted continued fistula to the duodenum/pancreas. Will need referral to pancreatic surgeon.                   RESOLVED: Disorder of electrolytes ICD-10-CM: E87.8  ICD-9-CM: 276.9  6/16/2013 - 11/16/2015    Overview Signed 6/16/2013 10:06 AM by Юлия Barraza MD     Hypokalemia hypomagnesemia hypocalcemia, replete and monitor              RESOLVED: Hypokalemia ICD-10-CM: E87.6  ICD-9-CM: 276.8  6/12/2013 - 11/16/2015 RESOLVED: Abdominal pain, generalized ICD-10-CM: R10.84  ICD-9-CM: 789.07  Unknown - 11/16/2015    Overview Addendum 7/9/2013 10:48 AM by El Roth MD     Consult GI for abdominal pain, Dr Justina Swartz IR for percutaneous drainage of fluid collection Catherine Armor  S/p 6/12/13 CT Guided Abscess Drainage of 20cc purulent sanguinous fluid for Right lower quadrant recurrent abscess  Cx 6/121/3 + ESBL  Will Leave drain to J-P suction. Repeat CT in a few days post placement. Appreciate general surgery  Fistulagram  6/17/13 Small residual abscess cavity in location of the pigtail drain. Fistulous communication with duodenum at the junction of second and third portion of the duodenum, similar to prior study of 04/06/10. Opacification of tubular structure extending superior medially from the medial aspect of the duodenal loop likely retrograde opacification of nondilated common bile duct. Afebrile, normal WBC/diff on admission   Normal LFTs amylase lipase   Consult ID for antibiotics and abdominal abscess                RESOLVED: Encounter for long-term (current) use of other medications ICD-10-CM: Z79.899  ICD-9-CM: V58.69  Unknown - 11/16/2015        RESOLVED: Follow-up examination, chronic pancreatitis ICD-10-CM: O63  ICD-9-CM: V67.9  11/2/2010 - 11/16/2015        RESOLVED: Pain in the abdomen ICD-10-CM: R10.9  ICD-9-CM: 789.00  11/2/2010 - 11/16/2015              Discharge Condition: Stable    Discharge To: Home    Consults: General Surgery, Hospitalist and Infectious Disease    Hospital Course: 45 y/o male with hx of chronic alcoholic pancreatitis, chronic abdominal pain, 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 Pt reports compliance with taking insulin, of note glucose > 500 on admission.   Also, pt reports his drain dislodged approximately 4 months ago and had not been evaluated for re-insertion. He is not followed by GI and was discharged from [facility] for non-compliance. Of note, multiple admissions to various hospitals for DKA, drain leakages and abdominal pain, most recent in April 2018. Work up in the ED with WBC 12.1 - -> 15.3 - -> 12.2 (and continued to remain normal throughout hospitalization). UA with trace leukocyte esterase, few bacteria, few yeast, WBC 11 to 20. Hyponatremia with sodium of 130, creatinine 2.56, A1C 9.6%, ALT 45 / AST 46 / Alk Phos 232, lipase 45. RUQ U/S showed post CCY, stable dilated CBD w/o evidence of intrahepatic biliary ductal dilatation, lengthy pancreatic duct stent present on recent CT, increased hepatic echotexture likely hepatic steatosis, simple appearing right renal cyst, probable right renal lower pole non-obstructive calculus. 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 He was started on IV abx and admitted for further management of care. General surgery consulted. Urine culture with Klebsiella UTI. Wound culture obtained, ESBL E. Coli E faecalis C glabrata (suspect colonizers as not septic appearing per ID note) . IR for CT guided duodenal fistula drain placement on 5/24/2018. He was started on ice chips on 5/24/2018 and advanced to clear liquids 5/25/2018. Per surgery note, pt with very complicated pancreatitis with pancreaticoduodenal fistula. For now no need for any urgent surgical intervention since the drain is controlling the fistula. The patient will eventually require major surgical repair procedure in the future, hopefully in a tertiary medical facility.   Surgery signed off on 5/25/2018. ID consulted on 5/29/2018 to assist with abx management. IV Gentamycin dc'd as pt received five days of IV Gentamycin for Klebsiella UTI. All abx were stopped on 5/29/2018 with concern selection further resistant isolates and toxicity or allergy, plan to monitor off abx. He was advanced to GI soft diet on 5/30/2018 and tolerated well. No fever or leukocytosis. Continued to have significant drainage from drain. Case management following, [address] no longer guardian as of 4/2018. Pt currently residing with mother at [facility] [address] in [place] with mother for the remainder of week then he will be moving with his sister Filemon Hunter in [place]. He is appropriate for discharge home, to follow up with PCP within one week. Physical Exam:  General appearance: alert, cooperative, no distress, appears stated age, mediport left chest wall  Head: Normocephalic, without obvious abnormality, atraumatic  Lungs: clear to auscultation bilaterally  Heart: regular rate and rhythm, S1, S2 normal, no murmur, click, rub or gallop  Abdomen: soft, non tender, non distended. Normoactive bowel sounds.  NATALIE drain right quad intact, note drainage surrounding insertion site of drain  Extremities: extremities normal, atraumatic, no cyanosis or edema  Skin: Skin color, texture, turgor normal. No rashes or lesions  Neurologic: Grossly normal  PSY: Mood and affect normal, appropriately behaved    Significant Diagnostic Studies: labs:   Recent Results (from the past 24 hour(s))   GLUCOSE, POC    Collection Time: 05/30/18  4:51 PM   Result Value Ref Range    Glucose (POC) 343 (H) 70 - 110 mg/dL   GLUCOSE, POC    Collection Time: 05/30/18  8:58 PM   Result Value Ref Range    Glucose (POC) 61 (L) 70 - 110 mg/dL   GLUCOSE, POC    Collection Time: 05/30/18  9:19 PM   Result Value Ref Range    Glucose (POC) 124 (H) 70 - 269 mg/dL   METABOLIC PANEL, BASIC    Collection Time: 05/31/18  4:10 AM   Result Value Ref Range    Sodium 136 136 - 145 mmol/L    Potassium 5.1 3.5 - 5.5 mmol/L    Chloride 103 100 - 108 mmol/L    CO2 25 21 - 32 mmol/L    Anion gap 8 3.0 - 18 mmol/L    Glucose 178 (H) 74 - 99 mg/dL    BUN 13 7.0 - 18 MG/DL    Creatinine 1.17 0.6 - 1.3 MG/DL    BUN/Creatinine ratio 11 (L) 12 - 20      GFR est AA >60 >60 ml/min/1.73m2    GFR est non-AA >60 >60 ml/min/1.73m2    Calcium 7.8 (L) 8.5 - 10.1 MG/DL   CBC W/O DIFF    Collection Time: 05/31/18  4:10 AM   Result Value Ref Range    WBC 6.3 4.6 - 13.2 K/uL    RBC 2.86 (L) 4.70 - 5.50 M/uL    HGB 8.7 (L) 13.0 - 16.0 g/dL    HCT 28.1 (L) 36.0 - 48.0 %    MCV 98.3 (H) 74.0 - 97.0 FL    MCH 30.4 24.0 - 34.0 PG    MCHC 31.0 31.0 - 37.0 g/dL    RDW 14.5 11.6 - 14.5 %    PLATELET 546 968 - 653 K/uL    MPV 9.7 9.2 - 11.8 FL   GLUCOSE, POC    Collection Time: 05/31/18  8:33 AM   Result Value Ref Range    Glucose (POC) 139 (H) 70 - 110 mg/dL   GLUCOSE, POC    Collection Time: 05/31/18 11:52 AM   Result Value Ref Range    Glucose (POC) 280 (H) 70 - 110 mg/dL         Discharge Medications:     Discharge Medication List as of 5/31/2018 12:04 PM      START taking these medications    Details   oxyCODONE-acetaminophen (PERCOCET) 5-325 mg per tablet Take 1 Tab by mouth every four (4) hours as needed for Pain. Max Daily Amount: 6 Tabs., Print, Disp-18 Tab, R-0         CONTINUE these medications which have CHANGED    Details   promethazine (PHENERGAN) 25 mg tablet Take 1 Tab by mouth every six (6) hours as needed. , Print, Disp-12 Tab, R-0      ondansetron (ZOFRAN ODT) 4 mg disintegrating tablet Take 1 Tab by mouth every eight (8) hours as needed for Nausea. , Print, Disp-9 Tab, R-0      sucralfate (CARAFATE) 1 gram tablet Take 1 Tab by mouth four (4) times daily.  Indications: PREVENTION OF STRESS ULCER, Print, Disp-30 Tab, R-0         CONTINUE these medications which have NOT CHANGED    Details   amitriptyline (ELAVIL) 75 mg tablet Take 1 Tab by mouth nightly. Indications: Depression, Print, Disp-30 Tab, R-0      ascorbic acid, vitamin C, (VITAMIN C) 500 mg tablet Take 1 Tab by mouth daily. Indications: VITAMIN C DEFICIENCY, Print, Disp-30 Tab, R-0      cholecalciferol, vitamin D3, 2,000 unit tab Take 1 Tab by mouth daily. Indications: PREVENTION OF VITAMIN D DEFICIENCY, Print, Disp-30 Tab, R-0      cyanocobalamin (VITAMIN B-12) 1,000 mcg sublingual tablet Take 2 Tabs by mouth daily. Indications: PREVENTION OF VITAMIN B12 DEFICIENCY, Print, Disp-30 Tab, R-0      ferrous sulfate 325 mg (65 mg iron) tablet Take 1 Tab by mouth two (2) times a day. Indications: IRON DEFICIENCY ANEMIA, Print, Disp-60 Tab, R-0      folic acid 839 mcg tablet Take 1 Tab by mouth daily. , Print, Disp-30 Tab, R-0      insulin glargine (LANTUS) 100 unit/mL injection 25 Units by SubCUTAneous route two (2) times a day. Indications: type 2 diabetes mellitus, Print, Disp-1 Vial, R-0      insulin lispro (HUMALOG) 100 unit/mL injection 12 Units by SubCUTAneous route nightly. Indications: type 2 diabetes mellitus, Print, Disp-1 Vial, R-0      melatonin 3 mg tablet Take 1 Tab by mouth nightly. , Print, Disp-30 Tab, R-0      midodrine (PROAMITINE) 10 mg tablet Take 1 Tab by mouth three (3) times daily. Indications: Symptomatic Orthostatic Hypotension, Print, Disp-60 Tab, R-0      lipase-protease-amylase (CREON) 12,000-38,000 -60,000 unit capsule Take 2 Caps by mouth three (3) times daily (with meals). Indications: exocrine pancreatic insufficiency, dosage change, Historical Med, Disp-180 Cap, R-0      pantoprazole (PROTONIX) 40 mg tablet Take 1 Tab by mouth two (2) times a day.  Indications: EROSIVE ESOPHAGITIS, gastroesophageal reflux disease, No Print, Disp-60 Tab, R-0         STOP taking these medications       acetaminophen (TYLENOL) 325 mg tablet Comments:   Reason for Stopping:         LORazepam (ATIVAN) 0.5 mg tablet Comments:   Reason for Stopping:         morphine CR (MS CONTIN) 60 mg CR tablet Comments:   Reason for Stopping:                   Activity: Activity as tolerated    Diet: Low fat, Low cholesterol    Wound Care: Keep wound clean and dry    Follow-up: Follow up with PCP within 3-5 days.     Discharge time: > 35 mins  José Manuel Douglas NP  5/31/2018, 3:15 PM

## 2018-05-31 NOTE — MANAGEMENT PLAN
Discharge/Transition Planning    Spoke with  for pt insurance. They will attempt to follow up with patient. He has no consistent address or phone number. Gave CM follow up appt with Dr Deedee Dangelo. Pt with very poor to little adherence to health and no compliance with following up in physician office. Pt drug seeks IV dilaudid. Very high risk of readmission. Care Management Interventions  PCP Verified by CM: Yes  Palliative Care Criteria Met (RRAT>21 & CHF Dx)?: No  Transition of Care Consult (CM Consult): Discharge Planning  Physical Therapy Consult: No  Occupational Therapy Consult: No  Speech Therapy Consult: No  Current Support Network:  (mother)  Plan discussed with Pt/Family/Caregiver: Yes     Transition of Care (JAZMINE) Plan: Motel     JAZMINE Transportation:       How is patient being transported at discharge? Cab or friend or family     When?per pt     Is transport scheduled? Follow-up appointment and transportation:     PCP? Dr Deedee Dangelo     Specialist?     Time/Date? 6/4/18 at [address] [place] [address]     Who is transporting to the follow-up appointment?cab      Is transport for follow up appointment scheduled?per pt  Communication plan (with patient/family): Who is being called? Patient or Next of Kin? Responsible party? PATIENT      What number(s) is to be used? HAS NO PHONE NUMBER TO CALL      What service provider is calling for [facility] services? When are they calling? Readmission Risk?   (Green/Low; Yellow/Moderate; Red/High): RED/HIGH      Ranjan Patterson RN BSN  Outcomes Manager  Pager # [phone]

## 2018-05-31 NOTE — ROUTINE PROCESS
1920  Bedside and Verbal shift change report given to Kobi Haley (oncoming nurse) by Alma Delia Quinonez (offgoing nurse). Report included the following information SBAR, Kardex, Intake/Output and MAR. Pt awake and alert at this time    2100  Shift assessment complete and due meds given. Pt has no complaints at this time. Call bell within reach    0230  Reassessment complete. No change in pt condition    0740  Bedside and Verbal shift change report given to Rodrigo Bill (oncoming nurse) by Kobi Haley (offgoing nurse). Report included the following information SBAR, Kardex, Intake/Output and MAR.

## 2018-05-31 NOTE — PROGRESS NOTES
18- Report received from previous nurse, Krista TOSCANO. Patient up ad eileen in his room. 07:55- Assessment completed- charting. Call light in reach-Continue to monitor. 12:25- Discharge instructions reviewed and E-signed by patient. Patient reported that he does not have a ride until 2 P. M today. 13:30-- Noted by staff member that patient was not in his room and belongings gone. Patient was fully prepared for discharge.

## 2018-05-31 NOTE — PROGRESS NOTES
Problem: Falls - Risk of  Goal: *Absence of Falls  Document Serena Fall Risk and appropriate interventions in the flowsheet. Outcome: Progressing Towards Goal  Fall Risk Interventions:  Mobility Interventions: Patient to call before getting OOB    Mentation Interventions: Door open when patient unattended    Medication Interventions: Patient to call before getting OOB    Elimination Interventions: Call light in reach             Problem: Pressure Injury - Risk of  Goal: *Prevention of pressure injury  Document Austin Scale and appropriate interventions in the flowsheet.    Outcome: Progressing Towards Goal  Pressure Injury Interventions:       Moisture Interventions: Absorbent underpads    Activity Interventions: Pressure redistribution bed/mattress(bed type)    Mobility Interventions: Pressure redistribution bed/mattress (bed type)    Nutrition Interventions: Document food/fluid/supplement intake    Friction and Shear Interventions: HOB 30 degrees or less

## 2018-05-31 NOTE — DISCHARGE INSTRUCTIONS
Armband removed and shredded     DISCHARGE SUMMARY from Nurse    PATIENT INSTRUCTIONS:    What to do at Home:  Recommended activity: Activity as tolerated,     If you experience any of the following symptoms chest pain, nausea, vomiting or bleeding, please follow up with PCP. *  Please give a list of your current medications to your Primary Care Provider. *  Please update this list whenever your medications are discontinued, doses are      changed, or new medications (including over-the-counter products) are added. *  Please carry medication information at all times in case of emergency situations. These are general instructions for a healthy lifestyle:    No smoking/ No tobacco products/ Avoid exposure to second hand smoke  Surgeon General's Warning:  Quitting smoking now greatly reduces serious risk to your health. Obesity, smoking, and sedentary lifestyle greatly increases your risk for illness    A healthy diet, regular physical exercise & weight monitoring are important for maintaining a healthy lifestyle    You may be retaining fluid if you have a history of heart failure or if you experience any of the following symptoms:  Weight gain of 3 pounds or more overnight or 5 pounds in a week, increased swelling in our hands or feet or shortness of breath while lying flat in bed. Please call your doctor as soon as you notice any of these symptoms; do not wait until your next office visit. Recognize signs and symptoms of STROKE:    F-face looks uneven    A-arms unable to move or move unevenly    S-speech slurred or non-existent    T-time-call 911 as soon as signs and symptoms begin-DO NOT go       Back to bed or wait to see if you get better-TIME IS BRAIN. Warning Signs of HEART ATTACK     Call 911 if you have these symptoms:   Chest discomfort. Most heart attacks involve discomfort in the center of the chest that lasts more than a few minutes, or that goes away and comes back.  It can feel like uncomfortable pressure, squeezing, fullness, or pain.  Discomfort in other areas of the upper body. Symptoms can include pain or discomfort in one or both arms, the back, neck, jaw, or stomach.  Shortness of breath with or without chest discomfort.  Other signs may include breaking out in a cold sweat, nausea, or lightheadedness. Don't wait more than five minutes to call 911 - MINUTES MATTER! Fast action can save your life. Calling 911 is almost always the fastest way to get lifesaving treatment. Emergency Medical Services staff can begin treatment when they arrive -- up to an hour sooner than if someone gets to the hospital by car. The discharge information has been reviewed with the patient. The patient verbalized understanding. Discharge medications reviewed with the patient and appropriate educational materials and side effects teaching were provided. ___________________________________________________________________________________________________________________________________               Pancreatitis: Care Instructions  Your Care Instructions    The pancreas is an organ behind the stomach. It makes hormones and enzymes to help your body digest food. But if these enzymes attack the pancreas, it can get inflamed. This is called pancreatitis. Most cases are caused by gallstones or by heavy alcohol use. If you take care of yourself at home, it will help you get better. It will also help you avoid more problems with your pancreas. Follow-up care is a key part of your treatment and safety. Be sure to make and go to all appointments, and call your doctor if you are having problems. It's also a good idea to know your test results and keep a list of the medicines you take. How can you care for yourself at home? · Drink clear liquids and eat bland foods until you feel better. [place] foods include rice, dry toast, and crackers. They also include bananas and applesauce.   · Eat a low-fat diet until your doctor says your pancreas is healed. · Do not drink alcohol. Tell your doctor if you need help to quit. Counseling, support groups, and sometimes medicines can help you stay sober. · Be safe with medicines. Read and follow all instructions on the label. ¨ If the doctor gave you a prescription medicine for pain, take it as prescribed. ¨ If you are not taking a prescription pain medicine, ask your doctor if you can take an over-the-counter medicine. · If your doctor prescribed antibiotics, take them as directed. Do not stop taking them just because you feel better. You need to take the full course of antibiotics. · Get extra rest until you feel better. To prevent future problems with your pancreas  · Do not drink alcohol. · Tell your doctors and pharmacist that you've had pancreatitis. They can help you avoid medicines that may cause this problem again. When should you call for help? Call 911 anytime you think you may need emergency care. For example, call if:  ? · You vomit blood or what looks like coffee grounds. ? · Your stools are maroon or very bloody. ?Call your doctor now or seek immediate medical care if:  ? · You have new or worse belly pain. ? · Your stools are black and look like tar, or they have streaks of blood. ? · You are vomiting. ? Watch closely for changes in your health, and be sure to contact your doctor if:  ? · You do not get better as expected. Where can you learn more? Go to http://thiago-mikhail.info/. Enter R835 in the search box to learn more about \"Pancreatitis: Care Instructions. \"  Current as of: May 12, 2017  Content Version: 11.4  © 1925-7030 [employer]. Care instructions adapted under license by [employer] (which disclaims liability or warranty for this information).  If you have questions about a medical condition or this instruction, always ask your healthcare professional. [address]  any warranty or liability for your use of this information.

## 2018-06-02 PROBLEM — N17.9 ACUTE KIDNEY INJURY (NONTRAUMATIC) (HCC): Status: ACTIVE | Noted: 2018-06-02

## 2018-06-02 PROBLEM — R55 SYNCOPE: Status: ACTIVE | Noted: 2018-06-02

## 2018-06-02 PROBLEM — S14.129A CENTRAL CORD SYNDROME (HCC): Status: ACTIVE | Noted: 2018-06-02

## 2018-06-09 PROBLEM — N17.9 ACUTE KIDNEY INJURY (NONTRAUMATIC) (HCC): Status: RESOLVED | Noted: 2018-06-02 | Resolved: 2018-06-09

## 2018-06-09 PROBLEM — R55 SYNCOPE: Status: RESOLVED | Noted: 2018-06-02 | Resolved: 2018-06-09

## 2018-06-09 PROBLEM — R11.10 VOMITING: Status: RESOLVED | Noted: 2017-08-05 | Resolved: 2018-06-09

## 2018-06-16 ENCOUNTER — APPOINTMENT (OUTPATIENT)
Dept: GENERAL RADIOLOGY | Age: 53
DRG: 420 | End: 2018-06-16
Attending: EMERGENCY MEDICINE
Payer: MEDICAID

## 2018-06-16 ENCOUNTER — APPOINTMENT (OUTPATIENT)
Dept: CT IMAGING | Age: 53
DRG: 420 | End: 2018-06-16
Attending: EMERGENCY MEDICINE
Payer: MEDICAID

## 2018-06-16 ENCOUNTER — HOSPITAL ENCOUNTER (INPATIENT)
Age: 53
LOS: 3 days | Discharge: HOME HEALTH | DRG: 420 | End: 2018-06-19
Attending: EMERGENCY MEDICINE | Admitting: HOSPITALIST
Payer: MEDICAID

## 2018-06-16 DIAGNOSIS — K56.7 ILEUS (HCC): ICD-10-CM

## 2018-06-16 DIAGNOSIS — R73.9 HYPERGLYCEMIA: Primary | ICD-10-CM

## 2018-06-16 DIAGNOSIS — T85.520D BILIARY DRAIN DISPLACEMENT, SUBSEQUENT ENCOUNTER: ICD-10-CM

## 2018-06-16 DIAGNOSIS — S14.129A CENTRAL CORD SYNDROME, INITIAL ENCOUNTER (HCC): ICD-10-CM

## 2018-06-16 DIAGNOSIS — K86.0 CHRONIC PANCREATITIS DUE TO ACUTE ALCOHOL INTOXICATION (HCC): ICD-10-CM

## 2018-06-16 DIAGNOSIS — F10.929 CHRONIC PANCREATITIS DUE TO ACUTE ALCOHOL INTOXICATION (HCC): ICD-10-CM

## 2018-06-16 LAB
ADMINISTERED INITIALS, ADMINIT: NORMAL
ALBUMIN SERPL-MCNC: 3.2 G/DL (ref 3.4–5)
ALBUMIN/GLOB SERPL: 0.9 {RATIO} (ref 0.8–1.7)
ALP SERPL-CCNC: 277 U/L (ref 45–117)
ALT SERPL-CCNC: 124 U/L (ref 16–61)
ANION GAP SERPL CALC-SCNC: 10 MMOL/L (ref 3–18)
APPEARANCE UR: CLEAR
AST SERPL-CCNC: 47 U/L (ref 15–37)
BACTERIA URNS QL MICRO: NEGATIVE /HPF
BASOPHILS # BLD: 0 K/UL (ref 0–0.06)
BASOPHILS NFR BLD: 0 % (ref 0–2)
BILIRUB SERPL-MCNC: 0.7 MG/DL (ref 0.2–1)
BILIRUB UR QL: NEGATIVE
BUN SERPL-MCNC: 23 MG/DL (ref 7–18)
BUN/CREAT SERPL: 15 (ref 12–20)
CALCIUM SERPL-MCNC: 8.1 MG/DL (ref 8.5–10.1)
CHLORIDE SERPL-SCNC: 94 MMOL/L (ref 100–108)
CK MB CFR SERPL CALC: ABNORMAL % (ref 0–4)
CK MB SERPL-MCNC: <1 NG/ML (ref 5–25)
CK SERPL-CCNC: 24 U/L (ref 39–308)
CO2 SERPL-SCNC: 24 MMOL/L (ref 21–32)
COLOR UR: YELLOW
CREAT SERPL-MCNC: 1.55 MG/DL (ref 0.6–1.3)
D50 ADMINISTERED, D50ADM: 0 ML
D50 ORDER, D50ORD: 0 ML
DIFFERENTIAL METHOD BLD: ABNORMAL
EOSINOPHIL # BLD: 0.1 K/UL (ref 0–0.4)
EOSINOPHIL NFR BLD: 1 % (ref 0–5)
EPITH CASTS URNS QL MICRO: NORMAL /LPF (ref 0–5)
ERYTHROCYTE [DISTWIDTH] IN BLOOD BY AUTOMATED COUNT: 13.5 % (ref 11.6–14.5)
EST. AVERAGE GLUCOSE BLD GHB EST-MCNC: 177 MG/DL
GLOBULIN SER CALC-MCNC: 3.4 G/DL (ref 2–4)
GLUCOSE BLD STRIP.AUTO-MCNC: 244 MG/DL (ref 70–110)
GLUCOSE BLD STRIP.AUTO-MCNC: 520 MG/DL (ref 70–110)
GLUCOSE BLD STRIP.AUTO-MCNC: >600 MG/DL (ref 70–110)
GLUCOSE SERPL-MCNC: 791 MG/DL (ref 74–99)
GLUCOSE UR STRIP.AUTO-MCNC: >1000 MG/DL
GLUCOSE, GLC: 244 MG/DL
GLUCOSE, GLC: 520 MG/DL
GLUCOSE, GLC: 601 MG/DL
HBA1C MFR BLD: 7.8 % (ref 4.2–5.6)
HCT VFR BLD AUTO: 33.9 % (ref 36–48)
HGB BLD-MCNC: 11.2 G/DL (ref 13–16)
HGB UR QL STRIP: ABNORMAL
HIGH TARGET, HITG: 180 MG/DL
INSULIN ADMINSTERED, INSADM: 1.8 UNITS/HOUR
INSULIN ADMINSTERED, INSADM: 10.8 UNITS/HOUR
INSULIN ADMINSTERED, INSADM: 4.6 UNITS/HOUR
INSULIN ORDER, INSORD: 1.8 UNITS/HOUR
INSULIN ORDER, INSORD: 10.8 UNITS/HOUR
INSULIN ORDER, INSORD: 4.6 UNITS/HOUR
KETONES UR QL STRIP.AUTO: NEGATIVE MG/DL
LACTATE BLD-SCNC: 1.8 MMOL/L (ref 0.4–2)
LEUKOCYTE ESTERASE UR QL STRIP.AUTO: NEGATIVE
LIPASE SERPL-CCNC: 48 U/L (ref 73–393)
LOW TARGET, LOT: 140 MG/DL
LYMPHOCYTES # BLD: 1.4 K/UL (ref 0.9–3.6)
LYMPHOCYTES NFR BLD: 10 % (ref 21–52)
MCH RBC QN AUTO: 32 PG (ref 24–34)
MCHC RBC AUTO-ENTMCNC: 33 G/DL (ref 31–37)
MCV RBC AUTO: 96.9 FL (ref 74–97)
MINUTES UNTIL NEXT BG, NBG: 60 MIN
MONOCYTES # BLD: 0.9 K/UL (ref 0.05–1.2)
MONOCYTES NFR BLD: 6 % (ref 3–10)
MULTIPLIER, MUL: 0.01
MULTIPLIER, MUL: 0.01
MULTIPLIER, MUL: 0.02
NEUTS SEG # BLD: 11.3 K/UL (ref 1.8–8)
NEUTS SEG NFR BLD: 83 % (ref 40–73)
NITRITE UR QL STRIP.AUTO: NEGATIVE
ORDER INITIALS, ORDINIT: NORMAL
PH UR STRIP: 5.5 [PH] (ref 5–8)
PLATELET # BLD AUTO: 246 K/UL (ref 135–420)
PMV BLD AUTO: 9.8 FL (ref 9.2–11.8)
POTASSIUM SERPL-SCNC: 5.1 MMOL/L (ref 3.5–5.5)
PROT SERPL-MCNC: 6.6 G/DL (ref 6.4–8.2)
PROT UR STRIP-MCNC: NEGATIVE MG/DL
RBC # BLD AUTO: 3.5 M/UL (ref 4.7–5.5)
RBC #/AREA URNS HPF: NORMAL /HPF (ref 0–5)
SODIUM SERPL-SCNC: 128 MMOL/L (ref 136–145)
SP GR UR REFRACTOMETRY: >1.03 (ref 1–1.03)
TROPONIN I SERPL-MCNC: <0.02 NG/ML (ref 0–0.04)
UROBILINOGEN UR QL STRIP.AUTO: 0.2 EU/DL (ref 0.2–1)
WBC # BLD AUTO: 13.6 K/UL (ref 4.6–13.2)
WBC URNS QL MICRO: NORMAL /HPF (ref 0–4)

## 2018-06-16 PROCEDURE — 82962 GLUCOSE BLOOD TEST: CPT

## 2018-06-16 PROCEDURE — 74011250636 HC RX REV CODE- 250/636: Performed by: EMERGENCY MEDICINE

## 2018-06-16 PROCEDURE — 96375 TX/PRO/DX INJ NEW DRUG ADDON: CPT

## 2018-06-16 PROCEDURE — 83036 HEMOGLOBIN GLYCOSYLATED A1C: CPT | Performed by: EMERGENCY MEDICINE

## 2018-06-16 PROCEDURE — 74176 CT ABD & PELVIS W/O CONTRAST: CPT

## 2018-06-16 PROCEDURE — 84484 ASSAY OF TROPONIN QUANT: CPT | Performed by: EMERGENCY MEDICINE

## 2018-06-16 PROCEDURE — 93005 ELECTROCARDIOGRAM TRACING: CPT

## 2018-06-16 PROCEDURE — 99285 EMERGENCY DEPT VISIT HI MDM: CPT

## 2018-06-16 PROCEDURE — 74011636637 HC RX REV CODE- 636/637: Performed by: EMERGENCY MEDICINE

## 2018-06-16 PROCEDURE — 83690 ASSAY OF LIPASE: CPT | Performed by: EMERGENCY MEDICINE

## 2018-06-16 PROCEDURE — 81001 URINALYSIS AUTO W/SCOPE: CPT | Performed by: EMERGENCY MEDICINE

## 2018-06-16 PROCEDURE — 74011000258 HC RX REV CODE- 258: Performed by: EMERGENCY MEDICINE

## 2018-06-16 PROCEDURE — 77030021352 HC CBL LD SYS DISP COVD -B

## 2018-06-16 PROCEDURE — 85025 COMPLETE CBC W/AUTO DIFF WBC: CPT | Performed by: EMERGENCY MEDICINE

## 2018-06-16 PROCEDURE — 96365 THER/PROPH/DIAG IV INF INIT: CPT

## 2018-06-16 PROCEDURE — 71045 X-RAY EXAM CHEST 1 VIEW: CPT

## 2018-06-16 PROCEDURE — 65270000029 HC RM PRIVATE

## 2018-06-16 PROCEDURE — 96361 HYDRATE IV INFUSION ADD-ON: CPT

## 2018-06-16 PROCEDURE — 80053 COMPREHEN METABOLIC PANEL: CPT | Performed by: EMERGENCY MEDICINE

## 2018-06-16 PROCEDURE — 70450 CT HEAD/BRAIN W/O DYE: CPT

## 2018-06-16 PROCEDURE — 83605 ASSAY OF LACTIC ACID: CPT

## 2018-06-16 RX ORDER — MAGNESIUM SULFATE 100 %
4 CRYSTALS MISCELLANEOUS AS NEEDED
Status: DISCONTINUED | OUTPATIENT
Start: 2018-06-16 | End: 2018-06-19 | Stop reason: HOSPADM

## 2018-06-16 RX ORDER — MORPHINE SULFATE 10 MG/ML
5 INJECTION, SOLUTION INTRAMUSCULAR; INTRAVENOUS ONCE
Status: COMPLETED | OUTPATIENT
Start: 2018-06-16 | End: 2018-06-16

## 2018-06-16 RX ORDER — DEXTROSE 50 % IN WATER (D50W) INTRAVENOUS SYRINGE
25-50 AS NEEDED
Status: DISCONTINUED | OUTPATIENT
Start: 2018-06-16 | End: 2018-06-19 | Stop reason: HOSPADM

## 2018-06-16 RX ORDER — MORPHINE SULFATE 10 MG/ML
4 INJECTION, SOLUTION INTRAMUSCULAR; INTRAVENOUS
Status: COMPLETED | OUTPATIENT
Start: 2018-06-16 | End: 2018-06-16

## 2018-06-16 RX ORDER — FENTANYL CITRATE 50 UG/ML
50 INJECTION, SOLUTION INTRAMUSCULAR; INTRAVENOUS
Status: COMPLETED | OUTPATIENT
Start: 2018-06-16 | End: 2018-06-16

## 2018-06-16 RX ORDER — SODIUM CHLORIDE 9 MG/ML
1000 INJECTION, SOLUTION INTRAVENOUS ONCE
Status: COMPLETED | OUTPATIENT
Start: 2018-06-16 | End: 2018-06-16

## 2018-06-16 RX ORDER — ONDANSETRON 2 MG/ML
4 INJECTION INTRAMUSCULAR; INTRAVENOUS
Status: COMPLETED | OUTPATIENT
Start: 2018-06-16 | End: 2018-06-16

## 2018-06-16 RX ADMIN — FENTANYL CITRATE 50 MCG: 50 INJECTION, SOLUTION INTRAMUSCULAR; INTRAVENOUS at 20:30

## 2018-06-16 RX ADMIN — MORPHINE SULFATE 5 MG: 10 INJECTION INTRAMUSCULAR; INTRAVENOUS; SUBCUTANEOUS at 22:45

## 2018-06-16 RX ADMIN — SODIUM CHLORIDE 10.8 UNITS/HR: 900 INJECTION, SOLUTION INTRAVENOUS at 20:57

## 2018-06-16 RX ADMIN — SODIUM CHLORIDE 1000 ML: 900 INJECTION, SOLUTION INTRAVENOUS at 19:10

## 2018-06-16 RX ADMIN — ONDANSETRON 4 MG: 2 INJECTION INTRAMUSCULAR; INTRAVENOUS at 19:09

## 2018-06-16 RX ADMIN — MORPHINE SULFATE 4 MG: 10 INJECTION INTRAMUSCULAR; INTRAVENOUS; SUBCUTANEOUS at 19:09

## 2018-06-16 RX ADMIN — PROMETHAZINE HYDROCHLORIDE 25 MG: 25 INJECTION INTRAMUSCULAR; INTRAVENOUS at 20:30

## 2018-06-16 NOTE — IP AVS SNAPSHOT
[address] [address] 
[phone] Patient: Yessenia Singer 
MRN: [id number] :1965 A check vidhi indicates which time of day the medication should be taken. My Medications CONTINUE taking these medications Instructions Each Dose to Equal  
 Morning Noon Evening Bedtime  
 amitriptyline 75 mg tablet Commonly known as:  ELAVIL Take 1 Tab by mouth nightly. Indications: Depression 75 mg  
    
   
   
   
  
  
 ascorbic acid (vitamin C) 500 mg tablet Commonly known as:  VITAMIN C Take 1 Tab by mouth daily. Indications: VITAMIN C DEFICIENCY  
 500 mg  
    
  
   
   
   
  
 cholecalciferol (vitamin D3) 2,000 unit Tab Take 1 Tab by mouth daily. Indications: PREVENTION OF VITAMIN D DEFICIENCY  
 2000 Units CREON 12,000-38,000 -60,000 unit capsule Generic drug:  lipase-protease-amylase Take 2 Caps by mouth three (3) times daily (with meals). Indications: exocrine pancreatic insufficiency, dosage change 2 Cap  
    
  
   
  
   
  
   
  
 cyanocobalamin 1,000 mcg sublingual tablet Commonly known as:  VITAMIN B-12 Take 2 Tabs by mouth daily. Indications: PREVENTION OF VITAMIN B12 DEFICIENCY  
 2000 mcg  
    
  
   
   
   
  
 dexamethasone 1 mg tablet Commonly known as:  DECADRON Take 4mg (4 tablets) by mouth twice a day for 2 days, then 2mg BID for 2 days, then 1mg BID for 2 days, then 1 mg daily for 2 days until gone. Indications: DIAGNOSTIC TEST FOR CUSHING'S SYNDROME, cervical stenosis  
     
   
   
   
  
 ferrous sulfate 325 mg (65 mg iron) tablet Take 1 Tab by mouth two (2) times a day. Indications: IRON DEFICIENCY ANEMIA  
 325 mg  
    
  
   
   
  
   
  
 folic acid 025 mcg tablet Take 1 Tab by mouth daily. 0.4 mg  
    
  
   
   
   
  
 gabapentin 300 mg capsule Commonly known as:  NEURONTIN  
   
 Take 2 Caps by mouth three (3) times daily for 30 days. Indications: NEUROPATHIC PAIN  
 600 mg  
    
  
   
  
   
  
   
  
 insulin lispro 100 unit/mL injection Commonly known as:  HUMALOG  
   
 12 Units by SubCUTAneous route nightly. Indications: type 2 diabetes mellitus 12 Units  
    
   
   
   
  
  
 ondansetron 4 mg disintegrating tablet Commonly known as:  ZOFRAN ODT Take 1 Tab by mouth every eight (8) hours as needed for Nausea. 4 mg  
    
   
   
   
  
 oxyCODONE-acetaminophen  mg per tablet Commonly known as:  PERCOCET Take 1 Tab by mouth every six (6) hours as needed for Pain. Max Daily Amount: 4 Tabs. Indications: Pain 1 Tab  
    
   
   
   
  
 pantoprazole 40 mg tablet Commonly known as:  PROTONIX Take 1 Tab by mouth two (2) times a day. Indications: EROSIVE ESOPHAGITIS, gastroesophageal reflux disease 40 mg  
    
  
   
   
   
  
 promethazine 25 mg tablet Commonly known as:  PHENERGAN Take 1 Tab by mouth every six (6) hours as needed. 25 mg  
    
   
   
   
  
 sucralfate 1 gram tablet Commonly known as:  Deepa [place] Take 1 Tab by mouth four (4) times daily. Indications: PREVENTION OF STRESS ULCER  
 1 g Where to Get Your Medications Information on where to get these meds will be given to you by the nurse or doctor. ! Ask your nurse or doctor about these medications  
  insulin lispro 100 unit/mL injection  
 oxyCODONE-acetaminophen  mg per tablet  
 promethazine 25 mg tablet

## 2018-06-16 NOTE — ED PROVIDER NOTES
EMERGENCY DEPARTMENT HISTORY AND PHYSICAL EXAM    7:07 PM      Date: 6/16/2018  Patient Name: Mireille Shannon    History of Presenting Illness     Chief Complaint   Patient presents with    Lethargy    Headache    Chest Pain    Fall    Fever         History Provided By: Patient    Chief Complaint: pain   Duration:  2 days  Timing:  Constant  Location: generalized but worse in posterior neck, bilateral upper extremities,   Quality: shooting pain  Severity: Severe  Modifying Factors: had syncopal episode one day ago and struck head against wall   Associated Symptoms: upper abd pain, emesis, diarrhea, dark stool, HA, dehydrated       Additional History (Context): Mireille Shannon, a 48 y.o. Male, smoker, occasional alcohol drinker, nonsubstance abuser with h/o DM and chronic neck pain, on 22 units of insulin qsh and on 16 units of lantus qday, allergic to penicillin, with a syncopal episode 1 day ago during which he fell and hit his head against a wall, presents to the ED with severe, constant pain to his entire body, particularly to his posterior neck and bilateral upper extremities, x 2 days that is not associated with fever, dyspnea or cp but that is associated with emesis, diarrhea, dehydration, upper abd pain and dark stool (has had dark stool since this afternoon). He has taken advil at home but no other pain medication. He also presents to the ED with hyperglycemia that is not associated with urinary sx. No other complaints/concerns are reported at this time.        PCP: Delia Slaughter MD    Current Facility-Administered Medications   Medication Dose Route Frequency Provider Last Rate Last Dose    insulin regular (NOVOLIN R, HUMULIN R) 100 Units in 0.9% sodium chloride 100 mL infusion  0-50 Units/hr IntraVENous TITRATE Britney Embs, DO 10.8 mL/hr at 06/16/18 2057 10.8 Units/hr at 06/16/18 2057    glucose chewable tablet 16 g  4 Tab Oral PRN Britney Embs, DO        glucagon [facility]) injection 1 mg  1 mg IntraMUSCular PRN Darylene Arsalan, DO        dextrose (D50W) injection syrg 12.5-25 g  25-50 mL IntraVENous PRN Darylene Arsalan, DO        insulin regular (Novolin,Humulin) premix infusion              Current Outpatient Prescriptions   Medication Sig Dispense Refill    gabapentin (NEURONTIN) 300 mg capsule Take 2 Caps by mouth three (3) times daily for 30 days. Indications: NEUROPATHIC PAIN 180 Cap 0    oxyCODONE-acetaminophen (PERCOCET)  mg per tablet Take 1 Tab by mouth every six (6) hours as needed for Pain. Max Daily Amount: 4 Tabs. Indications: Pain 20 Tab 0    dexamethasone (DECADRON) 1 mg tablet Take 4mg (4 tablets) by mouth twice a day for 2 days, then 2mg BID for 2 days, then 1mg BID for 2 days, then 1 mg daily for 2 days until gone. Indications: DIAGNOSTIC TEST FOR CUSHING'S SYNDROME, cervical stenosis 30 Tab 0    promethazine (PHENERGAN) 25 mg tablet Take 1 Tab by mouth every six (6) hours as needed. 12 Tab 0    ondansetron (ZOFRAN ODT) 4 mg disintegrating tablet Take 1 Tab by mouth every eight (8) hours as needed for Nausea. 9 Tab 0    sucralfate (CARAFATE) 1 gram tablet Take 1 Tab by mouth four (4) times daily. Indications: PREVENTION OF STRESS ULCER 30 Tab 0    amitriptyline (ELAVIL) 75 mg tablet Take 1 Tab by mouth nightly. Indications: Depression 30 Tab 0    ascorbic acid, vitamin C, (VITAMIN C) 500 mg tablet Take 1 Tab by mouth daily. Indications: VITAMIN C DEFICIENCY 30 Tab 0    cholecalciferol, vitamin D3, 2,000 unit tab Take 1 Tab by mouth daily. Indications: PREVENTION OF VITAMIN D DEFICIENCY 30 Tab 0    cyanocobalamin (VITAMIN B-12) 1,000 mcg sublingual tablet Take 2 Tabs by mouth daily. Indications: PREVENTION OF VITAMIN B12 DEFICIENCY 30 Tab 0    ferrous sulfate 325 mg (65 mg iron) tablet Take 1 Tab by mouth two (2) times a day. Indications: IRON DEFICIENCY ANEMIA 60 Tab 0    folic acid 244 mcg tablet Take 1 Tab by mouth daily.  30 Tab 0    insulin lispro (HUMALOG) 100 unit/mL injection 12 Units by SubCUTAneous route nightly. Indications: type 2 diabetes mellitus 1 Vial 0    lipase-protease-amylase (CREON) 12,000-38,000 -60,000 unit capsule Take 2 Caps by mouth three (3) times daily (with meals). Indications: exocrine pancreatic insufficiency, dosage change 180 Cap 0    pantoprazole (PROTONIX) 40 mg tablet Take 1 Tab by mouth two (2) times a day. Indications: EROSIVE ESOPHAGITIS, gastroesophageal reflux disease 60 Tab 0       Past History     Past Medical History:  Past Medical History:   Diagnosis Date    Abdominal pain, generalized     Chronic pancreatitis ([facility]     Diabetes ([facility] [facility]     hypothyroid    Encounter for long-term (current) use of other medications     Essential hypertension     ETOH abuse     GERD (gastroesophageal reflux disease)     Heart failure (HCC)     Hyponatremia     Pain in the abdomen 11/2/2010    Pancreatitis     w/ abscess and pseudocyst    Pancreatitis     Psychiatric disorder     depression, anxiety    Tobacco abuse        Past Surgical History:  Past Surgical History:   Procedure Laterality Date    HX ABDOMINAL LAPAROSCOPY      PANCREATIC STENT    HX CHOLECYSTECTOMY         Family History:  Family History   Problem Relation Age of Onset    Heart Disease Father        Social History:  Social History   Substance Use Topics    Smoking status: Current Every Day Smoker     Packs/day: 0.50     Years: 0.00     Types: Cigarettes    Smokeless tobacco: Never Used    Alcohol use Yes       Allergies: Allergies   Allergen Reactions    Ampicillin-Sulbactam Rash    Pcn [Penicillins] Itching and Hives    Piperacillin-Tazobactam Rash    Pollen Extracts Rash    Seafood [Shellfish Containing Products] Hives     Other reaction(s): unknown  Has tolerated iohexol (iodine-containing contrast)  Only shrimp  Shrimp         Review of Systems       Review of Systems   Constitutional: Negative for fever.         Dehydrated   Elevated blood glucose      Respiratory: Negative for shortness of breath. Cardiovascular: Negative for chest pain. Gastrointestinal: Positive for abdominal pain (upper abd pain ), diarrhea and vomiting. Dark stool    Genitourinary: Negative for dysuria, frequency and hematuria. Musculoskeletal: Positive for neck pain. Generalized body aches including neck pain and bilateral upper extremity pain    Neurological: Positive for headaches. All other systems reviewed and are negative. Physical Exam     Visit Vitals    /76 (BP 1 Location: Left arm, BP Patient Position: At rest)    Pulse 95    Temp 97.8 °F (36.6 °C)    Resp 17    Ht 6' (1.829 m)    Wt 68 kg (150 lb)    SpO2 100%    BMI 20.34 kg/m2         Physical Exam   Constitutional: He is oriented to person, place, and time. Appears chronically ill    HENT:   Head: Normocephalic and atraumatic. Neck: Neck supple. No JVD present. Cardiovascular: Normal rate and regular rhythm. Pulmonary/Chest: Effort normal and breath sounds normal. No respiratory distress. left chest wall port in place   Abdominal: Soft. He exhibits no distension. There is tenderness (generalized). There is no rebound and no guarding. Genitourinary:   Genitourinary Comments: rectal: no external hemorrhoid, brown stool, guaiac positive, no gross blood     Musculoskeletal: He exhibits no edema. Neurological: He is alert and oriented to person, place, and time. Skin: Skin is warm and dry. No erythema.    Psychiatric: Judgment normal.         Diagnostic Study Results     Labs -  Recent Results (from the past 12 hour(s))   CBC WITH AUTOMATED DIFF    Collection Time: 06/16/18  6:10 PM   Result Value Ref Range    WBC 13.6 (H) 4.6 - 13.2 K/uL    RBC 3.50 (L) 4.70 - 5.50 M/uL    HGB 11.2 (L) 13.0 - 16.0 g/dL    HCT 33.9 (L) 36.0 - 48.0 %    MCV 96.9 74.0 - 97.0 FL    MCH 32.0 24.0 - 34.0 PG    MCHC 33.0 31.0 - 37.0 g/dL    RDW 13.5 11.6 - 14.5 %    PLATELET 246 135 - 420 K/uL    MPV 9.8 9.2 - 11.8 FL    NEUTROPHILS 83 (H) 40 - 73 %    LYMPHOCYTES 10 (L) 21 - 52 %    MONOCYTES 6 3 - 10 %    EOSINOPHILS 1 0 - 5 %    BASOPHILS 0 0 - 2 %    ABS. NEUTROPHILS 11.3 (H) 1.8 - 8.0 K/UL    ABS. LYMPHOCYTES 1.4 0.9 - 3.6 K/UL    ABS. MONOCYTES 0.9 0.05 - 1.2 K/UL    ABS. EOSINOPHILS 0.1 0.0 - 0.4 K/UL    ABS. BASOPHILS 0.0 0.0 - 0.06 K/UL    DF AUTOMATED     METABOLIC PANEL, COMPREHENSIVE    Collection Time: 06/16/18  6:10 PM   Result Value Ref Range    Sodium 128 (L) 136 - 145 mmol/L    Potassium 5.1 3.5 - 5.5 mmol/L    Chloride 94 (L) 100 - 108 mmol/L    CO2 24 21 - 32 mmol/L    Anion gap 10 3.0 - 18 mmol/L    Glucose 791 (HH) 74 - 99 mg/dL    BUN 23 (H) 7.0 - 18 MG/DL    Creatinine 1.55 (H) 0.6 - 1.3 MG/DL    BUN/Creatinine ratio 15 12 - 20      GFR est AA 57 (L) >60 ml/min/1.73m2    GFR est non-AA 47 (L) >60 ml/min/1.73m2    Calcium 8.1 (L) 8.5 - 10.1 MG/DL    Bilirubin, total 0.7 0.2 - 1.0 MG/DL    ALT (SGPT) 124 (H) 16 - 61 U/L    AST (SGOT) 47 (H) 15 - 37 U/L    Alk.  phosphatase 277 (H) 45 - 117 U/L    Protein, total 6.6 6.4 - 8.2 g/dL    Albumin 3.2 (L) 3.4 - 5.0 g/dL    Globulin 3.4 2.0 - 4.0 g/dL    A-G Ratio 0.9 0.8 - 1.7     CARDIAC PANEL,(CK, CKMB & TROPONIN)    Collection Time: 06/16/18  6:10 PM   Result Value Ref Range    CK 24 (L) 39 - 308 U/L    CK - MB <1.0 <3.6 ng/ml    CK-MB Index  0.0 - 4.0 %     CALCULATION NOT PERFORMED WHEN RESULT IS BELOW LINEAR LIMIT    Troponin-I, Qt. <0.02 0.0 - 0.045 NG/ML   LIPASE    Collection Time: 06/16/18  6:10 PM   Result Value Ref Range    Lipase 48 (L) 73 - 393 U/L   HEMOGLOBIN A1C WITH EAG    Collection Time: 06/16/18  6:10 PM   Result Value Ref Range    Hemoglobin A1c 7.8 (H) 4.2 - 5.6 %    Est. average glucose 177 mg/dL   POC LACTIC ACID    Collection Time: 06/16/18  6:34 PM   Result Value Ref Range    Lactic Acid (POC) 1.8 0.4 - 2.0 mmol/L   EKG, 12 LEAD, INITIAL    Collection Time: 06/16/18  7:07 PM   Result Value Ref Range    Ventricular Rate 98 BPM    Atrial Rate 98 BPM    P-R Interval 130 ms    QRS Duration 84 ms    Q-T Interval 340 ms    QTC Calculation (Bezet) 434 ms    Calculated P Axis 65 degrees    Calculated R Axis -41 degrees    Calculated T Axis 70 degrees    Diagnosis       Normal sinus rhythm  Left axis deviation  Abnormal ECG  When compared with ECG of 22-MAY-2018 02:00,  ST no longer depressed in Inferior leads  ST no longer depressed in Anterolateral leads  Nonspecific T wave abnormality no longer evident in Lateral leads     GLUCOSE, POC    Collection Time: 06/16/18  7:10 PM   Result Value Ref Range    Glucose (POC) >600 (HH) 70 - 110 mg/dL   URINALYSIS W/ RFLX MICROSCOPIC    Collection Time: 06/16/18  8:52 PM   Result Value Ref Range    Color YELLOW      Appearance CLEAR      Specific gravity >1.030 (H) 1.005 - 1.030    pH (UA) 5.5 5.0 - 8.0      Protein NEGATIVE  NEG mg/dL    Glucose >1000 (A) NEG mg/dL    Ketone NEGATIVE  NEG mg/dL    Bilirubin NEGATIVE  NEG      Blood TRACE (A) NEG      Urobilinogen 0.2 0.2 - 1.0 EU/dL    Nitrites NEGATIVE  NEG      Leukocyte Esterase NEGATIVE  NEG     GLUCOSTABILIZER    Collection Time: 06/16/18  8:58 PM   Result Value Ref Range    Glucose 601 mg/dL    Insulin order 10.8 units/hour    Insulin adminstered 10.8 units/hour    Multiplier 0.020     Low target 140 mg/dL    High target 180 mg/dL    D50 order 0.0 ml    D50 administered 0.00 ml    Minutes until next BG 60 min    Order initials OV     Administered initials OV        Radiologic Studies -   CT HEAD WO CONT    (Results Pending)   XR CHEST PORT    (Results Pending)   CT ABD PELV WO CONT    (Results Pending)     XR CHEST PORT  -------------------------------------------  Impression by Dr. Angi Olivier at 8:12 PM   No acute process    CTD-HEAD without IV CONTRAST  ---------------------------------------------------------  Preliminary Reading at 20:17    Findings:   1. No acute intracranial abnormality  2.  Right maxillary mucous retention cyst.       CTD-CT ABD/PELV W/O CON  -----------------------------------------------------------  Preliminary Reading at 20:30    Findings:   1. Stable appearance of the duodenal thickening with inflammation surrounding the tract to the skin surface along the right flank. No evidence of drainable fluid collection. 2. Stable appearance of the pancreas with stable placement of the pancreatic stent. 3. Multiple fluid filled loops of small bowel in the right abdomen and right pelvis may reprsent a degree of mild illeus related to inflammation of the fistulous tract although these also appear stable compared to prior. Medical Decision Making   I am the first provider for this patient. I reviewed the vital signs, available nursing notes, past medical history, past surgical history, family history and social history. Vital Signs-Reviewed the patient's vital signs. Pulse Oximetry Analysis -  100% on room air (Interpretation) Normal     Cardiac Monitor:  Rate: 95  Rhythm:  Normal Sinus Rhythm     EKG: Interpreted by the EP. Time Interpreted: 19:07   Rate: 98   Rhythm: Normal Sinus Rhythm    Interpretation: Left axis, normal intervals, no st changes, no brugada, no delta wave   Comparison: Unchanged from prior on 5/22/18    Records Reviewed: Nursing Notes and Old Medical Records (Time of Review: 7:07 PM)    ED Course: Progress Notes, Reevaluation, and Consults:    Provider Notes (Medical Decision Making): 47 yo presents with reported syncope 1 day ago, where he struck head. he seems poor historia, will eval for ICH with CT. has chronic central core syndrome. Do not feel new spinal imaging is indicated at this time as he doesn't have new deficits. has elevated blood sugar, will eval for DKA, give fluid bolus, check basic labs. EKG to eval for arrhythmia because of reported syncope. No DVT signs. Pt with hyperglycemia, not in dka, also ileus and vomiting so will admit.  Started on glucosestabilizer  Noted elevated wbc so ct to eval.     9:08 PM Consult: I discussed care with Dr. Neli Fuller (hospitalist). It was a standard discussion including patient history, chief complaint, available diagnostic results, and predicted treatment course. Will admit pt. For Hospitalized Patients:    1. Hospitalization Decision Time:  The decision to hospitalize the patient was made by Dr. Katey Mckeon at 9:10 PM on 6/16/2018    2. Aspirin: Aspirin was not given because the patient did not present with a stroke at the time of their Emergency Department evaluation    Diagnosis     Clinical Impression:   1. Hyperglycemia    2. Ileus ([address]        Disposition: Admitted      Attestations:  Clayton Saba acting as a scribe for and in the presence of Edwar Tinajero DO      June 16, 2018 at 7:07 PM       Provider Attestation:      I personally performed the services described in the documentation, reviewed the documentation, as recorded by the scribe in my presence, and it accurately and completely records my words and actions.  June 16, 2018 at 7:07 PM - Edwar Tinajero DO    _______________________________

## 2018-06-16 NOTE — IP AVS SNAPSHOT
[address] [address] 
[phone] Patient: George Guerrero 
MRN: [id number] :[id number] About your hospitalization You were admitted on:  2018 You last received care in the:  [address] [place] [address] Road You were discharged on:  2018 Why you were hospitalized Your primary diagnosis was:  Not on File Your diagnoses also included:  Dka (Diabetic Ketoacidoses) (Hcc) Follow-up Information Follow up With Details Comments Contact Info Destin Bradford MD On 2018 8:30am [address] [address] 
[phone] Your Scheduled Appointments   8:30 AM EDT TRANSITIONAL CARE MANAGEMENT with Destin Bradford MD  
[facility]) [address] [address]  
[phone] 2018 10:00 AM EDT  
IR CHNG ABSCESS/CYST CATH with [facility] ANGIO/SPECIALS, [facility] IR VAS SURGEON, [facility] NO ANESTHESIA, [facility] CATH LAB HOLDING  
[facility] RAD ANGIO ([facility]) [address] [address] [phone] DIET RESTRICTIONS - Do not eat for 6 hours prior to procedure. - You may take meds with sips of water. For meds to be taken with food, take the night before your procedure. GENERAL INSTRUCTIONS - If you are on a blood thinner, you must contact your doctor to determine if you may discontinue your blood thinner prior to your exam.  Failure to contact your doctor may result in a reschedule of your appointment. - You must arrange for a  to take you home after your procedure. - You will need someone to stay with you at home during your recovery period. - Leave all valuables at home. - Bring an updated list of your medications including over the Counter and herbal therapies.  - If you  have a hand-written prescription for this exam, you must bring it with you to your appointment. - You may be responsible for any co-payments and deductibles as indicated by your insurance carrier. - Only patients will be allowed in the exam room, including children. - Children under the age of 15 may not be left unattended. - Bring all relevant prior images from facilities outside of [facility]. Failure to provide images may delay reading by Radiologist. - [address] patients must have an armband and be accompanied by a caregiver or family member. APPOINTMENT CANCELLATION - To reschedule or cancel an appointment, please contact the Scheduling department at [phone]. Check into Main Entrance Outpatient Registration 90 minutes prior to your appointment time. Discharge Orders None A check vidhi indicates which time of day the medication should be taken. My Medications CONTINUE taking these medications Instructions Each Dose to Equal  
 Morning Noon Evening Bedtime  
 amitriptyline 75 mg tablet Commonly known as:  ELAVIL Take 1 Tab by mouth nightly. Indications: Depression 75 mg  
    
   
   
   
  
  
 ascorbic acid (vitamin C) 500 mg tablet Commonly known as:  VITAMIN C Take 1 Tab by mouth daily. Indications: VITAMIN C DEFICIENCY  
 500 mg  
    
  
   
   
   
  
 cholecalciferol (vitamin D3) 2,000 unit Tab Take 1 Tab by mouth daily. Indications: PREVENTION OF VITAMIN D DEFICIENCY  
 2000 Units CREON 12,000-38,000 -60,000 unit capsule Generic drug:  lipase-protease-amylase Take 2 Caps by mouth three (3) times daily (with meals). Indications: exocrine pancreatic insufficiency, dosage change 2 Cap  
    
  
   
  
   
  
   
  
 cyanocobalamin 1,000 mcg sublingual tablet Commonly known as:  VITAMIN B-12 Take 2 Tabs by mouth daily.  Indications: PREVENTION OF VITAMIN B12 DEFICIENCY  
 2000 mcg  
    
  
   
   
   
  
 dexamethasone 1 mg tablet Commonly known as:  DECADRON Take 4mg (4 tablets) by mouth twice a day for 2 days, then 2mg BID for 2 days, then 1mg BID for 2 days, then 1 mg daily for 2 days until gone. Indications: DIAGNOSTIC TEST FOR CUSHING'S SYNDROME, cervical stenosis  
     
   
   
   
  
 ferrous sulfate 325 mg (65 mg iron) tablet Take 1 Tab by mouth two (2) times a day. Indications: IRON DEFICIENCY ANEMIA  
 325 mg  
    
  
   
   
  
   
  
 folic acid 664 mcg tablet Take 1 Tab by mouth daily. 0.4 mg  
    
  
   
   
   
  
 gabapentin 300 mg capsule Commonly known as:  NEURONTIN Take 2 Caps by mouth three (3) times daily for 30 days. Indications: NEUROPATHIC PAIN  
 600 mg  
    
  
   
  
   
  
   
  
 insulin lispro 100 unit/mL injection Commonly known as:  HUMALOG  
   
 12 Units by SubCUTAneous route nightly. Indications: type 2 diabetes mellitus 12 Units  
    
   
   
   
  
  
 ondansetron 4 mg disintegrating tablet Commonly known as:  ZOFRAN ODT Take 1 Tab by mouth every eight (8) hours as needed for Nausea. 4 mg  
    
   
   
   
  
 oxyCODONE-acetaminophen  mg per tablet Commonly known as:  PERCOCET Take 1 Tab by mouth every six (6) hours as needed for Pain. Max Daily Amount: 4 Tabs. Indications: Pain 1 Tab  
    
   
   
   
  
 pantoprazole 40 mg tablet Commonly known as:  PROTONIX Take 1 Tab by mouth two (2) times a day. Indications: EROSIVE ESOPHAGITIS, gastroesophageal reflux disease 40 mg  
    
  
   
   
   
  
 promethazine 25 mg tablet Commonly known as:  PHENERGAN Take 1 Tab by mouth every six (6) hours as needed. 25 mg  
    
   
   
   
  
 sucralfate 1 gram tablet Commonly known as:  Merlin Boy Take 1 Tab by mouth four (4) times daily.  Indications: PREVENTION OF STRESS ULCER  
 1 g  
    
  
   
  
   
  
   
  
  
  
  
 Where to Get Your Medications Information on where to get these meds will be given to you by the nurse or doctor. ! Ask your nurse or doctor about these medications  
  insulin lispro 100 unit/mL injection  
 oxyCODONE-acetaminophen  mg per tablet  
 promethazine 25 mg tablet Opioid Education Prescription Opioids: What You Need to Know: 
 
 
After general anesthesia or intravenous sedation, for 24 hours or while taking prescription Narcotics: · Limit your activities · Do not drive and operate hazardous machinery · Do not make important personal or business decisions · Do  not drink alcoholic beverages · If you have not urinated within 8 hours after discharge, please contact your surgeon on call. Report the following to your surgeon: 
· Excessive pain, swelling, redness or odor of or around the surgical area · Temperature over 100.5 · Nausea and vomiting lasting longer than 4 hours or if unable to take medications · Any signs of decreased circulation or nerve impairment to extremity: change in color, persistent  numbness, tingling, coldness or increase pain · Any questions What to do at Home: 
Recommended activity: Activity as tolerated. If you experience any of the following symptoms increasing/uncontrolled pain, please follow up with primary care physician. *  Please give a list of your current medications to your Primary Care Provider. *  Please update this list whenever your medications are discontinued, doses are 
    changed, or new medications (including over-the-counter products) are added.  
 
*  Please carry medication information at all times in case of emergency situations. These are general instructions for a healthy lifestyle: No smoking/ No tobacco products/ Avoid exposure to second hand smoke Surgeon General's Warning:  Quitting smoking now greatly reduces serious risk to your health. Obesity, smoking, and sedentary lifestyle greatly increases your risk for illness A healthy diet, regular physical exercise & weight monitoring are important for maintaining a healthy lifestyle You may be retaining fluid if you have a history of heart failure or if you experience any of the following symptoms:  Weight gain of 3 pounds or more overnight or 5 pounds in a week, increased swelling in our hands or feet or shortness of breath while lying flat in bed. Please call your doctor as soon as you notice any of these symptoms; do not wait until your next office visit. Recognize signs and symptoms of STROKE: 
 
F-face looks uneven A-arms unable to move or move unevenly S-speech slurred or non-existent T-time-call 911 as soon as signs and symptoms begin-DO NOT go Back to bed or wait to see if you get better-TIME IS BRAIN. Warning Signs of HEART ATTACK Call 911 if you have these symptoms: 
? Chest discomfort. Most heart attacks involve discomfort in the center of the chest that lasts more than a few minutes, or that goes away and comes back. It can feel like uncomfortable pressure, squeezing, fullness, or pain. ? Discomfort in other areas of the upper body. Symptoms can include pain or discomfort in one or both arms, the back, neck, jaw, or stomach. ? Shortness of breath with or without chest discomfort. ? Other signs may include breaking out in a cold sweat, nausea, or lightheadedness. Don't wait more than five minutes to call [address]! Fast action can save your life. Calling 911 is almost always the fastest way to get lifesaving treatment.  Emergency Medical Services staff can begin treatment when they arrive  up to an hour sooner than if someone gets to the hospital by car. The discharge information has been reviewed with the patient. The patient verbalized understanding. Discharge medications reviewed with the patient and appropriate educational materials and side effects teaching were provided. Patient armband removed and shredded. Simple [employer] Announcement We are excited to announce that we are making your provider's discharge notes available to you in PodPonics. You will see these notes when they are completed and signed by the physician that discharged you from your recent hospital stay. If you have any questions or concerns about any information you see in PodPonics, please call the Health Information Department where you were seen or reach out to your Primary Care Provider for more information about your plan of care. Introducing [facility]! Danae Hermosillo introduces PodPonics patient portal. Now you can access parts of your medical record, email your doctor's office, and request medication refills online. 1. In your internet browser, go to [email or web address] 2. Click on the First Time User? Click Here link in the Sign In box. You will see the New Member Sign Up page. 3. Enter your PodPonics Access Code exactly as it appears below. You will not need to use this code after youve completed the sign-up process. If you do not sign up before the expiration date, you must request a new code. · PodPonics Access Code: [email or web address] Expires: 7/14/2018  5:19 PM 
 
4. Enter the last four digits of your Social Security Number (xxxx) and Date of Birth (mm/dd/yyyy) as indicated and click Submit. You will be taken to the next sign-up page. 5. Create a RadioRxt ID. This will be your PodPonics login ID and cannot be changed, so think of one that is secure and easy to remember. 6. Create a ThinkSuit password. You can change your password at any time. 7. Enter your Password Reset Question and Answer. This can be used at a later time if you forget your password. 8. Enter your e-mail address. You will receive e-mail notification when new information is available in [address] 9. Click Sign Up. You can now view and download portions of your medical record. 10. Click the Download Summary menu link to download a portable copy of your medical information. If you have questions, please visit the Frequently Asked Questions section of the ThinkSuit website. Remember, ThinkSuit is NOT to be used for urgent needs. For medical emergencies, dial 911. Now available from your iPhone and Android! Introducing Michael Cherry As a [employer] patient, I wanted to make you aware of our electronic visit tool called Michael Cherry. [employer] 24/7 allows you to connect within minutes with a medical provider 24 hours a day, seven days a week via a mobile device or tablet or logging into a secure website from your computer. You can access Michael Cherry from anywhere in the [place]. A virtual visit might be right for you when you have a simple condition and feel like you just dont want to get out of bed, or cant get away from work for an appointment, when your regular [employer] provider is not available (evenings, weekends or holidays), or when youre out of town and need minor care. Electronic visits cost only $49 and if the [employer] 24/7 provider determines a prescription is needed to treat your condition, one can be electronically transmitted to a nearby pharmacy*. Please take a moment to enroll today if you have not already done so. The enrollment process is free and takes just a few minutes. To enroll, please download the [employer] 24/7 akira to your tablet or phone, or visit [email or web address] to enroll on your computer. And, as an [address] [place] [address] patient with a Discourse account, the results of your visits will be scanned into your electronic medical record and your primary care provider will be able to view the scanned results. We urge you to continue to see your regular [facility] provider for your ongoing medical care. And while your primary care provider may not be the one available when you seek a Michael Tenorioaidafin virtual visit, the peace of mind you get from getting a real diagnosis real time can be priceless. For more information on Michael Tenorioaidafin, view our Frequently Asked Questions (FAQs) at [email or web address]. Sincerely, 
 
Roxi Black MD 
Chief Medical Officer Jia Cano *:  certain medications cannot be prescribed via Michael Tenoriojessica Unresulted tests-please follow up with your PCP on these results Procedure/Test Authorizing Provider CARDIAC PANEL,(CK, CKMB & TROPONIN) Jaleesa [place], DO  
 CARDIAC PANEL,(CK, CKMB & TROPONIN) Aj Saldana, DO  
 CBC WITH AUTOMATED DIFF Jaleesa Florida, DO  
 CBC WITH AUTOMATED DIFF Aj Saldana, DO  
 CBC WITH AUTOMATED DIFF Chinmay Snowball, DO  
 CT ABD PELV WO ELIANA Castro MD  
 CT HEAD WO ELIANA Castro MD  
 EKG, 12 LEAD, INITIAL Jaleesa Florida, DO  
 EKG, 12 LEAD, [facility], DO  
 GLUCOSTABILIZER Historical Provider [place] Historical Provider [place] Historical Provider [place] Historical Provider [place] Historical Provider [place] Historical Provider [place] Historical Provider [place] Historical Provider [place] Historical Provider [place] Historical Provider [facility] Provider [facility] Provider Arnol Saini Historical Provider HEMOGLOBIN A1C WITH Ahsan Nice, DO  
 HEPATIC FUNCTION PANEL Collette Sousa, MD  
 IR GUIDE CATH/PERC DRAIN FLUID W [address] [facility],[address], [address] Subramanian, DO  
 METABOLIC PANEL, [address], DO  
 METABOLIC PANEL, COMPREHENSIVE Reg Sutherland, DO  
 METABOLIC PANEL, COMPREHENSIVE Aj Hameed, DO  
 URINALYSIS W/ RFLX MICROSCOPIC Steven [place], DO  
 URINE MICROSCOPIC ONLY Steven [place], DO  
 XR CHEST PORT Collette Sousa, MD  
  
Providers Seen During Your Hospitalization Provider Specialty Primary office phone Steven Ball DO Emergency Medicine [phone] Collette Sousa, MD Internal Medicine [phone] Reg Sutherland DO Internal Medicine [phone] Joan Morelos MD Family Practice [phone] Your Primary Care Physician (PCP) Primary Care Physician Office Phone Office Fax [address] [address] [phone] You are allergic to the following Allergen Reactions Ampicillin-Sulbactam Rash Pcn (Penicillins) Itching Hives Piperacillin-Tazobactam Rash Pollen Extracts Rash Seafood (Shellfish Containing Products) Hives Other reaction(s): unknown Has tolerated iohexol (iodine-containing contrast) Only shrimp Shrimp Recent Documentation Height Weight BMI Smoking Status 1.829 m 68 kg 20.34 kg/m2 Current Every Day Smoker Emergency Contacts Name Discharge Info Relation Home Work Mobile Unknown,Unknown N/A  AT THIS TIME [6] Mother [14] [phone] Patient Belongings The following personal items are in your possession at time of discharge: 
  Dental Appliances: None  Visual Aid: None      Home Medications: None   Jewelry: None  Clothing: Pants, Shirt, Undergarments, With patient Discharge Instructions Attachments/References HYPERGLYCEMIA: GENERAL INFO (ENGLISH) DIABETES: BLOOD SUGAR EMERGENCIES (ENGLISH) OXYCODONE/ACETAMINOPHEN (BY MOUTH) (ENGLISH) MEFS - PROMETHAZINE (PHENERGAN) - (BY INJECTION) (ENGLISH) Patient Handouts Learning About High Blood Sugar What is high blood sugar? Your body turns the food you eat into glucose (sugar), which it uses for energy. But if your body isn't able to use the sugar right away, it can build up in your blood and lead to high blood sugar. When the amount of sugar in your blood stays too high for too much of the time, you may have diabetes. Diabetes is a disease that can cause serious health problems. The good news is that lifestyle changes may help you get your blood sugar back to normal and avoid or delay diabetes. What causes high blood sugar? Sugar (glucose) can build up in your blood if you: · Are overweight. · Have a family history of diabetes. · Take certain medicines, such as steroids. What are the symptoms? Having high blood sugar may not cause any symptoms at all. Or it may make you feel very thirsty or very hungry. You may also urinate more often than usual, have blurry vision, or lose weight without trying. How is high blood sugar treated? You can take steps to lower your blood sugar level if you understand what makes it get higher. Your doctor may want you to learn how to test your blood sugar level at home. Then you can see how illness, stress, or different kinds of food or medicine raise or lower your blood sugar level. Other tests may be needed to see if you have diabetes. How can you prevent high blood sugar? · Watch your weight. If you're overweight, losing just a small amount of weight may help. Reducing fat around your waist is most important. · Limit the amount of calories, sweets, and unhealthy fat you eat. Ask your doctor if a dietitian can help you. A registered dietitian can help you create meal plans that fit your lifestyle. · Get at least 30 minutes of exercise on most days of the week. Exercise helps control your blood sugar. It also helps you maintain a healthy weight. Walking is a good choice. You also may want to do other activities, such as running, swimming, cycling, or playing tennis or team sports. · If your doctor prescribed medicines, take them exactly as prescribed. Call your doctor if you think you are having a problem with your medicine. You will get more details on the specific medicines your doctor prescribes. Follow-up care is a key part of your treatment and safety. Be sure to make and go to all appointments, and call your doctor if you are having problems. It's also a good idea to know your test results and keep a list of the medicines you take. Where can you learn more? Go to http://thiago-mikhail.info/. Enter O108 in the search box to learn more about \"Learning About High Blood Sugar. \" Current as of: March 13, 2017 Content Version: 11.4 © 0327-8292 [employer]ent[employer]. Care instructions adapted under license by [employer] (which disclaims liability or warranty for this information). If you have questions about a medical condition or this instruction, always ask your healthcare professional. [address] any warranty or liability for your use of this information. Diabetes Blood Sugar Emergencies: Your Action Plan How can you prevent a blood sugar emergency? An important part of living with diabetes is keeping your blood sugar in your target range. You'll need to know what to do if it's too high or too low. Managing your blood sugar levels helps you avoid emergencies. This care sheet will teach you about the signs of high and low blood sugar. It will help you make an action plan with your doctor for when these signs occur.  
Low blood sugar is more likely to happen if you take certain medicines for diabetes. It can also happen if you skip a meal, drink alcohol, or exercise more than usual. 
You may get high blood sugar if you eat differently than you normally do. One example is eating more carbohydrate than usual. Having a cold, the flu, or other sudden illness can also cause high blood sugar levels. Levels can also rise if you miss a dose of medicine. Any change in how you take your medicine may affect your blood sugar level. So it's important to work with your doctor before you make any changes. Check your blood sugar Work with your doctor to fill in the blank spaces below that apply to you. Track your levels, know your target range, and write down ways you can get your blood sugar back in your target range. A log book can help you track your levels. Take the book to all of your medical appointments. · Check your blood sugar _____ times a day, at these times:________________________________________________. (For example: Before meals, at bedtime, before exercise, during exercise, other.) · Your blood sugar target range before a meal is ___________________. Your blood sugar target range after a meal is _______________________. · Do pjzy-___________________________________________________-ch get your blood sugar back within your safe range if your blood sugar results are _________________________________________. (For example: Less than 70 or above 250 mg/dL.) Call your doctor when your blood sugar results are ___________________________________. (For example: Less than 70 or above 250 mg/dL.) What are the symptoms of low and high blood sugar? Common symptoms of low blood sugar are sweating and feeling shaky, weak, hungry, or confused. Symptoms can start quickly. Common symptoms of high blood sugar are feeling very thirsty or very hungry. You may also pass urine more often than usual. You may have blurry vision and may lose weight without trying. But some people may have high or low blood sugar without having any symptoms. That's a good reason to check your blood sugar on a regular schedule. What should you do if you have symptoms? Work with your doctor to fill in the blank spaces below that apply to you. Low blood sugar If you have symptoms of low blood sugar, check your blood sugar. If it's below _____ ( for example, below 70), eat or drink a quick-sugar food that has about 15 grams of carbohydrate. Your goal is to get your level back to your safe range. Check your blood sugar again 15 minutes later. If it's still not in your target range, take another 15 grams of carbohydrate and check your blood sugar again in 15 minutes. Repeat this until you reach your target. Then go back to your regular testing schedule. When you have low blood sugar, it's best to stop or reduce any physical activity until your blood sugar is back in your target range and is stable. If you must stay active, eat or drink 30 grams of carbohydrate. Then check your blood sugar again in 15 minutes. If it's not in your target range, take another 30 grams of carbohydrates. Check your blood sugar again in 15 minutes. Keep doing this until you reach your target. You can then go back to your regular testing schedule. If your symptoms or blood sugar levels are getting worse or have not improved after 15 minutes, seek medical care right away. Here are some examples of quick-sugar foods with 15 grams of carbohydrate: · 3 or 4 glucose tablets · 1 tube of glucose gel · Hard candy (such as 3 Jolly Ranchers or 5 to [employer]) · ½ cup to ¾ cup (4 to 6 ounces) of fruit juice or regular (not diet) soda High blood sugar If you have symptoms of high blood sugar, check your blood sugar. Your goal is to get your level back to your target range. If it's above ______ ( for example, above 250), follow these steps: · If you missed a dose of your diabetes medicine, take it now.  Take only the amount of medicine that you have been prescribed. Do not take more or less medicine. · Give yourself insulin if your doctor has prescribed it for high blood sugar. · Test for ketones, if the doctor told you to do so. If the results of the ketone test show a moderate-to-large amount of ketones, call the doctor for advice. · Wait 30 minutes after you take the extra insulin or the missed medicine. Check your blood sugar again. If your symptoms or blood sugar levels are getting worse or have not improved after taking these steps, seek medical care right away. Follow-up care is a key part of your treatment and safety. Be sure to make and go to all appointments, and call your doctor if you are having problems. It's also a good idea to know your test results and keep a list of the medicines you take. Where can you learn more? Go to http://thiago-mikhail.info/. Enter U882 in the search box to learn more about \"Diabetes Blood Sugar Emergencies: Your Action Plan. \" Current as of: March 13, 2017 Content Version: 11.4 © 6526-3960 [employer]. Care instructions adapted under license by [employer] (which disclaims liability or warranty for this information). If you have questions about a medical condition or this instruction, always ask your healthcare professional. [address] any warranty or liability for your use of this information. Oxycodone/Acetaminophen (By mouth) Acetaminophen (f-eutg-x-MIN-oh-fen), Oxycodone Hydrochloride (ed-v-VRK-done chan-droe-KLOR-ramses) Treats moderate to moderately severe pain. This medicine is a narcotic pain reliever. Brand Name(s): Endocet, Percocet, Primlev, Xartemis XR There may be other brand names for this medicine. When This Medicine Should Not Be Used: This medicine is not right for everyone.  Do not use it if you had an allergic reaction to acetaminophen or oxycodone, or if you have serious breathing problems or paralytic ileus. How to Use This Medicine:  
Capsule, Liquid, Tablet, Long Acting Tablet · Your doctor will tell you how much medicine to use. Do not use more than directed. · An overdose can be dangerous. Follow directions carefully so you do not get too much medicine at one time. · Oral liquid: Measure the oral liquid medicine with a marked measuring spoon, oral syringe, or medicine cup. · Swallow the extended-release tablet whole. Do not crush, break, or chew it. Do not lick or wet the tablet before placing it in your mouth. Do not give this medicine through a feeding tube. · This medicine should come with a Medication Guide. Ask your pharmacist for a copy if you do not have one. · Missed dose: If you miss a dose of this medicine, skip the missed dose and go back to your regular dosing schedule. Do not double doses. · Store the medicine in a closed container at room temperature, away from heat, moisture, and direct light. Ask your pharmacist about the best way to dispose of medicine you do not use. Drugs and Foods to Avoid: Ask your doctor or pharmacist before using any other medicine, including over-the-counter medicines, vitamins, and herbal products. · Do not use Xartemis XR if you are using or have used an MAO inhibitor in the past 14 days. · Some medicines can affect how this medicine works. Tell your doctor if you are using any of the following: ¨ Carbamazepine, erythromycin, ketoconazole, lamotrigine, mirtazapine, naltrexone, phenytoin, propranolol, rifampin, ritonavir, tramadol, trazodone, or zidovudine ¨ Birth control pills ¨ Diuretic (water pill) ¨ Medicine to treat depression ¨ Phenothiazine medicine ¨ Triptan medicine to treat migraine headaches · Do not drink alcohol while you are using this medicine. Acetaminophen can damage your liver, and alcohol can increase this risk.  Do not take acetaminophen without asking your doctor if you have 3 or more drinks of alcohol every day. · Tell your doctor if you use anything else that makes you sleepy. Some examples are allergy medicine, narcotic pain medicine, and alcohol. Tell your doctor if you are using buprenorphine, butorphanol, nalbuphine, pentazocine, a benzodiazepine, or a muscle relaxer. Warnings While Using This Medicine: · Tell your doctor if you are pregnant or breastfeeding, or if you have kidney disease, liver disease, heart disease, low blood pressure, breathing problems or lung disease (such as asthma, COPD), thyroid problems, [place] disease, pancreas or gallbladder problems, prostate problems, trouble urinating, or a stomach problems, or a history of head injury or brain damage, seizures, or alcohol or drug abuse. Tell your doctor if you are allergic to codeine. · This medicine may cause the following problems: 
¨ High risk of overdose, which can lead to death ¨ Respiratory depression (serious breathing problem that can be life-threatening) ¨ Liver problems ¨ Serious skin reactions ¨ Serotonin syndrome (when used with certain medicines) · This medicine may make you dizzy or drowsy. Do not drive or do anything that could be dangerous until you know how this medicine affects you. Sit or lie down if you feel dizzy. Stand up carefully. · This medicine contains acetaminophen. Read the labels of all other medicines you are using to see if they also contain acetaminophen, or ask your doctor or pharmacist. Valente Quintero not use more than 4 grams (4,000 milligrams) total of acetaminophen in one day. · This medicine can be habit-forming. Do not use more than your prescribed dose. Call your doctor if you think your medicine is not working. · Do not stop using this medicine suddenly. Your doctor will need to slowly decrease your dose before you stop it completely. · This medicine could cause infertility. Talk with your doctor before using this medicine if you plan to have children. · This medicine may cause constipation, especially with long-term use. Ask your doctor if you should use a laxative to prevent and treat constipation. · Keep all medicine out of the reach of children. Never share your medicine with anyone. Possible Side Effects While Using This Medicine:  
Call your doctor right away if you notice any of these side effects: · Allergic reaction: Itching or hives, swelling in your face or hands, swelling or tingling in your mouth or throat, chest tightness, trouble breathing · Anxiety, restlessness, fast heartbeat, fever, muscle spasms, twitching, diarrhea, seeing or hearing things that are not there · Blistering, peeling, red skin rash · Blue lips, fingernails, or skin · Dark urine or pale stools, loss of appetite, stomach pain, yellow skin or eyes · Extreme weakness, shallow breathing, uneven heartbeat, seizures, sweating, or cold or clammy skin · Severe confusion, lightheadedness, dizziness, or fainting · Severe constipation, nausea, or vomiting · Trouble breathing or slow breathing If you notice these less serious side effects, talk with your doctor:  
· Headache · Mild constipation, nausea, or vomiting · Mild sleepiness or drowsiness If you notice other side effects that you think are caused by this medicine, tell your doctor. Call your doctor for medical advice about side effects. You may report side effects to FDA at 2-896-FDA-0570 © 2017 [address] Information is for End User's use only and may not be sold, redistributed or otherwise used for commercial purposes. The above information is an  only. It is not intended as medical advice for individual conditions or treatments. Talk to your doctor, nurse or pharmacist before following any medical regimen to see if it is safe and effective for you. Promethazine (Phenergan) - (By injection) Why this medicine is used: Treats allergies and motion sickness. Also used before and after surgery and other procedures as a sedative and to control pain or nausea and vomiting. Contact a nurse or doctor right away if you have: 
· Fast, slow, pounding, or uneven heartbeat; lightheadedness or fainting · Trouble breathing or speaking, extreme tiredness or weakness, · Fever, sweating, confusion, muscle stiffness · Twitching, muscle movements you cannot control · Pain, burning, redness, or swelling where the needle is placed Common side effects: 
· Drowsiness · Nausea, vomiting, constipation, dry mouth © 2017 [address] Information is for End User's use only and may not be sold, redistributed or otherwise used for commercial purposes. Please provide this summary of care documentation to your next provider. Signatures-by signing, you are acknowledging that this After Visit Summary has been reviewed with you and you have received a copy. Patient Signature:  ____________________________________________________________ Date:  ____________________________________________________________  
  
Ethel Juarez Provider Signature:  ____________________________________________________________ Date:  ____________________________________________________________

## 2018-06-16 NOTE — ED TRIAGE NOTES
Patient arrived via ems. Patient states that he has not been feeling good and has \"pain all over my body\" since yesterday when he fell.  States he was watching tv when he got up suddenly and \"blacked out\"

## 2018-06-17 LAB
ADMINISTERED INITIALS, ADMINIT: NORMAL
D50 ADMINISTERED, D50ADM: 0 ML
D50 ORDER, D50ORD: 0 ML
GLUCOSE BLD STRIP.AUTO-MCNC: 110 MG/DL (ref 70–110)
GLUCOSE BLD STRIP.AUTO-MCNC: 111 MG/DL (ref 70–110)
GLUCOSE BLD STRIP.AUTO-MCNC: 137 MG/DL (ref 70–110)
GLUCOSE BLD STRIP.AUTO-MCNC: 156 MG/DL (ref 70–110)
GLUCOSE BLD STRIP.AUTO-MCNC: 158 MG/DL (ref 70–110)
GLUCOSE BLD STRIP.AUTO-MCNC: 164 MG/DL (ref 70–110)
GLUCOSE BLD STRIP.AUTO-MCNC: 172 MG/DL (ref 70–110)
GLUCOSE BLD STRIP.AUTO-MCNC: 225 MG/DL (ref 70–110)
GLUCOSE BLD STRIP.AUTO-MCNC: 242 MG/DL (ref 70–110)
GLUCOSE BLD STRIP.AUTO-MCNC: 327 MG/DL (ref 70–110)
GLUCOSE BLD STRIP.AUTO-MCNC: 371 MG/DL (ref 70–110)
GLUCOSE BLD STRIP.AUTO-MCNC: 521 MG/DL (ref 70–110)
GLUCOSE BLD STRIP.AUTO-MCNC: 90 MG/DL (ref 70–110)
GLUCOSE, GLC: 110 MG/DL
GLUCOSE, GLC: 111 MG/DL
GLUCOSE, GLC: 137 MG/DL
GLUCOSE, GLC: 158 MG/DL
GLUCOSE, GLC: 167 MG/DL
GLUCOSE, GLC: 172 MG/DL
GLUCOSE, GLC: 225 MG/DL
GLUCOSE, GLC: 242 MG/DL
GLUCOSE, GLC: 327 MG/DL
GLUCOSE, GLC: 90 MG/DL
HIGH TARGET, HITG: 180 MG/DL
INSULIN ADMINSTERED, INSADM: 0 UNITS/HOUR
INSULIN ADMINSTERED, INSADM: 0.5 UNITS/HOUR
INSULIN ADMINSTERED, INSADM: 1.7 UNITS/HOUR
INSULIN ADMINSTERED, INSADM: 2.7 UNITS/HOUR
INSULIN ADMINSTERED, INSADM: 3.6 UNITS/HOUR
INSULIN ORDER, INSORD: 0 UNITS/HOUR
INSULIN ORDER, INSORD: 0.5 UNITS/HOUR
INSULIN ORDER, INSORD: 1.7 UNITS/HOUR
INSULIN ORDER, INSORD: 2.7 UNITS/HOUR
INSULIN ORDER, INSORD: 3.6 UNITS/HOUR
LOW TARGET, LOT: 140 MG/DL
MINUTES UNTIL NEXT BG, NBG: 120 MIN
MINUTES UNTIL NEXT BG, NBG: 60 MIN
MULTIPLIER, MUL: 0
MULTIPLIER, MUL: 0.01
MULTIPLIER, MUL: 0.02
ORDER INITIALS, ORDINIT: NORMAL

## 2018-06-17 PROCEDURE — 65660000000 HC RM CCU STEPDOWN

## 2018-06-17 PROCEDURE — 74011250637 HC RX REV CODE- 250/637: Performed by: HOSPITALIST

## 2018-06-17 PROCEDURE — 74011636637 HC RX REV CODE- 636/637: Performed by: HOSPITALIST

## 2018-06-17 PROCEDURE — 74011636637 HC RX REV CODE- 636/637

## 2018-06-17 PROCEDURE — 74011250636 HC RX REV CODE- 250/636: Performed by: HOSPITALIST

## 2018-06-17 PROCEDURE — 74011250636 HC RX REV CODE- 250/636

## 2018-06-17 PROCEDURE — 77030021352 HC CBL LD SYS DISP COVD -B

## 2018-06-17 RX ORDER — HYDROMORPHONE HYDROCHLORIDE 1 MG/ML
1 INJECTION, SOLUTION INTRAMUSCULAR; INTRAVENOUS; SUBCUTANEOUS
Status: DISCONTINUED | OUTPATIENT
Start: 2018-06-17 | End: 2018-06-17

## 2018-06-17 RX ORDER — GABAPENTIN 300 MG/1
600 CAPSULE ORAL 3 TIMES DAILY
Status: DISCONTINUED | OUTPATIENT
Start: 2018-06-17 | End: 2018-06-19 | Stop reason: HOSPADM

## 2018-06-17 RX ORDER — SODIUM CHLORIDE 9 MG/ML
100 INJECTION, SOLUTION INTRAVENOUS CONTINUOUS
Status: DISCONTINUED | OUTPATIENT
Start: 2018-06-17 | End: 2018-06-18

## 2018-06-17 RX ORDER — INSULIN GLARGINE 100 [IU]/ML
INJECTION, SOLUTION SUBCUTANEOUS
Status: DISPENSED
Start: 2018-06-17 | End: 2018-06-18

## 2018-06-17 RX ORDER — HYDROMORPHONE HYDROCHLORIDE 1 MG/ML
INJECTION, SOLUTION INTRAMUSCULAR; INTRAVENOUS; SUBCUTANEOUS
Status: COMPLETED
Start: 2018-06-17 | End: 2018-06-17

## 2018-06-17 RX ORDER — HEPARIN SODIUM 5000 [USP'U]/ML
5000 INJECTION, SOLUTION INTRAVENOUS; SUBCUTANEOUS EVERY 8 HOURS
Status: DISCONTINUED | OUTPATIENT
Start: 2018-06-17 | End: 2018-06-19 | Stop reason: HOSPADM

## 2018-06-17 RX ORDER — INSULIN GLARGINE 100 [IU]/ML
10 INJECTION, SOLUTION SUBCUTANEOUS
Status: COMPLETED | OUTPATIENT
Start: 2018-06-17 | End: 2018-06-17

## 2018-06-17 RX ORDER — HYDROMORPHONE HYDROCHLORIDE 1 MG/ML
1 INJECTION, SOLUTION INTRAMUSCULAR; INTRAVENOUS; SUBCUTANEOUS
Status: DISCONTINUED | OUTPATIENT
Start: 2018-06-17 | End: 2018-06-19 | Stop reason: HOSPADM

## 2018-06-17 RX ORDER — AMITRIPTYLINE HYDROCHLORIDE 50 MG/1
75 TABLET, FILM COATED ORAL
Status: DISCONTINUED | OUTPATIENT
Start: 2018-06-17 | End: 2018-06-19 | Stop reason: HOSPADM

## 2018-06-17 RX ORDER — ASCORBIC ACID 250 MG
500 TABLET ORAL DAILY
Status: DISCONTINUED | OUTPATIENT
Start: 2018-06-18 | End: 2018-06-19 | Stop reason: HOSPADM

## 2018-06-17 RX ORDER — INSULIN LISPRO 100 [IU]/ML
INJECTION, SOLUTION INTRAVENOUS; SUBCUTANEOUS
Status: DISCONTINUED | OUTPATIENT
Start: 2018-06-17 | End: 2018-06-19 | Stop reason: HOSPADM

## 2018-06-17 RX ORDER — ACETAMINOPHEN 325 MG/1
650 TABLET ORAL
Status: DISCONTINUED | OUTPATIENT
Start: 2018-06-17 | End: 2018-06-19 | Stop reason: HOSPADM

## 2018-06-17 RX ORDER — ONDANSETRON 2 MG/ML
4 INJECTION INTRAMUSCULAR; INTRAVENOUS
Status: DISCONTINUED | OUTPATIENT
Start: 2018-06-17 | End: 2018-06-19 | Stop reason: HOSPADM

## 2018-06-17 RX ORDER — PANTOPRAZOLE SODIUM 40 MG/1
40 TABLET, DELAYED RELEASE ORAL 2 TIMES DAILY
Status: DISCONTINUED | OUTPATIENT
Start: 2018-06-17 | End: 2018-06-19 | Stop reason: HOSPADM

## 2018-06-17 RX ADMIN — Medication 1 MG: at 20:28

## 2018-06-17 RX ADMIN — SODIUM CHLORIDE 100 ML/HR: 900 INJECTION, SOLUTION INTRAVENOUS at 01:00

## 2018-06-17 RX ADMIN — AMITRIPTYLINE HYDROCHLORIDE 75 MG: 50 TABLET, FILM COATED ORAL at 23:49

## 2018-06-17 RX ADMIN — Medication 1 MG: at 16:26

## 2018-06-17 RX ADMIN — PANCRELIPASE 2 CAPSULE: 10000; 34000; 55000 CAPSULE, DELAYED RELEASE ORAL at 18:27

## 2018-06-17 RX ADMIN — HEPARIN SODIUM 5000 UNITS: 5000 INJECTION, SOLUTION INTRAVENOUS; SUBCUTANEOUS at 09:25

## 2018-06-17 RX ADMIN — HEPARIN SODIUM 5000 UNITS: 5000 INJECTION, SOLUTION INTRAVENOUS; SUBCUTANEOUS at 01:56

## 2018-06-17 RX ADMIN — PANTOPRAZOLE SODIUM 40 MG: 40 TABLET, DELAYED RELEASE ORAL at 18:28

## 2018-06-17 RX ADMIN — Medication 1 MG: at 11:48

## 2018-06-17 RX ADMIN — Medication 1 MG: at 06:00

## 2018-06-17 RX ADMIN — HEPARIN SODIUM 5000 UNITS: 5000 INJECTION, SOLUTION INTRAVENOUS; SUBCUTANEOUS at 17:28

## 2018-06-17 RX ADMIN — GABAPENTIN 600 MG: 300 CAPSULE ORAL at 23:50

## 2018-06-17 RX ADMIN — INSULIN GLARGINE 10 UNITS: 100 INJECTION, SOLUTION SUBCUTANEOUS at 17:26

## 2018-06-17 RX ADMIN — INSULIN LISPRO 10 UNITS: 100 INJECTION, SOLUTION INTRAVENOUS; SUBCUTANEOUS at 23:48

## 2018-06-17 RX ADMIN — ONDANSETRON 4 MG: 2 INJECTION INTRAMUSCULAR; INTRAVENOUS at 17:27

## 2018-06-17 RX ADMIN — Medication 3.6 UNITS: at 00:22

## 2018-06-17 RX ADMIN — Medication 1 MG: at 02:00

## 2018-06-17 NOTE — CONSULTS
[facility]  Colon and Rectal Surgery  [address] [address], [address] [place] [address]                Colon and Rectal Surgery Consult    Subjective:      Regan Soto is a 48 y.o. male who has a history of chronic pancreatitis, chronic alcoholism, duodenopancreaticcocutaneous fistula, insulin dependent diabetes, chronic pain and drug seeking behavior. The patient had a percutaneous drain placed at [facility] for the  duodenopancreaticcocutaneous fistula August 14, 2017. He presented to ED and was noted to have diabetic ketoacidosis. Also he states that he fell and his drain fell out. The patient was just recently admitted to [facility] (5/22/2018 to 5/31/2018 and 6/2/2018 to 6/9/2018) for similar problems, and he has had numerous percutaneous drains placed by IR. Yet he claims that the drains just keep coming out.       Patient Active Problem List    Diagnosis Date Noted    Central cord syndrome ([address] 06/02/2018    UTI (urinary tract infection) 05/24/2018    Hyperglycemia 05/22/2018    Chronic renal insufficiency 05/22/2018    CHRISTI (acute kidney injury) ([address] 05/22/2018    Enteritis 04/24/2018    Acute alcoholic hepatitis 27/99/7704    DKA (diabetic ketoacidoses) ([address] 04/15/2018    Chronic pancreatitis due to acute alcohol intoxication ([address] 04/15/2018    Dehydration 08/05/2017    Lactic acidosis 08/05/2017    Colitis, acute 07/17/2017    DKA, type 1 ([address] 07/17/2017    HCAP (healthcare-associated pneumonia) 05/31/2016    Sepsis ([address] 04/15/2016    Biliary drain displacement 03/23/2016    Hypokalemia 03/23/2016    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    H/O ETOH abuse 03/23/2016    Chronic pain 03/23/2016    Moderate protein-calorie malnutrition ([address] 11/21/2015    Abdominal pain 11/16/2015    Perinephric abscess 10/22/2015    Pneumobilia 10/22/2015    Gastroparesis 05/21/2015    IDDM (insulin dependent diabetes mellitus) ([address] 08/31/2013    Tobacco abuse     Chronic pancreatitis [facility])      Past Medical History:   Diagnosis Date    Abdominal pain, generalized     [facility]     Diabetes ([facility]     hypothyroid    Encounter for long-term (current) use of other medications     Essential hypertension     ETOH abuse     GERD (gastroesophageal reflux disease)     Heart failure (HCC)     Hyponatremia     Pain in the abdomen 11/2/2010    Pancreatitis     w/ abscess and pseudocyst    Pancreatitis     Psychiatric disorder     depression, anxiety    Tobacco abuse       Past Surgical History:   Procedure Laterality Date    HX ABDOMINAL LAPAROSCOPY      PANCREATIC STENT    HX CHOLECYSTECTOMY        Social History   Substance Use Topics    Smoking status: Current Every Day Smoker     Packs/day: 0.50     Years: 0.00     Types: Cigarettes    Smokeless tobacco: Never Used    Alcohol use Yes      Family History   Problem Relation Age of Onset    Heart Disease Father       Prior to Admission medications    Medication Sig Start Date End Date Taking? Authorizing Provider   gabapentin (NEURONTIN) 300 mg capsule Take 2 Caps by mouth three (3) times daily for 30 days. Indications: NEUROPATHIC PAIN 6/9/18 7/9/18  Liudmila Crane MD   oxyCODONE-acetaminophen (PERCOCET)  mg per tablet Take 1 Tab by mouth every six (6) hours as needed for Pain. Max Daily Amount: 4 Tabs. Indications: Pain 6/9/18   Liudmila Crane MD   dexamethasone (DECADRON) 1 mg tablet Take 4mg (4 tablets) by mouth twice a day for 2 days, then 2mg BID for 2 days, then 1mg BID for 2 days, then 1 mg daily for 2 days until gone. Indications: DIAGNOSTIC TEST FOR CUSHING'S SYNDROME, cervical stenosis 6/9/18   Liudmila Crane MD   promethazine (PHENERGAN) 25 mg tablet Take 1 Tab by mouth every six (6) hours as needed. 5/31/18   Amparo Alejandre NP   ondansetron (ZOFRAN ODT) 4 mg disintegrating tablet Take 1 Tab by mouth every eight (8) hours as needed for Nausea.  5/31/18   Amparo Alejandre NP sucralfate (CARAFATE) 1 gram tablet Take 1 Tab by mouth four (4) times daily. Indications: PREVENTION OF STRESS ULCER 5/22/18   Amber Mcclelland MD   amitriptyline (ELAVIL) 75 mg tablet Take 1 Tab by mouth nightly. Indications: Depression 8/9/17   Sheba Lisa NP   ascorbic acid, vitamin C, (VITAMIN C) 500 mg tablet Take 1 Tab by mouth daily. Indications: VITAMIN C DEFICIENCY 8/9/17   Sheba Lisa NP   cholecalciferol, vitamin D3, 2,000 unit tab Take 1 Tab by mouth daily. Indications: PREVENTION OF VITAMIN D DEFICIENCY 8/9/17   Stacyor SUJATHA Lisa   cyanocobalamin (VITAMIN B-12) 1,000 mcg sublingual tablet Take 2 Tabs by mouth daily. Indications: PREVENTION OF VITAMIN B12 DEFICIENCY 8/9/17   Sheba Lisa NP   ferrous sulfate 325 mg (65 mg iron) tablet Take 1 Tab by mouth two (2) times a day. Indications: IRON DEFICIENCY ANEMIA 8/9/17   Sheba Lisa NP   folic acid 095 mcg tablet Take 1 Tab by mouth daily. 8/9/17   Sheba Lisa NP   insulin lispro (HUMALOG) 100 unit/mL injection 12 Units by SubCUTAneous route nightly. Indications: type 2 diabetes mellitus 8/9/17   Sheba Lisa NP   lipase-protease-amylase (CREON) 12,000-38,000 -60,000 unit capsule Take 2 Caps by mouth three (3) times daily (with meals). Indications: exocrine pancreatic insufficiency, dosage change 7/28/17   Sheba Lisa NP   pantoprazole (PROTONIX) 40 mg tablet Take 1 Tab by mouth two (2) times a day.  Indications: EROSIVE ESOPHAGITIS, gastroesophageal reflux disease 7/28/17   Sheba Lisa NP     Current Facility-Administered Medications   Medication Dose Route Frequency    acetaminophen (TYLENOL) tablet 650 mg  650 mg Oral Q6H PRN    ondansetron (ZOFRAN) injection 4 mg  4 mg IntraVENous Q6H PRN    0.9% sodium chloride infusion  100 mL/hr IntraVENous CONTINUOUS    heparin (porcine) injection 5,000 Units  5,000 Units SubCUTAneous Q8H    HYDROmorphone (DILAUDID) injection 1 mg  1 mg IntraVENous Q4H PRN    insulin regular (NOVOLIN R, HUMULIN R) 100 Units in 0.9% sodium chloride 100 mL infusion  0-50 Units/hr IntraVENous TITRATE    glucose chewable tablet 16 g  4 Tab Oral PRN    glucagon (GLUCAGEN) injection 1 mg  1 mg IntraMUSCular PRN    dextrose (D50W) injection syrg 12.5-25 g  25-50 mL IntraVENous PRN      Allergies   Allergen Reactions    Ampicillin-Sulbactam Rash    Pcn [Penicillins] Itching and Hives    Piperacillin-Tazobactam Rash    Pollen Extracts Rash    Seafood [Shellfish Containing Products] Hives     Other reaction(s): unknown  Has tolerated iohexol (iodine-containing contrast)  Only shrimp  Shrimp       Review of Systems:    Pertinent items are noted in the History of Present Illness. Objective:        Visit Vitals    /65 (BP 1 Location: Left arm, BP Patient Position: Head of bed elevated (Comment degrees))    Pulse 88    Temp 97.7 °F (36.5 °C)    Resp 18    Ht 6' (1.829 m)    Wt 68 kg (150 lb)    SpO2 98%    BMI 20.34 kg/m2       Physical Exam:   GENERAL: alert, cooperative, no distress, appears stated age  LUNG: clear to auscultation bilaterally  HEART: regular rate and rhythm, S1, S2 normal, no murmur, click, rub or gallop  ABDOMEN: soft, non-distended. Bowel sounds normal. No masses,  no organomegaly. Minimal tenderness in RUQ/epigastrium without peritoneal signs. EXTREMITIES:  extremities normal, atraumatic, no cyanosis or edema       Imaging:  reviewed  CT of Abdomen and Pelvis (6/16/2018)  IMPRESSION:  1. Evidence of interval removal of pigtail catheter within known chronic  enterocutaneous fistulous communication between the duodenum and the right  abdominal wall. Minimal gas and fluid density now present along the tract. 2. Stable pancreatic ductal stent. Stable prominent intrahepatic and extra  hepatic bile ducts. Prior cholecystectomy. 3. Prominent fluid-filled distal small bowel loops, slightly increased, perhaps  mild ileus.  No free fluid or new fluid collection. 4. Stable bronchiectasis. Lab/Data Review:  CMP:   Lab Results   Component Value Date/Time     (L) 06/16/2018 06:10 PM    K 5.1 06/16/2018 06:10 PM    CL 94 (L) 06/16/2018 06:10 PM    CO2 24 06/16/2018 06:10 PM    AGAP 10 06/16/2018 06:10 PM     (HH) 06/16/2018 06:10 PM    BUN 23 (H) 06/16/2018 06:10 PM    CREA 1.55 (H) 06/16/2018 06:10 PM    GFRAA 57 (L) 06/16/2018 06:10 PM    GFRNA 47 (L) 06/16/2018 06:10 PM    CA 8.1 (L) 06/16/2018 06:10 PM    ALB 3.2 (L) 06/16/2018 06:10 PM    TP 6.6 06/16/2018 06:10 PM    GLOB 3.4 06/16/2018 06:10 PM    AGRAT 0.9 06/16/2018 06:10 PM    SGOT 47 (H) 06/16/2018 06:10 PM     (H) 06/16/2018 06:10 PM     CBC:   Lab Results   Component Value Date/Time    WBC 13.6 (H) 06/16/2018 06:10 PM    HGB 11.2 (L) 06/16/2018 06:10 PM    HCT 33.9 (L) 06/16/2018 06:10 PM     06/16/2018 06:10 PM     All Cardiac Markers in the last 24 hours:   Lab Results   Component Value Date/Time    CPK 24 (L) 06/16/2018 06:10 PM    CKMB <1.0 06/16/2018 06:10 PM    CKND1  06/16/2018 06:10 PM     CALCULATION NOT PERFORMED WHEN RESULT IS BELOW LINEAR LIMIT    Carmella Rogue <0.02 06/16/2018 06:10 PM     Recent Glucose Results:   Lab Results   Component Value Date/Time     (HH) 06/16/2018 06:10 PM     Liver Panel:   Lab Results   Component Value Date/Time    ALB 3.2 (L) 06/16/2018 06:10 PM    TP 6.6 06/16/2018 06:10 PM    GLOB 3.4 06/16/2018 06:10 PM    AGRAT 0.9 06/16/2018 06:10 PM    SGOT 47 (H) 06/16/2018 06:10 PM     (H) 06/16/2018 06:10 PM     (H) 06/16/2018 06:10 PM     Pancreatic Markers:   Lab Results   Component Value Date/Time    LPSE 48 (L) 06/16/2018 06:10 PM         Assessment:     Anu Danielle is a 48 y.o. male with a history of chronic pancreatitis, chronic alcoholism, duodenopancreaticcocutaneous fistula who will require replacement of the drain which appears to be dislodged/removed quite frequently.   He claims that all these incidents were accidental.    Plan:     IR consulted to replace the percutaneous drain tomorrow. Thank you for allowing me to participate in the patient's care.           Nat Curling, MD, FACS, FASCRS  Colon and Rectal Surgery  [facility]  Office [phone]  Fax     [phone]  6/17/2018  1:40 PM

## 2018-06-17 NOTE — PROGRESS NOTES
I talked with Ms Chris Koo at [place], and she states they are no longer guardians. CM last admit to us stated that, but [facility] stated they were. Ms Kelby Plunkett will fax over the papers to be scanned into the chart. Of note, pt lied to me and said she was his guardian and that he was going to call her this afternoon to let her know she was inpt again. 1345: Release of Guardianship papers ( from [address] [place] [address])  placed in front of patients chart.

## 2018-06-17 NOTE — PROGRESS NOTES
Physical Exam   Skin: Skin is warm and dry. Bedside shift change report was given to me, Paulo Almanza RN  (oncoming nurse), by Korina Go RN (offgoing nurse). Report included the following information:  SBAR, kardex, consultations, hemodynamic monitoring, intake/output, MAR, lab results, VS trends, cardiac rhythm noted NSR/ST Skin, neuro, respiratory status, and order verification have been dually noted and verified at the bedside with: Korina Go RN                                                                     3 [facility] Telemetry Nurse's Note:    0030: Pt is awake, alert x 4. Pt independently moves all extremities without observable deficits. However, pt states that he has significant pain, rated 9-10/10 in the region of the posterior neck and radiating down to BUE and abdomen that limits his movement and restricts his comfort level. He requires minimal assist with all ADLs and repositioning. Regular insulin gtt is presently infusing at 3.6 units/hr. Right lower quadrant abdominal open incision site, former location of dislodged biliary drain, was draining a moderate amount of yellow/green drainage with a pungent odor. RLQ abdomen was cleansed with dermal wound cleanser. Gauze, abd pad and hypafix tape applied to draining RLQ wound. Pt tolerated drsg application with expressed hypersensitivity, discomfort rated 8/10. Bed is locked and in lowest position, side rail up x 1, exit alarm activated, call bell and bedside table are within reach. Neurological: as noted in assessment, no deficits noted. Bilateral  and pedal pushes are strong and equal.    Cardiovascular:  NSR/ST on the monitor. Pulmonary: breath sounds diminished, clear on room air, saturations maintained at %    GI/: Abdomen is semi-soft, flat, non-distended, tender to touch. Pt indicates ongoing epigastric discomfort with nausea. He has not produced any emesis as of this time.  Last BM noted prior to admission 06/16/2018. Pt describes stools as having been frequent and loose when he was at home. Pt uses the urinal independently and has adequate, lele colored urine output. IVF/Critical gtts: IV maintenance fluid NS @ 100 ml/hr, Regular insulin gtt    Skin: as noted above      0120: Glucostabilizer accucheck noted 110, insulin gtt decreased from 3.6 units/hr to 0.5 units/hr. 0200: Pt requested and was medicated at this time with 1mg IV Dilaudid for ongoing generalized pain rated 10/10 to posterior neck, BUE, and abdomen. He also tolerated his scheduled SQ heparin as prescribed. He denies further c/o.    0225: Dois Lively noted 90, insulin gtt stopped at this time x 60 minutes per insulin gtt protocol. 0325: Dois Lively noted 111, continue to hold insulin gtt at this time, and repeat accuheck in 120 minutes, at 0525, per insulin gtt protocol. Pt states only moderate pain relief was achieved. He states that he is hopeful that the \"morning doctor will agree to increase the dose and frequency of the pain medicine for me\". 0400: VS at baseline, temp 97.7 orally, HR 83, resp 16, Saturations 93% on room air, BP 95/65 right arm. Pt is resting quietly in bed, watching TV. Bed is locked and in lowest position, side rail up x 1, exit alarm activated, call bell and bedside table are within reach. 2712: Dois Lively noted 172, continue to hold insulin gtt at this time, repeat accuheck in 120 minutes, at 0728, per insulin gtt protocol. 0600: Pt requested and was medicated at this time with IV Dilaudid for ongoing generalized abdominal, neck, BUE pain rated 9/10. He denies current s/s active N/V. Pt remains NPO at this time per orders. He is in no acute distress. Bed is locked and in lowest position, side rail up x 1, exit alarm activated, call bell and bedside table are within reach.      1993: Glucostabilizer accucheck noted 225, insulin gtt was restarted at this time at 1.7 units/hr. Pt states that his pain is only mildly relieved and rates it a 7/10.  Bedside change of shift report given to: Denney Duane, RN

## 2018-06-17 NOTE — PROGRESS NOTES
Physical Exam   Skin:           0720- assumed care of patient. See doc flow sheets and MAR throughout shift for medications and care interventions.   1515- report to be given to [facility]

## 2018-06-17 NOTE — PROGRESS NOTES
Problem: Diabetes Self-Management  Goal: *Disease process and treatment process  Define diabetes and identify own type of diabetes; list 3 options for treating diabetes. Outcome: Progressing Towards Goal  Pt has longstanding hx of frequent hospital admissions for hyperglycemia and non-compliance  Goal: *Incorporating nutritional management into lifestyle  Describe effect of type, amount and timing of food on blood glucose; list 3 methods for planning meals. Outcome: Progressing Towards Goal  Needs reinforcement    Problem: Pressure Injury - Risk of  Goal: *Prevention of pressure injury  Document Austin Scale and appropriate interventions in the flowsheet. Outcome: Progressing Towards Goal  Pressure Injury Interventions:       Moisture Interventions: Contain wound drainage, Maintain skin hydration (lotion/cream), Minimize layers, Moisture barrier, Offer toileting Q_hr, Absorbent underpads    Activity Interventions: Pressure redistribution bed/mattress(bed type)         Nutrition Interventions: Document food/fluid/supplement intake, Discuss nutritional consult with provider, Offer support with meals,snacks and hydration                    Problem: Falls - Risk of  Goal: *Absence of Falls  Document Serena Fall Risk and appropriate interventions in the flowsheet.   Outcome: Progressing Towards Goal  Fall Risk Interventions:  Mobility Interventions: Assess mobility with egress test, Strengthening exercises (ROM-active/passive), Bed/chair exit alarm         Medication Interventions: Bed/chair exit alarm, Evaluate medications/consider consulting pharmacy, Patient to call before getting OOB         History of Falls Interventions: Bed/chair exit alarm, Door open when patient unattended, Evaluate medications/consider consulting pharmacy, Room close to nurse's station        Problem: Impaired Skin Integrity/Pressure Injury Treatment  Goal: *Prevention of pressure injury  Document Austin Scale and appropriate interventions in the flowsheet.   Outcome: Progressing Towards Goal  Pressure Injury Interventions:       Moisture Interventions: Contain wound drainage, Maintain skin hydration (lotion/cream), Minimize layers, Moisture barrier, Offer toileting Q_hr, Absorbent underpads    Activity Interventions: Pressure redistribution bed/mattress(bed type)         Nutrition Interventions: Document food/fluid/supplement intake, Discuss nutritional consult with provider, Offer support with meals,snacks and hydration

## 2018-06-17 NOTE — PROGRESS NOTES
1618  Received pt on bed on Insulin drip , right now BS is 164, Insulin drip stop per protocol, double check with Los Fletcher, charge RN. NPo maintained per pt he is going for procedure tomorrow. 1657  I called Dr Chelsea Ruff, informed about pt labs, order to  To give Lantus    1710  Given Lantus 10 units, Insulin was stop at 1610,but at 1810  I will  Removed the Insulin set up, pain under control at this time, NPO instructed for procedure tomorrow, dressing dry and intact, no nausea. 1832  Blood sugar 156 , refused Insulin coverage, pt had eaten  About  Half of his tray.

## 2018-06-17 NOTE — PROGRESS NOTES
Chart reviewed and pt verified demographics. He has been here and [facility] 6-7 times in the last year. He has complex medical history. He has chronic pancreatitis and has had numerous duodenal drains. His abdomen is a bunch of scars. He has a med port to left upper chest for antibiotics, but says he is no longer on them. He was in [facility]  Earlier this month for cervical cord stenosis. Now he is in for DKA. But he says he is in because he fell and hit his head on forehead and now both his arms hurt. He says he has been controlling his diet. Chart  says he has [address] [phone] and he said he was going to call her this afternoon. But in our last visit here, it stated she was no longer to be his guardian. I called the answering service and gave my name and number to call back. In previous hospitalizations, it notes some drug seeking behavior. He has refused wound care  In the past.    Reason for Readmission:   DKA, but pt says a fall. RRAT Score and Risk Level:     Red, high at least 2 admissions in the last 30 days. Level of Readmission:    high      Care Conference scheduled:   TBD, I think mtg with mom,pt and guardian would be helpful. Resources/supports as identified by patient/family:  Pt says he lives with his mom. He says he sees his PCP, last 3-4 months ago, none recent        Top Challenges facing patient (as identified by patient/family and CM): Finances/Medication cost?   He has [employer]   His mom can arrange rides he says. Support system or lack thereof? Mom, Maybe guardian  Living arrangements? Lives with mom in [place]       Self-care/ADLs/Cognition? He was able to talk about his medical issues but kept coming back to his fall and that  He hurts. Current Advanced Directive/Advance Care Plan:  none           Plan for utilizing home health: It does not look like he has home health at least recently.  He could go home with skilled nurse for disease management and medication education             Likelihood of additional readmission:  high              Transition of Care Plan:    Based on readmission, the patient's previous Plan of Care   has been evaluated and/or modified. The current Transition of Care Plan is:     To go home, hopefully this time with Home Health

## 2018-06-17 NOTE — H&P
[facility]  Hospitalist Division      History & Physical    Patient: Buzz Hall MRN: [id number]  CSN: [id number]    YOB: 1965  Age: 48 y.o. Sex: male    DOA: 6/16/2018 LOS:  LOS: 1 day        DOA: 6/16/2018        Assessment/Plan     Active Problems:    DKA (diabetic ketoacidoses) ([facility] [address] (4/15/2018)        Plan:  1. DKA - insulin stabilizer protocol , monitor BS - ? Etiology of elevated sugars - steroids - recent admission to [place] - dx'd to have central cord syndrome with cervical stenosis - discharged on PO decadron   2. Duodenal drain - fell out yet again - chart reviewed - has had drain fall out multiple times - recently replaced in June 2018 - will consult IR vs Gen surg to replace   3. Chronic Pancreatitis 2y to Alcohol abuse - resume creon when eating   4. Chronic Pain - dilaudid prn   5. H/o T2DM - poorly controlled   6. Non compliance with meds   7.  GERD - protonix   DVT Px - heparin   FC                 HPI:     Buzz Hall is a 48 y.o. 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     Past Medical History:   Diagnosis Date    Abdominal pain, generalized     Chronic pancreatitis ([facility]     Diabetes ([facility] [facility]     hypothyroid    Encounter for long-term (current) use of other medications     Essential hypertension     ETOH abuse     GERD (gastroesophageal reflux disease)     Heart failure (HCC)     Hyponatremia     Pain in the abdomen 11/2/2010    Pancreatitis     w/ abscess and pseudocyst    Pancreatitis     Psychiatric disorder     depression, anxiety    Tobacco abuse        Past Surgical History:   Procedure Laterality Date    HX ABDOMINAL LAPAROSCOPY      PANCREATIC STENT    HX CHOLECYSTECTOMY         Family History   Problem Relation Age of Onset    Heart Disease Father        Social History     Social History    Marital status: UNKNOWN     Spouse name: N/A    Number of children: N/A    Years of education: N/A     Social History Main Topics    Smoking status: Current Every Day Smoker     Packs/day: 0.50     Years: 0.00     Types: Cigarettes    Smokeless tobacco: Never Used    Alcohol use Yes    Drug use: No    Sexual activity: Yes     Birth control/ protection: None     Other Topics Concern    None     Social History Narrative       Prior to Admission medications    Medication Sig Start Date End Date Taking? Authorizing Provider   gabapentin (NEURONTIN) 300 mg capsule Take 2 Caps by mouth three (3) times daily for 30 days. Indications: NEUROPATHIC PAIN 6/9/18 7/9/18  Theodora Hendrix MD   oxyCODONE-acetaminophen (PERCOCET)  mg per tablet Take 1 Tab by mouth every six (6) hours as needed for Pain. Max Daily Amount: 4 Tabs. Indications: Pain 6/9/18   Theodora Hendrix MD   dexamethasone (DECADRON) 1 mg tablet Take 4mg (4 tablets) by mouth twice a day for 2 days, then 2mg BID for 2 days, then 1mg BID for 2 days, then 1 mg daily for 2 days until gone. Indications: DIAGNOSTIC TEST FOR CUSHING'S SYNDROME, cervical stenosis 6/9/18   Theodora Hendrix MD   promethazine (PHENERGAN) 25 mg tablet Take 1 Tab by mouth every six (6) hours as needed.  5/31/18   Yulissa Kirk, NP   ondansetron (ZOFRAN ODT) 4 mg disintegrating tablet Take 1 Tab by mouth every eight (8) hours as needed for Nausea. 5/31/18   Kye Dacosta, SUJATHA   sucralfate (CARAFATE) 1 gram tablet Take 1 Tab by mouth four (4) times daily. Indications: PREVENTION OF STRESS ULCER 5/22/18   Abner Lawrence MD   amitriptyline (ELAVIL) 75 mg tablet Take 1 Tab by mouth nightly. Indications: Depression 8/9/17   Martywyn Antu, NP   ascorbic acid, vitamin C, (VITAMIN C) 500 mg tablet Take 1 Tab by mouth daily. Indications: VITAMIN C DEFICIENCY 8/9/17   Keturah Silvau, NP   cholecalciferol, vitamin D3, 2,000 unit tab Take 1 Tab by mouth daily. Indications: PREVENTION OF VITAMIN D DEFICIENCY 8/9/17   Keturah Barragan NP   cyanocobalamin (VITAMIN B-12) 1,000 mcg sublingual tablet Take 2 Tabs by mouth daily. Indications: PREVENTION OF VITAMIN B12 DEFICIENCY 8/9/17   Keturah Silvau, NP   ferrous sulfate 325 mg (65 mg iron) tablet Take 1 Tab by mouth two (2) times a day. Indications: IRON DEFICIENCY ANEMIA 8/9/17   Keturah Silvau, NP   folic acid 939 mcg tablet Take 1 Tab by mouth daily. 8/9/17   Keturah Barragan NP   insulin lispro (HUMALOG) 100 unit/mL injection 12 Units by SubCUTAneous route nightly. Indications: type 2 diabetes mellitus 8/9/17   Keturah Barragan NP   lipase-protease-amylase (CREON) 12,000-38,000 -60,000 unit capsule Take 2 Caps by mouth three (3) times daily (with meals). Indications: exocrine pancreatic insufficiency, dosage change 7/28/17   Keturah Barragan NP   pantoprazole (PROTONIX) 40 mg tablet Take 1 Tab by mouth two (2) times a day.  Indications: EROSIVE ESOPHAGITIS, gastroesophageal reflux disease 7/28/17   Keturah Barragan NP       Allergies   Allergen Reactions    Ampicillin-Sulbactam Rash    Pcn [Penicillins] Itching and Hives    Piperacillin-Tazobactam Rash    Pollen Extracts Rash    Seafood [Shellfish Containing Products] Hives     Other reaction(s): unknown  Has tolerated iohexol (iodine-containing contrast)  Only shrimp  Shrimp       Review of Systems  A comprehensive review of systems was negative except for that written in the History of Present Illness. Physical Exam:      Visit Vitals    BP 97/65 (BP 1 Location: Left arm, BP Patient Position: At rest)    Pulse 96    Temp 97.4 °F (36.3 °C)    Resp 18    Ht 6' (1.829 m)    Wt 68 kg (150 lb)    SpO2 100%    BMI 20.34 kg/m2       Physical Exam:    Gen: In general, this is a poorly nourished, cachexic male in no acute distress  HEENT: Sclerae nonicteric. Oral mucous membranes dry. Dentition poor  Neck: Supple with midline trachea. CV: RRR without murmur or rub appreciated. Resp:Respirations are unlabored without use of accessory muscles. Lung fields bilaterally without wheezes or rhonchi. Abd: Soft, nontender, nondistended. Extrem: Extremities are warm, without cyanosis or clubbing. No pitting pretibial edema  Skin: Warm, no visible rashes. Neuro: Patient is alert, oriented, and cooperative. No obvious focal defects. Moves all 4 extremities. Labs Reviewed:    Recent Results (from the past 24 hour(s))   CBC WITH AUTOMATED DIFF    Collection Time: 06/16/18  6:10 PM   Result Value Ref Range    WBC 13.6 (H) 4.6 - 13.2 K/uL    RBC 3.50 (L) 4.70 - 5.50 M/uL    HGB 11.2 (L) 13.0 - 16.0 g/dL    HCT 33.9 (L) 36.0 - 48.0 %    MCV 96.9 74.0 - 97.0 FL    MCH 32.0 24.0 - 34.0 PG    MCHC 33.0 31.0 - 37.0 g/dL    RDW 13.5 11.6 - 14.5 %    PLATELET 340 944 - 183 K/uL    MPV 9.8 9.2 - 11.8 FL    NEUTROPHILS 83 (H) 40 - 73 %    LYMPHOCYTES 10 (L) 21 - 52 %    MONOCYTES 6 3 - 10 %    EOSINOPHILS 1 0 - 5 %    BASOPHILS 0 0 - 2 %    ABS. NEUTROPHILS 11.3 (H) 1.8 - 8.0 K/UL    ABS. LYMPHOCYTES 1.4 0.9 - 3.6 K/UL    ABS. MONOCYTES 0.9 0.05 - 1.2 K/UL    ABS. EOSINOPHILS 0.1 0.0 - 0.4 K/UL    ABS.  BASOPHILS 0.0 0.0 - 0.06 K/UL    DF AUTOMATED     METABOLIC PANEL, COMPREHENSIVE    Collection Time: 06/16/18  6:10 PM   Result Value Ref Range    Sodium 128 (L) 136 - 145 mmol/L    Potassium 5.1 3.5 - 5.5 mmol/L    Chloride 94 (L) 100 - 108 mmol/L    CO2 24 21 - 32 mmol/L    Anion gap 10 3.0 - 18 mmol/L    Glucose 791 (HH) 74 - 99 mg/dL    BUN 23 (H) 7.0 - 18 MG/DL    Creatinine 1.55 (H) 0.6 - 1.3 MG/DL    BUN/Creatinine ratio 15 12 - 20      GFR est AA 57 (L) >60 ml/min/1.73m2    GFR est non-AA 47 (L) >60 ml/min/1.73m2    Calcium 8.1 (L) 8.5 - 10.1 MG/DL    Bilirubin, total 0.7 0.2 - 1.0 MG/DL    ALT (SGPT) 124 (H) 16 - 61 U/L    AST (SGOT) 47 (H) 15 - 37 U/L    Alk.  phosphatase 277 (H) 45 - 117 U/L    Protein, total 6.6 6.4 - 8.2 g/dL    Albumin 3.2 (L) 3.4 - 5.0 g/dL    Globulin 3.4 2.0 - 4.0 g/dL    A-G Ratio 0.9 0.8 - 1.7     CARDIAC PANEL,(CK, CKMB & TROPONIN)    Collection Time: 06/16/18  6:10 PM   Result Value Ref Range    CK 24 (L) 39 - 308 U/L    CK - MB <1.0 <3.6 ng/ml    CK-MB Index  0.0 - 4.0 %     CALCULATION NOT PERFORMED WHEN RESULT IS BELOW LINEAR LIMIT    Troponin-I, Qt. <0.02 0.0 - 0.045 NG/ML   LIPASE    Collection Time: 06/16/18  6:10 PM   Result Value Ref Range    Lipase 48 (L) 73 - 393 U/L   HEMOGLOBIN A1C WITH EAG    Collection Time: 06/16/18  6:10 PM   Result Value Ref Range    Hemoglobin A1c 7.8 (H) 4.2 - 5.6 %    Est. average glucose 177 mg/dL   POC LACTIC ACID    Collection Time: 06/16/18  6:34 PM   Result Value Ref Range    Lactic Acid (POC) 1.8 0.4 - 2.0 mmol/L   EKG, 12 LEAD, INITIAL    Collection Time: 06/16/18  7:07 PM   Result Value Ref Range    Ventricular Rate 98 BPM    Atrial Rate 98 BPM    P-R Interval 130 ms    QRS Duration 84 ms    Q-T Interval 340 ms    QTC Calculation (Bezet) 434 ms    Calculated P Axis 65 degrees    Calculated R Axis -41 degrees    Calculated T Axis 70 degrees    Diagnosis       Normal sinus rhythm  Left axis deviation  Abnormal ECG  When compared with ECG of 22-MAY-2018 02:00,  ST no longer depressed in Inferior leads  ST no longer depressed in Anterolateral leads  Nonspecific T wave abnormality no longer evident in Lateral leads     GLUCOSE, POC Collection Time: 06/16/18  7:10 PM   Result Value Ref Range    Glucose (POC) >600 (HH) 70 - 110 mg/dL   URINALYSIS W/ RFLX MICROSCOPIC    Collection Time: 06/16/18  8:52 PM   Result Value Ref Range    Color YELLOW      Appearance CLEAR      Specific gravity >1.030 (H) 1.005 - 1.030    pH (UA) 5.5 5.0 - 8.0      Protein NEGATIVE  NEG mg/dL    Glucose >1000 (A) NEG mg/dL    Ketone NEGATIVE  NEG mg/dL    Bilirubin NEGATIVE  NEG      Blood TRACE (A) NEG      Urobilinogen 0.2 0.2 - 1.0 EU/dL    Nitrites NEGATIVE  NEG      Leukocyte Esterase NEGATIVE  NEG     URINE MICROSCOPIC ONLY    Collection Time: 06/16/18  8:52 PM   Result Value Ref Range    WBC 0 to 3 0 - 4 /hpf    RBC 0 to 3 0 - 5 /hpf    Epithelial cells FEW 0 - 5 /lpf    Bacteria NEGATIVE  NEG /hpf   GLUCOSTABILIZER    Collection Time: 06/16/18  8:58 PM   Result Value Ref Range    Glucose 601 mg/dL    Insulin order 10.8 units/hour    Insulin adminstered 10.8 units/hour    Multiplier 0.020     Low target 140 mg/dL    High target 180 mg/dL    D50 order 0.0 ml    D50 administered 0.00 ml    Minutes until next BG 60 min    Order initials OV     Administered initials OV    GLUCOSE, POC    Collection Time: 06/16/18 10:03 PM   Result Value Ref Range    Glucose (POC) 520 (HH) 70 - 110 mg/dL   GLUCOSTABILIZER    Collection Time: 06/16/18 10:08 PM   Result Value Ref Range    Glucose 520 mg/dL    Insulin order 4.6 units/hour    Insulin adminstered 4.6 units/hour    Multiplier 0.010     Low target 140 mg/dL    High target 180 mg/dL    D50 order 0.0 ml    D50 administered 0.00 ml    Minutes until next BG 60 min    Order initials OV     Administered initials OV    GLUCOSE, POC    Collection Time: 06/16/18 11:10 PM   Result Value Ref Range    Glucose (POC) 244 (H) 70 - 110 mg/dL   GLUCOSTABILIZER    Collection Time: 06/16/18 11:17 PM   Result Value Ref Range    Glucose 244 mg/dL    Insulin order 1.8 units/hour    Insulin adminstered 1.8 units/hour    Multiplier 0.010     Low target 140 mg/dL    High target 180 mg/dL    D50 order 0.0 ml    D50 administered 0.00 ml    Minutes until next BG 60 min    Order initials ov     Administered initials ov    GLUCOSE, POC    Collection Time: 06/17/18 12:18 AM   Result Value Ref Range    Glucose (POC) 242 (H) 70 - 110 mg/dL   GLUCOSTABILIZER    Collection Time: 06/17/18 12:19 AM   Result Value Ref Range    Glucose 242 mg/dL    Insulin order 3.6 units/hour    Insulin adminstered 3.6 units/hour    Multiplier 0.020     Low target 140 mg/dL    High target 180 mg/dL    D50 order 0.0 ml    D50 administered 0.00 ml    Minutes until next BG 60 min    Order initials nurse     Administered initials nurse    GLUCOSE, POC    Collection Time: 06/17/18  1:12 AM   Result Value Ref Range    Glucose (POC) 110 70 - 110 mg/dL   GLUCOSTABILIZER    Collection Time: 06/17/18  1:20 AM   Result Value Ref Range    Glucose 110 mg/dL    Insulin order 0.5 units/hour    Insulin adminstered 0.5 units/hour    Multiplier 0.010     Low target 140 mg/dL    High target 180 mg/dL    D50 order 0.0 ml    D50 administered 0.00 ml    Minutes until next BG 60 min    Order initials tsw     Administered initials tsw    GLUCOSE, POC    Collection Time: 06/17/18  2:15 AM   Result Value Ref Range    Glucose (POC) 90 70 - 110 mg/dL   GLUCOSTABILIZER    Collection Time: 06/17/18  2:22 AM   Result Value Ref Range    Glucose 90 mg/dL    Insulin order 0.0 units/hour    Insulin adminstered 0.0 units/hour    Multiplier 0.000     Low target 140 mg/dL    High target 180 mg/dL    D50 order 0.0 ml    D50 administered 0.00 ml    Minutes until next BG 60 min    Order initials tsw     Administered initials tsw    GLUCOSE, POC    Collection Time: 06/17/18  3:23 AM   Result Value Ref Range    Glucose (POC) 111 (H) 70 - 110 mg/dL   GLUCOSTABILIZER    Collection Time: 06/17/18  3:26 AM   Result Value Ref Range    Glucose 111 mg/dL    Insulin order 0.0 units/hour    Insulin adminstered 0.0 units/hour    Multiplier 0.000     Low target 140 mg/dL    High target 180 mg/dL    D50 order 0.0 ml    D50 administered 0.00 ml    Minutes until next  min    Order initials tsw     Administered initials tsw    GLUCOSE, POC    Collection Time: 06/17/18  5:22 AM   Result Value Ref Range    Glucose (POC) 172 (H) 70 - 110 mg/dL   GLUCOSTABILIZER    Collection Time: 06/17/18  5:28 AM   Result Value Ref Range    Glucose 172 mg/dL    Insulin order 0.0 units/hour    Insulin adminstered 0.0 units/hour    Multiplier 0.000     Low target 140 mg/dL    High target 180 mg/dL    D50 order 0.0 ml    D50 administered 0.00 ml    Minutes until next  min    Order initials tsw     Administered initials tsw        Imaging Reviewed:    CT Abd & pelvis - report pending           Jovanny Pinon MD  6/17/2018, 10:02 PM

## 2018-06-17 NOTE — PROGRESS NOTES
Progress Note      Patient: Arlyn Holguin               Sex: male          DOA: 6/16/2018       YOB: 1965      Age:  48 y.o.        LOS:  LOS: 1 day             CHIEF COMPLAINT:    Subjective:     He endorsed mild intermittent abdominal pain but none currently and he denies fever, chills, nausea, vomiting, CP, or SOB, and was about to eat dinner. Objective:      Visit Vitals    /69 (BP 1 Location: Left arm, BP Patient Position: Head of bed elevated (Comment degrees))    Pulse (!) 101    Temp 98 °F (36.7 °C)    Resp 18    Ht 6' (1.829 m)    Wt 150 lb (68 kg)    SpO2 99%    BMI 20.34 kg/m2       Physical Exam:  GEN: AAO X 3, NAD  CVS: Normal S1S2, RRR  RESP: CTAB  ABD: Soft, NT/ND, +BS  EXT: No edema noted  NEURO: CN 2 - 12 intact with no focal deficits noted. Lab/Data Reviewed:  CMP:   Lab Results   Component Value Date/Time     (L) 06/16/2018 06:10 PM    K 5.1 06/16/2018 06:10 PM    CL 94 (L) 06/16/2018 06:10 PM    CO2 24 06/16/2018 06:10 PM    AGAP 10 06/16/2018 06:10 PM     (HH) 06/16/2018 06:10 PM    BUN 23 (H) 06/16/2018 06:10 PM    CREA 1.55 (H) 06/16/2018 06:10 PM    GFRAA 57 (L) 06/16/2018 06:10 PM    GFRNA 47 (L) 06/16/2018 06:10 PM    CA 8.1 (L) 06/16/2018 06:10 PM    ALB 3.2 (L) 06/16/2018 06:10 PM    TP 6.6 06/16/2018 06:10 PM    GLOB 3.4 06/16/2018 06:10 PM    AGRAT 0.9 06/16/2018 06:10 PM    SGOT 47 (H) 06/16/2018 06:10 PM     (H) 06/16/2018 06:10 PM     CBC:   Lab Results   Component Value Date/Time    WBC 13.6 (H) 06/16/2018 06:10 PM    HGB 11.2 (L) 06/16/2018 06:10 PM    HCT 33.9 (L) 06/16/2018 06:10 PM     06/16/2018 06:10 PM           Assessment/Plan     Active Problems:    DKA (diabetic ketoacidoses) ([facility] [address] (4/15/2018)        Plan:  DKA - Resolved with most recent glucose improved at 164 and AG normal. Will give Lantus 20 units now and discontinue Insulin gtt one hour later and start him on Diabetic diet.  Correctional insulin also started. HBA1C this admission is 7.8 and diabetic educator consulted. 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 Colorectal surgery also consulted and Dr. Manny Osullivan consult reviewed and appreciate his help. NPO after midnight. Chronic Pancreatitis - Restarted Creon  Chronic Pain - On dilaudid prn   GERD - Home medication of Protonix 40 mg BID restarted as now taking PO  DVT prophylaxis.  Heparin  Restarted appropriate home medications as now taking orally        Gus Boston DO, MPH  Internal Medicine

## 2018-06-18 ENCOUNTER — HOSPITAL ENCOUNTER (INPATIENT)
Dept: CARDIAC CATH/INVASIVE PROCEDURES | Age: 53
Discharge: HOME | DRG: 420 | End: 2018-06-18
Attending: HOSPITALIST
Payer: MEDICAID

## 2018-06-18 LAB
ALBUMIN SERPL-MCNC: 2.4 G/DL (ref 3.4–5)
ALBUMIN/GLOB SERPL: 0.9 {RATIO} (ref 0.8–1.7)
ALP SERPL-CCNC: 183 U/L (ref 45–117)
ALT SERPL-CCNC: 67 U/L (ref 16–61)
ANION GAP SERPL CALC-SCNC: 6 MMOL/L (ref 3–18)
AST SERPL-CCNC: 63 U/L (ref 15–37)
ATRIAL RATE: 98 BPM
BASOPHILS # BLD: 0 K/UL (ref 0–0.06)
BASOPHILS NFR BLD: 0 % (ref 0–2)
BILIRUB SERPL-MCNC: 0.2 MG/DL (ref 0.2–1)
BUN SERPL-MCNC: 19 MG/DL (ref 7–18)
BUN/CREAT SERPL: 24 (ref 12–20)
CALCIUM SERPL-MCNC: 7.7 MG/DL (ref 8.5–10.1)
CALCULATED P AXIS, ECG09: 65 DEGREES
CALCULATED R AXIS, ECG10: -41 DEGREES
CALCULATED T AXIS, ECG11: 70 DEGREES
CHLORIDE SERPL-SCNC: 113 MMOL/L (ref 100–108)
CK MB CFR SERPL CALC: 3.9 % (ref 0–4)
CK MB SERPL-MCNC: 1.1 NG/ML (ref 5–25)
CK SERPL-CCNC: 28 U/L (ref 39–308)
CO2 SERPL-SCNC: 23 MMOL/L (ref 21–32)
CREAT SERPL-MCNC: 0.78 MG/DL (ref 0.6–1.3)
DIAGNOSIS, 93000: NORMAL
DIFFERENTIAL METHOD BLD: ABNORMAL
EOSINOPHIL # BLD: 0.1 K/UL (ref 0–0.4)
EOSINOPHIL NFR BLD: 2 % (ref 0–5)
ERYTHROCYTE [DISTWIDTH] IN BLOOD BY AUTOMATED COUNT: 13.9 % (ref 11.6–14.5)
GLOBULIN SER CALC-MCNC: 2.6 G/DL (ref 2–4)
GLUCOSE BLD STRIP.AUTO-MCNC: 105 MG/DL (ref 70–110)
GLUCOSE BLD STRIP.AUTO-MCNC: 112 MG/DL (ref 70–110)
GLUCOSE BLD STRIP.AUTO-MCNC: 117 MG/DL (ref 70–110)
GLUCOSE BLD STRIP.AUTO-MCNC: 380 MG/DL (ref 70–110)
GLUCOSE BLD STRIP.AUTO-MCNC: 46 MG/DL (ref 70–110)
GLUCOSE BLD STRIP.AUTO-MCNC: 559 MG/DL (ref 70–110)
GLUCOSE SERPL-MCNC: 52 MG/DL (ref 74–99)
HCT VFR BLD AUTO: 27 % (ref 36–48)
HGB BLD-MCNC: 8.7 G/DL (ref 13–16)
LYMPHOCYTES # BLD: 1.1 K/UL (ref 0.9–3.6)
LYMPHOCYTES NFR BLD: 20 % (ref 21–52)
MCH RBC QN AUTO: 31.5 PG (ref 24–34)
MCHC RBC AUTO-ENTMCNC: 32.2 G/DL (ref 31–37)
MCV RBC AUTO: 97.8 FL (ref 74–97)
MONOCYTES # BLD: 0.4 K/UL (ref 0.05–1.2)
MONOCYTES NFR BLD: 6 % (ref 3–10)
NEUTS SEG # BLD: 4.2 K/UL (ref 1.8–8)
NEUTS SEG NFR BLD: 72 % (ref 40–73)
P-R INTERVAL, ECG05: 130 MS
PLATELET # BLD AUTO: 118 K/UL (ref 135–420)
PMV BLD AUTO: 9.5 FL (ref 9.2–11.8)
POTASSIUM SERPL-SCNC: 4 MMOL/L (ref 3.5–5.5)
PROT SERPL-MCNC: 5 G/DL (ref 6.4–8.2)
Q-T INTERVAL, ECG07: 340 MS
QRS DURATION, ECG06: 84 MS
QTC CALCULATION (BEZET), ECG08: 434 MS
RBC # BLD AUTO: 2.76 M/UL (ref 4.7–5.5)
SODIUM SERPL-SCNC: 142 MMOL/L (ref 136–145)
TROPONIN I SERPL-MCNC: <0.02 NG/ML (ref 0–0.04)
VENTRICULAR RATE, ECG03: 98 BPM
WBC # BLD AUTO: 5.8 K/UL (ref 4.6–13.2)

## 2018-06-18 PROCEDURE — 74011250637 HC RX REV CODE- 250/637: Performed by: HOSPITALIST

## 2018-06-18 PROCEDURE — 74011636320 HC RX REV CODE- 636/320: Performed by: RADIOLOGY

## 2018-06-18 PROCEDURE — C1769 GUIDE WIRE: HCPCS

## 2018-06-18 PROCEDURE — 74011000258 HC RX REV CODE- 258

## 2018-06-18 PROCEDURE — 49405 IMAGE CATH FLUID COLXN VISC: CPT

## 2018-06-18 PROCEDURE — 80053 COMPREHEN METABOLIC PANEL: CPT | Performed by: HOSPITALIST

## 2018-06-18 PROCEDURE — 74011250636 HC RX REV CODE- 250/636: Performed by: HOSPITALIST

## 2018-06-18 PROCEDURE — 77030025702 HC BG URIN DRN MRTM -A

## 2018-06-18 PROCEDURE — 36415 COLL VENOUS BLD VENIPUNCTURE: CPT | Performed by: HOSPITALIST

## 2018-06-18 PROCEDURE — 93005 ELECTROCARDIOGRAM TRACING: CPT

## 2018-06-18 PROCEDURE — 0W9G30Z DRAINAGE OF PERITONEAL CAVITY WITH DRAINAGE DEVICE, PERCUTANEOUS APPROACH: ICD-10-PCS | Performed by: RADIOLOGY

## 2018-06-18 PROCEDURE — 85025 COMPLETE CBC W/AUTO DIFF WBC: CPT | Performed by: HOSPITALIST

## 2018-06-18 PROCEDURE — 74011636637 HC RX REV CODE- 636/637: Performed by: HOSPITALIST

## 2018-06-18 PROCEDURE — 82962 GLUCOSE BLOOD TEST: CPT

## 2018-06-18 PROCEDURE — 99152 MOD SED SAME PHYS/QHP 5/>YRS: CPT

## 2018-06-18 PROCEDURE — 74011250636 HC RX REV CODE- 250/636

## 2018-06-18 PROCEDURE — 82550 ASSAY OF CK (CPK): CPT | Performed by: HOSPITALIST

## 2018-06-18 PROCEDURE — C1729 CATH, DRAINAGE: HCPCS

## 2018-06-18 PROCEDURE — 74011000258 HC RX REV CODE- 258: Performed by: HOSPITALIST

## 2018-06-18 PROCEDURE — 65660000000 HC RM CCU STEPDOWN

## 2018-06-18 RX ORDER — FENTANYL CITRATE 50 UG/ML
INJECTION, SOLUTION INTRAMUSCULAR; INTRAVENOUS AS NEEDED
Status: DISCONTINUED | OUTPATIENT
Start: 2018-06-18 | End: 2018-06-22 | Stop reason: HOSPADM

## 2018-06-18 RX ORDER — LIDOCAINE HYDROCHLORIDE 10 MG/ML
INJECTION INFILTRATION; PERINEURAL AS NEEDED
Status: DISCONTINUED | OUTPATIENT
Start: 2018-06-18 | End: 2018-06-22 | Stop reason: HOSPADM

## 2018-06-18 RX ORDER — DEXTROSE MONOHYDRATE 25 G/50ML
INJECTION, SOLUTION INTRAVENOUS
Status: COMPLETED
Start: 2018-06-18 | End: 2018-06-18

## 2018-06-18 RX ORDER — SODIUM CHLORIDE 450 MG/100ML
75 INJECTION, SOLUTION INTRAVENOUS CONTINUOUS
Status: DISCONTINUED | OUTPATIENT
Start: 2018-06-18 | End: 2018-06-19 | Stop reason: HOSPADM

## 2018-06-18 RX ORDER — DEXTROSE, SODIUM CHLORIDE, AND POTASSIUM CHLORIDE 5; .45; .075 G/100ML; G/100ML; G/100ML
75 INJECTION INTRAVENOUS CONTINUOUS
Status: DISCONTINUED | OUTPATIENT
Start: 2018-06-18 | End: 2018-06-18

## 2018-06-18 RX ORDER — HEPARIN SODIUM 200 [USP'U]/100ML
1000 INJECTION, SOLUTION INTRAVENOUS ONCE
Status: DISPENSED | OUTPATIENT
Start: 2018-06-18 | End: 2018-06-19

## 2018-06-18 RX ADMIN — PANCRELIPASE 2 CAPSULE: 10000; 34000; 55000 CAPSULE, DELAYED RELEASE ORAL at 16:17

## 2018-06-18 RX ADMIN — FENTANYL CITRATE 50 MCG: 50 INJECTION, SOLUTION INTRAMUSCULAR; INTRAVENOUS at 12:24

## 2018-06-18 RX ADMIN — FENTANYL CITRATE 50 MCG: 50 INJECTION, SOLUTION INTRAMUSCULAR; INTRAVENOUS at 12:33

## 2018-06-18 RX ADMIN — Medication 1 MG: at 05:34

## 2018-06-18 RX ADMIN — GABAPENTIN 600 MG: 300 CAPSULE ORAL at 16:17

## 2018-06-18 RX ADMIN — Medication 1 MG: at 17:16

## 2018-06-18 RX ADMIN — DEXTROSE 50 % IN WATER (D50W) INTRAVENOUS SYRINGE 25 G: at 07:53

## 2018-06-18 RX ADMIN — ONDANSETRON 4 MG: 2 INJECTION INTRAMUSCULAR; INTRAVENOUS at 00:47

## 2018-06-18 RX ADMIN — DEXTROSE MONOHYDRATE, SODIUM CHLORIDE, AND POTASSIUM CHLORIDE 75 ML/HR: 50; 4.5; .745 INJECTION, SOLUTION INTRAVENOUS at 09:32

## 2018-06-18 RX ADMIN — Medication 1 MG: at 13:33

## 2018-06-18 RX ADMIN — PANCRELIPASE 2 CAPSULE: 10000; 34000; 55000 CAPSULE, DELAYED RELEASE ORAL at 13:32

## 2018-06-18 RX ADMIN — Medication 1 MG: at 00:43

## 2018-06-18 RX ADMIN — IOPAMIDOL 5 ML: 612 INJECTION, SOLUTION INTRAVENOUS at 13:08

## 2018-06-18 RX ADMIN — AMITRIPTYLINE HYDROCHLORIDE 75 MG: 50 TABLET, FILM COATED ORAL at 21:40

## 2018-06-18 RX ADMIN — PANTOPRAZOLE SODIUM 40 MG: 40 TABLET, DELAYED RELEASE ORAL at 17:17

## 2018-06-18 RX ADMIN — DEXTROSE MONOHYDRATE 25 G: 25 INJECTION, SOLUTION INTRAVENOUS at 07:53

## 2018-06-18 RX ADMIN — SODIUM CHLORIDE 75 ML/HR: 450 INJECTION, SOLUTION INTRAVENOUS at 17:17

## 2018-06-18 RX ADMIN — GABAPENTIN 600 MG: 300 CAPSULE ORAL at 21:40

## 2018-06-18 RX ADMIN — Medication 1 MG: at 21:41

## 2018-06-18 RX ADMIN — Medication 1 MG: at 09:32

## 2018-06-18 RX ADMIN — INSULIN LISPRO 15 UNITS: 100 INJECTION, SOLUTION INTRAVENOUS; SUBCUTANEOUS at 16:52

## 2018-06-18 RX ADMIN — LIDOCAINE HYDROCHLORIDE 4 ML: 10 INJECTION INFILTRATION; PERINEURAL at 12:31

## 2018-06-18 NOTE — DIABETES MGMT
GLYCEMIC CONTROL AND NUTRITION    Assessment/Recommendations:  Diabetes consult received. Pt known from previous admissions and tends to have labile blood glucose. At last admission 5/28/18 pt was receiving very low dose of Lantus (4 units Lantus/day) and corrective lispro due to hypoglycemia with higher doses of Lantus. Of note, pt received 10 units Lantus 6/17/18 when transitioned off insulin drip but it is now discontinued due to hypoglycemia this morning. Agree with using just corrective lispro for now and monitoring glycemic response. Complete nutrition assessment and care plan to follow 6/19. Most recent blood glucose values:        Current A1C of 7.8 % is equivalent to average blood glucose of 174 mg/dl over the past 2-3 months. Current hospital diabetes medications:   Corrective lispro normal insulin senstivity ACHS    Previous day's insulin requirements: 50.9 units (30.9 units regular insulin from insulin drip, 10 units Lantus and 10 untis lispro) but with hypoglycemia this morning.     Home diabetes medications:  Per pt at prior admission 5/18 (will confirm 6/19)  Lantus 25 units/day BID and sliding scale lispro    Diet: orders written for consistent CHO     Aidan Conklin RD, CDE   Office:  [address] [place] [address] Pager:  [phone]

## 2018-06-18 NOTE — PROCEDURES
Vascular & Interventional Radiology Brief Procedure Note    Interventional Radiologist: Richard    Pre-operative Diagnosis:  pancreaticoduodenalcutanous fistula    Post-operative Diagnosis: Same as pre-op dx    Procedure(s) Performed:  14 Fr APD drain placement into right peritoneal fistula cavity    Anesthesia:  Local and Moderate Sedation    Findings:  Persistent fistula    Complications: None    Estimated Blood Loss:  minimal    Specimens: None    Condition: Good    Disposition:  inpt    Plan: tube indefinite for now unless there is definitive surgical management      Barb Wells MD  [address] [address]  Vascular & Interventional Radiology  6/18/2018

## 2018-06-18 NOTE — PROGRESS NOTES
INTERVENTIONAL RADIOLOGY PROGRESS NOTE    I met with Mr. Gordon Dale this morning and discussed his drain with him. He has a pancreatico-duodenal-cutaneous fistula that has been treated with an indwelling pigtail drain. The drain periodically falls out, and did last week during a fall. Last time it fell out, we placed a 14 Fr APD here at [place] [address] 5/24/18. He and I talked about it, and he would like the drain to be replaced again, which is a good idea. He is on the schedule for the first available angio lab slot for today.     No Johnson MD  Interventional Radiology

## 2018-06-18 NOTE — PROGRESS NOTES
[facility] 3S CARDIAC TELE  [address] [place] [address]  [phone]  Colon and Rectal Surgery Progress Note      Patient: Abad Ahumada MRN: [id number]  SSN: [id number]    YOB: 1965  Age: 48 y.o. Sex: male      Admit Date: 6/16/2018    LOS: 2 days     Subjective:     Comfortable. DKA resolved. Awaiting IR drain placement. Objective:     Vitals:    06/17/18 2015 06/18/18 0028 06/18/18 0520 06/18/18 0908   BP: 113/76 110/71 106/70 108/64   Pulse: 99 88 84 86   Resp: 20 17 17 16   Temp: 97.8 °F (36.6 °C) 97.9 °F (36.6 °C) 98 °F (36.7 °C) 98.7 °F (37.1 °C)   SpO2: 96% 100% 100% 100%   Weight:       Height:            Intake and Output:  Current Shift:    Last three shifts: 06/16 1901 - 06/18 0700  In: 3699.2 [P.O.:670; I.V.:3029.2]  Out: 2225 [Urine:2225]    Physical Exam:   GENERAL: alert, cooperative, no distress, appears stated age  ABDOMEN: soft, non-distended. No masses,  no organomegaly.   Minimal tenderness in RUQ/epigastrium  EXTREMITIES:  extremities normal, atraumatic, no cyanosis or edema     Lab/Data Review:  CMP:   Lab Results   Component Value Date/Time     06/18/2018 06:45 AM    K 4.0 06/18/2018 06:45 AM     (H) 06/18/2018 06:45 AM    CO2 23 06/18/2018 06:45 AM    AGAP 6 06/18/2018 06:45 AM    GLU 52 (LL) 06/18/2018 06:45 AM    BUN 19 (H) 06/18/2018 06:45 AM    CREA 0.78 06/18/2018 06:45 AM    GFRAA >60 06/18/2018 06:45 AM    GFRNA >60 06/18/2018 06:45 AM    CA 7.7 (L) 06/18/2018 06:45 AM    ALB 2.4 (L) 06/18/2018 06:45 AM    TP 5.0 (L) 06/18/2018 06:45 AM    GLOB 2.6 06/18/2018 06:45 AM    AGRAT 0.9 06/18/2018 06:45 AM    SGOT 63 (H) 06/18/2018 06:45 AM    ALT 67 (H) 06/18/2018 06:45 AM     CBC:   Lab Results   Component Value Date/Time    WBC 5.8 06/18/2018 06:45 AM    HGB 8.7 (L) 06/18/2018 06:45 AM    HCT 27.0 (L) 06/18/2018 06:45 AM     (L) 06/18/2018 06:45 AM     Recent Glucose Results:   Lab Results   Component Value Date/Time    GLU 52 (LL) 06/18/2018 06:45 AM     COAGS: No results found for: APTT, PTP, INR  Liver Panel:   Lab Results   Component Value Date/Time    ALB 2.4 (L) 06/18/2018 06:45 AM    TP 5.0 (L) 06/18/2018 06:45 AM    GLOB 2.6 06/18/2018 06:45 AM    AGRAT 0.9 06/18/2018 06:45 AM    SGOT 63 (H) 06/18/2018 06:45 AM    ALT 67 (H) 06/18/2018 06:45 AM     (H) 06/18/2018 06:45 AM           Assessment:     Active Problems:    DKA (diabetic ketoacidoses) ([facility] [address] (4/15/2018)      Duodenopancreaticocutaneous fistula    Plan:     Plan for IR placement of drain. Discussed with IR staff.           Michael Laguna MD, FACS, FASCRS  Colon and Rectal Surgery  [facility]  Office [phone]  Fax     [phone]  6/18/2018  11:06 AM

## 2018-06-18 NOTE — Clinical Note
History and physical documented and up to date, allergies reviewed, lab results reviewed, pre-procedure education provided, patient verbalized understanding of procedure and procedural consent signed.

## 2018-06-18 NOTE — ROUTINE PROCESS
0- Assumed care. Pt in bed resting. NAD.     0745- Critical POC glucose of 46 reported by Zachary Claudio CNA. Arsalan YOANNA notifed and he states he \"will give D50\". 0750- D50 given. Will continue to monitor. 4988- Blood sugar recheck is 112. Pt is alert and oriented x 4. C/o pain 6/10 but states \"this is manageable and where I feel comfortable\". No respiratory distress noted. Oral meds held d/t NPO status. Call light in reach. 0930- Prn pain meds given. 1130- Pt taken to cath lab for IR placement of duodenal drain. 1300- Pt is back from procedure. 1330- Prn pain meds given. 1650- POC glucose 558. Dr. Rody Fairbanks notified. Orders received. Will continue to monitor. 1720- Prn pain meds given. 1800- Dr. Rody Fairbanks made aware of HR sustaining in 120's. EKG done. Anterior infarct now present. Dr Rody Fairbanks aware. Orders for cardiac enzymes Q6H x3. Will continue to monitor. Bedside and Verbal shift change report given to [address] [place] [address] (oncoming nurse) by Stephon Deluna RN   (offgoing nurse). Report included the following information SBAR, Kardex, Procedure Summary, Intake/Output, MAR, Recent Results and Med Rec Status.

## 2018-06-18 NOTE — PROGRESS NOTES
Progress Note      Patient: George Guerrero               Sex: male          DOA: 6/16/2018       YOB: 1965      Age:  48 y.o.        LOS:  LOS: 2 days             CHIEF COMPLAINT:    Subjective:     Blood glucose this morning was low at 52  that improved to 112 after receiving 1 amp D 50 because pt is currently NPO for placement of duodenal drain later today by IR. He endorsed mild intermittent abdominal pain but he denies fever, chills, nausea, vomiting, CP, or SOB. Objective:      Visit Vitals    /70    Pulse 84    Temp 98 °F (36.7 °C)    Resp 17    Ht 6' (1.829 m)    Wt 150 lb (68 kg)    SpO2 100%    BMI 20.34 kg/m2       Physical Exam:  GEN: AAO X 3, NAD  CVS: Normal S1S2, RRR  RESP: CTAB  ABD: Soft, mild tenderness RUQ and epigastric region, non distended, no rebound or guarding, +BS  EXT: No edema noted  NEURO: CN 2 - 12 intact with no focal deficits noted. Lab/Data Reviewed:  CMP:   Lab Results   Component Value Date/Time     06/18/2018 06:45 AM    K 4.0 06/18/2018 06:45 AM     (H) 06/18/2018 06:45 AM    CO2 23 06/18/2018 06:45 AM    AGAP 6 06/18/2018 06:45 AM    GLU 52 (LL) 06/18/2018 06:45 AM    BUN 19 (H) 06/18/2018 06:45 AM    CREA 0.78 06/18/2018 06:45 AM    GFRAA >60 06/18/2018 06:45 AM    GFRNA >60 06/18/2018 06:45 AM    CA 7.7 (L) 06/18/2018 06:45 AM    ALB 2.4 (L) 06/18/2018 06:45 AM    TP 5.0 (L) 06/18/2018 06:45 AM    GLOB 2.6 06/18/2018 06:45 AM    AGRAT 0.9 06/18/2018 06:45 AM    SGOT 63 (H) 06/18/2018 06:45 AM    ALT 67 (H) 06/18/2018 06:45 AM     CBC:   Lab Results   Component Value Date/Time    WBC 5.8 06/18/2018 06:45 AM    HGB 8.7 (L) 06/18/2018 06:45 AM    HCT 27.0 (L) 06/18/2018 06:45 AM     (L) 06/18/2018 06:45 AM           Assessment/Plan     Active Problems:    DKA (diabetic ketoacidoses) ([facility] [place] [facility] (4/15/2018)        Plan:  DKA - Resolved. HBA1C this admission is 7.8 and diabetic educator consulted. Hypoglycemia.  Glucose this morning was low at 52 because pt is NPO that improved to 112 after receiving 1 amp D 50. NS discontinued and D 5 0.45 NS at 75 cc/hr started to avoid subsequent hypoglycemia as he is currently NPO to replace duodenal drain by IR later today. Hyponatremia resolved with hydration as Na today improved and normal at 142. Thrombocytopenia. Platelet low today at 118 ? Cause. Continue to monitor and will repeat CBC in AM.  Duodenopancreaticcocutaneous fistula with duodenal drain recently dislodged/removed- IR consulted to replace drain later today. Colorectal surgery consulted and Dr. Raven Maurice following and appreciate his help. Chronic Pancreatitis - Restarted Creon and to continue after duodenal drain placed as he is currently NPO  Chronic Pain - On dilaudid prn   GERD - On Protonix  DVT prophylaxis.  Heparin             Robbie Reyes, MPH  Internal Medicine

## 2018-06-18 NOTE — PROGRESS NOTES
Problem: Pressure Injury - Risk of  Goal: *Prevention of pressure injury  Document Austin Scale and appropriate interventions in the flowsheet. Outcome: Progressing Towards Goal  Pressure Injury Interventions:       Moisture Interventions: Absorbent underpads    Activity Interventions: Increase time out of bed    Mobility Interventions: HOB 30 degrees or less    Nutrition Interventions: Document food/fluid/supplement intake                    Problem: Falls - Risk of  Goal: *Absence of Falls  Document Serena Fall Risk and appropriate interventions in the flowsheet. Outcome: Progressing Towards Goal  Fall Risk Interventions:  Mobility Interventions: Patient to call before getting OOB         Medication Interventions: Patient to call before getting OOB    Elimination Interventions: Call light in reach    History of Falls Interventions: Door open when patient unattended        Problem: Impaired Skin Integrity/Pressure Injury Treatment  Goal: *Prevention of pressure injury  Document Austin Scale and appropriate interventions in the flowsheet.    Outcome: Progressing Towards Goal  Pressure Injury Interventions:       Moisture Interventions: Absorbent underpads    Activity Interventions: Increase time out of bed    Mobility Interventions: HOB 30 degrees or less    Nutrition Interventions: Document food/fluid/supplement intake

## 2018-06-18 NOTE — PROGRESS NOTES
\"I feel drowsy right now. I think I'm going for a procedure in a little bit. \"    Bonnie Hunter conducted/attempted to conduct a Follow-up Visit and Spiritual Assessment  for Talia Fair, who is a 48 y. o.,male. Patient did not feel well enough to engage.  offered assurance of prayer and a possible return visit, which patient agreed to. Patient's chart was reviewed. Chaplains will continue to follow and provide pastoral care as needed or requested.  recommends bedside caregivers page  on duty if patient shows signs of acute spiritual or emotional distress. The Rev.  [address] [facility],   Anders Prasad [address]  [facility] [phone] / [facility] [phone]

## 2018-06-19 ENCOUNTER — HOME HEALTH ADMISSION (OUTPATIENT)
Dept: HOME HEALTH SERVICES | Facility: HOME HEALTH | Age: 53
End: 2018-06-19

## 2018-06-19 VITALS
RESPIRATION RATE: 18 BRPM | BODY MASS INDEX: 20.32 KG/M2 | DIASTOLIC BLOOD PRESSURE: 81 MMHG | HEIGHT: 72 IN | TEMPERATURE: 97.4 F | OXYGEN SATURATION: 100 % | WEIGHT: 150 LBS | HEART RATE: 101 BPM | SYSTOLIC BLOOD PRESSURE: 126 MMHG

## 2018-06-19 PROBLEM — E11.10 DKA (DIABETIC KETOACIDOSES): Status: RESOLVED | Noted: 2018-04-15 | Resolved: 2018-06-19

## 2018-06-19 PROBLEM — E86.0 DEHYDRATION: Status: RESOLVED | Noted: 2017-08-05 | Resolved: 2018-06-19

## 2018-06-19 PROBLEM — E10.10 DKA, TYPE 1 (HCC): Status: RESOLVED | Noted: 2017-07-17 | Resolved: 2018-06-19

## 2018-06-19 LAB
ALBUMIN SERPL-MCNC: 2.4 G/DL (ref 3.4–5)
ALBUMIN/GLOB SERPL: 0.9 {RATIO} (ref 0.8–1.7)
ALP SERPL-CCNC: 194 U/L (ref 45–117)
ALT SERPL-CCNC: 55 U/L (ref 16–61)
ANION GAP SERPL CALC-SCNC: 9 MMOL/L (ref 3–18)
AST SERPL-CCNC: 36 U/L (ref 15–37)
ATRIAL RATE: 128 BPM
BASOPHILS # BLD: 0 K/UL (ref 0–0.06)
BASOPHILS NFR BLD: 0 % (ref 0–2)
BILIRUB SERPL-MCNC: 0.2 MG/DL (ref 0.2–1)
BUN SERPL-MCNC: 13 MG/DL (ref 7–18)
BUN/CREAT SERPL: 9 (ref 12–20)
CALCIUM SERPL-MCNC: 7.5 MG/DL (ref 8.5–10.1)
CALCULATED P AXIS, ECG09: 49 DEGREES
CALCULATED R AXIS, ECG10: -22 DEGREES
CALCULATED T AXIS, ECG11: 47 DEGREES
CHLORIDE SERPL-SCNC: 112 MMOL/L (ref 100–108)
CK MB CFR SERPL CALC: ABNORMAL % (ref 0–4)
CK MB CFR SERPL CALC: ABNORMAL % (ref 0–4)
CK MB SERPL-MCNC: <1 NG/ML (ref 5–25)
CK MB SERPL-MCNC: <1 NG/ML (ref 5–25)
CK SERPL-CCNC: 27 U/L (ref 39–308)
CK SERPL-CCNC: 27 U/L (ref 39–308)
CO2 SERPL-SCNC: 20 MMOL/L (ref 21–32)
CREAT SERPL-MCNC: 1.47 MG/DL (ref 0.6–1.3)
DIAGNOSIS, 93000: NORMAL
DIFFERENTIAL METHOD BLD: ABNORMAL
EOSINOPHIL # BLD: 0.4 K/UL (ref 0–0.4)
EOSINOPHIL NFR BLD: 5 % (ref 0–5)
ERYTHROCYTE [DISTWIDTH] IN BLOOD BY AUTOMATED COUNT: 13.7 % (ref 11.6–14.5)
GLOBULIN SER CALC-MCNC: 2.7 G/DL (ref 2–4)
GLUCOSE BLD STRIP.AUTO-MCNC: 210 MG/DL (ref 70–110)
GLUCOSE BLD STRIP.AUTO-MCNC: 212 MG/DL (ref 70–110)
GLUCOSE BLD STRIP.AUTO-MCNC: 482 MG/DL (ref 70–110)
GLUCOSE SERPL-MCNC: 232 MG/DL (ref 74–99)
HCT VFR BLD AUTO: 28.1 % (ref 36–48)
HGB BLD-MCNC: 9.3 G/DL (ref 13–16)
LYMPHOCYTES # BLD: 1.4 K/UL (ref 0.9–3.6)
LYMPHOCYTES NFR BLD: 21 % (ref 21–52)
MCH RBC QN AUTO: 32.5 PG (ref 24–34)
MCHC RBC AUTO-ENTMCNC: 33.1 G/DL (ref 31–37)
MCV RBC AUTO: 98.3 FL (ref 74–97)
MONOCYTES # BLD: 0.3 K/UL (ref 0.05–1.2)
MONOCYTES NFR BLD: 5 % (ref 3–10)
NEUTS SEG # BLD: 4.8 K/UL (ref 1.8–8)
NEUTS SEG NFR BLD: 69 % (ref 40–73)
P-R INTERVAL, ECG05: 138 MS
PLATELET # BLD AUTO: 133 K/UL (ref 135–420)
PMV BLD AUTO: 10.1 FL (ref 9.2–11.8)
POTASSIUM SERPL-SCNC: 4.3 MMOL/L (ref 3.5–5.5)
PROT SERPL-MCNC: 5.1 G/DL (ref 6.4–8.2)
Q-T INTERVAL, ECG07: 308 MS
QRS DURATION, ECG06: 78 MS
QTC CALCULATION (BEZET), ECG08: 449 MS
RBC # BLD AUTO: 2.86 M/UL (ref 4.7–5.5)
SODIUM SERPL-SCNC: 141 MMOL/L (ref 136–145)
TROPONIN I SERPL-MCNC: <0.02 NG/ML (ref 0–0.04)
TROPONIN I SERPL-MCNC: <0.02 NG/ML (ref 0–0.04)
VENTRICULAR RATE, ECG03: 128 BPM
WBC # BLD AUTO: 6.9 K/UL (ref 4.6–13.2)

## 2018-06-19 PROCEDURE — 74011636637 HC RX REV CODE- 636/637: Performed by: HOSPITALIST

## 2018-06-19 PROCEDURE — 36415 COLL VENOUS BLD VENIPUNCTURE: CPT | Performed by: HOSPITALIST

## 2018-06-19 PROCEDURE — 74011000258 HC RX REV CODE- 258: Performed by: HOSPITALIST

## 2018-06-19 PROCEDURE — 82962 GLUCOSE BLOOD TEST: CPT

## 2018-06-19 PROCEDURE — 82550 ASSAY OF CK (CPK): CPT | Performed by: HOSPITALIST

## 2018-06-19 PROCEDURE — 80053 COMPREHEN METABOLIC PANEL: CPT | Performed by: HOSPITALIST

## 2018-06-19 PROCEDURE — 74011250636 HC RX REV CODE- 250/636: Performed by: HOSPITALIST

## 2018-06-19 PROCEDURE — 85025 COMPLETE CBC W/AUTO DIFF WBC: CPT | Performed by: HOSPITALIST

## 2018-06-19 PROCEDURE — 74011250637 HC RX REV CODE- 250/637: Performed by: HOSPITALIST

## 2018-06-19 RX ORDER — INSULIN LISPRO 100 [IU]/ML
12 INJECTION, SOLUTION INTRAVENOUS; SUBCUTANEOUS
Qty: 1 VIAL | Refills: 0 | Status: ON HOLD | OUTPATIENT
Start: 2018-06-19 | End: 2018-09-18

## 2018-06-19 RX ORDER — PROMETHAZINE HYDROCHLORIDE 25 MG/1
25 TABLET ORAL
Qty: 6 TAB | Refills: 0 | Status: ON HOLD | OUTPATIENT
Start: 2018-06-19 | End: 2018-06-29

## 2018-06-19 RX ORDER — OXYCODONE AND ACETAMINOPHEN 10; 325 MG/1; MG/1
1 TABLET ORAL
Qty: 13 TAB | Refills: 0 | Status: SHIPPED | OUTPATIENT
Start: 2018-06-19 | End: 2018-06-29

## 2018-06-19 RX ADMIN — Medication 1 MG: at 07:48

## 2018-06-19 RX ADMIN — HEPARIN SODIUM 5000 UNITS: 5000 INJECTION, SOLUTION INTRAVENOUS; SUBCUTANEOUS at 01:55

## 2018-06-19 RX ADMIN — PANTOPRAZOLE SODIUM 40 MG: 40 TABLET, DELAYED RELEASE ORAL at 09:32

## 2018-06-19 RX ADMIN — Medication 1 MG: at 03:08

## 2018-06-19 RX ADMIN — INSULIN LISPRO 6 UNITS: 100 INJECTION, SOLUTION INTRAVENOUS; SUBCUTANEOUS at 09:30

## 2018-06-19 RX ADMIN — Medication 1 MG: at 12:00

## 2018-06-19 RX ADMIN — GABAPENTIN 600 MG: 300 CAPSULE ORAL at 09:32

## 2018-06-19 RX ADMIN — Medication 500 MG: at 09:32

## 2018-06-19 RX ADMIN — INSULIN LISPRO 6 UNITS: 100 INJECTION, SOLUTION INTRAVENOUS; SUBCUTANEOUS at 12:30

## 2018-06-19 RX ADMIN — SODIUM CHLORIDE 75 ML/HR: 450 INJECTION, SOLUTION INTRAVENOUS at 07:48

## 2018-06-19 RX ADMIN — HEPARIN SODIUM 5000 UNITS: 5000 INJECTION, SOLUTION INTRAVENOUS; SUBCUTANEOUS at 09:30

## 2018-06-19 RX ADMIN — PANCRELIPASE 2 CAPSULE: 10000; 34000; 55000 CAPSULE, DELAYED RELEASE ORAL at 09:32

## 2018-06-19 RX ADMIN — PANCRELIPASE 2 CAPSULE: 10000; 34000; 55000 CAPSULE, DELAYED RELEASE ORAL at 12:30

## 2018-06-19 NOTE — PROGRESS NOTES
Assumed care of pt. Pt resting in bed. AxOx4. No c/o pain. NAD. Call bell within reach. Will continue to monitor. 2142- due meds given. Pt complaining of pain in abdomen region rated 9/10. Prn pain med given. Urinal emptied. NATALIE drain emptied. 2330- Pt asleep in bed. No other needs at this time. 0030-Reassessment complete. No changes in pt's condition. Will continue to monitor. 80- due med given    97 [phone]- pt complaining of pain rated 8/10. PRN pain med administered. 0750- pain med given new bag of fluids hung    Bedside shift change report given to Tasia Montoya RN (oncoming nurse) by Mliss Hodgkin, RN (offgoing nurse). Report included the following information SBAR, Kardex, Intake/Output, MAR, Accordion, Recent Results and Med Rec Status.

## 2018-06-19 NOTE — DISCHARGE SUMMARY
2 [facility]  Hospitalist Division    Discharge Summary    Patient: Barry Ramos MRN: [id number]  CSN: [id number]    YOB: 1965  Age: 48 y.o.   Sex: male    DOA: 6/16/2018 LOS:  LOS: 3 days   Discharge Date: 6/19/2018     Admission Diagnoses: DKA (diabetic ketoacidoses) [facility])    Discharge Diagnoses:    Problem List as of 6/19/2018  Date Reviewed: 6/9/2018          Codes Class Noted - Resolved    Central cord syndrome ([facility] ICD-10-CM: D14.961R  ICD-9-CM: 952.9  6/2/2018 - Present        UTI (urinary tract infection) ICD-10-CM: N39.0  ICD-9-CM: 599.0  5/24/2018 - Present        Hyperglycemia ICD-10-CM: R73.9  ICD-9-CM: 790.29  5/22/2018 - Present        Chronic renal insufficiency ICD-10-CM: N18.9  ICD-9-CM: 585.9  5/22/2018 - Present        CHRISTI (acute kidney injury) ([facility] ICD-10-CM: N17.9  ICD-9-CM: 584.9  5/22/2018 - Present        Enteritis ICD-10-CM: K52.9  ICD-9-CM: 558.9  4/24/2018 - Present        Acute alcoholic hepatitis XOZ-95-MP: K70.10  ICD-9-CM: 571.1  4/15/2018 - Present        Chronic pancreatitis due to acute alcohol intoxication ([facility] ICD-10-CM: K86.0  ICD-9-CM: 577.1, 303.00  4/15/2018 - Present        Lactic acidosis ICD-10-CM: E87.2  ICD-9-CM: 276.2  8/5/2017 - Present        Colitis, acute ICD-10-CM: K52.9  ICD-9-CM: 558.9  7/17/2017 - Present        HCAP (healthcare-associated pneumonia) ICD-10-CM: J18.9  ICD-9-CM: 486  5/31/2016 - Present        Sepsis ([facility] ICD-10-CM: A41.9  ICD-9-CM: 038.9, 995.91  4/15/2016 - Present        Biliary drain displacement ICD-10-CM: T85.520A  ICD-9-CM: 996.59  3/23/2016 - Present        Hypokalemia ICD-10-CM: E87.6  ICD-9-CM: 276.8  3/23/2016 - Present        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  ICD-9-CM: 751.5  3/23/2016 - Present        H/O ETOH abuse (Chronic) ICD-10-CM: L43.101  ICD-9-CM: 305.03  3/23/2016 - Present        Chronic pain (Chronic) ICD-10-CM: E77.50  ICD-9-CM: 338.29  3/23/2016 - Present        Moderate protein-calorie malnutrition ([facility] (Chronic) ICD-10-CM: E44.0  ICD-9-CM: 263.0  11/21/2015 - Present        Abdominal pain ICD-10-CM: R10.9  ICD-9-CM: 789.00  11/16/2015 - Present        Perinephric abscess (Chronic) ICD-10-CM: N15.1  ICD-9-CM: 590.2  10/22/2015 - Present        Pneumobilia (Chronic) ICD-10-CM: K83.8  ICD-9-CM: 576.8  10/22/2015 - Present        Gastroparesis (Chronic) ICD-10-CM: K31.84  ICD-9-CM: 536.3  5/21/2015 - Present        IDDM (insulin dependent diabetes mellitus) (HCC) (Chronic) ICD-10-CM: E11.9, Z79.4  ICD-9-CM: 250.00, V58.67  8/31/2013 - Present        Tobacco abuse (Chronic) ICD-10-CM: Z72.0  ICD-9-CM: 305.1  Unknown - Present        Chronic pancreatitis (HCC) (Chronic) ICD-10-CM: K86.1  ICD-9-CM: 577.1  Unknown - Present    Overview Signed 6/12/2013  9:31 AM by Kenzie Ortega MD     Continue PPI IV             RESOLVED: Syncope ICD-10-CM: R55  ICD-9-CM: 780.2  6/2/2018 - 6/9/2018        RESOLVED: Acute kidney injury (nontraumatic) ([facility] ICD-10-CM: N17.9  ICD-9-CM: 584.9  6/2/2018 - 6/9/2018        RESOLVED: DKA (diabetic ketoacidoses) ([facility] ICD-10-CM: E13.10  ICD-9-CM: 250.10  4/15/2018 - 6/19/2018        RESOLVED: Dehydration ICD-10-CM: E86.0  ICD-9-CM: 276.51  8/5/2017 - 6/19/2018        RESOLVED: Vomiting ICD-10-CM: R11.10  ICD-9-CM: 787.03  8/5/2017 - 6/9/2018        RESOLVED: DKA, type 1 ([facility] ICD-10-CM: E10.10  ICD-9-CM: 250.13  7/17/2017 - 6/19/2018        RESOLVED: Protein calorie malnutrition ([facility] ICD-10-CM: E46  ICD-9-CM: 263.9  11/19/2015 - 3/23/2016        RESOLVED: HCAP (healthcare-associated pneumonia) ICD-10-CM: J18.9  ICD-9-CM: 486  11/18/2015 - 3/23/2016        RESOLVED: Wrist drop ICD-10-CM: M21.339  ICD-9-CM: 736.05  11/17/2015 - 3/23/2016        RESOLVED: Acute radial nerve palsy of right upper extremity ICD-10-CM: G56.31  ICD-9-CM: 354.3  11/17/2015 - 3/23/2016        RESOLVED: SIRS (systemic inflammatory response syndrome) (HCC) ICD-10-CM: R65.10  ICD-9-CM: 995.90  11/16/2015 - 11/16/2015        RESOLVED: Tachycardia ICD-10-CM: R00.0  ICD-9-CM: 785.0  11/16/2015 - 3/23/2016        RESOLVED: Anemia ICD-10-CM: D64.9  ICD-9-CM: 285.9  11/16/2015 - 3/23/2016        RESOLVED: Hyperglycemia ICD-10-CM: R73.9  ICD-9-CM: 790.29  11/16/2015 - 3/23/2016        RESOLVED: Fever ICD-10-CM: R50.9  ICD-9-CM: 780.60  11/16/2015 - 3/23/2016        RESOLVED: Sepsis ([facility] 75.) ICD-10-CM: A41.9  ICD-9-CM: 038.9, 995.91  11/16/2015 - 3/23/2016        RESOLVED: Malnutrition ([facility] 75.) (Chronic) ICD-10-CM: E46  ICD-9-CM: 263.9  10/25/2015 - 3/23/2016        RESOLVED: Severe sepsis ([facility] 75.) ICD-10-CM: A41.9, R65.20  ICD-9-CM: 038.9, 995.92  10/22/2015 - 11/16/2015        RESOLVED: Hypophosphatemia ICD-10-CM: E83.39  ICD-9-CM: 275.3  5/23/2015 - 11/16/2015        RESOLVED: DKA (diabetic ketoacidoses) ([facility] 75.) ICD-10-CM: E13.10  ICD-9-CM: 250.10  5/21/2015 - 11/16/2015        RESOLVED: CHRISTI (acute kidney injury) ([facility] 75.) ICD-10-CM: N17.9  ICD-9-CM: 584.9  5/21/2015 - 11/16/2015        RESOLVED: Acute pancreatitis ICD-10-CM: K85.90  ICD-9-CM: 577.0  5/21/2015 - 11/16/2015        RESOLVED: Hypertriglyceridemia (Chronic) ICD-10-CM: E78.1  ICD-9-CM: 272.1  5/21/2015 - 3/23/2016        RESOLVED: Pneumonia ICD-10-CM: J18.9  ICD-9-CM: 486  5/21/2015 - 11/16/2015        RESOLVED: Abdominal pain ICD-10-CM: R10.9  ICD-9-CM: 789.00  9/21/2013 - 11/16/2015        RESOLVED: DKA (diabetic ketoacidoses) ([facility] ICD-10-CM: E13.10  ICD-9-CM: 250.10  8/26/2013 - 8/31/2013        RESOLVED: Drug-seeking behavior ICD-10-CM: Z76.5  ICD-9-CM: 305.90  6/23/2013 - 11/16/2015        RESOLVED: Abdominal abscess ICD-10-CM: BLQ5607  ICD-9-CM: Rosita Chrissie  6/19/2013 - 11/16/2015    Overview Signed 7/9/2013 10:39 AM by Anya Hernandez MD     Abdominal abscess - most likely infected fluid collection from duodenal fistula related to pancreatic disease. Doing well with drainage and antibiotics.  Fistulogram on Friday noted continued fistula to the duodenum/pancreas. Will need referral to pancreatic surgeon. RESOLVED: Disorder of electrolytes ICD-10-CM: E87.8  ICD-9-CM: 276.9  6/16/2013 - 11/16/2015    Overview Signed 6/16/2013 10:06 AM by [place], MD     Hypokalemia hypomagnesemia hypocalcemia, replete and monitor              RESOLVED: Hypokalemia ICD-10-CM: E87.6  ICD-9-CM: 276.8  6/12/2013 - 11/16/2015        RESOLVED: Abdominal pain, generalized ICD-10-CM: R10.84  ICD-9-CM: 789.07  Unknown - 11/16/2015    Overview Addendum 7/9/2013 10:48 AM by [place] Falls, MD     Consult GI for abdominal pain, Dr Beni Reyes IR for percutaneous drainage of fluid collection Martha Pinzon  S/p 6/12/13 CT Guided Abscess Drainage of 20cc purulent sanguinous fluid for Right lower quadrant recurrent abscess  Cx 6/121/3 + ESBL  Will Leave drain to J-P suction. Repeat CT in a few days post placement. Appreciate general surgery  Fistulagram  6/17/13 Small residual abscess cavity in location of the pigtail drain. Fistulous communication with duodenum at the junction of second and third portion of the duodenum, similar to prior study of 04/06/10. Opacification of tubular structure extending superior medially from the medial aspect of the duodenal loop likely retrograde opacification of nondilated common bile duct. Afebrile, normal WBC/diff on admission   Normal LFTs amylase lipase   Consult ID for antibiotics and abdominal abscess                RESOLVED: Encounter for long-term (current) use of other medications ICD-10-CM: Z79.899  ICD-9-CM: V58.69  Unknown - 11/16/2015        RESOLVED: Follow-up examination, chronic pancreatitis ICD-10-CM: E76  ICD-9-CM: V67.9  11/2/2010 - 11/16/2015        RESOLVED: Pain in the abdomen ICD-10-CM: R10.9  ICD-9-CM: 789.00  11/2/2010 - 11/16/2015              Discharge Condition: Stable    Discharge To:  Home    Consults: Hospitalist    Hospital Course: Adrianna Saenz is a 48 y.o. male who has multiple medical problems with multiple admissions between [facility] & [address]. Patient presented to the ER yesterday - stating he fell / passed out at home and his drain on the R side of his abdomen came out - not a new thing. 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 He has a mediport in place - left chest wall since he mentioned he is a hard stick. ER eval - pt noted to have elevated blood glucose - likely from non compliance and also that he was on steroids recently. Patient was started on glucose stabilizer and admitted for further evaluation. Patient was weaned from glucose stabilizer. IR placed 14 Fr APD drain into right peritoneal fistula cavity. Patient is appropriate for discharge home with instructions to follow up with PCP within 1 week. Physical Exam:  General appearance: alert, cooperative, no distress, appears stated age  Lungs: clear to auscultation bilaterally  Heart: regular rate and rhythm, S1, S2 normal, no murmur, click, rub or gallop  Abdomen: soft, mild TTP, non distended. Normoactive bowel sounds.    Extremities: extremities normal, atraumatic, no cyanosis or edema  Skin: Skin color, texture, turgor normal. No rashes or lesions  Neurologic: Grossly normal  PSY: Mood and affect normal, appropriately behaved    Significant Diagnostic Studies:   Recent Results (from the past 12 hour(s))   CBC WITH AUTOMATED DIFF    Collection Time: 06/19/18  3:05 AM   Result Value Ref Range    WBC 6.9 4.6 - 13.2 K/uL    RBC 2.86 (L) 4.70 - 5.50 M/uL    HGB 9.3 (L) 13.0 - 16.0 g/dL    HCT 28.1 (L) 36.0 - 48.0 %    MCV 98.3 (H) 74.0 - 97.0 FL    MCH 32.5 24.0 - 34.0 PG    MCHC 33.1 31.0 - 37.0 g/dL    RDW 13.7 11.6 - 14.5 %    PLATELET 756 (L) 956 - 420 K/uL    MPV 10.1 9.2 - 11.8 FL    NEUTROPHILS 69 40 - 73 % LYMPHOCYTES 21 21 - 52 %    MONOCYTES 5 3 - 10 %    EOSINOPHILS 5 0 - 5 %    BASOPHILS 0 0 - 2 %    ABS. NEUTROPHILS 4.8 1.8 - 8.0 K/UL    ABS. LYMPHOCYTES 1.4 0.9 - 3.6 K/UL    ABS. MONOCYTES 0.3 0.05 - 1.2 K/UL    ABS. EOSINOPHILS 0.4 0.0 - 0.4 K/UL    ABS. BASOPHILS 0.0 0.0 - 0.06 K/UL    DF AUTOMATED     METABOLIC PANEL, COMPREHENSIVE    Collection Time: 06/19/18  3:05 AM   Result Value Ref Range    Sodium 141 136 - 145 mmol/L    Potassium 4.3 3.5 - 5.5 mmol/L    Chloride 112 (H) 100 - 108 mmol/L    CO2 20 (L) 21 - 32 mmol/L    Anion gap 9 3.0 - 18 mmol/L    Glucose 232 (H) 74 - 99 mg/dL    BUN 13 7.0 - 18 MG/DL    Creatinine 1.47 (H) 0.6 - 1.3 MG/DL    BUN/Creatinine ratio 9 (L) 12 - 20      GFR est AA >60 >60 ml/min/1.73m2    GFR est non-AA 50 (L) >60 ml/min/1.73m2    Calcium 7.5 (L) 8.5 - 10.1 MG/DL    Bilirubin, total 0.2 0.2 - 1.0 MG/DL    ALT (SGPT) 55 16 - 61 U/L    AST (SGOT) 36 15 - 37 U/L    Alk.  phosphatase 194 (H) 45 - 117 U/L    Protein, total 5.1 (L) 6.4 - 8.2 g/dL    Albumin 2.4 (L) 3.4 - 5.0 g/dL    Globulin 2.7 2.0 - 4.0 g/dL    A-G Ratio 0.9 0.8 - 1.7     CARDIAC PANEL,(CK, CKMB & TROPONIN)    Collection Time: 06/19/18  3:05 AM   Result Value Ref Range    CK 27 (L) 39 - 308 U/L    CK - MB <1.0 <3.6 ng/ml    CK-MB Index  0.0 - 4.0 %     CALCULATION NOT PERFORMED WHEN RESULT IS BELOW LINEAR LIMIT    Troponin-I, Qt. <0.02 0.0 - 0.045 NG/ML   GLUCOSE, POC    Collection Time: 06/19/18  7:25 AM   Result Value Ref Range    Glucose (POC) 212 (H) 70 - 110 mg/dL   CARDIAC PANEL,(CK, CKMB & TROPONIN)    Collection Time: 06/19/18  8:55 AM   Result Value Ref Range    CK 27 (L) 39 - 308 U/L    CK - MB <1.0 <3.6 ng/ml    CK-MB Index  0.0 - 4.0 %     CALCULATION NOT PERFORMED WHEN RESULT IS BELOW LINEAR LIMIT    Troponin-I, Qt. <0.02 0.0 - 0.045 NG/ML   GLUCOSE, POC    Collection Time: 06/19/18 11:49 AM   Result Value Ref Range    Glucose (POC) 210 (H) 70 - 110 mg/dL         Discharge Medications: Current Discharge Medication List      CONTINUE these medications which have CHANGED    Details   promethazine (PHENERGAN) 25 mg tablet Take 1 Tab by mouth every six (6) hours as needed. Qty: 6 Tab, Refills: 0      oxyCODONE-acetaminophen (PERCOCET)  mg per tablet Take 1 Tab by mouth every six (6) hours as needed for Pain. Max Daily Amount: 4 Tabs. Indications: Pain  Qty: 13 Tab, Refills: 0    Associated Diagnoses: Central cord syndrome, initial encounter ([facility]      insulin lispro (HUMALOG) 100 unit/mL injection 12 Units by SubCUTAneous route nightly. Indications: type 2 diabetes mellitus  Qty: 1 Vial, Refills: 0         CONTINUE these medications which have NOT CHANGED    Details   gabapentin (NEURONTIN) 300 mg capsule Take 2 Caps by mouth three (3) times daily for 30 days. Indications: NEUROPATHIC PAIN  Qty: 180 Cap, Refills: 0      dexamethasone (DECADRON) 1 mg tablet Take 4mg (4 tablets) by mouth twice a day for 2 days, then 2mg BID for 2 days, then 1mg BID for 2 days, then 1 mg daily for 2 days until gone. Indications: DIAGNOSTIC TEST FOR CUSHING'S SYNDROME, cervical stenosis  Qty: 30 Tab, Refills: 0      ondansetron (ZOFRAN ODT) 4 mg disintegrating tablet Take 1 Tab by mouth every eight (8) hours as needed for Nausea. Qty: 9 Tab, Refills: 0      sucralfate (CARAFATE) 1 gram tablet Take 1 Tab by mouth four (4) times daily. Indications: PREVENTION OF STRESS ULCER  Qty: 30 Tab, Refills: 0      amitriptyline (ELAVIL) 75 mg tablet Take 1 Tab by mouth nightly. Indications: Depression  Qty: 30 Tab, Refills: 0      ascorbic acid, vitamin C, (VITAMIN C) 500 mg tablet Take 1 Tab by mouth daily. Indications: VITAMIN C DEFICIENCY  Qty: 30 Tab, Refills: 0      cholecalciferol, vitamin D3, 2,000 unit tab Take 1 Tab by mouth daily. Indications: PREVENTION OF VITAMIN D DEFICIENCY  Qty: 30 Tab, Refills: 0      cyanocobalamin (VITAMIN B-12) 1,000 mcg sublingual tablet Take 2 Tabs by mouth daily.  Indications: PREVENTION OF VITAMIN B12 DEFICIENCY  Qty: 30 Tab, Refills: 0      ferrous sulfate 325 mg (65 mg iron) tablet Take 1 Tab by mouth two (2) times a day. Indications: IRON DEFICIENCY ANEMIA  Qty: 60 Tab, Refills: 0      folic acid 877 mcg tablet Take 1 Tab by mouth daily. Qty: 30 Tab, Refills: 0      lipase-protease-amylase (CREON) 12,000-38,000 -60,000 unit capsule Take 2 Caps by mouth three (3) times daily (with meals). Indications: exocrine pancreatic insufficiency, dosage change  Qty: 180 Cap, Refills: 0      pantoprazole (PROTONIX) 40 mg tablet Take 1 Tab by mouth two (2) times a day.  Indications: EROSIVE ESOPHAGITIS, gastroesophageal reflux disease  Qty: 60 Tab, Refills: 0             Activity: Activity as tolerated    Diet: Resume previous diet    Wound Care: Keep wound clean and dry and Reinforce dressing PRN    Follow-up: PCP within 1 week     Discharge time: > 35 minutes   Audi Matson NP  6/19/2018, 12:29 PM

## 2018-06-19 NOTE — PROGRESS NOTES
Offered the ept [address] for home care, he chose [facility]. Pt verified the contact information on the face sheet is correct. Referral sent to intake via [facility] and called to the intake rep, Brie Shelton. Care Management Interventions  PCP Verified by CM: Yes (he says last seen 3-4 months ago)  Palliative Care Criteria Met (RRAT>21 & CHF Dx)?: No  Mode of Transport at Discharge: Other (see comment) (Mom to get ride home, but we may need to send him via cab)  Transition of Care Consult (CM Consult): [address]: Yes  MyChart Signup: No  Discharge Durable Medical Equipment: No  Physical Therapy Consult: No  Occupational Therapy Consult: No  Speech Therapy Consult: No  Current Support Network:  Other (lives with mom)  Confirm Follow Up Transport: Other (see comment) (he says mom to provide ride but transport has been arranged in the past- [facility])  Plan discussed with Pt/Family/Caregiver: Yes  Freedom of Choice Offered: Yes  [address] Provided?: No  Discharge Location  Discharge Placement: Home with home health

## 2018-06-19 NOTE — PROGRESS NOTES
Problem: Pressure Injury - Risk of  Goal: *Prevention of pressure injury  Document Austin Scale and appropriate interventions in the flowsheet. Outcome: Progressing Towards Goal  Pressure Injury Interventions:       Moisture Interventions: Absorbent underpads, Maintain skin hydration (lotion/cream)    Activity Interventions: Increase time out of bed, Pressure redistribution bed/mattress(bed type)    Mobility Interventions: Pressure redistribution bed/mattress (bed type)    Nutrition Interventions: Document food/fluid/supplement intake, Offer support with meals,snacks and hydration                    Problem: Falls - Risk of  Goal: *Absence of Falls  Document Serena Fall Risk and appropriate interventions in the flowsheet. Outcome: Progressing Towards Goal  Fall Risk Interventions:  Mobility Interventions: Patient to call before getting OOB         Medication Interventions: Patient to call before getting OOB, Teach patient to arise slowly    Elimination Interventions: Call light in reach, Toileting schedule/hourly rounds    History of Falls Interventions: Door open when patient unattended, Investigate reason for fall        Problem: Impaired Skin Integrity/Pressure Injury Treatment  Goal: *Prevention of pressure injury  Document Austin Scale and appropriate interventions in the flowsheet.    Outcome: Progressing Towards Goal  Pressure Injury Interventions:       Moisture Interventions: Absorbent underpads, Maintain skin hydration (lotion/cream)    Activity Interventions: Increase time out of bed, Pressure redistribution bed/mattress(bed type)    Mobility Interventions: Pressure redistribution bed/mattress (bed type)    Nutrition Interventions: Document food/fluid/supplement intake, Offer support with meals,snacks and hydration

## 2018-06-19 NOTE — DIABETES MGMT
Diabetes Patient/Family Education Record    Factors That  May Influence Patients Ability  to Learn or  Comply with Recommendations   []   Language barrier    []   Cultural needs   []   Motivation    []   Cognitive limitation    []   Physical   []   Education    []   Physiological factors   []   Hearing/vision/speaking impairment   []   Synagogue beliefs    []   Financial factors   []  Other:   []  No factors identified at this time.      Person Instructed:   [x]   Patient   []   Family   []  Other     Preference for Learning:   [x]   Verbal   []   Written   []  Demonstration     Level of Comprehension & Competence:    []  Good                                      [x] Fair                                     []  Poor                             []  Needs Reinforcement   [x]  Teachback completed    Education Component:   [x]  Medication management, including how to administer insulin (if appropriate) and potential medication interactions    [x]  Nutritional management    []  Exercise   [x]  Signs, symptoms, and treatment of hyperglycemia and hypoglycemia   [x] Prevention, recognition and treatment of hyperglycemia and hypoglycemia   []  Importance of blood glucose monitoring and how to obtain a blood glucose meter    [x]  Instruction on use of the blood glucose meter   [x]  Discuss the importance of HbA1C monitoring    []  Sick day guidelines   [x]  Proper use and disposal of lancets, needles, syringes or insulin pens (if appropriate)   [x]  Potential long-term complications (retinopathy, kidney disease, neuropathy, foot care)   [x] Information about whom to contact in case of emergency or for more information    [x]  Goal:  Patient/family will demonstrate understanding of Diabetes Self Management Skills by: (date) _6/29/18______  Plan for post-discharge education or self-management support:    [x] Outpatient class schedule provided            [] Patient Declined    [] Scheduled for outpatient classes (date) _______     Mauro Elise RD, CDE   Office:  [address] [place] [address] Pager:  [phone]

## 2018-06-19 NOTE — DISCHARGE INSTRUCTIONS
DISCHARGE SUMMARY from Nurse    PATIENT INSTRUCTIONS:    After general anesthesia or intravenous sedation, for 24 hours or while taking prescription Narcotics:  · Limit your activities  · Do not drive and operate hazardous machinery  · Do not make important personal or business decisions  · Do  not drink alcoholic beverages  · If you have not urinated within 8 hours after discharge, please contact your surgeon on call. Report the following to your surgeon:  · Excessive pain, swelling, redness or odor of or around the surgical area  · Temperature over 100.5  · Nausea and vomiting lasting longer than 4 hours or if unable to take medications  · Any signs of decreased circulation or nerve impairment to extremity: change in color, persistent  numbness, tingling, coldness or increase pain  · Any questions    What to do at Home:  Recommended activity: Activity as tolerated. If you experience any of the following symptoms increasing/uncontrolled pain, please follow up with primary care physician. *  Please give a list of your current medications to your Primary Care Provider. *  Please update this list whenever your medications are discontinued, doses are      changed, or new medications (including over-the-counter products) are added. *  Please carry medication information at all times in case of emergency situations. These are general instructions for a healthy lifestyle:    No smoking/ No tobacco products/ Avoid exposure to second hand smoke  Surgeon General's Warning:  Quitting smoking now greatly reduces serious risk to your health.     Obesity, smoking, and sedentary lifestyle greatly increases your risk for illness    A healthy diet, regular physical exercise & weight monitoring are important for maintaining a healthy lifestyle    You may be retaining fluid if you have a history of heart failure or if you experience any of the following symptoms:  Weight gain of 3 pounds or more overnight or 5 pounds in a week, increased swelling in our hands or feet or shortness of breath while lying flat in bed. Please call your doctor as soon as you notice any of these symptoms; do not wait until your next office visit. Recognize signs and symptoms of STROKE:    F-face looks uneven    A-arms unable to move or move unevenly    S-speech slurred or non-existent    T-time-call 911 as soon as signs and symptoms begin-DO NOT go       Back to bed or wait to see if you get better-TIME IS BRAIN. Warning Signs of HEART ATTACK     Call 911 if you have these symptoms:   Chest discomfort. Most heart attacks involve discomfort in the center of the chest that lasts more than a few minutes, or that goes away and comes back. It can feel like uncomfortable pressure, squeezing, fullness, or pain.  Discomfort in other areas of the upper body. Symptoms can include pain or discomfort in one or both arms, the back, neck, jaw, or stomach.  Shortness of breath with or without chest discomfort.  Other signs may include breaking out in a cold sweat, nausea, or lightheadedness. Don't wait more than five minutes to call 911 - MINUTES MATTER! Fast action can save your life. Calling 911 is almost always the fastest way to get lifesaving treatment. Emergency Medical Services staff can begin treatment when they arrive -- up to an hour sooner than if someone gets to the hospital by car. The discharge information has been reviewed with the patient. The patient verbalized understanding. Discharge medications reviewed with the patient and appropriate educational materials and side effects teaching were provided. Patient armband removed and shredded.

## 2018-06-19 NOTE — DIABETES MGMT
NUTRITIONAL ASSESSMENT GLYCEMIC CONTROL/ PLAN OF CARE     Johana Ya           48 y.o.           6/16/2018                 1. Hyperglycemia    2. Ileus ([address]    3. Biliary drain displacement, subsequent encounter    4. Chronic pancreatitis due to acute alcohol intoxication ([facility] [address]    5. Central cord syndrome, initial encounter [facility])       INTERVENTIONS/PLAN:   1. Provided pt with another 50 test strips to go with the Aga Matrix meter that was provided to him at last month's admission. 2.  Suggest advancing corrective lispro to vary insulin resistant dosing ACHS. 3.  On-going diabetes education - see DM education record for details. ASSESSMENT:   Nutritional Status:  Pt is  84% ideal weight;   BMI (calculated): 20.3 kg/m2 (normal weight classification however pt appears thin and with history of malnutrition). Current po intake is good. Pt known in the past to have varied weights. Nutrition Diagnoses: Altered nutrition related labs due to diabetes as evidenced by A1C of 7.8%, BG of 791 mg/dL at admission. Altered GI function due to chronic pancreatitis/Duodenopancreaticocutaneous fistula as evidenced by use of Creon. Diabetes Management:   Pt had  Received diabetes education at multiple prior admissions. He was provided with an Aga Matrix glucometer at prior admission 5/25/18 (and 50 test strips, 100 lancets). Pt states he still has the meter and uses it 3-4 times daily. He states his usual preprandial BG readings are 110-120 mg/dL. He states he has been taking his lantus daily and usually takes the lispro sliding scale. He states he took his insulins right up to this admission. He still has his Aga Marix meter which was provided to him on 5/25/18. He has not yet seen a PCP since his last admission in May 2018. Pt received insulin drip for hyperglycemia, now transitioned to corrective lispro (Lantus continues to be discontinued due to hypoglycemia).     Recent blood glucose:   6/19/18: 212, 210 - pt received 6 units corrective lispro  6/18/18:  46, 112, 117, 482, 559, 380, 105 - pt received 15 units corrective lispro and no other insulins. Within target range (non-ICU: <140; ICU<180): [] Yes   [x]  No    Current Insulin regimen:   Corrective lispro normal insulin sensitivity ACHS  Home medication/insulin regimen: per pt:  Lantus, 12 units q AM and 16 units q bedtime  Lispro sliding scale TID   HbA1c:  7.8% - ave BG has been ~ 174 mg/dL over past 3 months. Adequate glycemic control PTA:  [] Yes  [x] No, but improved       SUBJECTIVE/OBJECTIVE:   Information obtained from: chart review, pt (pt known from previous admissions)  Pt admitted with DKA, Duodenopancreaticocutaneous fistula and PMHx including T2DM, chronic pancreatitis, GERD. Noted allergy to seafood in chart, pt states he is allergic to shrimp. Diet: consistent CHO - pt took 100% lunch meal today per meal time observation.     Patient Vitals for the past 100 hrs:   % Diet Eaten   06/19/18 1025 100 %   06/17/18 1833 50 %   06/17/18 1036 0 %         Medications: [x]                Reviewed     Most Recent POC Glucose:   Recent Labs      06/19/18   0305  06/18/18   0645  06/16/18   1810   GLU  232*  52*  791*         Labs:   Lab Results   Component Value Date/Time    Hemoglobin A1c 7.8 (H) 06/16/2018 06:10 PM     Lab Results   Component Value Date/Time    Hemoglobin A1c 7.8 (H) 06/16/2018 06:10 PM    Hemoglobin A1c 8.3 (H) 06/03/2018 10:47 AM    Hemoglobin A1c 8.2 (H) 06/02/2018 08:00 AM     Lab Results   Component Value Date/Time    Sodium 141 06/19/2018 03:05 AM    Potassium 4.3 06/19/2018 03:05 AM    Chloride 112 (H) 06/19/2018 03:05 AM    CO2 20 (L) 06/19/2018 03:05 AM    Anion gap 9 06/19/2018 03:05 AM    Glucose 232 (H) 06/19/2018 03:05 AM    BUN 13 06/19/2018 03:05 AM    Creatinine 1.47 (H) 06/19/2018 03:05 AM    Calcium 7.5 (L) 06/19/2018 03:05 AM    Magnesium 1.6 06/06/2018 04:20 AM    Phosphorus 3.1 04/19/2018 09:39 AM Albumin 2.4 (L) 06/19/2018 03:05 AM       Anthropometrics: IBW : 80.7 kg (178 lb), % IBW (Calculated): 84.27 %, BMI (calculated): 20.3  Wt Readings from Last 1 Encounters:   06/17/18 68 kg (150 lb)      Ht Readings from Last 1 Encounters:   06/17/18 6' (1.829 m)     Last Weight Metrics:  Weight Loss Metrics 6/17/2018 6/9/2018 5/31/2018 5/21/2018 4/24/2018 4/16/2018 8/5/2017   Today's Wt 150 lb 153 lb 14.1 oz 135 lb 130 lb 130 lb 145 lb 11.6 oz 164 lb   BMI 20.34 kg/m2 20.87 kg/m2 18.31 kg/m2 17.15 kg/m2 17.15 kg/m2 19.23 kg/m2 22.24 kg/m2         Estimated Nutrition Needs:  2346 Kcals/day, Protein (g): 102 g Fluid (ml): 2400 ml  Based on:   [x]          Actual BW    []          ABW   []            Adjusted BW         Nutrition Interventions:  Diabetes education  Goal:   Blood glucose will be within target range of  mg/dL by 6/21/18. Pt will maintain weight (+/- 1-2 kg) or gain 1-2 lbs/week by 6/3018.         Nutrition Monitoring and Evaluation      [x]     Monitor po intake on meal rounds  [x]     Continue inpatient monitoring and intervention  []     Other:      Nutrition Risk:  []   High     [x]  Moderate    []  Minimal/Uncompromised    Fer Broderick RD, CDE   Office:  [address] [place] [address] Pager:  [phone]

## 2018-06-20 NOTE — ROUTINE PROCESS
Bedside, Verbal and Written shift change report given to Phillip Bosch RN (oncoming nurse) by Ron Covington RN (offgoing nurse). Report included the following information SBAR, Kardex, Intake/Output, MAR, Recent Results, Med Rec Status, Procedure Summary and Cardiac Rhythm NSR/ST. Pt c/o pain to abdomen, PRN Dilaudid administered, witness 1 mg being wasted.      4775 - Shift assessment completed. Pt alert and oriented x4. No respiratory distress noted. Call bell within reach, bed in low position. Will continue to monitor.      1200 - Pt c/o pain to abdomen, PRN Dilaudid administered, wasted 1 mg w/Daisy Velez RN.    8583 - Shift re-assessment completed, no change in pt condition. 1400 - I have reviewed discharge instructions with the patient. The patient verbalized understanding. Discharge medications reviewed with patient and appropriate educational materials and side effects teaching were provided. IV catheter discontinued intact. Site without signs and symptoms of complications. Dressing and pressure applied. Patient armband removed and shredded. 1555- Pt discharged to home via taxi cab voucher. All pt belongings went with him.

## 2018-06-21 ENCOUNTER — HOME CARE VISIT (OUTPATIENT)
Dept: HOME HEALTH SERVICES | Facility: HOME HEALTH | Age: 53
End: 2018-06-21

## 2018-06-22 ENCOUNTER — HOME CARE VISIT (OUTPATIENT)
Dept: HOME HEALTH SERVICES | Facility: HOME HEALTH | Age: 53
End: 2018-06-22

## 2018-06-22 NOTE — ANCILLARY DISCHARGE INSTRUCTIONS
[facility]  Discharge Phone Call       After-Care Discharge Phone Call Questions: no answer    Were you able to get your prescriptions filled? Comment:      [] Yes  []No    Comment if answer is \"No\"   Are you taking your medication(s) as your doctor ordered? Do you understand the purpose of your medications? Comment:    [] Yes  []No    Comment if answer is \"No\"   Are you taking any other medications that are not on the list?  Comment:      [] Yes  []No    Comment if answer is \"Yes\"   Do you have any questions about your medications? Are you aware of potential side effects? Comment:    [] Yes  []No    Comment if answer is \"Yes\"   Did you make your follow-up appointments (if the hospital did not do this before  discharge)? Comment:    [] Yes  []No    Comment if answer is \"No\"   Is there any reason you might not be able to keep your follow-up appointments? Comment:     [] Yes  []No    Comment if answer is \"Yes\"   Do you have any questions about your care plan? Are you aware of what health problems to be alert for? Comment:    [] Yes  []No    Comment if answer is \"Yes\"   Do you have a good understanding of how you should manage your health? Comment:    [] Yes  []No    Comment if answer is \"Yes\"   Do you know which symptoms to watch for that would mean you would need to call your doctor right away? Comment:      [] Yes  []No    Comment if answer is \"No\"   Do you have any questions about the follow up process or any instructions that we have provided? Comment:    [] Yes  []No    Comment if answer is \"Yes\"   Did staff take your preferences into account?         [] Yes  []No    Comment if answer is \"Yes\"

## 2018-06-28 ENCOUNTER — HOSPITAL ENCOUNTER (OUTPATIENT)
Age: 53
Setting detail: OBSERVATION
Discharge: HOME | End: 2018-06-29
Attending: EMERGENCY MEDICINE | Admitting: HOSPITALIST
Payer: MEDICAID

## 2018-06-28 ENCOUNTER — APPOINTMENT (OUTPATIENT)
Dept: CT IMAGING | Age: 53
End: 2018-06-28
Attending: EMERGENCY MEDICINE
Payer: MEDICAID

## 2018-06-28 DIAGNOSIS — R11.2 NON-INTRACTABLE VOMITING WITH NAUSEA, UNSPECIFIED VOMITING TYPE: ICD-10-CM

## 2018-06-28 DIAGNOSIS — R73.9 HYPERGLYCEMIA: ICD-10-CM

## 2018-06-28 DIAGNOSIS — G89.29 CHRONIC ABDOMINAL PAIN: Primary | ICD-10-CM

## 2018-06-28 DIAGNOSIS — R10.9 CHRONIC ABDOMINAL PAIN: Primary | ICD-10-CM

## 2018-06-28 DIAGNOSIS — K86.89 PANCREATIC FISTULA: ICD-10-CM

## 2018-06-28 LAB
ADMINISTERED INITIALS, ADMINIT: NORMAL
ADMINISTERED INITIALS, ADMINIT: NORMAL
ALBUMIN SERPL-MCNC: 2.9 G/DL (ref 3.4–5)
ALBUMIN/GLOB SERPL: 0.8 {RATIO} (ref 0.8–1.7)
ALP SERPL-CCNC: 170 U/L (ref 45–117)
ALT SERPL-CCNC: 22 U/L (ref 16–61)
ANION GAP SERPL CALC-SCNC: 10 MMOL/L (ref 3–18)
AST SERPL-CCNC: 14 U/L (ref 15–37)
BASOPHILS # BLD: 0 K/UL (ref 0–0.06)
BASOPHILS NFR BLD: 0 % (ref 0–2)
BILIRUB DIRECT SERPL-MCNC: 0.1 MG/DL (ref 0–0.2)
BILIRUB SERPL-MCNC: 0.5 MG/DL (ref 0.2–1)
BUN SERPL-MCNC: 10 MG/DL (ref 7–18)
BUN/CREAT SERPL: 10 (ref 12–20)
CALCIUM SERPL-MCNC: 8.2 MG/DL (ref 8.5–10.1)
CHLORIDE SERPL-SCNC: 104 MMOL/L (ref 100–108)
CO2 SERPL-SCNC: 21 MMOL/L (ref 21–32)
CREAT SERPL-MCNC: 1.04 MG/DL (ref 0.6–1.3)
D50 ADMINISTERED, D50ADM: 0 ML
D50 ADMINISTERED, D50ADM: 0 ML
D50 ORDER, D50ORD: 0 ML
D50 ORDER, D50ORD: 0 ML
DIFFERENTIAL METHOD BLD: ABNORMAL
EOSINOPHIL # BLD: 0.1 K/UL (ref 0–0.4)
EOSINOPHIL NFR BLD: 1 % (ref 0–5)
ERYTHROCYTE [DISTWIDTH] IN BLOOD BY AUTOMATED COUNT: 13.7 % (ref 11.6–14.5)
EST. AVERAGE GLUCOSE BLD GHB EST-MCNC: 183 MG/DL
GLOBULIN SER CALC-MCNC: 3.7 G/DL (ref 2–4)
GLUCOSE BLD STRIP.AUTO-MCNC: 143 MG/DL (ref 70–110)
GLUCOSE BLD STRIP.AUTO-MCNC: 179 MG/DL (ref 70–110)
GLUCOSE BLD STRIP.AUTO-MCNC: 348 MG/DL (ref 70–110)
GLUCOSE BLD STRIP.AUTO-MCNC: 369 MG/DL (ref 70–110)
GLUCOSE BLD STRIP.AUTO-MCNC: 518 MG/DL (ref 70–110)
GLUCOSE SERPL-MCNC: 473 MG/DL (ref 74–99)
GLUCOSE, GLC: 347 MG/DL
GLUCOSE, GLC: 369 MG/DL
HBA1C MFR BLD: 8 % (ref 4.2–5.6)
HCT VFR BLD AUTO: 35 % (ref 36–48)
HGB BLD-MCNC: 11.8 G/DL (ref 13–16)
HIGH TARGET, HITG: 180 MG/DL
HIGH TARGET, HITG: 180 MG/DL
INSULIN ADMINSTERED, INSADM: 6.2 UNITS/HOUR
INSULIN ADMINSTERED, INSADM: 8.6 UNITS/HOUR
INSULIN ORDER, INSORD: 6.2 UNITS/HOUR
INSULIN ORDER, INSORD: 8.6 UNITS/HOUR
LACTATE BLD-SCNC: 1 MMOL/L (ref 0.4–2)
LIPASE SERPL-CCNC: 40 U/L (ref 73–393)
LOW TARGET, LOT: 140 MG/DL
LOW TARGET, LOT: 140 MG/DL
LYMPHOCYTES # BLD: 1.3 K/UL (ref 0.9–3.6)
LYMPHOCYTES NFR BLD: 16 % (ref 21–52)
MAGNESIUM SERPL-MCNC: 1.7 MG/DL (ref 1.6–2.6)
MAGNESIUM SERPL-MCNC: 1.9 MG/DL (ref 1.6–2.6)
MCH RBC QN AUTO: 32.1 PG (ref 24–34)
MCHC RBC AUTO-ENTMCNC: 33.7 G/DL (ref 31–37)
MCV RBC AUTO: 95.1 FL (ref 74–97)
MINUTES UNTIL NEXT BG, NBG: 60 MIN
MINUTES UNTIL NEXT BG, NBG: 60 MIN
MONOCYTES # BLD: 0.6 K/UL (ref 0.05–1.2)
MONOCYTES NFR BLD: 7 % (ref 3–10)
MULTIPLIER, MUL: 0.02
MULTIPLIER, MUL: 0.03
NEUTS SEG # BLD: 5.9 K/UL (ref 1.8–8)
NEUTS SEG NFR BLD: 76 % (ref 40–73)
ORDER INITIALS, ORDINIT: NORMAL
ORDER INITIALS, ORDINIT: NORMAL
PLATELET # BLD AUTO: 257 K/UL (ref 135–420)
PMV BLD AUTO: 10.8 FL (ref 9.2–11.8)
POTASSIUM SERPL-SCNC: 3.8 MMOL/L (ref 3.5–5.5)
PROT SERPL-MCNC: 6.6 G/DL (ref 6.4–8.2)
RBC # BLD AUTO: 3.68 M/UL (ref 4.7–5.5)
SODIUM SERPL-SCNC: 135 MMOL/L (ref 136–145)
WBC # BLD AUTO: 7.8 K/UL (ref 4.6–13.2)

## 2018-06-28 PROCEDURE — 96375 TX/PRO/DX INJ NEW DRUG ADDON: CPT

## 2018-06-28 PROCEDURE — 74011636637 HC RX REV CODE- 636/637: Performed by: HOSPITALIST

## 2018-06-28 PROCEDURE — 74011250637 HC RX REV CODE- 250/637: Performed by: HOSPITALIST

## 2018-06-28 PROCEDURE — 65270000029 HC RM PRIVATE

## 2018-06-28 PROCEDURE — 85025 COMPLETE CBC W/AUTO DIFF WBC: CPT | Performed by: PHYSICIAN ASSISTANT

## 2018-06-28 PROCEDURE — 96365 THER/PROPH/DIAG IV INF INIT: CPT

## 2018-06-28 PROCEDURE — 74011250636 HC RX REV CODE- 250/636: Performed by: EMERGENCY MEDICINE

## 2018-06-28 PROCEDURE — 96366 THER/PROPH/DIAG IV INF ADDON: CPT

## 2018-06-28 PROCEDURE — 83690 ASSAY OF LIPASE: CPT | Performed by: PHYSICIAN ASSISTANT

## 2018-06-28 PROCEDURE — 74011250636 HC RX REV CODE- 250/636: Performed by: HOSPITALIST

## 2018-06-28 PROCEDURE — 74011000258 HC RX REV CODE- 258: Performed by: EMERGENCY MEDICINE

## 2018-06-28 PROCEDURE — 82248 BILIRUBIN DIRECT: CPT | Performed by: PHYSICIAN ASSISTANT

## 2018-06-28 PROCEDURE — 77030018836 HC SOL IRR NACL ICUM -A

## 2018-06-28 PROCEDURE — 83735 ASSAY OF MAGNESIUM: CPT | Performed by: HOSPITALIST

## 2018-06-28 PROCEDURE — 80053 COMPREHEN METABOLIC PANEL: CPT | Performed by: PHYSICIAN ASSISTANT

## 2018-06-28 PROCEDURE — 74011000258 HC RX REV CODE- 258: Performed by: HOSPITALIST

## 2018-06-28 PROCEDURE — 83605 ASSAY OF LACTIC ACID: CPT

## 2018-06-28 PROCEDURE — 96376 TX/PRO/DX INJ SAME DRUG ADON: CPT

## 2018-06-28 PROCEDURE — 74011636637 HC RX REV CODE- 636/637: Performed by: EMERGENCY MEDICINE

## 2018-06-28 PROCEDURE — 74176 CT ABD & PELVIS W/O CONTRAST: CPT

## 2018-06-28 PROCEDURE — 83735 ASSAY OF MAGNESIUM: CPT | Performed by: EMERGENCY MEDICINE

## 2018-06-28 PROCEDURE — 74011000258 HC RX REV CODE- 258

## 2018-06-28 PROCEDURE — 82962 GLUCOSE BLOOD TEST: CPT

## 2018-06-28 PROCEDURE — 99285 EMERGENCY DEPT VISIT HI MDM: CPT

## 2018-06-28 PROCEDURE — 83036 HEMOGLOBIN GLYCOSYLATED A1C: CPT | Performed by: HOSPITALIST

## 2018-06-28 RX ORDER — MORPHINE SULFATE 10 MG/ML
4 INJECTION, SOLUTION INTRAMUSCULAR; INTRAVENOUS
Status: COMPLETED | OUTPATIENT
Start: 2018-06-28 | End: 2018-06-28

## 2018-06-28 RX ORDER — PANTOPRAZOLE SODIUM 40 MG/1
40 TABLET, DELAYED RELEASE ORAL 2 TIMES DAILY
Status: DISCONTINUED | OUTPATIENT
Start: 2018-06-28 | End: 2018-06-29 | Stop reason: HOSPADM

## 2018-06-28 RX ORDER — DEXTROSE 50 % IN WATER (D50W) INTRAVENOUS SYRINGE
25-50 AS NEEDED
Status: DISCONTINUED | OUTPATIENT
Start: 2018-06-28 | End: 2018-06-29 | Stop reason: HOSPADM

## 2018-06-28 RX ORDER — SUCRALFATE 1 G/1
1 TABLET ORAL 4 TIMES DAILY
Status: DISCONTINUED | OUTPATIENT
Start: 2018-06-28 | End: 2018-06-29 | Stop reason: HOSPADM

## 2018-06-28 RX ORDER — NALOXONE HYDROCHLORIDE 0.4 MG/ML
0.4 INJECTION, SOLUTION INTRAMUSCULAR; INTRAVENOUS; SUBCUTANEOUS AS NEEDED
Status: DISCONTINUED | OUTPATIENT
Start: 2018-06-28 | End: 2018-06-29 | Stop reason: HOSPADM

## 2018-06-28 RX ORDER — SODIUM CHLORIDE 900 MG/100ML
INJECTION INTRAVENOUS
Status: COMPLETED
Start: 2018-06-28 | End: 2018-06-28

## 2018-06-28 RX ORDER — LANOLIN ALCOHOL/MO/W.PET/CERES
0.4 CREAM (GRAM) TOPICAL DAILY
Status: DISCONTINUED | OUTPATIENT
Start: 2018-06-29 | End: 2018-06-29 | Stop reason: HOSPADM

## 2018-06-28 RX ORDER — DEXTROSE 50 % IN WATER (D50W) INTRAVENOUS SYRINGE
25-50 AS NEEDED
Status: DISCONTINUED | OUTPATIENT
Start: 2018-06-28 | End: 2018-06-28 | Stop reason: SDUPTHER

## 2018-06-28 RX ORDER — DICYCLOMINE HYDROCHLORIDE 10 MG/1
10 CAPSULE ORAL 3 TIMES DAILY
Status: DISCONTINUED | OUTPATIENT
Start: 2018-06-28 | End: 2018-06-29 | Stop reason: HOSPADM

## 2018-06-28 RX ORDER — ONDANSETRON 2 MG/ML
4 INJECTION INTRAMUSCULAR; INTRAVENOUS
Status: COMPLETED | OUTPATIENT
Start: 2018-06-28 | End: 2018-06-28

## 2018-06-28 RX ORDER — GABAPENTIN 300 MG/1
600 CAPSULE ORAL 3 TIMES DAILY
Status: DISCONTINUED | OUTPATIENT
Start: 2018-06-28 | End: 2018-06-29 | Stop reason: HOSPADM

## 2018-06-28 RX ORDER — MORPHINE SULFATE 4 MG/ML
5 INJECTION INTRAVENOUS
Status: DISCONTINUED | OUTPATIENT
Start: 2018-06-28 | End: 2018-06-29 | Stop reason: HOSPADM

## 2018-06-28 RX ORDER — MAGNESIUM SULFATE 100 %
4 CRYSTALS MISCELLANEOUS AS NEEDED
Status: DISCONTINUED | OUTPATIENT
Start: 2018-06-28 | End: 2018-06-28 | Stop reason: SDUPTHER

## 2018-06-28 RX ORDER — MORPHINE SULFATE 10 MG/ML
6 INJECTION, SOLUTION INTRAMUSCULAR; INTRAVENOUS ONCE
Status: COMPLETED | OUTPATIENT
Start: 2018-06-28 | End: 2018-06-28

## 2018-06-28 RX ORDER — OXYCODONE AND ACETAMINOPHEN 10; 325 MG/1; MG/1
1 TABLET ORAL
Status: DISCONTINUED | OUTPATIENT
Start: 2018-06-28 | End: 2018-06-29 | Stop reason: HOSPADM

## 2018-06-28 RX ORDER — ENOXAPARIN SODIUM 100 MG/ML
40 INJECTION SUBCUTANEOUS EVERY 24 HOURS
Status: DISCONTINUED | OUTPATIENT
Start: 2018-06-28 | End: 2018-06-29 | Stop reason: HOSPADM

## 2018-06-28 RX ORDER — LANOLIN ALCOHOL/MO/W.PET/CERES
325 CREAM (GRAM) TOPICAL 2 TIMES DAILY
Status: DISCONTINUED | OUTPATIENT
Start: 2018-06-28 | End: 2018-06-29 | Stop reason: HOSPADM

## 2018-06-28 RX ORDER — INSULIN LISPRO 100 [IU]/ML
INJECTION, SOLUTION INTRAVENOUS; SUBCUTANEOUS
Status: DISCONTINUED | OUTPATIENT
Start: 2018-06-28 | End: 2018-06-29 | Stop reason: HOSPADM

## 2018-06-28 RX ORDER — ONDANSETRON 2 MG/ML
4 INJECTION INTRAMUSCULAR; INTRAVENOUS
Status: DISCONTINUED | OUTPATIENT
Start: 2018-06-28 | End: 2018-06-29 | Stop reason: HOSPADM

## 2018-06-28 RX ORDER — SODIUM CHLORIDE 9 MG/ML
100 INJECTION, SOLUTION INTRAVENOUS CONTINUOUS
Status: DISCONTINUED | OUTPATIENT
Start: 2018-06-28 | End: 2018-06-29 | Stop reason: HOSPADM

## 2018-06-28 RX ORDER — MAGNESIUM SULFATE 100 %
4 CRYSTALS MISCELLANEOUS AS NEEDED
Status: DISCONTINUED | OUTPATIENT
Start: 2018-06-28 | End: 2018-06-29 | Stop reason: HOSPADM

## 2018-06-28 RX ADMIN — SODIUM CHLORIDE 6.2 UNITS/HR: 900 INJECTION, SOLUTION INTRAVENOUS at 13:17

## 2018-06-28 RX ADMIN — GABAPENTIN 600 MG: 300 CAPSULE ORAL at 22:23

## 2018-06-28 RX ADMIN — FERROUS SULFATE TAB 325 MG (65 MG ELEMENTAL FE) 325 MG: 325 (65 FE) TAB at 19:21

## 2018-06-28 RX ADMIN — DICYCLOMINE HYDROCHLORIDE 10 MG: 10 CAPSULE ORAL at 22:23

## 2018-06-28 RX ADMIN — PANCRELIPASE 2 CAPSULE: 10000; 34000; 55000 CAPSULE, DELAYED RELEASE ORAL at 22:23

## 2018-06-28 RX ADMIN — INSULIN LISPRO 3 UNITS: 100 INJECTION, SOLUTION INTRAVENOUS; SUBCUTANEOUS at 22:24

## 2018-06-28 RX ADMIN — SODIUM CHLORIDE 1000 ML: 900 INJECTION, SOLUTION INTRAVENOUS at 11:57

## 2018-06-28 RX ADMIN — ENOXAPARIN SODIUM 40 MG: 100 INJECTION SUBCUTANEOUS at 19:19

## 2018-06-28 RX ADMIN — ONDANSETRON 4 MG: 2 INJECTION INTRAMUSCULAR; INTRAVENOUS at 11:57

## 2018-06-28 RX ADMIN — SODIUM CHLORIDE 100 ML/HR: 900 INJECTION, SOLUTION INTRAVENOUS at 19:15

## 2018-06-28 RX ADMIN — SUCRALFATE 1 G: 1 TABLET ORAL at 22:23

## 2018-06-28 RX ADMIN — PANTOPRAZOLE SODIUM 40 MG: 40 TABLET, DELAYED RELEASE ORAL at 19:21

## 2018-06-28 RX ADMIN — SODIUM CHLORIDE 50 ML: 900 INJECTION INTRAVENOUS at 23:01

## 2018-06-28 RX ADMIN — AMITRIPTYLINE HYDROCHLORIDE 75 MG: 50 TABLET, FILM COATED ORAL at 22:23

## 2018-06-28 RX ADMIN — MORPHINE SULFATE 6 MG: 10 INJECTION INTRAMUSCULAR; INTRAVENOUS; SUBCUTANEOUS at 12:29

## 2018-06-28 RX ADMIN — PROMETHAZINE HYDROCHLORIDE 12.5 MG: 25 INJECTION INTRAMUSCULAR; INTRAVENOUS at 13:04

## 2018-06-28 RX ADMIN — MORPHINE SULFATE 4 MG: 10 INJECTION INTRAMUSCULAR; INTRAVENOUS; SUBCUTANEOUS at 14:26

## 2018-06-28 RX ADMIN — MORPHINE SULFATE 5 MG: 4 INJECTION INTRAVENOUS at 23:02

## 2018-06-28 RX ADMIN — MORPHINE SULFATE 5 MG: 4 INJECTION INTRAVENOUS at 19:06

## 2018-06-28 RX ADMIN — PROMETHAZINE HYDROCHLORIDE 6.25 MG: 25 INJECTION INTRAMUSCULAR; INTRAVENOUS at 23:02

## 2018-06-28 RX ADMIN — SUCRALFATE 1 G: 1 TABLET ORAL at 19:20

## 2018-06-28 NOTE — ED PROVIDER NOTES
[address] NEURO MED      11:47 AM    Date: 6/28/2018  Patient Name: Nestor Calderon    History of Presenting Illness     Chief Complaint   Patient presents with    Abscess    Abdominal Pain       History Provided By: Patient    Chief Complaint: Abdominal pain  Duration:  Days  Timing:  Acute and Constant  Location: Right sided abdomen  Quality:   Severity: Moderate  Modifying Factors: None  Associated Symptoms: N/V/D and drainage around his pancreatic drain    48 y.o. male 1/5 PPD smoker with noted past medical history who presents to the emergency department c/o right sided abdominal pain for the past several days. He notes associated N/V/D for the past three days. He reports that he has had a pancreatic stent with a pancreatic drain in place intermitently over te past two years for treatment of a pancreatic pseudocyst and pancreatitis. The drain that he currently has in place was placed 2.5 weeks ago and was placed in the unclosed wound of the prior drain. A few days ago fluid started draining from the drain site and that fluid is causing burning pain of the skin around the drain. He has been taking Phenergan for his nausea with improvement of the nausea, though he has now run out of his Phenergan. He admits to occasional alcohol use. He denies fever, chest pain, blood in his stool, blood in his vomit, and any further complaints. No other complaints. Nursing notes regarding the HPI and triage nursing notes were reviewed. Prior medical records were reviewed.      Current Facility-Administered Medications   Medication Dose Route Frequency Provider Last Rate Last Dose    glucose chewable tablet 16 g  4 Tab Oral PRN Mikey Martin MD        glucagon [facility]) injection 1 mg  1 mg IntraMUSCular PRN Mikey Martni MD        dextrose (D50W) injection syrg 12.5-25 g  25-50 mL IntraVENous PRN Mikey Martin MD        insulin regular (Novolin,Humulin) premix infusion        Stopped at 06/28/18 1340    amitriptyline (ELAVIL) tablet 75 mg  75 mg Oral QHS Ghada Mas MD        dicyclomine (BENTYL) capsule 10 mg  10 mg Oral TID Ghada Mas MD        ferrous sulfate tablet 325 mg  325 mg Oral BID Ghada Mas MD   325 mg at 06/28/18 1921    [START ON 0/60/4702] folic acid tablet 0.4 mg  0.4 mg Oral DAILY Ghada Mas MD        gabapentin (NEURONTIN) capsule 600 mg  600 mg Oral TID Ghada Mas MD        oxyCODONE-acetaminophen (PERCOCET 10)  mg per tablet 1 Tab  1 Tab Oral Q6H PRN Ghada Mas MD        pantoprazole (PROTONIX) tablet 40 mg  40 mg Oral BID Ghada Mas MD   40 mg at 06/28/18 1921    sucralfate (CARAFATE) tablet 1 g  1 g Oral QID Ghada Mas MD   1 g at 06/28/18 1920    lipase-protease-amylase (ZENPEP 10,000) capsule 2 Cap  2 Cap Oral TID Ghada Mas MD        insulin lispro (HUMALOG) injection   SubCUTAneous AC&HS Ghada Mas MD        0.9% sodium chloride infusion  100 mL/hr IntraVENous CONTINUOUS Ghada Mas  mL/hr at 06/28/18 1915 100 mL/hr at 06/28/18 1915    ondansetron (ZOFRAN) injection 4 mg  4 mg IntraVENous Q4H PRN Ghada Mas MD        naloxone [facility]) injection 0.4 mg  0.4 mg IntraVENous PRN Ghada Mas MD        morphine injection 5 mg  5 mg IntraVENous Q4H PRN Ghada Mas MD   5 mg at 06/28/18 1906    enoxaparin (LOVENOX) injection 40 mg  40 mg SubCUTAneous Q24H Ghada Mas MD   40 mg at 06/28/18 1919       Past History     Past Medical History:  Past Medical History:   Diagnosis Date    Abdominal pain, generalized     [facility] [address]     Diabetes ([facility] [address]     hypothyroid    Encounter for long-term (current) use of other medications     Essential hypertension     ETOH abuse     GERD (gastroesophageal reflux disease)     Heart failure ([facility] [address]     Hyponatremia     Pain in the abdomen 11/2/2010    Pancreatitis     w/ abscess and pseudocyst    Pancreatitis     Psychiatric disorder     depression, anxiety    Tobacco abuse        Past Surgical History:  Past Surgical History:   Procedure Laterality Date    HX ABDOMINAL LAPAROSCOPY      PANCREATIC STENT    HX CHOLECYSTECTOMY         Family History:  Family History   Problem Relation Age of Onset    Heart Disease Father        Social History:  Social History   Substance Use Topics    Smoking status: Current Every Day Smoker     Packs/day: 0.50     Years: 0.00     Types: Cigarettes    Smokeless tobacco: Never Used    Alcohol use Yes       Allergies: Allergies   Allergen Reactions    Ampicillin-Sulbactam Rash    Pcn [Penicillins] Itching and Hives    Piperacillin-Tazobactam Rash    Pollen Extracts Rash    Seafood [Shellfish Containing Products] Hives     Other reaction(s): unknown  Has tolerated iohexol (iodine-containing contrast)  Only shrimp  Shrimp       Patient's primary care provider (as noted in EPIC):  Aquiles Au MD    Review of Systems   Constitutional: Negative for diaphoresis. HENT: Negative for congestion. Eyes: Negative for discharge. Respiratory: Negative for stridor. Cardiovascular: Negative for palpitations. Gastrointestinal: Positive for abdominal pain, diarrhea, nausea and vomiting. Negative for blood in stool. Genitourinary: Negative for flank pain. Musculoskeletal: Negative for back pain. Skin:        Positive for burning pain   Neurological: Negative for weakness. Psychiatric/Behavioral: Negative for hallucinations. All other systems reviewed and are negative. Visit Vitals    BP 94/67 (BP 1 Location: Left arm, BP Patient Position: At rest)    Pulse 85    Temp 97.8 °F (36.6 °C)    Resp 19    Ht 6' (1.829 m)    Wt 63.5 kg (140 lb)    SpO2 95%    BMI 18.99 kg/m2       PHYSICAL EXAM:    CONSTITUTIONAL:  Alert, in no apparent distress;  well developed;  well nourished. HEAD:  Normocephalic, atraumatic. EYES:  EOMI. Non-icteric sclera. Normal conjunctiva.   ENTM: Nose:  no rhinorrhea. Throat:  no erythema or exudate, mucous membranes moist.  NECK:  No JVD. Supple  RESPIRATORY:  Chest clear, equal breath sounds, good air movement. CARDIOVASCULAR:  Regular rate and rhythm. No murmurs, rubs, or gallops. GI:  Normal bowel sounds, abdomen soft with moderate RLQ TTP. Biliary drain site in lateral inferior right abdomen has purulent yellow thick drainage around tube and yellow fluid in drainage bag,  No foul odor. No rebound or guarding. BACK:  Non-tender. UPPER EXT:  Normal inspection. LOWER EXT:  No edema, no calf tenderness. Distal pulses intact. NEURO:  Moves all four extremities, and grossly normal motor exam.  SKIN:  No rashes;  Normal for age. PSYCH:  Alert and normal affect. DIFFERENTIAL DIAGNOSES/ MEDICAL DECISION MAKING:  Gastritis, gerd, peptic ulcer disease, cholecystitis, pancreatitis, gastroenteritis, hepatitis, constipation related pain, appendicitis pain, diverticulitis, urinary tract infection, obstruction, abdominal wall pain, atypical cardiac (ami or anginal pain), referred pain from pulmonary process (pneumonia, empyema), or combination of the above versus many other processes. Diagnostic Study Results     Abnormal lab results from this emergency department encounter:  Labs Reviewed   CBC WITH AUTOMATED DIFF - Abnormal; Notable for the following:        Result Value    RBC 3.68 (*)     HGB 11.8 (*)     HCT 35.0 (*)     NEUTROPHILS 76 (*)     LYMPHOCYTES 16 (*)     All other components within normal limits   METABOLIC PANEL, COMPREHENSIVE - Abnormal; Notable for the following:     Sodium 135 (*)     Glucose 473 (*)     BUN/Creatinine ratio 10 (*)     Calcium 8.2 (*)     AST (SGOT) 14 (*)     Alk.  phosphatase 170 (*)     Albumin 2.9 (*)     All other components within normal limits   LIPASE - Abnormal; Notable for the following:     Lipase 40 (*)     All other components within normal limits   GLUCOSE, POC - Abnormal; Notable for the following:     Glucose (POC) 518 (*)     All other components within normal limits   GLUCOSE, POC - Abnormal; Notable for the following:     Glucose (POC) 369 (*)     All other components within normal limits   GLUCOSE, POC - Abnormal; Notable for the following:     Glucose (POC) 348 (*)     All other components within normal limits   GLUCOSE, POC - Abnormal; Notable for the following:     Glucose (POC) 143 (*)     All other components within normal limits   BILIRUBIN, DIRECT   MAGNESIUM   GLUCOSTABILIZER   GLUCOSTABILIZER   HEMOGLOBIN A1C WITH EAG   POC LACTIC ACID       Lab values for this patient within approximately the last 12 hours:  Recent Results (from the past 12 hour(s))   GLUCOSE, POC    Collection Time: 06/28/18 10:32 AM   Result Value Ref Range    Glucose (POC) 518 (HH) 70 - 110 mg/dL   CBC WITH AUTOMATED DIFF    Collection Time: 06/28/18 11:40 AM   Result Value Ref Range    WBC 7.8 4.6 - 13.2 K/uL    RBC 3.68 (L) 4.70 - 5.50 M/uL    HGB 11.8 (L) 13.0 - 16.0 g/dL    HCT 35.0 (L) 36.0 - 48.0 %    MCV 95.1 74.0 - 97.0 FL    MCH 32.1 24.0 - 34.0 PG    MCHC 33.7 31.0 - 37.0 g/dL    RDW 13.7 11.6 - 14.5 %    PLATELET 803 856 - 301 K/uL    MPV 10.8 9.2 - 11.8 FL    NEUTROPHILS 76 (H) 40 - 73 %    LYMPHOCYTES 16 (L) 21 - 52 %    MONOCYTES 7 3 - 10 %    EOSINOPHILS 1 0 - 5 %    BASOPHILS 0 0 - 2 %    ABS. NEUTROPHILS 5.9 1.8 - 8.0 K/UL    ABS. LYMPHOCYTES 1.3 0.9 - 3.6 K/UL    ABS. MONOCYTES 0.6 0.05 - 1.2 K/UL    ABS. EOSINOPHILS 0.1 0.0 - 0.4 K/UL    ABS.  BASOPHILS 0.0 0.0 - 0.06 K/UL    DF AUTOMATED     METABOLIC PANEL, COMPREHENSIVE    Collection Time: 06/28/18 11:40 AM   Result Value Ref Range    Sodium 135 (L) 136 - 145 mmol/L    Potassium 3.8 3.5 - 5.5 mmol/L    Chloride 104 100 - 108 mmol/L    CO2 21 21 - 32 mmol/L    Anion gap 10 3.0 - 18 mmol/L    Glucose 473 (HH) 74 - 99 mg/dL    BUN 10 7.0 - 18 MG/DL    Creatinine 1.04 0.6 - 1.3 MG/DL    BUN/Creatinine ratio 10 (L) 12 - 20      GFR est AA >60 >60 ml/min/1.73m2    GFR est non-AA >60 >60 ml/min/1.73m2    Calcium 8.2 (L) 8.5 - 10.1 MG/DL    Bilirubin, total 0.5 0.2 - 1.0 MG/DL    ALT (SGPT) 22 16 - 61 U/L    AST (SGOT) 14 (L) 15 - 37 U/L    Alk.  phosphatase 170 (H) 45 - 117 U/L    Protein, total 6.6 6.4 - 8.2 g/dL    Albumin 2.9 (L) 3.4 - 5.0 g/dL    Globulin 3.7 2.0 - 4.0 g/dL    A-G Ratio 0.8 0.8 - 1.7     LIPASE    Collection Time: 06/28/18 11:40 AM   Result Value Ref Range    Lipase 40 (L) 73 - 393 U/L   BILIRUBIN, DIRECT    Collection Time: 06/28/18 11:40 AM   Result Value Ref Range    Bilirubin, direct 0.1 0.0 - 0.2 MG/DL   MAGNESIUM    Collection Time: 06/28/18 11:40 AM   Result Value Ref Range    Magnesium 1.9 1.6 - 2.6 mg/dL   POC LACTIC ACID    Collection Time: 06/28/18 11:48 AM   Result Value Ref Range    Lactic Acid (POC) 1.0 0.4 - 2.0 mmol/L   GLUCOSE, POC    Collection Time: 06/28/18  1:43 PM   Result Value Ref Range    Glucose (POC) 369 (H) 70 - 110 mg/dL   GLUCOSTABILIZER    Collection Time: 06/28/18  1:47 PM   Result Value Ref Range    Glucose 369 mg/dL    Insulin order 6.2 units/hour    Insulin adminstered 6.2 units/hour    Multiplier 0.020     Low target 140 mg/dL    High target 180 mg/dL    D50 order 0.0 ml    D50 administered 0.00 ml    Minutes until next BG 60 min    Order initials PLO     Administered initials PLO    GLUCOSE, POC    Collection Time: 06/28/18  2:49 PM   Result Value Ref Range    Glucose (POC) 348 (H) 70 - 110 mg/dL   GLUCOSTABILIZER    Collection Time: 06/28/18  2:50 PM   Result Value Ref Range    Glucose 347 mg/dL    Insulin order 8.6 units/hour    Insulin adminstered 8.6 units/hour    Multiplier 0.030     Low target 140 mg/dL    High target 180 mg/dL    D50 order 0.0 ml    D50 administered 0.00 ml    Minutes until next BG 60 min    Order initials PLO     Administered initials PLO    GLUCOSE, POC    Collection Time: 06/28/18  4:01 PM   Result Value Ref Range    Glucose (POC) 143 (H) 70 - 110 mg/dL       Radiologist and cardiologist interpretations if available at time of this note:  Ct Abd Pelv Wo Cont    Result Date: 6/28/2018  CLINICAL HISTORY:   Abdominal abscess draining pus. 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 COMPARISON EXAMINATIONS:   Abdominal pelvic CT 6/16/2018. TECHNIQUE:   CT of the abdomen and pelvis without contrast administration. ---  CT ABDOMEN  --- LUNG BASES:   Mild lower lobe bronchial wall thickening. Mild cylindrical bronchiectasis. SOLID ABDOMINAL VISCERA:   Cholecystectomy. Mild to moderate biliary dilatation, with extrahepatic common duct measuring up to approximately 15 mm, not appreciably changed. Numerous pancreatic parenchymal calcifications, compatible with chronic pancreatitis. Pancreatic ductal stent, unchanged in position. Mild to moderate pancreatic ductal dilatation in the tail (up to 4.5 mm), not appreciably changed. Bilateral nonobstructive intrarenal calculi. No hydronephrosis or suspicious renal lesion. RETROPERITONEUM:   Mild to moderate atherosclerotic aortic calcification. No significantly enlarged retroperitoneal lymph nodes. OTHER:   No ascites or free intraperitoneal air. ---  CT PELVIS  --- RETROPERITONEUM:   No enlarged lymph nodes or retroperitoneal mass. BOWEL:   Slightly increased small and large bowel fluid with few mildly dilated small bowel loops. No colonic dilatation. OTHER:   Right lower quadrant pigtail catheter, coiled behind the ascending colon. Small amount of fluid with air surrounds catheter (up to 5 x 2 cm transverse, 4 cm craniocaudal). A portion of the duodenum appears tethered towards the abscess, and relationship between duodenum and abscess is not clear. Visible defect in the duodenal wall adjacent to abscess. OSSEOUS STRUCTURES:   Areas of subchondral sclerosis and both femoral heads, larger on the left, reflecting prior avascular necrosis. No evidence of subchondral collapse.  Mild lumbar scoliosis. --------------    IMPRESSION: -------------- 1. Percutaneous catheter coiled in small right lower quadrant abscess, slightly increased in size to comparison CT. The duodenum is tethered towards the abscess/catheter, and defect in duodenal wall is appreciated, compatible with clinical history. 2. Slightly increased small and large bowel fluid, nonspecific, though differential includes ileus and enterocolitis. No evidence of bowel obstruction. 3. Mild cylindrical bronchiectasis, likely with bronchitis. 4. Chronic calcific pancreatitis. Pancreatic ductal stent, in stable position. Pancreatic ductal dilatation in the tail likely reflects some degree of ductal obstruction by ductal stricture or intraductal calculus, not appreciably changed. 5. Mild to moderate biliary dilatation, not appreciably changed, and commonly seen following cholecystectomy.       Medication(s) ordered for patient during this emergency visit encounter:  Medications   glucose chewable tablet 16 g (not administered)   glucagon (GLUCAGEN) injection 1 mg (not administered)   dextrose (D50W) injection syrg 12.5-25 g (not administered)   insulin regular (Novolin,Humulin) premix infusion (  Held 6/28/18 1340)   amitriptyline (ELAVIL) tablet 75 mg (not administered)   dicyclomine (BENTYL) capsule 10 mg (not administered)   ferrous sulfate tablet 325 mg (325 mg Oral Given 0/14/02 0928)   folic acid tablet 0.4 mg (not administered)   gabapentin (NEURONTIN) capsule 600 mg (not administered)   oxyCODONE-acetaminophen (PERCOCET 10)  mg per tablet 1 Tab (not administered)   pantoprazole (PROTONIX) tablet 40 mg (40 mg Oral Given 6/28/18 1921)   sucralfate (CARAFATE) tablet 1 g (1 g Oral Given 6/28/18 1920)   lipase-protease-amylase (ZENPEP 10,000) capsule 2 Cap (not administered)   insulin lispro (HUMALOG) injection (not administered)   0.9% sodium chloride infusion (100 mL/hr IntraVENous New Bag 6/28/18 1915)   ondansetron (ZOFRAN) injection 4 mg (not administered)   naloxone ([employer]) injection 0.4 mg (not administered)   morphine injection 5 mg (5 mg IntraVENous Given 6/28/18 1906)   enoxaparin (LOVENOX) injection 40 mg (40 mg SubCUTAneous Given 6/28/18 1919)   sodium chloride 0.9 % bolus infusion 1,000 mL (1,000 mL IntraVENous New Bag 6/28/18 1157)   morphine injection 6 mg (6 mg IntraVENous Given 6/28/18 1229)   ondansetron (ZOFRAN) injection 4 mg (4 mg IntraVENous Given 6/28/18 1157)   promethazine (PHENERGAN) 12.5 mg in 0.9% sodium chloride 50 mL IVPB (12.5 mg IntraVENous Given 6/28/18 1304)   morphine injection 4 mg (4 mg IntraVENous Given 6/28/18 1426)       Medical Decision Making     I am the first provider for this patient. I reviewed the vital signs, available nursing notes, past medical history, past surgical history, family history and social history. Vital Signs:  Reviewed the patient's vital signs. ED COURSE:  The patient's presentation is consistent with an acute exacerbation of the patient's chronic abdominal pain and associated symptoms. 1:17 PM  Despite elevated glucose, normal lactic, AG and CO2. I do not believe pt is in DKA nor HHNK.    2:32 PM  CT a/p shows no acute changes except slight increase in abscess size. IMPRESSION AND MEDICAL DECISION MAKING:  Based upon the patient's presentation with noted HPI and PE, along with the work   up done in the emergency department, I believe that the patient is having abdominal pain of uncertain etiology. However, I do believe that the patient is stable and can be discharged home with further outpatient evaluation of the abdominal pain by the patient's primary doctor. DIAGNOSIS:  1. Acute exacerbation of chronic abdominal pain. SPECIFIC PATIENT INSTRUCTIONS FROM THE PHYSICIAN WHO TREATED YOU IN THE ER TODAY:  1. Return if any concerns or worsening of condition(s)  2. Take your PREVIOUSLY prescribed pain medication for pain.   3. If you have PREVIOUSLY prescribed nausea/vomiting medication or if you were prescribed Zofran in the emergency department today, then take that as prescribed for nausea and/or vomiting. 4. Follow up with your primary doctor in the next 2-4 days for reevaluation. IF you have a pain management doctor, then follow up with them as well in 2-4 days for reevaluation. Chronic Pain Management    We care about your pain and want what is best for you. Management of chronic pain is a carefully researched science and this protocol was developed using that research. If you have a chronic pain syndrome (chronic headache, back or neck pain, toothache, abdominal or pelvis pain without a specific diagnosis, or neuropathic pain such as fibromyalgia) or recurrent visits for the same condition without a specific diagnosis, you may be treated with one or more of the following medications. Either intravenous or intramuscular, or by mouth: Anti-inflammatory medication, Toradol, Nubain, Bentyl, Phenergan, Compazine, Haldol, Vistaril, Ultram, Fioricet, Fiorinal.    We will be unable to PRESCRIBE opioid/ narcotic medication(s) or only a limited number of opioid/ narcotic tablets. Some of these medications can impair your ability to drive, making you a danger to yourself and others. Therefore you must have a  present in the Emergency Department (ED) in order to receive those medications during your emergency department visit. Due to rebound pain and the addictive nature of narcotics, you should receive these medications from only one source, such as your Primary Care Physician (PCP), or the specialist managing your chronic pain. We are unable to refill your narcotic medication. Likewise, if you have received narcotics from more than one source in the last 2 months for any of the above stated conditions, we cannot provide you with a refill or different narcotic medication. If you have a chronic pain syndrome managed by your doctor, you should have provisions for break-through pain.   It is your responsibility to avoid running out of your medications. If you have a crisis, you should contact your physician and if he/she instructs you to come to the ED, have them call ahead and speak to our emergency department physician concerning your care. This protocol has been created to better serve you as the patient and create consistent care among physicians. Patient is improved, resting quietly and comfortably. The patient will be discharged home. The patient was reassured that these symptoms do not appear to represent a serious or life threatening condition at this time. Warning signs of worsening condition were discussed and understood by the patient. Based on patient's age, coexisting illness, exam, and the results of this ED evaluation, the decision to treat as an outpatient was made. Based on the information available at time of discharge, acute pathology requiring immediate intervention was deemed relative unlikely. While it is impossible to completely exclude the possibility of underlying serious disease or worsening of condition, I feel the relative likelihood is extremely low. I discussed this uncertainty with the patient, who understood ED evaluation and treatment and felt comfortable with the outpatient treatment plan. All questions regarding care, test results, and follow up were answered. The patient is stable and appropriate to discharge. They understand that they should return to the emergency department for any new or worsening symptoms. I stressed the importance of follow up for repeat assessment and possibly further evaluation/treatment. Coding Diagnoses     Clinical Impression:   1. Chronic abdominal pain    2. Non-intractable vomiting with nausea, unspecified vomiting type    3. Hyperglycemia        Disposition     Disposition:  Admit     Gianluca Weinberg M.D.   [facility] Board Certified Emergency Physician    Provider Attestation:  If a scribe was utilized in generation of this patient record, I personally performed the services described in the documentation, reviewed the documentation, as recorded by the scribe in my presence, and it accurately records the patient's history of presenting illness, review of systems, patient physical examination, and procedures performed by me as the attending physician. Ni Arrieta M.D. [facility] Board Certified Emergency Physician  6/28/2018.

## 2018-06-28 NOTE — ED NOTES
Pt pressing call bell requesting additional ice chips. RN informed pt she would speak to MD as unsure as to when drain would be changed. Pt stating, \"well if you just don't want to do it don't do it. I don't want you coming in here anymore any ways. \"

## 2018-06-28 NOTE — ED NOTES
Pt pressing the call bell again requesting pain medication, again RN spoke with MD and then pt. Pt aware MD has not written any orders for pain medication.

## 2018-06-28 NOTE — ED NOTES
TRANSFER - ED to INPATIENT REPORT:    SBAR report made available to receiving floor on this patient being transferred to [facility] ([id number])  for routine progression of care       Admitting diagnosis Pancreatic fistula    Information from the following report(s) ED Summary, MAR, Recent Results and Med Rec Status was made available to receiving floor. Lines:   Venous Access Device 06/07/17 Upper chest (subclavicular area), left (Active)     Opportunity for questions and clarification was provided.       Patient is oriented to time, place, person and situation   Patient is  continent and ambulatory without assist     Valuables transported with patient     Patient transported with:   [employer]

## 2018-06-28 NOTE — PROGRESS NOTES
Received patient from ED via wheelchair accompanied by ED Tech. Placed comfortably in bed. VS taken. Skin assessments done. Patient has a drain on right lower quadrant, with  greenish discharges. Patient is alert, oriented X4. Patient complained of pain, 10/10. Pain med to be given. Bed is locked and in lowest position and call bell is within reach. Not in acute distress.

## 2018-06-28 NOTE — PROGRESS NOTES
completed the initial Spiritual Assessment of the patient in bed 3 of the emergency room  and offered Pastoral Care support once again as he has been here multiple times in the past.Patient does not have any [facility]/cultural needs that will affect patients preferences in health care.  Chaplains will continue to follow and will provide pastoral care on an as needed/requested basis      Intern [address] [facility]   [phone]

## 2018-06-28 NOTE — ED NOTES
Blood Glucose checked and 143 resulted. Dr Goldberg Standing aware and Insulin Drip stopped. Reported to Randa Gu. Mediport flushed with 10ml of NS.

## 2018-06-28 NOTE — ED NOTES
Pt pressing the call bell repeatedly asking for pain medication. RN spoke with MD and pt. Pt will not be given pain medication in ED for his chronic pain. Pt aware.

## 2018-06-28 NOTE — ED TRIAGE NOTES
Has abscess on right side of abdomen w/ green pus draining. Burns skin.  Also meter running high for BS

## 2018-06-28 NOTE — H&P
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 He notes N/V however no hematemesis. He notes he has been taking his insulin. Does not see GI and per notes was [facility]. Upon arrival patient was found to have BS in 500's. He deneis any sob, cp, fever, chills. Of note has had multiple admissions for DKA and abd pain noting recent discharge for drain falling out after a fall with recent IR placed 14 Fr APD drain into right peritoneal fistula cavity about a week ago. Noting it was placed in the unclosed wound noting increased leakage with burning pain to skin around drain. Ila Chavez will be admitted for further evaluation. CT abd showed in significant change however increase in abcess size. Surgery has been consulted. Patient demanding narcotics. Past Medical History:   Diagnosis Date    Abdominal pain, generalized     Chronic pancreatitis ([address]     Diabetes ([address]     hypothyroid    Encounter for long-term (current) use of other medications     Essential hypertension     ETOH abuse     GERD (gastroesophageal reflux disease)     Heart failure (HCC)     Hyponatremia     Pain in the abdomen 11/2/2010    Pancreatitis     w/ abscess and pseudocyst    Pancreatitis     Psychiatric disorder     depression, anxiety    Tobacco abuse       Past Surgical History:   Procedure Laterality Date    HX ABDOMINAL LAPAROSCOPY      PANCREATIC STENT    HX CHOLECYSTECTOMY        Prior to Admission medications    Medication Sig Start Date End Date Taking?  Authorizing Provider   dicyclomine (BENTYL) 10 mg capsule Take 1 Cap by mouth three (3) times daily for 15 doses. 6/25/18 6/30/18  Beatriz Kaur MD   promethazine (PHENERGAN) 25 mg tablet Take 1 Tab by mouth every six (6) hours as needed for Nausea. 6/25/18   Beatriz Kaur MD   promethazine (PHENERGAN) 25 mg tablet Take 1 Tab by mouth every six (6) hours as needed. 6/19/18   Misha Wynn NP   oxyCODONE-acetaminophen (PERCOCET)  mg per tablet Take 1 Tab by mouth every six (6) hours as needed for Pain. Max Daily Amount: 4 Tabs. Indications: Pain 6/19/18   Misha Wynn NP   insulin lispro (HUMALOG) 100 unit/mL injection 12 Units by SubCUTAneous route nightly. Indications: type 2 diabetes mellitus 6/19/18   Misha Wynn NP   gabapentin (NEURONTIN) 300 mg capsule Take 2 Caps by mouth three (3) times daily for 30 days. Indications: NEUROPATHIC PAIN 6/9/18 7/9/18  Tevin Rhoades MD   dexamethasone (DECADRON) 1 mg tablet Take 4mg (4 tablets) by mouth twice a day for 2 days, then 2mg BID for 2 days, then 1mg BID for 2 days, then 1 mg daily for 2 days until gone. Indications: DIAGNOSTIC TEST FOR CUSHING'S SYNDROME, cervical stenosis 6/9/18   Tevin Rhoades MD   ondansetron (ZOFRAN ODT) 4 mg disintegrating tablet Take 1 Tab by mouth every eight (8) hours as needed for Nausea. 5/31/18   Hemant Arrieta NP   sucralfate (CARAFATE) 1 gram tablet Take 1 Tab by mouth four (4) times daily. Indications: PREVENTION OF STRESS ULCER 5/22/18   Jorge Giles MD   amitriptyline (ELAVIL) 75 mg tablet Take 1 Tab by mouth nightly. Indications: Depression 8/9/17   Regi Oliver NP   ascorbic acid, vitamin C, (VITAMIN C) 500 mg tablet Take 1 Tab by mouth daily. Indications: VITAMIN C DEFICIENCY 8/9/17   Regi Oliver NP   cholecalciferol, vitamin D3, 2,000 unit tab Take 1 Tab by mouth daily.  Indications: PREVENTION OF VITAMIN D DEFICIENCY 8/9/17   Regi Oliver NP   cyanocobalamin (VITAMIN B-12) 1,000 mcg sublingual tablet Take 2 Tabs by mouth daily. Indications: PREVENTION OF VITAMIN B12 DEFICIENCY 8/9/17   Shawn Pia, NP   ferrous sulfate 325 mg (65 mg iron) tablet Take 1 Tab by mouth two (2) times a day. Indications: IRON DEFICIENCY ANEMIA 8/9/17   Shawn Axe, NP   folic acid 285 mcg tablet Take 1 Tab by mouth daily. 8/9/17   Shawn Axe NP   lipase-protease-amylase (CREON) 12,000-38,000 -60,000 unit capsule Take 2 Caps by mouth three (3) times daily (with meals). Indications: exocrine pancreatic insufficiency, dosage change 7/28/17   Shawn Axe, NP   pantoprazole (PROTONIX) 40 mg tablet Take 1 Tab by mouth two (2) times a day. Indications: EROSIVE ESOPHAGITIS, gastroesophageal reflux disease 7/28/17   Shawn Axe, NP     Allergies   Allergen Reactions    Ampicillin-Sulbactam Rash    Pcn [Penicillins] Itching and Hives    Piperacillin-Tazobactam Rash    Pollen Extracts Rash    Seafood [Shellfish Containing Products] Hives     Other reaction(s): unknown  Has tolerated iohexol (iodine-containing contrast)  Only shrimp  Shrimp      Social History   Substance Use Topics    Smoking status: Current Every Day Smoker     Packs/day: 0.50     Years: 0.00     Types: Cigarettes    Smokeless tobacco: Never Used    Alcohol use Yes      Family History   Problem Relation Age of Onset    Heart Disease Father       Review of Systems   Constitutional: Positive for fatigue, fever and unexpected weight change. HENT: Negative. Eyes: Negative. Respiratory: Negative. Gastrointestinal: Positive for abdominal pain and nausea. Negative for anal bleeding, blood in stool and constipation. Neurological: Negative.         Objective:          Patient Vitals for the past 8 hrs:   BP Temp Pulse Resp SpO2 Height Weight   06/28/18 1500 91/65 - 89 18 100 % - -   06/28/18 1445 98/64 - 83 19 100 % - -   06/28/18 1430 102/69 - 91 18 100 % - -   06/28/18 1415 95/69 - 86 18 100 % - - 06/28/18 1400 104/72 - 86 23 100 % - -   06/28/18 1345 104/74 - 77 23 100 % - -   06/28/18 1330 113/73 - 77 18 100 % - -   06/28/18 1315 109/75 - 78 18 100 % - -   06/28/18 1308 - - 82 20 91 % - -   06/28/18 1306 (!) 125/91 - - - - - -   06/28/18 1230 109/69 - 87 18 100 % - -   06/28/18 1215 100/72 - 91 25 100 % - -   06/28/18 1200 91/67 - 99 18 96 % - -   06/28/18 1145 92/66 - 96 24 - - -   06/28/18 1130 94/72 - (!) 104 16 - - -   06/28/18 1029 94/73 97.8 °F (36.6 °C) 92 17 96 % 6' (1.829 m) 63.5 kg (140 lb)     Physical Exam   Constitutional: He appears distressed. HENT:   Head: Normocephalic. Eyes: Pupils are equal, round, and reactive to light. Neck: Normal range of motion. Cardiovascular: Normal rate. Pulmonary/Chest: Effort normal and breath sounds normal. No respiratory distress. He has no wheezes. He has no rales. He exhibits no tenderness. Abdominal: There is tenderness. There is no rebound and no guarding. Musculoskeletal: Normal range of motion. Neurological: He is alert. Skin: Skin is warm and dry. Labs:    Recent Results (from the past 24 hour(s))   GLUCOSE, POC    Collection Time: 06/28/18 10:32 AM   Result Value Ref Range    Glucose (POC) 518 (HH) 70 - 110 mg/dL   CBC WITH AUTOMATED DIFF    Collection Time: 06/28/18 11:40 AM   Result Value Ref Range    WBC 7.8 4.6 - 13.2 K/uL    RBC 3.68 (L) 4.70 - 5.50 M/uL    HGB 11.8 (L) 13.0 - 16.0 g/dL    HCT 35.0 (L) 36.0 - 48.0 %    MCV 95.1 74.0 - 97.0 FL    MCH 32.1 24.0 - 34.0 PG    MCHC 33.7 31.0 - 37.0 g/dL    RDW 13.7 11.6 - 14.5 %    PLATELET 023 734 - 436 K/uL    MPV 10.8 9.2 - 11.8 FL    NEUTROPHILS 76 (H) 40 - 73 %    LYMPHOCYTES 16 (L) 21 - 52 %    MONOCYTES 7 3 - 10 %    EOSINOPHILS 1 0 - 5 %    BASOPHILS 0 0 - 2 %    ABS. NEUTROPHILS 5.9 1.8 - 8.0 K/UL    ABS. LYMPHOCYTES 1.3 0.9 - 3.6 K/UL    ABS. MONOCYTES 0.6 0.05 - 1.2 K/UL    ABS. EOSINOPHILS 0.1 0.0 - 0.4 K/UL    ABS.  BASOPHILS 0.0 0.0 - 0.06 K/UL    DF AUTOMATED METABOLIC PANEL, COMPREHENSIVE    Collection Time: 06/28/18 11:40 AM   Result Value Ref Range    Sodium 135 (L) 136 - 145 mmol/L    Potassium 3.8 3.5 - 5.5 mmol/L    Chloride 104 100 - 108 mmol/L    CO2 21 21 - 32 mmol/L    Anion gap 10 3.0 - 18 mmol/L    Glucose 473 (HH) 74 - 99 mg/dL    BUN 10 7.0 - 18 MG/DL    Creatinine 1.04 0.6 - 1.3 MG/DL    BUN/Creatinine ratio 10 (L) 12 - 20      GFR est AA >60 >60 ml/min/1.73m2    GFR est non-AA >60 >60 ml/min/1.73m2    Calcium 8.2 (L) 8.5 - 10.1 MG/DL    Bilirubin, total 0.5 0.2 - 1.0 MG/DL    ALT (SGPT) 22 16 - 61 U/L    AST (SGOT) 14 (L) 15 - 37 U/L    Alk.  phosphatase 170 (H) 45 - 117 U/L    Protein, total 6.6 6.4 - 8.2 g/dL    Albumin 2.9 (L) 3.4 - 5.0 g/dL    Globulin 3.7 2.0 - 4.0 g/dL    A-G Ratio 0.8 0.8 - 1.7     LIPASE    Collection Time: 06/28/18 11:40 AM   Result Value Ref Range    Lipase 40 (L) 73 - 393 U/L   BILIRUBIN, DIRECT    Collection Time: 06/28/18 11:40 AM   Result Value Ref Range    Bilirubin, direct 0.1 0.0 - 0.2 MG/DL   POC LACTIC ACID    Collection Time: 06/28/18 11:48 AM   Result Value Ref Range    Lactic Acid (POC) 1.0 0.4 - 2.0 mmol/L   GLUCOSE, POC    Collection Time: 06/28/18  1:43 PM   Result Value Ref Range    Glucose (POC) 369 (H) 70 - 110 mg/dL   GLUCOSTABILIZER    Collection Time: 06/28/18  1:47 PM   Result Value Ref Range    Glucose 369 mg/dL    Insulin order 6.2 units/hour    Insulin adminstered 6.2 units/hour    Multiplier 0.020     Low target 140 mg/dL    High target 180 mg/dL    D50 order 0.0 ml    D50 administered 0.00 ml    Minutes until next BG 60 min    Order initials PLO     Administered initials PLO    GLUCOSE, POC    Collection Time: 06/28/18  2:49 PM   Result Value Ref Range    Glucose (POC) 348 (H) 70 - 110 mg/dL   GLUCOSTABILIZER    Collection Time: 06/28/18  2:50 PM   Result Value Ref Range    Glucose 347 mg/dL    Insulin order 8.6 units/hour    Insulin adminstered 8.6 units/hour    Multiplier 0.030     Low target 140 mg/dL    High target 180 mg/dL    D50 order 0.0 ml    D50 administered 0.00 ml    Minutes until next BG 60 min    Order initials PLO     Administered initials PLO    GLUCOSE, POC    Collection Time: 06/28/18  4:01 PM   Result Value Ref Range    Glucose (POC) 143 (H) 70 - 110 mg/dL           Assessment:  Principal Problem:    Pancreatic fistula (6/28/2018)    Active Problems:    Chronic pancreatitis ([facility] ()      Overview: Continue PPI IV      IDDM (insulin dependent diabetes mellitus) ([facility] (8/31/2013)      Abdominal pain (11/16/2015)      Biliary drain displacement (3/23/2016)      H/O ETOH abuse (3/23/2016)      Chronic pain (3/23/2016)      Acute alcoholic hepatitis (6/32/7938)      Chronic pancreatitis due to acute alcohol intoxication ([facility] (4/15/2018)        Plan:    Hyperglycemia without DKA  -BS improved was placed on insulin gtt by ED  -not entirely convince HHNK either  -Anion gap ok  -insulin gtt  -serum c02 ok  -obtain beta hydroxy  -renal function ok  -serial BMP  -IVF  -mulitple admissions for DKA and noncomliance     Chronic abd pain from chronic pancreatitis 2/2 alcohol  -would avoid giving Narcotics   -IVF  -NPO except meds  -antiemtics  -lipase ok  -CT abd shows increase in abscess size  -surgery consult     Hx of Duodenal cutaneous fistula  -hx of s/p indwelling draiange cath  -ct abd notes no change or obstruction  -wound care     ETOH  -CIWA  -folate, b12, thiamine     Hypocalcemia  -will get Ionized CA     DVT prophylaxis  -scd/teds  -heparin sq    Signed:  Leonor Park NP 6/28/2018

## 2018-06-28 NOTE — IP AVS SNAPSHOT
[address] [address] 
[phone] Patient: Viji Kothari 
MRN: [id number] :[id number] About your hospitalization You were admitted on:  2018 You last received care in the:  [address] [facility] MED You were discharged on:  2018 Why you were hospitalized Your primary diagnosis was:  Pancreatic Fistula Your diagnoses also included:  Abdominal Pain, Acute Alcoholic Hepatitis, Biliary Drain Displacement, Chronic Pain, Chronic Pancreatitis (Hcc), Chronic Pancreatitis Due To Acute Alcohol Intoxication (Hcc), H/O Etoh Abuse, Iddm (Insulin Dependent Diabetes Mellitus) (Hcc) Follow-up Information Follow up With Details Comments Contact Info Janett Joy MD On 2018 8am [address] [address] 
[phone] Your Scheduled Appointments   8:00 AM EDT New Patient with Janett Joy MD  
[facility] [address] [id number]  
[phone] 2018 10:00 AM EDT  
IR CHNG ABSCESS/CYST CATH with [facility] ANGIO/SPECIALS, [facility] IR VAS SURGEON, [facility] NO ANESTHESIA, [facility] C[facility]  
[facility] RAD ANGIO ([facility]) [address] [address] [phone] DIET RESTRICTIONS - Do not eat for 6 hours prior to procedure. - You may take meds with sips of water. For meds to be taken with food, take the night before your procedure. GENERAL INSTRUCTIONS - If you are on a blood thinner, you must contact your doctor to determine if you may discontinue your blood thinner prior to your exam.  Failure to contact your doctor may result in a reschedule of your appointment. - You must arrange for a  to take you home after your procedure.  - You will need someone to stay with you at home during your recovery period. - Leave all valuables at home. - Bring an updated list of your medications including over the Counter and herbal therapies. - If you  have a hand-written prescription for this exam, you must bring it with you to your appointment. - You may be responsible for any co-payments and deductibles as indicated by your insurance carrier. - Only patients will be allowed in the exam room, including children. - Children under the age of 15 may not be left unattended. - Bring all relevant prior images from facilities outside of [facility]. Failure to provide images may delay reading by Radiologist. - [facility] patients must have an armband and be accompanied by a caregiver or family member. APPOINTMENT CANCELLATION - To reschedule or cancel an appointment, please contact the Scheduling department at [phone]. Check into Main Entrance Outpatient Registration 90 minutes prior to your appointment time. Discharge Orders None A check vidhi indicates which time of day the medication should be taken. My Medications CHANGE how you take these medications Instructions Each Dose to Equal  
 Morning Noon Evening Bedtime  
 oxyCODONE-acetaminophen  mg per tablet Commonly known as:  PERCOCET What changed:  reasons to take this Your last dose was: Your next dose is: Take 1 Tab by mouth every six (6) hours as needed. Max Daily Amount: 4 Tabs. Indications: Pain 1 Tab  
    
   
   
   
  
 promethazine 25 mg tablet Commonly known as:  PHENERGAN What changed:  Another medication with the same name was removed. Continue taking this medication, and follow the directions you see here. Your last dose was: Your next dose is: Take 1 Tab by mouth every six (6) hours as needed.   
 25 mg  
    
   
   
   
  
  
 CONTINUE taking these medications Instructions Each Dose to Equal  
 Morning Noon Evening Bedtime  
 amitriptyline 75 mg tablet Commonly known as:  ELAVIL Your last dose was: Your next dose is: Take 1 Tab by mouth nightly. Indications: Depression 75 mg  
    
   
   
   
  
 ascorbic acid (vitamin C) 500 mg tablet Commonly known as:  VITAMIN C Your last dose was: Your next dose is: Take 1 Tab by mouth daily. Indications: VITAMIN C DEFICIENCY  
 500 mg  
    
   
   
   
  
 cholecalciferol (vitamin D3) 2,000 unit Tab Your last dose was: Your next dose is: Take 1 Tab by mouth daily. Indications: PREVENTION OF VITAMIN D DEFICIENCY  
 2000 Units CREON 12,000-38,000 -60,000 unit capsule Generic drug:  lipase-protease-amylase Your last dose was: Your next dose is: Take 2 Caps by mouth three (3) times daily (with meals). Indications: exocrine pancreatic insufficiency, dosage change 2 Cap  
    
   
   
   
  
 cyanocobalamin 1,000 mcg sublingual tablet Commonly known as:  VITAMIN B-12 Your last dose was: Your next dose is: Take 2 Tabs by mouth daily. Indications: PREVENTION OF VITAMIN B12 DEFICIENCY  
 2000 mcg  
    
   
   
   
  
 dicyclomine 10 mg capsule Commonly known as:  BENTYL Your last dose was: Your next dose is: Take 1 Cap by mouth three (3) times daily for 15 doses. 10 mg  
    
   
   
   
  
 ferrous sulfate 325 mg (65 mg iron) tablet Your last dose was: Your next dose is: Take 1 Tab by mouth two (2) times a day. Indications: IRON DEFICIENCY ANEMIA  
 325 mg  
    
   
   
   
  
 folic acid 574 mcg tablet Your last dose was: Your next dose is: Take 1 Tab by mouth daily. 0.4 mg  
    
   
   
   
  
 gabapentin 300 mg capsule Commonly known as:  NEURONTIN Your last dose was: Your next dose is: Take 2 Caps by mouth three (3) times daily for 30 days. Indications: NEUROPATHIC PAIN  
 600 mg  
    
   
   
   
  
 insulin lispro 100 unit/mL injection Commonly known as:  HUMALOG Your last dose was: Your next dose is:    
   
   
 12 Units by SubCUTAneous route nightly. Indications: type 2 diabetes mellitus 12 Units  
    
   
   
   
  
 ondansetron 4 mg disintegrating tablet Commonly known as:  ZOFRAN ODT Your last dose was: Your next dose is: Take 1 Tab by mouth every eight (8) hours as needed for Nausea. 4 mg  
    
   
   
   
  
 pantoprazole 40 mg tablet Commonly known as:  PROTONIX Your last dose was: Your next dose is: Take 1 Tab by mouth two (2) times a day. Indications: EROSIVE ESOPHAGITIS, gastroesophageal reflux disease 40 mg  
    
   
   
   
  
 sucralfate 1 gram tablet Commonly known as:  Adah Dory Your last dose was: Your next dose is: Take 1 Tab by mouth four (4) times daily. Indications: PREVENTION OF STRESS ULCER  
 1 g  
    
   
   
   
  
  
STOP taking these medications   
 dexamethasone 1 mg tablet Commonly known as:  DECADRON Where to Get Your Medications Information on where to get these meds will be given to you by the nurse or doctor. ! Ask your nurse or doctor about these medications  
  oxyCODONE-acetaminophen  mg per tablet  
 promethazine 25 mg tablet Opioid Education Prescription Opioids: What You Need to Know: 
 
 
 
F-face looks uneven A-arms unable to move or move unevenly S-speech slurred or non-existent T-time-call 911 as soon as signs and symptoms begin-DO NOT go Back to bed or wait to see if you get better-TIME IS BRAIN. Warning Signs of HEART ATTACK Call 911 if you have these symptoms: 
? Chest discomfort. Most heart attacks involve discomfort in the center of the chest that lasts more than a few minutes, or that goes away and comes back. It can feel like uncomfortable pressure, squeezing, fullness, or pain. ? Discomfort in other areas of the upper body. Symptoms can include pain or discomfort in one or both arms, the back, neck, jaw, or stomach. ? Shortness of breath with or without chest discomfort. ? Other signs may include breaking out in a cold sweat, nausea, or lightheadedness. Don't wait more than five minutes to call [address]! Fast action can save your life. Calling 911 is almost always the fastest way to get lifesaving treatment. Emergency Medical Services staff can begin treatment when they arrive  up to an hour sooner than if someone gets to the hospital by car. Patient armband removed and shredded The discharge information has been reviewed with the patient. The patient verbalized understanding. Discharge medications reviewed with the patient and appropriate educational materials and side effects teaching were provided. ___________________________________________________________________________________________________________________________________ MyChart Announcement We are excited to announce that we are making your provider's discharge notes available to you in [employer]. You will see these notes when they are completed and signed by the physician that discharged you from your recent hospital stay. If you have any questions or concerns about any information you see in [employer], please call the Health Information Department where you were seen or reach out to your Primary Care Provider for more information about your plan of care. Introducing [facility]! [employer] introduces [employer] patient portal. Now you can access parts of your medical record, email your doctor's office, and request medication refills online. 1. In your internet browser, go to [email or web address] 2. Click on the First Time User? Click Here link in the Sign In box. You will see the New Member Sign Up page. 3. Enter your [employer] Access Code exactly as it appears below. You will not need to use this code after youve completed the sign-up process. If you do not sign up before the expiration date, you must request a new code. · [employer] Access Code: [email or web address] Expires: 7/14/2018  5:19 PM 
 
4. Enter the last four digits of your Social Security Number (xxxx) and Date of Birth (mm/dd/yyyy) as indicated and click Submit. You will be taken to the next sign-up page. 5. Create a [employer] ID. This will be your [employer] login ID and cannot be changed, so think of one that is secure and easy to remember. 6. Create a [employer] password. You can change your password at any time. 7. Enter your Password Reset Question and Answer. This can be used at a later time if you forget your password. 8. Enter your e-mail address. You will receive e-mail notification when new information is available in [address] 9. Click Sign Up. You can now view and download portions of your medical record.  
10. Click the Download Summary menu link to download a portable copy of your medical information. If you have questions, please visit the Frequently Asked Questions section of the [employer] website. Remember, [employer] is NOT to be used for urgent needs. For medical emergencies, dial 911. Now available from your iPhone and Android! Introducing Michael Cherry As a [employer] patient, I wanted to make you aware of our electronic visit tool called Michael Cherry. [employer] 24/7 allows you to connect within minutes with a medical provider 24 hours a day, seven days a week via a mobile device or tablet or logging into a secure website from your computer. You can access Michael Cherry from anywhere in the [place]. A virtual visit might be right for you when you have a simple condition and feel like you just dont want to get out of bed, or cant get away from work for an appointment, when your regular [employer] provider is not available (evenings, weekends or holidays), or when youre out of town and need minor care. Electronic visits cost only $49 and if the [employer] 24/7 provider determines a prescription is needed to treat your condition, one can be electronically transmitted to a nearby pharmacy*. Please take a moment to enroll today if you have not already done so. The enrollment process is free and takes just a few minutes. To enroll, please download the [employer] 24/7 akira to your tablet or phone, or visit [email or web address] to enroll on your computer. And, as an [address] [place] [address] patient with a sourceasy account, the results of your visits will be scanned into your electronic medical record and your primary care provider will be able to view the scanned results. We urge you to continue to see your regular [employer] provider for your ongoing medical care.   And while your primary care provider may not be the one available when you seek a Michael Cherry virtual visit, the peace of mind you get from getting a real diagnosis real time can be priceless. For more information on Michael Jonajessica, view our Frequently Asked Questions (FAQs) at [email or web address]. Sincerely, 
 
Nimo Dao MD 
Chief Medical Officer [address] *:  certain medications cannot be prescribed via Michael Cherry Providers Seen During Your Hospitalization Provider Specialty Primary office phone Yossi Jimenez MD Emergency Medicine [phone] Yana King MD Internal Medicine [phone] Your Primary Care Physician (PCP) Primary Care Physician Office Phone Office Fax [address] [address] [phone] You are allergic to the following Allergen Reactions Ampicillin-Sulbactam Rash Pcn (Penicillins) Itching Hives Piperacillin-Tazobactam Rash Pollen Extracts Rash Seafood (Shellfish Containing Products) Hives Other reaction(s): unknown Has tolerated iohexol (iodine-containing contrast) Only shrimp Shrimp Recent Documentation Height Weight BMI Smoking Status 1.829 m 63.5 kg 18.99 kg/m2 Current Every Day Smoker Emergency Contacts Name Discharge Info Relation Home Work Mobile Teena Ya N/A  AT THIS TIME [6] Mother [14] [phone] Patient Belongings The following personal items are in your possession at time of discharge: 
  Dental Appliances: None  Visual Aid: None      Home Medications: None   Jewelry: None  Clothing: Pants, Footwear, Shirt    Other Valuables: None Discharge Instructions Attachments/References ABDOMINAL PAIN (ENGLISH) PANCREATITIS (ENGLISH) Patient Handouts Abdominal Pain: Care Instructions Your Care Instructions Abdominal pain has many possible causes. Some aren't serious and get better on their own in a few days. Others need more testing and treatment. If your pain continues or gets worse, you need to be rechecked and may need more tests to find out what is wrong. You may need surgery to correct the problem. Don't ignore new symptoms, such as fever, nausea and vomiting, urination problems, pain that gets worse, and dizziness. These may be signs of a more serious problem. Your doctor may have recommended a follow-up visit in the next 8 to 12 hours. If you are not getting better, you may need more tests or treatment. The doctor has checked you carefully, but problems can develop later. If you notice any problems or new symptoms, get medical treatment right away. Follow-up care is a key part of your treatment and safety. Be sure to make and go to all appointments, and call your doctor if you are having problems. It's also a good idea to know your test results and keep a list of the medicines you take. How can you care for yourself at home? · Rest until you feel better. · To prevent dehydration, drink plenty of fluids, enough so that your urine is light yellow or clear like water. Choose water and other caffeine-free clear liquids until you feel better. If you have kidney, heart, or liver disease and have to limit fluids, talk with your doctor before you increase the amount of fluids you drink. · If your stomach is upset, eat mild foods, such as rice, dry toast or crackers, bananas, and applesauce. Try eating several small meals instead of two or three large ones. · Wait until 48 hours after all symptoms have gone away before you have spicy foods, alcohol, and drinks that contain caffeine. · Do not eat foods that are high in fat. · Avoid anti-inflammatory medicines such as aspirin, ibuprofen (Advil, Motrin), and naproxen (Aleve). These can cause stomach upset. Talk to your doctor if you take daily aspirin for another health problem. When should you call for help? Call 911 anytime you think you may need emergency care. For example, call if: ? · You passed out (lost consciousness). ? · You pass maroon or very bloody stools. ? · You vomit blood or what looks like coffee grounds. ? · You have new, severe belly pain. ?Call your doctor now or seek immediate medical care if: 
? · Your pain gets worse, especially if it becomes focused in one area of your belly. ? · You have a new or higher fever. ? · Your stools are black and look like tar, or they have streaks of blood. ? · You have unexpected vaginal bleeding. ? · You have symptoms of a urinary tract infection. These may include: 
¨ Pain when you urinate. ¨ Urinating more often than usual. 
¨ Blood in your urine. ? · You are dizzy or lightheaded, or you feel like you may faint. ? Watch closely for changes in your health, and be sure to contact your doctor if: 
? · You are not getting better after 1 day (24 hours). Where can you learn more? Go to http://thiago-mikhail.info/. Enter S544 in the search box to learn more about \"Abdominal Pain: Care Instructions. \" Current as of: March 20, 2017 Content Version: 11.4 © 0178-8975 Client Outlook. Care instructions adapted under license by [employer] (which disclaims liability or warranty for this information). If you have questions about a medical condition or this instruction, always ask your healthcare professional. [address] any warranty or liability for your use of this information. Pancreatitis: Care Instructions Your Care Instructions The pancreas is an organ behind the stomach. It makes hormones and enzymes to help your body digest food. But if these enzymes attack the pancreas, it can get inflamed. This is called pancreatitis. Most cases are caused by gallstones or by heavy alcohol use. If you take care of yourself at home, it will help you get better. It will also help you avoid more problems with your pancreas. Follow-up care is a key part of your treatment and safety. Be sure to make and go to all appointments, and call your doctor if you are having problems. It's also a good idea to know your test results and keep a list of the medicines you take. How can you care for yourself at home? · Drink clear liquids and eat bland foods until you feel better. [place] foods include rice, dry toast, and crackers. They also include bananas and applesauce. · Eat a low-fat diet until your doctor says your pancreas is healed. · Do not drink alcohol. Tell your doctor if you need help to quit. Counseling, support groups, and sometimes medicines can help you stay sober. · Be safe with medicines. Read and follow all instructions on the label. ¨ If the doctor gave you a prescription medicine for pain, take it as prescribed. ¨ If you are not taking a prescription pain medicine, ask your doctor if you can take an over-the-counter medicine. · If your doctor prescribed antibiotics, take them as directed. Do not stop taking them just because you feel better. You need to take the full course of antibiotics. · Get extra rest until you feel better. To prevent future problems with your pancreas · Do not drink alcohol. · Tell your doctors and pharmacist that you've had pancreatitis. They can help you avoid medicines that may cause this problem again. When should you call for help? Call 911 anytime you think you may need emergency care. For example, call if: 
? · You vomit blood or what looks like coffee grounds. ? · Your stools are maroon or very bloody. ?Call your doctor now or seek immediate medical care if: 
? · You have new or worse belly pain. ? · Your stools are black and look like tar, or they have streaks of blood. ? · You are vomiting. ? Watch closely for changes in your health, and be sure to contact your doctor if: 
? · You do not get better as expected. Where can you learn more? Go to http://thiago-mikhail.info/. Enter Z911 in the search box to learn more about \"Pancreatitis: Care Instructions. \" Current as of: May 12, 2017 Content Version: 11.4 © 2006-2017 Healthwise[employer] Incorporated. Care instructions adapted under license by [employer] (which disclaims liability or warranty for this information). If you have questions about a medical condition or this instruction, always ask your healthcare professional. [address] any warranty or liability for your use of this information. Please provide this summary of care documentation to your next provider. Signatures-by signing, you are acknowledging that this After Visit Summary has been reviewed with you and you have received a copy. Patient Signature:  ____________________________________________________________ Date:  ____________________________________________________________  
  
Geena Sleight Provider Signature:  ____________________________________________________________ Date:  ____________________________________________________________

## 2018-06-28 NOTE — IP AVS SNAPSHOT
[address] [address] 
[phone] Patient: George Guerrero 
MRN: [id number] :1965 A check vidhi indicates which time of day the medication should be taken. My Medications CHANGE how you take these medications Instructions Each Dose to Equal  
 Morning Noon Evening Bedtime  
 oxyCODONE-acetaminophen  mg per tablet Commonly known as:  PERCOCET What changed:  reasons to take this Your last dose was: Your next dose is: Take 1 Tab by mouth every six (6) hours as needed. Max Daily Amount: 4 Tabs. Indications: Pain 1 Tab  
    
   
   
   
  
 promethazine 25 mg tablet Commonly known as:  PHENERGAN What changed:  Another medication with the same name was removed. Continue taking this medication, and follow the directions you see here. Your last dose was: Your next dose is: Take 1 Tab by mouth every six (6) hours as needed. 25 mg CONTINUE taking these medications Instructions Each Dose to Equal  
 Morning Noon Evening Bedtime  
 amitriptyline 75 mg tablet Commonly known as:  ELAVIL Your last dose was: Your next dose is: Take 1 Tab by mouth nightly. Indications: Depression 75 mg  
    
   
   
   
  
 ascorbic acid (vitamin C) 500 mg tablet Commonly known as:  VITAMIN C Your last dose was: Your next dose is: Take 1 Tab by mouth daily. Indications: VITAMIN C DEFICIENCY  
 500 mg  
    
   
   
   
  
 cholecalciferol (vitamin D3) 2,000 unit Tab Your last dose was: Your next dose is: Take 1 Tab by mouth daily. Indications: PREVENTION OF VITAMIN D DEFICIENCY  
 2000 Units CREON 12,000-38,000 -60,000 unit capsule Generic drug:  lipase-protease-amylase Your last dose was: Your next dose is: Take 2 Caps by mouth three (3) times daily (with meals). Indications: exocrine pancreatic insufficiency, dosage change 2 Cap  
    
   
   
   
  
 cyanocobalamin 1,000 mcg sublingual tablet Commonly known as:  VITAMIN B-12 Your last dose was: Your next dose is: Take 2 Tabs by mouth daily. Indications: PREVENTION OF VITAMIN B12 DEFICIENCY  
 2000 mcg  
    
   
   
   
  
 dicyclomine 10 mg capsule Commonly known as:  BENTYL Your last dose was: Your next dose is: Take 1 Cap by mouth three (3) times daily for 15 doses. 10 mg  
    
   
   
   
  
 ferrous sulfate 325 mg (65 mg iron) tablet Your last dose was: Your next dose is: Take 1 Tab by mouth two (2) times a day. Indications: IRON DEFICIENCY ANEMIA  
 325 mg  
    
   
   
   
  
 folic acid 936 mcg tablet Your last dose was: Your next dose is: Take 1 Tab by mouth daily. 0.4 mg  
    
   
   
   
  
 gabapentin 300 mg capsule Commonly known as:  NEURONTIN Your last dose was: Your next dose is: Take 2 Caps by mouth three (3) times daily for 30 days. Indications: NEUROPATHIC PAIN  
 600 mg  
    
   
   
   
  
 insulin lispro 100 unit/mL injection Commonly known as:  HUMALOG Your last dose was: Your next dose is:    
   
   
 12 Units by SubCUTAneous route nightly. Indications: type 2 diabetes mellitus 12 Units  
    
   
   
   
  
 ondansetron 4 mg disintegrating tablet Commonly known as:  ZOFRAN ODT Your last dose was: Your next dose is: Take 1 Tab by mouth every eight (8) hours as needed for Nausea. 4 mg  
    
   
   
   
  
 pantoprazole 40 mg tablet Commonly known as:  PROTONIX Your last dose was: Your next dose is: Take 1 Tab by mouth two (2) times a day. Indications: EROSIVE ESOPHAGITIS, gastroesophageal reflux disease 40 mg  
    
   
   
   
  
 sucralfate 1 gram tablet Commonly known as:  Tao Ambriz Your last dose was: Your next dose is: Take 1 Tab by mouth four (4) times daily. Indications: PREVENTION OF STRESS ULCER  
 1 g  
    
   
   
   
  
  
STOP taking these medications   
 dexamethasone 1 mg tablet Commonly known as:  DECADRON Where to Get Your Medications Information on where to get these meds will be given to you by the nurse or doctor. ! Ask your nurse or doctor about these medications  
  oxyCODONE-acetaminophen  mg per tablet  
 promethazine 25 mg tablet

## 2018-06-28 NOTE — ED NOTES
Pt pressing call bell stating drain is \"burning the hell out of my skin. \" RN explained to pt a surgeon has to replace the drain and it is not in a RNs scope of practice to do that. Jayson, [facility] tech at bedside changing linens pt is sitting on.

## 2018-06-29 ENCOUNTER — HOME HEALTH ADMISSION (OUTPATIENT)
Dept: HOME HEALTH SERVICES | Facility: HOME HEALTH | Age: 53
End: 2018-06-29

## 2018-06-29 ENCOUNTER — APPOINTMENT (OUTPATIENT)
Dept: CARDIAC CATH/INVASIVE PROCEDURES | Age: 53
End: 2018-06-29
Attending: NURSE PRACTITIONER
Payer: MEDICAID

## 2018-06-29 VITALS
SYSTOLIC BLOOD PRESSURE: 114 MMHG | WEIGHT: 140 LBS | BODY MASS INDEX: 18.96 KG/M2 | RESPIRATION RATE: 17 BRPM | TEMPERATURE: 97.7 F | HEART RATE: 92 BPM | OXYGEN SATURATION: 98 % | HEIGHT: 72 IN | DIASTOLIC BLOOD PRESSURE: 80 MMHG

## 2018-06-29 LAB
ANION GAP SERPL CALC-SCNC: 6 MMOL/L (ref 3–18)
BUN SERPL-MCNC: 10 MG/DL (ref 7–18)
BUN/CREAT SERPL: 11 (ref 12–20)
CALCIUM SERPL-MCNC: 7.9 MG/DL (ref 8.5–10.1)
CHLORIDE SERPL-SCNC: 108 MMOL/L (ref 100–108)
CO2 SERPL-SCNC: 25 MMOL/L (ref 21–32)
CREAT SERPL-MCNC: 0.88 MG/DL (ref 0.6–1.3)
ERYTHROCYTE [DISTWIDTH] IN BLOOD BY AUTOMATED COUNT: 13.9 % (ref 11.6–14.5)
GLUCOSE BLD STRIP.AUTO-MCNC: 148 MG/DL (ref 70–110)
GLUCOSE BLD STRIP.AUTO-MCNC: 180 MG/DL (ref 70–110)
GLUCOSE BLD STRIP.AUTO-MCNC: 240 MG/DL (ref 70–110)
GLUCOSE SERPL-MCNC: 64 MG/DL (ref 74–99)
HCT VFR BLD AUTO: 44.4 % (ref 36–48)
HGB BLD-MCNC: 15 G/DL (ref 13–16)
MAGNESIUM SERPL-MCNC: 1.7 MG/DL (ref 1.6–2.6)
MCH RBC QN AUTO: 32.3 PG (ref 24–34)
MCHC RBC AUTO-ENTMCNC: 33.8 G/DL (ref 31–37)
MCV RBC AUTO: 95.7 FL (ref 74–97)
PLATELET # BLD AUTO: 103 K/UL (ref 135–420)
PMV BLD AUTO: 9.9 FL (ref 9.2–11.8)
POTASSIUM SERPL-SCNC: 3.5 MMOL/L (ref 3.5–5.5)
RBC # BLD AUTO: 4.64 M/UL (ref 4.7–5.5)
SODIUM SERPL-SCNC: 139 MMOL/L (ref 136–145)
WBC # BLD AUTO: 3.3 K/UL (ref 4.6–13.2)

## 2018-06-29 PROCEDURE — 74011250636 HC RX REV CODE- 250/636: Performed by: HOSPITALIST

## 2018-06-29 PROCEDURE — 99152 MOD SED SAME PHYS/QHP 5/>YRS: CPT

## 2018-06-29 PROCEDURE — 74011250636 HC RX REV CODE- 250/636

## 2018-06-29 PROCEDURE — 74011636637 HC RX REV CODE- 636/637: Performed by: HOSPITALIST

## 2018-06-29 PROCEDURE — C1769 GUIDE WIRE: HCPCS

## 2018-06-29 PROCEDURE — 49405 IMAGE CATH FLUID COLXN VISC: CPT

## 2018-06-29 PROCEDURE — 80048 BASIC METABOLIC PNL TOTAL CA: CPT | Performed by: HOSPITALIST

## 2018-06-29 PROCEDURE — 82962 GLUCOSE BLOOD TEST: CPT

## 2018-06-29 PROCEDURE — 74011250637 HC RX REV CODE- 250/637: Performed by: HOSPITALIST

## 2018-06-29 PROCEDURE — 83735 ASSAY OF MAGNESIUM: CPT | Performed by: HOSPITALIST

## 2018-06-29 PROCEDURE — 74011250636 HC RX REV CODE- 250/636: Performed by: NURSE PRACTITIONER

## 2018-06-29 PROCEDURE — 74011000258 HC RX REV CODE- 258: Performed by: HOSPITALIST

## 2018-06-29 PROCEDURE — 99218 HC RM OBSERVATION: CPT

## 2018-06-29 PROCEDURE — C1729 CATH, DRAINAGE: HCPCS

## 2018-06-29 PROCEDURE — 85027 COMPLETE CBC AUTOMATED: CPT | Performed by: HOSPITALIST

## 2018-06-29 PROCEDURE — 74011000250 HC RX REV CODE- 250: Performed by: RADIOLOGY

## 2018-06-29 RX ORDER — FENTANYL CITRATE 50 UG/ML
25-100 INJECTION, SOLUTION INTRAMUSCULAR; INTRAVENOUS
Status: DISCONTINUED | OUTPATIENT
Start: 2018-06-29 | End: 2018-06-29 | Stop reason: HOSPADM

## 2018-06-29 RX ORDER — SODIUM CHLORIDE 9 MG/ML
INJECTION, SOLUTION INTRAVENOUS
Status: DISCONTINUED
Start: 2018-06-29 | End: 2018-06-29 | Stop reason: HOSPADM

## 2018-06-29 RX ORDER — LIDOCAINE HYDROCHLORIDE 10 MG/ML
1-60 INJECTION INFILTRATION; PERINEURAL
Status: DISCONTINUED | OUTPATIENT
Start: 2018-06-29 | End: 2018-06-29 | Stop reason: HOSPADM

## 2018-06-29 RX ORDER — LIDOCAINE HYDROCHLORIDE 10 MG/ML
1-60 INJECTION INFILTRATION; PERINEURAL
Status: DISCONTINUED | OUTPATIENT
Start: 2018-06-29 | End: 2018-06-29

## 2018-06-29 RX ORDER — HEPARIN 100 UNIT/ML
500 SYRINGE INTRAVENOUS ONCE
Status: COMPLETED | OUTPATIENT
Start: 2018-06-29 | End: 2018-06-29

## 2018-06-29 RX ORDER — LIDOCAINE HCL/PF 100 MG/5ML
100 SYRINGE (ML) INTRAVENOUS ONCE
Status: COMPLETED | OUTPATIENT
Start: 2018-06-29 | End: 2018-06-29

## 2018-06-29 RX ORDER — HEPARIN SODIUM 200 [USP'U]/100ML
1000 INJECTION, SOLUTION INTRAVENOUS ONCE
Status: DISCONTINUED | OUTPATIENT
Start: 2018-06-29 | End: 2018-06-29 | Stop reason: HOSPADM

## 2018-06-29 RX ORDER — OXYCODONE AND ACETAMINOPHEN 10; 325 MG/1; MG/1
1 TABLET ORAL
Qty: 12 TAB | Refills: 0 | Status: SHIPPED | OUTPATIENT
Start: 2018-06-29 | End: 2018-08-04

## 2018-06-29 RX ORDER — MAGNESIUM SULFATE HEPTAHYDRATE 40 MG/ML
2 INJECTION, SOLUTION INTRAVENOUS ONCE
Status: COMPLETED | OUTPATIENT
Start: 2018-06-29 | End: 2018-06-29

## 2018-06-29 RX ORDER — PROMETHAZINE HYDROCHLORIDE 25 MG/1
25 TABLET ORAL
Qty: 6 TAB | Refills: 0 | Status: ON HOLD | OUTPATIENT
Start: 2018-06-29 | End: 2018-09-21

## 2018-06-29 RX ADMIN — PANTOPRAZOLE SODIUM 40 MG: 40 TABLET, DELAYED RELEASE ORAL at 17:27

## 2018-06-29 RX ADMIN — OXYCODONE HYDROCHLORIDE AND ACETAMINOPHEN 1 TABLET: 10; 325 TABLET ORAL at 17:36

## 2018-06-29 RX ADMIN — INSULIN LISPRO 3 UNITS: 100 INJECTION, SOLUTION INTRAVENOUS; SUBCUTANEOUS at 12:37

## 2018-06-29 RX ADMIN — DICYCLOMINE HYDROCHLORIDE 10 MG: 10 CAPSULE ORAL at 09:54

## 2018-06-29 RX ADMIN — MORPHINE SULFATE 5 MG: 4 INJECTION INTRAVENOUS at 07:34

## 2018-06-29 RX ADMIN — INSULIN LISPRO 6 UNITS: 100 INJECTION, SOLUTION INTRAVENOUS; SUBCUTANEOUS at 17:27

## 2018-06-29 RX ADMIN — PANCRELIPASE 2 CAPSULE: 10000; 34000; 55000 CAPSULE, DELAYED RELEASE ORAL at 16:08

## 2018-06-29 RX ADMIN — Medication 500 UNITS: at 15:19

## 2018-06-29 RX ADMIN — FERROUS SULFATE TAB 325 MG (65 MG ELEMENTAL FE) 325 MG: 325 (65 FE) TAB at 17:27

## 2018-06-29 RX ADMIN — GABAPENTIN 600 MG: 300 CAPSULE ORAL at 09:54

## 2018-06-29 RX ADMIN — LIDOCAINE HYDROCHLORIDE 10 ML: 20 INJECTION, SOLUTION INTRAVENOUS at 11:08

## 2018-06-29 RX ADMIN — PANTOPRAZOLE SODIUM 40 MG: 40 TABLET, DELAYED RELEASE ORAL at 09:55

## 2018-06-29 RX ADMIN — MORPHINE SULFATE 5 MG: 4 INJECTION INTRAVENOUS at 11:45

## 2018-06-29 RX ADMIN — FOLIC ACID TAB 400 MCG 0.4 MG: 400 TAB at 09:55

## 2018-06-29 RX ADMIN — FERROUS SULFATE TAB 325 MG (65 MG ELEMENTAL FE) 325 MG: 325 (65 FE) TAB at 09:54

## 2018-06-29 RX ADMIN — MAGNESIUM SULFATE HEPTAHYDRATE 2 G: 40 INJECTION, SOLUTION INTRAVENOUS at 09:53

## 2018-06-29 RX ADMIN — PANCRELIPASE 2 CAPSULE: 10000; 34000; 55000 CAPSULE, DELAYED RELEASE ORAL at 09:54

## 2018-06-29 RX ADMIN — SUCRALFATE 1 G: 1 TABLET ORAL at 12:35

## 2018-06-29 RX ADMIN — DICYCLOMINE HYDROCHLORIDE 10 MG: 10 CAPSULE ORAL at 16:08

## 2018-06-29 RX ADMIN — PROMETHAZINE HYDROCHLORIDE 6.25 MG: 25 INJECTION INTRAMUSCULAR; INTRAVENOUS at 07:34

## 2018-06-29 RX ADMIN — MORPHINE SULFATE 5 MG: 4 INJECTION INTRAVENOUS at 03:18

## 2018-06-29 RX ADMIN — FENTANYL CITRATE 50 MCG: 50 INJECTION, SOLUTION INTRAMUSCULAR; INTRAVENOUS at 10:59

## 2018-06-29 RX ADMIN — PROMETHAZINE HYDROCHLORIDE 6.25 MG: 25 INJECTION INTRAMUSCULAR; INTRAVENOUS at 11:48

## 2018-06-29 RX ADMIN — SUCRALFATE 1 G: 1 TABLET ORAL at 17:26

## 2018-06-29 RX ADMIN — PROMETHAZINE HYDROCHLORIDE 6.25 MG: 25 INJECTION INTRAMUSCULAR; INTRAVENOUS at 03:16

## 2018-06-29 RX ADMIN — SUCRALFATE 1 G: 1 TABLET ORAL at 09:55

## 2018-06-29 RX ADMIN — SODIUM CHLORIDE 100 ML/HR: 900 INJECTION, SOLUTION INTRAVENOUS at 03:17

## 2018-06-29 RX ADMIN — GABAPENTIN 600 MG: 300 CAPSULE ORAL at 16:08

## 2018-06-29 NOTE — PROGRESS NOTES
1950: Bedside verbal shift report received from Kirsten Marin, [facility]. Pt is awake in bed, no signs of distress, instructed to press call light if assistance is needed, call light within reach. 2010: pt states that zofran does not work for him and would like some phenergan instead. Paged on call hospitalist.    2020: phenergan 6.5mg IVPB Q4 PRN ordered for this pt by Dr. Robert Flores: pt reported that his biliary drain came out. Paged Dr. Alessio Noguera. No orders received. Dressing in place. 6094: Pt took dressing off his biliary drain site. States that it is burning his skin. Left open to air. 0800: Bedside and Verbal shift change report given to Brian Saenz RN (oncoming nurse) by Jo Marion RN (offgoing nurse). Report included the following information SBAR, Kardex, Intake/Output, MAR and Recent Results.

## 2018-06-29 NOTE — PROGRESS NOTES
Problem: Falls - Risk of  Goal: *Absence of Falls  Document Serena Fall Risk and appropriate interventions in the flowsheet.    Outcome: Progressing Towards Goal  Fall Risk Interventions:

## 2018-06-29 NOTE — PROGRESS NOTES
Pt is agreeable for a [facility] visit and he verified the contact information on the face sheet is correct. Referral sent to intake via [facility] and called to intake rep,  Julia Alvarado. Care Management Interventions  PCP Verified by CM: Yes En Quinn MD)  Palliative Care Criteria Met (RRAT>21 & CHF Dx)?: Yes  Palliative Consult Recommended?: No  Reason Palliative Care Not Recommended?: Palliative Care not utilized by provider  Mode of Transport at Discharge: Other (see comment) ([employer] )  Transition of Care Consult (CM Consult): Home Health [facility])  [address]: Yes  Discharge Durable Medical Equipment: No  Physical Therapy Consult: No  Occupational Therapy Consult: No  Speech Therapy Consult: No  Current Support Network:  Other (resides with mother )  Confirm Follow Up Transport: Other (see comment) ([employer] )  Plan discussed with Pt/Family/Caregiver: Yes  Freedom of Choice Offered: Yes  Discharge Location  Discharge Placement: Home with home health

## 2018-06-29 NOTE — PROGRESS NOTES
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

## 2018-06-29 NOTE — ANCILLARY DISCHARGE INSTRUCTIONS
Call made to [facility] transportation, spoke with BIBI LOPEZ,  will return to the main entrance in 15 - 30 minutes, unit made aware.

## 2018-06-29 NOTE — ROUTINE PROCESS
Bedside and Verbal shift change report given to EVERTON Manuel (oshncoming nurse) by Graeme Crocker RN (offgoing nurse). Report included the folowing information SBAR, Kardex, Intake/Output, MAR and Recent Results.

## 2018-06-29 NOTE — PROGRESS NOTES
TRANSFER - OUT REPORT:    Verbal report given to Polo Mckeon RN on [facility] being transferred to [facility] for routine progression of care       Report consisted of patient's Situation, Background, Assessment and   Recommendations(SBAR). Information from the following report(s) SBAR, Procedure Summary, MAR and Cardiac Rhythm SR was reviewed with the receiving nurse. Opportunity for questions and clarification was provided.       Patient transported with:   [facility]

## 2018-06-29 NOTE — PROGRESS NOTES
Patient received in bed awake. Patient A&Ox4. Abd pain 7/10; see MAR. No further c/o having nausea. Has drainage from left flank where Biliary drain came out per Patient and refusing ABD dsg said \"it causes my skin to burn. \" Has blue pad in place. No distress noted. Frequently use items within reach. Bed locked in low position. Call bell within reach and Patient verbalized understanding of use for assistance and needs. [address] [facility] [address], NP to bedside made aware of am Mg 1.7 and that Phlebotomist to bedside to draw another Mg ordered every 6 hours. Also was asked if Mediport could to used for IVF, Lab draws and med's said \"they're already using it. \"     [phone]- Patient returned to room from Cath lab. Noted Billary drain (16 fr) in place to left flank; dsg CDI. Patient c/o having abd pain; see Pain 1 for assessment and c/o having nausea; see MAR.     1240- Patient has dc home orders and Phlebotomist arrived to bedside to draw Chem 7. Noted ordered every 12 hours and drawn 03:35 this am. Phlebotomist made aware will call to confirm if needed. Prakash Pond, NP was called said \"we don't need it. \"    2941- Patient to be discharged home has Mediport with miller needle. Dr. Oleg Daly was called T.O. received to remove miller needle prior to discharge; instill 500 units of heparin solution, following Miller Needle Removal protocol. (RBV)    1500- Dr. Oleg Daly to [facility] station V.O. received for Diabetic diet. Discharge planner inform this nurse that medicaid cab to arrive w/in a half hour. 1732- Patient c/o leakage around new Biliary drain 16 fr. Noted green drainage to ABD pad at right flank covering drain. Dr. Oleg Daly as called said to call Surgeon. Dr. Mary Tinoco # [phone]; prompt to call  [phone] and Dr. Troy Jeff to return call. 5117- IUPWUT [phone] again, and answering service to have MD return call. Alla Machuca return call made aware of new Biliary drain placed today d/t previous Biliary drain 14 fr had leakage.  New Biliary drain 16 fr currently in place and leakage same as this am. Abd saturated. No dgs place this am d/t Patient stated \"burning. Dr. Frances Kee said will call back. 18-  Dr. Frances Kee return call said that Patient has a gapping hole and nothing else can be done at this time. Patient to be given ABD dsg's; paper tape and gauzes. Call on Monday with Interventional Radiology # [phone] for follow up Biliary drain assessment. 1835- Patient dc'd via w/c accompanied by Lionel Alexandre, Natalia sec to er parking lot where [employer] awaits. Patient and Lionel Alexandre returned to unit, informed no ride available. [address] Medicaid van/ [place]ay [phone]; answering service said awaiting Provider. Danielrudykelly was called made aware of answering service said awaiting Provider. Faisal Parsons said to call [facility] ([address]) # [phone]; awaiting call back. [address] [facility] ([address] [facility]) returned call made aware of dc home ride still needed said will address and have . 1950- Pt discharge home; no distress noted. Dsg CDI to left flank, biliary drain. Accompanied by Natalia Reynolds sec and Scotty Castañeda CNA via  of w/c to front entrance to ride. Pt has discharge instructions; ABD dsg/ Gauze/ paper tape and belongings. Voiced understanding of discharge instructions.

## 2018-06-29 NOTE — DIABETES MGMT
NUTRITIONAL ASSESSMENT GLYCEMIC CONTROL/ PLAN OF CARE     Joseph Ya           48 y.o.           6/28/2018                 1. Chronic abdominal pain    2. Non-intractable vomiting with nausea, unspecified vomiting type    3. Hyperglycemia    4. Pancreatic fistula       INTERVENTIONS/PLAN:   1. Monitor feeding status, labs, weights and glycemic control. 2.  Add Ensure with meals (strawberry or chocolate) when diet is > clear liquids. ASSESSMENT:   Nutritional Status:  Pt is 79% ideal wt; BMI (calculated): 19.0 kg/m2 (low-normal weight classification however pt has very thin appearance). Nutrition diagnosis:  Inadequate oral food and beverage intake due to N/V as evidenced by pt stating he has not been able to eat anything for past 3-4 days. Unintentional weight loss due to inadequate energy intake as evidenced by pt with 10 lb weight loss in past 10 days (7% weight loss - severe) and overall 24 lb weight loss in past 10 months (15% weight loss) . Meets Criteria for Acute Malnutrition   []Moderate Malnutrition, as evidenced by:   [x] Mild muscle wasting, loss of subcutaneous fat   [] Nutritional intake <75% of recommended intake for >1 week   [x] Weight loss of 1-2% in 1 week, 5% in 1 month, 7.5% in 3 months, or 10% in 6 months   [] Mild edema       Diabetes Management:   Pt states he has been taking his insulin based on his BG readings but he is not clear on his method of dose adjusting. Pt is known from previous admissions where he has received diabetes education multiple times. Pt was last receiving corrective lispro only at last admission (6/19/18) after having hypoglycemia on very low doses of basal insulin. Pt was provided with 50 Aga Matrix test strips on 6/19/18 to go with the Aga Matrix glucometer that glycemic control provided at prior admission 5/25/18.   Recent blood glucose:     6/29/18:  148, 180  Lab - 64 (0335)  6/28/18:  518, 369, 348, 143, 179 - received 8.6 units regular insulin (briefly on insulin drip) and 3 units corrective lipsro  Within target range (non-ICU: <140; ICU<180): [] Yes   []  No    Current Insulin regimen: corrective lispro ACHS, very insulin resistant dosing  Home medication/insulin regimen:   Humalog 12 units q night and pt states he often takes it in the AM and at varied doses based on his BG reading. HbA1c: 8.0% - ave BG has been ~ 180 mg/dL over past 3 months  Adequate glycemic control PTA:  [] Yes  [x] No       SUBJECTIVE/OBJECTIVE:   Information obtained from: chart review, pt  Pt reports he was eating fine until few days ago when he started to have N/V. Pt states he is allergic to just shrimp however chart states shellfish. He denies problems with chewing or swallowing. He suspects he has lost weight. He states he has been taking his Creon as prescribed. Pt likes and is known to consume Ensure (stw or froylan) and would like to have it sent when diet allows. Pt admitted with abdominal pain, N/V, hyperlglycemia, biliary drain displacement, pancreatic fistula and PMHx including chronic pancreatitis with abscess and pseudocyst, T2DM,    Diet: NPO    No data found.     Medications: [x]                Reviewed   Pertinent:  Bentyl, FeSO4, folic acid, Creon  IVF: NS at 100 ml/hr   Most Recent POC Glucose:   Recent Labs      06/29/18   0335  06/28/18   1140   GLU  64*  473*         Labs:   Lab Results   Component Value Date/Time    Hemoglobin A1c 8.0 (H) 06/28/2018 09:15 PM     Lab Results   Component Value Date/Time    Sodium 139 06/29/2018 03:35 AM    Potassium 3.5 06/29/2018 03:35 AM    Chloride 108 06/29/2018 03:35 AM    CO2 25 06/29/2018 03:35 AM    Anion gap 6 06/29/2018 03:35 AM    Glucose 64 (L) 06/29/2018 03:35 AM    BUN 10 06/29/2018 03:35 AM    Creatinine 0.88 06/29/2018 03:35 AM    Calcium 7.9 (L) 06/29/2018 03:35 AM    Magnesium 1.7 06/29/2018 03:35 AM    Phosphorus 3.1 04/19/2018 09:39 AM    Albumin 2.9 (L) 06/28/2018 11:40 AM       Anthropometrics: IBW : 80.7 kg (178 lb), % IBW (Calculated): 78.65 %, BMI (calculated): 19  Wt Readings from Last 1 Encounters:   06/28/18 63.5 kg (140 lb)      Last Weight Metrics:  Weight Loss Metrics 6/28/2018 6/25/2018 6/17/2018 6/9/2018 5/31/2018 5/21/2018 4/24/2018   Today's Wt 140 lb 140 lb 150 lb 153 lb 14.1 oz 135 lb 130 lb 130 lb   BMI 18.99 kg/m2 18.99 kg/m2 20.34 kg/m2 20.87 kg/m2 18.31 kg/m2 17.15 kg/m2 17.15 kg/m2     8/15/17 weight - 164 lbs    Ht Readings from Last 1 Encounters:   06/28/18 6' (1.829 m)     Estimated Nutrition Needs:  2279 Kcals/day, Protein (g): 95 g Fluid (ml): 2300 ml  Based on:   [x]          Actual BW    []          ABW   []            Adjusted BW         Nutrition Interventions:  None at this time - NPO  Goal:   Blood glucose will be within target range of  mg/dL by 7/2/18. Pt will consume >75% meals by 7/4. Weight maintenance (+/- 1-2 kg) or weight gain of 1-2 lbs by 7/10/18.         Nutrition Monitoring and Evaluation      []     Monitor po intake on meal rounds  [x]     Continue inpatient monitoring and intervention  []     Other:      Nutrition Risk:  [x]   High     []  Moderate    []  Minimal/Uncompromised    Abdirahman Newman RD, CDE   Office:  [address] [place] [address] Pager:  [phone]

## 2018-06-29 NOTE — PROGRESS NOTES
Transportation at Discharge: 6/29/2018    Transport Company/Representative: Judy Rios / [employer] number: [phone]  Reference number: [id number]    Estimated pick-up time: Immediate  - is to call nursing station when on the way.      Destination: Home Address  [facility] [id number]    Method of Transport:  Medicaid Cab or Ly[employer]    Insurance Info: Milady WALL [facility]  Authorization:  [facility] transportation arrangements at the request of [address] [facility], [address] [facility] Specialist ext [phone]

## 2018-06-29 NOTE — DISCHARGE SUMMARY
[facility]    Discharge Summary    Patient: Soniya Paulino MRN: [id number]  CSN: [id number]    YOB: 1965  Age: 48 y.o.   Sex: male    DOA: 6/28/2018 LOS:  LOS: 1 day   Discharge Date:      Admission Diagnoses: Pancreatic fistula    Discharge Diagnoses:    Problem List as of 6/29/2018  Date Reviewed: 6/9/2018          Codes Class Noted - Resolved    * (Principal)Pancreatic fistula ICD-10-CM: K86.89  ICD-9-CM: 577.8  6/28/2018 - Present        Central cord syndrome ([facility] ICD-10-CM: U84.358W  ICD-9-CM: 952.9  6/2/2018 - Present        UTI (urinary tract infection) ICD-10-CM: N39.0  ICD-9-CM: 599.0  5/24/2018 - Present        Hyperglycemia ICD-10-CM: R73.9  ICD-9-CM: 790.29  5/22/2018 - Present        Chronic renal insufficiency ICD-10-CM: N18.9  ICD-9-CM: 585.9  5/22/2018 - Present        CHRISTI (acute kidney injury) ([facility] ICD-10-CM: N17.9  ICD-9-CM: 584.9  5/22/2018 - Present        Enteritis ICD-10-CM: K52.9  ICD-9-CM: 558.9  4/24/2018 - Present        Acute alcoholic hepatitis UR-31-OA: K70.10  ICD-9-CM: 571.1  4/15/2018 - Present        Chronic pancreatitis due to acute alcohol intoxication ([facility] ICD-10-CM: K86.0  ICD-9-CM: 577.1, 303.00  4/15/2018 - Present        Lactic acidosis ICD-10-CM: E87.2  ICD-9-CM: 276.2  8/5/2017 - Present        Colitis, acute ICD-10-CM: K52.9  ICD-9-CM: 558.9  7/17/2017 - Present        HCAP (healthcare-associated pneumonia) ICD-10-CM: J18.9  ICD-9-CM: 015  5/31/2016 - Present        Sepsis ([facility] ICD-10-CM: A41.9  ICD-9-CM: 038.9, 995.91  4/15/2016 - Present        Biliary drain displacement ICD-10-CM: T85.520A  ICD-9-CM: 996.59  3/23/2016 - Present        Hypokalemia ICD-10-CM: E87.6  ICD-9-CM: 276.8  3/23/2016 - Present        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  ICD-9-CM: 751.5  3/23/2016 - Present        H/O ETOH abuse (Chronic) ICD-10-CM: K98.262  ICD-9-CM: 305.03  3/23/2016 - Present        Chronic pain (Chronic) ICD-10-CM: L86.73  ICD-9-CM: 338.29  3/23/2016 - Present Moderate protein-calorie malnutrition (HCC) (Chronic) ICD-10-CM: E44.0  ICD-9-CM: 263.0  11/21/2015 - Present        Abdominal pain ICD-10-CM: R10.9  ICD-9-CM: 789.00  11/16/2015 - Present        Perinephric abscess (Chronic) ICD-10-CM: N15.1  ICD-9-CM: 590.2  10/22/2015 - Present        Pneumobilia (Chronic) ICD-10-CM: K83.8  ICD-9-CM: 576.8  10/22/2015 - Present        Gastroparesis (Chronic) ICD-10-CM: K31.84  ICD-9-CM: 536.3  5/21/2015 - Present        IDDM (insulin dependent diabetes mellitus) (HCC) (Chronic) ICD-10-CM: E11.9, Z79.4  ICD-9-CM: 250.00, V58.67  8/31/2013 - Present        Tobacco abuse (Chronic) ICD-10-CM: Z72.0  ICD-9-CM: 305.1  Unknown - Present        Chronic pancreatitis (HCC) (Chronic) ICD-10-CM: K86.1  ICD-9-CM: 577.1  Unknown - Present    Overview Signed 6/12/2013  9:31 AM by Rony Suárez MD     Continue PPI IV             RESOLVED: Syncope ICD-10-CM: R55  ICD-9-CM: 780.2  6/2/2018 - 6/9/2018        RESOLVED: Acute kidney injury (nontraumatic) ([facility] ICD-10-CM: N17.9  ICD-9-CM: 584.9  6/2/2018 - 6/9/2018        RESOLVED: DKA (diabetic ketoacidoses) ([facility] ICD-10-CM: E13.10  ICD-9-CM: 250.10  4/15/2018 - 6/19/2018        RESOLVED: Dehydration ICD-10-CM: E86.0  ICD-9-CM: 276.51  8/5/2017 - 6/19/2018        RESOLVED: Vomiting ICD-10-CM: R11.10  ICD-9-CM: 787.03  8/5/2017 - 6/9/2018        RESOLVED: DKA, type 1 ([facility].) ICD-10-CM: E10.10  ICD-9-CM: 250.13  7/17/2017 - 6/19/2018        RESOLVED: Protein calorie malnutrition ([facility] ICD-10-CM: E46  ICD-9-CM: 263.9  11/19/2015 - 3/23/2016        RESOLVED: HCAP (healthcare-associated pneumonia) ICD-10-CM: J18.9  ICD-9-CM: 486  11/18/2015 - 3/23/2016        RESOLVED: Wrist drop ICD-10-CM: M21.339  ICD-9-CM: 736.05  11/17/2015 - 3/23/2016        RESOLVED: Acute radial nerve palsy of right upper extremity ICD-10-CM: G56.31  ICD-9-CM: 354.3  11/17/2015 - 3/23/2016        RESOLVED: SIRS (systemic inflammatory response syndrome) ([facility] ICD-10-CM: R65.10  ICD-9-CM: 995.90  11/16/2015 - 11/16/2015        RESOLVED: Tachycardia ICD-10-CM: R00.0  ICD-9-CM: 785.0  11/16/2015 - 3/23/2016        RESOLVED: Anemia ICD-10-CM: D64.9  ICD-9-CM: 285.9  11/16/2015 - 3/23/2016        RESOLVED: Hyperglycemia ICD-10-CM: R73.9  ICD-9-CM: 790.29  11/16/2015 - 3/23/2016        RESOLVED: Fever ICD-10-CM: R50.9  ICD-9-CM: 780.60  11/16/2015 - 3/23/2016        RESOLVED: Sepsis ([facility].) ICD-10-CM: A41.9  ICD-9-CM: 038.9, 995.91  11/16/2015 - 3/23/2016        RESOLVED: Malnutrition ([facility].) (Chronic) ICD-10-CM: E46  ICD-9-CM: 263.9  10/25/2015 - 3/23/2016        RESOLVED: Severe sepsis ([facility].) ICD-10-CM: A41.9, R65.20  ICD-9-CM: 038.9, 995.92  10/22/2015 - 11/16/2015        RESOLVED: Hypophosphatemia ICD-10-CM: E83.39  ICD-9-CM: 275.3  5/23/2015 - 11/16/2015        RESOLVED: DKA (diabetic ketoacidoses) ([facility].) ICD-10-CM: E13.10  ICD-9-CM: 250.10  5/21/2015 - 11/16/2015        RESOLVED: CHRISTI (acute kidney injury) ([facility].) ICD-10-CM: N17.9  ICD-9-CM: 584.9  5/21/2015 - 11/16/2015        RESOLVED: Acute pancreatitis ICD-10-CM: K85.90  ICD-9-CM: 577.0  5/21/2015 - 11/16/2015        RESOLVED: Hypertriglyceridemia (Chronic) ICD-10-CM: E78.1  ICD-9-CM: 272.1  5/21/2015 - 3/23/2016        RESOLVED: Pneumonia ICD-10-CM: J18.9  ICD-9-CM: 486  5/21/2015 - 11/16/2015        RESOLVED: Abdominal pain ICD-10-CM: R10.9  ICD-9-CM: 789.00  9/21/2013 - 11/16/2015        RESOLVED: DKA (diabetic ketoacidoses) ([facility] ICD-10-CM: E13.10  ICD-9-CM: 250.10  8/26/2013 - 8/31/2013        RESOLVED: Drug-seeking behavior ICD-10-CM: Z76.5  ICD-9-CM: 305.90  6/23/2013 - 11/16/2015        RESOLVED: Abdominal abscess ICD-10-CM: CRQ2685  ICD-9-CM: Alveta Hammersmith  6/19/2013 - 11/16/2015    Overview Signed 7/9/2013 10:39 AM by Rony Suárez MD     Abdominal abscess - most likely infected fluid collection from duodenal fistula related to pancreatic disease. Doing well with drainage and antibiotics.  Fistulogram on Friday noted continued fistula to the duodenum/pancreas. Will need referral to pancreatic surgeon. RESOLVED: Disorder of electrolytes ICD-10-CM: E87.8  ICD-9-CM: 276.9  6/16/2013 - 11/16/2015    Overview Signed 6/16/2013 10:06 AM by Gm Babcock MD     Hypokalemia hypomagnesemia hypocalcemia, replete and monitor              RESOLVED: Hypokalemia ICD-10-CM: E87.6  ICD-9-CM: 276.8  6/12/2013 - 11/16/2015        RESOLVED: Abdominal pain, generalized ICD-10-CM: R10.84  ICD-9-CM: 789.07  Unknown - 11/16/2015    Overview Addendum 7/9/2013 10:48 AM by Gm Babcock MD     Consult GI for abdominal pain, Dr Leann Pryor IR for percutaneous drainage of fluid collection Kyrie Mojica  S/p 6/12/13 CT Guided Abscess Drainage of 20cc purulent sanguinous fluid for Right lower quadrant recurrent abscess  Cx 6/121/3 + ESBL  Will Leave drain to J-P suction. Repeat CT in a few days post placement. Appreciate general surgery  Fistulagram  6/17/13 Small residual abscess cavity in location of the pigtail drain. Fistulous communication with duodenum at the junction of second and third portion of the duodenum, similar to prior study of 04/06/10. Opacification of tubular structure extending superior medially from the medial aspect of the duodenal loop likely retrograde opacification of nondilated common bile duct. Afebrile, normal WBC/diff on admission   Normal LFTs amylase lipase   Consult ID for antibiotics and abdominal abscess                RESOLVED: Encounter for long-term (current) use of other medications ICD-10-CM: Z79.899  ICD-9-CM: V58.69  Unknown - 11/16/2015        RESOLVED: Follow-up examination, chronic pancreatitis ICD-10-CM: G73  ICD-9-CM: V67.9  11/2/2010 - 11/16/2015        RESOLVED: Pain in the abdomen ICD-10-CM: R10.9  ICD-9-CM: 789.00  11/2/2010 - 11/16/2015              Discharge Condition: Stable    Discharge To:  Home    Consults: Hospitalist and Interventional Radiology    Hospital Course: 45 YO M with PMH of chronic pancreatitis, 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 He notes N/V however no hematemesis. He notes he has been taking his insulin. Does not see GI and per notes was kicks from [facility] for noncompliance. Upon arrival patient was found to have BS in 500's. He deneis any sob, cp, fever, chills. Of note has had multiple admissions for DKA and abd pain noting recent discharge for drain falling out after a fall with recent IR placed 14 Fr APD drain into right peritoneal fistula cavity about a week ago. Noting it was placed in the unclosed wound noting increased leakage with burning pain to skin around drain. Kevan Villarreal will be admitted for further evaluation. CT abd showed in significant change however increase in abcess size. Surgery has been consulted but recommended IR, no surgical interventions. Patient demanding narcotics. Pt is s/p fluoro guided fistula tube placement- 16FR pigtail catheter placed into fistula cavity on 6/29/2018. Labs and vital signs remain WNL. He is appropriate for discharge home to follow up with PCP within one week- appt made for 7/5/2018. Physical Exam:  General appearance: alert, cooperative, no distress, appears stated age  Head: Normocephalic, without obvious abnormality, atraumatic  Lungs: clear to auscultation bilaterally  Heart: regular rate and rhythm, S1, S2 normal, no murmur, click, rub or gallop  Abdomen: soft, TTP epigastric and LUQ.   Bowel sounds normal.   Extremities: extremities normal, atraumatic, no cyanosis or edema  Skin: excoriation noted to fistula site, erythema  Neurologic: Grossly normal  PSY: Mood and affect normal, appropriately behaved    Significant Diagnostic Studies: labs:   Recent Results (from the past 24 hour(s))   GLUCOSE, POC    Collection Time: 06/28/18  4:01 PM   Result Value Ref Range    Glucose (POC) 143 (H) 70 - 110 mg/dL   MAGNESIUM    Collection Time: 06/28/18  9:15 PM   Result Value Ref Range    Magnesium 1.7 1.6 - 2.6 mg/dL   HEMOGLOBIN A1C WITH EAG    Collection Time: 06/28/18  9:15 PM   Result Value Ref Range    Hemoglobin A1c 8.0 (H) 4.2 - 5.6 %    Est. average glucose 183 mg/dL   GLUCOSE, POC    Collection Time: 06/28/18  9:24 PM   Result Value Ref Range    Glucose (POC) 179 (H) 70 - 579 mg/dL   METABOLIC PANEL, BASIC    Collection Time: 06/29/18  3:35 AM   Result Value Ref Range    Sodium 139 136 - 145 mmol/L    Potassium 3.5 3.5 - 5.5 mmol/L    Chloride 108 100 - 108 mmol/L    CO2 25 21 - 32 mmol/L    Anion gap 6 3.0 - 18 mmol/L    Glucose 64 (L) 74 - 99 mg/dL    BUN 10 7.0 - 18 MG/DL    Creatinine 0.88 0.6 - 1.3 MG/DL    BUN/Creatinine ratio 11 (L) 12 - 20      GFR est AA >60 >60 ml/min/1.73m2    GFR est non-AA >60 >60 ml/min/1.73m2    Calcium 7.9 (L) 8.5 - 10.1 MG/DL   CBC W/O DIFF    Collection Time: 06/29/18  3:35 AM   Result Value Ref Range    WBC 3.3 (L) 4.6 - 13.2 K/uL    RBC 4.64 (L) 4.70 - 5.50 M/uL    HGB 15.0 13.0 - 16.0 g/dL    HCT 44.4 36.0 - 48.0 %    MCV 95.7 74.0 - 97.0 FL    MCH 32.3 24.0 - 34.0 PG    MCHC 33.8 31.0 - 37.0 g/dL    RDW 13.9 11.6 - 14.5 %    PLATELET 948 (L) 605 - 420 K/uL    MPV 9.9 9.2 - 11.8 FL   MAGNESIUM    Collection Time: 06/29/18  3:35 AM   Result Value Ref Range    Magnesium 1.7 1.6 - 2.6 mg/dL   GLUCOSE, POC    Collection Time: 06/29/18  9:02 AM   Result Value Ref Range    Glucose (POC) 148 (H) 70 - 110 mg/dL   GLUCOSE, POC    Collection Time: 06/29/18 12:16 PM   Result Value Ref Range    Glucose (POC) 180 (H) 70 - 110 mg/dL         Discharge Medications:     Current Discharge Medication List      CONTINUE these medications which have CHANGED    Details promethazine (PHENERGAN) 25 mg tablet Take 1 Tab by mouth every six (6) hours as needed. Qty: 6 Tab, Refills: 0      oxyCODONE-acetaminophen (PERCOCET)  mg per tablet Take 1 Tab by mouth every six (6) hours as needed. Max Daily Amount: 4 Tabs. Indications: Pain  Qty: 12 Tab, Refills: 0    Associated Diagnoses: Pancreatic fistula         CONTINUE these medications which have NOT CHANGED    Details   insulin lispro (HUMALOG) 100 unit/mL injection 12 Units by SubCUTAneous route nightly. Indications: type 2 diabetes mellitus  Qty: 1 Vial, Refills: 0      gabapentin (NEURONTIN) 300 mg capsule Take 2 Caps by mouth three (3) times daily for 30 days. Indications: NEUROPATHIC PAIN  Qty: 180 Cap, Refills: 0      sucralfate (CARAFATE) 1 gram tablet Take 1 Tab by mouth four (4) times daily. Indications: PREVENTION OF STRESS ULCER  Qty: 30 Tab, Refills: 0      amitriptyline (ELAVIL) 75 mg tablet Take 1 Tab by mouth nightly. Indications: Depression  Qty: 30 Tab, Refills: 0      ascorbic acid, vitamin C, (VITAMIN C) 500 mg tablet Take 1 Tab by mouth daily. Indications: VITAMIN C DEFICIENCY  Qty: 30 Tab, Refills: 0      cholecalciferol, vitamin D3, 2,000 unit tab Take 1 Tab by mouth daily. Indications: PREVENTION OF VITAMIN D DEFICIENCY  Qty: 30 Tab, Refills: 0      cyanocobalamin (VITAMIN B-12) 1,000 mcg sublingual tablet Take 2 Tabs by mouth daily. Indications: PREVENTION OF VITAMIN B12 DEFICIENCY  Qty: 30 Tab, Refills: 0      ferrous sulfate 325 mg (65 mg iron) tablet Take 1 Tab by mouth two (2) times a day. Indications: IRON DEFICIENCY ANEMIA  Qty: 60 Tab, Refills: 0      folic acid 272 mcg tablet Take 1 Tab by mouth daily. Qty: 30 Tab, Refills: 0      lipase-protease-amylase (CREON) 12,000-38,000 -60,000 unit capsule Take 2 Caps by mouth three (3) times daily (with meals).  Indications: exocrine pancreatic insufficiency, dosage change  Qty: 180 Cap, Refills: 0      pantoprazole (PROTONIX) 40 mg tablet Take 1 Tab by mouth two (2) times a day. Indications: EROSIVE ESOPHAGITIS, gastroesophageal reflux disease  Qty: 60 Tab, Refills: 0      dicyclomine (BENTYL) 10 mg capsule Take 1 Cap by mouth three (3) times daily for 15 doses. Qty: 15 Cap, Refills: 0      ondansetron (ZOFRAN ODT) 4 mg disintegrating tablet Take 1 Tab by mouth every eight (8) hours as needed for Nausea.   Qty: 9 Tab, Refills: 0         STOP taking these medications       dexamethasone (DECADRON) 1 mg tablet Comments:   Reason for Stopping:               Activity: Activity as tolerated and No driving while on analgesics    Diet: Low fat, Low cholesterol    Wound Care: Keep wound clean and dry    Follow-up: Follow up with PCP as scheduled on 7/5/2018 @8am    Discharge time: > 35 mins  Myesha Garcia NP  6/29/2018, 10:42 AM

## 2018-06-29 NOTE — PROGRESS NOTES
Pt in peripheral vascular room 1, c/o burning at RUQ exit site, medicated per orders for pain control. Plan for pain control only at this time.

## 2018-06-29 NOTE — PROGRESS NOTES
Date of previous inpatient admission/ ED visit? IP  6/16/18 - 6/19/18 [facility])                                                                              ED 6/25/18- 6/26/18 ([facility])  In the past 6 months has had 5 IP admissions and 2 ED visits    What brought the patient back to ED? ABD Pain. Pt is a known drug seeker. Did patient decline recommended services during last admission/ ED visit (if yes, what)? Pt did not go to f/u PCP appt scheduled on 6/21/18. He also was a non-admit for [facility] as his phone was out of service and mother's VM box was full. Has patient seen a provider since their last inpatient admission/ED visit (if yes, when)? No    CM Interventions:  From previous inpatient admission/ED visit: Was set up with [facility] and f/u PCP appt scheduled for 6/21/18  From current inpatient admission/ED visit: PCP appt set for 7/5 ( Dr Abhishek Garcia). Other needs will be assessed.     Shea Toussaint RN  Care-manager  High Risk and Readmissions Coordinator  [phone]  [phone]

## 2018-06-29 NOTE — PROGRESS NOTES
Reason for Readmission:   Pancreatic fistula; Pancreatic fistula           RRAT Score and Risk Level:    29     Level of Readmission:  high        Care Conference scheduled:  n/a          Resources/supports as identified by patient/family:  None per patient         Top Challenges facing patient (as identified by patient/family and CM): None at this per patient        Finances/Medication cost?  Patient has medication coverage      Transportation:    medicaid cab needed    Support system or lack thereof?    family   Living arrangements? resides with his mother       Self-care/ADLs/Cognition? Able to care for self/       Independent   Current Advanced Directive/Advance Care Plan: On file            Plan for utilizing home health: HH arranged                 Likelihood of additional readmission:   High              Transition of Care Plan:    Based on readmission, the patient's previous Plan of Care   has been evaluated and/or modified. The current Transition of Care Plan is:   Home with Home Health           Met with the patient at bedside. All demographic information has been verified correct per patient. Patient states that he reside with his mother in [place]. [address]. Patient is unemployed and states that he is waiting for his disability application to be approved. Per patient, he does not have particular [facility] agency and doesn't have a preference. Medicaid cab to be arranged to transport patient to home address. Patient has designated _____Teena Ya___________________ to participate in his/her discharge plan and to receive any needed information. Name: Barry Ramos  Address: [address] [place], [place] [id number]  Phone number: [phone]      Care Management Interventions  PCP Verified by CM:  Yes Gwen Tucker MD)  Palliative Care Criteria Met (RRAT>21 & CHF Dx)?: Yes  Palliative Consult Recommended?: No  Reason Palliative Care Not Recommended?: Palliative Care not utilized by provider  Mode of Transport at Discharge: Other (see comment) ([employer] cab )  Transition of Care Consult (CM Consult): Discharge Planning  Discharge Durable Medical Equipment: No  Physical Therapy Consult: No  Occupational Therapy Consult: No  Speech Therapy Consult: No  Current Support Network:  Other (resides with mother )  Confirm Follow Up Transport: Other (see comment) ([employer] cab )  Plan discussed with Pt/Family/Caregiver: Yes  Discharge Location  Discharge Placement: Home with home health       [address] [place] [address]   [phone]

## 2018-06-29 NOTE — ROUTINE PROCESS
TRANSFER - IN REPORT:    Verbal report received from [address]  being received from Cath lab (unit) for ordered procedure      Report consisted of patients Situation, Background, Assessment and   Recommendations(SBAR). Information from the following report(s) SBAR, Kardex, Procedure Summary, Intake/Output and MAR was reviewed with the receiving nurse. Opportunity for questions and clarification was provided. Assessment will be completed upon patients arrival to unit and care assumed.

## 2018-06-29 NOTE — DISCHARGE INSTRUCTIONS
DISCHARGE SUMMARY from Nurse    PATIENT INSTRUCTIONS:    After general anesthesia or intravenous sedation, for 24 hours or while taking prescription Narcotics:  · Limit your activities  · Do not drive and operate hazardous machinery  · Do not make important personal or business decisions  · Do  not drink alcoholic beverages  · If you have not urinated within 8 hours after discharge, please contact your surgeon on call. Report the following to your surgeon:  · Excessive pain, swelling, redness or odor of or around the surgical area  · Temperature over 100.5  · Nausea and vomiting lasting longer than 4 hours or if unable to take medications  · Any signs of decreased circulation or nerve impairment to extremity: change in color, persistent  numbness, tingling, coldness or increase pain  · Any questions    What to do at Home:  * Recommended activity: Activity as tolerated     * If you experience any of the following symptoms as listed in your teaching for Pancreatitis and Abdominal pain under \"When Should You Call for Help?\", please follow up with your Primary Care Physician and/ or call 911. *  Please give a list of your current medications to your Primary Care Provider. *  Please update this list whenever your medications are discontinued, doses are      changed, or new medications (including over-the-counter products) are added. *  Please carry medication information at all times in case of emergency situations. These are general instructions for a healthy lifestyle:    No smoking/ No tobacco products/ Avoid exposure to second hand smoke  Surgeon General's Warning:  Quitting smoking now greatly reduces serious risk to your health.     Obesity, smoking, and sedentary lifestyle greatly increases your risk for illness    A healthy diet, regular physical exercise & weight monitoring are important for maintaining a healthy lifestyle    You may be retaining fluid if you have a history of heart failure or if you experience any of the following symptoms:  Weight gain of 3 pounds or more overnight or 5 pounds in a week, increased swelling in our hands or feet or shortness of breath while lying flat in bed. Please call your doctor as soon as you notice any of these symptoms; do not wait until your next office visit. Recognize signs and symptoms of STROKE:    F-face looks uneven    A-arms unable to move or move unevenly    S-speech slurred or non-existent    T-time-call 911 as soon as signs and symptoms begin-DO NOT go       Back to bed or wait to see if you get better-TIME IS BRAIN. Warning Signs of HEART ATTACK     Call 911 if you have these symptoms:   Chest discomfort. Most heart attacks involve discomfort in the center of the chest that lasts more than a few minutes, or that goes away and comes back. It can feel like uncomfortable pressure, squeezing, fullness, or pain.  Discomfort in other areas of the upper body. Symptoms can include pain or discomfort in one or both arms, the back, neck, jaw, or stomach.  Shortness of breath with or without chest discomfort.  Other signs may include breaking out in a cold sweat, nausea, or lightheadedness. Don't wait more than five minutes to call 911 - MINUTES MATTER! Fast action can save your life. Calling 911 is almost always the fastest way to get lifesaving treatment. Emergency Medical Services staff can begin treatment when they arrive -- up to an hour sooner than if someone gets to the hospital by car. Patient armband removed and shredded    The discharge information has been reviewed with the patient. The patient verbalized understanding. Discharge medications reviewed with the patient and appropriate educational materials and side effects teaching were provided.   ___________________________________________________________________________________________________________________________________

## 2018-06-29 NOTE — PROGRESS NOTES
Problem: Falls - Risk of  Goal: *Absence of Falls  Document Serena Fall Risk and appropriate interventions in the flowsheet.    Outcome: Progressing Towards Goal  Fall Risk Interventions:            Medication Interventions: Evaluate medications/consider consulting pharmacy    Elimination Interventions: Call light in reach, Patient to call for help with toileting needs    History of Falls Interventions: Evaluate medications/consider consulting pharmacy

## 2018-07-01 ENCOUNTER — APPOINTMENT (OUTPATIENT)
Dept: CARDIAC CATH/INVASIVE PROCEDURES | Age: 53
End: 2018-07-01
Attending: EMERGENCY MEDICINE
Payer: MEDICAID

## 2018-07-01 ENCOUNTER — HOSPITAL ENCOUNTER (EMERGENCY)
Age: 53
Discharge: HOME | End: 2018-07-01
Attending: EMERGENCY MEDICINE
Payer: MEDICAID

## 2018-07-01 VITALS
HEART RATE: 88 BPM | HEIGHT: 72 IN | WEIGHT: 140 LBS | BODY MASS INDEX: 18.96 KG/M2 | TEMPERATURE: 98.7 F | RESPIRATION RATE: 17 BRPM | OXYGEN SATURATION: 100 % | SYSTOLIC BLOOD PRESSURE: 101 MMHG | DIASTOLIC BLOOD PRESSURE: 63 MMHG

## 2018-07-01 DIAGNOSIS — R73.9 HYPERGLYCEMIA: ICD-10-CM

## 2018-07-01 DIAGNOSIS — K86.89 PANCREATIC FISTULA: Primary | ICD-10-CM

## 2018-07-01 LAB
ADMINISTERED INITIALS, ADMINIT: NORMAL
ADMINISTERED INITIALS, ADMINIT: NORMAL
ALBUMIN SERPL-MCNC: 2.5 G/DL (ref 3.4–5)
ALBUMIN/GLOB SERPL: 0.7 {RATIO} (ref 0.8–1.7)
ALP SERPL-CCNC: 129 U/L (ref 45–117)
ALT SERPL-CCNC: 14 U/L (ref 16–61)
ANION GAP SERPL CALC-SCNC: 8 MMOL/L (ref 3–18)
AST SERPL-CCNC: 10 U/L (ref 15–37)
BASOPHILS # BLD: 0 K/UL (ref 0–0.06)
BASOPHILS NFR BLD: 0 % (ref 0–2)
BILIRUB SERPL-MCNC: 0.3 MG/DL (ref 0.2–1)
BUN SERPL-MCNC: 18 MG/DL (ref 7–18)
BUN/CREAT SERPL: 14 (ref 12–20)
CALCIUM SERPL-MCNC: 7.9 MG/DL (ref 8.5–10.1)
CHLORIDE SERPL-SCNC: 106 MMOL/L (ref 100–108)
CO2 SERPL-SCNC: 22 MMOL/L (ref 21–32)
CREAT SERPL-MCNC: 1.27 MG/DL (ref 0.6–1.3)
D50 ADMINISTERED, D50ADM: 0 ML
D50 ADMINISTERED, D50ADM: 0 ML
D50 ORDER, D50ORD: 0 ML
D50 ORDER, D50ORD: 0 ML
DIFFERENTIAL METHOD BLD: ABNORMAL
EOSINOPHIL # BLD: 0.1 K/UL (ref 0–0.4)
EOSINOPHIL NFR BLD: 2 % (ref 0–5)
ERYTHROCYTE [DISTWIDTH] IN BLOOD BY AUTOMATED COUNT: 13.4 % (ref 11.6–14.5)
EST. AVERAGE GLUCOSE BLD GHB EST-MCNC: 180 MG/DL
GLOBULIN SER CALC-MCNC: 3.6 G/DL (ref 2–4)
GLUCOSE BLD STRIP.AUTO-MCNC: 234 MG/DL (ref 70–110)
GLUCOSE BLD STRIP.AUTO-MCNC: 350 MG/DL (ref 70–110)
GLUCOSE BLD STRIP.AUTO-MCNC: 449 MG/DL (ref 70–110)
GLUCOSE SERPL-MCNC: 458 MG/DL (ref 74–99)
GLUCOSE, GLC: 350 MG/DL
GLUCOSE, GLC: 449 MG/DL
HBA1C MFR BLD: 7.9 % (ref 4.2–5.6)
HCT VFR BLD AUTO: 29.9 % (ref 36–48)
HGB BLD-MCNC: 9.9 G/DL (ref 13–16)
HIGH TARGET, HITG: 180 MG/DL
HIGH TARGET, HITG: 180 MG/DL
INSULIN ADMINSTERED, INSADM: 2.9 UNITS/HOUR
INSULIN ADMINSTERED, INSADM: 7.8 UNITS/HOUR
INSULIN ORDER, INSORD: 2.9 UNITS/HOUR
INSULIN ORDER, INSORD: 7.8 UNITS/HOUR
LIPASE SERPL-CCNC: 34 U/L (ref 73–393)
LOW TARGET, LOT: 140 MG/DL
LOW TARGET, LOT: 140 MG/DL
LYMPHOCYTES # BLD: 1 K/UL (ref 0.9–3.6)
LYMPHOCYTES NFR BLD: 17 % (ref 21–52)
MCH RBC QN AUTO: 31.2 PG (ref 24–34)
MCHC RBC AUTO-ENTMCNC: 33.1 G/DL (ref 31–37)
MCV RBC AUTO: 94.3 FL (ref 74–97)
MINUTES UNTIL NEXT BG, NBG: 60 MIN
MINUTES UNTIL NEXT BG, NBG: 60 MIN
MONOCYTES # BLD: 0.6 K/UL (ref 0.05–1.2)
MONOCYTES NFR BLD: 10 % (ref 3–10)
MULTIPLIER, MUL: 0.01
MULTIPLIER, MUL: 0.02
NEUTS SEG # BLD: 4.1 K/UL (ref 1.8–8)
NEUTS SEG NFR BLD: 71 % (ref 40–73)
ORDER INITIALS, ORDINIT: NORMAL
ORDER INITIALS, ORDINIT: NORMAL
PLATELET # BLD AUTO: 233 K/UL (ref 135–420)
PMV BLD AUTO: 9.3 FL (ref 9.2–11.8)
POTASSIUM SERPL-SCNC: 4.1 MMOL/L (ref 3.5–5.5)
PROT SERPL-MCNC: 6.1 G/DL (ref 6.4–8.2)
RBC # BLD AUTO: 3.17 M/UL (ref 4.7–5.5)
SODIUM SERPL-SCNC: 136 MMOL/L (ref 136–145)
WBC # BLD AUTO: 5.8 K/UL (ref 4.6–13.2)

## 2018-07-01 PROCEDURE — 83690 ASSAY OF LIPASE: CPT | Performed by: EMERGENCY MEDICINE

## 2018-07-01 PROCEDURE — 96365 THER/PROPH/DIAG IV INF INIT: CPT

## 2018-07-01 PROCEDURE — 77030025702 HC BG URIN DRN MRTM -A

## 2018-07-01 PROCEDURE — C1769 GUIDE WIRE: HCPCS

## 2018-07-01 PROCEDURE — 99284 EMERGENCY DEPT VISIT MOD MDM: CPT

## 2018-07-01 PROCEDURE — 74011250637 HC RX REV CODE- 250/637: Performed by: EMERGENCY MEDICINE

## 2018-07-01 PROCEDURE — 74011250636 HC RX REV CODE- 250/636: Performed by: EMERGENCY MEDICINE

## 2018-07-01 PROCEDURE — 80053 COMPREHEN METABOLIC PANEL: CPT | Performed by: EMERGENCY MEDICINE

## 2018-07-01 PROCEDURE — 74011636320 HC RX REV CODE- 636/320: Performed by: RADIOLOGY

## 2018-07-01 PROCEDURE — 96375 TX/PRO/DX INJ NEW DRUG ADDON: CPT

## 2018-07-01 PROCEDURE — 74011250636 HC RX REV CODE- 250/636: Performed by: RADIOLOGY

## 2018-07-01 PROCEDURE — 77030004565 HC CATH ANGI DX TMPO CARD -B

## 2018-07-01 PROCEDURE — 77030002916 HC SUT ETHLN J&J -A

## 2018-07-01 PROCEDURE — C1729 CATH, DRAINAGE: HCPCS

## 2018-07-01 PROCEDURE — 85025 COMPLETE CBC W/AUTO DIFF WBC: CPT | Performed by: EMERGENCY MEDICINE

## 2018-07-01 PROCEDURE — 96361 HYDRATE IV INFUSION ADD-ON: CPT

## 2018-07-01 PROCEDURE — 82962 GLUCOSE BLOOD TEST: CPT

## 2018-07-01 PROCEDURE — 96366 THER/PROPH/DIAG IV INF ADDON: CPT

## 2018-07-01 PROCEDURE — 74011636637 HC RX REV CODE- 636/637: Performed by: EMERGENCY MEDICINE

## 2018-07-01 PROCEDURE — 83036 HEMOGLOBIN GLYCOSYLATED A1C: CPT | Performed by: EMERGENCY MEDICINE

## 2018-07-01 PROCEDURE — 74011000250 HC RX REV CODE- 250: Performed by: RADIOLOGY

## 2018-07-01 PROCEDURE — 75989 ABSCESS DRAINAGE UNDER X-RAY: CPT

## 2018-07-01 PROCEDURE — 77030003560 HC NDL HUBR BARD -A

## 2018-07-01 PROCEDURE — 74011000258 HC RX REV CODE- 258: Performed by: EMERGENCY MEDICINE

## 2018-07-01 RX ORDER — ONDANSETRON 2 MG/ML
4 INJECTION INTRAMUSCULAR; INTRAVENOUS
Status: COMPLETED | OUTPATIENT
Start: 2018-07-01 | End: 2018-07-01

## 2018-07-01 RX ORDER — MAGNESIUM SULFATE 100 %
4 CRYSTALS MISCELLANEOUS AS NEEDED
Status: DISCONTINUED | OUTPATIENT
Start: 2018-07-01 | End: 2018-07-02 | Stop reason: HOSPADM

## 2018-07-01 RX ORDER — HEPARIN 100 UNIT/ML
300 SYRINGE INTRAVENOUS
Status: COMPLETED | OUTPATIENT
Start: 2018-07-01 | End: 2018-07-01

## 2018-07-01 RX ORDER — HEPARIN 100 UNIT/ML
300 SYRINGE INTRAVENOUS ONCE
Status: DISCONTINUED | OUTPATIENT
Start: 2018-07-01 | End: 2018-07-02 | Stop reason: HOSPADM

## 2018-07-01 RX ORDER — LIDOCAINE HYDROCHLORIDE 10 MG/ML
1-60 INJECTION INFILTRATION; PERINEURAL
Status: DISCONTINUED | OUTPATIENT
Start: 2018-07-01 | End: 2018-07-02 | Stop reason: HOSPADM

## 2018-07-01 RX ORDER — DEXTROSE 50 % IN WATER (D50W) INTRAVENOUS SYRINGE
25-50 AS NEEDED
Status: DISCONTINUED | OUTPATIENT
Start: 2018-07-01 | End: 2018-07-02 | Stop reason: HOSPADM

## 2018-07-01 RX ORDER — OXYCODONE AND ACETAMINOPHEN 5; 325 MG/1; MG/1
1 TABLET ORAL
Status: COMPLETED | OUTPATIENT
Start: 2018-07-01 | End: 2018-07-01

## 2018-07-01 RX ORDER — ZINC OXIDE 20 G/100G
OINTMENT TOPICAL
Status: COMPLETED | OUTPATIENT
Start: 2018-07-01 | End: 2018-07-01

## 2018-07-01 RX ORDER — HYDROMORPHONE HYDROCHLORIDE 1 MG/ML
1 INJECTION, SOLUTION INTRAMUSCULAR; INTRAVENOUS; SUBCUTANEOUS ONCE
Status: COMPLETED | OUTPATIENT
Start: 2018-07-01 | End: 2018-07-01

## 2018-07-01 RX ORDER — HEPARIN SODIUM 200 [USP'U]/100ML
500 INJECTION, SOLUTION INTRAVENOUS ONCE
Status: COMPLETED | OUTPATIENT
Start: 2018-07-01 | End: 2018-07-01

## 2018-07-01 RX ADMIN — ZINC OXIDE: 200 OINTMENT TOPICAL at 19:11

## 2018-07-01 RX ADMIN — ONDANSETRON 4 MG: 2 INJECTION, SOLUTION INTRAMUSCULAR; INTRAVENOUS at 15:23

## 2018-07-01 RX ADMIN — IOPAMIDOL 9 ML: 612 INJECTION, SOLUTION INTRAVENOUS at 16:38

## 2018-07-01 RX ADMIN — OXYCODONE HYDROCHLORIDE AND ACETAMINOPHEN 1 TABLET: 5; 325 TABLET ORAL at 17:05

## 2018-07-01 RX ADMIN — LIDOCAINE HYDROCHLORIDE 6 ML: 10 INJECTION, SOLUTION INFILTRATION; PERINEURAL at 16:18

## 2018-07-01 RX ADMIN — HEPARIN SODIUM IN SODIUM CHLORIDE 1000 UNITS: 200 INJECTION INTRAVENOUS at 16:13

## 2018-07-01 RX ADMIN — Medication 300 UNITS: at 20:04

## 2018-07-01 RX ADMIN — Medication 1 MG: at 15:28

## 2018-07-01 RX ADMIN — SODIUM CHLORIDE 7.8 UNITS/HR: 900 INJECTION, SOLUTION INTRAVENOUS at 17:08

## 2018-07-01 RX ADMIN — SODIUM CHLORIDE 1000 ML: 900 INJECTION, SOLUTION INTRAVENOUS at 15:23

## 2018-07-01 NOTE — PROCEDURES
Vascular & Interventional Radiology Brief Procedure Note    Interventional Radiologist: Harvinder Weiner    Pre-operative Diagnosis:  Chronic enterocutaneous fistula. Post-operative Diagnosis: Same as pre-op dx    Procedure(s) Performed:  Reinsertion of RUQ drainage catheter. Anesthesia:  Local    Findings:  Successful reinsertion of RUQ drainage catheter adjacent to duodenum. Complications: None    Estimated Blood Loss:  None    Tubes and Drains: 16 Fr locking catheter. Specimens: None    Condition: Good    Disposition: To ED    Plan: Per ED. Patient will need to see surgeon to address fistula (has had drainage catheter for at least 5 years).       Melo Watson MD  [facility]  Vascular & Interventional Radiology  7/1/2018

## 2018-07-01 NOTE — ED PROVIDER NOTES
EMERGENCY DEPARTMENT HISTORY AND PHYSICAL EXAM    2:54 PM      Date: 7/1/2018  Patient Name: Yessenia Singer    History of Presenting Illness     Chief Complaint   Patient presents with    Abscess         History Provided By: Patient    Chief Complaint: Lila Madura drain problem   Duration:  Days  Timing:  Constant  Location: Right flank  Quality: Burning   Severity: Severe  Modifying Factors: Zinc Oxide w/ no relief  Associated Symptoms: denies any other associated signs or symptoms      Additional History (Context): Yessenia Singer is a 48 y.o. male with a h/o DM, pancreatitis, GERD, ETOH abuse who presents to the ED complaining of right flank NATALIE drain leakage x 3 days. He notes severe burning at the site. He explains that he had an outpatient procedure on Friday and after the procedure his NATALIE drain began to leak. He notes the the NATALIE fell out Saturday and has continued to drain. The patient notes feeling nauseous. He reports using zinc oxide cream around site with no pain relief. No other complaints or concerns at this time.        PCP: Andi Rodriguez MD    Current Facility-Administered Medications   Medication Dose Route Frequency Provider Last Rate Last Dose    insulin regular (NOVOLIN R, HUMULIN R) 100 Units in 0.9% sodium chloride 100 mL infusion  0-50 Units/hr IntraVENous TITRATE Venancio Benítez MD 7.8 mL/hr at 07/01/18 1708 7.8 Units/hr at 07/01/18 1708    glucose chewable tablet 16 g  4 Tab Oral PRN Venancio Benítez MD        glucagon (GLUCAGEN) injection 1 mg  1 mg IntraMUSCular PRN Venancio Benítez MD        dextrose (D50W) injection syrg 12.5-25 g  25-50 mL IntraVENous PRN Venancio Benítez MD        lidocaine (XYLOCAINE) 10 mg/mL (1 %) injection 1-60 mL  1-60 mL IntraDERMal Multiple May Carvalho MD   6 mL at 07/01/18 1618    insulin regular (Novolin,Humulin) premix infusion             zinc oxide-white petrolatum (SENSI-CARE) 15-49 % ointment   Topical ONCE Venancio Benítez MD         Current Outpatient Prescriptions   Medication Sig Dispense Refill    promethazine (PHENERGAN) 25 mg tablet Take 1 Tab by mouth every six (6) hours as needed. 6 Tab 0    oxyCODONE-acetaminophen (PERCOCET)  mg per tablet Take 1 Tab by mouth every six (6) hours as needed. Max Daily Amount: 4 Tabs. Indications: Pain 12 Tab 0    insulin lispro (HUMALOG) 100 unit/mL injection 12 Units by SubCUTAneous route nightly. Indications: type 2 diabetes mellitus 1 Vial 0    gabapentin (NEURONTIN) 300 mg capsule Take 2 Caps by mouth three (3) times daily for 30 days. Indications: NEUROPATHIC PAIN 180 Cap 0    ondansetron (ZOFRAN ODT) 4 mg disintegrating tablet Take 1 Tab by mouth every eight (8) hours as needed for Nausea. 9 Tab 0    sucralfate (CARAFATE) 1 gram tablet Take 1 Tab by mouth four (4) times daily. Indications: PREVENTION OF STRESS ULCER 30 Tab 0    amitriptyline (ELAVIL) 75 mg tablet Take 1 Tab by mouth nightly. Indications: Depression 30 Tab 0    ascorbic acid, vitamin C, (VITAMIN C) 500 mg tablet Take 1 Tab by mouth daily. Indications: VITAMIN C DEFICIENCY 30 Tab 0    cholecalciferol, vitamin D3, 2,000 unit tab Take 1 Tab by mouth daily. Indications: PREVENTION OF VITAMIN D DEFICIENCY 30 Tab 0    cyanocobalamin (VITAMIN B-12) 1,000 mcg sublingual tablet Take 2 Tabs by mouth daily. Indications: PREVENTION OF VITAMIN B12 DEFICIENCY 30 Tab 0    ferrous sulfate 325 mg (65 mg iron) tablet Take 1 Tab by mouth two (2) times a day. Indications: IRON DEFICIENCY ANEMIA 60 Tab 0    folic acid 979 mcg tablet Take 1 Tab by mouth daily. 30 Tab 0    lipase-protease-amylase (CREON) 12,000-38,000 -60,000 unit capsule Take 2 Caps by mouth three (3) times daily (with meals). Indications: exocrine pancreatic insufficiency, dosage change 180 Cap 0    pantoprazole (PROTONIX) 40 mg tablet Take 1 Tab by mouth two (2) times a day.  Indications: EROSIVE ESOPHAGITIS, gastroesophageal reflux disease 60 Tab 0       Past History Past Medical History:  Past Medical History:   Diagnosis Date    Abdominal pain, generalized     [facility]     Diabetes ([facility] [address] [facility]     hypothyroid    Encounter for long-term (current) use of other medications     Essential hypertension     ETOH abuse     GERD (gastroesophageal reflux disease)     Heart failure (HCC)     Hyponatremia     Pain in the abdomen 11/2/2010    Pancreatitis     w/ abscess and pseudocyst    Pancreatitis     Psychiatric disorder     depression, anxiety    Tobacco abuse        Past Surgical History:  Past Surgical History:   Procedure Laterality Date    HX ABDOMINAL LAPAROSCOPY      PANCREATIC STENT    HX CHOLECYSTECTOMY         Family History:  Family History   Problem Relation Age of Onset    Heart Disease Father        Social History:  Social History   Substance Use Topics    Smoking status: Current Every Day Smoker     Packs/day: 0.50     Years: 0.00     Types: Cigarettes    Smokeless tobacco: Never Used    Alcohol use Yes       Allergies: Allergies   Allergen Reactions    Ampicillin-Sulbactam Rash    Pcn [Penicillins] Itching and Hives    Piperacillin-Tazobactam Rash    Pollen Extracts Rash    Seafood [Shellfish Containing Products] Hives     Other reaction(s): unknown  Has tolerated iohexol (iodine-containing contrast)  Only shrimp  Shrimp         Review of Systems       Review of Systems   Constitutional: Negative for fever. Skin: Positive for wound (Pain a NATALIE site). All other systems reviewed and are negative. Physical Exam     Visit Vitals    /67    Pulse 86    Temp 98.7 °F (37.1 °C)    Resp 10    Ht 6' (1.829 m)    Wt 63.5 kg (140 lb)    SpO2 100%    BMI 18.99 kg/m2       Physical Exam   Constitutional: He is oriented to person, place, and time. He appears well-developed and well-nourished. No distress. HENT:   Head: Normocephalic and atraumatic.    Mouth/Throat: Oropharynx is clear and moist.   Eyes: Conjunctivae and EOM are normal. Pupils are equal, round, and reactive to light. No scleral icterus. Neck: Normal range of motion. Neck supple. Cardiovascular: Normal rate, regular rhythm and normal heart sounds. No murmur heard. Pulmonary/Chest: Effort normal and breath sounds normal. No respiratory distress. Abdominal: Soft. Bowel sounds are normal. He exhibits no distension. There is no tenderness. 2cm ostomy site right flank w/ active drainage of bilious fluid, 10cm area of surrounding erythema    Musculoskeletal: He exhibits no edema. Lymphadenopathy:     He has no cervical adenopathy. Neurological: He is alert and oriented to person, place, and time. Coordination normal.   Skin: Skin is warm and dry. No rash noted. Psychiatric: He has a normal mood and affect. His behavior is normal.   Nursing note and vitals reviewed. Diagnostic Study Results     Labs -  Recent Results (from the past 12 hour(s))   CBC WITH AUTOMATED DIFF    Collection Time: 07/01/18  3:24 PM   Result Value Ref Range    WBC 5.8 4.6 - 13.2 K/uL    RBC 3.17 (L) 4.70 - 5.50 M/uL    HGB 9.9 (L) 13.0 - 16.0 g/dL    HCT 29.9 (L) 36.0 - 48.0 %    MCV 94.3 74.0 - 97.0 FL    MCH 31.2 24.0 - 34.0 PG    MCHC 33.1 31.0 - 37.0 g/dL    RDW 13.4 11.6 - 14.5 %    PLATELET 118 911 - 413 K/uL    MPV 9.3 9.2 - 11.8 FL    NEUTROPHILS 71 40 - 73 %    LYMPHOCYTES 17 (L) 21 - 52 %    MONOCYTES 10 3 - 10 %    EOSINOPHILS 2 0 - 5 %    BASOPHILS 0 0 - 2 %    ABS. NEUTROPHILS 4.1 1.8 - 8.0 K/UL    ABS. LYMPHOCYTES 1.0 0.9 - 3.6 K/UL    ABS. MONOCYTES 0.6 0.05 - 1.2 K/UL    ABS. EOSINOPHILS 0.1 0.0 - 0.4 K/UL    ABS.  BASOPHILS 0.0 0.0 - 0.06 K/UL    DF AUTOMATED     LIPASE    Collection Time: 07/01/18  3:24 PM   Result Value Ref Range    Lipase 34 (L) 73 - 184 U/L   METABOLIC PANEL, COMPREHENSIVE    Collection Time: 07/01/18  3:24 PM   Result Value Ref Range    Sodium 136 136 - 145 mmol/L    Potassium 4.1 3.5 - 5.5 mmol/L    Chloride 106 100 - 108 mmol/L    CO2 22 21 - 32 mmol/L    Anion gap 8 3.0 - 18 mmol/L    Glucose 458 (HH) 74 - 99 mg/dL    BUN 18 7.0 - 18 MG/DL    Creatinine 1.27 0.6 - 1.3 MG/DL    BUN/Creatinine ratio 14 12 - 20      GFR est AA >60 >60 ml/min/1.73m2    GFR est non-AA 59 (L) >60 ml/min/1.73m2    Calcium 7.9 (L) 8.5 - 10.1 MG/DL    Bilirubin, total 0.3 0.2 - 1.0 MG/DL    ALT (SGPT) 14 (L) 16 - 61 U/L    AST (SGOT) 10 (L) 15 - 37 U/L    Alk. phosphatase 129 (H) 45 - 117 U/L    Protein, total 6.1 (L) 6.4 - 8.2 g/dL    Albumin 2.5 (L) 3.4 - 5.0 g/dL    Globulin 3.6 2.0 - 4.0 g/dL    A-G Ratio 0.7 (L) 0.8 - 1.7     GLUCOSE, POC    Collection Time: 07/01/18  4:58 PM   Result Value Ref Range    Glucose (POC) 449 (HH) 70 - 110 mg/dL   GLUCOSTABILIZER    Collection Time: 07/01/18  5:10 PM   Result Value Ref Range    Glucose 449 mg/dL    Insulin order 7.8 units/hour    Insulin adminstered 7.8 units/hour    Multiplier 0.020     Low target 140 mg/dL    High target 180 mg/dL    D50 order 0.0 ml    D50 administered 0.00 ml    Minutes until next BG 60 min    Order initials ed     Administered initials ed        Radiologic Studies -   IR DRAIN PROCEDURE W CATH   Final Result   IMPRESSION:     1. Successful reinsertion of right upper quadrant drainage catheter. Medical Decision Making   I am the first provider for this patient. I reviewed the vital signs, available nursing notes, past medical history, past surgical history, family history and social history. Vital Signs-Reviewed the patient's vital signs.     Pulse Oximetry Analysis -  100% on room air (Interpretation) WNL    Records Reviewed: Nursing Notes and Old Medical Records (Time of Review: 2:54 PM)    ED Course: Progress Notes, Reevaluation, and Consults:    Provider Notes (Medical Decision Making):   MDM  Number of Diagnoses or Management Options  Hyperglycemia:   Pancreatic fistula:   Diagnosis management comments: H/o recent drain placed for known pancreatic fistula states became dislodged yesterday now increased pain surrounding area due to skin breakdown from drainage     Pt taken to IR for drain placement noted elevated glucose     Insulin infusion started        Amount and/or Complexity of Data Reviewed  Clinical lab tests: ordered and reviewed          Diagnosis     Clinical Impression:   1. Pancreatic fistula    2. Hyperglycemia        Disposition: Home    Follow-up Information     Follow up With Details Comments Contact [facility] EMERGENCY DEPT  As needed, If symptoms worsen [address] [place] [address]    Delia Slaughter MD Schedule an appointment as soon as possible for a visit for ED follow up  Vandanaalejandroashley Paulson  [phone]             Patient's Medications   Start Taking    No medications on file   Continue Taking    AMITRIPTYLINE (ELAVIL) 75 MG TABLET    Take 1 Tab by mouth nightly. Indications: Depression    ASCORBIC ACID, VITAMIN C, (VITAMIN C) 500 MG TABLET    Take 1 Tab by mouth daily. Indications: VITAMIN C DEFICIENCY    CHOLECALCIFEROL, VITAMIN D3, 2,000 UNIT TAB    Take 1 Tab by mouth daily. Indications: PREVENTION OF VITAMIN D DEFICIENCY    CYANOCOBALAMIN (VITAMIN B-12) 1,000 MCG SUBLINGUAL TABLET    Take 2 Tabs by mouth daily. Indications: PREVENTION OF VITAMIN B12 DEFICIENCY    FERROUS SULFATE 325 MG (65 MG IRON) TABLET    Take 1 Tab by mouth two (2) times a day. Indications: IRON DEFICIENCY ANEMIA    FOLIC ACID 854 MCG TABLET    Take 1 Tab by mouth daily. GABAPENTIN (NEURONTIN) 300 MG CAPSULE    Take 2 Caps by mouth three (3) times daily for 30 days. Indications: NEUROPATHIC PAIN    INSULIN LISPRO (HUMALOG) 100 UNIT/ML INJECTION    12 Units by SubCUTAneous route nightly. Indications: type 2 diabetes mellitus    LIPASE-PROTEASE-AMYLASE (CREON) 12,000-38,000 -60,000 UNIT CAPSULE    Take 2 Caps by mouth three (3) times daily (with meals).  Indications: exocrine pancreatic insufficiency, dosage change    ONDANSETRON (ZOFRAN ODT) 4 MG DISINTEGRATING TABLET    Take 1 Tab by mouth every eight (8) hours as needed for Nausea. OXYCODONE-ACETAMINOPHEN (PERCOCET)  MG PER TABLET    Take 1 Tab by mouth every six (6) hours as needed. Max Daily Amount: 4 Tabs. Indications: Pain    PANTOPRAZOLE (PROTONIX) 40 MG TABLET    Take 1 Tab by mouth two (2) times a day. Indications: EROSIVE ESOPHAGITIS, gastroesophageal reflux disease    PROMETHAZINE (PHENERGAN) 25 MG TABLET    Take 1 Tab by mouth every six (6) hours as needed. SUCRALFATE (CARAFATE) 1 GRAM TABLET    Take 1 Tab by mouth four (4) times daily. Indications: PREVENTION OF STRESS ULCER   These Medications have changed    No medications on file   Stop Taking    No medications on file     _______________________________    Attestations:  Scribe [employer]asight acting as a scribe for and in the presence of Lupis Garcia MD      July 01, 2018 at 2:54 PM       Provider Attestation:      I personally performed the services described in the documentation, reviewed the documentation, as recorded by the scribe in my presence, and it accurately and completely records my words and actions. July 01, 2018 at 2:54 PM - Lupis Garcia MD    _______________________________    Note:  Assuming care of patient at beginning of shift    7:04 PM  I, Herman Thomas MD, assumed care of patient at the beginning of my shift.    7:04 PM    Date: 7/1/2018  Patient Name: Barry Ramos    History of Presenting Illness     Chief Complaint   Patient presents with    Abscess       Nursing notes regarding the HPI and triage nursing notes were reviewed. Prior medical records were reviewed.      Current Facility-Administered Medications   Medication Dose Route Frequency Provider Last Rate Last Dose    insulin regular (NOVOLIN R, HUMULIN R) 100 Units in 0.9% sodium chloride 100 mL infusion  0-50 Units/hr IntraVENous TITRATE Lupis Garcia MD 2.9 mL/hr at 07/01/18 1818 2.9 Units/hr at 07/01/18 1818    glucose chewable tablet 16 g  4 Tab Oral PRN Maggy Myers MD        glucagon (GLUCAGEN) injection 1 mg  1 mg IntraMUSCular PRN Maggy Myers MD        dextrose (D50W) injection syrg 12.5-25 g  25-50 mL IntraVENous PRN Maggy Myers MD        lidocaine (XYLOCAINE) 10 mg/mL (1 %) injection 1-60 mL  1-60 mL IntraDERMal Multiple Tatiana Kincaid MD   6 mL at 07/01/18 1618    insulin regular (Novolin,Humulin) premix infusion             zinc oxide-white petrolatum (SENSI-CARE) 15-49 % ointment   Topical ONCE Maggy Myers MD        zinc oxide 20 % ointment   Topical NOW Maggy Myers MD         Current Outpatient Prescriptions   Medication Sig Dispense Refill    promethazine (PHENERGAN) 25 mg tablet Take 1 Tab by mouth every six (6) hours as needed. 6 Tab 0    oxyCODONE-acetaminophen (PERCOCET)  mg per tablet Take 1 Tab by mouth every six (6) hours as needed. Max Daily Amount: 4 Tabs. Indications: Pain 12 Tab 0    insulin lispro (HUMALOG) 100 unit/mL injection 12 Units by SubCUTAneous route nightly. Indications: type 2 diabetes mellitus 1 Vial 0    gabapentin (NEURONTIN) 300 mg capsule Take 2 Caps by mouth three (3) times daily for 30 days. Indications: NEUROPATHIC PAIN 180 Cap 0    ondansetron (ZOFRAN ODT) 4 mg disintegrating tablet Take 1 Tab by mouth every eight (8) hours as needed for Nausea. 9 Tab 0    sucralfate (CARAFATE) 1 gram tablet Take 1 Tab by mouth four (4) times daily. Indications: PREVENTION OF STRESS ULCER 30 Tab 0    amitriptyline (ELAVIL) 75 mg tablet Take 1 Tab by mouth nightly. Indications: Depression 30 Tab 0    ascorbic acid, vitamin C, (VITAMIN C) 500 mg tablet Take 1 Tab by mouth daily. Indications: VITAMIN C DEFICIENCY 30 Tab 0    cholecalciferol, vitamin D3, 2,000 unit tab Take 1 Tab by mouth daily.  Indications: PREVENTION OF VITAMIN D DEFICIENCY 30 Tab 0    cyanocobalamin (VITAMIN B-12) 1,000 mcg sublingual tablet Take 2 Tabs by mouth daily. Indications: PREVENTION OF VITAMIN B12 DEFICIENCY 30 Tab 0    ferrous sulfate 325 mg (65 mg iron) tablet Take 1 Tab by mouth two (2) times a day. Indications: IRON DEFICIENCY ANEMIA 60 Tab 0    folic acid 246 mcg tablet Take 1 Tab by mouth daily. 30 Tab 0    lipase-protease-amylase (CREON) 12,000-38,000 -60,000 unit capsule Take 2 Caps by mouth three (3) times daily (with meals). Indications: exocrine pancreatic insufficiency, dosage change 180 Cap 0    pantoprazole (PROTONIX) 40 mg tablet Take 1 Tab by mouth two (2) times a day. Indications: EROSIVE ESOPHAGITIS, gastroesophageal reflux disease 60 Tab 0       Past History     Past Medical History:  Past Medical History:   Diagnosis Date    Abdominal pain, generalized     Chronic [address]itis ([address]     Diabetes ([facility] [address]     hypothyroid    Encounter for long-term (current) use of other medications     Essential hypertension     ETOH abuse     GERD (gastroesophageal reflux disease)     Heart failure (HCC)     Hyponatremia     Pain in the abdomen 11/2/2010    Pancreatitis     w/ abscess and pseudocyst    Pancreatitis     Psychiatric disorder     depression, anxiety    Tobacco abuse        Past Surgical History:  Past Surgical History:   Procedure Laterality Date    HX ABDOMINAL LAPAROSCOPY      PANCREATIC STENT    HX CHOLECYSTECTOMY         Family History:  Family History   Problem Relation Age of Onset    Heart Disease Father        Social History:  Social History   Substance Use Topics    Smoking status: Current Every Day Smoker     Packs/day: 0.50     Years: 0.00     Types: Cigarettes    Smokeless tobacco: Never Used    Alcohol use Yes       Allergies:   Allergies   Allergen Reactions    Ampicillin-Sulbactam Rash    Pcn [Penicillins] Itching and Hives    Piperacillin-Tazobactam Rash    Pollen Extracts Rash    Seafood [Shellfish Containing Products] Hives     Other reaction(s): unknown  Has tolerated iohexol (iodine-containing contrast)  Only shrimp  Shrimp       Patient's primary care provider (as noted in EPIC):  Janett Joy MD    Abnormal lab results from this emergency department encounter:  Labs Reviewed   CBC WITH AUTOMATED DIFF - Abnormal; Notable for the following:        Result Value    RBC 3.17 (*)     HGB 9.9 (*)     HCT 29.9 (*)     LYMPHOCYTES 17 (*)     All other components within normal limits   LIPASE - Abnormal; Notable for the following:     Lipase 34 (*)     All other components within normal limits   METABOLIC PANEL, COMPREHENSIVE - Abnormal; Notable for the following:     Glucose 458 (*)     GFR est non-AA 59 (*)     Calcium 7.9 (*)     ALT (SGPT) 14 (*)     AST (SGOT) 10 (*)     Alk. phosphatase 129 (*)     Protein, total 6.1 (*)     Albumin 2.5 (*)     A-G Ratio 0.7 (*)     All other components within normal limits   HEMOGLOBIN A1C WITH EAG - Abnormal; Notable for the following:     Hemoglobin A1c 7.9 (*)     All other components within normal limits   GLUCOSE, POC - Abnormal; Notable for the following:     Glucose (POC) 449 (*)     All other components within normal limits   GLUCOSE, POC - Abnormal; Notable for the following:     Glucose (POC) 350 (*)     All other components within normal limits   GLUCOSTABILIZER   GLUCOSTABILIZER       Lab values for this patient within approximately the last 12 hours:  Recent Results (from the past 12 hour(s))   CBC WITH AUTOMATED DIFF    Collection Time: 07/01/18  3:24 PM   Result Value Ref Range    WBC 5.8 4.6 - 13.2 K/uL    RBC 3.17 (L) 4.70 - 5.50 M/uL    HGB 9.9 (L) 13.0 - 16.0 g/dL    HCT 29.9 (L) 36.0 - 48.0 %    MCV 94.3 74.0 - 97.0 FL    MCH 31.2 24.0 - 34.0 PG    MCHC 33.1 31.0 - 37.0 g/dL    RDW 13.4 11.6 - 14.5 %    PLATELET 630 186 - 257 K/uL    MPV 9.3 9.2 - 11.8 FL    NEUTROPHILS 71 40 - 73 %    LYMPHOCYTES 17 (L) 21 - 52 %    MONOCYTES 10 3 - 10 %    EOSINOPHILS 2 0 - 5 %    BASOPHILS 0 0 - 2 %    ABS. NEUTROPHILS 4.1 1.8 - 8.0 K/UL    ABS.  LYMPHOCYTES 1.0 0.9 - 3.6 K/UL    ABS. MONOCYTES 0.6 0.05 - 1.2 K/UL    ABS. EOSINOPHILS 0.1 0.0 - 0.4 K/UL    ABS. BASOPHILS 0.0 0.0 - 0.06 K/UL    DF AUTOMATED     LIPASE    Collection Time: 07/01/18  3:24 PM   Result Value Ref Range    Lipase 34 (L) 73 - 637 U/L   METABOLIC PANEL, COMPREHENSIVE    Collection Time: 07/01/18  3:24 PM   Result Value Ref Range    Sodium 136 136 - 145 mmol/L    Potassium 4.1 3.5 - 5.5 mmol/L    Chloride 106 100 - 108 mmol/L    CO2 22 21 - 32 mmol/L    Anion gap 8 3.0 - 18 mmol/L    Glucose 458 (HH) 74 - 99 mg/dL    BUN 18 7.0 - 18 MG/DL    Creatinine 1.27 0.6 - 1.3 MG/DL    BUN/Creatinine ratio 14 12 - 20      GFR est AA >60 >60 ml/min/1.73m2    GFR est non-AA 59 (L) >60 ml/min/1.73m2    Calcium 7.9 (L) 8.5 - 10.1 MG/DL    Bilirubin, total 0.3 0.2 - 1.0 MG/DL    ALT (SGPT) 14 (L) 16 - 61 U/L    AST (SGOT) 10 (L) 15 - 37 U/L    Alk.  phosphatase 129 (H) 45 - 117 U/L    Protein, total 6.1 (L) 6.4 - 8.2 g/dL    Albumin 2.5 (L) 3.4 - 5.0 g/dL    Globulin 3.6 2.0 - 4.0 g/dL    A-G Ratio 0.7 (L) 0.8 - 1.7     HEMOGLOBIN A1C WITH EAG    Collection Time: 07/01/18  3:24 PM   Result Value Ref Range    Hemoglobin A1c 7.9 (H) 4.2 - 5.6 %    Est. average glucose 180 mg/dL   GLUCOSE, POC    Collection Time: 07/01/18  4:58 PM   Result Value Ref Range    Glucose (POC) 449 (HH) 70 - 110 mg/dL   GLUCOSTABILIZER    Collection Time: 07/01/18  5:10 PM   Result Value Ref Range    Glucose 449 mg/dL    Insulin order 7.8 units/hour    Insulin adminstered 7.8 units/hour    Multiplier 0.020     Low target 140 mg/dL    High target 180 mg/dL    D50 order 0.0 ml    D50 administered 0.00 ml    Minutes until next BG 60 min    Order initials ed     Administered initials ed    GLUCOSE, POC    Collection Time: 07/01/18  6:10 PM   Result Value Ref Range    Glucose (POC) 350 (H) 70 - 110 mg/dL   GLUCOSTABILIZER    Collection Time: 07/01/18  6:17 PM   Result Value Ref Range    Glucose 350 mg/dL    Insulin order 2.9 units/hour Insulin adminstered 2.9 units/hour    Multiplier 0.010     Low target 140 mg/dL    High target 180 mg/dL    D50 order 0.0 ml    D50 administered 0.00 ml    Minutes until next BG 60 min    Order initials flj     Administered initials radha        Radiologist and cardiologist interpretations if available at time of this note:  Radiology results:  Ir Drain Procedure W Cath    Result Date: 7/1/2018  Right upper quadrant drainage catheter insertion: INDICATION: Chronic enterocutaneous fistula. History of right upper quadrant drainage catheter for at least 5 years. Technique/findings: Written informed consent was obtained. Patient's right lateral abdominal wall was prepped and draped in sterile fashion and lidocaine was used for local anesthesia. Using a RingMDson wire and Perri catheter, the fistula tract was selected. Contrast was injected demonstrating communication with the duodenum. Over an Amplatz superstiff wire, a 16 Nepali locking catheter was placed adjacent to the duodenum. The catheter was sutured to the skin with several 2-0 Ethilon sutures. Patient tolerated the procedure well and there were no immediate complications. Preprocedure timeout: 1615 hours. Radiation dose (reference air kerma):  21 mGy. GUIDANCE: Fluoroscopy guidance was used to position (and confirm the position of) the catheter. Image(s) saved in PACS: Fluoroscopy     IMPRESSION: 1. Successful reinsertion of right upper quadrant drainage catheter.          Medication(s) ordered for patient during this emergency visit encounter:  Medications   insulin regular (NOVOLIN R, HUMULIN R) 100 Units in 0.9% sodium chloride 100 mL infusion (2.9 Units/hr IntraVENous Rate Change 7/1/18 1818)   glucose chewable tablet 16 g (not administered)   glucagon (GLUCAGEN) injection 1 mg (not administered)   dextrose (D50W) injection syrg 12.5-25 g (not administered)   lidocaine (XYLOCAINE) 10 mg/mL (1 %) injection 1-60 mL (6 mL IntraDERMal Given 7/1/18 1618) insulin regular (Novolin,Humulin) premix infusion (not administered)   zinc oxide-white petrolatum (SENSI-CARE) 15-49 % ointment (not administered)   zinc oxide 20 % ointment (not administered)   HYDROmorphone (DILAUDID) injection 1 mg (1 mg IntraVENous Given 7/1/18 1528)   ondansetron (ZOFRAN) injection 4 mg (4 mg IntraVENous Given 7/1/18 1523)   sodium chloride 0.9 % bolus infusion 1,000 mL (0 mL IntraVENous IV Completed 7/1/18 1819)   iopamidol (ISOVUE 300) 61 % contrast injection 1-150 mL (9 mL IntraVENous Given 7/1/18 1638)   heparinized saline 2 units/mL infusion 1,000 Units (1,000 Units Irrigation New Bag 7/1/18 1613)   oxyCODONE-acetaminophen (PERCOCET) 5-325 mg per tablet 1 Tab (1 Tab Oral Given 7/1/18 1705)       Pt care assumed from Dr. Francine Nicole , ED provider. Pt complaint(s), current treatment plan, progression and available diagnostic results have been discussed thoroughly. Rounding occurred: no  Intended Disposition: Home   Pending diagnostic reports and/or labs (please list): Improvement of FBS to an acceptable range. Patient is improved, resting quietly and comfortably. The patient will be discharged home. The patient was reassured that these symptoms do not appear to represent a serious or life threatening condition at this time. Warning signs of worsening condition were discussed and understood by the patient. Based on patient's age, coexisting illness, exam, and the results of this ED evaluation, the decision to treat as an outpatient was made. Based on the information available at time of discharge, acute pathology requiring immediate intervention was deemed relative unlikely. While it is impossible to completely exclude the possibility of underlying serious disease or worsening of condition, I feel the relative likelihood is extremely low.  I discussed this uncertainty with the patient, who understood ED evaluation and treatment and felt comfortable with the outpatient treatment plan. All questions regarding care, test results, and follow up were answered. The patient is stable and appropriate to discharge. They understand that they should return to the emergency department for any new or worsening symptoms. I stressed the importance of follow up for repeat assessment and possibly further evaluation/treatment. Coding Diagnoses     Clinical Impression:   1. Pancreatic fistula    2. Hyperglycemia        Disposition     Disposition:  Home. Deepthi Garza M.D.   [facility] Board Certified Emergency Physician

## 2018-07-01 NOTE — ED TRIAGE NOTES
Pt came in by EMS reports his NATALIE drain fell out. Pt reports that he has a pancreatic fistula that caused an abscess & had a NATALIE drain placed RLQ of abdomen on Friday, pt reports NATALIE drain fell out Saturday & pt decided to come to ER due to drainage & pain on skin around opening where drain was.

## 2018-07-02 NOTE — DISCHARGE INSTRUCTIONS
Learning About High Blood Sugar  What is high blood sugar? Your body turns the food you eat into glucose (sugar), which it uses for energy. But if your body isn't able to use the sugar right away, it can build up in your blood and lead to high blood sugar. When the amount of sugar in your blood stays too high for too much of the time, you may have diabetes. Diabetes is a disease that can cause serious health problems. The good news is that lifestyle changes may help you get your blood sugar back to normal and avoid or delay diabetes. What causes high blood sugar? Sugar (glucose) can build up in your blood if you:  · Are overweight. · Have a family history of diabetes. · Take certain medicines, such as steroids. What are the symptoms? Having high blood sugar may not cause any symptoms at all. Or it may make you feel very thirsty or very hungry. You may also urinate more often than usual, have blurry vision, or lose weight without trying. How is high blood sugar treated? You can take steps to lower your blood sugar level if you understand what makes it get higher. Your doctor may want you to learn how to test your blood sugar level at home. Then you can see how illness, stress, or different kinds of food or medicine raise or lower your blood sugar level. Other tests may be needed to see if you have diabetes. How can you prevent high blood sugar? · Watch your weight. If you're overweight, losing just a small amount of weight may help. Reducing fat around your waist is most important. · Limit the amount of calories, sweets, and unhealthy fat you eat. Ask your doctor if a dietitian can help you. A registered dietitian can help you create meal plans that fit your lifestyle. · Get at least 30 minutes of exercise on most days of the week. Exercise helps control your blood sugar. It also helps you maintain a healthy weight. Walking is a good choice.  You also may want to do other activities, such as running, swimming, cycling, or playing tennis or team sports. · If your doctor prescribed medicines, take them exactly as prescribed. Call your doctor if you think you are having a problem with your medicine. You will get more details on the specific medicines your doctor prescribes. Follow-up care is a key part of your treatment and safety. Be sure to make and go to all appointments, and call your doctor if you are having problems. It's also a good idea to know your test results and keep a list of the medicines you take. Where can you learn more? Go to http://thiago-mikhail.info/. Enter O108 in the search box to learn more about \"Learning About High Blood Sugar. \"  Current as of: March 13, 2017  Content Version: 11.4  © 6343-6030 [employer]. Care instructions adapted under license by [employer] ([employer]) (which disclaims liability or warranty for this information). If you have questions about a medical condition or this instruction, always ask your healthcare professional. [address] any warranty or liability for your use of this information.

## 2018-07-02 NOTE — ED NOTES
I have reviewed discharge instruction and prescriptions with patient. Patient verbalized understanding and has no further questions at this time. Education taught and patient verbalized understanding of education. Teach back method used. Mediport left chest deacessel. Armband removed and shredded per patients request.    Patients pain 3/10. Belongings given to pat;ient. Patient discharged with [facility] transport to home with mother.

## 2018-07-05 ENCOUNTER — HOME CARE VISIT (OUTPATIENT)
Dept: HOME HEALTH SERVICES | Facility: HOME HEALTH | Age: 53
End: 2018-07-05

## 2018-07-28 ENCOUNTER — HOSPITAL ENCOUNTER (INPATIENT)
Age: 53
LOS: 7 days | Discharge: HOME | DRG: 720 | End: 2018-08-04
Attending: EMERGENCY MEDICINE | Admitting: HOSPITALIST
Payer: MEDICAID

## 2018-07-28 ENCOUNTER — APPOINTMENT (OUTPATIENT)
Dept: CT IMAGING | Age: 53
DRG: 720 | End: 2018-07-28
Attending: PHYSICIAN ASSISTANT
Payer: MEDICAID

## 2018-07-28 ENCOUNTER — APPOINTMENT (OUTPATIENT)
Dept: GENERAL RADIOLOGY | Age: 53
DRG: 720 | End: 2018-07-28
Attending: PHYSICIAN ASSISTANT
Payer: MEDICAID

## 2018-07-28 DIAGNOSIS — N17.9 ACUTE KIDNEY INJURY (HCC): ICD-10-CM

## 2018-07-28 DIAGNOSIS — L02.91 ABSCESS: ICD-10-CM

## 2018-07-28 DIAGNOSIS — R53.1 WEAKNESS: ICD-10-CM

## 2018-07-28 DIAGNOSIS — K86.0 ALCOHOL-INDUCED CHRONIC PANCREATITIS (HCC): ICD-10-CM

## 2018-07-28 DIAGNOSIS — I95.9 HYPOTENSION, UNSPECIFIED HYPOTENSION TYPE: ICD-10-CM

## 2018-07-28 DIAGNOSIS — K63.2 ENTEROCUTANEOUS FISTULA: ICD-10-CM

## 2018-07-28 DIAGNOSIS — G89.29 OTHER CHRONIC PAIN: Chronic | ICD-10-CM

## 2018-07-28 DIAGNOSIS — R10.13 ABDOMINAL PAIN, EPIGASTRIC: ICD-10-CM

## 2018-07-28 DIAGNOSIS — A41.9 SEPSIS, DUE TO UNSPECIFIED ORGANISM: Primary | ICD-10-CM

## 2018-07-28 LAB
ALBUMIN SERPL-MCNC: 3.4 G/DL (ref 3.4–5)
ALBUMIN/GLOB SERPL: 0.6 {RATIO} (ref 0.8–1.7)
ALP SERPL-CCNC: 299 U/L (ref 45–117)
ALT SERPL-CCNC: 99 U/L (ref 16–61)
AMORPH CRY URNS QL MICRO: ABNORMAL
ANION GAP SERPL CALC-SCNC: 10 MMOL/L (ref 3–18)
ANION GAP SERPL CALC-SCNC: 8 MMOL/L (ref 3–18)
APPEARANCE UR: ABNORMAL
APTT PPP: 24.1 SEC (ref 23–36.4)
AST SERPL-CCNC: 34 U/L (ref 15–37)
BACTERIA URNS QL MICRO: ABNORMAL /HPF
BASOPHILS # BLD: 0 K/UL (ref 0–0.1)
BASOPHILS NFR BLD: 0 % (ref 0–2)
BILIRUB SERPL-MCNC: 0.3 MG/DL (ref 0.2–1)
BILIRUB UR QL: ABNORMAL
BUN SERPL-MCNC: 20 MG/DL (ref 7–18)
BUN SERPL-MCNC: 24 MG/DL (ref 7–18)
BUN/CREAT SERPL: 11 (ref 12–20)
BUN/CREAT SERPL: 13 (ref 12–20)
CALCIUM SERPL-MCNC: 8.1 MG/DL (ref 8.5–10.1)
CALCIUM SERPL-MCNC: 9.3 MG/DL (ref 8.5–10.1)
CHLORIDE SERPL-SCNC: 106 MMOL/L (ref 100–108)
CHLORIDE SERPL-SCNC: 112 MMOL/L (ref 100–108)
CK MB CFR SERPL CALC: NORMAL % (ref 0–4)
CK MB SERPL-MCNC: <1 NG/ML (ref 5–25)
CK SERPL-CCNC: 39 U/L (ref 39–308)
CO2 SERPL-SCNC: 16 MMOL/L (ref 21–32)
CO2 SERPL-SCNC: 19 MMOL/L (ref 21–32)
COLOR UR: ABNORMAL
CREAT SERPL-MCNC: 1.83 MG/DL (ref 0.6–1.3)
CREAT SERPL-MCNC: 1.87 MG/DL (ref 0.6–1.3)
DIFFERENTIAL METHOD BLD: ABNORMAL
EOSINOPHIL # BLD: 0.1 K/UL (ref 0–0.4)
EOSINOPHIL NFR BLD: 1 % (ref 0–5)
EPITH CASTS URNS QL MICRO: ABNORMAL /LPF (ref 0–5)
ERYTHROCYTE [DISTWIDTH] IN BLOOD BY AUTOMATED COUNT: 15.5 % (ref 11.6–14.5)
EST. AVERAGE GLUCOSE BLD GHB EST-MCNC: 180 MG/DL
ETHANOL SERPL-MCNC: <3 MG/DL (ref 0–3)
GLOBULIN SER CALC-MCNC: 5.5 G/DL (ref 2–4)
GLUCOSE BLD STRIP.AUTO-MCNC: 253 MG/DL (ref 70–110)
GLUCOSE SERPL-MCNC: 269 MG/DL (ref 74–99)
GLUCOSE SERPL-MCNC: 288 MG/DL (ref 74–99)
GLUCOSE UR STRIP.AUTO-MCNC: 100 MG/DL
HBA1C MFR BLD: 7.9 % (ref 4.2–5.6)
HCT VFR BLD AUTO: 39.6 % (ref 36–48)
HGB BLD-MCNC: 12.9 G/DL (ref 13–16)
HGB UR QL STRIP: NEGATIVE
INR PPP: 0.9 (ref 0.8–1.2)
KETONES UR QL STRIP.AUTO: ABNORMAL MG/DL
LACTATE BLD-SCNC: 1.5 MMOL/L (ref 0.4–2)
LEUKOCYTE ESTERASE UR QL STRIP.AUTO: ABNORMAL
LIPASE SERPL-CCNC: 48 U/L (ref 73–393)
LYMPHOCYTES # BLD: 1.6 K/UL (ref 0.9–3.6)
LYMPHOCYTES NFR BLD: 9 % (ref 21–52)
MCH RBC QN AUTO: 32.3 PG (ref 24–34)
MCHC RBC AUTO-ENTMCNC: 32.6 G/DL (ref 31–37)
MCV RBC AUTO: 99 FL (ref 74–97)
MONOCYTES # BLD: 0.8 K/UL (ref 0.05–1.2)
MONOCYTES NFR BLD: 5 % (ref 3–10)
NEUTS SEG # BLD: 15.1 K/UL (ref 1.8–8)
NEUTS SEG NFR BLD: 85 % (ref 40–73)
NITRITE UR QL STRIP.AUTO: NEGATIVE
PH UR STRIP: 5 [PH] (ref 5–8)
PLATELET # BLD AUTO: 508 K/UL (ref 135–420)
PMV BLD AUTO: 10.1 FL (ref 9.2–11.8)
POTASSIUM SERPL-SCNC: 6.2 MMOL/L (ref 3.5–5.5)
POTASSIUM SERPL-SCNC: 6.4 MMOL/L (ref 3.5–5.5)
PROT SERPL-MCNC: 8.9 G/DL (ref 6.4–8.2)
PROT UR STRIP-MCNC: 300 MG/DL
PROTHROMBIN TIME: 11.7 SEC (ref 11.5–15.2)
RBC # BLD AUTO: 4 M/UL (ref 4.7–5.5)
RBC #/AREA URNS HPF: 0 /HPF (ref 0–5)
SODIUM SERPL-SCNC: 135 MMOL/L (ref 136–145)
SODIUM SERPL-SCNC: 136 MMOL/L (ref 136–145)
SP GR UR REFRACTOMETRY: 1.02 (ref 1–1.03)
TROPONIN I SERPL-MCNC: <0.02 NG/ML (ref 0–0.04)
UROBILINOGEN UR QL STRIP.AUTO: 1 EU/DL (ref 0.2–1)
WBC # BLD AUTO: 17.7 K/UL (ref 4.6–13.2)
WBC URNS QL MICRO: ABNORMAL /HPF (ref 0–4)

## 2018-07-28 PROCEDURE — 83690 ASSAY OF LIPASE: CPT | Performed by: PHYSICIAN ASSISTANT

## 2018-07-28 PROCEDURE — 82962 GLUCOSE BLOOD TEST: CPT

## 2018-07-28 PROCEDURE — 99285 EMERGENCY DEPT VISIT HI MDM: CPT

## 2018-07-28 PROCEDURE — 96367 TX/PROPH/DG ADDL SEQ IV INF: CPT

## 2018-07-28 PROCEDURE — 74011636637 HC RX REV CODE- 636/637: Performed by: HOSPITALIST

## 2018-07-28 PROCEDURE — 83036 HEMOGLOBIN GLYCOSYLATED A1C: CPT | Performed by: HOSPITALIST

## 2018-07-28 PROCEDURE — 74011250636 HC RX REV CODE- 250/636: Performed by: HOSPITALIST

## 2018-07-28 PROCEDURE — 96361 HYDRATE IV INFUSION ADD-ON: CPT

## 2018-07-28 PROCEDURE — 74011000250 HC RX REV CODE- 250: Performed by: HOSPITALIST

## 2018-07-28 PROCEDURE — 74011250637 HC RX REV CODE- 250/637

## 2018-07-28 PROCEDURE — 96375 TX/PRO/DX INJ NEW DRUG ADDON: CPT

## 2018-07-28 PROCEDURE — 74011250636 HC RX REV CODE- 250/636: Performed by: PHYSICIAN ASSISTANT

## 2018-07-28 PROCEDURE — 85610 PROTHROMBIN TIME: CPT | Performed by: PHYSICIAN ASSISTANT

## 2018-07-28 PROCEDURE — 83605 ASSAY OF LACTIC ACID: CPT

## 2018-07-28 PROCEDURE — 71045 X-RAY EXAM CHEST 1 VIEW: CPT

## 2018-07-28 PROCEDURE — 85730 THROMBOPLASTIN TIME PARTIAL: CPT | Performed by: PHYSICIAN ASSISTANT

## 2018-07-28 PROCEDURE — 65660000001 HC RM ICU INTERMED STEPDOWN

## 2018-07-28 PROCEDURE — 82550 ASSAY OF CK (CPK): CPT | Performed by: PHYSICIAN ASSISTANT

## 2018-07-28 PROCEDURE — 74011000258 HC RX REV CODE- 258: Performed by: HOSPITALIST

## 2018-07-28 PROCEDURE — 80307 DRUG TEST PRSMV CHEM ANLYZR: CPT | Performed by: PHYSICIAN ASSISTANT

## 2018-07-28 PROCEDURE — 74011250637 HC RX REV CODE- 250/637: Performed by: PHYSICIAN ASSISTANT

## 2018-07-28 PROCEDURE — 96365 THER/PROPH/DIAG IV INF INIT: CPT

## 2018-07-28 PROCEDURE — 74011250637 HC RX REV CODE- 250/637: Performed by: HOSPITALIST

## 2018-07-28 PROCEDURE — 74176 CT ABD & PELVIS W/O CONTRAST: CPT

## 2018-07-28 PROCEDURE — 80053 COMPREHEN METABOLIC PANEL: CPT | Performed by: PHYSICIAN ASSISTANT

## 2018-07-28 PROCEDURE — 85025 COMPLETE CBC W/AUTO DIFF WBC: CPT | Performed by: PHYSICIAN ASSISTANT

## 2018-07-28 PROCEDURE — 74011000258 HC RX REV CODE- 258: Performed by: PHYSICIAN ASSISTANT

## 2018-07-28 PROCEDURE — 74011000250 HC RX REV CODE- 250: Performed by: PHYSICIAN ASSISTANT

## 2018-07-28 PROCEDURE — 87040 BLOOD CULTURE FOR BACTERIA: CPT | Performed by: PHYSICIAN ASSISTANT

## 2018-07-28 PROCEDURE — 70450 CT HEAD/BRAIN W/O DYE: CPT

## 2018-07-28 PROCEDURE — 93005 ELECTROCARDIOGRAM TRACING: CPT

## 2018-07-28 PROCEDURE — 81001 URINALYSIS AUTO W/SCOPE: CPT | Performed by: PHYSICIAN ASSISTANT

## 2018-07-28 RX ORDER — SODIUM POLYSTYRENE SULFONATE 15 G/60ML
30 SUSPENSION ORAL; RECTAL
Status: COMPLETED | OUTPATIENT
Start: 2018-07-28 | End: 2018-07-28

## 2018-07-28 RX ORDER — ACETAMINOPHEN 325 MG/1
650 TABLET ORAL
Status: DISCONTINUED | OUTPATIENT
Start: 2018-07-28 | End: 2018-07-29

## 2018-07-28 RX ORDER — LEVOFLOXACIN 5 MG/ML
750 INJECTION, SOLUTION INTRAVENOUS EVERY 24 HOURS
Status: DISCONTINUED | OUTPATIENT
Start: 2018-07-28 | End: 2018-07-31

## 2018-07-28 RX ORDER — MELATONIN
2000 DAILY
Status: DISCONTINUED | OUTPATIENT
Start: 2018-07-29 | End: 2018-08-04 | Stop reason: HOSPADM

## 2018-07-28 RX ORDER — SUCRALFATE 1 G/1
1 TABLET ORAL 4 TIMES DAILY
Status: DISCONTINUED | OUTPATIENT
Start: 2018-07-28 | End: 2018-08-04 | Stop reason: HOSPADM

## 2018-07-28 RX ORDER — MAGNESIUM SULFATE 100 %
4 CRYSTALS MISCELLANEOUS AS NEEDED
Status: DISCONTINUED | OUTPATIENT
Start: 2018-07-28 | End: 2018-08-04 | Stop reason: HOSPADM

## 2018-07-28 RX ORDER — SODIUM POLYSTYRENE SULFONATE 15 G/60ML
15 SUSPENSION ORAL; RECTAL
Status: COMPLETED | OUTPATIENT
Start: 2018-07-28 | End: 2018-07-28

## 2018-07-28 RX ORDER — METRONIDAZOLE 500 MG/100ML
500 INJECTION, SOLUTION INTRAVENOUS EVERY 8 HOURS
Status: DISCONTINUED | OUTPATIENT
Start: 2018-07-28 | End: 2018-07-31

## 2018-07-28 RX ORDER — FENTANYL CITRATE 50 UG/ML
25 INJECTION, SOLUTION INTRAMUSCULAR; INTRAVENOUS
Status: COMPLETED | OUTPATIENT
Start: 2018-07-28 | End: 2018-07-28

## 2018-07-28 RX ORDER — CALCIUM GLUCONATE 94 MG/ML
INJECTION, SOLUTION INTRAVENOUS
Status: DISPENSED
Start: 2018-07-28 | End: 2018-07-29

## 2018-07-28 RX ORDER — SODIUM CHLORIDE 0.9 % (FLUSH) 0.9 %
5-10 SYRINGE (ML) INJECTION AS NEEDED
Status: DISCONTINUED | OUTPATIENT
Start: 2018-07-28 | End: 2018-08-04 | Stop reason: HOSPADM

## 2018-07-28 RX ORDER — LANOLIN ALCOHOL/MO/W.PET/CERES
1000 CREAM (GRAM) TOPICAL DAILY
Status: DISCONTINUED | OUTPATIENT
Start: 2018-07-29 | End: 2018-08-04 | Stop reason: HOSPADM

## 2018-07-28 RX ORDER — DEXTROSE MONOHYDRATE AND SODIUM CHLORIDE 5; .45 G/100ML; G/100ML
150 INJECTION, SOLUTION INTRAVENOUS CONTINUOUS
Status: DISCONTINUED | OUTPATIENT
Start: 2018-07-28 | End: 2018-08-04 | Stop reason: HOSPADM

## 2018-07-28 RX ORDER — OXYCODONE AND ACETAMINOPHEN 10; 325 MG/1; MG/1
1 TABLET ORAL
Status: DISCONTINUED | OUTPATIENT
Start: 2018-07-28 | End: 2018-07-29

## 2018-07-28 RX ORDER — PROMETHAZINE HYDROCHLORIDE 25 MG/1
25 TABLET ORAL
Status: DISCONTINUED | OUTPATIENT
Start: 2018-07-28 | End: 2018-08-04 | Stop reason: HOSPADM

## 2018-07-28 RX ORDER — PANTOPRAZOLE SODIUM 40 MG/1
40 TABLET, DELAYED RELEASE ORAL 2 TIMES DAILY
Status: DISCONTINUED | OUTPATIENT
Start: 2018-07-29 | End: 2018-08-04 | Stop reason: HOSPADM

## 2018-07-28 RX ORDER — INSULIN LISPRO 100 [IU]/ML
INJECTION, SOLUTION INTRAVENOUS; SUBCUTANEOUS EVERY 6 HOURS
Status: DISCONTINUED | OUTPATIENT
Start: 2018-07-29 | End: 2018-07-29

## 2018-07-28 RX ORDER — FOLIC ACID 1 MG/1
1 TABLET ORAL DAILY
Status: DISCONTINUED | OUTPATIENT
Start: 2018-07-29 | End: 2018-08-04 | Stop reason: HOSPADM

## 2018-07-28 RX ORDER — LANOLIN ALCOHOL/MO/W.PET/CERES
325 CREAM (GRAM) TOPICAL 2 TIMES DAILY
Status: DISCONTINUED | OUTPATIENT
Start: 2018-07-29 | End: 2018-08-04 | Stop reason: HOSPADM

## 2018-07-28 RX ORDER — SUCRALFATE 1 G/1
TABLET ORAL
Status: COMPLETED
Start: 2018-07-28 | End: 2018-07-28

## 2018-07-28 RX ORDER — DEXTROSE 50 % IN WATER (D50W) INTRAVENOUS SYRINGE
25 ONCE
Status: COMPLETED | OUTPATIENT
Start: 2018-07-28 | End: 2018-07-28

## 2018-07-28 RX ORDER — DEXTROSE 50 % IN WATER (D50W) INTRAVENOUS SYRINGE
25-50 AS NEEDED
Status: DISCONTINUED | OUTPATIENT
Start: 2018-07-28 | End: 2018-08-04 | Stop reason: HOSPADM

## 2018-07-28 RX ORDER — SODIUM CHLORIDE 0.9 % (FLUSH) 0.9 %
5-10 SYRINGE (ML) INJECTION EVERY 8 HOURS
Status: DISCONTINUED | OUTPATIENT
Start: 2018-07-28 | End: 2018-08-04 | Stop reason: HOSPADM

## 2018-07-28 RX ORDER — ENOXAPARIN SODIUM 100 MG/ML
40 INJECTION SUBCUTANEOUS EVERY 24 HOURS
Status: DISCONTINUED | OUTPATIENT
Start: 2018-07-28 | End: 2018-08-04 | Stop reason: HOSPADM

## 2018-07-28 RX ORDER — ONDANSETRON 2 MG/ML
4 INJECTION INTRAMUSCULAR; INTRAVENOUS
Status: DISCONTINUED | OUTPATIENT
Start: 2018-07-28 | End: 2018-08-04 | Stop reason: HOSPADM

## 2018-07-28 RX ORDER — ASCORBIC ACID 250 MG
500 TABLET ORAL DAILY
Status: DISCONTINUED | OUTPATIENT
Start: 2018-07-29 | End: 2018-08-04 | Stop reason: HOSPADM

## 2018-07-28 RX ADMIN — Medication 10 ML: at 22:10

## 2018-07-28 RX ADMIN — SODIUM CHLORIDE 150 ML/HR: 900 INJECTION, SOLUTION INTRAVENOUS at 19:40

## 2018-07-28 RX ADMIN — LEVOFLOXACIN 750 MG: 5 INJECTION, SOLUTION INTRAVENOUS at 18:24

## 2018-07-28 RX ADMIN — OXYCODONE HYDROCHLORIDE AND ACETAMINOPHEN 1 TABLET: 10; 325 TABLET ORAL at 22:49

## 2018-07-28 RX ADMIN — ENOXAPARIN SODIUM 40 MG: 100 INJECTION SUBCUTANEOUS at 22:50

## 2018-07-28 RX ADMIN — FENTANYL CITRATE 25 MCG: 50 INJECTION, SOLUTION INTRAMUSCULAR; INTRAVENOUS at 18:46

## 2018-07-28 RX ADMIN — SUCRALFATE 1 G: 1 TABLET ORAL at 23:30

## 2018-07-28 RX ADMIN — Medication 1 G: at 22:51

## 2018-07-28 RX ADMIN — SODIUM POLYSTYRENE SULFONATE 30 G: 15 SUSPENSION ORAL; RECTAL at 22:49

## 2018-07-28 RX ADMIN — AZTREONAM 2 G: 2 INJECTION, POWDER, LYOPHILIZED, FOR SOLUTION INTRAMUSCULAR; INTRAVENOUS at 16:42

## 2018-07-28 RX ADMIN — INSULIN HUMAN 10 UNITS: 100 INJECTION, SOLUTION PARENTERAL at 22:49

## 2018-07-28 RX ADMIN — SODIUM CHLORIDE 770 ML: 900 INJECTION, SOLUTION INTRAVENOUS at 16:32

## 2018-07-28 RX ADMIN — DEXTROSE MONOHYDRATE 25 G: 25 INJECTION, SOLUTION INTRAVENOUS at 22:48

## 2018-07-28 RX ADMIN — DEXTROSE MONOHYDRATE AND SODIUM CHLORIDE 150 ML/HR: 5; .45 INJECTION, SOLUTION INTRAVENOUS at 22:53

## 2018-07-28 RX ADMIN — METRONIDAZOLE 500 MG: 500 INJECTION, SOLUTION INTRAVENOUS at 17:17

## 2018-07-28 RX ADMIN — SODIUM POLYSTYRENE SULFONATE 15 G: 15 SUSPENSION ORAL; RECTAL at 17:22

## 2018-07-28 RX ADMIN — SODIUM CHLORIDE 1000 ML: 900 INJECTION, SOLUTION INTRAVENOUS at 16:28

## 2018-07-28 NOTE — ED PROVIDER NOTES
EMERGENCY DEPARTMENT HISTORY AND PHYSICAL EXAM 
 
Date: 7/28/2018 Patient Name: Regan Soto 
 
History of Presenting Illness Chief Complaint Patient presents with  Extremity Weakness History Provided By: Patient, Patient's Mother and EMS Chief Complaint: extremity weakness Duration: 30-45  Minutes Timing:  Acute Location: bilateral legs, left arm Quality: numbness Severity: N/A Modifying Factors: hx of central cord compression, last admission for this at beginning of June Associated Symptoms: abdominal pain, inability to walk, hx of pancreatitis with pancreatic fistula Additional History (Context): Regan Soto is a 48 y.o. male with HTN, DM, hyponatremia, heart failure, DM, chronic pancreatitis, pancreatic abscess, ETOH abuse, depression, anxiety who presents with medics with C/O weakness to his left arm, bilaterally legs, with inability to walk in the past 30-45 minutes. Patient states he walked to a cab to go to a convenience store. States when he go to the convenience store, he could only walk a few steps and then found that his legs and his left arm were too weak. Medics were called, patient had concerning symptoms for stroke and + Martin and a code S was called/protocol started. Upon arrival, patient was not cooperative with a full neurological exam to include unwilling to hold his arms up to test for pronator drift and to test for leg drift. He has no speech changes or facial droop. Code S protocol was continued. After patient returned from CT, he started to complain of intense upper abdominal pain -- similar to his pancreatitis. He admits to drinking alcohol last week -- \"a couple of beers\". 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 This drain was pulled out several times over the course of June and eventually taken out and not reinserted in July.   Patient has not followed up with GI outpatient since his several admissions. He has been admitted to [place] [address] 5 times since the beginning of June for DKA, Pancreatitis complications. He denies chest pain, SOB, NVD, fevers, chills. PCP: Berenice Bragg MD 
 
Current Facility-Administered Medications Medication Dose Route Frequency Provider Last Rate Last Dose  sodium chloride (NS) flush 5-10 mL  5-10 mL IntraVENous PRN RACHEL Valenzuela      
 aztreonam (AZACTAM) 2 g in 0.9% sodium chloride (MBP/ADV) 100 mL MBP  2 g IntraVENous Q8H [place], [place]   Stopped at 07/28/18 9680  metroNIDAZOLE (FLAGYL) IVPB premix 500 mg  500 mg IntraVENous Q8H [place], [place]   Stopped at 07/28/18 3301  levoFLOXacin (LEVAQUIN) 750 mg in D5W IVPB  750 mg IntraVENous Q24H [place], [place] 100 mL/hr at 07/28/18 1824 750 mg at 07/28/18 1824 Current Outpatient Prescriptions Medication Sig Dispense Refill  promethazine (PHENERGAN) 25 mg tablet Take 1 Tab by mouth every six (6) hours as needed. 6 Tab 0  
 oxyCODONE-acetaminophen (PERCOCET)  mg per tablet Take 1 Tab by mouth every six (6) hours as needed. Max Daily Amount: 4 Tabs. Indications: Pain 12 Tab 0  
 insulin lispro (HUMALOG) 100 unit/mL injection 12 Units by SubCUTAneous route nightly. Indications: type 2 diabetes mellitus 1 Vial 0  
 ondansetron (ZOFRAN ODT) 4 mg disintegrating tablet Take 1 Tab by mouth every eight (8) hours as needed for Nausea. 9 Tab 0  
 sucralfate (CARAFATE) 1 gram tablet Take 1 Tab by mouth four (4) times daily. Indications: PREVENTION OF STRESS ULCER 30 Tab 0  
 amitriptyline (ELAVIL) 75 mg tablet Take 1 Tab by mouth nightly. Indications: Depression 30 Tab 0  
 ascorbic acid, vitamin C, (VITAMIN C) 500 mg tablet Take 1 Tab by mouth daily. Indications: VITAMIN C DEFICIENCY 30 Tab 0  cholecalciferol, vitamin D3, 2,000 unit tab Take 1 Tab by mouth daily.  Indications: PREVENTION OF VITAMIN D DEFICIENCY 30 Tab 0  
 cyanocobalamin (VITAMIN B-12) 1,000 mcg sublingual tablet Take 2 Tabs by mouth daily. Indications: PREVENTION OF VITAMIN B12 DEFICIENCY 30 Tab 0  
 ferrous sulfate 325 mg (65 mg iron) tablet Take 1 Tab by mouth two (2) times a day. Indications: IRON DEFICIENCY ANEMIA 60 Tab 0  
 folic acid 699 mcg tablet Take 1 Tab by mouth daily. 30 Tab 0  
 lipase-protease-amylase (CREON) 12,000-38,000 -60,000 unit capsule Take 2 Caps by mouth three (3) times daily (with meals). Indications: exocrine pancreatic insufficiency, dosage change 180 Cap 0  
 pantoprazole (PROTONIX) 40 mg tablet Take 1 Tab by mouth two (2) times a day. Indications: EROSIVE ESOPHAGITIS, gastroesophageal reflux disease 60 Tab 0 Past History Past Medical History: 
Past Medical History:  
Diagnosis Date  Abdominal pain, generalized  Chronic pancreatitis ([address]  Diabetes ([facility] [address]   
 hypothyroid  Encounter for long-term (current) use of other medications  Essential hypertension  ETOH abuse  GERD (gastroesophageal reflux disease)  Heart failure ([address]  Hyponatremia  Pain in the abdomen 11/2/2010  Pancreatitis w/ abscess and pseudocyst  
 Pancreatitis  Psychiatric disorder   
 depression, anxiety  Tobacco abuse Past Surgical History: 
Past Surgical History:  
Procedure Laterality Date  HX ABDOMINAL LAPAROSCOPY PANCREATIC STENT  
 HX CHOLECYSTECTOMY Family History: 
Family History Problem Relation Age of Onset  Heart Disease Father Social History: 
Social History Substance Use Topics  Smoking status: Current Every Day Smoker Packs/day: 0.50 Years: 0.00 Types: Cigarettes  Smokeless tobacco: Never Used  Alcohol use Yes Allergies: Allergies Allergen Reactions  Ampicillin-Sulbactam Rash  Pcn [Penicillins] Itching and Hives  Piperacillin-Tazobactam Rash  Pollen Extracts Rash  Seafood [Shellfish Containing Products] Hives   Other reaction(s): unknown Has tolerated iohexol (iodine-containing contrast) Only shrimp Shrimp Review of Systems Review of Systems Constitutional: Negative for chills and fever. Cardiovascular: Negative for chest pain and palpitations. Gastrointestinal: Positive for abdominal pain. Negative for diarrhea, nausea and vomiting. Genitourinary: Negative for dysuria, flank pain and frequency. Musculoskeletal: Negative. Skin: Negative. Neurological: Positive for weakness and numbness. All other systems reviewed and are negative. All Other Systems Negative Physical Exam  
 
Vitals:  
 07/28/18 1915 07/28/18 1930 07/28/18 2000 07/28/18 2044 BP: 111/83 (!) 85/61 92/62 105/71 Pulse: 87 91 90 Resp: 16 15 14 Temp:      
SpO2: 100% 100% 100% 100% Weight:      
Height:      
 
Physical Exam  
Constitutional: He appears well-developed. He appears distressed. Thin chronically ill appearing gentleman HENT:  
Head: Normocephalic and atraumatic. Eyes: EOM are normal. Pupils are equal, round, and reactive to light. No scleral icterus. Neck: Neck supple. Cardiovascular: Normal rate and regular rhythm. Exam reveals no gallop and no friction rub. No murmur heard. Pulmonary/Chest: Effort normal and breath sounds normal. No respiratory distress. He has no wheezes. He has no rales. He exhibits no tenderness. Port to the left upper chest  
Abdominal: Soft. Bowel sounds are normal. He exhibits no distension and no mass. There is tenderness. There is no rebound and no guarding.  
+ epigastric TTP. Hx of duodenal drain, none currently in place. Musculoskeletal: Normal range of motion. He exhibits no tenderness or deformity. Lymphadenopathy:  
  He has no cervical adenopathy. Neurological:  
Very poor effort during neuro exam.  Can move the arms and both legs, but will not cooperate with testing of pronator drift -- says he cannot.    Both arms falls almost immediately when testing. No facial droop, no dysarthria, pupils equal.   
Skin: Skin is warm and dry. No rash noted. He is not diaphoretic. No jaundice Psychiatric: He has a normal mood and affect. His behavior is normal.  
Nursing note and vitals reviewed. Diagnostic Study Results Labs - Recent Results (from the past 12 hour(s)) GLUCOSE, POC Collection Time: 07/28/18  4:01 PM  
Result Value Ref Range Glucose (POC) 253 (H) 70 - 110 mg/dL POC LACTIC ACID Collection Time: 07/28/18  4:23 PM  
Result Value Ref Range Lactic Acid (POC) 1.5 0.4 - 2.0 mmol/L  
CULTURE, BLOOD Collection Time: 07/28/18  4:25 PM  
Result Value Ref Range Special Requests: PERIPHERAL Culture result: PENDING   
CULTURE, BLOOD Collection Time: 07/28/18  4:25 PM  
Result Value Ref Range Special Requests: PERIPHERAL Culture result: PENDING   
ETHYL ALCOHOL Collection Time: 07/28/18  4:25 PM  
Result Value Ref Range ALCOHOL(ETHYL),SERUM <3 0 - 3 MG/DL  
CBC WITH AUTOMATED DIFF Collection Time: 07/28/18  4:28 PM  
Result Value Ref Range WBC 17.7 (H) 4.6 - 13.2 K/uL  
 RBC 4.00 (L) 4.70 - 5.50 M/uL  
 HGB 12.9 (L) 13.0 - 16.0 g/dL HCT 39.6 36.0 - 48.0 % MCV 99.0 (H) 74.0 - 97.0 FL  
 MCH 32.3 24.0 - 34.0 PG  
 MCHC 32.6 31.0 - 37.0 g/dL  
 RDW 15.5 (H) 11.6 - 14.5 % PLATELET 885 (H) 971 - 420 K/uL MPV 10.1 9.2 - 11.8 FL  
 NEUTROPHILS 85 (H) 40 - 73 % LYMPHOCYTES 9 (L) 21 - 52 % MONOCYTES 5 3 - 10 % EOSINOPHILS 1 0 - 5 % BASOPHILS 0 0 - 2 %  
 ABS. NEUTROPHILS 15.1 (H) 1.8 - 8.0 K/UL  
 ABS. LYMPHOCYTES 1.6 0.9 - 3.6 K/UL  
 ABS. MONOCYTES 0.8 0.05 - 1.2 K/UL  
 ABS. EOSINOPHILS 0.1 0.0 - 0.4 K/UL  
 ABS. BASOPHILS 0.0 0.0 - 0.1 K/UL  
 DF AUTOMATED METABOLIC PANEL, COMPREHENSIVE Collection Time: 07/28/18  4:28 PM  
Result Value Ref Range Sodium 135 (L) 136 - 145 mmol/L Potassium 6.2 (HH) 3.5 - 5.5 mmol/L  Chloride 106 100 - 108 mmol/L  
 CO2 19 (L) 21 - 32 mmol/L Anion gap 10 3.0 - 18 mmol/L Glucose 269 (H) 74 - 99 mg/dL BUN 20 (H) 7.0 - 18 MG/DL Creatinine 1.87 (H) 0.6 - 1.3 MG/DL  
 BUN/Creatinine ratio 11 (L) 12 - 20 GFR est AA 46 (L) >60 ml/min/1.73m2 GFR est non-AA 38 (L) >60 ml/min/1.73m2 Calcium 9.3 8.5 - 10.1 MG/DL Bilirubin, total 0.3 0.2 - 1.0 MG/DL  
 ALT (SGPT) 99 (H) 16 - 61 U/L  
 AST (SGOT) 34 15 - 37 U/L Alk. phosphatase 299 (H) 45 - 117 U/L Protein, total 8.9 (H) 6.4 - 8.2 g/dL Albumin 3.4 3.4 - 5.0 g/dL Globulin 5.5 (H) 2.0 - 4.0 g/dL A-G Ratio 0.6 (L) 0.8 - 1.7 CARDIAC PANEL,(CK, CKMB & TROPONIN) Collection Time: 07/28/18  4:28 PM  
Result Value Ref Range CK 39 39 - 308 U/L  
 CK - MB <1.0 <3.6 ng/ml CK-MB Index  0.0 - 4.0 % CALCULATION NOT PERFORMED WHEN RESULT IS BELOW LINEAR LIMIT Troponin-I, Qt. <0.02 0.0 - 0.045 NG/ML  
PROTHROMBIN TIME + INR Collection Time: 07/28/18  4:28 PM  
Result Value Ref Range Prothrombin time 11.7 11.5 - 15.2 sec INR 0.9 0.8 - 1.2 PTT Collection Time: 07/28/18  4:28 PM  
Result Value Ref Range aPTT 24.1 23.0 - 36.4 SEC  
LIPASE Collection Time: 07/28/18  4:28 PM  
Result Value Ref Range Lipase 48 (L) 73 - 393 U/L  
EKG, 12 LEAD, INITIAL Collection Time: 07/28/18  4:36 PM  
Result Value Ref Range Ventricular Rate 106 BPM  
 Atrial Rate 106 BPM  
 P-R Interval 128 ms QRS Duration 80 ms  
 Q-T Interval 328 ms QTC Calculation (Bezet) 435 ms Calculated P Axis 79 degrees Calculated R Axis -56 degrees Calculated T Axis 67 degrees Diagnosis Sinus tachycardia Right atrial enlargement Left axis deviation Pulmonary disease pattern Abnormal ECG When compared with ECG of 18-JUN-2018 18:02, 
Criteria for Inferior infarct are no longer present URINALYSIS W/ RFLX MICROSCOPIC Collection Time: 07/28/18  7:51 PM  
Result Value Ref Range Color DARK YELLOW Appearance CLOUDY  Specific gravity 1.025 1.005 - 1.030    
 pH (UA) 5.0 5.0 - 8.0 Protein 300 (A) NEG mg/dL Glucose 100 (A) NEG mg/dL Ketone TRACE (A) NEG mg/dL Bilirubin SMALL (A) NEG Blood NEGATIVE  NEG Urobilinogen 1.0 0.2 - 1.0 EU/dL Nitrites NEGATIVE  NEG Leukocyte Esterase TRACE (A) NEG URINE MICROSCOPIC ONLY Collection Time: 07/28/18  7:51 PM  
Result Value Ref Range WBC 2 to 4 0 - 4 /hpf  
 RBC 0 0 - 5 /hpf Epithelial cells FEW 0 - 5 /lpf Bacteria 1+ (A) NEG /hpf Amorphous Crystals 3+ (A) NEG Radiologic Studies -  
CT ABD PELV WO CONT Final Result XR CHEST PORT Final Result CT HEAD WO CONT Final Result CT Results  (Last 48 hours) 07/28/18 1736  CT ABD PELV WO CONT Final result Impression:  IMPRESSION:  
   
Decompressed inferior vena cava raising the possibility of hypovolemia Fluid-filled small and large bowel loops suggesting gastroenteritis without  
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  
right lateral abdomen wall with a small abscess collection along the right flank  
measuring 2.2 x 1.8 cm. Duodenum is tethered to the abscess. The previously  
noted pigtail catheter in this collection has been removed. Fistulous tract is  
visualized. Evidence of chronic calcific pancreatitis with a dilated pancreatic duct along  
the tail which may represent pancreatic stricturing or obstruction due to  
calculi. Gastric wall thickening which may reflect under distention versus gastritis Small hiatal hernia Nonobstructive renal calculi Avascular necrosis of the femoral heads Narrative:  EXAM: CT of the abdomen and pelvis INDICATION: Left-sided weakness COMPARISON: CT dated June 28, 2018 TECHNIQUE: Axial CT imaging of the abdomen and pelvis was performed without  
intravenous contrast. Multiplanar reformats were generated.   
   
DOSE REDUCTION: One or more dose reduction techniques were used on this CT:  
automated exposure control, adjustment of the mAs and/or kVp according to  
patient's size, and iterative reconstruction techniques. The specific techniques  
utilized on this CT exam have been documented in the patient's electronic  
medical record.  
   
_______________ FINDINGS: Initial read was given by resident on call. LOWER CHEST: Mild bronchiectasis is present laterally. No focal airspace  
disease. Small hiatal hernia. LIVER, BILIARY: Liver is normal. No biliary dilation. Cholecystectomy PANCREAS: Evidence of chronic calcific pancreatitis. Pancreatic stent in place. Dilated distal pancreatic duct. SPLEEN: Normal.  
   
ADRENALS: Mild left adrenal hyperplasia. The right adrenal is normal.. KIDNEYS/URETERS: There is scarring in the upper pole the left kidney with  
parenchymal calcification measuring 6 mm. Cortical cyst seen in the midpole the  
left kidney posteriorly measuring 11 mm. Small probable cyst seen in the lower  
pole the left kidney measuring 8 mm. Left kidneys otherwise unremarkable. Right kidney demonstrates 3 mm nonobstructive calculi in the lower pole. Right  
kidneys unremarkable otherwise. VASCULATURE: There is a decompressed inferior vena cava raising the possibility  
of hypovolemia. Calcific atherosclerosis present. LYMPH NODES: No enlarged lymph nodes seen GASTRO INTESTINAL TRACT: Small hiatal hernia. There is gastric wall thickening  
along the fundus and body posteriorly. This may reflect under distention however  
gastritis not completely excluded. Laura Ditty There is duodenal wall thickening along the second portion the duodenum with  
chronic stranding around the duodenum. Duodenum is tethered to a right flank  
abscess.  There is a chronic fistulous tract leading from the duodenum to a small  
abscess collection along the right flank with the abscess measures approximately 1.8 x 2.3 cm. The previously noted pigtail catheter in this abscess collection  
has been removed. Inflammatory changes and scarring seen around this abscess. There are fluid-filled small and large bowel loops throughout the abdomen. This  
is nonspecific but may represent gastroenteritis/ileus. No free air or  
pneumatosis seen. There is no bowel obstruction. PELVIC ORGANS/BLADDER: Urinary bladder is under distended therefore difficult to  
evaluate. No free fluid. BONES: There is avascular necrosis of both femoral heads left larger than right. There is osteopenia. Degenerative disc disease present at L5-S1 with a  
transitional vertebrae present. OTHER: None.  
   
_______________  
   
  
 07/28/18 1606  CT HEAD WO CONT Final result Impression:  IMPRESSION:  
   
   
1. No acute intracranial abnormality. Stable examination. 2. Mild diffuse volume loss, unchanged. 3. Small amount of fluid within the right maxillary sinus, decreased from prior  
head CT. Critical result: CODE S findings conveyed to Elmer Marsh PA-C in the ED  
prior to dictation at the time of the exam.  
  
 Narrative:  EXAM: CT head INDICATION: Potential stroke. Difficulty walking. COMPARISON: CT head June 16, 2018 TECHNIQUE: Axial CT imaging of the head was performed without intravenous  
contrast.  
   
One or more dose reduction techniques were used on this CT: automated exposure  
control, adjustment of the mAs and/or kVp according to patient's size, and  
iterative reconstruction techniques. The specific techniques utilized on this CT  
exam have been documented in the patient's electronic medical record.   
   
_______________ FINDINGS:  
   
BRAIN AND POSTERIOR FOSSA: There is mild cortical and cerebellar volume loss  
redemonstrated, similar in appearance to prior examination.  Ventricular size and  
configuration is normal. Basilar cisterns remain patent. There is no  
intracranial hemorrhage, mass effect, or midline shift. The gray-white matter  
differentiation remains within normal limits. EXTRA-AXIAL SPACES AND MENINGES: There are no abnormal extra-axial fluid  
collections. CALVARIUM: No acute osseous abnormality. SINUSES: There is mild mucosal thickening of the right maxillary sinus. OTHER: Atherosclerotic vascular calcifications are noted involving the carotid  
siphons bilaterally. _______________ CXR Results  (Last 48 hours) 07/28/18 1633  XR CHEST PORT Final result Impression:  Impression:  
Left IJ approach central venous catheter in stable position. No superimposed acute radiographic cardiopulmonary abnormality. Narrative:  Chest, single view Indication: Left-sided weakness. Comparison: Several prior exams, most recently June 16, 2018. Findings:  Portable upright AP view of the chest was obtained. There is a left IJ approach central venous port catheter with tip projecting over the superior  
cavoatrial junction, unchanged. Linear area of opacity at the left lung base  
likely in keeping with underlying atelectasis and/or scar formation. No focal  
pneumonic consolidation, pneumothorax or pleural effusion. Stable cardiac size  
and mediastinal contours. No acute osseous abnormality. CT abd prelim reading by resident: 1. No new intra-abdominal pathology since prior CT 6/28/18 2. Small residual ill defined RLQ abscess s/p pigtail cath removal 
3. Non specific small and large bowel dilation, as before could represent enterocolitis or ileus 4. Bilateral non obstructing renal stones 5. Mild degenerative changes in the lower lumbar spine 6. Small hiatal hernia 7. Cholecystectomy 8. Findings of chronic pancreatitis 9. Pancreatic duct dilation and stent, unchanged 10. Mild dilation of CBD, stable 11. dudodenal wall thickening, this could be sequelae of proximity to RLQ abscess to which the duodenum appears tethered, chronic pancreatitis or combination of both 12. Internal removal of pigtail cath from the RLQ abscess 13. Findings of bilateral femoral head AVN, as before. Attending reading requested. Medical Decision Making I am the first provider for this patient. I reviewed the vital signs, available nursing notes, past medical history, past surgical history, family history and social history. Vital Signs-Reviewed the patient's vital signs. EKG: Interpreted by the EP and PA [facility] Time Interpreted:  16:35 Rate:  106 Rhythm: sinus tachycardia Interpretation:  NO STEMI, right atrial enlargement, left axis deviation Records Reviewed: Nursing Notes and Old Medical Records Procedures: 
Procedures Provider Notes (Medical Decision Making): Consult:  Dr. Paul Guerrero. Dr. Paul Guerrero and I evaluated patient at the door when medics brought patient in for concern of stroke. Patient was relatively uncooperative with neuro exam.  Was sent to CT for Code S. Tele neuro notified. Consult:  Spoke with Dr. Janit Lombard about patient and clinical exam findings. Last normal per patient is between 30-45 minutes ago. Dr. Eli Coppola to see patient Consult:  Spoke with Radiologist, Dr. Farhan Kingsley. No acute process on Head CT. Consult:  Dr. Eli Coppola saw patient. Does not think that this is an acute stroke. Could be an aggravation of his central cord syndrome. He also noted BP, would like to evaluate for other reasons of his weakness to include sepsis, pancreatitis, etc.    
 
Consult:  Discussed with Dr. Paul Guerrero. Patient started on sepsis protocol with fluid bolus and Intra abdominal ABx ordered. Patient noted to continue to be hypotensive. Noted potassium. Discussed further with Dr. Paul Guerrero -- will give Kayexalate right now. Hold on other therapies. Patient still hypotensive -- 85/53.   Cuff size is correct, has had about half of his intended bolus. Complaining of bad abdominal pain. Explained to the patient several times that I could not give him pain meds while his BP was so low as it would make that worse, possibly making him even sicker, decreasing his respiratory rate and possible kill him. He continues to beg for pain relief. Discussed with Dr. Dangelo Isidro. We would like to get his pressure better so that we can give him pain meds. Discussed efficacy of IV Tylenol but both agree unlikely the Tylenol will work. Will continue to monitor closely. Noted leukocytosis. Sent to CT for further eval for pancreatitis or intraabdominal infection. Does have a hx of pancreatic abscess this past summer with pancreatic fistula tracking to the duodenum. Steven Herrera [place] Progress: Patient has significant improvement of his BP -- it was 103/86. Patient asking for pain meds. Gave 25 mcg of Fentanyl. Noted 2 hypotensive readings thereafter. He did have a good reading 111/83 thirty mins after pain meds. Will hold further pain meds for now, updated Dr. Thanh Lindsey. Do not think he needs pressors at this point. Noted resident reading of the CT of the abdomen with small residual ill defined RLQ abscess. Will obtain attending reading. Anticipate admission. Steven Herrera [place] Spoke with Dr. Ally Mccullough, GI. He knows this patient. Advised him of hypotension, leukocytosis, CT reading. He wants to be sure that patient had UA, urine cx, blood cultures, lactic acid, chest xr. Which patient has had. No UTI on UA. Spoke with Dr. Noah Echeverria, general surgery. Agrees with plan for admission. Will round on patient. Does not recommend anything further at this time. Aware of sepsis status and abx given as well as CT reading. Spoke with Dr. Cottrell How about patient. Knows patient well. Plans for admission to step down given hypotension, sepsis.    Aware about sepsis bolus, aware of potassium and kayexalate given in treatment for potassium, aware that Abx have been given. Will admit patient to step down. Steven Herrera, [id number] Sepsis Re-Assessment Documentation:  
 
Date: 7/28/2018 Time: 9:07 PM 
 
 
Vital Signs Level of Consciousness: Alert Temp: 97.7 °F (36.5 °C) Temp Source: Axillary Pulse (Heart Rate): 90 Resp Rate: 14 
BP: 105/71 MAP (Monitor): 79 MAP (Calculated): (!) 58 BP 1 Location: Left arm BP 1 Method: Automatic 
BP Patient Position: At rest  
 
Good cap refill on reexam, heart sounds and lungs are clear to auscultation, alert and oriented times four, no focal neuro deficit. Have discussed map and hypotension with hospitalist and attending ER physician, Dr. Thanh Lindsey -- all agree patient does not meet septic shock criteria and does not need pressors, at this time. Patient is responding to fluid bolus. He does not have an elevated lactic acid. Impression:  Sepsis, hypotension -- patient does not require pressors, has received appropriately fluid bolus, abx. Has intraabdominal abscess that seems pretty stable, but in the setting of hypotension, tachycardia, and leukcyctosis, will treat with Abx and monitor closely. Patient has epigastric abd pain. His pain has been difficult to manage due to his low blood pressure. Did give one dose of fentanyl, but blood pressure dropped. Will monitor for now. He will be admitted to the hospitalist service to the step down unit with surgery and GI consulting. RACHEL Borrego 
 
 
 
MED RECONCILIATION: 
Current Facility-Administered Medications Medication Dose Route Frequency  sodium chloride (NS) flush 5-10 mL  5-10 mL IntraVENous PRN  
 aztreonam (AZACTAM) 2 g in 0.9% sodium chloride (MBP/ADV) 100 mL MBP  2 g IntraVENous Q8H  
 metroNIDAZOLE (FLAGYL) IVPB premix 500 mg  500 mg IntraVENous Q8H  
 levoFLOXacin (LEVAQUIN) 750 mg in D5W IVPB  750 mg IntraVENous Q24H Current Outpatient Prescriptions Medication Sig  promethazine (PHENERGAN) 25 mg tablet Take 1 Tab by mouth every six (6) hours as needed.  oxyCODONE-acetaminophen (PERCOCET)  mg per tablet Take 1 Tab by mouth every six (6) hours as needed. Max Daily Amount: 4 Tabs. Indications: Pain  insulin lispro (HUMALOG) 100 unit/mL injection 12 Units by SubCUTAneous route nightly. Indications: type 2 diabetes mellitus  ondansetron (ZOFRAN ODT) 4 mg disintegrating tablet Take 1 Tab by mouth every eight (8) hours as needed for Nausea.  sucralfate (CARAFATE) 1 gram tablet Take 1 Tab by mouth four (4) times daily. Indications: PREVENTION OF STRESS ULCER  
 amitriptyline (ELAVIL) 75 mg tablet Take 1 Tab by mouth nightly. Indications: Depression  ascorbic acid, vitamin C, (VITAMIN C) 500 mg tablet Take 1 Tab by mouth daily. Indications: VITAMIN C DEFICIENCY  cholecalciferol, vitamin D3, 2,000 unit tab Take 1 Tab by mouth daily. Indications: PREVENTION OF VITAMIN D DEFICIENCY  cyanocobalamin (VITAMIN B-12) 1,000 mcg sublingual tablet Take 2 Tabs by mouth daily. Indications: PREVENTION OF VITAMIN B12 DEFICIENCY  ferrous sulfate 325 mg (65 mg iron) tablet Take 1 Tab by mouth two (2) times a day. Indications: IRON DEFICIENCY ANEMIA  folic acid 781 mcg tablet Take 1 Tab by mouth daily.  lipase-protease-amylase (CREON) 12,000-38,000 -60,000 unit capsule Take 2 Caps by mouth three (3) times daily (with meals). Indications: exocrine pancreatic insufficiency, dosage change  pantoprazole (PROTONIX) 40 mg tablet Take 1 Tab by mouth two (2) times a day. Indications: EROSIVE ESOPHAGITIS, gastroesophageal reflux disease Disposition: 
Discharged Core Measures: 
 
Critical Care Time:  
Critical Care Time:  
I have spent 63 minutes of critical care time involved in lab review, consultations with specialist, family decision-making, and documentation. During this entire length of time I was immediately available to the patient. 
  
Critical Care:   The reason for providing this level of medical care for this critically ill patient was due a critical illness that impaired one or more vital organ systems such that there was a high probability of imminent or life threatening deterioration in the patients condition. This care involved high complexity decision making to assess, manipulate, and support vital system functions, to treat this degreee vital organ system failure and to prevent further life threatening deterioration of the patients condition. Diagnosis Clinical Impression: 1. Sepsis, due to unspecified organism ([address] 2. Alcohol-induced chronic pancreatitis ([address] 3. Abdominal pain, epigastric 4. Weakness 5. Acute kidney injury ([address] 6. Hypotension, unspecified hypotension type

## 2018-07-28 NOTE — ED NOTES
Met ct tech in ct with patient. Pt observed on ct table with eyes closed and apparently resting comfortably. NAD.

## 2018-07-28 NOTE — ED NOTES
Patient was able to get into a cab to get to [facility]. Patient states when get got there he was feeling weak. Left side feels weaker than the right side. Last known well time 45 minutes ago.

## 2018-07-28 NOTE — ED NOTES
Pt requesting pain medicine at least 5 times since provider left bedside explaining to pt that his blood pressure is too low for pain medicine. Provider updated.

## 2018-07-28 NOTE — ED NOTES
Pt continues with low bp. Denies dizziness or anyother problems except for abdominal pain. Will continue to closely monitor.

## 2018-07-28 NOTE — ED NOTES
Pt continues to request pain medicine and moaning when at bedside. When not being directly observed, pt appears with NAD.

## 2018-07-28 NOTE — IP AVS SNAPSHOT
[id number] [address] 
[phone] Patient: Kris Machado 
MRN: [id number] :[id number] About your hospitalization You were admitted on:  2018 You last received care in the:  [address] [place] [address] Road You were discharged on:  2018 Why you were hospitalized Your primary diagnosis was:  Sepsis (Hcc) Your diagnoses also included:  Enterocutaneous Fistula, Abscess Of Abdominal Cavity (Hcc), Chronic Pain, Iddm (Insulin Dependent Diabetes Mellitus) (Hcc) Follow-up Information Follow up With Details Comments Contact Info Louis Hardin MD On 2018 10:30am [address] [place] [address] 
[phone] Your Scheduled Appointments 2018 10:30 AM EDT New Patient with Louis Hardin MD  
[facility] [address] [place] [address]  
[phone] Discharge Orders None A check vidhi indicates which time of day the medication should be taken. My Medications START taking these medications Instructions Each Dose to Equal  
 Morning Noon Evening Bedtime  
 linezolid 600 mg tablet Commonly known as:  Ward Nyhan Take 1 Tab by mouth two (2) times a day. 600 mg  
    
  
   
   
  
   
  
 lipase-protease-amylase capsule Commonly known as:  ZENPEP 10,000 Replaces:  CREON 12,000-38,000 -60,000 unit capsule Take 3 Caps by mouth three (3) times daily (with meals). 3 Cap  
    
  
   
  
   
  
   
  
 oxyCODONE-acetaminophen 5-325 mg per tablet Commonly known as:  PERCOCET Replaces:  oxyCODONE-acetaminophen  mg per tablet Take 1 Tab by mouth every six (6) hours as needed. Max Daily Amount: 4 Tabs. 1 Tab CONTINUE taking these medications  Instructions Each Dose to Equal  
 Morning Noon Evening Bedtime  
 amitriptyline 75 mg tablet Commonly known as:  ELAVIL Take 1 Tab by mouth nightly. Indications: Depression 75 mg  
    
   
   
  
   
  
 ascorbic acid (vitamin C) 500 mg tablet Commonly known as:  VITAMIN C Take 1 Tab by mouth daily. Indications: VITAMIN C DEFICIENCY  
 500 mg  
    
  
   
   
   
  
 cholecalciferol (vitamin D3) 2,000 unit Tab Take 1 Tab by mouth daily. Indications: PREVENTION OF VITAMIN D DEFICIENCY  
 2000 Units  
    
  
   
   
   
  
 cyanocobalamin 1,000 mcg sublingual tablet Commonly known as:  VITAMIN B-12 Take 2 Tabs by mouth daily. Indications: PREVENTION OF VITAMIN B12 DEFICIENCY  
 2000 mcg  
    
  
   
   
   
  
 ferrous sulfate 325 mg (65 mg iron) tablet Take 1 Tab by mouth two (2) times a day. Indications: IRON DEFICIENCY ANEMIA  
 325 mg  
    
  
   
   
  
   
  
 folic acid 075 mcg tablet Take 1 Tab by mouth daily. 0.4 mg  
    
  
   
   
   
  
 insulin lispro 100 unit/mL injection Commonly known as:  HUMALOG  
   
 12 Units by SubCUTAneous route nightly. Indications: type 2 diabetes mellitus 12 Units  
    
   
   
   
  
 ondansetron 4 mg disintegrating tablet Commonly known as:  ZOFRAN ODT Take 1 Tab by mouth every eight (8) hours as needed for Nausea. 4 mg  
    
   
   
   
  
 pantoprazole 40 mg tablet Commonly known as:  PROTONIX Take 1 Tab by mouth two (2) times a day. Indications: EROSIVE ESOPHAGITIS, gastroesophageal reflux disease 40 mg  
    
  
   
   
  
   
  
 promethazine 25 mg tablet Commonly known as:  PHENERGAN Take 1 Tab by mouth every six (6) hours as needed. 25 mg  
    
   
   
   
  
 sucralfate 1 gram tablet Commonly known as:  Colona Bi Take 1 Tab by mouth four (4) times daily. Indications: PREVENTION OF STRESS ULCER  
 1 g  
    
  
   
  
   
  
   
  
  
  
STOP taking these medications CREON 12,000-38,000 -60,000 unit capsule Generic drug:  lipase-protease-amylase Replaced by:  lipase-protease-amylase capsule  
   
  
 oxyCODONE-acetaminophen  mg per tablet Commonly known as:  PERCOCET Replaced by:  oxyCODONE-acetaminophen 5-325 mg per tablet Where to Get Your Medications Information on where to get these meds will be given to you by the nurse or doctor. ! Ask your nurse or doctor about these medications  
  linezolid 600 mg tablet  
 lipase-protease-amylase capsule  
 oxyCODONE-acetaminophen 5-325 mg per tablet Opioid Education Prescription Opioids: What You Need to Know: 
 
Prescription opioids can be used to help relieve moderate-to-severe pain and are often prescribed following a surgery or injury, or for certain health conditions. These medications can be an important part of treatment but also come with serious risks. Opioids are strong pain medicines. Examples include hydrocodone, oxycodone, fentanyl, and morphine. Heroin is an example of an illegal opioid. It is important to work with your health care provider to make sure you are getting the safest, most effective care. WHAT ARE THE RISKS AND SIDE EFFECTS OF OPIOID USE? Prescription opioids carry serious risks of addiction and overdose, especially with prolonged use. An opioid overdose, often marked by slow breathing, can cause sudden death. The use of prescription opioids can have a number of side effects as well, even when taken as directed. · Tolerance-meaning you might need to take more of a medication for the same pain relief · Physical dependence-meaning you have symptoms of withdrawal when the medication is stopped. Withdrawal symptoms can include nausea, sweating, chills, diarrhea, stomach cramps, and muscle aches. Withdrawal can last up to several weeks, depending on which drug you took and how long you took it. · Increased sensitivity to pain · Constipation · Nausea, vomiting, and dry mouth · Sleepiness and dizziness · Confusion · Depression · Low levels of testosterone that can result in lower sex drive, energy, and strength · Itching and sweating RISKS ARE GREATER WITH:      
· History of drug misuse, substance use disorder, or overdose · Mental health conditions (such as depression or anxiety) · Sleep apnea · Older age (72 years or older) · Pregnancy Avoid alcohol while taking prescription opioids. Also, unless specifically advised by your health care provider, medications to avoid include: · Benzodiazepines (such as Xanax or Valium) · Muscle relaxants (such as Soma or Flexeril) · Hypnotics (such as Ambien or Lunesta) · Other prescription opioids KNOW YOUR OPTIONS Talk to your health care provider about ways to manage your pain that don't involve prescription opioids. Some of these options may actually work better and have fewer risks and side effects. Options may include: 
· Pain relievers such as acetaminophen, ibuprofen, and naproxen · Some medications that are also used for depression or seizures · Physical therapy and exercise · Counseling to help patients learn how to cope better with triggers of pain and stress. · Application of heat or cold compress · Massage therapy · Relaxation techniques Be Informed Make sure you know the name of your medication, how much and how often to take it, and its potential risks & side effects. IF YOU ARE PRESCRIBED OPIOIDS FOR PAIN: 
· Never take opioids in greater amounts or more often than prescribed. Remember the goal is not to be pain-free but to manage your pain at a tolerable level. · Follow up with your primary care provider to: · Work together to create a plan on how to manage your pain. · Talk about ways to help manage your pain that don't involve prescription opioids. · Talk about any and all concerns and side effects. · Help prevent misuse and abuse. · Never sell or share prescription opioids · Help prevent misuse and abuse. · Store prescription opioids in a secure place and out of reach of others (this may include visitors, children, friends, and family). · Safely dispose of unused/unwanted prescription opioids: Find your community drug take-back program or your pharmacy mail-back program, or flush them down the toilet, following guidance from the Food and Drug Administration (www.fda.gov/Drugs/ResourcesForYou). · Visit www.cdc.gov/drugoverdose to learn about the risks of opioid abuse and overdose. · If you believe you may be struggling with addiction, tell your health care provider and ask for guidance or call [facility] at 4-258-247-VJNU. Discharge Instructions FOLLOW UP VISIT Appointment in: Other (Specify) Follow up with PCP X 3-5 DAYS Follow up with Gastroenterology as scheduled Abdominal Pain: Care Instructions Your Care Instructions Abdominal pain has many possible causes. Some aren't serious and get better on their own in a few days. Others need more testing and treatment. If your pain continues or gets worse, you need to be rechecked and may need more tests to find out what is wrong. You may need surgery to correct the problem. Don't ignore new symptoms, such as fever, nausea and vomiting, urination problems, pain that gets worse, and dizziness. These may be signs of a more serious problem. Your doctor may have recommended a follow-up visit in the next 8 to 12 hours. If you are not getting better, you may need more tests or treatment. The doctor has checked you carefully, but problems can develop later. If you notice any problems or new symptoms, get medical treatment right away. Follow-up care is a key part of your treatment and safety.  Be sure to make and go to all appointments, and call your doctor if you are having problems. It's also a good idea to know your test results and keep a list of the medicines you take. How can you care for yourself at home? · Rest until you feel better. · To prevent dehydration, drink plenty of fluids, enough so that your urine is light yellow or clear like water. Choose water and other caffeine-free clear liquids until you feel better. If you have kidney, heart, or liver disease and have to limit fluids, talk with your doctor before you increase the amount of fluids you drink. · If your stomach is upset, eat mild foods, such as rice, dry toast or crackers, bananas, and applesauce. Try eating several small meals instead of two or three large ones. · Wait until 48 hours after all symptoms have gone away before you have spicy foods, alcohol, and drinks that contain caffeine. · Do not eat foods that are high in fat. · Avoid anti-inflammatory medicines such as aspirin, ibuprofen (Advil, Motrin), and naproxen (Aleve). These can cause stomach upset. Talk to your doctor if you take daily aspirin for another health problem. When should you call for help? Call 911 anytime you think you may need emergency care. For example, call if: 
  · You passed out (lost consciousness).  
  · You pass maroon or very bloody stools.  
  · You vomit blood or what looks like coffee grounds.  
  · You have new, severe belly pain.  
 Call your doctor now or seek immediate medical care if: 
  · Your pain gets worse, especially if it becomes focused in one area of your belly.  
  · You have a new or higher fever.  
  · Your stools are black and look like tar, or they have streaks of blood.  
  · You have unexpected vaginal bleeding.  
  · You have symptoms of a urinary tract infection. These may include: 
¨ Pain when you urinate. ¨ Urinating more often than usual. 
¨ Blood in your urine.   · You are dizzy or lightheaded, or you feel like you may faint.  
 Watch closely for changes in your health, and be sure to contact your doctor if: 
  · You are not getting better after 1 day (24 hours). Where can you learn more? Go to http://thiago-mikhail.info/. Enter G489 in the search box to learn more about \"Abdominal Pain: Care Instructions. \" Current as of: November 20, 2017 Content Version: 11.7 © 0277-2239 [employer]. Care instructions adapted under license by [employer] (which disclaims liability or warranty for this information). If you have questions about a medical condition or this instruction, always ask your healthcare professional. [address] any warranty or liability for your use of this information. Skin Abscess: Care Instructions Your Care Instructions A skin abscess is a bacterial infection that forms a pocket of pus. A boil is a kind of skin abscess. The doctor may have cut an opening in the abscess so that the pus can drain out. You may have gauze in the cut so that the abscess will stay open and keep draining. You may need antibiotics. You will need to follow up with your doctor to make sure the infection has gone away. The doctor has checked you carefully, but problems can develop later. If you notice any problems or new symptoms, get medical treatment right away. Follow-up care is a key part of your treatment and safety. Be sure to make and go to all appointments, and call your doctor if you are having problems. It's also a good idea to know your test results and keep a list of the medicines you take. How can you care for yourself at home? · Apply warm and dry compresses, a heating pad set on low, or a hot water bottle 3 or 4 times a day for pain. Keep a cloth between the heat source and your skin. · If your doctor prescribed antibiotics, take them as directed.  Do not stop taking them just because you feel better. You need to take the full course of antibiotics. · Take pain medicines exactly as directed. ¨ If the doctor gave you a prescription medicine for pain, take it as prescribed. ¨ If you are not taking a prescription pain medicine, ask your doctor if you can take an over-the-counter medicine. · Keep your bandage clean and dry. Change the bandage whenever it gets wet or dirty, or at least one time a day. · If the abscess was packed with gauze: 
¨ Keep follow-up appointments to have the gauze changed or removed. If the doctor instructed you to remove the gauze, gently pull out all of the gauze when your doctor tells you to. ¨ After the gauze is removed, soak the area in warm water for 15 to 20 minutes 2 times a day, until the wound closes. When should you call for help? Call your doctor now or seek immediate medical care if: 
  · You have signs of worsening infection, such as: 
¨ Increased pain, swelling, warmth, or redness. ¨ Red streaks leading from the infected skin. ¨ Pus draining from the wound. ¨ A fever.  
 Watch closely for changes in your health, and be sure to contact your doctor if: 
  · You do not get better as expected. Where can you learn more? Go to http://thiago-mikhail.info/. Enter A090 in the search box to learn more about \"Skin Abscess: Care Instructions. \" Current as of: May 10, 2017 Content Version: 11.7 © 3617-9392 [employer]. Care instructions adapted under license by [employer] (which disclaims liability or warranty for this information). If you have questions about a medical condition or this instruction, always ask your healthcare professional. [address] [address] any warranty or liability for your use of this information. Chronic Pain: Care Instructions Your Care Instructions Chronic pain is pain that lasts a long time (months or even years) and may or may not have a clear cause. It is different from acute pain, which usually does have a clear cause-like an injury or illness-and gets better over time. Chronic pain: 
· Lasts over time but may vary from day to day. · Does not go away despite efforts to end it. · May disrupt your sleep and lead to fatigue. · May cause depression or anxiety. · May make your muscles tense, causing more pain. · Can disrupt your work, hobbies, home life, and relationships with friends and family. Chronic pain is a very real condition. It is not just in your head. Treatment can help and usually includes several methods used together, such as medicines, physical therapy, exercise, and other treatments. Learning how to relax and changing negative thought patterns can also help you cope. Chronic pain is complex. Taking an active role in your treatment will help you better manage your pain. Tell your doctor if you have trouble dealing with your pain. You may have to try several things before you find what works best for you. Follow-up care is a key part of your treatment and safety. Be sure to make and go to all appointments, and call your doctor if you are having problems. It's also a good idea to know your test results and keep a list of the medicines you take. How can you care for yourself at home? · Pace yourself. Break up large jobs into smaller tasks. Save harder tasks for days when you have less pain, or go back and forth between hard tasks and easier ones. Take rest breaks. · Relax, and reduce stress. Relaxation techniques such as deep breathing or meditation can help. · Keep moving. Gentle, daily exercise can help reduce pain over the long run. Try low- or no-impact exercises such as walking, swimming, and stationary biking. Do stretches to stay flexible. · Try heat, cold packs, and massage. · Get enough sleep. Chronic pain can make you tired and drain your energy. Talk with your doctor if you have trouble sleeping because of pain. · Think positive. Your thoughts can affect your pain level. Do things that you enjoy to distract yourself when you have pain instead of focusing on the pain. See a movie, read a book, listen to music, or spend time with a friend. · If you think you are depressed, talk to your doctor about treatment. · Keep a daily pain diary. Record how your moods, thoughts, sleep patterns, activities, and medicine affect your pain. You may find that your pain is worse during or after certain activities or when you are feeling a certain emotion. Having a record of your pain can help you and your doctor find the best ways to treat your pain. · Take pain medicines exactly as directed. ¨ If the doctor gave you a prescription medicine for pain, take it as prescribed. ¨ If you are not taking a prescription pain medicine, ask your doctor if you can take an over-the-counter medicine. Reducing constipation caused by pain medicine · Include fruits, vegetables, beans, and whole grains in your diet each day. These foods are high in fiber. · Drink plenty of fluids, enough so that your urine is light yellow or clear like water. If you have kidney, heart, or liver disease and have to limit fluids, talk with your doctor before you increase the amount of fluids you drink. · If your doctor recommends it, get more exercise. Walking is a good choice. Bit by bit, increase the amount you walk every day. Try for at least 30 minutes on most days of the week. · Schedule time each day for a bowel movement. A daily routine may help. Take your time and do not strain when having a bowel movement. When should you call for help? Call your doctor now or seek immediate medical care if: 
  · Your pain gets worse or is out of control.  
  · You feel down or blue, or you do not enjoy things like you once did. You may be depressed, which is common in people with chronic pain. Depression can be treated.  
  · You have vomiting or cramps for more than 2 hours.  
 Watch closely for changes in your health, and be sure to contact your doctor if: 
  · You cannot sleep because of pain.  
  · You are very worried or anxious about your pain.  
  · You have trouble taking your pain medicine.  
  · You have any concerns about your pain medicine.  
  · You have trouble with bowel movements, such as: 
¨ No bowel movement in 3 days. ¨ Blood in the anal area, in your stool, or on the toilet paper. ¨ Diarrhea for more than 24 hours. Where can you learn more? Go to http://thiago-mikhail.info/. Enter N004 in the search box to learn more about \"Chronic Pain: Care Instructions. \" Current as of: October 9, 2017 Content Version: 11.7 © 3491-2108 [facility]. Care instructions adapted under license by [employer] (which disclaims liability or warranty for this information). If you have questions about a medical condition or this instruction, always ask your healthcare professional. [address] [place] [address] any warranty or liability for your use of this information. Indigestion (Dyspepsia or Heartburn): Care Instructions Your Care Instructions Sometimes it can be hard to pinpoint the cause of indigestion. (It is also called dyspepsia or heartburn.) Most cases of an upset stomach with bloating, burning, burping, and nausea are minor and go away within several hours. Home treatment and over-the-counter medicine often are able to control symptoms. But if you take medicine to relieve your indigestion without making diet and lifestyle changes, your symptoms are likely to return again and again. If you get indigestion often, it may be a sign of a more serious medical problem. Be sure to follow up with your doctor, who may want to do tests to be sure of the cause of your indigestion. Follow-up care is a key part of your treatment and safety. Be sure to make and go to all appointments, and call your doctor if you are having problems. It's also a good idea to know your test results and keep a list of the medicines you take. How can you care for yourself at home? · Your doctor may recommend over-the-counter medicine. For mild or occasional indigestion, antacids such as Gaviscon, Mylanta, Maalox, or Tums, may help. Be safe with medicines. Be careful when you take over-the-counter antacid medicines. Many of these medicines have aspirin in them. Read the label to make sure that you are not taking more than the recommended dose. Too much aspirin can be harmful. · Your doctor also may recommend over-the-counter acid reducers, such as Pepcid AC, Tagamet HB, Zantac 75, or Prilosec. Read and follow all instructions on the label. If you use these medicines often, talk with your doctor. · Change your eating habits. ¨ It's best to eat several small meals instead of two or three large meals. ¨ After you eat, wait 2 to 3 hours before you lie down. ¨ Chocolate, mint, and alcohol can make GERD worse. ¨ Spicy foods, foods that have a lot of acid (like tomatoes and oranges), and coffee can make GERD symptoms worse in some people. If your symptoms are worse after you eat a certain food, you may want to stop eating that food to see if your symptoms get better. · Do not smoke or chew tobacco. Smoking can make GERD worse. If you need help quitting, talk to your doctor about stop-smoking programs and medicines. These can increase your chances of quitting for good. · If you have GERD symptoms at night, raise the head of your bed 6 to 8 inches. You can do this by putting the frame on blocks or placing a foam wedge under the head of your mattress. (Adding extra pillows does not work.) · Do not wear tight clothing around your middle. · Lose weight if you need to. Losing just 5 to 10 pounds can help. · Do not take anti-inflammatory medicines, such as aspirin, ibuprofen (Advil, Motrin), or naproxen (Aleve). These can irritate the stomach. If you need a pain medicine, try acetaminophen (Tylenol), which does not cause stomach upset. When should you call for help? Call your doctor now or seek immediate medical care if: 
  · You have new or worse belly pain.  
  · You are vomiting.  
 Watch closely for changes in your health, and be sure to contact your doctor if: 
  · You have new or worse symptoms of indigestion.  
  · You have trouble or pain swallowing.  
  · You are losing weight.  
  · You do not get better as expected. Where can you learn more? Go to http://thiago-mikhail.info/. Enter W868 in the search box to learn more about \"Indigestion (Dyspepsia or Heartburn): Care Instructions. \" Current as of: May 12, 2017 Content Version: 11.7 © 8650-8112 [employer]. Care instructions adapted under license by [employer] (which disclaims liability or warranty for this information). If you have questions about a medical condition or this instruction, always ask your healthcare professional. [address] any warranty or liability for your use of this information. Learning About Pain Control When You Have a History of Opioid Dependence How is pain managed when you have a history of opioid dependence? It can be really hard to be in severe pain and at the same time be afraid to take medicine for it because you could have a relapse. It's important to see a doctor when you're in severe pain. If you try to manage the pain yourself, you could fall back into your old habits with opioids. An important part of preventing a relapse is to make sure the doctor knows about your history of dependence. Even when you see a doctor for some other reason than pain, make sure he or she knows. You may feel embarrassed or ashamed to talk about it. But don't let these feelings  your way. Your doctor is there to help you. He or she needs to know about your history in order to get you the right treatment. It may be easier to bring it up if you write down a sentence or two about it ahead of time. For example, you could write what you were dependent on and for how long. You could say how long you have been in recovery. And you could say how important it is to you to avoid using opioids. You may also take someone with you who could help you explain your history. This could be a friend, a family member, or your Narcotics Anonymous sponsor. Can severe pain be treated without opioids? Your doctor can tell you about many other ways to manage pain. One option is non-opioid medicines, such as: · Acetaminophen. · NSAIDs. · Certain antidepressants. · Creams or oils applied to the skin. Pain treatment may include other things besides medicine. You may find relief from treatments such as ice or heat, meditation, and making sure you get enough sleep. Work with your doctor to explore different options for pain relief. It's also possible that you could take an opioid for pain for a short time. In that case, it will be very important to work closely with your doctor. Follow-up care is a key part of your treatment and safety. Be sure to make and go to all appointments, and call your doctor if you are having problems. It's also a good idea to know your test results and keep a list of the medicines you take. Where can you learn more? Go to http://thiago-mikhail.info/. Enter F138 in the search box to learn more about \"Learning About Pain Control When You Have a History of Opioid Dependence. \" Current as of: October 9, 2017 Content Version: 11.7 © 6995-4386 [employer].  Care instructions adapted under license by [employer]inksport (which disclaims liability or warranty for this information). If you have questions about a medical condition or this instruction, always ask your healthcare professional. [address] any warranty or liability for your use of this information. Learning About Managing Chronic Pain What is a plan for pain management? A pain management plan helps you find ways to control pain with side effects you can live with. Some diseases and injuries can cause pain that lasts a long time. Constant pain can make you depressed. It can cause stress and make it hard for you to eat and sleep. But you don't need to live with uncontrolled pain. How can you plan for managing your pain? You and your doctor will work to make your plan. Your plan can include more than one type of pain control. You may take prescription or over-the-counter drugs. You can also try physical treatments, like massage and acupuncture. Other things can help too, such as meditation or a type of therapy to change how you think about your pain. It's important to let your doctor know how you prefer to control your pain. Sometimes the goal of a pain management plan isn't to totally get rid of pain. Instead, it might be to reduce the pain enough that daily activities are easier. If your pain isn't controlled well enough, talk with your doctor. You may need to make a new plan. Or your doctor may refer you to a specialist. 
What medicines are used? Your doctor may prescribe medicine to help with your pain. If you aren't taking a prescription medicine, you may be able to take an over-the-counter one. Here are the main types of medicine for chronic pain. · Non-opioids. These are things like aspirin, acetaminophen, and nonsteroidal anti-inflammatory drugs (NSAIDs). They work by blocking (or reducing) the feeling of pain. And some also reduce swelling. They usually relieve pain for 6 to 12 hours at a time. · Opioids. Morphine, codeine, and oxycodone are some examples. Opioids relieve pain by changing the way your body feels pain and the way you feel about pain. · Other medicines. Some medicines seem to change the way your brain senses pain. These include things like: ¨ Antidepressants, anti-seizure medicines, and anti-anxiety medicines. ¨ Nerve block injections. Medicines are the most common treatment for pain. But to feel better, you'll need to do more than take medicine. You can also do things like reducing your stress level and changing how you think. How can you take medicine safely? Medicines can help you get better. But they can also be dangerous, especially if you don't take them the right way. Be safe with medicines. Read and follow all instructions on the label. If the medicine you take causes side effects such as constipation or nausea, you may need to take other medicines for those problems. Talk to your doctor about any side effects you have. If you were prescribed an opioid pain reliever, your care team will give you information on how to use it safely. You will also get directions for how to safely store the medicine and how to get rid of any that's left over. Follow these instructions carefully. What physical treatments can help? Physical treatments can be an important part of managing chronic pain. You may find that combining more than one treatment helps the most. 
These treatments can include: 
· Heat or cold. This can help arthritis, sore muscles, and other aches. · Hydrotherapy. It uses flowing water to relax muscles. · Massage. Massage involves rubbing the soft tissues of the body. It eases tension and pain. · Transcutaneous electrical nerve stimulation (TENS). This treatment uses a gentle electric current applied to the skin for pain relief. · Acupuncture. This is a form of traditional [facility] medicine. It uses very thin needles inserted into certain points of the body. · Physical therapy. This treatment uses stretches and exercises to reduce pain and help you move better. If you get physical therapy, make sure to do any home exercises or stretching your therapist has prescribed. Stay as active as you can. Try to get some physical activity every day. What other things can help? You can manage chronic pain by using things other than medicines or physical treatments. For example, you can keep track of your pain in a pain diary. It can help you understand how the things you do affect your pain. Reducing stress and tension can reduce pain. And being more aware of your thought patterns can be helpful. In some cases, shifting how you think about pain can affect how you feel. Here are some options to think about: · Breathing exercises and meditation. These techniques can help you focus your attention, relax, and get rid of tension. · Guided imagery. This is a series of thoughts and images that can focus your attention away from your pain. · Hypnosis. It's a state of focused concentration that makes you less aware of your surroundings. · Cognitive behavioral therapy. This type of counseling helps you change your thought patterns. · Yoga. Stretching and exercises can reduce stress and improve flexibility. If what you're doing to control your pain isn't working, or if you're feeling depressed, talk to your doctor. He or she can help you change your pain management plan and find resources for emotional support. Where can you learn more? Go to http://thiago-mikhail.info/. Enter P119 in the search box to learn more about \"Learning About Managing Chronic Pain. \" Current as of: December 11, 2017 Content Version: 11.7 © 1748-7512 [employer]Share. Care instructions adapted under license by [employer] (which disclaims liability or warranty for this information).  If you have questions about a medical condition or this instruction, always ask your healthcare professional. [address] any warranty or liability for your use of this information. Sepsis: Care Instructions Your Care Instructions Sepsis is an intense reaction to an infection. It can cause deadly damage to the body and lead to a dangerously low blood pressure. You may have inflammation across large areas of your body. It can damage tissue and even go deep into your organs. Infections that can lead to sepsis include: · A skin infection such as from a cut. · A lung infection like pneumonia. · A kidney infection. · A gut infection such as E. coli. It's important to care for yourself and try to avoid infections so that you don't get sepsis again. Follow-up care is a key part of your treatment and safety. Be sure to make and go to all appointments, and call your doctor if you are having problems. It's also a good idea to know your test results and keep a list of the medicines you take. How can you care for yourself at home? · If your doctor prescribed antibiotics, take them as directed. Do not stop taking them just because you feel better. You need to take the full course of antibiotics. · Help prevent infections that could lead to sepsis: ¨ Try to avoid colds and flu. If you must be around people who have a cold or the flu, wash your hands often. And get a flu vaccine every year. ¨ Get a pneumococcal vaccine shot (to prevent pneumonia, meningitis, and other infections). If you have had one before, ask your doctor if you need another dose. ¨ Clean any wounds or scrapes. · Do not smoke or use other tobacco products. When you quit smoking, you are less likely to get a cold, the flu, bronchitis, and pneumonia. If you need help quitting, talk to your doctor about stop-smoking programs and medicines. These can increase your chances of quitting for good. · To prevent dehydration, drink plenty of fluids.  Choose water and other caffeine-free clear liquids until you feel better. If you have kidney, heart, or liver disease and have to limit fluids, talk with your doctor before you increase the amount of fluids you drink. · Eat a healthy diet. Include fruits, vegetables, and whole grains in your diet every day. · If your doctor recommends it, try doing some physical activity. Walking is a good choice. Bit by bit, increase the amount you walk every day. When should you call for help? [id number] anytime you think you may need emergency care. For example, call if: 
  · You passed out (lost consciousness).  
 Call your doctor now or seek immediate medical care if: 
  · You have symptoms of sepsis. These may include: ¨ Shortness of breath. ¨ A fast heart rate. ¨ Cool, pale, or clammy skin. ¨ Feeling confused.  
  · You are dizzy or lightheaded, or you feel like you may faint.  
  · You have a fever or chills.  
 Watch closely for changes in your health, and be sure to contact your doctor if: 
  · You do not get better as expected. Where can you learn more? Go to http://thiagoKiwupmikhail.info/. Enter W452 in the search box to learn more about \"Sepsis: Care Instructions. \" Current as of: November 20, 2017 Content Version: 11.7 © 3207-8223 [employer]. Care instructions adapted under license by [employer] (which disclaims liability or warranty for this information). If you have questions about a medical condition or this instruction, always ask your healthcare professional. [place] [address] any warranty or liability for your use of this information. Learning About Peritonitis What is peritonitis? Peritonitis is an infection of the lining of the belly (peritoneum). It causes pain and swelling inside the belly. It may also cause a fever.  
It can be the result of medical problems in the belly, such as a burst appendix. It can also be the result of surgery or an injury in the belly. This can lead to an infection that is caused by bacteria. This infection is serious. It needs medical care right away. If the illness is not treated, it gets worse fast and can become life-threatening. What are the symptoms? Symptoms of peritonitis include: · Swelling of the belly, which may feel hard (rigid). · Severe pain and tenderness in the belly that gets worse when you move, cough, or press on the belly. The pain sometimes reaches into the shoulder. · Nausea and vomiting. · Diarrhea. · A rapid pulse. · Chills and fever. · Rapid breathing. · Confusion, memory loss, or feeling less alert. How is it treated? You may have a CT (computed tomography) scan or other imaging test. This can help find out if there is damage to an organ in the belly. If so, then you will likely need surgery to repair the damage. If your symptoms are severe, you may need surgery right away. Peritonitis will also be treated with antibiotics. If there is fluid in your belly, your doctor may do a procedure called paracentesis (say \"ukry-il-bfw-PRASHANTH-blayne\") to get a sample of the fluid. The fluid can be tested to help your doctor decide which antibiotics will work best to treat the infection. The test can also help find the cause of your symptoms. Follow-up care is a key part of your treatment and safety. Be sure to make and go to all appointments, and call your doctor if you are having problems. It's also a good idea to know your test results and keep a list of the medicines you take. Where can you learn more? Go to http://thiago-mikhail.info/. Enter P727 in the search box to learn more about \"Learning About Peritonitis. \" Current as of: May 12, 2017 Content Version: 11.7 © 7199-1749 [employer]orporated.  Care instructions adapted under license by Realm (which disclaims liability or warranty for this information). If you have questions about a medical condition or this instruction, always ask your healthcare professional. [address] any warranty or liability for your use of this information. Pancreatitis: Care Instructions Your Care Instructions The pancreas is an organ behind the stomach. It makes hormones and enzymes to help your body digest food. But if these enzymes attack the pancreas, it can get inflamed. This is called pancreatitis. Most cases are caused by gallstones or by heavy alcohol use. If you take care of yourself at home, it will help you get better. It will also help you avoid more problems with your pancreas. Follow-up care is a key part of your treatment and safety. Be sure to make and go to all appointments, and call your doctor if you are having problems. It's also a good idea to know your test results and keep a list of the medicines you take. How can you care for yourself at home? · Drink clear liquids and eat bland foods until you feel better. [place] foods include rice, dry toast, and crackers. They also include bananas and applesauce. · Eat a low-fat diet until your doctor says your pancreas is healed. · Do not drink alcohol. Tell your doctor if you need help to quit. Counseling, support groups, and sometimes medicines can help you stay sober. · Be safe with medicines. Read and follow all instructions on the label. ¨ If the doctor gave you a prescription medicine for pain, take it as prescribed. ¨ If you are not taking a prescription pain medicine, ask your doctor if you can take an over-the-counter medicine. · If your doctor prescribed antibiotics, take them as directed. Do not stop taking them just because you feel better. You need to take the full course of antibiotics. · Get extra rest until you feel better. To prevent future problems with your pancreas · Do not drink alcohol. · Tell your doctors and pharmacist that you've had pancreatitis. They can help you avoid medicines that may cause this problem again. When should you call for help? Call 911 anytime you think you may need emergency care. For example, call if: 
  · You vomit blood or what looks like coffee grounds.  
  · Your stools are maroon or very bloody.  
 Call your doctor now or seek immediate medical care if: 
  · You have new or worse belly pain.  
  · Your stools are black and look like tar, or they have streaks of blood.  
  · You are vomiting.  
 Watch closely for changes in your health, and be sure to contact your doctor if: 
  · You do not get better as expected. Where can you learn more? Go to http://thiago-mikhail.info/. Enter L573 in the search box to learn more about \"Pancreatitis: Care Instructions. \" Current as of: May 12, 2017 Content Version: 11.7 © 5143-5924 Exit41. Care instructions adapted under license by [employer]Simple (which disclaims liability or warranty for this information). If you have questions about a medical condition or this instruction, always ask your healthcare professional. [place] [address] any warranty or liability for your use of this information. Intelipost Announcement We are excited to announce that we are making your provider's discharge notes available to you in Intelipost. You will see these notes when they are completed and signed by the physician that discharged you from your recent hospital stay. If you have any questions or concerns about any information you see in Intelipost, please call the Health Information Department where you were seen or reach out to your Primary Care Provider for more information about your plan of care. Introducing \[facility] [place] [facility]\"" & HEALTH SERVICES!    
 Clarence Oseguera introduces Intelipost patient portal. Now you can access parts of your medical record, email your doctor's office, and request medication refills online. 1. In your internet browser, go to [email or web address] "[email or web address] 2. Click on the First Time User? Click Here link in the Sign In box. You will see the New Member Sign Up page. 3. Enter your Adeze Access Code exactly as it appears below. You will not need to use this code after youve completed the sign-up process. If you do not sign up before the expiration date, you must request a new code. · Adeze Access Code: [email or web address] Expires: 10/26/2018  4:03 PM 
 
4. Enter the last four digits of your Social Security Number (xxxx) and Date of Birth (mm/dd/yyyy) as indicated and click Submit. You will be taken to the next sign-up page. 5. Create a Adeze ID. This will be your Adeze login ID and cannot be changed, so think of one that is secure and easy to remember. 6. Create a Adeze password. You can change your password at any time. 7. Enter your Password Reset Question and Answer. This can be used at a later time if you forget your password. 8. Enter your e-mail address. You will receive e-mail notification when new information is available in [address] 9. Click Sign Up. You can now view and download portions of your medical record. 10. Click the Download Summary menu link to download a portable copy of your medical information. If you have questions, please visit the Frequently Asked Questions section of the Adeze website. Remember, Adeze is NOT to be used for urgent needs. For medical emergencies, dial 911. Now available from your iPhone and Android! Introducing Michael Cherry As a [facility] patient, I wanted to make you aware of our electronic visit tool called Michael Cherry. [facility] 24/7 allows you to connect within minutes with a medical provider 24 hours a day, seven days a week via a mobile device or tablet or logging into a secure website from your computer. You can access [employer] from anywhere in the [place]. A virtual visit might be right for you when you have a simple condition and feel like you just dont want to get out of bed, or cant get away from work for an appointment, when your regular [facility] provider is not available (evenings, weekends or holidays), or when youre out of town and need minor care. Electronic visits cost only $49 and if the [facility] 24/7 provider determines a prescription is needed to treat your condition, one can be electronically transmitted to a nearby pharmacy*. Please take a moment to enroll today if you have not already done so. The enrollment process is free and takes just a few minutes. To enroll, please download the [employer] 24/7 akira to your tablet or phone, or visit [email or web address] to enroll on your computer. And, as an [address] [place] [address] patient with a [employer] account, the results of your visits will be scanned into your electronic medical record and your primary care provider will be able to view the scanned results. We urge you to continue to see your regular [facility] provider for your ongoing medical care. And while your primary care provider may not be the one available when you seek a Michael [employer] virtual visit, the peace of mind you get from getting a real diagnosis real time can be priceless. For more information on [employer], view our Frequently Asked Questions (FAQs) at [email or web address]. Sincerely, 
 
Ailin Richmond MD 
Chief Medical Officer [address] *:  certain medications cannot be prescribed via [employer] Unresulted Labs-Please follow up with your PCP about these lab tests Order Current Status CULTURE, BODY FLUID W GRAM STAIN Preliminary result Providers Seen During Your Hospitalization Provider Specialty Primary office phone Antonio Dobbins MD Emergency Medicine [phone] Maria Fernanda Ferrell MD Emergency Medicine [phone] Susan Koenig MD Internal Medicine [phone] George Marie MD Internal Medicine [phone] Vielka Cohen MD [facility] [phone] Your Primary Care Physician (PCP) Primary Care Physician Office Phone Office Fax [phone] [facility] [place] [address] [phone] You are allergic to the following Allergen Reactions Ampicillin-Sulbactam Rash Pcn (Penicillins) Itching Hives Piperacillin-Tazobactam Rash Pollen Extracts Rash Seafood (Shellfish Containing Products) Hives Other reaction(s): unknown Has tolerated iohexol (iodine-containing contrast) Only shrimp Shrimp Recent Documentation Height Weight BMI Smoking Status 1.829 m 64 kg 18.6 kg/m2 Current Every Day Smoker Emergency Contacts Name Discharge Info Relation Home Work Mobile Teena Ya DISCHARGE CAREGIVER [3] Mother [14] [phone] Patient Belongings The following personal items are in your possession at time of discharge: 
  Dental Appliances: None  Visual Aid: None      Home Medications: None   Jewelry: None  Clothing: At bedside    Other Valuables: Other (comment) (shoes shit socks) Please provide this summary of care documentation to your next provider. Signatures-by signing, you are acknowledging that this After Visit Summary has been reviewed with you and you have received a copy. Patient Signature:  ____________________________________________________________ Date:  ____________________________________________________________  
  
Mario Police Provider Signature:  ____________________________________________________________ Date:  ____________________________________________________________

## 2018-07-28 NOTE — ED TRIAGE NOTES
Per ems first call, pt with new onset of being unable to walk, repeat call with left sided weakness and being positive on [place]. Pt immediately eval by md, taken to ct.

## 2018-07-28 NOTE — Clinical Note
Status[de-identified] Inpatient [101] Type of Bed: Telemetry [19] Inpatient Hospitalization Certified Necessary for the Following Reasons: 3. Patient receiving treatment that can only be provided in an inpatient setting (further clarification in H&P documentation) Admitting Diagnosis: Sepsis ([facility] [address] [[id number]] Admitting Physician: Fatoumata Medina [[id number]] Attending Physician: Fatoumata Medina [[id number]] Estimated Length of Stay: 3-4 Midnights Discharge Plan[de-identified] Home with Office Follow-up

## 2018-07-29 LAB
ANION GAP SERPL CALC-SCNC: 6 MMOL/L (ref 3–18)
ATRIAL RATE: 106 BPM
BUN SERPL-MCNC: 26 MG/DL (ref 7–18)
BUN/CREAT SERPL: 13 (ref 12–20)
CALCIUM SERPL-MCNC: 9.1 MG/DL (ref 8.5–10.1)
CALCULATED P AXIS, ECG09: 79 DEGREES
CALCULATED R AXIS, ECG10: -56 DEGREES
CALCULATED T AXIS, ECG11: 67 DEGREES
CHLORIDE SERPL-SCNC: 114 MMOL/L (ref 100–108)
CO2 SERPL-SCNC: 19 MMOL/L (ref 21–32)
CREAT SERPL-MCNC: 1.95 MG/DL (ref 0.6–1.3)
DIAGNOSIS, 93000: NORMAL
ERYTHROCYTE [DISTWIDTH] IN BLOOD BY AUTOMATED COUNT: 15.6 % (ref 11.6–14.5)
GLUCOSE BLD STRIP.AUTO-MCNC: 175 MG/DL (ref 70–110)
GLUCOSE BLD STRIP.AUTO-MCNC: 213 MG/DL (ref 70–110)
GLUCOSE BLD STRIP.AUTO-MCNC: 236 MG/DL (ref 70–110)
GLUCOSE BLD STRIP.AUTO-MCNC: 261 MG/DL (ref 70–110)
GLUCOSE SERPL-MCNC: 99 MG/DL (ref 74–99)
HCT VFR BLD AUTO: 35.2 % (ref 36–48)
HGB BLD-MCNC: 11.2 G/DL (ref 13–16)
MCH RBC QN AUTO: 31.5 PG (ref 24–34)
MCHC RBC AUTO-ENTMCNC: 31.8 G/DL (ref 31–37)
MCV RBC AUTO: 99.2 FL (ref 74–97)
P-R INTERVAL, ECG05: 128 MS
PLATELET # BLD AUTO: 427 K/UL (ref 135–420)
PMV BLD AUTO: 9.7 FL (ref 9.2–11.8)
POTASSIUM SERPL-SCNC: 5.4 MMOL/L (ref 3.5–5.5)
Q-T INTERVAL, ECG07: 328 MS
QRS DURATION, ECG06: 80 MS
QTC CALCULATION (BEZET), ECG08: 435 MS
RBC # BLD AUTO: 3.55 M/UL (ref 4.7–5.5)
SODIUM SERPL-SCNC: 139 MMOL/L (ref 136–145)
VENTRICULAR RATE, ECG03: 106 BPM
WBC # BLD AUTO: 14.6 K/UL (ref 4.6–13.2)

## 2018-07-29 PROCEDURE — 74011250636 HC RX REV CODE- 250/636: Performed by: INTERNAL MEDICINE

## 2018-07-29 PROCEDURE — 36591 DRAW BLOOD OFF VENOUS DEVICE: CPT

## 2018-07-29 PROCEDURE — 65660000001 HC RM ICU INTERMED STEPDOWN

## 2018-07-29 PROCEDURE — 74011000258 HC RX REV CODE- 258: Performed by: HOSPITALIST

## 2018-07-29 PROCEDURE — 74011000250 HC RX REV CODE- 250: Performed by: PHYSICIAN ASSISTANT

## 2018-07-29 PROCEDURE — 80048 BASIC METABOLIC PNL TOTAL CA: CPT | Performed by: HOSPITALIST

## 2018-07-29 PROCEDURE — 74011636637 HC RX REV CODE- 636/637: Performed by: INTERNAL MEDICINE

## 2018-07-29 PROCEDURE — 82962 GLUCOSE BLOOD TEST: CPT

## 2018-07-29 PROCEDURE — 74011250636 HC RX REV CODE- 250/636: Performed by: HOSPITALIST

## 2018-07-29 PROCEDURE — 74011250637 HC RX REV CODE- 250/637: Performed by: INTERNAL MEDICINE

## 2018-07-29 PROCEDURE — 74011250637 HC RX REV CODE- 250/637: Performed by: HOSPITALIST

## 2018-07-29 PROCEDURE — 74011250636 HC RX REV CODE- 250/636: Performed by: PHYSICIAN ASSISTANT

## 2018-07-29 PROCEDURE — 74011000258 HC RX REV CODE- 258: Performed by: PHYSICIAN ASSISTANT

## 2018-07-29 PROCEDURE — 74011636637 HC RX REV CODE- 636/637: Performed by: HOSPITALIST

## 2018-07-29 PROCEDURE — 74011000250 HC RX REV CODE- 250

## 2018-07-29 PROCEDURE — 85027 COMPLETE CBC AUTOMATED: CPT | Performed by: HOSPITALIST

## 2018-07-29 RX ORDER — HYDROMORPHONE HYDROCHLORIDE 1 MG/ML
1 INJECTION, SOLUTION INTRAMUSCULAR; INTRAVENOUS; SUBCUTANEOUS
Status: DISCONTINUED | OUTPATIENT
Start: 2018-07-29 | End: 2018-07-30 | Stop reason: CLARIF

## 2018-07-29 RX ORDER — HYDROMORPHONE HYDROCHLORIDE 1 MG/ML
2 INJECTION, SOLUTION INTRAMUSCULAR; INTRAVENOUS; SUBCUTANEOUS
Status: DISCONTINUED | OUTPATIENT
Start: 2018-07-29 | End: 2018-07-29

## 2018-07-29 RX ORDER — AZTREONAM 2 G/1
INJECTION, POWDER, LYOPHILIZED, FOR SOLUTION INTRAMUSCULAR; INTRAVENOUS
Status: COMPLETED
Start: 2018-07-29 | End: 2018-07-29

## 2018-07-29 RX ORDER — OXYCODONE AND ACETAMINOPHEN 5; 325 MG/1; MG/1
1 TABLET ORAL
Status: DISCONTINUED | OUTPATIENT
Start: 2018-07-29 | End: 2018-08-04 | Stop reason: HOSPADM

## 2018-07-29 RX ORDER — INSULIN LISPRO 100 [IU]/ML
INJECTION, SOLUTION INTRAVENOUS; SUBCUTANEOUS
Status: DISCONTINUED | OUTPATIENT
Start: 2018-07-29 | End: 2018-07-29

## 2018-07-29 RX ORDER — INSULIN GLARGINE 100 [IU]/ML
5 INJECTION, SOLUTION SUBCUTANEOUS
Status: DISCONTINUED | OUTPATIENT
Start: 2018-07-29 | End: 2018-08-02

## 2018-07-29 RX ORDER — INSULIN LISPRO 100 [IU]/ML
INJECTION, SOLUTION INTRAVENOUS; SUBCUTANEOUS EVERY 6 HOURS
Status: DISCONTINUED | OUTPATIENT
Start: 2018-07-29 | End: 2018-08-01

## 2018-07-29 RX ADMIN — AZTREONAM 2 G: 2 INJECTION, POWDER, LYOPHILIZED, FOR SOLUTION INTRAMUSCULAR; INTRAVENOUS at 00:33

## 2018-07-29 RX ADMIN — METRONIDAZOLE 500 MG: 500 INJECTION, SOLUTION INTRAVENOUS at 16:42

## 2018-07-29 RX ADMIN — INSULIN LISPRO 4 UNITS: 100 INJECTION, SOLUTION INTRAVENOUS; SUBCUTANEOUS at 00:18

## 2018-07-29 RX ADMIN — SUCRALFATE 1 G: 1 TABLET ORAL at 08:52

## 2018-07-29 RX ADMIN — PANCRELIPASE 2 CAPSULE: 10000; 34000; 55000 CAPSULE, DELAYED RELEASE ORAL at 08:53

## 2018-07-29 RX ADMIN — SUCRALFATE 1 G: 1 TABLET ORAL at 12:18

## 2018-07-29 RX ADMIN — INSULIN LISPRO 9 UNITS: 100 INJECTION, SOLUTION INTRAVENOUS; SUBCUTANEOUS at 17:20

## 2018-07-29 RX ADMIN — Medication 10 ML: at 06:03

## 2018-07-29 RX ADMIN — DEXTROSE MONOHYDRATE AND SODIUM CHLORIDE 150 ML/HR: 5; .45 INJECTION, SOLUTION INTRAVENOUS at 08:52

## 2018-07-29 RX ADMIN — AZTREONAM 2 G: 2 INJECTION, POWDER, LYOPHILIZED, FOR SOLUTION INTRAMUSCULAR; INTRAVENOUS at 01:35

## 2018-07-29 RX ADMIN — Medication 1 MG: at 16:22

## 2018-07-29 RX ADMIN — FOLIC ACID 1 MG: 1 TABLET ORAL at 08:53

## 2018-07-29 RX ADMIN — INSULIN LISPRO 4 UNITS: 100 INJECTION, SOLUTION INTRAVENOUS; SUBCUTANEOUS at 06:02

## 2018-07-29 RX ADMIN — ENOXAPARIN SODIUM 40 MG: 100 INJECTION SUBCUTANEOUS at 22:37

## 2018-07-29 RX ADMIN — Medication 1 MG: at 12:18

## 2018-07-29 RX ADMIN — INSULIN LISPRO 3 UNITS: 100 INJECTION, SOLUTION INTRAVENOUS; SUBCUTANEOUS at 12:18

## 2018-07-29 RX ADMIN — Medication 10 ML: at 16:05

## 2018-07-29 RX ADMIN — FERROUS SULFATE TAB 325 MG (65 MG ELEMENTAL FE) 325 MG: 325 (65 FE) TAB at 08:53

## 2018-07-29 RX ADMIN — METRONIDAZOLE 500 MG: 500 INJECTION, SOLUTION INTRAVENOUS at 00:26

## 2018-07-29 RX ADMIN — Medication 10 ML: at 22:37

## 2018-07-29 RX ADMIN — AZTREONAM 2 G: 2 INJECTION, POWDER, LYOPHILIZED, FOR SOLUTION INTRAMUSCULAR; INTRAVENOUS at 08:56

## 2018-07-29 RX ADMIN — PANTOPRAZOLE SODIUM 40 MG: 40 TABLET, DELAYED RELEASE ORAL at 08:53

## 2018-07-29 RX ADMIN — DEXTROSE MONOHYDRATE AND SODIUM CHLORIDE 150 ML/HR: 5; .45 INJECTION, SOLUTION INTRAVENOUS at 16:42

## 2018-07-29 RX ADMIN — SUCRALFATE 1 G: 1 TABLET ORAL at 17:20

## 2018-07-29 RX ADMIN — LEVOFLOXACIN 750 MG: 5 INJECTION, SOLUTION INTRAVENOUS at 16:42

## 2018-07-29 RX ADMIN — OXYCODONE HYDROCHLORIDE AND ACETAMINOPHEN 1 TABLET: 10; 325 TABLET ORAL at 04:42

## 2018-07-29 RX ADMIN — PANTOPRAZOLE SODIUM 40 MG: 40 TABLET, DELAYED RELEASE ORAL at 17:20

## 2018-07-29 RX ADMIN — CYANOCOBALAMIN TAB 1000 MCG 1000 MCG: 1000 TAB at 08:53

## 2018-07-29 RX ADMIN — Medication 1 MG: at 20:22

## 2018-07-29 RX ADMIN — SUCRALFATE 1 G: 1 TABLET ORAL at 22:37

## 2018-07-29 RX ADMIN — METRONIDAZOLE 500 MG: 500 INJECTION, SOLUTION INTRAVENOUS at 10:00

## 2018-07-29 RX ADMIN — VITAMIN D, TAB 1000IU (100/BT) 2000 UNITS: 25 TAB at 08:52

## 2018-07-29 RX ADMIN — Medication 500 MG: at 08:52

## 2018-07-29 RX ADMIN — PANCRELIPASE 3 CAPSULE: 10000; 34000; 55000 CAPSULE, DELAYED RELEASE ORAL at 20:21

## 2018-07-29 RX ADMIN — OXYCODONE HYDROCHLORIDE AND ACETAMINOPHEN 1 TABLET: 10; 325 TABLET ORAL at 10:00

## 2018-07-29 RX ADMIN — INSULIN GLARGINE 5 UNITS: 100 INJECTION, SOLUTION SUBCUTANEOUS at 16:07

## 2018-07-29 RX ADMIN — AZTREONAM 2 G: 2 INJECTION, POWDER, LYOPHILIZED, FOR SOLUTION INTRAMUSCULAR; INTRAVENOUS at 16:06

## 2018-07-29 RX ADMIN — PANCRELIPASE 2 CAPSULE: 10000; 34000; 55000 CAPSULE, DELAYED RELEASE ORAL at 16:06

## 2018-07-29 RX ADMIN — FERROUS SULFATE TAB 325 MG (65 MG ELEMENTAL FE) 325 MG: 325 (65 FE) TAB at 17:20

## 2018-07-29 NOTE — H&P
Medicine History and Physical 
 
Patient: Masha Graham   Age:  48 y.o. Assessment Principal Problem: 
  Sepsis ([facility] (4/15/2016) Active Problems: Abscess of abdominal cavity ([facility] [address] (6/19/2013) Overview: Abdominal abscess - most likely infected fluid collection from duodenal  
    fistula related to pancreatic disease. Doing well with drainage and  
    antibiotics. Fistulogram on Friday noted continued fistula to the  
    duodenum/pancreas. Will need referral to pancreatic surgeon. 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 
 - likely from abscess/fistula - CT abdomen done and prelim is not impressive doesn't look to be much change, final read pending - UA and xray negative 
 - triple abx for now. ID consult in am recommended - IV fluids 2)  Enterocutaneous fistula, chronic pancreatitis with abscess - GI and surgery consulted in ED 
 - NPO but meds, IVF, pain meds as bp can tolerate 3)  DM 
 - SSI , q 6 accucheck 4)  Chronic pain 
 - oral percocet only to start and only if sbp maintains >100 DISPO 
-Pt to be admitted  at this time for reasons addressed above, continued hospitalization for ongoing assessment and treatment indicated Anticipated Date of Discharge: 3 days Anticipated Disposition (home, SNF) : home Chief Complaint:  
Chief Complaint Patient presents with  Extremity Weakness HPI:  
Masha Graham is a 48y.o. year old male who presents with  Increasing abdominal pain starting today in addition to extremity weakness. 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 He has chronic pain syndrome as well. Code s called for extremity weakness but later cancelled, appears to be metabolically related.   Patient found to have wbc elevation and hypotension. He will be admitted for further work up. Review of Systems - positive responses in bold type Constitutional: Negative for fever, chills, diaphoresis and unexpected weight change. HENT: Negative for ear pain, congestion, sore throat, rhinorrhea, drooling, trouble swallowing, neck pain and tinnitus. Eyes: Negative for photophobia, pain, redness and visual disturbance. Respiratory: negative for shortness of breath, cough, choking, chest tightness, wheezing or stridor. Cardiovascular: Negative for chest pain, palpitations and leg swelling. Gastrointestinal: Negative for nausea, vomiting, abdominal pain, diarrhea, constipation, blood in stool, abdominal distention and anal bleeding. Genitourinary: Negative for dysuria, urgency, frequency, hematuria, flank pain and difficulty urinating. Musculoskeletal: Negative for back pain and arthralgias. Skin: Negative for color change, rash and wound. Neurological: Negative for dizziness, seizures, syncope, speech difficulty, light-headedness or headaches. Hematological: Does not bruise/bleed easily. Psychiatric/Behavioral: Negative for suicidal ideas, hallucinations, behavioral problems, self-injury or agitation Past Medical History: 
Past Medical History:  
Diagnosis Date  Abdominal pain, generalized  Chronic pancreatitis ([facility] [address]  Diabetes ([facility] [address]   
 hypothyroid  Encounter for long-term (current) use of other medications  Essential hypertension  ETOH abuse  GERD (gastroesophageal reflux disease)  Heart failure ([address]  Hyponatremia  Pain in the abdomen 11/2/2010  Pancreatitis w/ abscess and pseudocyst  
 Pancreatitis  Psychiatric disorder   
 depression, anxiety  Tobacco abuse Past Surgical History: 
Past Surgical History:  
Procedure Laterality Date  HX ABDOMINAL LAPAROSCOPY PANCREATIC STENT  
 HX CHOLECYSTECTOMY Family History: 
Family History Problem Relation Age of Onset  Heart Disease Father Social History: 
Social History Social History  Marital status: SINGLE Spouse name: N/A  
 Number of children: N/A  
 Years of education: N/A Social History Main Topics  Smoking status: Current Every Day Smoker Packs/day: 0.50 Years: 0.00 Types: Cigarettes  Smokeless tobacco: Never Used  Alcohol use Yes  Drug use: No  
 Sexual activity: Yes Birth control/ protection: None Other Topics Concern  None Social History Narrative Home Medications: 
Prior to Admission medications Medication Sig Start Date End Date Taking? Authorizing Provider  
promethazine (PHENERGAN) 25 mg tablet Take 1 Tab by mouth every six (6) hours as needed. 6/29/18   Chrystal Baptiste NP  
oxyCODONE-acetaminophen (PERCOCET)  mg per tablet Take 1 Tab by mouth every six (6) hours as needed. Max Daily Amount: 4 Tabs. Indications: Pain 6/29/18   Chrystal Baptiste NP  
insulin lispro (HUMALOG) 100 unit/mL injection 12 Units by SubCUTAneous route nightly. Indications: type 2 diabetes mellitus 6/19/18   Alonzo Dale NP  
ondansetron (ZOFRAN ODT) 4 mg disintegrating tablet Take 1 Tab by mouth every eight (8) hours as needed for Nausea. 5/31/18   Chrystal Baptiste NP  
sucralfate (CARAFATE) 1 gram tablet Take 1 Tab by mouth four (4) times daily. Indications: PREVENTION OF STRESS ULCER 5/22/18   Stacie Moy MD  
amitriptyline (ELAVIL) 75 mg tablet Take 1 Tab by mouth nightly. Indications: Depression 8/9/17   Mary Weiss NP  
ascorbic acid, vitamin C, (VITAMIN C) 500 mg tablet Take 1 Tab by mouth daily. Indications: VITAMIN C DEFICIENCY 8/9/17   Mary Weiss NP  
cholecalciferol, vitamin D3, 2,000 unit tab Take 1 Tab by mouth daily. Indications: PREVENTION OF VITAMIN D DEFICIENCY 8/9/17   Mary Weiss NP  
cyanocobalamin (VITAMIN B-12) 1,000 mcg sublingual tablet Take 2 Tabs by mouth daily. Indications: PREVENTION OF VITAMIN B12 DEFICIENCY 8/9/17   Sia Kim NP  
ferrous sulfate 325 mg (65 mg iron) tablet Take 1 Tab by mouth two (2) times a day. Indications: IRON DEFICIENCY ANEMIA 8/9/17   Sia Kim NP  
folic acid 696 mcg tablet Take 1 Tab by mouth daily. 8/9/17   Sia Kim NP  
lipase-protease-amylase (CREON) 12,000-38,000 -60,000 unit capsule Take 2 Caps by mouth three (3) times daily (with meals). Indications: exocrine pancreatic insufficiency, dosage change 7/28/17   Sia Kim NP  
pantoprazole (PROTONIX) 40 mg tablet Take 1 Tab by mouth two (2) times a day. Indications: EROSIVE ESOPHAGITIS, gastroesophageal reflux disease 7/28/17   Sia Kim NP Allergies: Allergies Allergen Reactions  Ampicillin-Sulbactam Rash  Pcn [Penicillins] Itching and Hives  Piperacillin-Tazobactam Rash  Pollen Extracts Rash  Seafood [Shellfish Containing Products] Hives Other reaction(s): unknown Has tolerated iohexol (iodine-containing contrast) Only shrimp Shrimp Physical Exam:  
 
Visit Vitals  /71  Pulse 90  Temp 97.7 °F (36.5 °C)  Resp 14  
 Ht 6' (1.829 m)  Wt 59 kg (130 lb)  SpO2 100%  BMI 17.63 kg/m2 Physical Exam: 
General appearance: alert, cooperative, no distress, appears stated age Head: Normocephalic, without obvious abnormality, atraumatic Neck: supple, trachea midline Lungs: clear to auscultation bilaterally Heart: regular rate and rhythm, S1, S2 normal, no murmur, click, rub or gallop Abdomen: soft, + bowel sounds Extremities: extremities normal, atraumatic, no cyanosis or edema Skin: Skin color, texture, turgor normal. No rashes or lesions Neurologic: Grossly normal 
 
Intake and Output: 
Current Shift:    
Last three shifts:    
 
Lab/Data Reviewed: 
CMP:  
Lab Results Component Value Date/Time   (L) 07/28/2018 04:28 PM  
 K 6.2 (HH) 07/28/2018 04:28 PM  
  07/28/2018 04:28 PM  
 CO2 19 (L) 07/28/2018 04:28 PM  
 AGAP 10 07/28/2018 04:28 PM  
  (H) 07/28/2018 04:28 PM  
 BUN 20 (H) 07/28/2018 04:28 PM  
 CREA 1.87 (H) 07/28/2018 04:28 PM  
 GFRAA 46 (L) 07/28/2018 04:28 PM  
 GFRNA 38 (L) 07/28/2018 04:28 PM  
 CA 9.3 07/28/2018 04:28 PM  
 ALB 3.4 07/28/2018 04:28 PM  
 TP 8.9 (H) 07/28/2018 04:28 PM  
 GLOB 5.5 (H) 07/28/2018 04:28 PM  
 AGRAT 0.6 (L) 07/28/2018 04:28 PM  
 SGOT 34 07/28/2018 04:28 PM  
 ALT 99 (H) 07/28/2018 04:28 PM  
 
CBC:  
Lab Results Component Value Date/Time WBC 17.7 (H) 07/28/2018 04:28 PM  
 HGB 12.9 (L) 07/28/2018 04:28 PM  
 HCT 39.6 07/28/2018 04:28 PM  
  (H) 07/28/2018 04:28 PM  
 
All Cardiac Markers in the last 24 hours:  
Lab Results Component Value Date/Time CPK 39 07/28/2018 04:28 PM  
 CKMB <1.0 07/28/2018 04:28 PM  
 CKND1  07/28/2018 04:28 PM  
  CALCULATION NOT PERFORMED WHEN RESULT IS BELOW LINEAR LIMIT  
 Douglas Bookbinder <0.02 07/28/2018 04:28 PM  
 
 
Sulaiman Javed MD 
 
July 28, 2018

## 2018-07-29 NOTE — ED NOTES
Patient assisted to wheelchair and into bathroom. Refused bedside commode. Patient with steady gait.

## 2018-07-29 NOTE — ED NOTES
Patient repeatedly asking for pain medicine. Provider informed. Provider at bedside speaking with patient.

## 2018-07-29 NOTE — PROGRESS NOTES
Problem: Falls - Risk of 
Goal: *Absence of Falls Document Carlos Bunting Fall Risk and appropriate interventions in the flowsheet. Outcome: Progressing Towards Goal 
Fall Risk Interventions: 
  
 
  
 
Medication Interventions: Bed/chair exit alarm, Evaluate medications/consider consulting pharmacy Elimination Interventions: Bed/chair exit alarm, Call light in reach, Patient to call for help with toileting needs, Urinal in reach

## 2018-07-29 NOTE — ROUTINE PROCESS
0725 Bedside report given to [address] [facility] [address] (oncoming nurse) per Joseph Kim RN (ofgoing nurse) utilizing SBAR, MAR, Kardex, I&O, results review and EKG interpretation with rate and rhythm.

## 2018-07-29 NOTE — MANAGEMENT PLAN
Discharge/Transition Planning Interviewed patient. Verified demographics listed on face sheet with patient; all information correct. Pt states living in [address] with mother. States that his his number, but unsure if phone paid. Patient stated their PCP is Dr Rebeca Coker and unsure of last appt. Pt stated he was at [facility] and d/c a couple days ago. Patient's NOK is. Patient independent with ADLs prior to admission. No use of DME prior to admission. Discharge plan is Home. Pt known to present CM. He is chronically non-compliant, often gives wrong addresses and does not follow up with physician, HH, medications. Uses ER and comes to hospital as pcp. Drug seeking. Patient has designated ___mother_____________________ to participate in his/her discharge plan and to receive any needed information. Name: Lexa Haro as pt Phone [phone] Care Management Interventions PCP Verified by CM: Yes (Dr Rebeca Coker) Mode of Transport at Discharge: Other (see comment) (medicaid cab) Transition of Care Consult (CM Consult): Discharge Planning Current Support Network: Other (apartment with mother) Confirm Follow Up Transport: Cab Plan discussed with Pt/Family/Caregiver: Yes Discharge Location Discharge Placement: Home Reason for Admission:   Sepsis RRAT Score:     29 Resources/supports as identified by patient/family:   tbd Top Challenges facing patient (as identified by patient/family and CM): Finances/Medication cost?      Has Shwetha David Transportation?  cab Support system or lack thereof?  mother Living arrangements? Self-care/ADLs/Cognition?  independent Current Advanced Directive/Advance Care Plan: On file Plan for utilizing home health:  [facility] if they are able to contact pt Likelihood of readmission:  Very High 
              
Transition of Care Plan:        Home Alma Sorto RN BSN Outcomes Manager Pager # [phone]

## 2018-07-29 NOTE — PROGRESS NOTES
Problem: Falls - Risk of 
Goal: *Absence of Falls Document Vane Jadaw Fall Risk and appropriate interventions in the flowsheet. Outcome: Progressing Towards Goal 
Fall Risk Interventions: 
  
 
  
 
Medication Interventions: Bed/chair exit alarm Elimination Interventions: Bed/chair exit alarm Problem: Pressure Injury - Risk of 
Goal: *Prevention of pressure injury Document Austin Scale and appropriate interventions in the flowsheet. Outcome: Progressing Towards Goal 
Pressure Injury Interventions: 
Sensory Interventions: Assess need for specialty bed, Maintain/enhance activity level, Monitor skin under medical devices Moisture Interventions: Check for incontinence Q2 hours and as needed, Contain wound drainage Activity Interventions: Pressure redistribution bed/mattress(bed type) Mobility Interventions: HOB 30 degrees or less Nutrition Interventions: Document food/fluid/supplement intake

## 2018-07-29 NOTE — CONSULTS
Gastroenterology Consult    Patient: Onofre Childress MRN: [id number]  SSN: [id number]    YOB: 1965  Age: 48 y.o. 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 Has had multiple drains placed which usually fall out by his report. Last attempt at percutaneous drain placement was 7/5/18 but was unsuccessful. Has chronic abdominal pain which is likely multifactorial. Has acute on chronic diarrhea on relatively low doses of pancreat enzymes. Need to exclude CDiff. Agree with empiric ABX and ID evaluation. Recommended IR evaluation for possible percutaneous drainage of the right flank fluid collection although this is relatively small. Ultimately, he should be evaluated at an academic/university center to consider options/feasbility for more definitive pancreatic surgery.     Plan:   -Antiemetics as needed, minimize narcotics if possible  -Check CDiff  -Will increase pancreatic enzyme replacement and continue PPI  -Recommend IR evaluation for possible percutaneous drain placement  -Agree with antibiotics for now, ID consultation      Subjective:      Onofre Childress is a 48 y.o. male with history of recurrent acute pancreatitis and chronic pancreatitis thought due to ETOH with chronic pancreaticoduodenal-cutaneous fistula, chronic pain, and poorly controlled diabetes who is admitted for leg and arm weakness, found to have hypotension and tachycardia. Reports that he had abrupt onset of overall weakness at a convenience store yesterday. Notes indicate leg and arm weakness although he tells me he had global weakness. Was brought to the ER where neurology teleconsult was performed and felt his symptoms were related to transient worsening of his cervical spinal stenosis, exacerbated by hypotension. His SBP was in th 60's with HR's in the 100's. Was given IVF's and empiric antibiotics for possible sepsis and SBP has improved to the 90's. 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 Duodenum is tethered to the abscess. The previously noted pigtail catheter in this collection has been removed. Fistulous tract is visualized. Chronic calcific pancreatitis is seen with a dilated duct in the tail; gastric wall thickening noted; small hiatal hernia. He is will known to our service for his chronic pancreatitis and chronic fistula. Has been admitted multiple times for abdominal pain, DKA, weakness. Has had multiple percutaneous drain placements for recurrent abdominal fluid collections and typically the drain falls out by his report. He had a fall in June2018 and was admitted at [facility] with weakness in the extremities. Evaluated by neurosurgery at that time and felt to have central cord syndrome treated with gabapentin and steroids.     Today, he complains of intermitted nausea/vomiting, chronic abdominal pain unchanged from baseline, chronic watery nonbloody diarrhea worse in the past 3 days, limited appetite, weight loss which appears to have been steady decline. Admission labwork: WBC 17.7, down to 14.6, Creat 1.89 up to 1.95, , AST /ALT 54/99 with normal bilirubin/lipase    He tells me he no longer drinks alcohol although ER notes indicate recent beer drinking last week. --Background--  -He has known history of recurrent alcoholic pancreatitis started around 2006/2007 per notes with multiple pseudocysts, percutaneous drainage procedures, PD stenting and ultimately development of pancreaticoduodenal-cutaneous fistula which has intermittently been drained via a percutaneous catheter or has been allowed to spontaneously drain (into an ostomy bag previsously. He has been followed by Dr Vivien Kitchen in our office although not seen since 2017. It appears his last endoscopic pancreatic intervention was ERCP with Dr Vivien Ktichen with stent placement in the pancreas crossing the ductal disruption in 2015. Imaging has shown persistent presence of this stent in the pancreas. He has been managed on chronic narcotics and pancreatic enzymes replacement but has required frequent admissions for abdominal pain, problems with drainage and skin irritation and fistula, DKA, nausea/vomiting.   -Recurrent admissions for DKA, abdominal pain, nausea/vomiting. -CT scans with recurrent fluid collection in the right lower quadrant, chronic calcific pancreatitis with dilated PD   -Seen by General Surgery (Dr Aileen Tadeo, Dr Julee Polanco) and ID during prior admission. Surgery has recommended referral to a tertiary center for pancreatic surgery.   -History of ESBL E Coli growing from wound cultures, possible colonization  -Has been on chronic protonix, creon 12k/38k/60k 2 caps tid, Percocet  -Prior EGDs 2010, 2013, 2014, negative for Kellers esophagus  -Recent EGD [id number]: ulcerative esophagitis, gastric erosions, ulcerative duodenitis, hiatal hernia, atrophic gastritis (Hpylori negative on biopsies)  -History of adenomatous colon polyps, last colonoscopy in 2010    Past Medical History  Past Medical History:   Diagnosis Date    Abdominal pain, generalized     [facility]     Diabetes ([facility]     hypothyroid    Encounter for long-term (current) use of other medications     Essential hypertension     ETOH abuse     GERD (gastroesophageal reflux disease)     Heart failure (HCC)     Hyponatremia     Pain in the abdomen 11/2/2010    Pancreatitis     w/ abscess and pseudocyst    Pancreatitis     Psychiatric disorder     depression, anxiety    Tobacco abuse        Past Surgical History  Past Surgical History:   Procedure Laterality Date    HX ABDOMINAL LAPAROSCOPY      PANCREATIC STENT    HX CHOLECYSTECTOMY         Family History  Family History   Problem Relation Age of Onset    Heart Disease Father          Social History  Social History     Social History    Marital status: SINGLE     Spouse name: N/A    Number of children: N/A    Years of education: N/A     Occupational History    Not on file.      Social History Main Topics    Smoking status: Current Every Day Smoker     Packs/day: 0.50     Years: 0.00     Types: Cigarettes    Smokeless tobacco: Never Used    Alcohol use Yes    Drug use: No    Sexual activity: Yes     Birth control/ protection: None     Other Topics Concern    Not on file     Social History Narrative         Medications  Current Facility-Administered Medications   Medication Dose Route Frequency    insulin lispro (HUMALOG) injection   SubCUTAneous Q6H    insulin glargine (LANTUS) injection 5 Units  5 Units SubCUTAneous ACD    HYDROmorphone (DILAUDID) injection 1 mg  1 mg IntraVENous Q4H PRN    oxyCODONE-acetaminophen (PERCOCET) 5-325 mg per tablet 1 Tab  1 Tab Oral Q6H PRN    sodium chloride (NS) flush 5-10 mL  5-10 mL IntraVENous PRN    aztreonam (AZACTAM) 2 g in 0.9% sodium chloride (MBP/ADV) 100 mL MBP  2 g IntraVENous Q8H    metroNIDAZOLE (FLAGYL) IVPB premix 500 mg  500 mg IntraVENous Q8H    levoFLOXacin (LEVAQUIN) 750 mg in D5W IVPB 750 mg IntraVENous Q24H    ascorbic acid (vitamin C) (VITAMIN C) tablet 500 mg  500 mg Oral DAILY    cholecalciferol (VITAMIN D3) tablet 2,000 Units  2,000 Units Oral DAILY    cyanocobalamin tablet 1,000 mcg  1,000 mcg Oral DAILY    ferrous sulfate tablet 325 mg  325 mg Oral BID    folic acid (FOLVITE) tablet 1 mg  1 mg Oral DAILY    lipase-protease-amylase (ZENPEP 10,000) capsule 2 Cap  2 Cap Oral TID    pantoprazole (PROTONIX) tablet 40 mg  40 mg Oral BID    sucralfate (CARAFATE) tablet 1 g  1 g Oral QID    promethazine (PHENERGAN) tablet 25 mg  25 mg Oral Q6H PRN    dextrose 5 % - 0.45% NaCl infusion  150 mL/hr IntraVENous CONTINUOUS    ondansetron (ZOFRAN) injection 4 mg  4 mg IntraVENous Q4H PRN    enoxaparin (LOVENOX) injection 40 mg  40 mg SubCUTAneous Q24H    glucose chewable tablet 16 g  4 Tab Oral PRN    glucagon (GLUCAGEN) injection 1 mg  1 mg IntraMUSCular PRN    dextrose (D50W) injection syrg 12.5-25 g  25-50 mL IntraVENous PRN    sodium chloride (NS) flush 5-10 mL  5-10 mL IntraVENous Q8H    sodium chloride (NS) flush 5-10 mL  5-10 mL IntraVENous PRN        Hospital Problems  Date Reviewed: 6/9/2018          Codes Class Noted POA    Enterocutaneous fistula ICD-10-CM: K63.2  ICD-9-CM: 569.81  7/28/2018 Unknown        * (Principal)Sepsis ([facility] ICD-10-CM: A41.9  ICD-9-CM: 038.9, 995.91  4/15/2016 Unknown        Chronic pain (Chronic) ICD-10-CM: Q88.39  ICD-9-CM: 338.29  3/23/2016 Yes        IDDM (insulin dependent diabetes mellitus) (HCC) (Chronic) ICD-10-CM: E11.9, Z79.4  ICD-9-CM: 250.00, V58.67  8/31/2013 Yes        Abscess of abdominal cavity (HCC) ICD-10-CM: K65.1  ICD-9-CM: 567.22  6/19/2013 Yes    Overview Signed 7/9/2013 10:39 AM by Demarco Fu MD     Abdominal abscess - most likely infected fluid collection from duodenal fistula related to pancreatic disease. Doing well with drainage and antibiotics. Fistulogram on Friday noted continued fistula to the duodenum/pancreas. Will need referral to pancreatic surgeon. Allergies   Allergen Reactions    Ampicillin-Sulbactam Rash    Pcn [Penicillins] Itching and Hives    Piperacillin-Tazobactam Rash    Pollen Extracts Rash    Seafood [Shellfish Containing Products] Hives     Other reaction(s): unknown  Has tolerated iohexol (iodine-containing contrast)  Only shrimp  Shrimp       Review of Systems:  Pertinent items are noted in the History of Present Illness.     Objective:     Physical Exam:  Visit Vitals    BP 93/69    Pulse 80    Temp 97.9 °F (36.6 °C)    Resp 14    Ht 6' (1.829 m)    Wt 58.7 kg (129 lb 6.6 oz)    SpO2 100%    BMI 17.55 kg/m2     General appearance: Chronically ill appearing M, appeared comfortable from the door but complaining of pain throughout my interview, alert, cooperative  HEENT: Head: Normocephalic, without obvious abnormality, atraumatic; sclera negative for pallor/icterus; poor dentition  Neck: supple, symmetrical, trachea midline, no adenopathy   Lungs: clear to auscultation bilaterally  Heart: regular rate and rhythm, S1, S2 normal, no murmur, click, rub or gallop; port noted left upper chest  Abdomen: diffuse voluntary guarding with slight touch of the skin limiting examination, no apparent rebound, fistulous tract noted RLQ without drainage or evidence of cellulitis, Bowel sounds normal.  Extremities: No edema  Skin: Chronic skin changes around fistula, otherwise color, texture, turgor normal  Recent Results (from the past 24 hour(s))   GLUCOSE, POC    Collection Time: 07/28/18  4:01 PM   Result Value Ref Range    Glucose (POC) 253 (H) 70 - 110 mg/dL   POC LACTIC ACID    Collection Time: 07/28/18  4:23 PM   Result Value Ref Range    Lactic Acid (POC) 1.5 0.4 - 2.0 mmol/L   CULTURE, BLOOD    Collection Time: 07/28/18  4:25 PM   Result Value Ref Range    Special Requests: PERIPHERAL      Culture result: NO GROWTH AFTER 14 HOURS     CULTURE, BLOOD    Collection Time: 07/28/18 4:25 PM   Result Value Ref Range    Special Requests: PERIPHERAL      Culture result: NO GROWTH AFTER 14 HOURS     ETHYL ALCOHOL    Collection Time: 07/28/18  4:25 PM   Result Value Ref Range    ALCOHOL(ETHYL),SERUM <3 0 - 3 MG/DL   CBC WITH AUTOMATED DIFF    Collection Time: 07/28/18  4:28 PM   Result Value Ref Range    WBC 17.7 (H) 4.6 - 13.2 K/uL    RBC 4.00 (L) 4.70 - 5.50 M/uL    HGB 12.9 (L) 13.0 - 16.0 g/dL    HCT 39.6 36.0 - 48.0 %    MCV 99.0 (H) 74.0 - 97.0 FL    MCH 32.3 24.0 - 34.0 PG    MCHC 32.6 31.0 - 37.0 g/dL    RDW 15.5 (H) 11.6 - 14.5 %    PLATELET 599 (H) 492 - 420 K/uL    MPV 10.1 9.2 - 11.8 FL    NEUTROPHILS 85 (H) 40 - 73 %    LYMPHOCYTES 9 (L) 21 - 52 %    MONOCYTES 5 3 - 10 %    EOSINOPHILS 1 0 - 5 %    BASOPHILS 0 0 - 2 %    ABS. NEUTROPHILS 15.1 (H) 1.8 - 8.0 K/UL    ABS. LYMPHOCYTES 1.6 0.9 - 3.6 K/UL    ABS. MONOCYTES 0.8 0.05 - 1.2 K/UL    ABS. EOSINOPHILS 0.1 0.0 - 0.4 K/UL    ABS. BASOPHILS 0.0 0.0 - 0.1 K/UL    DF AUTOMATED     METABOLIC PANEL, COMPREHENSIVE    Collection Time: 07/28/18  4:28 PM   Result Value Ref Range    Sodium 135 (L) 136 - 145 mmol/L    Potassium 6.2 (HH) 3.5 - 5.5 mmol/L    Chloride 106 100 - 108 mmol/L    CO2 19 (L) 21 - 32 mmol/L    Anion gap 10 3.0 - 18 mmol/L    Glucose 269 (H) 74 - 99 mg/dL    BUN 20 (H) 7.0 - 18 MG/DL    Creatinine 1.87 (H) 0.6 - 1.3 MG/DL    BUN/Creatinine ratio 11 (L) 12 - 20      GFR est AA 46 (L) >60 ml/min/1.73m2    GFR est non-AA 38 (L) >60 ml/min/1.73m2    Calcium 9.3 8.5 - 10.1 MG/DL    Bilirubin, total 0.3 0.2 - 1.0 MG/DL    ALT (SGPT) 99 (H) 16 - 61 U/L    AST (SGOT) 34 15 - 37 U/L    Alk.  phosphatase 299 (H) 45 - 117 U/L    Protein, total 8.9 (H) 6.4 - 8.2 g/dL    Albumin 3.4 3.4 - 5.0 g/dL    Globulin 5.5 (H) 2.0 - 4.0 g/dL    A-G Ratio 0.6 (L) 0.8 - 1.7     CARDIAC PANEL,(CK, CKMB & TROPONIN)    Collection Time: 07/28/18  4:28 PM   Result Value Ref Range    CK 39 39 - 308 U/L    CK - MB <1.0 <3.6 ng/ml    CK-MB Index  0.0 - 4.0 %     CALCULATION NOT PERFORMED WHEN RESULT IS BELOW LINEAR LIMIT    Troponin-I, Qt. <0.02 0.0 - 0.045 NG/ML   PROTHROMBIN TIME + INR    Collection Time: 07/28/18  4:28 PM   Result Value Ref Range    Prothrombin time 11.7 11.5 - 15.2 sec    INR 0.9 0.8 - 1.2     PTT    Collection Time: 07/28/18  4:28 PM   Result Value Ref Range    aPTT 24.1 23.0 - 36.4 SEC   LIPASE    Collection Time: 07/28/18  4:28 PM   Result Value Ref Range    Lipase 48 (L) 73 - 393 U/L   HEMOGLOBIN A1C WITH EAG    Collection Time: 07/28/18  4:28 PM   Result Value Ref Range    Hemoglobin A1c 7.9 (H) 4.2 - 5.6 %    Est. average glucose 180 mg/dL   EKG, 12 LEAD, INITIAL    Collection Time: 07/28/18  4:36 PM   Result Value Ref Range    Ventricular Rate 106 BPM    Atrial Rate 106 BPM    P-R Interval 128 ms    QRS Duration 80 ms    Q-T Interval 328 ms    QTC Calculation (Bezet) 435 ms    Calculated P Axis 79 degrees    Calculated R Axis -56 degrees    Calculated T Axis 67 degrees    Diagnosis       Sinus tachycardia  Right atrial enlargement  Left axis deviation  Pulmonary disease pattern  Abnormal ECG  When compared with ECG of 18-JUN-2018 18:02,  Criteria for Inferior infarct are no longer present  Confirmed by Edith Davidson ([id number]) on 7/29/2018 10:00:35 AM     URINALYSIS W/ RFLX MICROSCOPIC    Collection Time: 07/28/18  7:51 PM   Result Value Ref Range    Color DARK YELLOW      Appearance CLOUDY      Specific gravity 1.025 1.005 - 1.030      pH (UA) 5.0 5.0 - 8.0      Protein 300 (A) NEG mg/dL    Glucose 100 (A) NEG mg/dL    Ketone TRACE (A) NEG mg/dL    Bilirubin SMALL (A) NEG      Blood NEGATIVE  NEG      Urobilinogen 1.0 0.2 - 1.0 EU/dL    Nitrites NEGATIVE  NEG      Leukocyte Esterase TRACE (A) NEG     URINE MICROSCOPIC ONLY    Collection Time: 07/28/18  7:51 PM   Result Value Ref Range    WBC 2 to 4 0 - 4 /hpf    RBC 0 0 - 5 /hpf    Epithelial cells FEW 0 - 5 /lpf    Bacteria 1+ (A) NEG /hpf    Amorphous Crystals 3+ (A) NEG   METABOLIC PANEL, BASIC    Collection Time: 07/28/18  9:19 PM   Result Value Ref Range    Sodium 136 136 - 145 mmol/L    Potassium 6.4 (HH) 3.5 - 5.5 mmol/L    Chloride 112 (H) 100 - 108 mmol/L    CO2 16 (L) 21 - 32 mmol/L    Anion gap 8 3.0 - 18 mmol/L    Glucose 288 (H) 74 - 99 mg/dL    BUN 24 (H) 7.0 - 18 MG/DL    Creatinine 1.83 (H) 0.6 - 1.3 MG/DL    BUN/Creatinine ratio 13 12 - 20      GFR est AA 47 (L) >60 ml/min/1.73m2    GFR est non-AA 39 (L) >60 ml/min/1.73m2    Calcium 8.1 (L) 8.5 - 10.1 MG/DL   GLUCOSE, POC    Collection Time: 07/29/18 12:13 AM   Result Value Ref Range    Glucose (POC) 213 (H) 70 - 297 mg/dL   METABOLIC PANEL, BASIC    Collection Time: 07/29/18  1:45 AM   Result Value Ref Range    Sodium 139 136 - 145 mmol/L    Potassium 5.4 3.5 - 5.5 mmol/L    Chloride 114 (H) 100 - 108 mmol/L    CO2 19 (L) 21 - 32 mmol/L    Anion gap 6 3.0 - 18 mmol/L    Glucose 99 74 - 99 mg/dL    BUN 26 (H) 7.0 - 18 MG/DL    Creatinine 1.95 (H) 0.6 - 1.3 MG/DL    BUN/Creatinine ratio 13 12 - 20      GFR est AA 44 (L) >60 ml/min/1.73m2    GFR est non-AA 36 (L) >60 ml/min/1.73m2    Calcium 9.1 8.5 - 10.1 MG/DL   CBC W/O DIFF    Collection Time: 07/29/18  1:45 AM   Result Value Ref Range    WBC 14.6 (H) 4.6 - 13.2 K/uL    RBC 3.55 (L) 4.70 - 5.50 M/uL    HGB 11.2 (L) 13.0 - 16.0 g/dL    HCT 35.2 (L) 36.0 - 48.0 %    MCV 99.2 (H) 74.0 - 97.0 FL    MCH 31.5 24.0 - 34.0 PG    MCHC 31.8 31.0 - 37.0 g/dL    RDW 15.6 (H) 11.6 - 14.5 %    PLATELET 357 (H) 900 - 420 K/uL    MPV 9.7 9.2 - 11.8 FL   GLUCOSE, POC    Collection Time: 07/29/18  5:57 AM   Result Value Ref Range    Glucose (POC) 236 (H) 70 - 110 mg/dL   GLUCOSE, POC    Collection Time: 07/29/18 11:42 AM   Result Value Ref Range    Glucose (POC) 175 (H) 70 - 110 mg/dL         Signed By: Maddie Salter MD     July 29, 2018

## 2018-07-29 NOTE — PROGRESS NOTES
07/28/2018 2215 Assumed care of pt after bedside report from ER. Connected to monitor, NBP cuff, pulse ox. Monitor denotes NSR. Neuro intact. AAO x 4. KHAN x 4 but weak lower extremities. Lungs clear diminished I bases. Abd, flat, intact,tender. Pt has diarrhea and also is receiving Kayexalate for hyperkalemia. Pt has rt side abd healing site of former biliary drain. Pt also has bilateral feet with very hick misshapened overlapping nails especially rt great toe and 2nd toe with broken areas noted. Mother at bedside with pt. Pt's last K+ 6.4.2300 Meds given as ordered for hyperkalemia. Pt also given percocet 1 tab for pain at 2218. 07/29/2018 0000 Accucheck result 213. Lispro insulin 4 units SQ given as ordered per sliding scale coverage. 0145 AM labs drawn and sent to lab. 0442 Pt c/o abd pain. Percocet 1 tab po given for c/o pain. 1305 Abd pain relieved per earlier med. Resting with eyes closed. Spoke with Dr. Harry Graves and advised of pt's last K+ result 5.4 down from earlier result of 6.4. No need to repeat lab at this time. 0600 Acucheck result 236. Lispro insulin 4 units SQ given as per sliding scale coverage.

## 2018-07-29 NOTE — PROGRESS NOTES
Problem: Pressure Injury - Risk of 
Goal: *Prevention of pressure injury Document Austin Scale and appropriate interventions in the flowsheet. Outcome: Progressing Towards Goal 
Pressure Injury Interventions: 
Sensory Interventions: Assess changes in LOC Moisture Interventions: Absorbent underpads Activity Interventions: Pressure redistribution bed/mattress(bed type) Mobility Interventions: HOB 30 degrees or less, Pressure redistribution bed/mattress (bed type) Nutrition Interventions: Document food/fluid/supplement intake

## 2018-07-29 NOTE — PROGRESS NOTES
Hospitalist Progress Note Meryle Mark, MD 
Internal medicine/ Hospitalist 
 
Daily Progress Note: 7/29/2018 11:48 AM 
   
Interval history / Subjective:  
Mireille Shannon is a 48y.o. year old male who presented with increasing abdominal pain in addition to extremity weakness. 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 He has chronic pain syndrome as well. Code s called for extremity weakness but later cancelled, appears to be metabolically related. Patient found to have wbc elevation and hypotension. CT abdm/pelvis c/w chronic fistulous tract leading from the duodenum to a small abscess collection along the right flank with the abscess measures approximately 1.8 x 2.3 cm. Admitted for treatment and further work-up. Blood cx NGTD. Current Facility-Administered Medications Medication Dose Route Frequency  insulin lispro (HUMALOG) injection   SubCUTAneous Q6H  
 insulin glargine (LANTUS) injection 5 Units  5 Units SubCUTAneous ACD  
 HYDROmorphone (DILAUDID) injection 2 mg  2 mg IntraVENous Q4H PRN  
 sodium chloride (NS) flush 5-10 mL  5-10 mL IntraVENous PRN  
 aztreonam (AZACTAM) 2 g in 0.9% sodium chloride (MBP/ADV) 100 mL MBP  2 g IntraVENous Q8H  
 metroNIDAZOLE (FLAGYL) IVPB premix 500 mg  500 mg IntraVENous Q8H  
 levoFLOXacin (LEVAQUIN) 750 mg in D5W IVPB  750 mg IntraVENous Q24H  
 ascorbic acid (vitamin C) (VITAMIN C) tablet 500 mg  500 mg Oral DAILY  cholecalciferol (VITAMIN D3) tablet 2,000 Units  2,000 Units Oral DAILY  cyanocobalamin tablet 1,000 mcg  1,000 mcg Oral DAILY  ferrous sulfate tablet 325 mg  325 mg Oral BID  folic acid (FOLVITE) tablet 1 mg  1 mg Oral DAILY  lipase-protease-amylase (ZENPEP 10,000) capsule 2 Cap  2 Cap Oral TID  pantoprazole (PROTONIX) tablet 40 mg  40 mg Oral BID  sucralfate (CARAFATE) tablet 1 g  1 g Oral QID  promethazine (PHENERGAN) tablet 25 mg  25 mg Oral Q6H PRN  
 dextrose 5 % - 0.45% NaCl infusion  150 mL/hr IntraVENous CONTINUOUS  
 acetaminophen (TYLENOL) tablet 650 mg  650 mg Oral Q4H PRN  
 oxyCODONE-acetaminophen (PERCOCET 10)  mg per tablet 1 Tab  1 Tab Oral Q6H PRN  
 ondansetron (ZOFRAN) injection 4 mg  4 mg IntraVENous Q4H PRN  
 enoxaparin (LOVENOX) injection 40 mg  40 mg SubCUTAneous Q24H  
 glucose chewable tablet 16 g  4 Tab Oral PRN  
 glucagon (GLUCAGEN) injection 1 mg  1 mg IntraMUSCular PRN  
 dextrose (D50W) injection syrg 12.5-25 g  25-50 mL IntraVENous PRN  
 sodium chloride (NS) flush 5-10 mL  5-10 mL IntraVENous Q8H  
 sodium chloride (NS) flush 5-10 mL  5-10 mL IntraVENous PRN Review of Systems Saying that he did not sleep well due to abdominal pain. Objective:  
 
Visit Vitals  BP 99/68  Pulse 89  Temp 97.9 °F (36.6 °C)  Resp 16  
 Ht 6' (1.829 m)  Wt 59 kg (130 lb)  SpO2 100%  BMI 17.63 kg/m2 O2 Device: Room air Temp (24hrs), Av.9 °F (36.6 °C), Min:97.7 °F (36.5 °C), Max:98 °F (36.7 °C) 
 
 
701 -  1900 In: 950 [I.V.:950] Out: 400 [Urine:400] 1901 -  0700 In: 1292.5 [I.V.:1292.5] Out: 400 Physical Exam: 
General appearance: looks anxious due to abdominal pain,not in acute distress Head: Normocephalic, without obvious abnormality, atraumatic Lungs: clear to auscultation bilaterally Heart: regular rate and rhythm, S1, S2 normal, no murmur, click, rub or gallop Abdomen:tender to palpation,diffusely,no rebound Extremities: extremities normal, atraumatic, no cyanosis or edema Neurologic: Grossly normal 
   
Data Review Recent Results (from the past 12 hour(s)) GLUCOSE, POC Collection Time: 18 12:13 AM  
Result Value Ref Range Glucose (POC) 213 (H) 70 - 110 mg/dL METABOLIC PANEL, BASIC Collection Time: 18  1:45 AM  
Result Value Ref Range Sodium 139 136 - 145 mmol/L  Potassium 5.4 3.5 - 5.5 mmol/L Chloride 114 (H) 100 - 108 mmol/L  
 CO2 19 (L) 21 - 32 mmol/L Anion gap 6 3.0 - 18 mmol/L Glucose 99 74 - 99 mg/dL BUN 26 (H) 7.0 - 18 MG/DL Creatinine 1.95 (H) 0.6 - 1.3 MG/DL  
 BUN/Creatinine ratio 13 12 - 20 GFR est AA 44 (L) >60 ml/min/1.73m2 GFR est non-AA 36 (L) >60 ml/min/1.73m2 Calcium 9.1 8.5 - 10.1 MG/DL  
CBC W/O DIFF Collection Time: 07/29/18  1:45 AM  
Result Value Ref Range WBC 14.6 (H) 4.6 - 13.2 K/uL  
 RBC 3.55 (L) 4.70 - 5.50 M/uL  
 HGB 11.2 (L) 13.0 - 16.0 g/dL HCT 35.2 (L) 36.0 - 48.0 % MCV 99.2 (H) 74.0 - 97.0 FL  
 MCH 31.5 24.0 - 34.0 PG  
 MCHC 31.8 31.0 - 37.0 g/dL  
 RDW 15.6 (H) 11.6 - 14.5 % PLATELET 481 (H) 074 - 420 K/uL MPV 9.7 9.2 - 11.8 FL  
GLUCOSE, POC Collection Time: 07/29/18  5:57 AM  
Result Value Ref Range Glucose (POC) 236 (H) 70 - 110 mg/dL GLUCOSE, POC Collection Time: 07/29/18 11:42 AM  
Result Value Ref Range Glucose (POC) 175 (H) 70 - 110 mg/dL Assessment/Plan:  
 
Principal Problem: 
  Sepsis ([facility] [address] (4/15/2016) Active Problems: Abscess of abdominal cavity ([facility] [address] (6/19/2013) Overview: Abdominal abscess - most likely infected fluid collection from duodenal  
    fistula related to pancreatic disease. Doing well with drainage and  
    antibiotics. Fistulogram on Friday noted continued fistula to the  
    duodenum/pancreas. Will need referral to pancreatic surgeon. IDDM (insulin dependent diabetes mellitus) ([facility] [address] (8/31/2013) Chronic pain (3/23/2016) Enterocutaneous fistula (7/28/2018) Care Plan 1) Sepsis 
 - likely from abscess/fistula 
 -CT abdomen c/w enterocutaneous fistula extending from the duodenum to the right lateral abdomen wall with a small abscess collection along the right flank measuring 2.2 x 1.8 cm. 
- Triple abx for now. - IV fluids 
   
2) Enterocutaneous fistula, chronic pancreatitis with abscess  - GI and surgery consulted in ED 
 - NPO but meds, IVF, pain meds as bp can tolerate   
3) DM 
 -SSI , q 6 accucheck   
4) Chronic pain - Dilaudid prn 
  -UA and xray negative 
  -triple abx for now. ID consult in am recommended - IV fluids 
   
DVT prophylaxis:scds Full code Disposition:tbd

## 2018-07-29 NOTE — PROGRESS NOTES
Patient seen and examined. A full note will be written, but no need for any acute surgical interventions.

## 2018-07-29 NOTE — PROGRESS NOTES
attempted to conduct an initial consultation and spiritual assessment for Keturah Monique, who is a 48 y.o.male. However, patient was unavailable, busy with staff. Patients primary language is: [place]. According to the patients EMR, his Baptism affiliation is: [employer]. The  provided the following interventions: 
Offered silent prayer on patient's behalf. Reviewed chart. Plan: 
Chaplains will continue our attempts to connect with patient and then provide pastoral care on an as-needed/requested basis. He will also be visited by our [facility] and our [facility]. [facility] 
Board Certified  [address] [place] [address]  
[phone]

## 2018-07-29 NOTE — PROGRESS NOTES
Problem: Falls - Risk of 
Goal: *Absence of Falls Document Christine Law Fall Risk and appropriate interventions in the flowsheet. Outcome: Progressing Towards Goal 
Fall Risk Interventions: 
  
 
  
 
Medication Interventions: Bed/chair exit alarm Elimination Interventions: Bed/chair exit alarm Problem: Pressure Injury - Risk of 
Goal: *Prevention of pressure injury Document Austin Scale and appropriate interventions in the flowsheet. Outcome: Progressing Towards Goal 
Pressure Injury Interventions: 
Sensory Interventions: Assess need for specialty bed, Maintain/enhance activity level, Monitor skin under medical devices Moisture Interventions: Check for incontinence Q2 hours and as needed, Contain wound drainage Activity Interventions: Pressure redistribution bed/mattress(bed type) Mobility Interventions: HOB 30 degrees or less Nutrition Interventions: Document food/fluid/supplement intake

## 2018-07-29 NOTE — PROGRESS NOTES
0700: Received report from Ann Flores. Assumed care of patient in bed in low locked position with call bell and phone within reach. 1100:Patient requesting additional pain medication. Received order from Dr. Jenaro Ascencio for dilaudid 1mg q 4 hrs prn.  
 
1900: Bedside and Verbal shift change report given to [address] [place] [address] (oncoming nurse) by Nick Negrete RN (offgoing nurse). Report included the following information SBAR, MAR, Med Rec Status, Cardiac Rhythm NSR  and Alarm Parameters .

## 2018-07-29 NOTE — ROUTINE PROCESS
1900: assumed care of pt from Yessenia Solorio RN. Pt resting in bed, call bell within reach, complaining of 9/10 pain; not due for pain medication at this time. VS stable. Will continue to monitor. 0700: Bedside shift change report given to EVERTON Ricardo (oncoming nurse) by Marissa Dunlap RN 
 (offgoing nurse). Report included the following information SBAR, Kardex, Intake/Output, MAR and Recent Results.

## 2018-07-30 LAB
ANION GAP SERPL CALC-SCNC: 11 MMOL/L (ref 3–18)
BACTERIA SPEC CULT: NORMAL
BUN SERPL-MCNC: 22 MG/DL (ref 7–18)
BUN/CREAT SERPL: 19 (ref 12–20)
CALCIUM SERPL-MCNC: 8.1 MG/DL (ref 8.5–10.1)
CHLORIDE SERPL-SCNC: 112 MMOL/L (ref 100–108)
CO2 SERPL-SCNC: 16 MMOL/L (ref 21–32)
CREAT SERPL-MCNC: 1.15 MG/DL (ref 0.6–1.3)
ERYTHROCYTE [DISTWIDTH] IN BLOOD BY AUTOMATED COUNT: 15.2 % (ref 11.6–14.5)
GLUCOSE BLD STRIP.AUTO-MCNC: 138 MG/DL (ref 70–110)
GLUCOSE BLD STRIP.AUTO-MCNC: 150 MG/DL (ref 70–110)
GLUCOSE BLD STRIP.AUTO-MCNC: 154 MG/DL (ref 70–110)
GLUCOSE BLD STRIP.AUTO-MCNC: 87 MG/DL (ref 70–110)
GLUCOSE SERPL-MCNC: 84 MG/DL (ref 74–99)
HCT VFR BLD AUTO: 28 % (ref 36–48)
HGB BLD-MCNC: 9 G/DL (ref 13–16)
MCH RBC QN AUTO: 31.4 PG (ref 24–34)
MCHC RBC AUTO-ENTMCNC: 32.1 G/DL (ref 31–37)
MCV RBC AUTO: 97.6 FL (ref 74–97)
PLATELET # BLD AUTO: 274 K/UL (ref 135–420)
PMV BLD AUTO: 9.1 FL (ref 9.2–11.8)
POTASSIUM SERPL-SCNC: 4.1 MMOL/L (ref 3.5–5.5)
RBC # BLD AUTO: 2.87 M/UL (ref 4.7–5.5)
SERVICE CMNT-IMP: NORMAL
SODIUM SERPL-SCNC: 139 MMOL/L (ref 136–145)
WBC # BLD AUTO: 6.3 K/UL (ref 4.6–13.2)

## 2018-07-30 PROCEDURE — 74011250636 HC RX REV CODE- 250/636: Performed by: INTERNAL MEDICINE

## 2018-07-30 PROCEDURE — 77030037878 HC DRSG MEPILEX >48IN BORD MOLN -B

## 2018-07-30 PROCEDURE — 65660000000 HC RM CCU STEPDOWN

## 2018-07-30 PROCEDURE — 74011000258 HC RX REV CODE- 258: Performed by: HOSPITALIST

## 2018-07-30 PROCEDURE — 74011250636 HC RX REV CODE- 250/636: Performed by: FAMILY MEDICINE

## 2018-07-30 PROCEDURE — 74011000258 HC RX REV CODE- 258: Performed by: PHYSICIAN ASSISTANT

## 2018-07-30 PROCEDURE — 74011636637 HC RX REV CODE- 636/637: Performed by: INTERNAL MEDICINE

## 2018-07-30 PROCEDURE — 77030021352 HC CBL LD SYS DISP COVD -B

## 2018-07-30 PROCEDURE — 74011250636 HC RX REV CODE- 250/636: Performed by: HOSPITALIST

## 2018-07-30 PROCEDURE — 74011250637 HC RX REV CODE- 250/637: Performed by: HOSPITALIST

## 2018-07-30 PROCEDURE — 87493 C DIFF AMPLIFIED PROBE: CPT | Performed by: INTERNAL MEDICINE

## 2018-07-30 PROCEDURE — 74011250637 HC RX REV CODE- 250/637: Performed by: INTERNAL MEDICINE

## 2018-07-30 PROCEDURE — 74011250636 HC RX REV CODE- 250/636: Performed by: PHYSICIAN ASSISTANT

## 2018-07-30 PROCEDURE — 74011000250 HC RX REV CODE- 250: Performed by: PHYSICIAN ASSISTANT

## 2018-07-30 PROCEDURE — 82962 GLUCOSE BLOOD TEST: CPT

## 2018-07-30 PROCEDURE — 85027 COMPLETE CBC AUTOMATED: CPT | Performed by: HOSPITALIST

## 2018-07-30 PROCEDURE — 80048 BASIC METABOLIC PNL TOTAL CA: CPT | Performed by: HOSPITALIST

## 2018-07-30 PROCEDURE — 77030011256 HC DRSG MEPILEX <16IN NO BORD MOLN -A

## 2018-07-30 PROCEDURE — 97166 OT EVAL MOD COMPLEX 45 MIN: CPT

## 2018-07-30 RX ORDER — SODIUM CHLORIDE 900 MG/100ML
INJECTION INTRAVENOUS
Status: DISPENSED
Start: 2018-07-30 | End: 2018-07-31

## 2018-07-30 RX ORDER — HYDROMORPHONE HYDROCHLORIDE 1 MG/ML
1 INJECTION, SOLUTION INTRAMUSCULAR; INTRAVENOUS; SUBCUTANEOUS
Status: DISCONTINUED | OUTPATIENT
Start: 2018-07-30 | End: 2018-07-31

## 2018-07-30 RX ADMIN — SUCRALFATE 1 G: 1 TABLET ORAL at 17:48

## 2018-07-30 RX ADMIN — SUCRALFATE 1 G: 1 TABLET ORAL at 23:13

## 2018-07-30 RX ADMIN — Medication 10 ML: at 14:00

## 2018-07-30 RX ADMIN — HYDROMORPHONE HYDROCHLORIDE 1 MG: 1 INJECTION, SOLUTION INTRAMUSCULAR; INTRAVENOUS; SUBCUTANEOUS at 10:28

## 2018-07-30 RX ADMIN — PANCRELIPASE 3 CAPSULE: 10000; 34000; 55000 CAPSULE, DELAYED RELEASE ORAL at 12:40

## 2018-07-30 RX ADMIN — METRONIDAZOLE 500 MG: 500 INJECTION, SOLUTION INTRAVENOUS at 09:58

## 2018-07-30 RX ADMIN — DEXTROSE MONOHYDRATE AND SODIUM CHLORIDE 150 ML/HR: 5; .45 INJECTION, SOLUTION INTRAVENOUS at 08:36

## 2018-07-30 RX ADMIN — Medication 10 ML: at 05:08

## 2018-07-30 RX ADMIN — VITAMIN D, TAB 1000IU (100/BT) 2000 UNITS: 25 TAB at 08:34

## 2018-07-30 RX ADMIN — PANCRELIPASE 3 CAPSULE: 10000; 34000; 55000 CAPSULE, DELAYED RELEASE ORAL at 08:34

## 2018-07-30 RX ADMIN — Medication 1 MG: at 04:21

## 2018-07-30 RX ADMIN — ENOXAPARIN SODIUM 40 MG: 100 INJECTION SUBCUTANEOUS at 23:13

## 2018-07-30 RX ADMIN — METRONIDAZOLE 500 MG: 500 INJECTION, SOLUTION INTRAVENOUS at 17:49

## 2018-07-30 RX ADMIN — SUCRALFATE 1 G: 1 TABLET ORAL at 08:33

## 2018-07-30 RX ADMIN — METRONIDAZOLE 500 MG: 500 INJECTION, SOLUTION INTRAVENOUS at 01:46

## 2018-07-30 RX ADMIN — PROMETHAZINE HYDROCHLORIDE 25 MG: 25 TABLET ORAL at 04:26

## 2018-07-30 RX ADMIN — OXYCODONE HYDROCHLORIDE AND ACETAMINOPHEN 1 TABLET: 5; 325 TABLET ORAL at 20:03

## 2018-07-30 RX ADMIN — PANTOPRAZOLE SODIUM 40 MG: 40 TABLET, DELAYED RELEASE ORAL at 08:34

## 2018-07-30 RX ADMIN — AZTREONAM 2 G: 2 INJECTION, POWDER, LYOPHILIZED, FOR SOLUTION INTRAMUSCULAR; INTRAVENOUS at 08:34

## 2018-07-30 RX ADMIN — Medication 1 MG: at 00:26

## 2018-07-30 RX ADMIN — PANCRELIPASE 3 CAPSULE: 10000; 34000; 55000 CAPSULE, DELAYED RELEASE ORAL at 17:47

## 2018-07-30 RX ADMIN — HYDROMORPHONE HYDROCHLORIDE 1 MG: 1 INJECTION, SOLUTION INTRAMUSCULAR; INTRAVENOUS; SUBCUTANEOUS at 22:37

## 2018-07-30 RX ADMIN — METRONIDAZOLE 500 MG: 500 INJECTION, SOLUTION INTRAVENOUS at 23:45

## 2018-07-30 RX ADMIN — HYDROMORPHONE HYDROCHLORIDE 1 MG: 1 INJECTION, SOLUTION INTRAMUSCULAR; INTRAVENOUS; SUBCUTANEOUS at 14:37

## 2018-07-30 RX ADMIN — SUCRALFATE 1 G: 1 TABLET ORAL at 12:40

## 2018-07-30 RX ADMIN — OXYCODONE HYDROCHLORIDE AND ACETAMINOPHEN 1 TABLET: 5; 325 TABLET ORAL at 12:45

## 2018-07-30 RX ADMIN — Medication 10 ML: at 23:14

## 2018-07-30 RX ADMIN — PANTOPRAZOLE SODIUM 40 MG: 40 TABLET, DELAYED RELEASE ORAL at 17:48

## 2018-07-30 RX ADMIN — INSULIN LISPRO 3 UNITS: 100 INJECTION, SOLUTION INTRAVENOUS; SUBCUTANEOUS at 00:09

## 2018-07-30 RX ADMIN — LEVOFLOXACIN 750 MG: 5 INJECTION, SOLUTION INTRAVENOUS at 16:42

## 2018-07-30 RX ADMIN — FOLIC ACID 1 MG: 1 TABLET ORAL at 08:34

## 2018-07-30 RX ADMIN — DEXTROSE MONOHYDRATE AND SODIUM CHLORIDE 150 ML/HR: 5; .45 INJECTION, SOLUTION INTRAVENOUS at 00:28

## 2018-07-30 RX ADMIN — Medication 500 MG: at 08:34

## 2018-07-30 RX ADMIN — FERROUS SULFATE TAB 325 MG (65 MG ELEMENTAL FE) 325 MG: 325 (65 FE) TAB at 17:48

## 2018-07-30 RX ADMIN — FERROUS SULFATE TAB 325 MG (65 MG ELEMENTAL FE) 325 MG: 325 (65 FE) TAB at 08:34

## 2018-07-30 RX ADMIN — CYANOCOBALAMIN TAB 1000 MCG 1000 MCG: 1000 TAB at 08:34

## 2018-07-30 RX ADMIN — DEXTROSE MONOHYDRATE AND SODIUM CHLORIDE 150 ML/HR: 5; .45 INJECTION, SOLUTION INTRAVENOUS at 16:31

## 2018-07-30 RX ADMIN — AZTREONAM 2 G: 2 INJECTION, POWDER, LYOPHILIZED, FOR SOLUTION INTRAMUSCULAR; INTRAVENOUS at 16:42

## 2018-07-30 RX ADMIN — INSULIN LISPRO 3 UNITS: 100 INJECTION, SOLUTION INTRAVENOUS; SUBCUTANEOUS at 17:00

## 2018-07-30 RX ADMIN — INSULIN GLARGINE 5 UNITS: 100 INJECTION, SOLUTION SUBCUTANEOUS at 17:00

## 2018-07-30 RX ADMIN — AZTREONAM 2 G: 2 INJECTION, POWDER, LYOPHILIZED, FOR SOLUTION INTRAMUSCULAR; INTRAVENOUS at 00:09

## 2018-07-30 RX ADMIN — HYDROMORPHONE HYDROCHLORIDE 1 MG: 1 INJECTION, SOLUTION INTRAMUSCULAR; INTRAVENOUS; SUBCUTANEOUS at 18:40

## 2018-07-30 NOTE — DIABETES MGMT
NUTRITIONAL ASSESSMENT GLYCEMIC CONTROL/ PLAN OF CARE Abad Ahumada           48 y.o.           7/28/2018 1. Sepsis, due to unspecified organism ([facility] [address] 2. Alcohol-induced chronic pancreatitis ([facility] [address] 3. Abdominal pain, epigastric 4. Weakness 5. Acute kidney injury ([facility] [address] 6. Hypotension, unspecified hypotension type 7. Enterocutaneous fistula 8. Abscess INTERVENTIONS/PLAN:  
1. Monitor feeding status. When diet at least full liquids, suggest Ensure TID with meals (pt likes strawberry or chocolate flavors). 2.  Monitor glycemic control, labs and weights. ASSESSMENT:  
Nutritional Status:  Pt is 72% ideal weight;  Pt appears thin. Widely varied weights seen since 5/31/18 when pt's documented weight was 135 lbs; on 6/9/18 weight was up to 153 lbs and on 7/30/18 it is 129 lbs (net weight loss of 6 lbs in past 2 months). Pt known to like Ensure supplements (both Ensure Clear and classic Ensure). Nutrition Diagnoses:  
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 Altered nutrition related labs due to diabetes as evidenced by A1C of 7.9%. Diabetes Management:  
Good glycemic control with low dose of basal insulin. Recent blood glucose:   
7/30/18:  154, 87 
7/29/18:  213, 236, 175, 261 - pt received 25 units insulin (5 units Lantus and 20 units corrective lispro) Within target range (non-ICU: <140; ICU<180): [] Yes   []  No 
 
Current Insulin regimen:  
Lantus 5 units/day 
corrective lispro very insulin resistant dosing q 6 hours Home medication/insulin regimen:  
Humalog 12 units q HS. Pt states he also takes Humalog in the AM but dose varies based on his blood glucose reading. HbA1c: 7.9% - ave BG has been ~ 180 mg/dL over past 3 months. Adequate glycemic control PTA:  [x] Yes  [x] No, but improved SUBJECTIVE/OBJECTIVE: Information obtained from: chart review, ICU rounds, Pt (pt known from previous admissions) Pt with history of recurrent acute pancreatitis and chronic pancreatitis thought due to ETOH with chronic pancreaticoduodenal-cutaneous fistula, chronic pain, and poorly controlled diabetes who is admitted for leg and arm weakness, found to have hypotension and tachycardiaChronic pancreaticoduodenal-cutaneous fistula with persistent fluid collection; multiple prior percutaneous drains placed; most recently replaced 7/1. 
7/30/18:  Pt reports not eating due to nausea for past 3 days. He states he is taking his Creon when he eats. Complains of pain. Allergy to shrimp noted. Diet: NPO No data found. Medications: [x]                Reviewed IVF:  D5 1/2 NS at 150 ml/hr providing 612 calories/day Most Recent POC Glucose:  
Recent Labs  
   07/30/18 
 0400  07/29/18 
 0145  07/28/18 
 2119  07/28/18 
 1628 GLU  84  99  288*  269* Labs:  
Lab Results Component Value Date/Time Hemoglobin A1c 7.9 (H) 07/28/2018 04:28 PM  
 
Lab Results Component Value Date/Time Sodium 139 07/30/2018 04:00 AM  
 Potassium 4.1 07/30/2018 04:00 AM  
 Chloride 112 (H) 07/30/2018 04:00 AM  
 CO2 16 (L) 07/30/2018 04:00 AM  
 Anion gap 11 07/30/2018 04:00 AM  
 Glucose 84 07/30/2018 04:00 AM  
 BUN 22 (H) 07/30/2018 04:00 AM  
 Creatinine 1.15 07/30/2018 04:00 AM  
 Calcium 8.1 (L) 07/30/2018 04:00 AM  
 Magnesium 1.7 06/29/2018 03:35 AM  
 Phosphorus 3.1 04/19/2018 09:39 AM  
 Albumin 3.4 07/28/2018 04:28 PM  
 
 
Anthropometrics: IBW : 80.9 kg (178 lb 5.6 oz), % IBW (Calculated): 72.31 %, BMI (calculated): 17.5 Wt Readings from Last 1 Encounters:  
07/30/18 58.5 kg (128 lb 15.5 oz) Ht Readings from Last 1 Encounters:  
07/30/18 6' (1.829 m) Last Weight Metrics: 
Weight Loss Metrics 7/30/2018 7/1/2018 6/28/201828/2018 6/25/2018 6/17/2018 6/9/2018 5/31/2018 Today's Wt 128 lb 15.5 oz 140 lb 140 lb 140 lb 150 lb 153 lb 14.1 oz 135 lb BMI 17.49 kg/m2 18.99 kg/m2 18.99 kg/m2 18.99 kg/m2 20.34 kg/m2 20.87 kg/m2 18.31 kg/m2 4/16/16:  Weight:  48.2 kg Estimated Nutrition Needs:  2950 Kcals/day, Protein (g): 88 g Fluid (ml): 2200 ml Based on:   [x]          Actual BW    []          ABW   []            Adjusted BW   
    
Nutrition Interventions: 
None at this time Goal:  
Blood glucose will be within target range of  mg/dL by 8/2/18. Pt will consume >75% meals by 8/7/18. Weight maintenance (+/- 1-2 kg) or weight gain of 1-2 lbs by 8/10/18. Nutrition Monitoring and Evaluation []     Monitor po intake on meal rounds 
[x]     Continue inpatient monitoring and intervention 
[]     Other: 
 
 
Nutrition Risk:  [x]   High     []  Moderate    []  Minimal/Uncompromised Kaela Eaton RD, CDE Office:  [phone] [place] Pager:  [phone]

## 2018-07-30 NOTE — PROGRESS NOTES
Problem: Self Care Deficits Care Plan (Adult) Goal: *Acute Goals and Plan of Care (Insert Text) Occupational Therapy Goals Initiated 7/30/2018 within 7 day(s). Will see pt 1-2 more visits. 1.  Patient will perform grooming tasks while standing with modified independence. 2.  Patient will perform lower body dressing with modified independence utilizing adaptive strategies, prn. 
3.  Patient will perform functional task in standing for 8 minutes with modified independence and < 2 rest breaks. 4.  Patient will perform toilet transfers with modified independence. 5.  Patient will perform all aspects of toileting with modified independence. 6.  Patient will participate in upper extremity therapeutic exercise/activities with modified independence for 8 minutes to maintain BUE strength for functional transfers & ADLs. 7.  Patient will utilize energy conservation techniques during functional activities with minimal verbal cues. [facility]: Initial Assessment INPATIENT: Medicaid: Hospital Day: 3 Patient: Frank Quinonez (61 y.o. male)    Date: 7/30/2018 Primary Diagnosis: Sepsis ([facility] [address] Sepsis ([facility] [address]  
 ,  ,  
Precautions: Falls PLOF: Pt was independent with basic self care tasks and functional mobility PTA. ASSESSMENT:  
Based on the objective data described below, the patient presents with impairments with regard to bed mobility, activity tolerance, BUE function/strength and independence in ADLs. Pt supine on arrival, c/o 9/10 pain, initially agreeable to therapy, however, eventually refusing EOB/OOB activity. Full AROM, strength WFL. Supervision for rolling and supine-->long sitting. Pt refused EOB activity despite max encouragement. Reports independence in all ADLs, IADLs and community integration PTA. Pt reporting he maneuvered to bathroom yesterday with RN.  Will implement 1-2 more treatment sessions to ensure independence in bed mobility, transfers & ADLs. Pt educated on role of OT and POC; he verbalized understanding. Left supine with needs within reach. C/o 9/10 pain. RN aware. Recommendations for the next treatment session: ADLs at sink; toilet transfer Mr. Roseanne Judd will benefit from skilled intervention to address the above impairments. His rehabilitation potential is considered to be Good. EDUCATION Education:  Patient was educated on the following topics: importance of mobility Barriers to Learning/Limitations: None Compensate with: visual, verbal, tactile, kinesthetic cues/model PLAN OF CARE: 
Problems:  Decreased strength affecting function, Decreased ADL/functioning of activities, Decreased transfer abilities and Increased pain affecting function Recommendations and Planned Interventions: 
[x]                  Self Care Training                   [x]         Therapeutic Activities [x]                  Functional Mobility Training    []          Cognitive Retraining 
[x]                  Therapeutic Exercises            [x]          Endurance Activities [x]                  Balance Training                     []          Neuromuscular Re-ed []                  Visual/Perceptual Training      [x]       Home Safety Training 
[x]                  Patient Education                    [x]          Family Training/Education []                  Other (comment): Frequency/Duration: Patient will be followed by occupational therapy 1-2 more visits to address goals. Discharge Recommendations: To Be Determined (pending increased OOB activity & possible surgery?) Further Equipment Recommendations for Discharge: Anticipate noneSUBJECTIVE: 
Patient stated: \"I'm telling you I'm in a lot of pain. \" OBJECTIVE/TREATMENT:  
 
Past Medical History:  
Diagnosis Date  Abdominal pain, generalized  Chronic pancreatitis ([facility] [address]  Diabetes ([facility] [address]   
 hypothyroid  Encounter for long-term (current) use of other medications  Essential hypertension  ETOH abuse  GERD (gastroesophageal reflux disease)  Heart failure ([facility] [address]  Hyponatremia  Pain in the abdomen 11/2/2010  Pancreatitis w/ abscess and pseudocyst  
 Pancreatitis  Psychiatric disorder   
 depression, anxiety  Tobacco abuse Past Surgical History:  
Procedure Laterality Date  HX ABDOMINAL LAPAROSCOPY PANCREATIC STENT  
 HX CHOLECYSTECTOMY Eval Complexity: History: MEDIUM Complexity : Expanded review of history including physical, cognitive and psychosocial  history ; Examination: MEDIUM Complexity : 3-5 performance deficits relating to physical, cognitive , or psychosocial skils that result in activity limitations and / or participation restrictions; Decision Making:MEDIUM Complexity : Patient may present with comorbidities that affect occupational performnce. Miniml to moderate modification of tasks or assistance (eg, physical or verbal ) with assesment(s) is necessary to enable patient to complete evaluation G CODES: Self Care  Current  CJ= 20-39%  Goal  CI= 1-19%. The severity rating is based on the Other Functional Assessment, MMT, ROM Prior Level of Function/Home Situation: Fully active; able to carry on all performance without restriction Restricted in physically strenuous activity but ambulatory and able to carry out work of a light or sedentary nature (e.g., light house work, office work). Lives with Family 1 story Entrance steps 1 
none reported Cognitive/Behavioral Status:  
Neurologic State: alert Orientation: oriented to time, place, person and situation Cognition:   appropriate decision making, appropriate for age attention/concentration, appropriate safety awareness and following commands  follows multi-step complex commands/direction Safety/Judgement: Awareness of environment and Fall prevention ROM: Within normal limits (BUEs) MMT: 4+/5 (BUEs) Coordination: WFL (Emmanuel Douglass) Hand dominance: Right Skin: Intact (BUEs) Edema: None noted (BUEs) Sensation: Intact (BUEs) Vision/Perceptual: normal 
 
 
Functional Status Indep Mod I  
Sup. / 
Set- Up SBA CGA Min Assist  
Mod Assist  
Max assist  
Total Assist  
Assist x2 Additional Time NT Comments Rolling []  [x]  []  []  []    []    []    []  []  []  []  [] Supine to sit []  []  [x]  []  []  []  []  []  []  []  []  [] Sit to supine []  []  [x]  []  []  []  []  []  []  []  []  []  To long-sitting Sit to stand []  []  []  []  []  []  []  []  []  []  []  [x] Toilet Transfer []  []  []  []  []  []  []  []  []  []  []  [x] Feeding []  [x]  []  []  []  []  []  []  []  []  []  []    
Grooming []  [x]  []  []  []  []  []  []  []  []  []  [] Bathing  []  []  [x]  []  []  [x]  []  []  []  []  []  []    
UB Dressing  []  [x]  []  []  []  []  []  []  []  []  []  []    
LB Dressing  []  []  []  []  []  [x]  []  []  []  []  []  [] Toileting []  [x]  []  []  []  []  []  []  []  []  []  [] Balance Good Jaxson Alan Poor Unable Comments Sitting static []  []  []  []  N/t; pt refused Sitting dynamic []  []  []  []    
Standing static []  []  []  []  Pt refused Standing dynamic []  []  []  []    
 
Pain:  
Pre treatment: 9/10 Post treatment: 9/10 Scale: numeric Activity tolerance:  fair- 
 
COMMUNICATION/EDUCATION: Pt educated on role of OT, POC and home safety. He verbalized understanding. [x]         Fall prevention education was provided and the patient/caregiver indicated understanding. [x]         Patient/family have participated as able in goal setting and plan of care. [x]         Patient/family agree to work toward stated goals and plan of care. []         Patient understands intent and goals of therapy, but is neutral about his/her participation The patients plan of care was also discussed with: Physical Therapist, Certified Occupational Therapy Assistant and Registered Nurse · After treatment position/precautions:  
o Supine in bed 
o Call light within reach 
o RN notified Recommendations for nursing: up with assist x1 Thank you for this referral. 
Janie Kate MS OTR/L Time Calculation: 10 mins

## 2018-07-30 NOTE — PROGRESS NOTES
818: PT orders received and chart reviewed. Refusing mobility d/tpain. RN Manda aware. Will follow up.  
1338: 2nd PT attempt. Continues to refuse mobility d/t pain despite max education on role of mobility. Will follow up. Thank you, Renee Ray, PT, DPT Office Extension: [phone] Pager: [phone]

## 2018-07-30 NOTE — PROGRESS NOTES
Pt transferred to [phone], report called to EVERTON Hernandez. Belongings w/ pt and call light within reach.

## 2018-07-30 NOTE — PROGRESS NOTES
Hospitalist Progress Note 
t Daily Progress Note: 7/30/2018 11:48 AM 
   
Interval history / Subjective:  
Leonela Morelos is a 48y.o. year old male who presented with increasing abdominal pain in addition to extremity weakness. 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 He has chronic pain syndrome as well. Code s called for extremity weakness but later cancelled, appears to be metabolically related. Patient found to have wbc elevation and hypotension. CT abdm/pelvis c/w chronic fistulous tract leading from the duodenum to a small abscess collection along the right flank with the abscess measures approximately 1.8 x 2.3 cm. Admitted for treatment and further work-up. Blood cx NGTD. Current Facility-Administered Medications Medication Dose Route Frequency  insulin lispro (HUMALOG) injection   SubCUTAneous Q6H  
 insulin glargine (LANTUS) injection 5 Units  5 Units SubCUTAneous ACD  
 HYDROmorphone (DILAUDID) injection 1 mg  1 mg IntraVENous Q4H PRN  
 oxyCODONE-acetaminophen (PERCOCET) 5-325 mg per tablet 1 Tab  1 Tab Oral Q6H PRN  
 lipase-protease-amylase (ZENPEP 10,000) capsule 3 Cap  3 Cap Oral TID WITH MEALS  sodium chloride (NS) flush 5-10 mL  5-10 mL IntraVENous PRN  
 aztreonam (AZACTAM) 2 g in 0.9% sodium chloride (MBP/ADV) 100 mL MBP  2 g IntraVENous Q8H  
 metroNIDAZOLE (FLAGYL) IVPB premix 500 mg  500 mg IntraVENous Q8H  
 levoFLOXacin (LEVAQUIN) 750 mg in D5W IVPB  750 mg IntraVENous Q24H  
 ascorbic acid (vitamin C) (VITAMIN C) tablet 500 mg  500 mg Oral DAILY  cholecalciferol (VITAMIN D3) tablet 2,000 Units  2,000 Units Oral DAILY  cyanocobalamin tablet 1,000 mcg  1,000 mcg Oral DAILY  ferrous sulfate tablet 325 mg  325 mg Oral BID  folic acid (FOLVITE) tablet 1 mg  1 mg Oral DAILY  pantoprazole (PROTONIX) tablet 40 mg  40 mg Oral BID  sucralfate (CARAFATE) tablet 1 g  1 g Oral QID  promethazine (PHENERGAN) tablet 25 mg  25 mg Oral Q6H PRN  
 dextrose 5 % - 0.45% NaCl infusion  150 mL/hr IntraVENous CONTINUOUS  
 ondansetron (ZOFRAN) injection 4 mg  4 mg IntraVENous Q4H PRN  
 enoxaparin (LOVENOX) injection 40 mg  40 mg SubCUTAneous Q24H  
 glucose chewable tablet 16 g  4 Tab Oral PRN  
 glucagon (GLUCAGEN) injection 1 mg  1 mg IntraMUSCular PRN  
 dextrose (D50W) injection syrg 12.5-25 g  25-50 mL IntraVENous PRN  
 sodium chloride (NS) flush 5-10 mL  5-10 mL IntraVENous Q8H  
 sodium chloride (NS) flush 5-10 mL  5-10 mL IntraVENous PRN Review of Systems Saying that he did not sleep well due to abdominal pain. Objective:  
 
Visit Vitals  BP (!) 149/120  Pulse 82  Temp 97.8 °F (36.6 °C)  Resp 26  
 Ht 6' (1.829 m)  Wt 58.5 kg (128 lb 15.5 oz)  SpO2 94%  BMI 17.49 kg/m2 O2 Device: Room air Temp (24hrs), Av °F (36.7 °C), Min:97.6 °F (36.4 °C), Max:98.7 °F (37.1 °C) 
 
 
701 -  1900 In: 152.5 [I.V.:152.5] Out: -  
 190 -  0700 In: 5792.5 [I.V.:5792.5] Out: 1550 [LDDUK:5081] Physical Exam: 
General appearance: looks anxious due to abdominal pain,not in acute distress Head: Normocephalic, without obvious abnormality, atraumatic Lungs: clear to auscultation bilaterally Heart: regular rate and rhythm, S1, S2 normal, no murmur, click, rub or gallop Abdomen:tender to palpation,diffusely,no rebound Extremities: extremities normal, atraumatic, no cyanosis or edema Neurologic: Grossly normal 
   
Data Review Recent Results (from the past 12 hour(s)) GLUCOSE, POC Collection Time: 18 12:03 AM  
Result Value Ref Range Glucose (POC) 154 (H) 70 - 110 mg/dL METABOLIC PANEL, BASIC Collection Time: 18  4:00 AM  
Result Value Ref Range Sodium 139 136 - 145 mmol/L Potassium 4.1 3.5 - 5.5 mmol/L Chloride 112 (H) 100 - 108 mmol/L  
 CO2 16 (L) 21 - 32 mmol/L  Anion gap 11 3.0 - 18 mmol/L Glucose 84 74 - 99 mg/dL BUN 22 (H) 7.0 - 18 MG/DL Creatinine 1.15 0.6 - 1.3 MG/DL  
 BUN/Creatinine ratio 19 12 - 20 GFR est AA >60 >60 ml/min/1.73m2 GFR est non-AA >60 >60 ml/min/1.73m2 Calcium 8.1 (L) 8.5 - 10.1 MG/DL  
CBC W/O DIFF Collection Time: 07/30/18  4:00 AM  
Result Value Ref Range WBC 6.3 4.6 - 13.2 K/uL  
 RBC 2.87 (L) 4.70 - 5.50 M/uL HGB 9.0 (L) 13.0 - 16.0 g/dL HCT 28.0 (L) 36.0 - 48.0 % MCV 97.6 (H) 74.0 - 97.0 FL  
 MCH 31.4 24.0 - 34.0 PG  
 MCHC 32.1 31.0 - 37.0 g/dL  
 RDW 15.2 (H) 11.6 - 14.5 % PLATELET 979 287 - 146 K/uL MPV 9.1 (L) 9.2 - 11.8 FL  
GLUCOSE, POC Collection Time: 07/30/18  5:06 AM  
Result Value Ref Range Glucose (POC) 87 70 - 110 mg/dL Assessment/Plan:  
 
Principal Problem: 
  [facility] ([facility]15/2016) Active Problems: Abscess of abdominal cavity ([facility] (6/19/2013) Overview: Abdominal abscess - most likely infected fluid collection from duodenal  
    fistula related to pancreatic disease. Doing well with drainage and  
    antibiotics. Fistulogram on Friday noted continued fistula to the  
    duodenum/pancreas. Will need referral to pancreatic surgeon. IDDM (insulin dependent diabetes mellitus) ([facility] (8/31/2013) Chronic pain (3/23/2016) Enterocutaneous fistula (7/28/2018) Care Plan 1) Sepsis 
 - likely from abscess/fistula 
 -CT abdomen c/w enterocutaneous fistula extending from the duodenum to the right lateral abdomen wall with a small abscess collection along the right flank measuring 2.2 x 1.8 cm. 
- IR consulted 7/30 for possible  percutaneous drainage - Triple abx for now. - IV fluids 
   
2) Enterocutaneous fistula, chronic pancreatitis with abscess - GI and surgery consulted in ED 
 - NPO but meds, IVF, pain meds as bp can tolerate - Surgery signed off 7/30 , suggest referral to tertiary center with a pancreatic surgeon   
3) DM 
 -SSI , q 6 accucheck   
4) Chronic pain - Dilaudid prn 
  -UA and xray negative 
  -triple abx for now. - ID consult on 7/31 
    - IV fluids 
   
DVT prophylaxis:scds Full code Disposition:tbd

## 2018-07-30 NOTE — PROGRESS NOTES
Patient chart was reviewed. No need for any acute surgical intervention. As have been suggested by many physicians before, consider referral to a tertiary center. Will sign off.

## 2018-07-30 NOTE — PROGRESS NOTES
0719: assumed care of patient resting in bed, report received from off- going nurse, Shauna Campos RN. Patient complaining of pain to \" where pancreas is, \" rating pain at 10/10. Patient was informed of PRN percocet, offered at this time but was declined by patient stating \" I can wait for the dilaudid. \" 
 
5346: Bedside and Verbal shift change report given to Keagan Pal RN (oncoming nurse) by Shahbaz Portillo RN 
 (offgoing nurse). Report included the following information SBAR, ED Summary, Intake/Output, MAR, Recent Results, Cardiac Rhythm NSR and Alarm Parameters .

## 2018-07-30 NOTE — ROUTINE PROCESS
1358 - TRANSFER - IN REPORT: Verbal report received from [facility], RN (name) on George Guerrero  being received from ICU 2600 (unit) for routine progression of care. Report consisted of patients Situation, Background, Assessment and Recommendations(SBAR). Information from the following report(s) SBAR, Kardex, Intake/Output, MAR, Recent Results, Med Rec Status and Cardiac Rhythm NSR was reviewed with the receiving nurse. Opportunity for questions and clarification was provided. 1449 - Pt transferred to unit via wheelchair from ICU 2600. 
 
1500 - Shift assessment completed. Skin assessment performed with Tessa Vang RN, pt noted to have stage I to buttocks/sacrum, old incision site to right abdomen, scabs to BLE, and diabetic pressure ulcer to right toes - 2nd and 3rd digit. Wound consult order placed for right toes. Pt's bilateral toenails are very thick, overgrown, and discolored. Pt has mediport to left upper chest that has blood return and infusing with IVF. Pt alert and oriented x4. No respiratory distress noted. Pt c/o pain to abdomen, unable to administer pain medication at this time - last received Dilaudid at 1437. Call bell within reach, bed in low position. Will continue to monitor. 
   
1702 - Pt's C. Diff specimen negative, Enteric Isolation discontinued. Pt on Contact Isolation for ESBL of wound. [id number] [facility] c/o pain to abdomen, PRN Dilaudid administered. 1900 - Shift re-assessment completed, no change in pt condition. 
   
2003 - Pt c/o pain to abdomen, PRN Dilaudid administered. Bedside, Verbal and Written shift change report given to Barb Miller RN (oncoming nurse) by Alejandra Lorenzo RN (offgoing nurse). Report included the following information SBAR, Kardex, Intake/Output, MAR, Recent Results, Med Rec Status, Procedure Summary and Cardiac Rhythm NSR.

## 2018-07-30 NOTE — PROGRESS NOTES
I have requested a consult with palliative care to address management of the patient chronic disease. It has been recommended that the [facility] a [facility] for [facility] of his condition and possible intervention.  He will need an accepting physician obtained by his physician in order to facilitate transfer as well as a bed assignment from an accepting university hospital. Alvino Lee RN

## 2018-07-30 NOTE — PROGRESS NOTES
PROGRESS NOTE Junie Paige 
         MRN: [id number] [facility]/[address], [address] 
         7/30/2018, 5:50 PM 
 
 
SUBJECTIVE: 
Persistent pain across the upper abdomen. This is made worse by food ingestion. Patient has lost a lot of weight. OBJECTIVE: 
Patient Vitals for the past 24 hrs: 
 Temp Pulse Resp BP SpO2  
07/30/18 1508 97.3 °F (36.3 °C) 85 16 133/68 99 % 07/30/18 1400 - 70 17 118/64 100 % 07/30/18 1300 - 70 16 101/75 100 % 07/30/18 1242 - 89 23 112/72 100 % 07/30/18 1240 - 79 19 95/65 100 % 07/30/18 1152 97.5 °F (36.4 °C) - - - -  
07/30/18 1100 - 73 17 121/67 100 % 07/30/18 1008 - 84 17 102/64 100 % 07/30/18 0904 - 89 20 116/74 99 % 07/30/18 0800 97.8 °F (36.6 °C) 82 21 (!) 152/120 98 % 07/30/18 0733 97.8 °F (36.6 °C) - - - -  
07/30/18 0701 - 82 26 (!) 149/120 94 % 07/30/18 0600 - 73 21 105/57 100 % 07/30/18 0500 - 74 17 109/70 100 % 07/30/18 0400 97.9 °F (36.6 °C) 85 24 115/61 99 % 07/30/18 0301 - 81 (!) 32 100/66 100 % 07/30/18 0200 - 65 19 91/62 100 % 07/30/18 0100 - 75 16 114/56 100 % 07/30/18 0001 97.6 °F (36.4 °C) 76 28 (!) 138/117 100 % 07/29/18 2300 - 72 20 100/64 100 % 07/29/18 2200 - 68 16 108/51 100 % 07/29/18 2100 - 73 14 112/53 100 % 07/29/18 2000 97.9 °F (36.6 °C) 75 15 114/67 100 % 07/29/18 1900 - 75 14 (!) 115/91 100 % 07/29/18 1800 - 80 14 93/69 100 % Intake/Output Summary (Last 24 hours) at 07/30/18 1750 Last data filed at 07/30/18 1400 Gross per 24 hour Intake             3100 ml Output              750 ml Net             2350 ml Gen: NAD Heent: No pallor, icterus Lungs: Clear B/L  
CVS exam: Regular rate and rhythm Abd  : Soft, tender across the upper abdomen, BS reduced, No masses felt. Labs: Results:  
Chemistry Recent Labs  
   07/30/18 
 0400  07/29/18 
 0145  07/28/18 
 2119  07/28/18 
 1628 GLU  84  99  288*  269* NA  139  139  136  135* K  4.1  5.4  6.4*  6.2* CL  112*  114*  112*  106 CO2  16*  19*  16*  19*  
BUN  22*  26*  24*  20* CREA  1.15  1.95*  1.83*  1.87* CA  8.1*  9.1  8.1*  9.3 AGAP  11  6  8  10 BUCR  19  13  13  11* AP   --    --    --   299* TP   --    --    --   8.9* ALB   --    --    --   3.4 GLOB   --    --    --   5.5* AGRAT   --    --    --   0.6* Estimated Creatinine Clearance: 61.5 mL/min (based on Cr of 1.15). CBC w/Diff Recent Labs  
   07/30/18 
 0400  07/29/18 
 0145  07/28/18 
 1628 WBC  6.3  14.6*  17.7*  
RBC  2.87*  3.55*  4.00* HGB  9.0*  11.2*  12.9*  
HCT  28.0*  35.2*  39.6 PLT  274  427*  508* GRANS   --    --   85* LYMPH   --    --   9* EOS   --    --   1 Cardiac Enzymes Recent Labs  
   07/28/18 
 1628 CPK  39 CKND1  CALCULATION NOT PERFORMED WHEN RESULT IS BELOW LINEAR LIMIT Coagulation Recent Labs  
   07/28/18 
 1628 PTP  11.7 INR  0.9 APTT  24.1 Hepatitis Panel Lab Results Component Value Date/Time Hepatitis A, IgM NEGATIVE  12/04/2009 03:30 AM  
 Hepatitis B surface Ag <0.10 12/04/2009 03:30 AM  
 Hepatitis B core, IgM NEGATIVE  12/04/2009 03:30 AM  
  
Amylase Lipase Liver Enzymes Recent Labs  
   07/28/18 
 1628 TP  8.9* ALB  3.4 TBILI  0.3 AP  299* SGOT  34 ALT  99* Thyroid Studies No results for input(s): T4, T3U, TSH, TSHEXT in the last 72 hours. No lab exists for component: T3RU Pathology pathology Allergies Allergen Reactions  Ampicillin-Sulbactam Rash  Pcn [Penicillins] Itching and Hives  Piperacillin-Tazobactam Rash  Pollen Extracts Rash  Seafood [Shellfish Containing Products] Hives Other reaction(s): unknown Has tolerated iohexol (iodine-containing contrast) Only shrimp Shrimp Current Facility-Administered Medications Medication Dose Route Frequency  HYDROmorphone (DILAUDID) syringe 1 mg  1 mg IntraVENous Q4H PRN  
 0.9% sodium chloride (MBP/ADV) infusion  insulin lispro (HUMALOG) injection   SubCUTAneous Q6H  
 insulin glargine (LANTUS) injection 5 Units  5 Units SubCUTAneous ACD  oxyCODONE-acetaminophen (PERCOCET) 5-325 mg per tablet 1 Tab  1 Tab Oral Q6H PRN  
 lipase-protease-amylase (ZENPEP 10,000) capsule 3 Cap  3 Cap Oral TID WITH MEALS  sodium chloride (NS) flush 5-10 mL  5-10 mL IntraVENous PRN  
 aztreonam (AZACTAM) 2 g in 0.9% sodium chloride (MBP/ADV) 100 mL MBP  2 g IntraVENous Q8H  
 metroNIDAZOLE (FLAGYL) IVPB premix 500 mg  500 mg IntraVENous Q8H  
 levoFLOXacin (LEVAQUIN) 750 mg in D5W IVPB  750 mg IntraVENous Q24H  
 ascorbic acid (vitamin C) (VITAMIN C) tablet 500 mg  500 mg Oral DAILY  cholecalciferol (VITAMIN D3) tablet 2,000 Units  2,000 Units Oral DAILY  cyanocobalamin tablet 1,000 mcg  1,000 mcg Oral DAILY  ferrous sulfate tablet 325 mg  325 mg Oral BID  folic acid (FOLVITE) tablet 1 mg  1 mg Oral DAILY  pantoprazole (PROTONIX) tablet 40 mg  40 mg Oral BID  sucralfate (CARAFATE) tablet 1 g  1 g Oral QID  promethazine (PHENERGAN) tablet 25 mg  25 mg Oral Q6H PRN  
 dextrose 5 % - 0.45% NaCl infusion  150 mL/hr IntraVENous CONTINUOUS  
 ondansetron (ZOFRAN) injection 4 mg  4 mg IntraVENous Q4H PRN  
 enoxaparin (LOVENOX) injection 40 mg  40 mg SubCUTAneous Q24H  
 glucose chewable tablet 16 g  4 Tab Oral PRN  
 glucagon (GLUCAGEN) injection 1 mg  1 mg IntraMUSCular PRN  
 dextrose (D50W) injection syrg 12.5-25 g  25-50 mL IntraVENous PRN  
 sodium chloride (NS) flush 5-10 mL  5-10 mL IntraVENous Q8H  
 sodium chloride (NS) flush 5-10 mL  5-10 mL IntraVENous PRN  
 
 
ASSESSMENT: 
Principal Problem: 
  Sepsis ([facility] (4/15/2016) Active Problems: Abscess of abdominal cavity ([facility] (6/19/2013) Overview: Abdominal abscess - most likely infected fluid collection from duodenal  
    fistula related to pancreatic disease. Doing well with drainage and  
    antibiotics.  Fistulogram on Friday noted continued fistula to the  
    duodenum/pancreas. Will need referral to pancreatic surgeon. IDDM (insulin dependent diabetes mellitus) ([facility] (8/31/2013) Chronic pain (3/23/2016) Enterocutaneous fistula (7/28/2018) Agree with IR drainage of fluid collection/abscess. Patient should be referred for repair of fistula and pancreatic surgery. I can arrange this once he is discharged.   
 
Araceli Woods MD

## 2018-07-31 ENCOUNTER — APPOINTMENT (OUTPATIENT)
Dept: CARDIAC CATH/INVASIVE PROCEDURES | Age: 53
DRG: 720 | End: 2018-07-31
Attending: FAMILY MEDICINE
Payer: MEDICAID

## 2018-07-31 LAB
ALBUMIN SERPL-MCNC: 2.2 G/DL (ref 3.4–5)
ALBUMIN/GLOB SERPL: 0.7 {RATIO} (ref 0.8–1.7)
ALP SERPL-CCNC: 148 U/L (ref 45–117)
ALT SERPL-CCNC: 30 U/L (ref 16–61)
ANION GAP SERPL CALC-SCNC: 9 MMOL/L (ref 3–18)
AST SERPL-CCNC: 10 U/L (ref 15–37)
BASOPHILS # BLD: 0 K/UL (ref 0–0.1)
BASOPHILS NFR BLD: 1 % (ref 0–2)
BILIRUB SERPL-MCNC: 0.1 MG/DL (ref 0.2–1)
BUN SERPL-MCNC: 10 MG/DL (ref 7–18)
BUN/CREAT SERPL: 11 (ref 12–20)
CALCIUM SERPL-MCNC: 7.7 MG/DL (ref 8.5–10.1)
CHLORIDE SERPL-SCNC: 113 MMOL/L (ref 100–108)
CO2 SERPL-SCNC: 17 MMOL/L (ref 21–32)
CREAT SERPL-MCNC: 0.95 MG/DL (ref 0.6–1.3)
DIFFERENTIAL METHOD BLD: ABNORMAL
EOSINOPHIL # BLD: 0.5 K/UL (ref 0–0.4)
EOSINOPHIL NFR BLD: 12 % (ref 0–5)
ERYTHROCYTE [DISTWIDTH] IN BLOOD BY AUTOMATED COUNT: 15 % (ref 11.6–14.5)
GLOBULIN SER CALC-MCNC: 3.2 G/DL (ref 2–4)
GLUCOSE BLD STRIP.AUTO-MCNC: 110 MG/DL (ref 70–110)
GLUCOSE BLD STRIP.AUTO-MCNC: 127 MG/DL (ref 70–110)
GLUCOSE BLD STRIP.AUTO-MCNC: 137 MG/DL (ref 70–110)
GLUCOSE BLD STRIP.AUTO-MCNC: 139 MG/DL (ref 70–110)
GLUCOSE SERPL-MCNC: 145 MG/DL (ref 74–99)
HCT VFR BLD AUTO: 27.4 % (ref 36–48)
HGB BLD-MCNC: 8.8 G/DL (ref 13–16)
LYMPHOCYTES # BLD: 0.7 K/UL (ref 0.9–3.6)
LYMPHOCYTES NFR BLD: 16 % (ref 21–52)
MCH RBC QN AUTO: 31.1 PG (ref 24–34)
MCHC RBC AUTO-ENTMCNC: 32.1 G/DL (ref 31–37)
MCV RBC AUTO: 96.8 FL (ref 74–97)
MONOCYTES # BLD: 0.6 K/UL (ref 0.05–1.2)
MONOCYTES NFR BLD: 13 % (ref 3–10)
NEUTS SEG # BLD: 2.6 K/UL (ref 1.8–8)
NEUTS SEG NFR BLD: 58 % (ref 40–73)
PLATELET # BLD AUTO: 223 K/UL (ref 135–420)
PMV BLD AUTO: 9.1 FL (ref 9.2–11.8)
POTASSIUM SERPL-SCNC: 4.1 MMOL/L (ref 3.5–5.5)
PROT SERPL-MCNC: 5.4 G/DL (ref 6.4–8.2)
RBC # BLD AUTO: 2.83 M/UL (ref 4.7–5.5)
SODIUM SERPL-SCNC: 139 MMOL/L (ref 136–145)
WBC # BLD AUTO: 4.4 K/UL (ref 4.6–13.2)

## 2018-07-31 PROCEDURE — C1729 CATH, DRAINAGE: HCPCS

## 2018-07-31 PROCEDURE — 74011000250 HC RX REV CODE- 250: Performed by: RADIOLOGY

## 2018-07-31 PROCEDURE — 36591 DRAW BLOOD OFF VENOUS DEVICE: CPT

## 2018-07-31 PROCEDURE — C1769 GUIDE WIRE: HCPCS

## 2018-07-31 PROCEDURE — 80053 COMPREHEN METABOLIC PANEL: CPT | Performed by: FAMILY MEDICINE

## 2018-07-31 PROCEDURE — C1887 CATHETER, GUIDING: HCPCS

## 2018-07-31 PROCEDURE — 74011000250 HC RX REV CODE- 250: Performed by: PHYSICIAN ASSISTANT

## 2018-07-31 PROCEDURE — 74011250636 HC RX REV CODE- 250/636: Performed by: RADIOLOGY

## 2018-07-31 PROCEDURE — 74011250637 HC RX REV CODE- 250/637: Performed by: HOSPITALIST

## 2018-07-31 PROCEDURE — 77030002996 HC SUT SLK J&J -A

## 2018-07-31 PROCEDURE — 97162 PT EVAL MOD COMPLEX 30 MIN: CPT

## 2018-07-31 PROCEDURE — 74011250637 HC RX REV CODE- 250/637: Performed by: INTERNAL MEDICINE

## 2018-07-31 PROCEDURE — 74011636637 HC RX REV CODE- 636/637: Performed by: INTERNAL MEDICINE

## 2018-07-31 PROCEDURE — 65660000000 HC RM CCU STEPDOWN

## 2018-07-31 PROCEDURE — 74011000258 HC RX REV CODE- 258: Performed by: PHYSICIAN ASSISTANT

## 2018-07-31 PROCEDURE — 76450000000

## 2018-07-31 PROCEDURE — 75989 ABSCESS DRAINAGE UNDER X-RAY: CPT

## 2018-07-31 PROCEDURE — 77010033678 HC OXYGEN DAILY

## 2018-07-31 PROCEDURE — 74011250636 HC RX REV CODE- 250/636: Performed by: INTERNAL MEDICINE

## 2018-07-31 PROCEDURE — 77030004565 HC CATH ANGI DX TMPO CARD -B

## 2018-07-31 PROCEDURE — 85025 COMPLETE CBC W/AUTO DIFF WBC: CPT | Performed by: FAMILY MEDICINE

## 2018-07-31 PROCEDURE — 74011636320 HC RX REV CODE- 636/320: Performed by: RADIOLOGY

## 2018-07-31 PROCEDURE — 97530 THERAPEUTIC ACTIVITIES: CPT

## 2018-07-31 PROCEDURE — 77030027478 HC TBNG JP W BLB MRTM -B

## 2018-07-31 PROCEDURE — 82962 GLUCOSE BLOOD TEST: CPT

## 2018-07-31 PROCEDURE — 77030012935 HC DRSG AQUACEL BMS -B

## 2018-07-31 PROCEDURE — 74011000258 HC RX REV CODE- 258: Performed by: INTERNAL MEDICINE

## 2018-07-31 PROCEDURE — 74011000258 HC RX REV CODE- 258: Performed by: HOSPITALIST

## 2018-07-31 PROCEDURE — 74011250636 HC RX REV CODE- 250/636

## 2018-07-31 PROCEDURE — 74011250636 HC RX REV CODE- 250/636: Performed by: FAMILY MEDICINE

## 2018-07-31 PROCEDURE — 77030009378 HC DEV TORQ OLCT F/GWIRE MANIP COOK -A

## 2018-07-31 RX ORDER — FENTANYL CITRATE 50 UG/ML
25-100 INJECTION, SOLUTION INTRAMUSCULAR; INTRAVENOUS
Status: DISCONTINUED | OUTPATIENT
Start: 2018-07-31 | End: 2018-07-31 | Stop reason: HOSPADM

## 2018-07-31 RX ORDER — HEPARIN SODIUM 200 [USP'U]/100ML
1000 INJECTION, SOLUTION INTRAVENOUS ONCE
Status: COMPLETED | OUTPATIENT
Start: 2018-07-31 | End: 2018-07-31

## 2018-07-31 RX ORDER — HYDROMORPHONE HYDROCHLORIDE 1 MG/ML
1 INJECTION, SOLUTION INTRAMUSCULAR; INTRAVENOUS; SUBCUTANEOUS
Status: DISCONTINUED | OUTPATIENT
Start: 2018-07-31 | End: 2018-08-04 | Stop reason: HOSPADM

## 2018-07-31 RX ORDER — MIDAZOLAM HYDROCHLORIDE 1 MG/ML
1-2 INJECTION, SOLUTION INTRAMUSCULAR; INTRAVENOUS
Status: DISCONTINUED | OUTPATIENT
Start: 2018-07-31 | End: 2018-07-31 | Stop reason: HOSPADM

## 2018-07-31 RX ORDER — LIDOCAINE HYDROCHLORIDE 10 MG/ML
1-60 INJECTION INFILTRATION; PERINEURAL
Status: DISCONTINUED | OUTPATIENT
Start: 2018-07-31 | End: 2018-07-31 | Stop reason: HOSPADM

## 2018-07-31 RX ADMIN — MEROPENEM 1 G: 1 INJECTION, POWDER, FOR SOLUTION INTRAVENOUS at 21:00

## 2018-07-31 RX ADMIN — SUCRALFATE 1 G: 1 TABLET ORAL at 09:01

## 2018-07-31 RX ADMIN — CYANOCOBALAMIN TAB 1000 MCG 1000 MCG: 1000 TAB at 09:01

## 2018-07-31 RX ADMIN — PANTOPRAZOLE SODIUM 40 MG: 40 TABLET, DELAYED RELEASE ORAL at 17:57

## 2018-07-31 RX ADMIN — PANCRELIPASE 3 CAPSULE: 10000; 34000; 55000 CAPSULE, DELAYED RELEASE ORAL at 12:24

## 2018-07-31 RX ADMIN — HYDROMORPHONE HYDROCHLORIDE 1 MG: 1 INJECTION, SOLUTION INTRAMUSCULAR; INTRAVENOUS; SUBCUTANEOUS at 06:30

## 2018-07-31 RX ADMIN — METRONIDAZOLE 500 MG: 500 INJECTION, SOLUTION INTRAVENOUS at 10:10

## 2018-07-31 RX ADMIN — AZTREONAM 2 G: 2 INJECTION, POWDER, LYOPHILIZED, FOR SOLUTION INTRAMUSCULAR; INTRAVENOUS at 00:15

## 2018-07-31 RX ADMIN — Medication 500 MG: at 09:01

## 2018-07-31 RX ADMIN — FERROUS SULFATE TAB 325 MG (65 MG ELEMENTAL FE) 325 MG: 325 (65 FE) TAB at 17:57

## 2018-07-31 RX ADMIN — Medication 10 ML: at 06:30

## 2018-07-31 RX ADMIN — VITAMIN D, TAB 1000IU (100/BT) 2000 UNITS: 25 TAB at 09:01

## 2018-07-31 RX ADMIN — DEXTROSE MONOHYDRATE AND SODIUM CHLORIDE 150 ML/HR: 5; .45 INJECTION, SOLUTION INTRAVENOUS at 11:18

## 2018-07-31 RX ADMIN — HYDROMORPHONE HYDROCHLORIDE 1 MG: 1 INJECTION, SOLUTION INTRAMUSCULAR; INTRAVENOUS; SUBCUTANEOUS at 14:51

## 2018-07-31 RX ADMIN — SUCRALFATE 1 G: 1 TABLET ORAL at 17:55

## 2018-07-31 RX ADMIN — FERROUS SULFATE TAB 325 MG (65 MG ELEMENTAL FE) 325 MG: 325 (65 FE) TAB at 09:01

## 2018-07-31 RX ADMIN — HYDROMORPHONE HYDROCHLORIDE 1 MG: 1 INJECTION, SOLUTION INTRAMUSCULAR; INTRAVENOUS; SUBCUTANEOUS at 20:55

## 2018-07-31 RX ADMIN — MEROPENEM 1 G: 1 INJECTION, POWDER, FOR SOLUTION INTRAVENOUS at 14:51

## 2018-07-31 RX ADMIN — Medication 10 ML: at 21:00

## 2018-07-31 RX ADMIN — SUCRALFATE 1 G: 1 TABLET ORAL at 21:00

## 2018-07-31 RX ADMIN — PANCRELIPASE 3 CAPSULE: 10000; 34000; 55000 CAPSULE, DELAYED RELEASE ORAL at 17:57

## 2018-07-31 RX ADMIN — DEXTROSE MONOHYDRATE AND SODIUM CHLORIDE 150 ML/HR: 5; .45 INJECTION, SOLUTION INTRAVENOUS at 19:36

## 2018-07-31 RX ADMIN — PANTOPRAZOLE SODIUM 40 MG: 40 TABLET, DELAYED RELEASE ORAL at 09:01

## 2018-07-31 RX ADMIN — FOLIC ACID 1 MG: 1 TABLET ORAL at 09:01

## 2018-07-31 RX ADMIN — PANCRELIPASE 3 CAPSULE: 10000; 34000; 55000 CAPSULE, DELAYED RELEASE ORAL at 09:01

## 2018-07-31 RX ADMIN — INSULIN GLARGINE 5 UNITS: 100 INJECTION, SOLUTION SUBCUTANEOUS at 17:53

## 2018-07-31 RX ADMIN — IOPAMIDOL 15 ML: 612 INJECTION, SOLUTION INTRAVENOUS at 10:43

## 2018-07-31 RX ADMIN — DEXTROSE MONOHYDRATE AND SODIUM CHLORIDE 150 ML/HR: 5; .45 INJECTION, SOLUTION INTRAVENOUS at 02:40

## 2018-07-31 RX ADMIN — HYDROMORPHONE HYDROCHLORIDE 1 MG: 1 INJECTION, SOLUTION INTRAMUSCULAR; INTRAVENOUS; SUBCUTANEOUS at 17:55

## 2018-07-31 RX ADMIN — HEPARIN SODIUM 2000 UNITS: 200 INJECTION, SOLUTION INTRAVENOUS at 10:11

## 2018-07-31 RX ADMIN — OXYCODONE HYDROCHLORIDE AND ACETAMINOPHEN 1 TABLET: 5; 325 TABLET ORAL at 12:24

## 2018-07-31 RX ADMIN — SUCRALFATE 1 G: 1 TABLET ORAL at 12:26

## 2018-07-31 RX ADMIN — HYDROMORPHONE HYDROCHLORIDE 1 MG: 1 INJECTION, SOLUTION INTRAMUSCULAR; INTRAVENOUS; SUBCUTANEOUS at 11:19

## 2018-07-31 RX ADMIN — FENTANYL CITRATE 50 MCG: 50 INJECTION, SOLUTION INTRAMUSCULAR; INTRAVENOUS at 10:12

## 2018-07-31 RX ADMIN — LIDOCAINE HYDROCHLORIDE 3 ML: 10 INJECTION, SOLUTION INFILTRATION; PERINEURAL at 10:42

## 2018-07-31 RX ADMIN — HYDROMORPHONE HYDROCHLORIDE 1 MG: 1 INJECTION, SOLUTION INTRAMUSCULAR; INTRAVENOUS; SUBCUTANEOUS at 02:35

## 2018-07-31 RX ADMIN — Medication 10 ML: at 17:55

## 2018-07-31 NOTE — INTERDISCIPLINARY ROUNDS
IDR Summary Patient: Frank Quinonez MRN: [id number]    Age: 48 y.o.  : 1965 Admit Diagnosis: Sepsis ([facility] Sepsis ([facility] Problems pertinent to hospital stay:  
 
Consults:Case Management Testing due for patient today? YES Nutrition plan:Yes Mobility needs: Yes Lines/Tubes:  
IV: YES   Needed: YES Keita: NO  Needed:NO Central Line: YES Needed: YES   
 
VTE Prophylaxis: Chemical 
 
Care Management: 
Discharge plan: home PCP: Charline Samaniego MD   
: NO Financial concerns:No  
Interventions: LOS: 3 days Expected days until discharge: today? Signed: ODALIS Longoria [address] [place], [place] [address] Pager:  [phone] Office:  [phone]

## 2018-07-31 NOTE — PROGRESS NOTES
Problem: Falls - Risk of 
Goal: *Absence of Falls Document Anisha Xiong Fall Risk and appropriate interventions in the flowsheet. Outcome: Progressing Towards Goal 
Fall Risk Interventions: 
Mobility Interventions: Patient to call before getting OOB Medication Interventions: Patient to call before getting OOB, Teach patient to arise slowly Elimination Interventions: Call light in reach, Patient to call for help with toileting needs Problem: Pressure Injury - Risk of 
Goal: *Prevention of pressure injury Document Austin Scale and appropriate interventions in the flowsheet. Outcome: Progressing Towards Goal 
Pressure Injury Interventions: 
Sensory Interventions: Assess changes in LOC, Check visual cues for pain, Pressure redistribution bed/mattress (bed type) Moisture Interventions: Absorbent underpads Activity Interventions: Pressure redistribution bed/mattress(bed type) Mobility Interventions: Pressure redistribution bed/mattress (bed type) Nutrition Interventions: Document food/fluid/supplement intake Friction and Shear Interventions: Apply protective barrier, creams and emollients, Foam dressings/transparent film/skin sealants

## 2018-07-31 NOTE — CONSULTS
INFECTIOUS DISEASE CONSULT NOTE       Requested by: Dr William Morales     Reason for consult: Abd abscess    Date of admission: 7/28/2018    Date of consult: July 31, 2018      ABX:     Current abx Prior abx    Meropenem 7/31-0 Aztreonam/Flagyl 7/28-3     ASSESSMENT: -- RECOMMENDATIONS      Sepsis/SIRS on presentation  - pt with fever, leucocytosis, thrombocytosis, elevated Cr, elevated LFTs - will need to treat with BSabx  - previous had ESBL so will switch Abx to meropenem  - C diff is negative and will dc Flagyl  - monitor cx and response  - d/w Dr Sanjiv Mccloud intra-abd abscess- seen on CT - S/p IR drainage 7/31- await cx  - Abx as above   H/o Chronic pancreaticoduodeno -cutaneous fistula   -SP drain replacement by IR 5/24  -apparently dislodged with h/o recurrent abscess when drain out, CTAP 5/22 no abscess reported.   -skin around drain irritated from leakage around drain, not cellulitic appearing   -5/23 wd culture ESBL E coli (history of incl 7/2017) E faecalis C glabrata-- suspect colonizers as not septic appearing no abscess afebrile wbc nl drain functioning   -> was taken off all Abx 5/29 as CT showed resolution of abscess ->new abscess seen on repeat CT  - Abx as above  -> GI and Sx seeing   CHRISTI- elevated Cr of 1.87 on presentation - dose Abx for current crCl  - avoid nephrotoxic meds  - trend cr   Acute C5-6 Cervical Cord injury s/p syncope/fall 6/2 - s/p CT and MRI s/o severe canal stenosis  - NSx has been consulted and conservative Mx at this time   Chronic abd pain with nausea and vomiting - seen by GI  - plan for transfer to tertiary care center when stable       h/o recurrent pancreatitis, pseudocysts- h/o EtOH      Chronic abd pain  ->mgmt per IM   IDDM - uncontrolled ->bs control per IM    PCN allergy w/ rash recorded -> tolerated Meropenem in past      MICROBIOLOGY:   5/22               uctx Kleb 90k Kleb R amp  5/23               Wd ESBL E coli, 2 strains 1 R bactrim both R cipro S GM TM zosyn imipenem, C glabrata)  6/2                 Blcx x2 neg    7/28  Blcx x2 ntd  7/30  C diff neg  7/31  BF Cx IP      LINES/DRAINS:     [facility]    HPI:     Mr Dot Avendano is a 47 y/o male known to our group from multiple previous infectious disease consultations for complicated pancreatitis with abscess, chronic pancreaticoduodenal cutaneous fistula controlled by percutaneous drain, DM with non compliance with insulin, h/o alcohol use, tobacco use, chronic abdominal pain, and PENICILLIN ALLERGY, drug seeking behavior. He had many admission at [place] and [facility]. Please see previous progress notes and consults from our group as part of this consultation. His last admission was at [facility] 6/2 and before that at [place] on 5/22 where he was admitted for N, V and uncontrolled sugars. His chronic drain was dislodged few weeks before presentation and he didn;t get it replaced till when he was admitted and was found with ch drainage around his drain which grew multiple organisms including ESBL E. Coli x2, E faecalis and Candida glabrata.  The patient had placement of drain by interventional radiology. Fistulogram demonstrated persistent fistula to the region of the distal duodenum and jejunum. CT scan 05/22 demonstrated a drainage catheter in place, but without a discrete measurable fluid collection to suggest abscess and some mild wall thickening of the duodenum felt chronic inflammatory sequalae of fistula or duodenitis.  The patient's antibiotic was changed from Levaquin with the addition of vancomycin and substitution of gentamicin.  He has also been continued on Flagyl and fluconazole. He was followed by Dr. Jose Paniagua of surgery and deemed requiring no urgent Sx intervention needed. All abx were stopped on 5/29/2018 with concern selection further resistant isolates and toxicity or allergy, plan to monitor off abx.  He was tolerating diet and was free of fever or leukocytosis. He continued to have significant drainage from drain. He was discharged with plan to fu with Sx. Pt while at home had a fall and was admitted at [facility] 6/2. CT and MRI were done and s/p acute C5-6 cervical cord injury. He was evaluated by Nsx and plan for conservative Mx. There was concern for SIRS given leucocytosis, hypotension, tachycardia, draining fistula and h/o ESBL but thought with no acute infection concern and was taken off abx and discharged. He came back to ED 7/28 with c/o abrupt onset of overall weakness with leg and arm weakness. In ED, neurology teleconsult was performed and felt his symptoms were related to transient worsening of his cervical spinal stenosis, exacerbated by hypotension. His SBP was in th 60's with HR's in the 100's. He was given IVF's and was started on empiric antibiotics for possible sepsis. CT Abd/Pelvis showed decompressed IVC, fluid filled small and large bowel loops without obstruction, enterocutaneous fistula extending from the duodenum to the right lateral abdomen wall with a small abscess collection along the right flank measuring 2.2 x 1.8 cm. Duodenum is tethered to the abscess. The previously noted pigtail catheter in this collection has been removed. Fistulous tract is visualized. Chronic calcific pancreatitis is seen with a dilated duct in the tail; gastric wall thickening noted; small hiatal hernia. He was seen by GI for persistent nausea and vomiting. He was scheduled for IR aspiration of abscess fluid and just came back and we were asked for further eval and management.     Past medical history:     Past Medical History:   Diagnosis Date    Abdominal pain, generalized     Chronic pancreatitis ([address]     Diabetes ([address]     hypothyroid    Encounter for long-term (current) use of other medications     Essential hypertension     ETOH abuse     GERD (gastroesophageal reflux disease)     Heart failure (HCC)     Hyponatremia     Pain in the abdomen 11/2/2010    Pancreatitis     w/ abscess and pseudocyst    Pancreatitis     Psychiatric disorder     depression, anxiety    Tobacco abuse        Social History:     Social History     Social History    Marital status: SINGLE     Spouse name: N/A    Number of children: N/A    Years of education: N/A     Occupational History    Not on file. Social History Main Topics    Smoking status: Current Every Day Smoker     Packs/day: 0.50     Years: 0.00     Types: Cigarettes    Smokeless tobacco: Never Used    Alcohol use Yes    Drug use: No    Sexual activity: Yes     Birth control/ protection: None     Other Topics Concern    Not on file     Social History Narrative       Family History:     Family History   Problem Relation Age of Onset    Heart Disease Father        Allergies: Allergies   Allergen Reactions    Ampicillin-Sulbactam Rash    Pcn [Penicillins] Itching and Hives    Piperacillin-Tazobactam Rash    Pollen Extracts Rash    Seafood [Shellfish Containing Products] Hives     Other reaction(s): unknown  Has tolerated iohexol (iodine-containing contrast)  Only shrimp  Shrimp         Home Medications:     Prescriptions Prior to Admission   Medication Sig    promethazine (PHENERGAN) 25 mg tablet Take 1 Tab by mouth every six (6) hours as needed.  oxyCODONE-acetaminophen (PERCOCET)  mg per tablet Take 1 Tab by mouth every six (6) hours as needed. Max Daily Amount: 4 Tabs. Indications: Pain    insulin lispro (HUMALOG) 100 unit/mL injection 12 Units by SubCUTAneous route nightly. Indications: type 2 diabetes mellitus    ondansetron (ZOFRAN ODT) 4 mg disintegrating tablet Take 1 Tab by mouth every eight (8) hours as needed for Nausea.  sucralfate (CARAFATE) 1 gram tablet Take 1 Tab by mouth four (4) times daily. Indications: PREVENTION OF STRESS ULCER    amitriptyline (ELAVIL) 75 mg tablet Take 1 Tab by mouth nightly.  Indications: Depression    ascorbic acid, vitamin C, (VITAMIN C) 500 mg tablet Take 1 Tab by mouth daily. Indications: VITAMIN C DEFICIENCY    cholecalciferol, vitamin D3, 2,000 unit tab Take 1 Tab by mouth daily. Indications: PREVENTION OF VITAMIN D DEFICIENCY    cyanocobalamin (VITAMIN B-12) 1,000 mcg sublingual tablet Take 2 Tabs by mouth daily. Indications: PREVENTION OF VITAMIN B12 DEFICIENCY    ferrous sulfate 325 mg (65 mg iron) tablet Take 1 Tab by mouth two (2) times a day. Indications: IRON DEFICIENCY ANEMIA    folic acid 234 mcg tablet Take 1 Tab by mouth daily.  lipase-protease-amylase (CREON) 12,000-38,000 -60,000 unit capsule Take 2 Caps by mouth three (3) times daily (with meals). Indications: exocrine pancreatic insufficiency, dosage change    pantoprazole (PROTONIX) 40 mg tablet Take 1 Tab by mouth two (2) times a day.  Indications: EROSIVE ESOPHAGITIS, gastroesophageal reflux disease       Current Medications:     Current Facility-Administered Medications   Medication Dose Route Frequency    HYDROmorphone (DILAUDID) syringe 1 mg  1 mg IntraVENous Q4H PRN    insulin lispro (HUMALOG) injection   SubCUTAneous Q6H    insulin glargine (LANTUS) injection 5 Units  5 Units SubCUTAneous ACD    oxyCODONE-acetaminophen (PERCOCET) 5-325 mg per tablet 1 Tab  1 Tab Oral Q6H PRN    lipase-protease-amylase (ZENPEP 10,000) capsule 3 Cap  3 Cap Oral TID WITH MEALS    sodium chloride (NS) flush 5-10 mL  5-10 mL IntraVENous PRN    aztreonam (AZACTAM) 2 g in 0.9% sodium chloride (MBP/ADV) 100 mL MBP  2 g IntraVENous Q8H    metroNIDAZOLE (FLAGYL) IVPB premix 500 mg  500 mg IntraVENous Q8H    levoFLOXacin (LEVAQUIN) 750 mg in D5W IVPB  750 mg IntraVENous Q24H    ascorbic acid (vitamin C) (VITAMIN C) tablet 500 mg  500 mg Oral DAILY    cholecalciferol (VITAMIN D3) tablet 2,000 Units  2,000 Units Oral DAILY    cyanocobalamin tablet 1,000 mcg  1,000 mcg Oral DAILY    ferrous sulfate tablet 325 mg  325 mg Oral BID    folic acid (FOLVITE) tablet 1 mg  1 mg Oral DAILY    pantoprazole (PROTONIX) tablet 40 mg  40 mg Oral BID    sucralfate (CARAFATE) tablet 1 g  1 g Oral QID    promethazine (PHENERGAN) tablet 25 mg  25 mg Oral Q6H PRN    dextrose 5 % - 0.45% NaCl infusion  150 mL/hr IntraVENous CONTINUOUS    ondansetron (ZOFRAN) injection 4 mg  4 mg IntraVENous Q4H PRN    enoxaparin (LOVENOX) injection 40 mg  40 mg SubCUTAneous Q24H    glucose chewable tablet 16 g  4 Tab Oral PRN    glucagon (GLUCAGEN) injection 1 mg  1 mg IntraMUSCular PRN    dextrose (D50W) injection syrg 12.5-25 g  25-50 mL IntraVENous PRN    sodium chloride (NS) flush 5-10 mL  5-10 mL IntraVENous Q8H    sodium chloride (NS) flush 5-10 mL  5-10 mL IntraVENous PRN       Review of Systems:   12 points ROS done. Pertinent positives and negatives are as follows, ROS otherwise negative. Constitutional: +ve for fever, chills, weight loss. HENT: Negative for ear pain, congestion, sore throat, rhinorrhea. Eyes: Negative for pain, redness and visual disturbance. Respiratory: negative for shortness of breath, cough, chest tightness, wheezing. Cardiovascular: Negative for chest pain, palpitations and leg swelling. Gastrointestinal: +ve for nausea, vomiting, abdominal pain and diarrhea and drainage  Genitourinary: Negative for dysuria   Musculoskeletal: +ve for neck pain and back pain  Skin: Negative for ulcers, rash  Neurological: neg headaches but +ve weakness in upper extremities left >rt  Hematological:Negative for easy bruising or bleeding. Psychiatric/Behavioral: Negative anxiety, depression.       Physical Exam:  Vitals  Temp (24hrs), Av.6 °F (36.4 °C), Min:97.3 °F (36.3 °C), Max:97.8 °F (36.6 °C)    Visit Vitals    /85 (BP 1 Location: Right arm, BP Patient Position: At rest)    Pulse 84    Temp 97.6 °F (36.4 °C)    Resp 18    Ht 6' (1.829 m)    Wt 59.8 kg (131 lb 14.4 oz)    SpO2 95%    BMI 17.89 kg/m2       General: Well-developed, 48y.o. year-old, male, in no acute distress  HEENT: Normocephalic, anicteric sclerae, No sinus tenderness. Neck: Supple, no lymphadenopathy, masses or thyromegaly  Chest: Symmetrical expansion, CTA  Heart: Regular rhythm, no murmurs, rubs or gallops, No JVD  Abdomen: Guarding+, gen tender, Drain+ with dark brown thin fluid in bulb, old multiple healed scars, BS+  Musculoskeletal: No edema. No clubbing or cyanosis  CNS: AAOx3. Dec strength in UE. No NR  SKIN: No other skin lesion or rash. Dry, warm, intact          Labs: Results:   Chemistry Recent Labs      07/31/18 0410 07/30/18   0400  07/29/18   0145 07/28/18   1628   GLU  145*  84  99   < >  269*   NA  139  139  139   < >  135*   K  4.1  4.1  5.4   < >  6.2*   CL  113*  112*  114*   < >  106   CO2  17*  16*  19*   < >  19*   BUN  10  22*  26*   < >  20*   CREA  0.95  1.15  1.95*   < >  1.87*   CA  7.7*  8.1*  9.1   < >  9.3   AGAP  9  11  6   < >  10   BUCR  11*  19  13   < >  11*   AP  148*   --    --    --   299*   TP  5.4*   --    --    --   8.9*   ALB  2.2*   --    --    --   3.4   GLOB  3.2   --    --    --   5.5*   AGRAT  0.7*   --    --    --   0.6*    < > = values in this interval not displayed. CBC w/Diff Recent Labs      07/31/18 0410 07/30/18   0400  07/29/18   0145 07/28/18   1628   WBC  4.4*  6.3  14.6*  17.7*   RBC  2.83*  2.87*  3.55*  4.00*   HGB  8.8*  9.0*  11.2*  12.9*   HCT  27.4*  28.0*  35.2*  39.6   PLT  223  274  427*  508*   GRANS  58   --    --   85*   LYMPH  16*   --    --   9*   EOS  12*   --    --   1      Microbiology Recent Labs      07/30/18   1000  07/28/18   1625   CULT  Toxigenic C. difficile NEGATIVE                         C. difficile target DNA sequences are not detected. NO GROWTH 2 DAYS  NO GROWTH 2 DAYS          Imaging-      Results from Hospital Encounter encounter on 07/28/18   XR CHEST PORT   Narrative Chest, single view    Indication: Left-sided weakness. Comparison: Several prior exams, most recently June 16, 2018.     Findings: Portable upright AP view of the chest was obtained. There is a left  IJ approach central venous port catheter with tip projecting over the superior  cavoatrial junction, unchanged. Linear area of opacity at the left lung base  likely in keeping with underlying atelectasis and/or scar formation. No focal  pneumonic consolidation, pneumothorax or pleural effusion. Stable cardiac size  and mediastinal contours. No acute osseous abnormality. Impression Impression:  Left IJ approach central venous catheter in stable position. No superimposed acute radiographic cardiopulmonary abnormality. Results from [place] encounter on 07/28/18   CT ABD PELV WO CONT   Narrative EXAM: CT of the abdomen and pelvis    INDICATION: Left-sided weakness    COMPARISON: CT dated June 28, 2018    TECHNIQUE: Axial CT imaging of the abdomen and pelvis was performed without  intravenous contrast. Multiplanar reformats were generated. DOSE REDUCTION:  One or more dose reduction techniques were used on this CT:  automated exposure control, adjustment of the mAs and/or kVp according to  patient's size, and iterative reconstruction techniques. The specific techniques  utilized on this CT exam have been documented in the patient's electronic  medical record.    _______________    FINDINGS: Initial read was given by resident on call. LOWER CHEST: Mild bronchiectasis is present laterally. No focal airspace  disease. Small hiatal hernia. LIVER, BILIARY: Liver is normal. No biliary dilation. Cholecystectomy    PANCREAS: Evidence of chronic calcific pancreatitis. Pancreatic stent in place. Dilated distal pancreatic duct. SPLEEN: Normal.    ADRENALS: Mild left adrenal hyperplasia. The right adrenal is normal.. KIDNEYS/URETERS: There is scarring in the upper pole the left kidney with  parenchymal calcification measuring 6 mm. Cortical cyst seen in the midpole the  left kidney posteriorly measuring 11 mm.  Small probable cyst seen in the lower  pole the left kidney measuring 8 mm. Left kidneys otherwise unremarkable. Right kidney demonstrates 3 mm nonobstructive calculi in the lower pole. Right  kidneys unremarkable otherwise. VASCULATURE: There is a decompressed inferior vena cava raising the possibility  of hypovolemia. Calcific atherosclerosis present. LYMPH NODES: No enlarged lymph nodes seen    GASTRO INTESTINAL TRACT: Small hiatal hernia. There is gastric wall thickening  along the fundus and body posteriorly. This may reflect under distention however  gastritis not completely excluded. T    There is duodenal wall thickening along the second portion the duodenum with  chronic stranding around the duodenum. Duodenum is tethered to a right flank  abscess. There is a chronic fistulous tract leading from the duodenum to a small  abscess collection along the right flank with the abscess measures approximately  1.8 x 2.3 cm. The previously noted pigtail catheter in this abscess collection  has been removed. Inflammatory changes and scarring seen around this abscess. There are fluid-filled small and large bowel loops throughout the abdomen. This  is nonspecific but may represent gastroenteritis/ileus. No free air or  pneumatosis seen. There is no bowel obstruction. PELVIC ORGANS/BLADDER: Urinary bladder is under distended therefore difficult to  evaluate. No free fluid. BONES: There is avascular necrosis of both femoral heads left larger than right. There is osteopenia. Degenerative disc disease present at L5-S1 with a  transitional vertebrae present.     OTHER: None.    _______________         Impression IMPRESSION:    Decompressed inferior vena cava raising the possibility of hypovolemia    Fluid-filled small and large bowel loops suggesting gastroenteritis without  obstruction    Again there is a enterocutaneous fistula extending from the duodenum to the  right lateral abdomen wall with a small abscess collection along the right flank  measuring 2.2 x 1.8 cm. Duodenum is tethered to the abscess. The previously  noted pigtail catheter in this collection has been removed. Fistulous tract is  visualized. Evidence of chronic calcific pancreatitis with a dilated pancreatic duct along  the tail which may represent pancreatic stricturing or obstruction due to  calculi. Gastric wall thickening which may reflect under distention versus gastritis    Small hiatal hernia    Nonobstructive renal calculi    Avascular necrosis of the femoral heads          ---------------------------------------------------------------------------------------------------------------  I have independently examined the patient and reviewed all lab studies and imgaing as well as review of nursing notes and physican notes from the past 24 hours. The plan of care has been discussed with the patient/relative and all questions are answered. Dragon medical dictation software was used for portions of this report. Unintended errors may occur.      Mary Jane Martinez MD  7/31/2018    [facility]  [phone]

## 2018-07-31 NOTE — H&P (VIEW-ONLY)
Gastroenterology Consult Patient: Frank Quinonez MRN: [id number]  SSN: [id number] YOB: 1965  Age: 48 y.o. Sex: male Assessment:  
-Acute bilateral leg/arm weakness, suspected transient worsening of cervical spinal stenosis -Hypotension, suspected sepsis, improving 
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 
-Chronic pancreatitis with poorly controlled diabetes, chronic pain, diarrhea 
-CHRISTI 
-Chronic pain with report of pain-seeking behavior 
-Ongoing tobacco and intermittent ETOH use 
-Chronic malnutrition 
-History of adenomatous colon polyps, overdue for surveillance 
  
49 y/o M with complicated history as outlined below admitted for weakness, now improved; also with hypotension and tachycardia with concern for sepsis with ongoing persistent fluid collection in right flank. Has had multiple drains placed which usually fall out by his report. Last attempt at percutaneous drain placement was 7/5/18 but was unsuccessful. Has chronic abdominal pain which is likely multifactorial. Has acute on chronic diarrhea on relatively low doses of pancreat enzymes. Need to exclude CDiff. Agree with empiric ABX and ID evaluation. Recommended IR evaluation for possible percutaneous drainage of the right flank fluid collection although this is relatively small. Ultimately, he should be evaluated at an academic/university center to consider options/feasbility for more definitive pancreatic surgery. Plan:  
-Antiemetics as needed, minimize narcotics if possible 
-Check CDiff 
-Will increase pancreatic enzyme replacement and continue PPI 
-Recommend IR evaluation for possible percutaneous drain placement 
-Agree with antibiotics for now, ID consultation Subjective:  
  
Frank Quinonez is a 48 y.o. male with history of recurrent acute pancreatitis and chronic pancreatitis thought due to ETOH with chronic pancreaticoduodenal-cutaneous fistula, chronic pain, and poorly controlled diabetes who is admitted for leg and arm weakness, found to have hypotension and tachycardia. Reports that he had abrupt onset of overall weakness at a convenience store yesterday. Notes indicate leg and arm weakness although he tells me he had global weakness. Was brought to the ER where neurology teleconsult was performed and felt his symptoms were related to transient worsening of his cervical spinal stenosis, exacerbated by hypotension. His SBP was in th 60's with HR's in the 100's. Was given IVF's and empiric antibiotics for possible sepsis and SBP has improved to the 90's. 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 Duodenum is tethered to the abscess. The previously noted pigtail catheter in this collection has been removed. Fistulous tract is visualized. Chronic calcific pancreatitis is seen with a dilated duct in the tail; gastric wall thickening noted; small hiatal hernia. He is will known to our service for his chronic pancreatitis and chronic fistula. Has been admitted multiple times for abdominal pain, DKA, weakness. Has had multiple percutaneous drain placements for recurrent abdominal fluid collections and typically the drain falls out by his report. He had a fall in June2018 and was admitted at [facility] with weakness in the extremities. Evaluated by neurosurgery at that time and felt to have central cord syndrome treated with gabapentin and steroids.  
 
Today, he complains of intermitted nausea/vomiting, chronic abdominal pain unchanged from baseline, chronic watery nonbloody diarrhea worse in the past 3 days, limited appetite, weight loss which appears to have been steady decline. Admission labwork: WBC 17.7, down to 14.6, Creat 1.89 up to 1.95, , AST /ALT 54/99 with normal bilirubin/lipase He tells me he no longer drinks alcohol although ER notes indicate recent beer drinking last week. --Background-- 
-He has known history of recurrent alcoholic pancreatitis started around 2006/2007 per notes with multiple pseudocysts, percutaneous drainage procedures, PD stenting and ultimately development of pancreaticoduodenal-cutaneous fistula which has intermittently been drained via a percutaneous catheter or has been allowed to spontaneously drain (into an ostomy bag previsously. He has been followed by Dr Vivien Kitchen in our office although not seen since 2017. It appears his last endoscopic pancreatic intervention was ERCP with Dr Vivien Kitchen with stent placement in the pancreas crossing the ductal disruption in 2015. Imaging has shown persistent presence of this stent in the pancreas. He has been managed on chronic narcotics and pancreatic enzymes replacement but has required frequent admissions for abdominal pain, problems with drainage and skin irritation and fistula, DKA, nausea/vomiting.  
-Recurrent admissions for DKA, abdominal pain, nausea/vomiting. -CT scans with recurrent fluid collection in the right lower quadrant, chronic calcific pancreatitis with dilated PD  
-Seen by General Surgery (Dr Aileen Tadeo, Dr Julee Polanco) and ID during prior admission. Surgery has recommended referral to a tertiary center for pancreatic surgery.  
-History of ESBL E Coli growing from wound cultures, possible colonization 
-Has been on chronic protonix, creon 12k/38k/60k 2 caps tid, Percocet 
-Prior EGDs 2010, 2013, 2014, negative for Kellers esophagus 
-Recent EGD [id number]: ulcerative esophagitis, gastric erosions, ulcerative duodenitis, hiatal hernia, atrophic gastritis (Hpylori negative on biopsies) -History of adenomatous colon polyps, last colonoscopy in 2010 Past Medical History Past Medical History:  
Diagnosis Date  Abdominal pain, generalized  Chronic pancreatitis ([facility]  Diabetes ([facility]   
 hypothyroid  Encounter for long-term (current) use of other medications  Essential hypertension  ETOH abuse  GERD (gastroesophageal reflux disease)  Heart failure ([facility]  Hyponatremia  Pain in the abdomen 11/2/2010  Pancreatitis w/ abscess and pseudocyst  
 Pancreatitis  Psychiatric disorder   
 depression, anxiety  Tobacco abuse Past Surgical History Past Surgical History:  
Procedure Laterality Date  HX ABDOMINAL LAPAROSCOPY PANCREATIC STENT  
 HX CHOLECYSTECTOMY Family History Family History Problem Relation Age of Onset  Heart Disease Father Social History Social History Social History  Marital status: SINGLE Spouse name: N/A  
 Number of children: N/A  
 Years of education: N/A Occupational History  Not on file. Social History Main Topics  Smoking status: Current Every Day Smoker Packs/day: 0.50 Years: 0.00 Types: Cigarettes  Smokeless tobacco: Never Used  Alcohol use Yes  Drug use: No  
 Sexual activity: Yes Birth control/ protection: None Other Topics Concern  Not on file Social History Narrative Medications Current Facility-Administered Medications Medication Dose Route Frequency  insulin lispro (HUMALOG) injection   SubCUTAneous Q6H  
 insulin glargine (LANTUS) injection 5 Units  5 Units SubCUTAneous ACD  
 HYDROmorphone (DILAUDID) injection 1 mg  1 mg IntraVENous Q4H PRN  
 oxyCODONE-acetaminophen (PERCOCET) 5-325 mg per tablet 1 Tab  1 Tab Oral Q6H PRN  
 sodium chloride (NS) flush 5-10 mL  5-10 mL IntraVENous PRN  
 aztreonam (AZACTAM) 2 g in 0.9% sodium chloride (MBP/ADV) 100 mL MBP  2 g IntraVENous Q8H  
 metroNIDAZOLE (FLAGYL) IVPB premix 500 mg  500 mg IntraVENous Q8H  
 levoFLOXacin (LEVAQUIN) 750 mg in D5W IVPB 750 mg IntraVENous Q24H  
 ascorbic acid (vitamin C) (VITAMIN C) tablet 500 mg  500 mg Oral DAILY  cholecalciferol (VITAMIN D3) tablet 2,000 Units  2,000 Units Oral DAILY  cyanocobalamin tablet 1,000 mcg  1,000 mcg Oral DAILY  ferrous sulfate tablet 325 mg  325 mg Oral BID  folic acid (FOLVITE) tablet 1 mg  1 mg Oral DAILY  lipase-protease-amylase (ZENPEP 10,000) capsule 2 Cap  2 Cap Oral TID  pantoprazole (PROTONIX) tablet 40 mg  40 mg Oral BID  sucralfate (CARAFATE) tablet 1 g  1 g Oral QID  promethazine (PHENERGAN) tablet 25 mg  25 mg Oral Q6H PRN  
 dextrose 5 % - 0.45% NaCl infusion  150 mL/hr IntraVENous CONTINUOUS  
 ondansetron (ZOFRAN) injection 4 mg  4 mg IntraVENous Q4H PRN  
 enoxaparin (LOVENOX) injection 40 mg  40 mg SubCUTAneous Q24H  
 glucose chewable tablet 16 g  4 Tab Oral PRN  
 glucagon (GLUCAGEN) injection 1 mg  1 mg IntraMUSCular PRN  
 dextrose (D50W) injection syrg 12.5-25 g  25-50 mL IntraVENous PRN  
 sodium chloride (NS) flush 5-10 mL  5-10 mL IntraVENous Q8H  
 sodium chloride (NS) flush 5-10 mL  5-10 mL IntraVENous PRN Hospital Problems  Date Reviewed: 6/9/2018 Codes Class Noted POA Enterocutaneous fistula ICD-10-CM: B33.2 ICD-9-CM: 569.81  7/28/2018 Unknown * (Principal)Sepsis ([facility] ICD-10-CM: A41.9 ICD-9-CM: 038.9, 995.91  4/15/2016 Unknown Chronic pain (Chronic) ICD-10-CM: T43.55 ICD-9-CM: 338.29  3/23/2016 Yes IDDM (insulin dependent diabetes mellitus) (HCC) (Chronic) ICD-10-CM: E11.9, Z79.4 ICD-9-CM: 250.00, V58.67  8/31/2013 Yes Abscess of abdominal cavity ([facility] ICD-10-CM: K65.1 ICD-9-CM: 092.00  6/19/2013 Yes Overview Signed 7/9/2013 10:39 AM by [place], MD  
  Abdominal abscess - most likely infected fluid collection from duodenal fistula related to pancreatic disease. Doing well with drainage and antibiotics. Fistulogram on Friday noted continued fistula to the duodenum/pancreas. Will need referral to pancreatic surgeon. Allergies Allergen Reactions  Ampicillin-Sulbactam Rash  Pcn [Penicillins] Itching and Hives  Piperacillin-Tazobactam Rash  Pollen Extracts Rash  Seafood [Shellfish Containing Products] Hives Other reaction(s): unknown Has tolerated iohexol (iodine-containing contrast) Only shrimp Shrimp Review of Systems: 
Pertinent items are noted in the History of Present Illness. Objective:  
 
Physical Exam: 
Visit Vitals  BP 93/69  Pulse 80  Temp 97.9 °F (36.6 °C)  Resp 14  
 Ht 6' (1.829 m)  Wt 58.7 kg (129 lb 6.6 oz)  SpO2 100%  BMI 17.55 kg/m2 General appearance: Chronically ill appearing M, appeared comfortable from the door but complaining of pain throughout my interview, alert, cooperative HEENT: Head: Normocephalic, without obvious abnormality, atraumatic; sclera negative for pallor/icterus; poor dentition Neck: supple, symmetrical, trachea midline, no adenopathy Lungs: clear to auscultation bilaterally Heart: regular rate and rhythm, S1, S2 normal, no murmur, click, rub or gallop; port noted left upper chest 
Abdomen: diffuse voluntary guarding with slight touch of the skin limiting examination, no apparent rebound, fistulous tract noted RLQ without drainage or evidence of cellulitis, Bowel sounds normal. 
Extremities: No edema Skin: Chronic skin changes around fistula, otherwise color, texture, turgor normal 
Recent Results (from the past 24 hour(s)) GLUCOSE, POC Collection Time: 07/28/18  4:01 PM  
Result Value Ref Range Glucose (POC) 253 (H) 70 - 110 mg/dL POC LACTIC ACID Collection Time: 07/28/18  4:23 PM  
Result Value Ref Range Lactic Acid (POC) 1.5 0.4 - 2.0 mmol/L  
CULTURE, BLOOD Collection Time: 07/28/18  4:25 PM  
Result Value Ref Range Special Requests: PERIPHERAL Culture result: NO GROWTH AFTER 14 HOURS    
CULTURE, BLOOD  Collection Time: 07/28/18 4:25 PM  
Result Value Ref Range Special Requests: PERIPHERAL Culture result: NO GROWTH AFTER 14 HOURS    
ETHYL ALCOHOL Collection Time: 07/28/18  4:25 PM  
Result Value Ref Range ALCOHOL(ETHYL),SERUM <3 0 - 3 MG/DL  
CBC WITH AUTOMATED DIFF Collection Time: 07/28/18  4:28 PM  
Result Value Ref Range WBC 17.7 (H) 4.6 - 13.2 K/uL  
 RBC 4.00 (L) 4.70 - 5.50 M/uL  
 HGB 12.9 (L) 13.0 - 16.0 g/dL HCT 39.6 36.0 - 48.0 % MCV 99.0 (H) 74.0 - 97.0 FL  
 MCH 32.3 24.0 - 34.0 PG  
 MCHC 32.6 31.0 - 37.0 g/dL  
 RDW 15.5 (H) 11.6 - 14.5 % PLATELET 178 (H) 945 - 420 K/uL MPV 10.1 9.2 - 11.8 FL  
 NEUTROPHILS 85 (H) 40 - 73 % LYMPHOCYTES 9 (L) 21 - 52 % MONOCYTES 5 3 - 10 % EOSINOPHILS 1 0 - 5 % BASOPHILS 0 0 - 2 %  
 ABS. NEUTROPHILS 15.1 (H) 1.8 - 8.0 K/UL  
 ABS. LYMPHOCYTES 1.6 0.9 - 3.6 K/UL  
 ABS. MONOCYTES 0.8 0.05 - 1.2 K/UL  
 ABS. EOSINOPHILS 0.1 0.0 - 0.4 K/UL  
 ABS. BASOPHILS 0.0 0.0 - 0.1 K/UL  
 DF AUTOMATED METABOLIC PANEL, COMPREHENSIVE Collection Time: 07/28/18  4:28 PM  
Result Value Ref Range Sodium 135 (L) 136 - 145 mmol/L Potassium 6.2 (HH) 3.5 - 5.5 mmol/L Chloride 106 100 - 108 mmol/L  
 CO2 19 (L) 21 - 32 mmol/L Anion gap 10 3.0 - 18 mmol/L Glucose 269 (H) 74 - 99 mg/dL BUN 20 (H) 7.0 - 18 MG/DL Creatinine 1.87 (H) 0.6 - 1.3 MG/DL  
 BUN/Creatinine ratio 11 (L) 12 - 20 GFR est AA 46 (L) >60 ml/min/1.73m2 GFR est non-AA 38 (L) >60 ml/min/1.73m2 Calcium 9.3 8.5 - 10.1 MG/DL Bilirubin, total 0.3 0.2 - 1.0 MG/DL  
 ALT (SGPT) 99 (H) 16 - 61 U/L  
 AST (SGOT) 34 15 - 37 U/L Alk. phosphatase 299 (H) 45 - 117 U/L Protein, total 8.9 (H) 6.4 - 8.2 g/dL Albumin 3.4 3.4 - 5.0 g/dL Globulin 5.5 (H) 2.0 - 4.0 g/dL A-G Ratio 0.6 (L) 0.8 - 1.7 CARDIAC PANEL,(CK, CKMB & TROPONIN) Collection Time: 07/28/18  4:28 PM  
Result Value Ref Range CK 39 39 - 308 U/L  
 CK - MB <1.0 <3.6 ng/ml  CK-MB Index  0.0 - 4.0 % CALCULATION NOT PERFORMED WHEN RESULT IS BELOW LINEAR LIMIT Troponin-I, Qt. <0.02 0.0 - 0.045 NG/ML  
PROTHROMBIN TIME + INR Collection Time: 07/28/18  4:28 PM  
Result Value Ref Range Prothrombin time 11.7 11.5 - 15.2 sec INR 0.9 0.8 - 1.2 PTT Collection Time: 07/28/18  4:28 PM  
Result Value Ref Range aPTT 24.1 23.0 - 36.4 SEC  
LIPASE Collection Time: 07/28/18  4:28 PM  
Result Value Ref Range Lipase 48 (L) 73 - 393 U/L  
HEMOGLOBIN A1C WITH EAG Collection Time: 07/28/18  4:28 PM  
Result Value Ref Range Hemoglobin A1c 7.9 (H) 4.2 - 5.6 % Est. average glucose 180 mg/dL EKG, 12 LEAD, INITIAL Collection Time: 07/28/18  4:36 PM  
Result Value Ref Range Ventricular Rate 106 BPM  
 Atrial Rate 106 BPM  
 P-R Interval 128 ms QRS Duration 80 ms  
 Q-T Interval 328 ms QTC Calculation (Bezet) 435 ms Calculated P Axis 79 degrees Calculated R Axis -56 degrees Calculated T Axis 67 degrees Diagnosis Sinus tachycardia Right atrial enlargement Left axis deviation Pulmonary disease pattern Abnormal ECG When compared with ECG of 18-JUN-2018 18:02, 
Criteria for Inferior infarct are no longer present Confirmed by Crisitano Flores ([id number]) on 7/29/2018 10:00:35 AM 
  
URINALYSIS W/ RFLX MICROSCOPIC Collection Time: 07/28/18  7:51 PM  
Result Value Ref Range Color DARK YELLOW Appearance CLOUDY Specific gravity 1.025 1.005 - 1.030    
 pH (UA) 5.0 5.0 - 8.0 Protein 300 (A) NEG mg/dL Glucose 100 (A) NEG mg/dL Ketone TRACE (A) NEG mg/dL Bilirubin SMALL (A) NEG Blood NEGATIVE  NEG Urobilinogen 1.0 0.2 - 1.0 EU/dL Nitrites NEGATIVE  NEG Leukocyte Esterase TRACE (A) NEG URINE MICROSCOPIC ONLY Collection Time: 07/28/18  7:51 PM  
Result Value Ref Range WBC 2 to 4 0 - 4 /hpf  
 RBC 0 0 - 5 /hpf Epithelial cells FEW 0 - 5 /lpf Bacteria 1+ (A) NEG /hpf  Amorphous Crystals 3+ (A) NEG  
METABOLIC PANEL, BASIC Collection Time: 07/28/18  9:19 PM  
Result Value Ref Range Sodium 136 136 - 145 mmol/L Potassium 6.4 (HH) 3.5 - 5.5 mmol/L Chloride 112 (H) 100 - 108 mmol/L  
 CO2 16 (L) 21 - 32 mmol/L Anion gap 8 3.0 - 18 mmol/L Glucose 288 (H) 74 - 99 mg/dL BUN 24 (H) 7.0 - 18 MG/DL Creatinine 1.83 (H) 0.6 - 1.3 MG/DL  
 BUN/Creatinine ratio 13 12 - 20 GFR est AA 47 (L) >60 ml/min/1.73m2 GFR est non-AA 39 (L) >60 ml/min/1.73m2 Calcium 8.1 (L) 8.5 - 10.1 MG/DL  
GLUCOSE, POC Collection Time: 07/29/18 12:13 AM  
Result Value Ref Range Glucose (POC) 213 (H) 70 - 110 mg/dL METABOLIC PANEL, BASIC Collection Time: 07/29/18  1:45 AM  
Result Value Ref Range Sodium 139 136 - 145 mmol/L Potassium 5.4 3.5 - 5.5 mmol/L Chloride 114 (H) 100 - 108 mmol/L  
 CO2 19 (L) 21 - 32 mmol/L Anion gap 6 3.0 - 18 mmol/L Glucose 99 74 - 99 mg/dL BUN 26 (H) 7.0 - 18 MG/DL Creatinine 1.95 (H) 0.6 - 1.3 MG/DL  
 BUN/Creatinine ratio 13 12 - 20 GFR est AA 44 (L) >60 ml/min/1.73m2 GFR est non-AA 36 (L) >60 ml/min/1.73m2 Calcium 9.1 8.5 - 10.1 MG/DL  
CBC W/O DIFF Collection Time: 07/29/18  1:45 AM  
Result Value Ref Range WBC 14.6 (H) 4.6 - 13.2 K/uL  
 RBC 3.55 (L) 4.70 - 5.50 M/uL  
 HGB 11.2 (L) 13.0 - 16.0 g/dL HCT 35.2 (L) 36.0 - 48.0 % MCV 99.2 (H) 74.0 - 97.0 FL  
 MCH 31.5 24.0 - 34.0 PG  
 MCHC 31.8 31.0 - 37.0 g/dL  
 RDW 15.6 (H) 11.6 - 14.5 % PLATELET 286 (H) 062 - 420 K/uL MPV 9.7 9.2 - 11.8 FL  
GLUCOSE, POC Collection Time: 07/29/18  5:57 AM  
Result Value Ref Range Glucose (POC) 236 (H) 70 - 110 mg/dL GLUCOSE, POC Collection Time: 07/29/18 11:42 AM  
Result Value Ref Range Glucose (POC) 175 (H) 70 - 110 mg/dL Signed By: Sulaiman Wan MD   
 July 29, 2018

## 2018-07-31 NOTE — PROGRESS NOTES
TRANSFER - OUT REPORT: 
 
Verbal report given to RN Freda Luevano(name) on Buzz Hall being transferred to 3016(unit) for routine progression of care Report consisted of patient's Situation, Background, Assessment and  
Recommendations(SBAR). Information from the following report(s) SBAR was reviewed with the receiving nurse. Opportunity for questions and clarification was provided. Patient transported with: 
 Felicia Cabrera

## 2018-07-31 NOTE — PROGRESS NOTES
Hospitalist Progress Note 
t Daily Progress Note: 7/31/2018 9:22 AM 
 
   
Interval history / Subjective:  
Regan Soto is a 48y.o. year old male who presented with increasing abdominal pain in addition to extremity weakness. 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 He has chronic pain syndrome as well. Code s called for extremity weakness but later cancelled, appears to be metabolically related. Patient found to have wbc elevation and hypotension. CT abdm/pelvis c/w chronic fistulous tract leading from the duodenum to a small abscess collection along the right flank with the abscess measures approximately 1.8 x 2.3 cm. Admitted for treatment and further work-up. Blood cx NGTD. Surgery consulted 7/30. Recommend referral to pancreatic surgery. GI following and will arrange referral on patient discharge. IR consulted 7/30 for percutaneous drainage of abscess ID consulted for infected abdominal abscess 7/31 Current Facility-Administered Medications Medication Dose Route Frequency  HYDROmorphone (DILAUDID) syringe 1 mg  1 mg IntraVENous Q4H PRN  
 insulin lispro (HUMALOG) injection   SubCUTAneous Q6H  
 insulin glargine (LANTUS) injection 5 Units  5 Units SubCUTAneous ACD  oxyCODONE-acetaminophen (PERCOCET) 5-325 mg per tablet 1 Tab  1 Tab Oral Q6H PRN  
 lipase-protease-amylase (ZENPEP 10,000) capsule 3 Cap  3 Cap Oral TID WITH MEALS  sodium chloride (NS) flush 5-10 mL  5-10 mL IntraVENous PRN  
 aztreonam (AZACTAM) 2 g in 0.9% sodium chloride (MBP/ADV) 100 mL MBP  2 g IntraVENous Q8H  
 metroNIDAZOLE (FLAGYL) IVPB premix 500 mg  500 mg IntraVENous Q8H  
 levoFLOXacin (LEVAQUIN) 750 mg in D5W IVPB  750 mg IntraVENous Q24H  
 ascorbic acid (vitamin C) (VITAMIN C) tablet 500 mg  500 mg Oral DAILY  cholecalciferol (VITAMIN D3) tablet 2,000 Units  2,000 Units Oral DAILY  cyanocobalamin tablet 1,000 mcg  1,000 mcg Oral DAILY  ferrous sulfate tablet 325 mg  325 mg Oral BID  folic acid (FOLVITE) tablet 1 mg  1 mg Oral DAILY  pantoprazole (PROTONIX) tablet 40 mg  40 mg Oral BID  sucralfate (CARAFATE) tablet 1 g  1 g Oral QID  promethazine (PHENERGAN) tablet 25 mg  25 mg Oral Q6H PRN  
 dextrose 5 % - 0.45% NaCl infusion  150 mL/hr IntraVENous CONTINUOUS  
 ondansetron (ZOFRAN) injection 4 mg  4 mg IntraVENous Q4H PRN  
 enoxaparin (LOVENOX) injection 40 mg  40 mg SubCUTAneous Q24H  
 glucose chewable tablet 16 g  4 Tab Oral PRN  
 glucagon (GLUCAGEN) injection 1 mg  1 mg IntraMUSCular PRN  
 dextrose (D50W) injection syrg 12.5-25 g  25-50 mL IntraVENous PRN  
 sodium chloride (NS) flush 5-10 mL  5-10 mL IntraVENous Q8H  
 sodium chloride (NS) flush 5-10 mL  5-10 mL IntraVENous PRN Review of Systems Saying that he did not sleep well due to abdominal pain. Objective:  
 
Visit Vitals  /85 (BP 1 Location: Right arm, BP Patient Position: At rest)  Pulse 84  Temp 97.6 °F (36.4 °C)  Resp 18  Ht 6' (1.829 m)  Wt 59.8 kg (131 lb 14.4 oz)  SpO2 95%  BMI 17.89 kg/m2 O2 Device: Room air Temp (24hrs), Av.5 °F (36.4 °C), Min:97.3 °F (36.3 °C), Max:97.7 °F (36.5 °C) 
 
 
  
 1901 -  0700 In: 6347.5 [I.V.:6347.5] Out: 750 [Urine:750] Physical Exam: 
General appearance: looks anxious due to abdominal pain,not in acute distress Head: Normocephalic, without obvious abnormality, atraumatic Lungs: clear to auscultation bilaterally Heart: regular rate and rhythm, S1, S2 normal, no murmur, click, rub or gallop Abdomen:tender to palpation,diffusely,no rebound Extremities: extremities normal, atraumatic, no cyanosis or edema Neurologic: Grossly normal 
   
Data Review Recent Results (from the past 12 hour(s)) GLUCOSE, POC Collection Time: 18 12:01 AM  
Result Value Ref Range  Glucose (POC) 110 70 - 110 mg/dL CBC WITH AUTOMATED DIFF Collection Time: 07/31/18  4:10 AM  
Result Value Ref Range WBC 4.4 (L) 4.6 - 13.2 K/uL  
 RBC 2.83 (L) 4.70 - 5.50 M/uL HGB 8.8 (L) 13.0 - 16.0 g/dL HCT 27.4 (L) 36.0 - 48.0 % MCV 96.8 74.0 - 97.0 FL  
 MCH 31.1 24.0 - 34.0 PG  
 MCHC 32.1 31.0 - 37.0 g/dL  
 RDW 15.0 (H) 11.6 - 14.5 % PLATELET 902 004 - 804 K/uL MPV 9.1 (L) 9.2 - 11.8 FL  
 NEUTROPHILS 58 40 - 73 % LYMPHOCYTES 16 (L) 21 - 52 % MONOCYTES 13 (H) 3 - 10 % EOSINOPHILS 12 (H) 0 - 5 % BASOPHILS 1 0 - 2 %  
 ABS. NEUTROPHILS 2.6 1.8 - 8.0 K/UL  
 ABS. LYMPHOCYTES 0.7 (L) 0.9 - 3.6 K/UL  
 ABS. MONOCYTES 0.6 0.05 - 1.2 K/UL  
 ABS. EOSINOPHILS 0.5 (H) 0.0 - 0.4 K/UL  
 ABS. BASOPHILS 0.0 0.0 - 0.1 K/UL  
 DF AUTOMATED METABOLIC PANEL, COMPREHENSIVE Collection Time: 07/31/18  4:10 AM  
Result Value Ref Range Sodium 139 136 - 145 mmol/L Potassium 4.1 3.5 - 5.5 mmol/L Chloride 113 (H) 100 - 108 mmol/L  
 CO2 17 (L) 21 - 32 mmol/L Anion gap 9 3.0 - 18 mmol/L Glucose 145 (H) 74 - 99 mg/dL BUN 10 7.0 - 18 MG/DL Creatinine 0.95 0.6 - 1.3 MG/DL  
 BUN/Creatinine ratio 11 (L) 12 - 20 GFR est AA >60 >60 ml/min/1.73m2 GFR est non-AA >60 >60 ml/min/1.73m2 Calcium 7.7 (L) 8.5 - 10.1 MG/DL Bilirubin, total 0.1 (L) 0.2 - 1.0 MG/DL  
 ALT (SGPT) 30 16 - 61 U/L  
 AST (SGOT) 10 (L) 15 - 37 U/L Alk. phosphatase 148 (H) 45 - 117 U/L Protein, total 5.4 (L) 6.4 - 8.2 g/dL Albumin 2.2 (L) 3.4 - 5.0 g/dL Globulin 3.2 2.0 - 4.0 g/dL A-G Ratio 0.7 (L) 0.8 - 1.7 GLUCOSE, POC Collection Time: 07/31/18  5:13 AM  
Result Value Ref Range Glucose (POC) 137 (H) 70 - 110 mg/dL Assessment/Plan:  
 
Principal Problem: 
  Sepsis ([facility] [address] (4/15/2016) Active Problems: Abscess of abdominal cavity ([facility] [address] (6/19/2013) Overview: Abdominal abscess - most likely infected fluid collection from duodenal  
    fistula related to pancreatic disease.  Doing well with drainage and  
    antibiotics. Fistulogram on Friday noted continued fistula to the  
    duodenum/pancreas. Will need referral to pancreatic surgeon. IDDM (insulin dependent diabetes mellitus) ([facility] [address] (8/31/2013) Chronic pain (3/23/2016) Enterocutaneous fistula (7/28/2018) Care Plan 1) Sepsis 
 - likely from abscess/fistula 
 -CT abdomen c/w enterocutaneous fistula extending from the duodenum to the right lateral abdomen wall with a small abscess collection along the right flank measuring 2.2 x 1.8 cm. 
- IR consulted 7/30 for possible  percutaneous drainage - Triple abx d/c's 7/31 and switched to meropenem - IV fluids - switched to meropenem 7/31 per ID  
   
2) Enterocutaneous fistula, chronic pancreatitis with abscess - GI and surgery consulted in ED 
 - NPO but meds, IVF, pain meds as bp can tolerate - Surgery signed off 7/30 , suggest referral to tertiary center with a pancreatic surgeon - IR Drainage with NATALIE drain 7/31 
- GI plans to arrange consult fo pancreatic surgeon on discharge   
3) DM 
 -SSI , q 6 accucheck   
4) Chronic pain - Dilaudid prn 
  -UA and xray negative 
 
 
   
DVT prophylaxis:scds Full code Disposition:tbd

## 2018-07-31 NOTE — PROGRESS NOTES
Problem: Mobility Impaired (Adult and Pediatric) Goal: *Acute Goals and Plan of Care (Insert Text) Physical Therapy Goals 1. Patient will transfer from bed to chair and chair to bed with independence using the least restrictive device. 2.  Patient will ambulate with independence for 500 feet with the least restrictive device. 3. Patient will negotiate obstacles during ambulation with independence 4. Patient will ascend/descend 3 stairs with handrail(s) with independence. Outcome: Progressing Towards Goal 
PHYSICAL THERAPY: Initial Assessment INPATIENT: Medicaid: Hospital Day: 4 Patient: Benton Bright (14 y.o. male)    Date: 7/31/2018 Primary Diagnosis: Sepsis ([facility] [address] Sepsis ([facility] [address] Precautions:   
PLOF: Independent ASSESSMENT : 
Patient supine<>sit with mod I. Demonstrated impulsivity with abrupt sit<>stand with no prompting from PT. Sit to stand with independence. MMT 4/5 BLE measured grossly. AROM WFL BLE. Independent amb 3 ft with no AD. Return to sitting on EOB with independence. Return to supine with independence. Presents with mobility appropriate for DC to home. Left supine in bed with all needs in reach. RN notified. Patient presents with deficits in: 
Transfers, Gait, Strength, Range of Motion, Balance and Stairs Patient will benefit from skilled intervention to address the above impairments. Patients rehabilitation potential is considered to be Fair Factors which may influence rehabilitation potential include:  
[]         None noted 
[]         Mental ability/status [x]         Medical condition 
[]         Home/family situation and support systems 
[x]         Safety awareness 
[]         Pain tolerance/management 
[]         Other: PLAN : 
Recommendations and Planned Interventions: 
[x]           Bed Mobility Training             [x]    Neuromuscular Re-Education 
[x]           Transfer Training                   []    Orthotic/Prosthetic Training 
[x] Gait Training                          []    Modalities [x]           Therapeutic Exercises          []    Edema Management/Control 
[x]           Therapeutic Activities            [x]    Patient and Family Training/Education 
[]           Other (comment): EDUCATION:  
Education:  Patient was educated on the following topics: Cognition, bed mobility, transfers, amb, safety, role of PT, POC Barriers to Learning/Limitations: None Compensate with: visual, verbal, tactile, kinesthetic cues/model Recommendations for the next treatment session: Gait training, stair neg Frequency/Duration: Patient will be followed by physical therapy 1-2 times a week to address goals. Discharge Recommendations: None Further Equipment Recommendations for Discharge: N/A Factors which may impact discharge planning: N/A  
 
SUBJECTIVE:  
Patient stated I just got this drain in.\" OBJECTIVE DATA SUMMARY:  
 
Past Medical History:  
Diagnosis Date  Abdominal pain, generalized  Chronic pancreatitis ([facility] [address]  Diabetes ([facility] [address]   
 hypothyroid  Encounter for long-term (current) use of other medications  Essential hypertension  ETOH abuse  GERD (gastroesophageal reflux disease)  Heart failure ([facility] [address]  Hyponatremia  Pain in the abdomen 11/2/2010  Pancreatitis w/ abscess and pseudocyst  
 Pancreatitis  Psychiatric disorder   
 depression, anxiety  Tobacco abuse Past Surgical History:  
Procedure Laterality Date  HX ABDOMINAL LAPAROSCOPY PANCREATIC STENT  
 HX CHOLECYSTECTOMY Eval Complexity: History: MEDIUM  Complexity : 1-2 comorbidities / personal factors will impact the outcome/ POC Exam:MEDIUM Complexity : 3 Standardized tests and measures addressing body structure, function, activity limitation and / or participation in recreation  Presentation: MEDIUM Complexity : Evolving with changing characteristics  Clinical Decision Making:Medium Complexity [place] Standing Balance Scale 4+/5 Overall Complexity:MEDIUM 
 
G CODES:Mobility [id number] Current  CI= 1-19%   Goal  CH= 0%. The severity rating is based on the Other [facility] Balance Scale 4+/5 [address] Standing Balance Scale 4+/5 
0: Pt performs 25% or less of standing activity (Max assist) CN, 100% impaired. 1: Pt supports self with upper extremities but requires therapist assistance. Pt performs 25-50% of effort (Mod assist) CM, 80% to <100% impaired. 1+: Pt supports self with upper extremities but requires therapist assistance. Pt performs >50% effort. (Min assist). CL, 60% to <80% impaired. 2: Pt supports self independently with both upper extremities (walker, crutches, parallel bars). CL, 60% to <80% impaired. 2+: Pt support self independently with 1 upper extremity (cane, crutch, 1 parallel bar). CK, 40% to <60% impaired. 3: Pt stands without upper extremity support for up to 30 seconds. CK, 40% to <60% impaired. 3+: Pt stands without upper extremity support for 30 seconds or greater. CJ, 20% to <40% impaired. 4: Pt independently moves and returns center of gravity 1-2 inches in one plane. CJ, 20% to <40% impaired. 4+: Pt independently moves and returns center of gravity 1-2 inches in multiple planes. CI, 1% to <20% impaired. 5: Pt independently moves and returns center of gravity in all planes greater than 2 inches. CH, 0% impaired. Prior Level of Function/Home Situation:  
Home Situation Home Environment: Private residence # Steps to Enter: 1 Rails to Enter: No 
One/Two Story Residence: One story Lift Chair Available: No 
Living Alone: No 
Support Systems: Family member(s) Patient Expects to be Discharged to[de-identified] Private residence Current DME Used/Available at Home: None Critical Behavior: 
Neurologic State: Alert Orientation Level: Oriented X4 Cognition: Follows commands; Impulsive Manual Muscle Testing (LE) - Measured grossly        R L   
Hip Flexion:   4/5 4/5 Knee EXT:   4/5 4/5 Knee FLEX:   4/5 4/5 Ankle DF:   4/5 4/5 
_________________________________________________ Tone : Deferred till next session Sensation: Not assessed Range Of Motion: [facility] Functional Mobility: 
 
 
Functional Status Indep (I) Mod I Super-vision Min A Mod A Max A Total A Assist x2 Verbal cues Additional time Not tested Comments Rolling []  []  [] []    []    []  []  [] [] [] [x] Supine to sit []  [x]  [] []  []  []  []  [] [] [] [] Sit to supine []  [x]  [] []  []  []  []  [] [] [] [] Sit to stand [x]  []  [] []  []  []  []  [] [] [] []   
Stand to sit [x]  []  [] []  []  []  []  [] [] [] [] Bed to chair transfers []  []  [] []  []  []  []  [] [] [] [x] Balance Good [place] Poor Unable Not tested Comments Sitting static [x]  []  []  []  [] Sitting dynamic [x]  []  []  []  []   
Standing static [x]  []  []  []  []   
Standing dynamic [x]  []  []  []  [] Mobility/Gait:  
Level of Assistance: Independent Assistive Device: None Distance Ambulated: 3 feet Left Lower Extremity: FWB Right Lower Extremity: FWB Base of Support: [facility] Speed/Maryse: [facility] Step Length: [facility] Swing Pattern: [facility] Stance: [facility] Gait Abnormalities: [facility] Pain: Per non-verbal scale Pre treatment pain level: 2 Post treatment pain level: 2 Activity Tolerance:  
Fair Please refer to the flowsheet for vital signs taken during this treatment. After treatment:  
[]         Patient left in no apparent distress sitting up in chair 
[x]         Patient left in no apparent distress in bed 
[x]         Call bell left within reach [x]         Nursing notified 
[]         Caregiver present 
[]         Bed alarm activated COMMUNICATION/EDUCATION:  
[x]         Fall prevention education was provided and the patient/caregiver indicated understanding.  
[x]         Patient/family have participated as able in goal setting and plan of care. [x]         Patient/family agree to work toward stated goals and plan of care. []         Patient understands intent and goals of therapy, but is neutral about his/her participation. []         Patient is unable to participate in goal setting and plan of care. Thank you for this referral. 
Calixto Benitez Time Calculation: 8 mins

## 2018-07-31 NOTE — ADT AUTH CERT NOTES
LOC:Acute Adult-Infection: GI//GYN (7/29/2018) by Mara Salmeron     
  Review Status Review Entered    
  In Primary 7/30/2018    
  Details    
    REVIEW SUMMARY 
  
Patient: Dori Clinton Review Number: [id number] Review Status: In Primary 
  
Condition Specific: Yes 
  
Condition Level Of Care Code: ACUTE Condition Level Of Care Description: Acute 
  
  
OUTCOMES Outcome Type: Primary 
  
  
  
REVIEW DETAILS 
  
Service Date: 07/29/2018 Admit Date: 07/28/2018 Product: Dominick Garcia Adult Subset: Infection: GI//GYN 
    (Symptom or finding within 24h) 
  (Excludes PO medications unless noted) [X] Select Day, One: 
            [X] Episode Day 2, One: 
                [X] ACUTE, One: 
                    [X] Partial responder, not clinically stable for discharge and requires continued stay, >= One: 
                    ~--Admin, IQ Admin Admin on 07- 12:39 PM--~ Hospitalist Progress Note 
                    presented with increasing abdominal pain in addition to extremity weakness. 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 He has chronic pain syndrome as well. Code s called for extremity weakness but later cancelled, appears to be metabolically related. Patient found to have wbc elevation and hypotension. CT abdm/pelvis c/w chronic fistulous tract leading from the duodenum to a small abscess collection along the right flank with the abscess measures approximately 1.8 x 2.3 cm. Admitted for treatment and further work-up. Blood cx NGTD. Saying that he did not sleep well due to abdominal pain. 
   
                    EXAM:  General appearance: looks anxious due to abdominal pain,not in acute distress. Abdomen:tender to palpation,diffusely,no rebound                     T 97.9, BP 97/68, P 83, R 16, 02 % RA 
 WBC 14.6, RBC 3.55, HGB 11.2, HCT 35.2, , , C02 19, BUN 26, CREAT 1.95, POC GLUC 175-236 
   
                    Assessment/Plan: 
   
                    SEPSIS, Abscess of abdominal cavity- most likely infected fluid collection from duodenal fistula related to pancreatic disease. Doing well with drainage and antibiotics. Fistulogram on Friday noted continued fistula to the duodenum/pancreas. Will need referral to pancreatic surgeon. IDDM, chronic pain, enterocutaneous fistula Care Plan 1) Sepsis 
                     - likely from abscess/fistula 
                     -CT abdomen c/w enterocutaneous fistula extending from the duodenum to the right lateral abdomen wall with a small abscess collection along the right flank measuring 2.2 x 1.8 cm. 
                    - Triple abx for now. - IV fluids    
                    2) Enterocutaneous fistula, chronic pancreatitis with abscess - GI and surgery consulted in ED 
                     - NPO but meds, IVF, pain meds as bp can tolerate 
   
                    3) DM 
                     -SSI , q 6 accucheck 
   
                    4) Chronic pain - Dilaudid prn 
                      -UA and xray negative 
                      -triple abx for now. ID consult in am recommended - IV fluids    
                    DVT prophylaxis:scds Full code Disposition:tbd 
   
                    Gastroenterology Consult 
   
                    ... He is will known to our service for his chronic pancreatitis and chronic fistula. Has been admitted multiple times for abdominal pain, DKA, weakness.  Has had multiple percutaneous drain placements for recurrent abdominal fluid collections and typically the drain falls out by his report. He had a fall in June2018 and was admitted at [facility] with weakness in the extremities. Evaluated by neurosurgery at that time and felt to have central cord syndrome treated with gabapentin and steroids.    
                    Today, he complains of intermitted nausea/vomiting, chronic abdominal pain unchanged from baseline, chronic watery nonbloody diarrhea worse in the past 3 days, limited appetite, weight loss which appears to have been steady decline. .. Has chronic abdominal pain which is likely multifactorial. Has acute on chronic diarrhea on relatively low doses of pancreat enzymes. Need to exclude CDiff. Agree with empiric ABX and ID evaluation. Recommended IR evaluation for possible percutaneous drainage of the right flank fluid collection although this is relatively small. Ultimately, he should be evaluated at an academic/university center to consider options/feasbility for more definitive pancreatic surgery. Plan: 
                    -Antiemetics as needed, minimize narcotics if possible 
                    -Check CDiff 
                    -Will increase pancreatic enzyme replacement and continue PPI 
                    -Recommend IR evaluation for possible percutaneous drain placement 
                    -Agree with antibiotics for now, ID consultation   
   
                    NPO EXCEPT MEDS, AMBULATE W/ASSIST, ENTERIC CONTACT ISOLATION, C DIFF, ID CONSULT, AZACTAM IV Q 8 HRS, VIT C PO QD, VIT D3 PO QD, CYANOCOBALAMIN PO QD, IV  ML/HR,  LOVENOX SC Q 24 HRS, FOLVITE PO QD, LANTUS 5U SC QD AC DINNER,  HUMALOG 4U SC Q 6 HRS (rec'd x2 then dc'd), SSI SC Q 6 HRS (3U X1, 9U X1), LEVAQUIN 750 MG IV Q 24 HRS, ZENPEP  INCREASED TO 3 CAPS PO TID W/MEALS, FLAGYL 500 MG IV Q 8 HRS, PROTONIX PO BID, CARAFATE PO QID, PERCOCET PO DC'D (REC'D X2 & 3X/24 HRS), DILAUDID 1 MG IV Q 4 HRS PRN X 3 
   
   
   
                        [X] Infection unresolved, All: [X] Infection, >= One: 
                                [X] Intra-abdominal abscess <= 8d since admission [X] Finding, >= One: 
                                [X] WBC >= 13,000/cu.mm(13x10 exp 9/L) [X] Severe pain, One: 
                                    [X] Requiring analgesic >= 3x/24h 
                            [X] Anti-infective 
  
Version: [employer]® 2018.1 [place]  © 2018 [employer] and/or one of its [place]. All Rights Reserved. CPT only © 2017 [employer]. All Rights Reserved.

## 2018-07-31 NOTE — ROUTINE PROCESS
0800 - assumed care of patient - sleeping, easily arousable, alert and oriented. NPO except for meds - c/o of LUQ pain, last medicated at 0630  
 
0900- AM meds with sip water - To IR for drainage of abcess 1045 - received report from [employer] - drain re-inserted to RUQ and sutured in place 1100 - patient back to room - NATALIE drain RUQ charged and patent with bilious drainage - c/o pain 10/10. 
 
1130 - Bedside and Verbal shift change report given to [address] [place] [address] (oncoming nurse) by Uvaldo Gottlieb RN (offgoing nurse). Report included the following information Procedure Summary

## 2018-07-31 NOTE — ROUTINE PROCESS
2000  Bedside and Verbal shift change report given to Marty Barth (oncoming nurse) by Mari Keane (offgoing nurse). Report included the following information SBAR, Kardex, Intake/Output and MAR. Pt awake and alert at this time 2130  Shift assessment complete and due meds given. Pt has no complaints at this time 
 
0200  Reassessment complete. No change in pt condition 0730  Bedside and Verbal shift change report given to Sergio Mcgrath (oncoming nurse) by Marty Barth (offgoing nurse). Report included the following information SBAR, Kardex, Intake/Output and MAR.

## 2018-07-31 NOTE — PROGRESS NOTES
Problem: Falls - Risk of 
Goal: *Absence of Falls Document Pratik Smiley Fall Risk and appropriate interventions in the flowsheet. Outcome: Progressing Towards Goal 
Fall Risk Interventions: 
Mobility Interventions: Patient to call before getting OOB Medication Interventions: Patient to call before getting OOB, Teach patient to arise slowly Elimination Interventions: Call light in reach, Patient to call for help with toileting needs

## 2018-07-31 NOTE — PROGRESS NOTES
Problem: Falls - Risk of 
Goal: *Absence of Falls Document Madison Mosqueda Fall Risk and appropriate interventions in the flowsheet. Outcome: Progressing Towards Goal 
Fall Risk Interventions: 
Mobility Interventions: Patient to call before getting OOB Medication Interventions: Patient to call before getting OOB, Teach patient to arise slowly Elimination Interventions: Call light in reach, Patient to call for help with toileting needs, Toilet paper/wipes in reach, Toileting schedule/hourly rounds, Urinal in reach Problem: Pressure Injury - Risk of 
Goal: *Prevention of pressure injury Document Austin Scale and appropriate interventions in the flowsheet. Outcome: Progressing Towards Goal 
Pressure Injury Interventions: 
Sensory Interventions: Assess changes in LOC, Avoid rigorous massage over bony prominences, Check visual cues for pain, Keep linens dry and wrinkle-free, Maintain/enhance activity level, Minimize linen layers, Monitor skin under medical devices, Pressure redistribution bed/mattress (bed type) Moisture Interventions: Absorbent underpads, Limit adult briefs, Maintain skin hydration (lotion/cream), Minimize layers, Moisture barrier Activity Interventions: Increase time out of bed, Pressure redistribution bed/mattress(bed type) Mobility Interventions: HOB 30 degrees or less, Pressure redistribution bed/mattress (bed type) Nutrition Interventions: Document food/fluid/supplement intake, Discuss nutritional consult with provider Friction and Shear Interventions: Foam dressings/transparent film/skin sealants, HOB 30 degrees or less, Minimize layers

## 2018-07-31 NOTE — WOUND CARE
Wound/Ostomy Nurse Progress Note Patient: Gerson Galeano     
                                                                               
:1965 MRN: [id number] Situation: Wound care consult for foot wounds Background: Patient admited for weakness, extensive PMH of chronic pancreatitis Assessment: Wound care bedside to evaluate patient. Per patient the only wounds he has are on his Right foot and declined wound care evaluation of left foot. Roll Gauze and Mepilex removed from right foot. Right great toenail overgrown by approximately 6inchs and curled towards the right. Overgrown toenail has created wounds to his 2nd and 3rd toe due to constant rubbing of the area. Wounds cleansed with normal saline and Aquacel AG applied, secured with roll gauze. Sacrum observed, intact, no redness present. Patient able to ambulate himself as well as reposition himself. Recommendation: Podiatry consult recommended Nursing to Perform : Wound Care/Dressing change to RIGHT 2nd and 3rd Toe:  Cleanse wound with normal saline or dermal wound cleanser, apply Aquacel AG to wound base/sides, cover with Kelix,

## 2018-07-31 NOTE — INTERVAL H&P NOTE
VASCULAR & INTERVENTIONAL RADIOLOGY PROGRESS NOTE History and Physical reviewed; I have examined the patient and there are no pertinent changes. Pt is an appropriate candidate to undergo RLQ drain under local and moderate sedation. Neida Lemon MD, MD 
Vascular & Interventional Radiology [address] [place] [address] [facility] 7/31/2018

## 2018-08-01 ENCOUNTER — APPOINTMENT (OUTPATIENT)
Dept: MRI IMAGING | Age: 53
DRG: 720 | End: 2018-08-01
Attending: INTERNAL MEDICINE
Payer: MEDICAID

## 2018-08-01 LAB
ALBUMIN SERPL-MCNC: 2.4 G/DL (ref 3.4–5)
ALBUMIN/GLOB SERPL: 0.6 {RATIO} (ref 0.8–1.7)
ALP SERPL-CCNC: 156 U/L (ref 45–117)
ALT SERPL-CCNC: 27 U/L (ref 16–61)
ANION GAP SERPL CALC-SCNC: 7 MMOL/L (ref 3–18)
AST SERPL-CCNC: 9 U/L (ref 15–37)
BASOPHILS # BLD: 0 K/UL (ref 0–0.1)
BASOPHILS NFR BLD: 1 % (ref 0–2)
BILIRUB SERPL-MCNC: 0.2 MG/DL (ref 0.2–1)
BUN SERPL-MCNC: 6 MG/DL (ref 7–18)
BUN/CREAT SERPL: 7 (ref 12–20)
CALCIUM SERPL-MCNC: 8 MG/DL (ref 8.5–10.1)
CHLORIDE SERPL-SCNC: 113 MMOL/L (ref 100–108)
CO2 SERPL-SCNC: 20 MMOL/L (ref 21–32)
CREAT SERPL-MCNC: 0.89 MG/DL (ref 0.6–1.3)
DIFFERENTIAL METHOD BLD: ABNORMAL
EOSINOPHIL # BLD: 0.5 K/UL (ref 0–0.4)
EOSINOPHIL NFR BLD: 12 % (ref 0–5)
ERYTHROCYTE [DISTWIDTH] IN BLOOD BY AUTOMATED COUNT: 14.5 % (ref 11.6–14.5)
GLOBULIN SER CALC-MCNC: 3.7 G/DL (ref 2–4)
GLUCOSE BLD STRIP.AUTO-MCNC: 109 MG/DL (ref 70–110)
GLUCOSE BLD STRIP.AUTO-MCNC: 146 MG/DL (ref 70–110)
GLUCOSE BLD STRIP.AUTO-MCNC: 186 MG/DL (ref 70–110)
GLUCOSE BLD STRIP.AUTO-MCNC: 378 MG/DL (ref 70–110)
GLUCOSE BLD STRIP.AUTO-MCNC: 94 MG/DL (ref 70–110)
GLUCOSE SERPL-MCNC: 132 MG/DL (ref 74–99)
HCT VFR BLD AUTO: 28.5 % (ref 36–48)
HGB BLD-MCNC: 9.3 G/DL (ref 13–16)
LYMPHOCYTES # BLD: 0.8 K/UL (ref 0.9–3.6)
LYMPHOCYTES NFR BLD: 20 % (ref 21–52)
MCH RBC QN AUTO: 31 PG (ref 24–34)
MCHC RBC AUTO-ENTMCNC: 32.6 G/DL (ref 31–37)
MCV RBC AUTO: 95 FL (ref 74–97)
MONOCYTES # BLD: 0.6 K/UL (ref 0.05–1.2)
MONOCYTES NFR BLD: 15 % (ref 3–10)
NEUTS SEG # BLD: 2.1 K/UL (ref 1.8–8)
NEUTS SEG NFR BLD: 52 % (ref 40–73)
PLATELET # BLD AUTO: 245 K/UL (ref 135–420)
PMV BLD AUTO: 9.6 FL (ref 9.2–11.8)
POTASSIUM SERPL-SCNC: 4 MMOL/L (ref 3.5–5.5)
PROT SERPL-MCNC: 6.1 G/DL (ref 6.4–8.2)
RBC # BLD AUTO: 3 M/UL (ref 4.7–5.5)
SODIUM SERPL-SCNC: 140 MMOL/L (ref 136–145)
WBC # BLD AUTO: 4.1 K/UL (ref 4.6–13.2)

## 2018-08-01 PROCEDURE — 87070 CULTURE OTHR SPECIMN AEROBIC: CPT | Performed by: INTERNAL MEDICINE

## 2018-08-01 PROCEDURE — 74011250637 HC RX REV CODE- 250/637: Performed by: HOSPITALIST

## 2018-08-01 PROCEDURE — 77030021566 MRI ABD W MRCP W WO CONT

## 2018-08-01 PROCEDURE — 36415 COLL VENOUS BLD VENIPUNCTURE: CPT | Performed by: FAMILY MEDICINE

## 2018-08-01 PROCEDURE — 87186 SC STD MICRODIL/AGAR DIL: CPT | Performed by: INTERNAL MEDICINE

## 2018-08-01 PROCEDURE — 97535 SELF CARE MNGMENT TRAINING: CPT

## 2018-08-01 PROCEDURE — 74011636637 HC RX REV CODE- 636/637: Performed by: INTERNAL MEDICINE

## 2018-08-01 PROCEDURE — 74011250636 HC RX REV CODE- 250/636: Performed by: INTERNAL MEDICINE

## 2018-08-01 PROCEDURE — A9575 INJ GADOTERATE MEGLUMI 0.1ML: HCPCS | Performed by: FAMILY MEDICINE

## 2018-08-01 PROCEDURE — 74011250637 HC RX REV CODE- 250/637: Performed by: INTERNAL MEDICINE

## 2018-08-01 PROCEDURE — 97116 GAIT TRAINING THERAPY: CPT

## 2018-08-01 PROCEDURE — 80053 COMPREHEN METABOLIC PANEL: CPT | Performed by: FAMILY MEDICINE

## 2018-08-01 PROCEDURE — 36591 DRAW BLOOD OFF VENOUS DEVICE: CPT

## 2018-08-01 PROCEDURE — 74011000258 HC RX REV CODE- 258: Performed by: HOSPITALIST

## 2018-08-01 PROCEDURE — 74011000258 HC RX REV CODE- 258: Performed by: INTERNAL MEDICINE

## 2018-08-01 PROCEDURE — 87077 CULTURE AEROBIC IDENTIFY: CPT | Performed by: INTERNAL MEDICINE

## 2018-08-01 PROCEDURE — 65660000000 HC RM CCU STEPDOWN

## 2018-08-01 PROCEDURE — 85025 COMPLETE CBC W/AUTO DIFF WBC: CPT | Performed by: FAMILY MEDICINE

## 2018-08-01 PROCEDURE — 82962 GLUCOSE BLOOD TEST: CPT

## 2018-08-01 PROCEDURE — 74011636637 HC RX REV CODE- 636/637: Performed by: FAMILY MEDICINE

## 2018-08-01 PROCEDURE — 74011250636 HC RX REV CODE- 250/636: Performed by: HOSPITALIST

## 2018-08-01 PROCEDURE — 87106 FUNGI IDENTIFICATION YEAST: CPT | Performed by: INTERNAL MEDICINE

## 2018-08-01 PROCEDURE — 77010033678 HC OXYGEN DAILY

## 2018-08-01 PROCEDURE — 74011250636 HC RX REV CODE- 250/636: Performed by: FAMILY MEDICINE

## 2018-08-01 RX ORDER — GADOTERATE MEGLUMINE 376.9 MG/ML
10 INJECTION INTRAVENOUS
Status: COMPLETED | OUTPATIENT
Start: 2018-08-01 | End: 2018-08-01

## 2018-08-01 RX ORDER — INSULIN LISPRO 100 [IU]/ML
INJECTION, SOLUTION INTRAVENOUS; SUBCUTANEOUS
Status: DISCONTINUED | OUTPATIENT
Start: 2018-08-01 | End: 2018-08-03

## 2018-08-01 RX ADMIN — GADOTERATE MEGLUMINE 10 ML: 376.9 INJECTION INTRAVENOUS at 14:28

## 2018-08-01 RX ADMIN — PANTOPRAZOLE SODIUM 40 MG: 40 TABLET, DELAYED RELEASE ORAL at 08:18

## 2018-08-01 RX ADMIN — PANTOPRAZOLE SODIUM 40 MG: 40 TABLET, DELAYED RELEASE ORAL at 17:57

## 2018-08-01 RX ADMIN — DEXTROSE MONOHYDRATE AND SODIUM CHLORIDE 150 ML/HR: 5; .45 INJECTION, SOLUTION INTRAVENOUS at 02:48

## 2018-08-01 RX ADMIN — HYDROMORPHONE HYDROCHLORIDE 1 MG: 1 INJECTION, SOLUTION INTRAMUSCULAR; INTRAVENOUS; SUBCUTANEOUS at 15:16

## 2018-08-01 RX ADMIN — HYDROMORPHONE HYDROCHLORIDE 1 MG: 1 INJECTION, SOLUTION INTRAMUSCULAR; INTRAVENOUS; SUBCUTANEOUS at 06:11

## 2018-08-01 RX ADMIN — VITAMIN D, TAB 1000IU (100/BT) 2000 UNITS: 25 TAB at 08:17

## 2018-08-01 RX ADMIN — FERROUS SULFATE TAB 325 MG (65 MG ELEMENTAL FE) 325 MG: 325 (65 FE) TAB at 17:57

## 2018-08-01 RX ADMIN — HYDROMORPHONE HYDROCHLORIDE 1 MG: 1 INJECTION, SOLUTION INTRAMUSCULAR; INTRAVENOUS; SUBCUTANEOUS at 17:58

## 2018-08-01 RX ADMIN — PANCRELIPASE 3 CAPSULE: 10000; 34000; 55000 CAPSULE, DELAYED RELEASE ORAL at 08:17

## 2018-08-01 RX ADMIN — MEROPENEM 1 G: 1 INJECTION, POWDER, FOR SOLUTION INTRAVENOUS at 14:54

## 2018-08-01 RX ADMIN — SUCRALFATE 1 G: 1 TABLET ORAL at 17:57

## 2018-08-01 RX ADMIN — MEROPENEM 1 G: 1 INJECTION, POWDER, FOR SOLUTION INTRAVENOUS at 06:11

## 2018-08-01 RX ADMIN — HYDROMORPHONE HYDROCHLORIDE 1 MG: 1 INJECTION, SOLUTION INTRAMUSCULAR; INTRAVENOUS; SUBCUTANEOUS at 12:17

## 2018-08-01 RX ADMIN — FERROUS SULFATE TAB 325 MG (65 MG ELEMENTAL FE) 325 MG: 325 (65 FE) TAB at 08:18

## 2018-08-01 RX ADMIN — SUCRALFATE 1 G: 1 TABLET ORAL at 08:18

## 2018-08-01 RX ADMIN — SUCRALFATE 1 G: 1 TABLET ORAL at 21:25

## 2018-08-01 RX ADMIN — Medication 10 ML: at 14:55

## 2018-08-01 RX ADMIN — INSULIN GLARGINE 5 UNITS: 100 INJECTION, SOLUTION SUBCUTANEOUS at 17:58

## 2018-08-01 RX ADMIN — INSULIN LISPRO 15 UNITS: 100 INJECTION, SOLUTION INTRAVENOUS; SUBCUTANEOUS at 23:10

## 2018-08-01 RX ADMIN — HYDROMORPHONE HYDROCHLORIDE 1 MG: 1 INJECTION, SOLUTION INTRAMUSCULAR; INTRAVENOUS; SUBCUTANEOUS at 00:01

## 2018-08-01 RX ADMIN — FOLIC ACID 1 MG: 1 TABLET ORAL at 08:18

## 2018-08-01 RX ADMIN — PANCRELIPASE 3 CAPSULE: 10000; 34000; 55000 CAPSULE, DELAYED RELEASE ORAL at 17:57

## 2018-08-01 RX ADMIN — ENOXAPARIN SODIUM 40 MG: 100 INJECTION SUBCUTANEOUS at 23:10

## 2018-08-01 RX ADMIN — HYDROMORPHONE HYDROCHLORIDE 1 MG: 1 INJECTION, SOLUTION INTRAMUSCULAR; INTRAVENOUS; SUBCUTANEOUS at 21:25

## 2018-08-01 RX ADMIN — INSULIN LISPRO 3 UNITS: 100 INJECTION, SOLUTION INTRAVENOUS; SUBCUTANEOUS at 12:17

## 2018-08-01 RX ADMIN — Medication 10 ML: at 06:12

## 2018-08-01 RX ADMIN — ENOXAPARIN SODIUM 40 MG: 100 INJECTION SUBCUTANEOUS at 00:01

## 2018-08-01 RX ADMIN — HYDROMORPHONE HYDROCHLORIDE 1 MG: 1 INJECTION, SOLUTION INTRAMUSCULAR; INTRAVENOUS; SUBCUTANEOUS at 09:17

## 2018-08-01 RX ADMIN — CYANOCOBALAMIN TAB 1000 MCG 1000 MCG: 1000 TAB at 08:17

## 2018-08-01 RX ADMIN — HYDROMORPHONE HYDROCHLORIDE 1 MG: 1 INJECTION, SOLUTION INTRAMUSCULAR; INTRAVENOUS; SUBCUTANEOUS at 02:54

## 2018-08-01 RX ADMIN — OXYCODONE HYDROCHLORIDE AND ACETAMINOPHEN 1 TABLET: 5; 325 TABLET ORAL at 08:17

## 2018-08-01 RX ADMIN — MEROPENEM 1 G: 1 INJECTION, POWDER, FOR SOLUTION INTRAVENOUS at 21:25

## 2018-08-01 RX ADMIN — Medication 500 MG: at 08:18

## 2018-08-01 RX ADMIN — Medication 10 ML: at 21:25

## 2018-08-01 NOTE — PROGRESS NOTES
Problem: Self Care Deficits Care Plan (Adult) Goal: *Acute Goals and Plan of Care (Insert Text) Occupational Therapy Goals Initiated 7/30/2018 within 7 day(s). Will see pt 1-2 more visits. 1.  Patient will perform grooming tasks while standing with modified independence. 2.  Patient will perform lower body dressing with modified independence utilizing adaptive strategies, prn. 
3.  Patient will perform functional task in standing for 8 minutes with modified independence and < 2 rest breaks. 4.  Patient will perform toilet transfers with modified independence. 5.  Patient will perform all aspects of toileting with modified independence. 6.  Patient will participate in upper extremity therapeutic exercise/activities with modified independence for 8 minutes to maintain BUE strength for functional transfers & ADLs. 7.  Patient will utilize energy conservation techniques during functional activities with minimal verbal cues. Outcome: Resolved/Met Date Met: 08/01/18 Occupational Therapy TREATMENT/Discharge Patient: Viji Kothari (38 y.o. male) Date: 8/1/2018 Diagnosis: Sepsis ([facility] [address] Sepsis ([facility] [address] Sepsis ([facility] [address] Precautions:   
Chart, occupational therapy assessment, plan of care, and goals were reviewed. PLOF: Independent ASSESSMENT: 
Pt demonstrates good safety and balance w/functional mobility/transfers and ADLs. Pt tolerates standing sinkside performing ADL grooming tasks. Pt declines UE strengthening training and demonstrates UE WFL. EDUCATION Pt educated on energy conservation techniques w/ADLs Progression toward goals: 
[x]          Improving appropriately and all goals met 
[]          Improving slowly and progressing toward goals 
[]          Not making progress toward goals and plan of care will be adjusted PLAN: 
Patient continues to benefit from skilled intervention to address the above impairments. Continue treatment per established plan of care.  
Discharge Recommendations: Home Health for safety check Further Equipment Recommendations for Discharge:  N/A  
 
SUBJECTIVE:  
Patient stated I might be going to [facility] for surgery.  OBJECTIVE DATA SUMMARY: 
  
 
Cognitive/Behavioral Status: 
Neurologic State: Alert Orientation Level: Oriented X4 Cognition: Follows commands Functional Mobility and Transfers for ADLs: 
 Bed Mobility: 
Supine to Sit: Independent Transfers: 
Bed to Chair: Independent Bathroom Mobility: Independent Balance: 
Sitting: Intact Standing: Intact ADL Intervention: 
Grooming Washing Hands: Independent Lower Body Dressing Assistance Socks: Modified independent Leg Crossed Method Used: Yes Position Performed: Bending forward method;Seated in chair Cues: Don;Doff 
 
Pain: 
Pre Treatment:4 Post Treatment:4 Pain Scale 1: Numeric (0 - 10) Pain Intensity 1: 4 Pain Location 1: Abdomen Pain Orientation 1: Anterior Pain Description 1: Burning Pain Intervention(s) 1: Medication (see MAR) Activity Tolerance:   
Good Please refer to the flowsheet for vital signs taken during this treatment. After treatment:  
[x]  Patient left in no apparent distress sitting up in chair 
[]  Patient left in no apparent distress in bed 
[]  Call bell left within reach 
[]  Nursing notified 
[]  Caregiver present 
[]  Bed alarm activated Idania Centeno Bluebell Time Calculation: 12 mins

## 2018-08-01 NOTE — PROGRESS NOTES
Problem: Mobility Impaired (Adult and Pediatric) Goal: *Acute Goals and Plan of Care (Insert Text) Physical Therapy Goals Initiated 7/31/2018 and to be accomplished within 7 day(s) 1. Patient will transfer from bed to chair and chair to bed with independence using the least restrictive device. 2.  Patient will ambulate with independence for 500 feet with the least restrictive device. 3. Patient will negotiate obstacles during ambulation with independence 4. Patient will ascend/descend 3 stairs with handrail(s) with independence. Outcome: Resolved/Met Date Met: 08/01/18 PHYSICAL THERAPY: Daily Treatment Note/Discharge INPATIENT: Medicaid: Hospital Day: 5 Patient: Leonela Morelos (82 y.o. male)    Date: 8/1/2018 Primary Diagnosis: Sepsis ([facility] [address] Sepsis ([facility] [address]  
 ,  ,  
Precautions: WBAT Chart, physical therapy assessment, plan of care and goals were reviewed. PLOF:independent ASSESSMENT: 
Pt able to perform bed mobility, transfers and gait independently today. Pt performed toe taps X10 ea on 6 inch step in room to simulate stair training, pt able to perform without UE assist.  Pt has met acute care goals and will be discharged at this time. EDUCATION:  
Education:  Patient was educated on the following topics: activity pacing Barriers to Learning/Limitations: None Compensate with: visual, verbal, tactile, kinesthetic cues/model Progression toward goals: 
[x]      Goals met 
[]      Improving appropriately and progressing toward goals 
[]      Improving slowly and progressing toward goals 
[]      Not making progress toward goals and plan of care will be adjusted PLAN: 
Patient will be discharged from physical therapy at this time. Rationale for discharge: 
[x] Goals Achieved 
[] [address] 
[] Patient not participating in therapy 
[] Other: 
Discharge Recommendations:  None Further Equipment Recommendations for Discharge:  N/A Factors which may impact discharge planning: none SUBJECTIVE:  
Patient stated I have been up walking around in the room.  OBJECTIVE DATA SUMMARY:  
Critical Behavior: 
Neurologic State: Agitated Orientation Level: Oriented X4 Cognition: Follows commands G CODE:Mobility [id number] Current  CH= 0%   Goal  CH= 0%. The severity rating is based on the Other KUS[address] Standing Balance Scale 
0: Pt performs 25% or less of standing activity (Max assist) CN, 100% impaired. 1: Pt supports self with upper extremities but requires therapist assistance. Pt performs 25-50% of effort (Mod assist) CM, 80% to <100% impaired. 1+: Pt supports self with upper extremities but requires therapist assistance. Pt performs >50% effort. (Min assist). CL, 60% to <80% impaired. 2: Pt supports self independently with both upper extremities (walker, crutches, parallel bars). CL, 60% to <80% impaired. 2+: Pt support self independently with 1 upper extremity (cane, crutch, 1 parallel bar). CK, 40% to <60% impaired. 3: Pt stands without upper extremity support for up to 30 seconds. CK, 40% to <60% impaired. 3+: Pt stands without upper extremity support for 30 seconds or greater. CJ, 20% to <40% impaired. 4: Pt independently moves and returns center of gravity 1-2 inches in one plane. CJ, 20% to <40% impaired. 4+: Pt independently moves and returns center of gravity 1-2 inches in multiple planes. CI, 1% to <20% impaired. 5: Pt independently moves and returns center of gravity in all planes greater than 2 inches. CH, 0% impaired. Functional Mobility: 
 
 
Functional Status Indep (I) Mod I Super-vision Min A Mod A Max A Total A Assist x2 Verbal cues Additional time Not tested Comments Rolling [x]  []  [] []    []    []  []  [] [] [] [] Supine to sit [x]  []  [] []  []  []  []  [] [] [] [] Sit to supine [x]  []  [] []  []  []  []  [] [] [] [] Sit to stand [x]  []  [] []  []  []  []  [] [] [] [] Stand to sit [x]  []  [] []  []  []  []  [] [] [] [] Bed to chair transfers [x]  []  [] []  []  []  []  [] [] [] [] Balance Good Mary Wagner Poor Unable Not tested Comments Sitting static [x]  []  []  []  [] Sitting dynamic [x]  []  []  []  []   
Standing static [x]  []  []  []  []   
Standing dynamic [x]  []  []  []  [] Mobility/Gait:  
Level of Assistance: Independent Assistive Device: none Distance Ambulated: 20 feet Left Lower Extremity: WBAT Right Lower Extremity: WBAT Base of Support: [facility] Speed/Maryse: [facility] Step Length: [facility] Swing Pattern: [facility] Stance: [facility] Gait Abnormalities: [facility] Vital Signs Temp: 97.9 °F (36.6 °C) Pulse (Heart Rate): 78 BP: 113/69 Resp Rate: 18    
O2 Sat (%): 96 % Pain: 
Pre treatment pain level:7 Post treatment pain level:7 Pain Scale 1: Numeric (0 - 10) Pain Intensity 1: 4 Pain Location 1: Abdomen Pain Orientation 1: Anterior Pain Description 1: Burning Pain Intervention(s) 1: Other (comment) (pt requested percocet with breakfast) Activity Tolerance:  
Good After treatment:  
 
Patient left in no apparent distress in bed Call bell left within reach Nursing present Willard Perez PTA Time Calculation: 10 mins

## 2018-08-01 NOTE — PROGRESS NOTES
Hospitalist Progress Note 
t Daily Progress Note: 8/1/2018 9:22 AM 
 
   
Interval history / Subjective:  
Keturah Monique is a 48y.o. year old male who presented with increasing abdominal pain in addition to extremity weakness. 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 He has chronic pain syndrome as well. Code s called for extremity weakness but later cancelled, appears to be metabolically related. Patient found to have wbc elevation and hypotension. CT abdm/pelvis c/w chronic fistulous tract leading from the duodenum to a small abscess collection along the right flank with the abscess measures approximately 1.8 x 2.3 cm. Admitted for treatment and further work-up. Blood cx NGTD. Surgery consulted 7/30. Recommend referral to pancreatic surgery. GI following and will arrange referral on patient discharge. IR consulted 7/30 for percutaneous drainage of abscess ID consulted for infected abdominal abscess 7/31 8/1 MRI pancreas ordered by GI  
 
Current Facility-Administered Medications Medication Dose Route Frequency  insulin lispro (HUMALOG) injection   SubCUTAneous AC&HS  meropenem (MERREM) 1 g in 0.9% sodium chloride (MBP/ADV) 50 mL MBP  1 g IntraVENous Q8H  
 HYDROmorphone (DILAUDID) syringe 1 mg  1 mg IntraVENous Q3H PRN  
 insulin glargine (LANTUS) injection 5 Units  5 Units SubCUTAneous ACD  oxyCODONE-acetaminophen (PERCOCET) 5-325 mg per tablet 1 Tab  1 Tab Oral Q6H PRN  
 lipase-protease-amylase (ZENPEP 10,000) capsule 3 Cap  3 Cap Oral TID WITH MEALS  sodium chloride (NS) flush 5-10 mL  5-10 mL IntraVENous PRN  
 ascorbic acid (vitamin C) (VITAMIN C) tablet 500 mg  500 mg Oral DAILY  cholecalciferol (VITAMIN D3) tablet 2,000 Units  2,000 Units Oral DAILY  cyanocobalamin tablet 1,000 mcg  1,000 mcg Oral DAILY  ferrous sulfate tablet 325 mg  325 mg Oral BID  folic acid (FOLVITE) tablet 1 mg  1 mg Oral DAILY  pantoprazole (PROTONIX) tablet 40 mg  40 mg Oral BID  sucralfate (CARAFATE) tablet 1 g  1 g Oral QID  promethazine (PHENERGAN) tablet 25 mg  25 mg Oral Q6H PRN  
 dextrose 5 % - 0.45% NaCl infusion  150 mL/hr IntraVENous CONTINUOUS  
 ondansetron (ZOFRAN) injection 4 mg  4 mg IntraVENous Q4H PRN  
 enoxaparin (LOVENOX) injection 40 mg  40 mg SubCUTAneous Q24H  
 glucose chewable tablet 16 g  4 Tab Oral PRN  
 glucagon (GLUCAGEN) injection 1 mg  1 mg IntraMUSCular PRN  
 dextrose (D50W) injection syrg 12.5-25 g  25-50 mL IntraVENous PRN  
 sodium chloride (NS) flush 5-10 mL  5-10 mL IntraVENous Q8H  
 sodium chloride (NS) flush 5-10 mL  5-10 mL IntraVENous PRN Review of Systems Saying that he did not sleep well due to abdominal pain. Objective:  
 
Visit Vitals  /69 (BP 1 Location: Right arm, BP Patient Position: At rest)  Pulse 78  Temp 97.9 °F (36.6 °C)  Resp 18  Ht 6' (1.829 m)  Wt 59.4 kg (131 lb)  SpO2 96%  BMI 17.77 kg/m2 O2 Device: Room air Temp (24hrs), Av.6 °F (36.4 °C), Min:97 °F (36.1 °C), Max:98 °F (36.7 °C) 
 
 
  
 1901 -  0700 In: 5867.5 [P.O.:120; I.V.:5747.5] Out: 950 [Drains:950] Physical Exam: 
General appearance: looks anxious due to abdominal pain,not in acute distress Head: Normocephalic, without obvious abnormality, atraumatic Lungs: clear to auscultation bilaterally Heart: regular rate and rhythm, S1, S2 normal, no murmur, click, rub or gallop Abdomen:tender to palpation,diffusely,no rebound Extremities: extremities normal, atraumatic, no cyanosis or edema Neurologic: Grossly normal 
   
Data Review Recent Results (from the past 12 hour(s)) GLUCOSE, POC Collection Time: 18 12:40 AM  
Result Value Ref Range Glucose (POC) 146 (H) 70 - 110 mg/dL CBC WITH AUTOMATED DIFF Collection Time: 18  4:10 AM  
Result Value Ref Range  WBC 4.1 (L) 4.6 - 13.2 K/uL  
 RBC 3.00 (L) 4.70 - 5.50 M/uL HGB 9.3 (L) 13.0 - 16.0 g/dL HCT 28.5 (L) 36.0 - 48.0 % MCV 95.0 74.0 - 97.0 FL  
 MCH 31.0 24.0 - 34.0 PG  
 MCHC 32.6 31.0 - 37.0 g/dL  
 RDW 14.5 11.6 - 14.5 % PLATELET 865 268 - 444 K/uL MPV 9.6 9.2 - 11.8 FL  
 NEUTROPHILS 52 40 - 73 % LYMPHOCYTES 20 (L) 21 - 52 % MONOCYTES 15 (H) 3 - 10 % EOSINOPHILS 12 (H) 0 - 5 % BASOPHILS 1 0 - 2 %  
 ABS. NEUTROPHILS 2.1 1.8 - 8.0 K/UL  
 ABS. LYMPHOCYTES 0.8 (L) 0.9 - 3.6 K/UL  
 ABS. MONOCYTES 0.6 0.05 - 1.2 K/UL  
 ABS. EOSINOPHILS 0.5 (H) 0.0 - 0.4 K/UL  
 ABS. BASOPHILS 0.0 0.0 - 0.1 K/UL  
 DF AUTOMATED METABOLIC PANEL, COMPREHENSIVE Collection Time: 08/01/18  4:10 AM  
Result Value Ref Range Sodium 140 136 - 145 mmol/L Potassium 4.0 3.5 - 5.5 mmol/L Chloride 113 (H) 100 - 108 mmol/L  
 CO2 20 (L) 21 - 32 mmol/L Anion gap 7 3.0 - 18 mmol/L Glucose 132 (H) 74 - 99 mg/dL BUN 6 (L) 7.0 - 18 MG/DL Creatinine 0.89 0.6 - 1.3 MG/DL  
 BUN/Creatinine ratio 7 (L) 12 - 20 GFR est AA >60 >60 ml/min/1.73m2 GFR est non-AA >60 >60 ml/min/1.73m2 Calcium 8.0 (L) 8.5 - 10.1 MG/DL Bilirubin, total 0.2 0.2 - 1.0 MG/DL  
 ALT (SGPT) 27 16 - 61 U/L  
 AST (SGOT) 9 (L) 15 - 37 U/L Alk. phosphatase 156 (H) 45 - 117 U/L Protein, total 6.1 (L) 6.4 - 8.2 g/dL Albumin 2.4 (L) 3.4 - 5.0 g/dL Globulin 3.7 2.0 - 4.0 g/dL A-G Ratio 0.6 (L) 0.8 - 1.7 GLUCOSE, POC Collection Time: 08/01/18  7:29 AM  
Result Value Ref Range Glucose (POC) 94 70 - 110 mg/dL Assessment/Plan:  
 
Principal Problem: 
  Sepsis ([facility] [address] (4/15/2016) Active Problems: Abscess of abdominal cavity ([facility] [address] (6/19/2013) Overview: Abdominal abscess - most likely infected fluid collection from duodenal  
    fistula related to pancreatic disease. Doing well with drainage and  
    antibiotics. Fistulogram on Friday noted continued fistula to the  
    duodenum/pancreas.   Will need referral to pancreatic surgeon. 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 
 - likely from abscess/fistula 
 -CT abdomen c/w enterocutaneous fistula extending from the duodenum to the right lateral abdomen wall with a small abscess collection along the right flank measuring 2.2 x 1.8 cm. 
- IR consulted 7/30 for possible  percutaneous drainage - Triple abx d/c's 7/31 and switched to meropenem - IV fluids - switched to meropenem 7/31 per ID  
   
2) Enterocutaneous fistula, chronic pancreatitis with abscess - GI and surgery consulted in ED 
 - NPO but meds, IVF, pain meds as bp can tolerate - Surgery signed off 7/30 , suggest referral to tertiary center with a pancreatic surgeon - IR Drainage with NATALIE drain 7/31 
- GI plans to arrange consult fo pancreatic surgeon on discharge - ID consulted   
3) DM 
 -SSI , q 6 accucheck   
4) Chronic pain - Dilaudid prn 
  -UA and xray negative 5) Chronic pancreatitis - pain control - MRI pancreas 8/1 by GI 
 
 
   
DVT prophylaxis:scds Full code Disposition:tbd

## 2018-08-01 NOTE — PROGRESS NOTES
INFECTIOUS DISEASE FOLLOW UP NOTE :-  
 
Admit Date: 7/28/2018 ABX;   
 
Current  Prior Meropenem 7/31-1 Aztreonam/Flagyl 7/28-3 ASSESSMENT: -> RECS Sepsis/SIRS on presentation - pt with fever, leucocytosis, thrombocytosis, elevated Cr, elevated LFTs 
- h/o ESBL in past  - cont meropenem 
- monitor cx and response - d/w Dr Lear Rinne New intra-abd abscess- seen on CT - S/p IR drainage 7/31- send cx , looks like not collected yet - Abx as above H/o Chronic pancreaticoduodeno -cutaneous fistula  
-SP drain replacement by IR 5/24 
-apparently dislodged with h/o recurrent abscess when drain out, CTAP 5/22 no abscess reported. -skin around drain irritated from leakage around drain, not cellulitic appearing  
-5/23 wd culture ESBL E coli (history of incl 7/2017) E faecalis C glabrata-- suspect colonizers as not septic appearing no abscess afebrile wbc nl drain functioning  
-> was taken off all Abx 5/29 as CT showed resolution of abscess ->new abscess seen on repeat CT 
- Abx as above 
-> GI and Sx seeing 
-> Plan for MRI abdomen today. CHRISTI- elevated Cr of 1.87 on presentation - dose Abx for current crCl 
- avoid nephrotoxic meds - trend cr  
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 
- NSx has been consulted and conservative Mx at this time Chronic abd pain with nausea and vomiting - seen by GI 
- plan for transfer to tertiary care center when stable  
h/o recurrent pancreatitis, pseudocysts- h/o EtOH     
Chronic abd pain  ->mgmt per IM  
IDDM - uncontrolled ->bs control per IM   
PCN allergy w/ rash recorded -> tolerated Meropenem in past  
 
MICROBIOLOGY:  
7/28  Blcx x 2 - neg 
7/30  C.diff neg LINES AND CATHETERS:  
piv SUBJECTIVE :  
 
Interval notes reviewed.  Pt is feeling better after drain placement, denies any fever, abdominal pain off and on, going for MRI of abdomen today. MEDICATIONS :  
 
Current Facility-Administered Medications Medication Dose Route Frequency  insulin lispro (HUMALOG) injection   SubCUTAneous AC&HS  meropenem (MERREM) 1 g in 0.9% sodium chloride (MBP/ADV) 50 mL MBP  1 g IntraVENous Q8H  
 HYDROmorphone (DILAUDID) syringe 1 mg  1 mg IntraVENous Q3H PRN  
 insulin glargine (LANTUS) injection 5 Units  5 Units SubCUTAneous ACD  oxyCODONE-acetaminophen (PERCOCET) 5-325 mg per tablet 1 Tab  1 Tab Oral Q6H PRN  
 lipase-protease-amylase (ZENPEP 10,000) capsule 3 Cap  3 Cap Oral TID WITH MEALS  sodium chloride (NS) flush 5-10 mL  5-10 mL IntraVENous PRN  
 ascorbic acid (vitamin C) (VITAMIN C) tablet 500 mg  500 mg Oral DAILY  cholecalciferol (VITAMIN D3) tablet 2,000 Units  2,000 Units Oral DAILY  cyanocobalamin tablet 1,000 mcg  1,000 mcg Oral DAILY  ferrous sulfate tablet 325 mg  325 mg Oral BID  folic acid (FOLVITE) tablet 1 mg  1 mg Oral DAILY  pantoprazole (PROTONIX) tablet 40 mg  40 mg Oral BID  sucralfate (CARAFATE) tablet 1 g  1 g Oral QID  promethazine (PHENERGAN) tablet 25 mg  25 mg Oral Q6H PRN  
 dextrose 5 % - 0.45% NaCl infusion  150 mL/hr IntraVENous CONTINUOUS  
 ondansetron (ZOFRAN) injection 4 mg  4 mg IntraVENous Q4H PRN  
 enoxaparin (LOVENOX) injection 40 mg  40 mg SubCUTAneous Q24H  
 glucose chewable tablet 16 g  4 Tab Oral PRN  
 glucagon (GLUCAGEN) injection 1 mg  1 mg IntraMUSCular PRN  
 dextrose (D50W) injection syrg 12.5-25 g  25-50 mL IntraVENous PRN  
 sodium chloride (NS) flush 5-10 mL  5-10 mL IntraVENous Q8H  
 sodium chloride (NS) flush 5-10 mL  5-10 mL IntraVENous PRN OBJECTIVE :  
 
Visit Vitals  BP (!) 141/94 (BP 1 Location: Right arm, BP Patient Position: At rest)  Pulse 73  Temp 97.5 °F (36.4 °C)  Resp 18  Ht 6' (1.829 m)  Wt 59.4 kg (131 lb)  SpO2 100%  BMI 17.77 kg/m2 Temp (24hrs), Av.6 °F (36.4 °C), Min:97.3 °F (36.3 °C), Max:98 °F (36.7 °C) GEN - thin built  male not in distress RESP- ctab CVS- s1s2 normal, no murmur ABD-soft, NATALIE drain present RLQ with purulent green discharge , bs present EXT-no edema SKIN -no rash NEURO - awake, alert, O x3 Labs: Results:  
Chemistry Recent Labs 08/01/18 0410 07/31/18 
 0410  07/30/18 
 0400 GLU  132*  145*  84 NA  140  139  139  
K  4.0  4.1  4.1 CL  113*  113*  112* CO2  20*  17*  16*  
BUN  6*  10  22* CREA  0.89  0.95  1.15  
CA  8.0*  7.7*  8.1* AGAP  7  9  11 BUCR  7*  11*  19  
AP  156*  148*   --   
TP  6.1*  5.4*   --   
ALB  2.4*  2.2*   --   
GLOB  3.7  3.2   --   
AGRAT  0.6*  0.7*   --   
  
CBC w/Diff Recent Labs 08/01/18 0410 07/31/18 
 0410  07/30/18 
 0400 WBC  4.1*  4.4*  6.3  
RBC  3.00*  2.83*  2.87* HGB  9.3*  8.8*  9.0*  
HCT  28.5*  27.4*  28.0*  
PLT  245  223  274 GRANS  52  58   --   
LYMPH  20*  16*   --   
EOS  12*  12*   --   
  
 
 
RADIOLOGY :   
7/28 - CT abdo/pelvis - IMPRESSION: 
Decompressed inferior vena cava raising the possibility of hypovolemia Fluid-filled small and large bowel loops suggesting gastroenteritis without 
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 
right lateral abdomen wall with a small abscess collection along the right flank 
measuring 2.2 x 1.8 cm. Duodenum is tethered to the abscess. The previously 
noted pigtail catheter in this collection has been removed. Fistulous tract is 
visualized. Evidence of chronic calcific pancreatitis with a dilated pancreatic duct along 
the tail which may represent pancreatic stricturing or obstruction due to 
calculi. Gastric wall thickening which may reflect under distention versus gastritis Small hiatal hernia Nonobstructive renal calculi Avascular necrosis of the femoral heads 7/31 - IMPRESSION: 
  
Successful insertion of right lower quadrant 16 [facility] pigtail catheter. Tyson Vora MD 
August 1, 2018 [facility] [phone]

## 2018-08-01 NOTE — PROGRESS NOTES
PROGRESS NOTE Merlene Ramirez 
         MRN: [id number] [facility], [address] 
         8/1/2018, 5:37 PM 
 
 
SUBJECTIVE: 
 
Abdominal pain is controlled. Patient is tolerating regular diet. He had the MRCP the results are pending. OBJECTIVE: 
Patient Vitals for the past 24 hrs: 
 Temp Pulse Resp BP SpO2  
08/01/18 1658 98.1 °F (36.7 °C) 68 18 128/71 100 % 08/01/18 1123 97.5 °F (36.4 °C) 73 18 (!) 141/94 100 % 08/01/18 0731 97.9 °F (36.6 °C) 78 18 113/69 96 % 08/01/18 0412 98 °F (36.7 °C) 85 18 140/89 95 % 08/01/18 0041 97.5 °F (36.4 °C) 68 18 120/81 100 % 07/31/18 1947 97.6 °F (36.4 °C) 75 18 126/73 100 % Intake/Output Summary (Last 24 hours) at 08/01/18 1737 Last data filed at 08/01/18 1003 Gross per 24 hour Intake             3940 ml Output              900 ml Net             3040 ml Gen: NAD Heent: No pallor, icterus Lungs: Clear B/L  
CVS exam: Regular rate and rhythm Abd  : Unchanged. Labs: Results:  
Chemistry Recent Labs 08/01/18 0410 07/31/18 0410 07/30/18 
 0400 GLU  132*  145*  84 NA  140  139  139  
K  4.0  4.1  4.1 CL  113*  113*  112* CO2  20*  17*  16*  
BUN  6*  10  22* CREA  0.89  0.95  1.15  
CA  8.0*  7.7*  8.1* AGAP  7  9  11 BUCR  7*  11*  19  
AP  156*  148*   --   
TP  6.1*  5.4*   --   
ALB  2.4*  2.2*   --   
GLOB  3.7  3.2   --   
AGRAT  0.6*  0.7*   --   
 Estimated Creatinine Clearance: 80.6 mL/min (based on Cr of 0.89). CBC w/Diff Recent Labs 08/01/18 0410 07/31/18 
 0410  07/30/18 
 0400 WBC  4.1*  4.4*  6.3  
RBC  3.00*  2.83*  2.87* HGB  9.3*  8.8*  9.0*  
HCT  28.5*  27.4*  28.0*  
PLT  245  223  274 GRANS  52  58   --   
LYMPH  20*  16*   --   
EOS  12*  12*   --   
  
Cardiac Enzymes No results for input(s): CPK, CKND1, JESU in the last 72 hours. No lab exists for component: R[place] Dress Coagulation No results for input(s): PTP, INR, APTT in the last 72 hours. 
 
No lab exists for component: INREXT Hepatitis Panel Lab Results Component Value Date/Time Hepatitis A, IgM NEGATIVE  12/04/2009 03:30 AM  
 Hepatitis B surface Ag <0.10 12/04/2009 03:30 AM  
 Hepatitis B core, IgM NEGATIVE  12/04/2009 03:30 AM  
  
Amylase Lipase Liver Enzymes Recent Labs 08/01/18 
 0410  07/31/18 
 0410 TP  6.1*  5.4* ALB  2.4*  2.2* TBILI  0.2  0.1* AP  156*  148* SGOT  9*  10* ALT  27  30 Thyroid Studies No results for input(s): T4, T3U, TSH, TSHEXT in the last 72 hours. No lab exists for component: T3RU Pathology pathology Allergies Allergen Reactions  Ampicillin-Sulbactam Rash  Pcn [Penicillins] Itching and Hives  Piperacillin-Tazobactam Rash  Pollen Extracts Rash  Seafood [Shellfish Containing Products] Hives Other reaction(s): unknown Has tolerated iohexol (iodine-containing contrast) Only shrimp Shrimp Current Facility-Administered Medications Medication Dose Route Frequency  insulin lispro (HUMALOG) injection   SubCUTAneous AC&HS  meropenem (MERREM) 1 g in 0.9% sodium chloride (MBP/ADV) 50 mL MBP  1 g IntraVENous Q8H  
 HYDROmorphone (DILAUDID) syringe 1 mg  1 mg IntraVENous Q3H PRN  
 insulin glargine (LANTUS) injection 5 Units  5 Units SubCUTAneous ACD  oxyCODONE-acetaminophen (PERCOCET) 5-325 mg per tablet 1 Tab  1 Tab Oral Q6H PRN  
 lipase-protease-amylase (ZENPEP 10,000) capsule 3 Cap  3 Cap Oral TID WITH MEALS  sodium chloride (NS) flush 5-10 mL  5-10 mL IntraVENous PRN  
 ascorbic acid (vitamin C) (VITAMIN C) tablet 500 mg  500 mg Oral DAILY  cholecalciferol (VITAMIN D3) tablet 2,000 Units  2,000 Units Oral DAILY  cyanocobalamin tablet 1,000 mcg  1,000 mcg Oral DAILY  ferrous sulfate tablet 325 mg  325 mg Oral BID  folic acid (FOLVITE) tablet 1 mg  1 mg Oral DAILY  pantoprazole (PROTONIX) tablet 40 mg  40 mg Oral BID  sucralfate (CARAFATE) tablet 1 g  1 g Oral QID  
 promethazine (PHENERGAN) tablet 25 mg  25 mg Oral Q6H PRN  
 dextrose 5 % - 0.45% NaCl infusion  150 mL/hr IntraVENous CONTINUOUS  
 ondansetron (ZOFRAN) injection 4 mg  4 mg IntraVENous Q4H PRN  
 enoxaparin (LOVENOX) injection 40 mg  40 mg SubCUTAneous Q24H  
 glucose chewable tablet 16 g  4 Tab Oral PRN  
 glucagon (GLUCAGEN) injection 1 mg  1 mg IntraMUSCular PRN  
 dextrose (D50W) injection syrg 12.5-25 g  25-50 mL IntraVENous PRN  
 sodium chloride (NS) flush 5-10 mL  5-10 mL IntraVENous Q8H  
 sodium chloride (NS) flush 5-10 mL  5-10 mL IntraVENous PRN  
 
 
ASSESSMENT: 
 
Chronic pancreatitis. Chronic duodenocutaneous fistula. Better since retroperitoneal fluid collection drained. 
  
Advance diet as tolerated. I will check MRI of the pancreas. Nothing further to add from GI standpoint in the inpatient setting. I will arrange outpatient follow-up.  
WIll sign off for now.  
   
   
  
     
 
Katherine Ratliff MD

## 2018-08-01 NOTE — PROGRESS NOTES
Palliative team attempted visit with Mr Nieves Velasco who asked us to return later; he stated that he is waiting for his Mom to help him get washed up. Will attempt to assist Mr Nieves Velasco in completing AMD. 
 
11:30 Palliative THI Rodriguez and I followed up with Mr Nieves Velasco who asked us to come back again around 2 pm. Not able to come back at that time, so Mr Nieves Velasco allowed us in and AMD completed, naming his mom, Patria Agudelo, [phone], as his medical power of . Mr Nieves Velasco was eager for us to leave, adding, \"They were right - you don't get any rest in the hospital. As soon as one person leaves, another comes in.\" Copy of AMD was placed in his chart and original and copy given to Mr Nieves Velasco who expressed his thanks for assistance.

## 2018-08-01 NOTE — ROUTINE PROCESS
0730- Assumed care. Pt is alert and oriented x 4. C/o mild pain in abdomen. No respiratory distress noted. Call light in reach. 0805- Prn pain meds given. 0915- Prn pain meds given. 1015- MRI screening form completed. Signed on paper copy placed in chart d/t signature pad not working. Pt made aware of NPO status 4 hours prior to MRI. 1340- Pt taken to MRI. 1500- Pt back from MRI. 1515- Prn pain meds given. Tolerated well. 1800- Prn pain meds given. Bedside and Verbal shift change report given to Martha. [address] (oncoming nurse) by Fallon Snyder RN 
 (offgoing nurse). Report included the following information SBAR, Kardex, Procedure Summary, Intake/Output, MAR, Recent Results and Med Rec Status.

## 2018-08-01 NOTE — PROGRESS NOTES
Chart reviewed by . The Rev. Yessenia Garcia [address] [facility] [address] [facility] [phone] / [facility] [phone]

## 2018-08-01 NOTE — ACP (ADVANCE CARE PLANNING)
AMD completed, his mother Gasper Puentes ([phone]) [facility]     Full Code    Mr Gordon Dale is eager to go home

## 2018-08-01 NOTE — PROGRESS NOTES
PROGRESS NOTE Richard Ugalde 
         MRN: [id number] [facility], 3016/01 
         7/31/2018, 8:55 PM 
   
 
 
SUBJECTIVE: 
Abdominal pain better controlled. No vomiting. Tolerating po. Retroperitoneal drain placed in fluid collection. OBJECTIVE: 
Patient Vitals for the past 24 hrs: 
 Temp Pulse Resp BP SpO2  
07/31/18 1947 97.6 °F (36.4 °C) 75 18 126/73 100 % 07/31/18 1726 97.3 °F (36.3 °C) 91 18 122/74 99 % 07/31/18 1219 97 °F (36.1 °C) 97 18 113/80 100 % 07/31/18 1025 - 66 (!) 37 91/51 (!) 80 % 07/31/18 0430 97.6 °F (36.4 °C) 84 18 130/85 95 % 07/30/18 2358 97.7 °F (36.5 °C) 84 18 115/67 98 % Intake/Output Summary (Last 24 hours) at 07/31/18 2055 Last data filed at 07/31/18 1859 Gross per 24 hour Intake           3967.5 ml Output              500 ml Net           3467.5 ml  
 
 
Gen: NAD Heent: No pallor, icterus Lungs: Clear B/L  
CVS exam: Regular rate and rhythm Abd  : Soft, tender in upper abdomen, BS +, No masses felt. Labs: Results:  
Chemistry Recent Labs  
   07/31/18 0410 07/30/18 0400 07/29/18 
 0145 GLU  145*  84  99 NA  139  139  139  
K  4.1  4.1  5.4 CL  113*  112*  114* CO2  17*  16*  19* BUN  10  22*  26* CREA  0.95  1.15  1.95* CA  7.7*  8.1*  9.1 AGAP  9  11  6 BUCR  11*  19  13 AP  148*   --    --   
TP  5.4*   --    --   
ALB  2.2*   --    --   
GLOB  3.2   --    --   
AGRAT  0.7*   --    --   
 Estimated Creatinine Clearance: 76.1 mL/min (based on Cr of 0.95). CBC w/Diff Recent Labs  
   07/31/18 0410 07/30/18 
 0400  07/29/18 
 0145 WBC  4.4*  6.3  14.6*  
RBC  2.83*  2.87*  3.55* HGB  8.8*  9.0*  11.2* HCT  27.4*  28.0*  35.2*  
PLT  223  274  427* GRANS  58   --    --   
LYMPH  16*   --    --   
EOS  12*   --    --   
  
Cardiac Enzymes No results for input(s): CPK, CKND1, JESU in the last 72 hours. No lab exists for component: Vishnu Justo Coagulation No results for input(s): PTP, INR, APTT in the last 72 hours. No lab exists for component: INREXT Hepatitis Panel Lab Results Component Value Date/Time Hepatitis A, IgM NEGATIVE  12/04/2009 03:30 AM  
 Hepatitis B surface Ag <0.10 12/04/2009 03:30 AM  
 Hepatitis B core, IgM NEGATIVE  12/04/2009 03:30 AM  
  
Amylase Lipase Liver Enzymes Recent Labs  
   07/31/18 
 0410 TP  5.4* ALB  2.2* TBILI  0.1* AP  148* SGOT  10* ALT  30 Thyroid Studies No results for input(s): T4, T3U, TSH, TSHEXT in the last 72 hours. No lab exists for component: T3RU Pathology pathology Allergies Allergen Reactions  Ampicillin-Sulbactam Rash  Pcn [Penicillins] Itching and Hives  Piperacillin-Tazobactam Rash  Pollen Extracts Rash  Seafood [Shellfish Containing Products] Hives Other reaction(s): unknown Has tolerated iohexol (iodine-containing contrast) Only shrimp Shrimp Current Facility-Administered Medications Medication Dose Route Frequency  meropenem (MERREM) 1 g in 0.9% sodium chloride (MBP/ADV) 50 mL MBP  1 g IntraVENous Q8H  
 HYDROmorphone (DILAUDID) syringe 1 mg  1 mg IntraVENous Q3H PRN  
 insulin lispro (HUMALOG) injection   SubCUTAneous Q6H  
 insulin glargine (LANTUS) injection 5 Units  5 Units SubCUTAneous ACD  oxyCODONE-acetaminophen (PERCOCET) 5-325 mg per tablet 1 Tab  1 Tab Oral Q6H PRN  
 lipase-protease-amylase (ZENPEP 10,000) capsule 3 Cap  3 Cap Oral TID WITH MEALS  sodium chloride (NS) flush 5-10 mL  5-10 mL IntraVENous PRN  
 ascorbic acid (vitamin C) (VITAMIN C) tablet 500 mg  500 mg Oral DAILY  cholecalciferol (VITAMIN D3) tablet 2,000 Units  2,000 Units Oral DAILY  cyanocobalamin tablet 1,000 mcg  1,000 mcg Oral DAILY  ferrous sulfate tablet 325 mg  325 mg Oral BID  folic acid (FOLVITE) tablet 1 mg  1 mg Oral DAILY  pantoprazole (PROTONIX) tablet 40 mg  40 mg Oral BID  sucralfate (CARAFATE) tablet 1 g  1 g Oral QID  promethazine (PHENERGAN) tablet 25 mg  25 mg Oral Q6H PRN  
 dextrose 5 % - 0.45% NaCl infusion  150 mL/hr IntraVENous CONTINUOUS  
 ondansetron (ZOFRAN) injection 4 mg  4 mg IntraVENous Q4H PRN  
 enoxaparin (LOVENOX) injection 40 mg  40 mg SubCUTAneous Q24H  
 glucose chewable tablet 16 g  4 Tab Oral PRN  
 glucagon (GLUCAGEN) injection 1 mg  1 mg IntraMUSCular PRN  
 dextrose (D50W) injection syrg 12.5-25 g  25-50 mL IntraVENous PRN  
 sodium chloride (NS) flush 5-10 mL  5-10 mL IntraVENous Q8H  
 sodium chloride (NS) flush 5-10 mL  5-10 mL IntraVENous PRN  
 
 
ASSESSMENT: 
 
Chronic pancreatitis. Chronic duodenocutaneous fistula. Better since retroperitoneal fluid collection drained. Advance diet as tolerated. Obtain MRI of pancreas.  
 
Marty Castro MD

## 2018-08-01 NOTE — PROGRESS NOTES
Chart reviewed. Transition plan remains home with out-pt follow up when medically stable. Will cont to follow for any needs. Yanni Rodriguez RN,ext [phone].

## 2018-08-01 NOTE — PROGRESS NOTES
Problem: Falls - Risk of 
Goal: *Absence of Falls Document Jai Reas Fall Risk and appropriate interventions in the flowsheet. Outcome: Progressing Towards Goal 
Fall Risk Interventions: 
Mobility Interventions: Patient to call before getting OOB Medication Interventions: Patient to call before getting OOB, Teach patient to arise slowly Elimination Interventions: Call light in reach, Patient to call for help with toileting needs Problem: Pressure Injury - Risk of 
Goal: *Prevention of pressure injury Document Austin Scale and appropriate interventions in the flowsheet. Outcome: Progressing Towards Goal 
Pressure Injury Interventions: 
Sensory Interventions: Pressure redistribution bed/mattress (bed type) Moisture Interventions: Absorbent underpads Activity Interventions: Pressure redistribution bed/mattress(bed type) Mobility Interventions: Pressure redistribution bed/mattress (bed type) Nutrition Interventions: Document food/fluid/supplement intake Friction and Shear Interventions: Apply protective barrier, creams and emollients

## 2018-08-02 LAB
ALBUMIN SERPL-MCNC: 2.4 G/DL (ref 3.4–5)
ALBUMIN/GLOB SERPL: 0.7 {RATIO} (ref 0.8–1.7)
ALP SERPL-CCNC: 139 U/L (ref 45–117)
ALT SERPL-CCNC: 24 U/L (ref 16–61)
ANION GAP SERPL CALC-SCNC: 7 MMOL/L (ref 3–18)
AST SERPL-CCNC: 13 U/L (ref 15–37)
BASOPHILS # BLD: 0 K/UL (ref 0–0.1)
BASOPHILS NFR BLD: 1 % (ref 0–2)
BILIRUB SERPL-MCNC: 0.2 MG/DL (ref 0.2–1)
BUN SERPL-MCNC: 4 MG/DL (ref 7–18)
BUN/CREAT SERPL: 4 (ref 12–20)
CALCIUM SERPL-MCNC: 7.8 MG/DL (ref 8.5–10.1)
CHLORIDE SERPL-SCNC: 115 MMOL/L (ref 100–108)
CO2 SERPL-SCNC: 21 MMOL/L (ref 21–32)
CREAT SERPL-MCNC: 0.93 MG/DL (ref 0.6–1.3)
DIFFERENTIAL METHOD BLD: ABNORMAL
EOSINOPHIL # BLD: 0.4 K/UL (ref 0–0.4)
EOSINOPHIL NFR BLD: 10 % (ref 0–5)
ERYTHROCYTE [DISTWIDTH] IN BLOOD BY AUTOMATED COUNT: 14.6 % (ref 11.6–14.5)
GLOBULIN SER CALC-MCNC: 3.4 G/DL (ref 2–4)
GLUCOSE BLD STRIP.AUTO-MCNC: 125 MG/DL (ref 70–110)
GLUCOSE BLD STRIP.AUTO-MCNC: 165 MG/DL (ref 70–110)
GLUCOSE BLD STRIP.AUTO-MCNC: 204 MG/DL (ref 70–110)
GLUCOSE BLD STRIP.AUTO-MCNC: 257 MG/DL (ref 70–110)
GLUCOSE SERPL-MCNC: 197 MG/DL (ref 74–99)
HCT VFR BLD AUTO: 26.4 % (ref 36–48)
HGB BLD-MCNC: 8.7 G/DL (ref 13–16)
LYMPHOCYTES # BLD: 1.1 K/UL (ref 0.9–3.6)
LYMPHOCYTES NFR BLD: 27 % (ref 21–52)
MCH RBC QN AUTO: 30.9 PG (ref 24–34)
MCHC RBC AUTO-ENTMCNC: 33 G/DL (ref 31–37)
MCV RBC AUTO: 93.6 FL (ref 74–97)
MONOCYTES # BLD: 0.4 K/UL (ref 0.05–1.2)
MONOCYTES NFR BLD: 9 % (ref 3–10)
NEUTS SEG # BLD: 2.2 K/UL (ref 1.8–8)
NEUTS SEG NFR BLD: 53 % (ref 40–73)
PLATELET # BLD AUTO: 228 K/UL (ref 135–420)
PMV BLD AUTO: 9.2 FL (ref 9.2–11.8)
POTASSIUM SERPL-SCNC: 3.7 MMOL/L (ref 3.5–5.5)
PROT SERPL-MCNC: 5.8 G/DL (ref 6.4–8.2)
RBC # BLD AUTO: 2.82 M/UL (ref 4.7–5.5)
SODIUM SERPL-SCNC: 143 MMOL/L (ref 136–145)
WBC # BLD AUTO: 4.2 K/UL (ref 4.6–13.2)

## 2018-08-02 PROCEDURE — 74011250637 HC RX REV CODE- 250/637: Performed by: INTERNAL MEDICINE

## 2018-08-02 PROCEDURE — 85025 COMPLETE CBC W/AUTO DIFF WBC: CPT | Performed by: FAMILY MEDICINE

## 2018-08-02 PROCEDURE — 74011250637 HC RX REV CODE- 250/637: Performed by: HOSPITALIST

## 2018-08-02 PROCEDURE — 74011250636 HC RX REV CODE- 250/636: Performed by: INTERNAL MEDICINE

## 2018-08-02 PROCEDURE — 36591 DRAW BLOOD OFF VENOUS DEVICE: CPT

## 2018-08-02 PROCEDURE — 80053 COMPREHEN METABOLIC PANEL: CPT | Performed by: FAMILY MEDICINE

## 2018-08-02 PROCEDURE — 82962 GLUCOSE BLOOD TEST: CPT

## 2018-08-02 PROCEDURE — 74011000258 HC RX REV CODE- 258: Performed by: HOSPITALIST

## 2018-08-02 PROCEDURE — 65660000000 HC RM CCU STEPDOWN

## 2018-08-02 PROCEDURE — 74011636637 HC RX REV CODE- 636/637: Performed by: FAMILY MEDICINE

## 2018-08-02 PROCEDURE — 74011250636 HC RX REV CODE- 250/636: Performed by: HOSPITALIST

## 2018-08-02 PROCEDURE — 74011250636 HC RX REV CODE- 250/636: Performed by: FAMILY MEDICINE

## 2018-08-02 PROCEDURE — 74011000258 HC RX REV CODE- 258: Performed by: INTERNAL MEDICINE

## 2018-08-02 RX ORDER — INSULIN GLARGINE 100 [IU]/ML
5 INJECTION, SOLUTION SUBCUTANEOUS EVERY 12 HOURS
Status: DISCONTINUED | OUTPATIENT
Start: 2018-08-02 | End: 2018-08-03

## 2018-08-02 RX ADMIN — HYDROMORPHONE HYDROCHLORIDE 1 MG: 1 INJECTION, SOLUTION INTRAMUSCULAR; INTRAVENOUS; SUBCUTANEOUS at 21:25

## 2018-08-02 RX ADMIN — Medication 10 ML: at 15:12

## 2018-08-02 RX ADMIN — INSULIN GLARGINE 5 UNITS: 100 INJECTION, SOLUTION SUBCUTANEOUS at 21:25

## 2018-08-02 RX ADMIN — HYDROMORPHONE HYDROCHLORIDE 1 MG: 1 INJECTION, SOLUTION INTRAMUSCULAR; INTRAVENOUS; SUBCUTANEOUS at 15:11

## 2018-08-02 RX ADMIN — MEROPENEM 1 G: 1 INJECTION, POWDER, FOR SOLUTION INTRAVENOUS at 21:24

## 2018-08-02 RX ADMIN — MEROPENEM 1 G: 1 INJECTION, POWDER, FOR SOLUTION INTRAVENOUS at 06:22

## 2018-08-02 RX ADMIN — Medication 500 MG: at 08:21

## 2018-08-02 RX ADMIN — INSULIN LISPRO 3 UNITS: 100 INJECTION, SOLUTION INTRAVENOUS; SUBCUTANEOUS at 21:25

## 2018-08-02 RX ADMIN — VITAMIN D, TAB 1000IU (100/BT) 2000 UNITS: 25 TAB at 08:20

## 2018-08-02 RX ADMIN — HYDROMORPHONE HYDROCHLORIDE 1 MG: 1 INJECTION, SOLUTION INTRAMUSCULAR; INTRAVENOUS; SUBCUTANEOUS at 03:51

## 2018-08-02 RX ADMIN — DEXTROSE MONOHYDRATE AND SODIUM CHLORIDE 150 ML/HR: 5; .45 INJECTION, SOLUTION INTRAVENOUS at 10:25

## 2018-08-02 RX ADMIN — SUCRALFATE 1 G: 1 TABLET ORAL at 08:21

## 2018-08-02 RX ADMIN — FERROUS SULFATE TAB 325 MG (65 MG ELEMENTAL FE) 325 MG: 325 (65 FE) TAB at 18:22

## 2018-08-02 RX ADMIN — INSULIN LISPRO 6 UNITS: 100 INJECTION, SOLUTION INTRAVENOUS; SUBCUTANEOUS at 18:21

## 2018-08-02 RX ADMIN — PANTOPRAZOLE SODIUM 40 MG: 40 TABLET, DELAYED RELEASE ORAL at 18:22

## 2018-08-02 RX ADMIN — PANCRELIPASE 3 CAPSULE: 10000; 34000; 55000 CAPSULE, DELAYED RELEASE ORAL at 16:45

## 2018-08-02 RX ADMIN — Medication 10 ML: at 06:00

## 2018-08-02 RX ADMIN — DEXTROSE MONOHYDRATE AND SODIUM CHLORIDE 150 ML/HR: 5; .45 INJECTION, SOLUTION INTRAVENOUS at 18:17

## 2018-08-02 RX ADMIN — MEROPENEM 1 G: 1 INJECTION, POWDER, FOR SOLUTION INTRAVENOUS at 15:15

## 2018-08-02 RX ADMIN — DEXTROSE MONOHYDRATE AND SODIUM CHLORIDE 150 ML/HR: 5; .45 INJECTION, SOLUTION INTRAVENOUS at 00:42

## 2018-08-02 RX ADMIN — HYDROMORPHONE HYDROCHLORIDE 1 MG: 1 INJECTION, SOLUTION INTRAMUSCULAR; INTRAVENOUS; SUBCUTANEOUS at 18:17

## 2018-08-02 RX ADMIN — HYDROMORPHONE HYDROCHLORIDE 1 MG: 1 INJECTION, SOLUTION INTRAMUSCULAR; INTRAVENOUS; SUBCUTANEOUS at 08:15

## 2018-08-02 RX ADMIN — FERROUS SULFATE TAB 325 MG (65 MG ELEMENTAL FE) 325 MG: 325 (65 FE) TAB at 08:22

## 2018-08-02 RX ADMIN — PROMETHAZINE HYDROCHLORIDE 25 MG: 25 TABLET ORAL at 03:50

## 2018-08-02 RX ADMIN — SUCRALFATE 1 G: 1 TABLET ORAL at 18:22

## 2018-08-02 RX ADMIN — PANCRELIPASE 3 CAPSULE: 10000; 34000; 55000 CAPSULE, DELAYED RELEASE ORAL at 11:53

## 2018-08-02 RX ADMIN — HYDROMORPHONE HYDROCHLORIDE 1 MG: 1 INJECTION, SOLUTION INTRAMUSCULAR; INTRAVENOUS; SUBCUTANEOUS at 00:41

## 2018-08-02 RX ADMIN — HYDROMORPHONE HYDROCHLORIDE 1 MG: 1 INJECTION, SOLUTION INTRAMUSCULAR; INTRAVENOUS; SUBCUTANEOUS at 11:52

## 2018-08-02 RX ADMIN — CYANOCOBALAMIN TAB 1000 MCG 1000 MCG: 1000 TAB at 08:21

## 2018-08-02 RX ADMIN — PANCRELIPASE 3 CAPSULE: 10000; 34000; 55000 CAPSULE, DELAYED RELEASE ORAL at 08:22

## 2018-08-02 RX ADMIN — INSULIN LISPRO 9 UNITS: 100 INJECTION, SOLUTION INTRAVENOUS; SUBCUTANEOUS at 12:48

## 2018-08-02 RX ADMIN — FOLIC ACID 1 MG: 1 TABLET ORAL at 08:21

## 2018-08-02 RX ADMIN — ENOXAPARIN SODIUM 40 MG: 100 INJECTION SUBCUTANEOUS at 23:44

## 2018-08-02 RX ADMIN — Medication 10 ML: at 21:26

## 2018-08-02 RX ADMIN — SUCRALFATE 1 G: 1 TABLET ORAL at 21:26

## 2018-08-02 RX ADMIN — PANTOPRAZOLE SODIUM 40 MG: 40 TABLET, DELAYED RELEASE ORAL at 08:21

## 2018-08-02 RX ADMIN — SUCRALFATE 1 G: 1 TABLET ORAL at 12:48

## 2018-08-02 RX ADMIN — OXYCODONE HYDROCHLORIDE AND ACETAMINOPHEN 1 TABLET: 5; 325 TABLET ORAL at 10:24

## 2018-08-02 RX ADMIN — INSULIN GLARGINE 5 UNITS: 100 INJECTION, SOLUTION SUBCUTANEOUS at 12:49

## 2018-08-02 NOTE — PROGRESS NOTES
INFECTIOUS DISEASE FOLLOW UP NOTE :-  
 
Admit Date: 7/28/2018 ABX;   
 
Current  Prior Meropenem 7/31-2 Aztreonam/Flagyl 7/28-3 ASSESSMENT: -> RECS Sepsis/SIRS on presentation - pt with fever, leucocytosis, thrombocytosis, elevated Cr, elevated LFTs 
- h/o ESBL in past  - cont meropenem 
- monitor cx and response New intra-abd abscess- seen on CT 
- s/p IR drainage 7/31  - S/p IR drainage 7/31 
- f/up final cx 
-> since MRI abdomen already showing complete drainage of abscess, will do short course of 5 days of abx. [ 3 more days ], will await cx to see if oral abx is an option. H/o Chronic pancreaticoduodeno -cutaneous fistula  
-SP drain replacement by IR 5/24 
-apparently dislodged with h/o recurrent abscess when drain out, CTAP 5/22 no abscess reported. -skin around drain irritated from leakage around drain, not cellulitic appearing  
-5/23 wd culture ESBL E coli (history of incl 7/2017) E faecalis C glabrata-- suspect colonizers as not septic appearing no abscess afebrile wbc nl drain functioning  
-> was taken off all Abx 5/29 as CT showed resolution of abscess ->new abscess seen on repeat CT 
- Abx as above 
-> Gi planning for outpt referral to tertiary center. CHRISTI- elevated Cr of 1.87 on presentation - dose Abx for current crCl 
- avoid nephrotoxic meds - trend cr  
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 
- NSx has been consulted and conservative Mx at this time Chronic abd pain with nausea and vomiting - seen by GI 
- plan for transfer to tertiary care center when stable  
h/o recurrent pancreatitis, pseudocysts- h/o EtOH     
Chronic abd pain  ->mgmt per IM  
IDDM - uncontrolled ->bs control per IM   
PCN allergy w/ rash recorded -> tolerated Meropenem in past  
 
MICROBIOLOGY:  
7/28  Blcx x 2 - neg 
7/30  C.diff neg 8/1 Body fluid cx - gpc , yeast  
 
LINES AND CATHETERS:  
piv SUBJECTIVE :  
 
Interval notes reviewed. Pt is feeling better , says yesterday had nausea and vomiting but tolerating regular diet now. No fever or chills, abdominal pain much better. MEDICATIONS :  
 
Current Facility-Administered Medications Medication Dose Route Frequency  insulin glargine (LANTUS) injection 5 Units  5 Units SubCUTAneous Q12H  
 insulin lispro (HUMALOG) injection   SubCUTAneous AC&HS  meropenem (MERREM) 1 g in 0.9% sodium chloride (MBP/ADV) 50 mL MBP  1 g IntraVENous Q8H  
 HYDROmorphone (DILAUDID) syringe 1 mg  1 mg IntraVENous Q3H PRN  
 oxyCODONE-acetaminophen (PERCOCET) 5-325 mg per tablet 1 Tab  1 Tab Oral Q6H PRN  
 lipase-protease-amylase (ZENPEP 10,000) capsule 3 Cap  3 Cap Oral TID WITH MEALS  sodium chloride (NS) flush 5-10 mL  5-10 mL IntraVENous PRN  
 ascorbic acid (vitamin C) (VITAMIN C) tablet 500 mg  500 mg Oral DAILY  cholecalciferol (VITAMIN D3) tablet 2,000 Units  2,000 Units Oral DAILY  cyanocobalamin tablet 1,000 mcg  1,000 mcg Oral DAILY  ferrous sulfate tablet 325 mg  325 mg Oral BID  folic acid (FOLVITE) tablet 1 mg  1 mg Oral DAILY  pantoprazole (PROTONIX) tablet 40 mg  40 mg Oral BID  sucralfate (CARAFATE) tablet 1 g  1 g Oral QID  promethazine (PHENERGAN) tablet 25 mg  25 mg Oral Q6H PRN  
 dextrose 5 % - 0.45% NaCl infusion  150 mL/hr IntraVENous CONTINUOUS  
 ondansetron (ZOFRAN) injection 4 mg  4 mg IntraVENous Q4H PRN  
 enoxaparin (LOVENOX) injection 40 mg  40 mg SubCUTAneous Q24H  
 glucose chewable tablet 16 g  4 Tab Oral PRN  
 glucagon (GLUCAGEN) injection 1 mg  1 mg IntraMUSCular PRN  
 dextrose (D50W) injection syrg 12.5-25 g  25-50 mL IntraVENous PRN  
 sodium chloride (NS) flush 5-10 mL  5-10 mL IntraVENous Q8H  
 sodium chloride (NS) flush 5-10 mL  5-10 mL IntraVENous PRN OBJECTIVE :  
 
Visit Vitals  /67  Pulse 85  Temp 98.4 °F (36.9 °C)  Resp 18  Ht 6' (1.829 m)  Wt 60.8 kg (134 lb)  SpO2 100%  BMI 17.68 kg/m2 Temp (24hrs), Av.1 °F (36.7 °C), Min:97.6 °F (36.4 °C), Max:98.4 °F (36.9 °C) GEN - thin built  male not in distress RESP- ctab CVS- s1s2 normal, no murmur ABD-soft, NATALIE drain present RLQ with purulent green discharge , bs present EXT-no edema SKIN -no rash NEURO - awake, alert, O x3 Labs: Results:  
Chemistry Recent Labs 18 1030  18 
 0410  18 
 0410 GLU  197*  132*  145* NA  143  140  139  
K  3.7  4.0  4.1 CL  115*  113*  113* CO2  21  20*  17* BUN  4*  6*  10  
CREA  0.93  0.89  0.95  
CA  7.8*  8.0*  7.7* AGAP  7  7  9 BUCR  4*  7*  11* AP  139*  156*  148* TP  5.8*  6.1*  5.4* ALB  2.4*  2.4*  2.2*  
GLOB  3.4  3.7  3.2 AGRAT  0.7*  0.6*  0.7* CBC w/Diff Recent Labs 18 1030  18 
 0410  18 
 0410 WBC  4.2*  4.1*  4.4*  
RBC  2.82*  3.00*  2.83* HGB  8.7*  9.3*  8.8* HCT  26.4*  28.5*  27.4*  
PLT  228  245  223 GRANS  53  52  58 LYMPH  27  20*  16* EOS  10*  12*  12* RADIOLOGY :   
MRI abdomen  - IMPRESSION: 
1. Interval placement of a drainage catheter inferior to the pancreatic head 
within the duodenal cutaneous fistula related to fluid collection, which has 
since been decompressed. No significant residual fluid. Small amount of free 
fluid is present within the right lower quadrant. Focal tethering with wall 
thickening is present at the medial anterior aspect of the second portion the 
duodenum, likely the site of the fistula. 
  
2. Pancreatic ductal dilation within the pancreatic tail, related to chronic 
pancreatitis is unchanged since 2015.  Mild smooth intrahepatic biliary dilation 
with normal caliber common bile duct is unchanged in appearance since 2015. 
 
 - CT abdo/pelvis - IMPRESSION: 
Decompressed inferior vena cava raising the possibility of hypovolemia Fluid-filled small and large bowel loops suggesting gastroenteritis without 
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 
right lateral abdomen wall with a small abscess collection along the right flank 
measuring 2.2 x 1.8 cm. Duodenum is tethered to the abscess. The previously 
noted pigtail catheter in this collection has been removed. Fistulous tract is 
visualized. Evidence of chronic calcific pancreatitis with a dilated pancreatic duct along 
the tail which may represent pancreatic stricturing or obstruction due to 
calculi. Gastric wall thickening which may reflect under distention versus gastritis Small hiatal hernia Nonobstructive renal calculi Avascular necrosis of the femoral heads 7/31 - IMPRESSION: 
  
Successful insertion of right lower quadrant 16 Greek pigtail catheter. Ubaldo Macario MD 
August 2, 2018 [facility] [phone]

## 2018-08-02 NOTE — PROGRESS NOTES
1900 -- Bedside, Verbal and Written shift change report given to [address] (oncoming nurse) by Mahendra Moder nurse). Report included the following information SBAR, Kardex, Intake/Output, MAR and Recent Results. 2120 -- PM and PRN pain medications administered, pt tolerated with ease, will continue to monitor. MD paged . 
   
 0045 -- PRN pain medication and new bag continuous fluids administered, pt tolerated with ease, will continue to monitor. 
  
 0100 -- Shift reassessment, pt condition unchanged, will continue to monitor. 0400 -- PRN pain medication administered, pt tolerated with ease, will continue to monitor. 
   
 0430 --  Shift reassessment, pt condition unchanged, will continue to monitor.   
   
 0730 -- Bedside, Verbal and Written shift change report given to [address]) by BLAINE (offgoing nurse). Report included the following information SBAR, Kardex, Intake/Output, MAR and Recent Results. Skin assessment completed.

## 2018-08-02 NOTE — PROGRESS NOTES
Problem: Falls - Risk of 
Goal: *Absence of Falls Document Adalgisa Collado Fall Risk and appropriate interventions in the flowsheet. Outcome: Progressing Towards Goal 
Fall Risk Interventions: 
Mobility Interventions: Patient to call before getting OOB Medication Interventions: Evaluate medications/consider consulting pharmacy Elimination Interventions: Call light in reach Problem: Pressure Injury - Risk of 
Goal: *Prevention of pressure injury Document Austin Scale and appropriate interventions in the flowsheet. Outcome: Progressing Towards Goal 
Pressure Injury Interventions: 
Sensory Interventions: Assess changes in LOC Moisture Interventions: Absorbent underpads Activity Interventions: PT/OT evaluation Mobility Interventions: HOB 30 degrees or less Nutrition Interventions: Document food/fluid/supplement intake Friction and Shear Interventions: HOB 30 degrees or less

## 2018-08-02 NOTE — PROGRESS NOTES
Problem: Falls - Risk of 
Goal: *Absence of Falls Document Tsering Guerrero Fall Risk and appropriate interventions in the flowsheet. Outcome: Progressing Towards Goal 
Fall Risk Interventions: 
Mobility Interventions: Patient to call before getting OOB Medication Interventions: Evaluate medications/consider consulting pharmacy, Patient to call before getting OOB, Teach patient to arise slowly Elimination Interventions: Patient to call for help with toileting needs, Call light in reach Problem: Pressure Injury - Risk of 
Goal: *Prevention of pressure injury Document Austin Scale and appropriate interventions in the flowsheet. Outcome: Progressing Towards Goal 
Pressure Injury Interventions: 
Sensory Interventions: Assess changes in LOC, Check visual cues for pain, Keep linens dry and wrinkle-free, Maintain/enhance activity level, Minimize linen layers Moisture Interventions: Absorbent underpads, Limit adult briefs Activity Interventions: PT/OT evaluation, Pressure redistribution bed/mattress(bed type), Increase time out of bed Mobility Interventions: HOB 30 degrees or less, Pressure redistribution bed/mattress (bed type), PT/OT evaluation Nutrition Interventions: Document food/fluid/supplement intake Friction and Shear Interventions: HOB 30 degrees or less, Foam dressings/transparent film/skin sealants

## 2018-08-02 NOTE — PROGRESS NOTES
Palliative Medicine AMD completed. No additional palliative needs. Will sign off. Zain Alcala MD 
Palliative Medicine

## 2018-08-02 NOTE — ROUTINE PROCESS
Bedside and Verbal shift change report given to [address] [facility] (oncoming nurse) by Guille Rogel (offgoing nurse). Report included the following information SBAR, Kardex, Intake/Output, MAR, Recent Results and Cardiac Rhythm NSR.  
 
1030 Pt resting in bed. Complaints of pain have been addressed. Collected blood sample for labs from pt's Mediport per policy. Will continue to monitor. 1245 Pt resting in bed. Complaints of pain have been addressed. Will continue to monitor. 1630 Changed pt's dressing to right foot. Pt tolerated without difficulty. Pt up and ambulating in room. Sheets changed and pain addressed. Will continue to monitor. 1845  Pt resting in bed. Complaints of pain have been addressed. Will continue to monitor. Bedside and Verbal shift change report given to [address] [facility] (oncoming nurse) by Vanesa Alvarado RN (offgoing nurse). Report included the following information SBAR, Kardex, Intake/Output, MAR, Recent Results and Cardiac Rhythm NSR.

## 2018-08-02 NOTE — INTERDISCIPLINARY ROUNDS
IDR Summary Patient: Adrianna Saenz MRN: [id number]    Age: 48 y.o.  : 1965 Admit Diagnosis: Sepsis ([facility] [address] Sepsis ([facility] [address] Problems pertinent to hospital stay: drain replaced yesterday Consults:P.T, O.T. and Case Management Testing due for patient today? YES Nutrition plan:Yes Mobility needs: Yes Lines/Tubes:  
IV: YES   Needed: YES Keita: NO  Needed:NO Central Line: YES Needed: YES   
 
VTE Prophylaxis: Chemical 
 
Care Management: 
Discharge plan: home  with home health PCP: Aylin Zambrano MD   
: NO Financial concerns:No  
Interventions:  
 
Wound care following Pain adequately controlled LOS: 5 days Expected days until discharge: GI recommends repair of fistula and pancreatic surgery- they can arrange Discharge TBD Signed:  
 
ODALIS Scott [address] [facility] Hospitalist Division Pager:  [phone] Office:  [phone]

## 2018-08-03 LAB
BACTERIA SPEC CULT: NORMAL
BACTERIA SPEC CULT: NORMAL
GLUCOSE BLD STRIP.AUTO-MCNC: 156 MG/DL (ref 70–110)
GLUCOSE BLD STRIP.AUTO-MCNC: 158 MG/DL (ref 70–110)
GLUCOSE BLD STRIP.AUTO-MCNC: 162 MG/DL (ref 70–110)
GLUCOSE BLD STRIP.AUTO-MCNC: 77 MG/DL (ref 70–110)
SERVICE CMNT-IMP: NORMAL
SERVICE CMNT-IMP: NORMAL

## 2018-08-03 PROCEDURE — 74011250637 HC RX REV CODE- 250/637: Performed by: HOSPITALIST

## 2018-08-03 PROCEDURE — 74011250637 HC RX REV CODE- 250/637: Performed by: INTERNAL MEDICINE

## 2018-08-03 PROCEDURE — 65660000000 HC RM CCU STEPDOWN

## 2018-08-03 PROCEDURE — 74011000258 HC RX REV CODE- 258: Performed by: INTERNAL MEDICINE

## 2018-08-03 PROCEDURE — 74011250636 HC RX REV CODE- 250/636: Performed by: INTERNAL MEDICINE

## 2018-08-03 PROCEDURE — 74011636637 HC RX REV CODE- 636/637: Performed by: FAMILY MEDICINE

## 2018-08-03 PROCEDURE — 74011250636 HC RX REV CODE- 250/636: Performed by: FAMILY MEDICINE

## 2018-08-03 PROCEDURE — 82962 GLUCOSE BLOOD TEST: CPT

## 2018-08-03 PROCEDURE — 74011000258 HC RX REV CODE- 258: Performed by: HOSPITALIST

## 2018-08-03 RX ORDER — INSULIN GLARGINE 100 [IU]/ML
6 INJECTION, SOLUTION SUBCUTANEOUS EVERY 12 HOURS
Status: DISCONTINUED | OUTPATIENT
Start: 2018-08-03 | End: 2018-08-03

## 2018-08-03 RX ORDER — INSULIN LISPRO 100 [IU]/ML
INJECTION, SOLUTION INTRAVENOUS; SUBCUTANEOUS
Status: DISCONTINUED | OUTPATIENT
Start: 2018-08-03 | End: 2018-08-04 | Stop reason: HOSPADM

## 2018-08-03 RX ORDER — INSULIN GLARGINE 100 [IU]/ML
5 INJECTION, SOLUTION SUBCUTANEOUS EVERY 12 HOURS
Status: DISCONTINUED | OUTPATIENT
Start: 2018-08-03 | End: 2018-08-04 | Stop reason: HOSPADM

## 2018-08-03 RX ORDER — LINEZOLID 2 MG/ML
600 INJECTION, SOLUTION INTRAVENOUS EVERY 12 HOURS
Status: DISCONTINUED | OUTPATIENT
Start: 2018-08-03 | End: 2018-08-03

## 2018-08-03 RX ORDER — LINEZOLID 2 MG/ML
600 INJECTION, SOLUTION INTRAVENOUS EVERY 12 HOURS
Status: DISCONTINUED | OUTPATIENT
Start: 2018-08-03 | End: 2018-08-04 | Stop reason: HOSPADM

## 2018-08-03 RX ADMIN — HYDROMORPHONE HYDROCHLORIDE 1 MG: 1 INJECTION, SOLUTION INTRAMUSCULAR; INTRAVENOUS; SUBCUTANEOUS at 04:07

## 2018-08-03 RX ADMIN — SUCRALFATE 1 G: 1 TABLET ORAL at 21:04

## 2018-08-03 RX ADMIN — LINEZOLID 600 MG: 600 INJECTION, SOLUTION INTRAVENOUS at 14:00

## 2018-08-03 RX ADMIN — SUCRALFATE 1 G: 1 TABLET ORAL at 12:41

## 2018-08-03 RX ADMIN — FERROUS SULFATE TAB 325 MG (65 MG ELEMENTAL FE) 325 MG: 325 (65 FE) TAB at 09:30

## 2018-08-03 RX ADMIN — PANTOPRAZOLE SODIUM 40 MG: 40 TABLET, DELAYED RELEASE ORAL at 17:40

## 2018-08-03 RX ADMIN — LINEZOLID 600 MG: 600 INJECTION, SOLUTION INTRAVENOUS at 21:04

## 2018-08-03 RX ADMIN — INSULIN GLARGINE 5 UNITS: 100 INJECTION, SOLUTION SUBCUTANEOUS at 09:28

## 2018-08-03 RX ADMIN — OXYCODONE HYDROCHLORIDE AND ACETAMINOPHEN 1 TABLET: 5; 325 TABLET ORAL at 17:40

## 2018-08-03 RX ADMIN — HYDROMORPHONE HYDROCHLORIDE 1 MG: 1 INJECTION, SOLUTION INTRAMUSCULAR; INTRAVENOUS; SUBCUTANEOUS at 12:41

## 2018-08-03 RX ADMIN — INSULIN GLARGINE 5 UNITS: 100 INJECTION, SOLUTION SUBCUTANEOUS at 22:11

## 2018-08-03 RX ADMIN — FOLIC ACID 1 MG: 1 TABLET ORAL at 09:30

## 2018-08-03 RX ADMIN — FERROUS SULFATE TAB 325 MG (65 MG ELEMENTAL FE) 325 MG: 325 (65 FE) TAB at 17:39

## 2018-08-03 RX ADMIN — HYDROMORPHONE HYDROCHLORIDE 1 MG: 1 INJECTION, SOLUTION INTRAMUSCULAR; INTRAVENOUS; SUBCUTANEOUS at 09:35

## 2018-08-03 RX ADMIN — OXYCODONE HYDROCHLORIDE AND ACETAMINOPHEN 1 TABLET: 5; 325 TABLET ORAL at 11:08

## 2018-08-03 RX ADMIN — PANCRELIPASE 3 CAPSULE: 10000; 34000; 55000 CAPSULE, DELAYED RELEASE ORAL at 09:30

## 2018-08-03 RX ADMIN — PANTOPRAZOLE SODIUM 40 MG: 40 TABLET, DELAYED RELEASE ORAL at 09:30

## 2018-08-03 RX ADMIN — PANCRELIPASE 3 CAPSULE: 10000; 34000; 55000 CAPSULE, DELAYED RELEASE ORAL at 17:39

## 2018-08-03 RX ADMIN — DEXTROSE MONOHYDRATE AND SODIUM CHLORIDE 150 ML/HR: 5; .45 INJECTION, SOLUTION INTRAVENOUS at 15:45

## 2018-08-03 RX ADMIN — DEXTROSE MONOHYDRATE AND SODIUM CHLORIDE 150 ML/HR: 5; .45 INJECTION, SOLUTION INTRAVENOUS at 09:35

## 2018-08-03 RX ADMIN — HYDROMORPHONE HYDROCHLORIDE 1 MG: 1 INJECTION, SOLUTION INTRAMUSCULAR; INTRAVENOUS; SUBCUTANEOUS at 00:17

## 2018-08-03 RX ADMIN — HYDROMORPHONE HYDROCHLORIDE 1 MG: 1 INJECTION, SOLUTION INTRAMUSCULAR; INTRAVENOUS; SUBCUTANEOUS at 18:49

## 2018-08-03 RX ADMIN — Medication 10 ML: at 06:00

## 2018-08-03 RX ADMIN — SUCRALFATE 1 G: 1 TABLET ORAL at 09:30

## 2018-08-03 RX ADMIN — MEROPENEM 1 G: 1 INJECTION, POWDER, FOR SOLUTION INTRAVENOUS at 06:45

## 2018-08-03 RX ADMIN — PANCRELIPASE 3 CAPSULE: 10000; 34000; 55000 CAPSULE, DELAYED RELEASE ORAL at 11:08

## 2018-08-03 RX ADMIN — Medication 500 MG: at 09:30

## 2018-08-03 RX ADMIN — VITAMIN D, TAB 1000IU (100/BT) 2000 UNITS: 25 TAB at 09:30

## 2018-08-03 RX ADMIN — DEXTROSE MONOHYDRATE AND SODIUM CHLORIDE 150 ML/HR: 5; .45 INJECTION, SOLUTION INTRAVENOUS at 00:17

## 2018-08-03 RX ADMIN — Medication 10 ML: at 21:05

## 2018-08-03 RX ADMIN — HYDROMORPHONE HYDROCHLORIDE 1 MG: 1 INJECTION, SOLUTION INTRAMUSCULAR; INTRAVENOUS; SUBCUTANEOUS at 22:10

## 2018-08-03 RX ADMIN — Medication 10 ML: at 14:00

## 2018-08-03 RX ADMIN — INSULIN LISPRO 3 UNITS: 100 INJECTION, SOLUTION INTRAVENOUS; SUBCUTANEOUS at 09:27

## 2018-08-03 RX ADMIN — CYANOCOBALAMIN TAB 1000 MCG 1000 MCG: 1000 TAB at 09:30

## 2018-08-03 RX ADMIN — INSULIN LISPRO 2 UNITS: 100 INJECTION, SOLUTION INTRAVENOUS; SUBCUTANEOUS at 17:40

## 2018-08-03 RX ADMIN — SUCRALFATE 1 G: 1 TABLET ORAL at 17:39

## 2018-08-03 RX ADMIN — HYDROMORPHONE HYDROCHLORIDE 1 MG: 1 INJECTION, SOLUTION INTRAMUSCULAR; INTRAVENOUS; SUBCUTANEOUS at 15:41

## 2018-08-03 RX ADMIN — MEROPENEM 1 G: 1 INJECTION, POWDER, FOR SOLUTION INTRAVENOUS at 12:41

## 2018-08-03 RX ADMIN — HYDROMORPHONE HYDROCHLORIDE 1 MG: 1 INJECTION, SOLUTION INTRAMUSCULAR; INTRAVENOUS; SUBCUTANEOUS at 06:45

## 2018-08-03 NOTE — PROGRESS NOTES
1900 -- Bedside, Verbal and Written shift change report given to [address] (oncoming nurse) by Shazia Halo nurse). Report included the following information SBAR, Kardex, Intake/Output, MAR and Recent Results. 1930 -- Mediport dressing change completed. 
   
 2102 -- PM medications administered, pt tolerated with ease, will continue to monitor. 2211 -- PRN pain medications administered, pt tolerated with ease, will continue to monitor. 
  
 0000 -- Shift reassessment, pt condition unchanged, will continue to monitor. 0405 -- PRN pain medications administered, pt tolerated with ease, will continue to monitor. 
   
 0500 --  Shift reassessment, pt condition unchanged, will continue to monitor.   
 
 0510 -- PRN pain medications administered, pt tolerated with ease, will continue to monitor. 
   
 0700 -- Bedside, Verbal and Written shift change report given to THADDEUS  (oncoming nurse) by BLAINE (offgoing nurse). Report included the following information SBAR, Kardex, Intake/Output, MAR and Recent Results. Skin assessment completed.

## 2018-08-03 NOTE — INTERDISCIPLINARY ROUNDS
IDR Summary Patient: Gerson Galeano MRN: [id number]    Age: 48 y.o.  : 1965 Admit Diagnosis: Sepsis ([address] Sepsis ([address] Problems pertinent to hospital stay: drain replaced yesterday Consults:P.T, O.T. and Case Management Testing due for patient today? YES Nutrition plan:Yes Mobility needs: Yes Lines/Tubes:  
IV: YES   Needed: YES Keita: NO  Needed:NO Central Line: YES Needed: YES   
 
VTE Prophylaxis: Chemical 
 
Care Management: 
Discharge plan: home  with home health PCP: Georgina Hernandez MD   
: NO Financial concerns:No  
Interventions:  
 
Wound care following Pain adequately controlled Waiting on culture LOS: 6 days Expected days until discharge: GI recommends repair of fistula and pancreatic surgery- they can arrange Discharge TBD Signed:  
 
AIMEE CelisC [address] [facility] Hospitalist Division Pager:  [phone] Office:  [phone]

## 2018-08-03 NOTE — ROUTINE PROCESS
0730 Bedside, Verbal and Written shift change report given to [address] [place] [address],[address] (oncoming nurse) by Cameron Nur RN (offgoing nurse). Report included the following information SBAR and Kardex. Patient currently resting in bed, alert and oriented to all spheres, call bell in reach, 5 Ps and hourly rounding completed, bed locked in low position 0800 assessment completed 0945 pain meds given, and due meds given

## 2018-08-03 NOTE — PROGRESS NOTES
NUTRITION follow-up/Plan of care RECOMMENDATIONS:  
1. Consistent CHO 2. Ensure TID 3. Monitor weight, labs and PO intake 4. RD to follow GOALS:  
1. Ongoing: PO intake meets >75% of protein/calorie needs by 8/8 
2. Ongoing: Maintain weight (+/- 1-2 lb) by 8/10 ASSESSMENT:  
 Wt status is classified as underweight per BMI of 18.1. Adequate PO intake. Labs noted. BG range () over the past 24 hours. Nutrition recommendations listed. RD to follow. Nutrition Risk:  []   High [x]  Moderate [] Low SUBJECTIVE/OBJECTIVE:  
Pt admitted for sepsis. 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 Noted multiple wounds in nursing documentation including two Stage II pressure injury to R toe and a Stage I to sacrum. Pt now with a good appetite for the past 3 days and consuming % of meals. Pt seen in room with visitor in chair at bedside. Tolerating diet and doing well. Encouraged intake and will monitor. Information Obtained From:  
[x] Chart Review [x] Patient 
[] Family/Caregiver 
[] Nurse/Physician  
[] Patient Rounds/Interdisciplinary Meeting Diet: Consistent CHO Patient Vitals for the past 100 hrs: 
 % Diet Eaten 08/03/18 1029 75 % 08/02/18 1745 75 % 08/02/18 1230 75 % 08/02/18 0930 100 % 08/01/18 1738 100 % 08/01/18 1003 75 %  
07/31/18 1837 50 % Medications: [x] Reviewed IV: D5 1/2 NS @150 mL/hr Encounter Diagnoses ICD-10-CM ICD-9-CM 1. Sepsis, due to unspecified organism ([facility] A41.9 038.9  
  995.91  
2. Alcohol-induced chronic pancreatitis (HCC) K86.0 577.1 3. Abdominal pain, epigastric R10.13 789.06  
4. Weakness R53.1 780.79  
5. Acute kidney injury ([facility] N17.9 584.9 6. Hypotension, unspecified hypotension type I95.9 458.9 7. 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  
8. Abscess L02.91 682.9 Past Medical History:  
Diagnosis Date  Abdominal pain, generalized  Chronic pancreatitis ([facility]  Diabetes ([facility]   
 hypothyroid  Encounter for long-term (current) use of other medications  Essential hypertension  ETOH abuse  GERD (gastroesophageal reflux disease)  [facility] failure ([facility]  Hyponatremia  Pain in the abdomen 11/2/2010  Pancreatitis w/ abscess and pseudocyst  
 Pancreatitis  Psychiatric disorder   
 depression, anxiety  Tobacco abuse Labs:  Lab Results Component Value Date/Time Sodium 143 08/02/2018 10:30 AM  
 Potassium 3.7 08/02/2018 10:30 AM  
 Chloride 115 (H) 08/02/2018 10:30 AM  
 CO2 21 08/02/2018 10:30 AM  
 Anion gap 7 08/02/2018 10:30 AM  
 Glucose 197 (H) 08/02/2018 10:30 AM  
 BUN 4 (L) 08/02/2018 10:30 AM  
 Creatinine 0.93 08/02/2018 10:30 AM  
 Calcium 7.8 (L) 08/02/2018 10:30 AM  
 Magnesium 1.7 06/29/2018 03:35 AM  
 Phosphorus 3.1 04/19/2018 09:39 AM  
 Albumin 2.4 (L) 08/02/2018 10:30 AM  
 
Anthropometrics: BMI (calculated): 18.1 Last 3 Recorded Weights in this Encounter 08/01/18 4117 08/02/18 6218 08/03/18 6976 Weight: 59.4 kg (131 lb) 60.8 kg (134 lb) 62.1 kg (137 lb) Ht Readings from Last 1 Encounters:  
07/30/18 6' (1.829 m)  
 
[]  Weight Loss 
[x]  Weight Gain since admission 
[]  Weight Stable  
[]  New wt n/a on record Estimated Nutrition Needs:  
7933 Kcals/day Protein (g): 88 g Nutrition Problems Identified:  
[] Suboptimal PO intake [x] Food Allergies [] Difficulty chewing/swallowing/poor dentition 
[] Constipation/Diarrhea  
[] Nausea/Vomiting  
[] None 
[] Other:  
 
Plan:  
[x] Therapeutic Diet 
[]  Obtained/adjusted food preferences/tolerances and/or snacks options [x]  Continue supplements added  
[] Occupational therapy following for feeding techniques []  HS snack added  
[]  Modify diet texture  
[x]  Modify diet for food allergies []  Educate patient  
[]  Assist with menu selection  
[x]  Monitor PO intake on meal rounds [x]  Continue inpatient monitoring and intervention  
[]  Participated in discharge planning/Interdisciplinary rounds/Team meetings  
[]  Other:  
 
Education Needs: 
 [] Not appropriate for teaching at this time due to: 
 [x] Identified and addressed Nutrition Monitoring and Evaluation: 
 [x] Continue inpatient monitoring and interventions [] Other:  
 
Dalila [facility]

## 2018-08-03 NOTE — PROGRESS NOTES
1900 -- Bedside, Verbal and Written shift change report given to [address] [facility] (oncoming nurse) by Troy Graham nurse). Report included the following information SBAR, Kardex, Intake/Output, MAR and Recent Results. 2045 -- Podiatry consult order resubmitted from original date 07/31/18. 
   
2125 -- PM and PRN pain medications administered, pt tolerated with ease, will continue to monitor. 0015 -- PRN pain medications administered, pt tolerated with ease, will continue to monitor. 
  
 0030 -- Shift reassessment, pt condition unchanged, will continue to monitor. 0400 -- PRN pain medications administered, pt tolerated with ease, will continue to monitor. 
   
 0500 --  Shift reassessment, pt condition unchanged, will continue to monitor.   
 
 0645 -- PRN pain medications administered, pt tolerated with ease, will continue to monitor. 
   
0700  -- Bedside, Verbal and Written shift change report given to [address] [facility]) by BLAINE (offgoing nurse). Report included the following information SBAR, Kardex, Intake/Output, MAR and Recent Results. Skin assessment completed.

## 2018-08-03 NOTE — PROGRESS NOTES
Hospitalist Progress Note 
t Daily Progress Note: 8/3/2018 9:22 AM 
 
   
Interval history / Subjective:  
Talia Fair is a 48y.o. year old male who presented with increasing abdominal pain in addition to extremity weakness. 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 He has chronic pain syndrome as well. Code s called for extremity weakness but later cancelled, appears to be metabolically related. Patient found to have wbc elevation and hypotension. CT abdm/pelvis c/w chronic fistulous tract leading from the duodenum to a small abscess collection along the right flank with the abscess measures approximately 1.8 x 2.3 cm. Admitted for treatment and further work-up. Blood cx NGTD. Surgery consulted 7/30. Recommend referral to pancreatic surgery. GI following and will arrange referral on patient discharge. IR consulted 7/30 for percutaneous drainage of abscess ID consulted for infected abdominal abscess 7/31 8/1 MRI pancreas result noted 8/1 GI signed off Current Facility-Administered Medications Medication Dose Route Frequency  insulin glargine (LANTUS) injection 6 Units  6 Units SubCUTAneous Q12H  
 insulin lispro (HUMALOG) injection   SubCUTAneous AC&HS  meropenem (MERREM) 1 g in 0.9% sodium chloride (MBP/ADV) 50 mL MBP  1 g IntraVENous Q8H  
 HYDROmorphone (DILAUDID) syringe 1 mg  1 mg IntraVENous Q3H PRN  
 oxyCODONE-acetaminophen (PERCOCET) 5-325 mg per tablet 1 Tab  1 Tab Oral Q6H PRN  
 lipase-protease-amylase (ZENPEP 10,000) capsule 3 Cap  3 Cap Oral TID WITH MEALS  sodium chloride (NS) flush 5-10 mL  5-10 mL IntraVENous PRN  
 ascorbic acid (vitamin C) (VITAMIN C) tablet 500 mg  500 mg Oral DAILY  cholecalciferol (VITAMIN D3) tablet 2,000 Units  2,000 Units Oral DAILY  cyanocobalamin tablet 1,000 mcg  1,000 mcg Oral DAILY  ferrous sulfate tablet 325 mg  325 mg Oral BID  
 folic acid (FOLVITE) tablet 1 mg  1 mg Oral DAILY  pantoprazole (PROTONIX) tablet 40 mg  40 mg Oral BID  sucralfate (CARAFATE) tablet 1 g  1 g Oral QID  promethazine (PHENERGAN) tablet 25 mg  25 mg Oral Q6H PRN  
 dextrose 5 % - 0.45% NaCl infusion  150 mL/hr IntraVENous CONTINUOUS  
 ondansetron (ZOFRAN) injection 4 mg  4 mg IntraVENous Q4H PRN  
 enoxaparin (LOVENOX) injection 40 mg  40 mg SubCUTAneous Q24H  
 glucose chewable tablet 16 g  4 Tab Oral PRN  
 glucagon (GLUCAGEN) injection 1 mg  1 mg IntraMUSCular PRN  
 dextrose (D50W) injection syrg 12.5-25 g  25-50 mL IntraVENous PRN  
 sodium chloride (NS) flush 5-10 mL  5-10 mL IntraVENous Q8H  
 sodium chloride (NS) flush 5-10 mL  5-10 mL IntraVENous PRN Review of Systems Saying that he did not sleep well due to abdominal pain. Objective:  
 
Visit Vitals  /77 (BP 1 Location: Right arm, BP Patient Position: At rest)  Pulse 87  Temp 97.5 °F (36.4 °C)  Resp 20  
 Ht 6' (1.829 m)  Wt 62.1 kg (137 lb)  SpO2 100%  BMI 18.07 kg/m2 O2 Device: Room air Temp (24hrs), Av.1 °F (36.7 °C), Min:97.5 °F (36.4 °C), Max:98.8 °F (37.1 °C) 
 
 
701 -  1900 In: 120 [P.O.:120] Out: -  
 190 -  0700 In: 6448.5 [P.O.:836; I.V.:5612.5] Out: 2370 [Urine:2250; Drains:120] Physical Exam: 
General appearance: looks anxious due to abdominal pain,not in acute distress Head: Normocephalic, without obvious abnormality, atraumatic Lungs: clear to auscultation bilaterally Heart: regular rate and rhythm, S1, S2 normal, no murmur, click, rub or gallop Abdomen:tender to palpation,diffusely,no rebound Extremities: extremities normal, atraumatic, no cyanosis or edema Neurologic: Grossly normal 
   
Data Review Recent Results (from the past 12 hour(s)) GLUCOSE, POC Collection Time: 18  7:44 AM  
Result Value Ref Range Glucose (POC) 156 (H) 70 - 110 mg/dL MRI pancreas 8/1 
Comparison: CT abdomen 7/28/2018, MRI 11/23/2015 
  
Indication: Chronic duodenal cutaneous fistula. Chronic pancreatitis. 
  
Technique: Multiplanar, multisequence MRI acquisition of the abdomen with and 
without contrast. MRCP sequences were included. Postprocessing 3D rendering was 
done on a separate workstation under concurrent physician supervision. 
  
======== 
  
FINDINGS: 
  
LIVER: Normal contour and signal characteristics. No suspicious lesion. 
  
BILIARY: Configuration of the biliary tract with central intrahepatic biliary 
dilation and decompressed common bile duct is unchanged in configuration since 11/23/2015. No focal filling defect. The gallbladder has been removed. 
  
SPLEEN: Small splenic cyst is unchanged. 
  
PANCREAS: Diffuse pancreatic ductal dilation within the pancreatic tail, which 
may be related to an obstructing calculus, related to chronic pancreatitis is 
unchanged in appearance since 11/23/2015.  
  
ADRENALS: Normal. 
  
KIDNEYS: Mild prominence of the central right renal collecting system without 
ureteral dilation is unchanged since CT 6/28/2018. Small bilateral renal cysts 
are present. 
  
LYMPH NODES: Increased number of retroperitoneal lymph nodes are present, which 
are less than a centimeter, likely reactive. 
  
GI TRACT: Wall thickening with tethering is present at the anterior medial 
aspect of the duodenum, likely related to the chronic known duodenal cutaneous 
fistula. Otherwise, the visualized portions of small bowel and colon are normal 
in caliber and wall thickness. 
  
VASCULATURE: Unremarkable. 
  
OTHER: Lung bases appear clear. No acute osseous findings. 
  
Pigtail for the drainage catheter is just below the level of the pancreatic 
head. The previously noted fluid collection appears decompressed. The previously 
noted fluid collection is decompressed, with trace residual contrast present on 
the precontrast scan. . A small amount of free fluid is present within the right 
lower quadrant. 
  
======== 
  
IMPRESSION IMPRESSION: 
  
  
1. Interval placement of a drainage catheter inferior to the pancreatic head 
within the duodenal cutaneous fistula related to fluid collection, which has 
since been decompressed. No significant residual fluid. Small amount of free 
fluid is present within the right lower quadrant. Focal tethering with wall 
thickening is present at the medial anterior aspect of the second portion the 
duodenum, likely the site of the fistula. 
  
2. Pancreatic ductal dilation within the pancreatic tail, related to chronic 
pancreatitis is unchanged since 2015. Mild smooth intrahepatic biliary dilation 
with normal caliber common bile duct is unchanged in appearance since 2015. 
  
  
 
 
Assessment/Plan:  
 
Principal Problem: 
  Sepsis ([facility] [address] (4/15/2016) Active Problems: Abscess of abdominal cavity ([address] (6/19/2013) Overview: Abdominal abscess - most likely infected fluid collection from duodenal  
    fistula related to pancreatic disease. Doing well with drainage and  
    antibiotics. Fistulogram on Friday noted continued fistula to the  
    duodenum/pancreas. Will need referral to pancreatic surgeon. IDDM (insulin dependent diabetes mellitus) ([facility] [address] (8/31/2013) Chronic pain (3/23/2016) Enterocutaneous fistula (7/28/2018) Care Plan 1) Sepsis 
 - likely from abscess/fistula 
 -CT abdomen c/w enterocutaneous fistula extending from the duodenum to the right lateral abdomen wall with a small abscess collection along the right flank measuring 2.2 x 1.8 cm. 
- IR consulted 7/30 for possible  percutaneous drainage - Triple abx d/c's 7/31 and switched to meropenem - IV fluids - Body fluid culture + streptococci  
- ID following , recommending Linezolid - 8/3 meropenem d/c'd 
   
2) Enterocutaneous fistula, chronic pancreatitis with abscess  - GI and surgery consulted in ED 
 - NPO but meds, IVF, pain meds as bp can tolerate - Surgery signed off 7/30 , suggest referral to tertiary center with a pancreatic surgeon - IR Drainage with NATALIE drain 7/31 
- GI plans to arrange consult fo pancreatic surgeon on discharge - ID consulted   
3) DM 
 -SSI , q 6 accucheck   
4) Chronic pain - Dilaudid prn 
  -UA and xray negative 5) Chronic pancreatitis - pain control - MRI pancreas 8/1 by GI 
 
 
   
DVT prophylaxis:scds Full code Disposition:tbd

## 2018-08-03 NOTE — PROGRESS NOTES
Problem: Falls - Risk of 
Goal: *Absence of Falls Document Tsering Guerrero Fall Risk and appropriate interventions in the flowsheet. Outcome: Progressing Towards Goal 
Fall Risk Interventions: 
Mobility Interventions: Patient to call before getting OOB Medication Interventions: Evaluate medications/consider consulting pharmacy, Patient to call before getting OOB, Teach patient to arise slowly Elimination Interventions: Call light in reach, Urinal in reach Problem: Pressure Injury - Risk of 
Goal: *Prevention of pressure injury Document Austin Scale and appropriate interventions in the flowsheet. Outcome: Progressing Towards Goal 
Pressure Injury Interventions: 
Sensory Interventions: Assess changes in LOC, Check visual cues for pain, Keep linens dry and wrinkle-free, Maintain/enhance activity level, Minimize linen layers Moisture Interventions: Absorbent underpads, Limit adult briefs Activity Interventions: PT/OT evaluation, Pressure redistribution bed/mattress(bed type), Increase time out of bed Mobility Interventions: HOB 30 degrees or less, Pressure redistribution bed/mattress (bed type), PT/OT evaluation Nutrition Interventions: Document food/fluid/supplement intake Friction and Shear Interventions: HOB 30 degrees or less, Foam dressings/transparent film/skin sealants

## 2018-08-03 NOTE — MANAGEMENT PLAN
Discharge/Transition Planning Patient plan remains home with [facility] [place] if he accept. Chronically refuses Home Health or does not have working number. Needs Home Health and wound care Regina Franklin RN BSN Outcomes Manager Pager # [phone]

## 2018-08-03 NOTE — PROGRESS NOTES
INFECTIOUS DISEASE FOLLOW UP NOTE :-  
Will follow back on Monday, Dr Tanesha Soni is on call this weekend, please call us at [phone] for any questions or concerns. Admit Date: 7/28/2018 ABX;   
 
Current  Prior   
linezolid 8/3 - 0  Aztreonam/Flagyl 7/28-3, Meropenem 7/31-3 ASSESSMENT: -> RECS Sepsis/SIRS on presentation - pt with fever, leucocytosis, thrombocytosis, elevated Cr, elevated LFTs 
- h/o ESBL in past  -improved New intra-abd abscess- seen on CT 
- s/p IR drainage 7/31  
- cx growing non hemolytic strep , yeast . Yeast likely colonizer -> f/up final cx - if E.faecalis [ as in previous cx then meropenem should cover anyways ], however will switch abx to Linezolid in case it is VRE and also for oral transition. 
-> since MRI abdomen already showing complete drainage of abscess, will do short course 5 days of abx. [ 2 more days ] 
-> d/w Dr Marine Mendoza H/o Chronic pancreaticoduodeno -cutaneous fistula  
-SP drain replacement by IR 5/24 
-apparently dislodged with h/o recurrent abscess when drain out, CTAP 5/22 no abscess reported. -skin around drain irritated from leakage around drain, not cellulitic appearing  
-5/23 wd culture ESBL E coli (history of incl 7/2017) E faecalis C glabrata-- suspect colonizers as not septic appearing no abscess afebrile wbc nl drain functioning  
-> was taken off all Abx 5/29 as CT showed resolution of abscess  
-> Gi planning for outpt referral to tertiary center. 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 
- NSx has been consulted and conservative Mx at this time Chronic abd pain with nausea and vomiting - seen by GI 
- plan for referral to tertiary care center when stable  
h/o recurrent pancreatitis, pseudocysts- h/o EtOH     
Chronic abd pain ->mgmt per IM  
IDDM - uncontrolled ->bs control per IM   
PCN allergy w/ rash recorded -> tolerated Meropenem in past  
 
MICROBIOLOGY:  
7/28  Blcx x 2 - neg 
7/30  C.diff neg 8/1    Body fluid cx - moderate non hemolytic strep , yeast  
 
LINES AND CATHETERS:  
piv SUBJECTIVE :  
 
Interval notes reviewed. Pt is feeling better , tolerating diet well, NATALIE draining less but cont to have purulent material. No fever/chills, abdominal pain same as before which has been chronic. MEDICATIONS :  
 
Current Facility-Administered Medications Medication Dose Route Frequency  insulin glargine (LANTUS) injection 6 Units  6 Units SubCUTAneous Q12H  
 insulin lispro (HUMALOG) injection   SubCUTAneous AC&HS  meropenem (MERREM) 1 g in 0.9% sodium chloride (MBP/ADV) 50 mL MBP  1 g IntraVENous Q8H  
 HYDROmorphone (DILAUDID) syringe 1 mg  1 mg IntraVENous Q3H PRN  
 oxyCODONE-acetaminophen (PERCOCET) 5-325 mg per tablet 1 Tab  1 Tab Oral Q6H PRN  
 lipase-protease-amylase (ZENPEP 10,000) capsule 3 Cap  3 Cap Oral TID WITH MEALS  sodium chloride (NS) flush 5-10 mL  5-10 mL IntraVENous PRN  
 ascorbic acid (vitamin C) (VITAMIN C) tablet 500 mg  500 mg Oral DAILY  cholecalciferol (VITAMIN D3) tablet 2,000 Units  2,000 Units Oral DAILY  cyanocobalamin tablet 1,000 mcg  1,000 mcg Oral DAILY  ferrous sulfate tablet 325 mg  325 mg Oral BID  folic acid (FOLVITE) tablet 1 mg  1 mg Oral DAILY  pantoprazole (PROTONIX) tablet 40 mg  40 mg Oral BID  sucralfate (CARAFATE) tablet 1 g  1 g Oral QID  promethazine (PHENERGAN) tablet 25 mg  25 mg Oral Q6H PRN  
 dextrose 5 % - 0.45% NaCl infusion  150 mL/hr IntraVENous CONTINUOUS  
 ondansetron (ZOFRAN) injection 4 mg  4 mg IntraVENous Q4H PRN  
 enoxaparin (LOVENOX) injection 40 mg  40 mg SubCUTAneous Q24H  
 glucose chewable tablet 16 g  4 Tab Oral PRN  
 glucagon (GLUCAGEN) injection 1 mg  1 mg IntraMUSCular PRN  
 dextrose (D50W) injection syrg 12.5-25 g  25-50 mL IntraVENous PRN  
 sodium chloride (NS) flush 5-10 mL  5-10 mL IntraVENous Q8H  
 sodium chloride (NS) flush 5-10 mL  5-10 mL IntraVENous PRN OBJECTIVE :  
 
Visit Vitals  BP (!) 143/99  Pulse 98  Temp 98 °F (36.7 °C)  Resp 20  
 Ht 6' (1.829 m)  Wt 62.1 kg (137 lb)  SpO2 100%  BMI 18.07 kg/m2 Temp (24hrs), Av.1 °F (36.7 °C), Min:97.5 °F (36.4 °C), Max:98.8 °F (37.1 °C) GEN - thin built  male not in distress RESP- ctab CVS- s1s2 normal, no murmur ABD-soft, NATALIE drain present RLQ with purulent muddy discharge , bs present EXT-no edema SKIN -no rash NEURO - awake, alert, O x3 Labs: Results:  
Chemistry Recent Labs 18 1030  18 
 0410 GLU  197*  132* NA  143  140  
K  3.7  4.0  
CL  115*  113* CO2  21  20* BUN  4*  6*  
CREA  0.93  0.89 CA  7.8*  8.0* AGAP  7  7 BUCR  4*  7* AP  139*  156* TP  5.8*  6.1* ALB  2.4*  2.4*  
GLOB  3.4  3.7 AGRAT  0.7*  0.6* CBC w/Diff Recent Labs 18 1030  18 
 0410 WBC  4.2*  4.1*  
RBC  2.82*  3.00* HGB  8.7*  9.3* HCT  26.4*  28.5* PLT  228  245 GRANS  53  52 LYMPH  27  20* EOS  10*  12* RADIOLOGY :   
MRI abdomen  - IMPRESSION: 
1. Interval placement of a drainage catheter inferior to the pancreatic head 
within the duodenal cutaneous fistula related to fluid collection, which has 
since been decompressed. No significant residual fluid. Small amount of free 
fluid is present within the right lower quadrant. Focal tethering with wall 
thickening is present at the medial anterior aspect of the second portion the 
duodenum, likely the site of the fistula. 
  
2. Pancreatic ductal dilation within the pancreatic tail, related to chronic 
pancreatitis is unchanged since .  Mild smooth intrahepatic biliary dilation 
with normal caliber common bile duct is unchanged in appearance since 2015. 
 
 - CT abdo/pelvis - IMPRESSION: 
Decompressed inferior vena cava raising the possibility of hypovolemia Fluid-filled small and large bowel loops suggesting gastroenteritis without 
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 
right lateral abdomen wall with a small abscess collection along the right flank 
measuring 2.2 x 1.8 cm. Duodenum is tethered to the abscess. The previously 
noted pigtail catheter in this collection has been removed. Fistulous tract is 
visualized. Evidence of chronic calcific pancreatitis with a dilated pancreatic duct along 
the tail which may represent pancreatic stricturing or obstruction due to 
calculi. Gastric wall thickening which may reflect under distention versus gastritis Small hiatal hernia Nonobstructive renal calculi Avascular necrosis of the femoral heads 7/31 - IMPRESSION: 
  
Successful insertion of right lower quadrant 16 Filipino pigtail catheter. Chito Monroy MD 
August 3, 2018 [facility] [phone]

## 2018-08-04 VITALS
DIASTOLIC BLOOD PRESSURE: 69 MMHG | SYSTOLIC BLOOD PRESSURE: 127 MMHG | HEART RATE: 86 BPM | HEIGHT: 72 IN | BODY MASS INDEX: 19.1 KG/M2 | WEIGHT: 141 LBS | RESPIRATION RATE: 20 BRPM | TEMPERATURE: 97.6 F | OXYGEN SATURATION: 100 %

## 2018-08-04 LAB
GLUCOSE BLD STRIP.AUTO-MCNC: 123 MG/DL (ref 70–110)
GLUCOSE BLD STRIP.AUTO-MCNC: 156 MG/DL (ref 70–110)

## 2018-08-04 PROCEDURE — 82962 GLUCOSE BLOOD TEST: CPT

## 2018-08-04 PROCEDURE — 74011636637 HC RX REV CODE- 636/637: Performed by: FAMILY MEDICINE

## 2018-08-04 PROCEDURE — 74011250636 HC RX REV CODE- 250/636: Performed by: FAMILY MEDICINE

## 2018-08-04 PROCEDURE — 74011250637 HC RX REV CODE- 250/637: Performed by: INTERNAL MEDICINE

## 2018-08-04 PROCEDURE — 74011000258 HC RX REV CODE- 258: Performed by: HOSPITALIST

## 2018-08-04 PROCEDURE — 74011250637 HC RX REV CODE- 250/637: Performed by: HOSPITALIST

## 2018-08-04 RX ORDER — LINEZOLID 600 MG/1
600 TABLET, FILM COATED ORAL 2 TIMES DAILY
Qty: 20 TAB | Refills: 0 | Status: SHIPPED | OUTPATIENT
Start: 2018-08-04 | End: 2018-09-21

## 2018-08-04 RX ORDER — OXYCODONE AND ACETAMINOPHEN 5; 325 MG/1; MG/1
1 TABLET ORAL
Qty: 12 TAB | Refills: 0 | Status: ON HOLD | OUTPATIENT
Start: 2018-08-04 | End: 2018-09-21

## 2018-08-04 RX ADMIN — INSULIN GLARGINE 5 UNITS: 100 INJECTION, SOLUTION SUBCUTANEOUS at 09:12

## 2018-08-04 RX ADMIN — SUCRALFATE 1 G: 1 TABLET ORAL at 09:13

## 2018-08-04 RX ADMIN — DEXTROSE MONOHYDRATE AND SODIUM CHLORIDE 150 ML/HR: 5; .45 INJECTION, SOLUTION INTRAVENOUS at 09:30

## 2018-08-04 RX ADMIN — LINEZOLID 600 MG: 600 INJECTION, SOLUTION INTRAVENOUS at 09:12

## 2018-08-04 RX ADMIN — PANCRELIPASE 3 CAPSULE: 10000; 34000; 55000 CAPSULE, DELAYED RELEASE ORAL at 13:26

## 2018-08-04 RX ADMIN — HYDROMORPHONE HYDROCHLORIDE 1 MG: 1 INJECTION, SOLUTION INTRAMUSCULAR; INTRAVENOUS; SUBCUTANEOUS at 09:15

## 2018-08-04 RX ADMIN — VITAMIN D, TAB 1000IU (100/BT) 2000 UNITS: 25 TAB at 09:13

## 2018-08-04 RX ADMIN — DEXTROSE MONOHYDRATE AND SODIUM CHLORIDE 150 ML/HR: 5; .45 INJECTION, SOLUTION INTRAVENOUS at 02:18

## 2018-08-04 RX ADMIN — OXYCODONE HYDROCHLORIDE AND ACETAMINOPHEN 1 TABLET: 5; 325 TABLET ORAL at 04:08

## 2018-08-04 RX ADMIN — FOLIC ACID 1 MG: 1 TABLET ORAL at 09:13

## 2018-08-04 RX ADMIN — PANCRELIPASE 3 CAPSULE: 10000; 34000; 55000 CAPSULE, DELAYED RELEASE ORAL at 09:13

## 2018-08-04 RX ADMIN — PANTOPRAZOLE SODIUM 40 MG: 40 TABLET, DELAYED RELEASE ORAL at 09:13

## 2018-08-04 RX ADMIN — HYDROMORPHONE HYDROCHLORIDE 1 MG: 1 INJECTION, SOLUTION INTRAMUSCULAR; INTRAVENOUS; SUBCUTANEOUS at 02:18

## 2018-08-04 RX ADMIN — OXYCODONE HYDROCHLORIDE AND ACETAMINOPHEN 1 TABLET: 5; 325 TABLET ORAL at 10:18

## 2018-08-04 RX ADMIN — SUCRALFATE 1 G: 1 TABLET ORAL at 13:26

## 2018-08-04 RX ADMIN — INSULIN LISPRO 2 UNITS: 100 INJECTION, SOLUTION INTRAVENOUS; SUBCUTANEOUS at 13:26

## 2018-08-04 RX ADMIN — HYDROMORPHONE HYDROCHLORIDE 1 MG: 1 INJECTION, SOLUTION INTRAMUSCULAR; INTRAVENOUS; SUBCUTANEOUS at 12:16

## 2018-08-04 RX ADMIN — Medication 10 ML: at 05:12

## 2018-08-04 RX ADMIN — HYDROMORPHONE HYDROCHLORIDE 1 MG: 1 INJECTION, SOLUTION INTRAMUSCULAR; INTRAVENOUS; SUBCUTANEOUS at 05:11

## 2018-08-04 RX ADMIN — CYANOCOBALAMIN TAB 1000 MCG 1000 MCG: 1000 TAB at 09:13

## 2018-08-04 RX ADMIN — FERROUS SULFATE TAB 325 MG (65 MG ELEMENTAL FE) 325 MG: 325 (65 FE) TAB at 09:13

## 2018-08-04 RX ADMIN — Medication 500 MG: at 09:13

## 2018-08-04 NOTE — PROGRESS NOTES
Problem: Falls - Risk of 
Goal: *Absence of Falls Document Vane Fonseca Fall Risk and appropriate interventions in the flowsheet. Outcome: Progressing Towards Goal 
Fall Risk Interventions: 
Mobility Interventions: Patient to call before getting OOB, PT Consult for mobility concerns, PT Consult for assist device competence Medication Interventions: Evaluate medications/consider consulting pharmacy, Patient to call before getting OOB, Teach patient to arise slowly Elimination Interventions: Call light in reach, Patient to call for help with toileting needs, Urinal in reach Problem: Pressure Injury - Risk of 
Goal: *Prevention of pressure injury Document Austin Scale and appropriate interventions in the flowsheet. Outcome: Progressing Towards Goal 
Pressure Injury Interventions: 
Sensory Interventions: Assess changes in LOC Moisture Interventions: Absorbent underpads Activity Interventions: PT/OT evaluation Mobility Interventions: HOB 30 degrees or less Nutrition Interventions: Document food/fluid/supplement intake Friction and Shear Interventions: HOB 30 degrees or less

## 2018-08-04 NOTE — DISCHARGE INSTRUCTIONS
FOLLOW UP VISIT Appointment in: Other (Specify) Follow up with PCP X 3-5 DAYS Follow up with Gastroenterology as scheduled                      Abdominal Pain: Care Instructions  Your Care Instructions    Abdominal pain has many possible causes. Some aren't serious and get better on their own in a few days. Others need more testing and treatment. If your pain continues or gets worse, you need to be rechecked and may need more tests to find out what is wrong. You may need surgery to correct the problem. Don't ignore new symptoms, such as fever, nausea and vomiting, urination problems, pain that gets worse, and dizziness. These may be signs of a more serious problem. Your doctor may have recommended a follow-up visit in the next 8 to 12 hours. If you are not getting better, you may need more tests or treatment. The doctor has checked you carefully, but problems can develop later. If you notice any problems or new symptoms, get medical treatment right away. Follow-up care is a key part of your treatment and safety. Be sure to make and go to all appointments, and call your doctor if you are having problems. It's also a good idea to know your test results and keep a list of the medicines you take. How can you care for yourself at home? · Rest until you feel better. · To prevent dehydration, drink plenty of fluids, enough so that your urine is light yellow or clear like water. Choose water and other caffeine-free clear liquids until you feel better. If you have kidney, heart, or liver disease and have to limit fluids, talk with your doctor before you increase the amount of fluids you drink. · If your stomach is upset, eat mild foods, such as rice, dry toast or crackers, bananas, and applesauce. Try eating several small meals instead of two or three large ones. · Wait until 48 hours after all symptoms have gone away before you have spicy foods, alcohol, and drinks that contain caffeine.   · Do not eat foods that are high in fat. · Avoid anti-inflammatory medicines such as aspirin, ibuprofen (Advil, Motrin), and naproxen (Aleve). These can cause stomach upset. Talk to your doctor if you take daily aspirin for another health problem. When should you call for help? Call 911 anytime you think you may need emergency care. For example, call if:    · You passed out (lost consciousness).     · You pass maroon or very bloody stools.     · You vomit blood or what looks like coffee grounds.     · You have new, severe belly pain.    Call your doctor now or seek immediate medical care if:    · Your pain gets worse, especially if it becomes focused in one area of your belly.     · You have a new or higher fever.     · Your stools are black and look like tar, or they have streaks of blood.     · You have unexpected vaginal bleeding.     · You have symptoms of a urinary tract infection. These may include:  ¨ Pain when you urinate. ¨ Urinating more often than usual.  ¨ Blood in your urine.     · You are dizzy or lightheaded, or you feel like you may faint.    Watch closely for changes in your health, and be sure to contact your doctor if:    · You are not getting better after 1 day (24 hours). Where can you learn more? Go to http://thiago-mikhail.info/. Enter D106 in the search box to learn more about \"Abdominal Pain: Care Instructions. \"  Current as of: November 20, 2017  Content Version: 11.7  © 0925-1072 [employer]. Care instructions adapted under license by [employer] (which disclaims liability or warranty for this information). If you have questions about a medical condition or this instruction, always ask your healthcare professional. [place] [address] any warranty or liability for your use of this information. Skin Abscess: Care Instructions  Your Care Instructions    A skin abscess is a bacterial infection that forms a pocket of pus.  A boil is a kind of skin abscess. The doctor may have cut an opening in the abscess so that the pus can drain out. You may have gauze in the cut so that the abscess will stay open and keep draining. You may need antibiotics. You will need to follow up with your doctor to make sure the infection has gone away. The doctor has checked you carefully, but problems can develop later. If you notice any problems or new symptoms, get medical treatment right away. Follow-up care is a key part of your treatment and safety. Be sure to make and go to all appointments, and call your doctor if you are having problems. It's also a good idea to know your test results and keep a list of the medicines you take. How can you care for yourself at home? · Apply warm and dry compresses, a heating pad set on low, or a hot water bottle 3 or 4 times a day for pain. Keep a cloth between the heat source and your skin. · If your doctor prescribed antibiotics, take them as directed. Do not stop taking them just because you feel better. You need to take the full course of antibiotics. · Take pain medicines exactly as directed. ¨ If the doctor gave you a prescription medicine for pain, take it as prescribed. ¨ If you are not taking a prescription pain medicine, ask your doctor if you can take an over-the-counter medicine. · Keep your bandage clean and dry. Change the bandage whenever it gets wet or dirty, or at least one time a day. · If the abscess was packed with gauze:  ¨ Keep follow-up appointments to have the gauze changed or removed. If the doctor instructed you to remove the gauze, gently pull out all of the gauze when your doctor tells you to. ¨ After the gauze is removed, soak the area in warm water for 15 to 20 minutes 2 times a day, until the wound closes. When should you call for help?   Call your doctor now or seek immediate medical care if:    · You have signs of worsening infection, such as:  ¨ Increased pain, swelling, warmth, or redness. ¨ Red streaks leading from the infected skin. ¨ Pus draining from the wound. ¨ A fever.    Watch closely for changes in your health, and be sure to contact your doctor if:    · You do not get better as expected. Where can you learn more? Go to http://thiago-mikhail.info/. Enter T738 in the search box to learn more about \"Skin Abscess: Care Instructions. \"  Current as of: May 10, 2017  Content Version: 11.7  © 3541-5753 [employer]. Care instructions adapted under license by [employer] (which disclaims liability or warranty for this information). If you have questions about a medical condition or this instruction, always ask your healthcare professional. [place] [address] any warranty or liability for your use of this information. Chronic Pain: Care Instructions  Your Care Instructions    Chronic pain is pain that lasts a long time (months or even years) and may or may not have a clear cause. It is different from acute pain, which usually does have a clear cause-like an injury or illness-and gets better over time. Chronic pain:  · Lasts over time but may vary from day to day. · Does not go away despite efforts to end it. · May disrupt your sleep and lead to fatigue. · May cause depression or anxiety. · May make your muscles tense, causing more pain. · Can disrupt your work, hobbies, home life, and relationships with friends and family. Chronic pain is a very real condition. It is not just in your head. Treatment can help and usually includes several methods used together, such as medicines, physical therapy, exercise, and other treatments. Learning how to relax and changing negative thought patterns can also help you cope. Chronic pain is complex. Taking an active role in your treatment will help you better manage your pain. Tell your doctor if you have trouble dealing with your pain.  You may have to try several things before you find what works best for you. Follow-up care is a key part of your treatment and safety. Be sure to make and go to all appointments, and call your doctor if you are having problems. It's also a good idea to know your test results and keep a list of the medicines you take. How can you care for yourself at home? · Pace yourself. Break up large jobs into smaller tasks. Save harder tasks for days when you have less pain, or go back and forth between hard tasks and easier ones. Take rest breaks. · Relax, and reduce stress. Relaxation techniques such as deep breathing or meditation can help. · Keep moving. Gentle, daily exercise can help reduce pain over the long run. Try low- or no-impact exercises such as walking, swimming, and stationary biking. Do stretches to stay flexible. · Try heat, cold packs, and massage. · Get enough sleep. Chronic pain can make you tired and drain your energy. Talk with your doctor if you have trouble sleeping because of pain. · Think positive. Your thoughts can affect your pain level. Do things that you enjoy to distract yourself when you have pain instead of focusing on the pain. See a movie, read a book, listen to music, or spend time with a friend. · If you think you are depressed, talk to your doctor about treatment. · Keep a daily pain diary. Record how your moods, thoughts, sleep patterns, activities, and medicine affect your pain. You may find that your pain is worse during or after certain activities or when you are feeling a certain emotion. Having a record of your pain can help you and your doctor find the best ways to treat your pain. · Take pain medicines exactly as directed. ¨ If the doctor gave you a prescription medicine for pain, take it as prescribed. ¨ If you are not taking a prescription pain medicine, ask your doctor if you can take an over-the-counter medicine.   Reducing constipation caused by pain medicine  · Include fruits, vegetables, beans, and whole grains in your diet each day. These foods are high in fiber. · Drink plenty of fluids, enough so that your urine is light yellow or clear like water. If you have kidney, heart, or liver disease and have to limit fluids, talk with your doctor before you increase the amount of fluids you drink. · If your doctor recommends it, get more exercise. Walking is a good choice. Bit by bit, increase the amount you walk every day. Try for at least 30 minutes on most days of the week. · Schedule time each day for a bowel movement. A daily routine may help. Take your time and do not strain when having a bowel movement. When should you call for help? Call your doctor now or seek immediate medical care if:    · Your pain gets worse or is out of control.     · You feel down or blue, or you do not enjoy things like you once did. You may be depressed, which is common in people with chronic pain. Depression can be treated.     · You have vomiting or cramps for more than 2 hours.    Watch closely for changes in your health, and be sure to contact your doctor if:    · You cannot sleep because of pain.     · You are very worried or anxious about your pain.     · You have trouble taking your pain medicine.     · You have any concerns about your pain medicine.     · You have trouble with bowel movements, such as:  ¨ No bowel movement in 3 days. ¨ Blood in the anal area, in your stool, or on the toilet paper. ¨ Diarrhea for more than 24 hours. Where can you learn more? Go to http://thiago-mikhail.info/. Enter N004 in the search box to learn more about \"Chronic Pain: Care Instructions. \"  Current as of: October 9, 2017  Content Version: 11.7  © 0512-5131 [employer]. Care instructions adapted under license by [employer] (which disclaims liability or warranty for this information).  If you have questions about a medical condition or this instruction, always ask your healthcare professional. [place], [facility] disclaims any warranty or liability for your use of this information. Indigestion (Dyspepsia or Heartburn): Care Instructions  Your Care Instructions  Sometimes it can be hard to pinpoint the cause of indigestion. (It is also called dyspepsia or heartburn.) Most cases of an upset stomach with bloating, burning, burping, and nausea are minor and go away within several hours. Home treatment and over-the-counter medicine often are able to control symptoms. But if you take medicine to relieve your indigestion without making diet and lifestyle changes, your symptoms are likely to return again and again. If you get indigestion often, it may be a sign of a more serious medical problem. Be sure to follow up with your doctor, who may want to do tests to be sure of the cause of your indigestion. Follow-up care is a key part of your treatment and safety. Be sure to make and go to all appointments, and call your doctor if you are having problems. It's also a good idea to know your test results and keep a list of the medicines you take. How can you care for yourself at home? · Your doctor may recommend over-the-counter medicine. For mild or occasional indigestion, antacids such as Gaviscon, Mylanta, Maalox, or Tums, may help. Be safe with medicines. Be careful when you take over-the-counter antacid medicines. Many of these medicines have aspirin in them. Read the label to make sure that you are not taking more than the recommended dose. Too much aspirin can be harmful. · Your doctor also may recommend over-the-counter acid reducers, such as Pepcid AC, Tagamet HB, Zantac 75, or Prilosec. Read and follow all instructions on the label. If you use these medicines often, talk with your doctor. · Change your eating habits. ¨ It's best to eat several small meals instead of two or three large meals. ¨ After you eat, wait 2 to 3 hours before you lie down.   ¨ Chocolate, mint, and alcohol can make GERD worse. ¨ Spicy foods, foods that have a lot of acid (like tomatoes and oranges), and coffee can make GERD symptoms worse in some people. If your symptoms are worse after you eat a certain food, you may want to stop eating that food to see if your symptoms get better. · Do not smoke or chew tobacco. Smoking can make GERD worse. If you need help quitting, talk to your doctor about stop-smoking programs and medicines. These can increase your chances of quitting for good. · If you have GERD symptoms at night, raise the head of your bed 6 to 8 inches. You can do this by putting the frame on blocks or placing a foam wedge under the head of your mattress. (Adding extra pillows does not work.)  · Do not wear tight clothing around your middle. · Lose weight if you need to. Losing just 5 to 10 pounds can help. · Do not take anti-inflammatory medicines, such as aspirin, ibuprofen (Advil, Motrin), or naproxen (Aleve). These can irritate the stomach. If you need a pain medicine, try acetaminophen (Tylenol), which does not cause stomach upset. When should you call for help? Call your doctor now or seek immediate medical care if:    · You have new or worse belly pain.     · You are vomiting.    Watch closely for changes in your health, and be sure to contact your doctor if:    · You have new or worse symptoms of indigestion.     · You have trouble or pain swallowing.     · You are losing weight.     · You do not get better as expected. Where can you learn more? Go to http://thiago-mikhail.info/. Enter R717 in the search box to learn more about \"Indigestion (Dyspepsia or Heartburn): Care Instructions. \"  Current as of: May 12, 2017  Content Version: 11.7  © 8796-4238 [employer], [employer]. Care instructions adapted under license by [employer] (which disclaims liability or warranty for this information).  If you have questions about a medical condition or this instruction, always ask your healthcare professional. [address] any warranty or liability for your use of this information. Learning About Pain Control When You Have a History of Opioid Dependence  How is pain managed when you have a history of opioid dependence? It can be really hard to be in severe pain and at the same time be afraid to take medicine for it because you could have a relapse. It's important to see a doctor when you're in severe pain. If you try to manage the pain yourself, you could fall back into your old habits with opioids. An important part of preventing a relapse is to make sure the doctor knows about your history of dependence. Even when you see a doctor for some other reason than pain, make sure he or she knows. You may feel embarrassed or ashamed to talk about it. But don't let these feelings  your way. Your doctor is there to help you. He or she needs to know about your history in order to get you the right treatment. It may be easier to bring it up if you write down a sentence or two about it ahead of time. For example, you could write what you were dependent on and for how long. You could say how long you have been in recovery. And you could say how important it is to you to avoid using opioids. You may also take someone with you who could help you explain your history. This could be a friend, a family member, or your Narcotics Anonymous sponsor. Can severe pain be treated without opioids? Your doctor can tell you about many other ways to manage pain. One option is non-opioid medicines, such as:  · Acetaminophen. · NSAIDs. · Certain antidepressants. · Creams or oils applied to the skin. Pain treatment may include other things besides medicine. You may find relief from treatments such as ice or heat, meditation, and making sure you get enough sleep. Work with your doctor to explore different options for pain relief.   It's also possible that you could take an opioid for pain for a short time. In that case, it will be very important to work closely with your doctor. Follow-up care is a key part of your treatment and safety. Be sure to make and go to all appointments, and call your doctor if you are having problems. It's also a good idea to know your test results and keep a list of the medicines you take. Where can you learn more? Go to http://thiago-mikhail.info/. Enter A359 in the search box to learn more about \"Learning About Pain Control When You Have a History of Opioid Dependence. \"  Current as of: October 9, 2017  Content Version: 11.7  © 5813-6148 [employer]. Care instructions adapted under license by [employer] (which disclaims liability or warranty for this information). If you have questions about a medical condition or this instruction, always ask your healthcare professional. [place] [address] any warranty or liability for your use of this information. Learning About Managing Chronic Pain  What is a plan for pain management? A pain management plan helps you find ways to control pain with side effects you can live with. Some diseases and injuries can cause pain that lasts a long time. Constant pain can make you depressed. It can cause stress and make it hard for you to eat and sleep. But you don't need to live with uncontrolled pain. How can you plan for managing your pain? You and your doctor will work to make your plan. Your plan can include more than one type of pain control. You may take prescription or over-the-counter drugs. You can also try physical treatments, like massage and acupuncture. Other things can help too, such as meditation or a type of therapy to change how you think about your pain. It's important to let your doctor know how you prefer to control your pain. Sometimes the goal of a pain management plan isn't to totally get rid of pain.  Instead, it might be to reduce the pain enough that daily activities are easier. If your pain isn't controlled well enough, talk with your doctor. You may need to make a new plan. Or your doctor may refer you to a specialist.  What medicines are used? Your doctor may prescribe medicine to help with your pain. If you aren't taking a prescription medicine, you may be able to take an over-the-counter one. Here are the main types of medicine for chronic pain. · Non-opioids. These are things like aspirin, acetaminophen, and nonsteroidal anti-inflammatory drugs (NSAIDs). They work by blocking (or reducing) the feeling of pain. And some also reduce swelling. They usually relieve pain for 6 to 12 hours at a time. · Opioids. Morphine, codeine, and oxycodone are some examples. Opioids relieve pain by changing the way your body feels pain and the way you feel about pain. · Other medicines. Some medicines seem to change the way your brain senses pain. These include things like:  ¨ Antidepressants, anti-seizure medicines, and anti-anxiety medicines. ¨ Nerve block injections. Medicines are the most common treatment for pain. But to feel better, you'll need to do more than take medicine. You can also do things like reducing your stress level and changing how you think. How can you take medicine safely? Medicines can help you get better. But they can also be dangerous, especially if you don't take them the right way. Be safe with medicines. Read and follow all instructions on the label. If the medicine you take causes side effects such as constipation or nausea, you may need to take other medicines for those problems. Talk to your doctor about any side effects you have. If you were prescribed an opioid pain reliever, your care team will give you information on how to use it safely. You will also get directions for how to safely store the medicine and how to get rid of any that's left over. Follow these instructions carefully.   What physical treatments can help? Physical treatments can be an important part of managing chronic pain. You may find that combining more than one treatment helps the most.  These treatments can include:  · Heat or cold. This can help arthritis, sore muscles, and other aches. · Hydrotherapy. It uses flowing water to relax muscles. · Massage. Massage involves rubbing the soft tissues of the body. It eases tension and pain. · Transcutaneous electrical nerve stimulation (TENS). This treatment uses a gentle electric current applied to the skin for pain relief. · Acupuncture. This is a form of traditional [facility] medicine. It uses very thin needles inserted into certain points of the body. · Physical therapy. This treatment uses stretches and exercises to reduce pain and help you move better. If you get physical therapy, make sure to do any home exercises or stretching your therapist has prescribed. Stay as active as you can. Try to get some physical activity every day. What other things can help? You can manage chronic pain by using things other than medicines or physical treatments. For example, you can keep track of your pain in a pain diary. It can help you understand how the things you do affect your pain. Reducing stress and tension can reduce pain. And being more aware of your thought patterns can be helpful. In some cases, shifting how you think about pain can affect how you feel. Here are some options to think about:  · Breathing exercises and meditation. These techniques can help you focus your attention, relax, and get rid of tension. · Guided imagery. This is a series of thoughts and images that can focus your attention away from your pain. · Hypnosis. It's a state of focused concentration that makes you less aware of your surroundings. · Cognitive behavioral therapy. This type of counseling helps you change your thought patterns. · Yoga. Stretching and exercises can reduce stress and improve flexibility.   If what you're doing to control your pain isn't working, or if you're feeling depressed, talk to your doctor. He or she can help you change your pain management plan and find resources for emotional support. Where can you learn more? Go to http://thiago-mikhail.info/. Enter P119 in the search box to learn more about \"Learning About Managing Chronic Pain. \"  Current as of: December 11, 2017  Content Version: 11.7  © 7987-2296 [employer]. Care instructions adapted under license by [employer] (which disclaims liability or warranty for this information). If you have questions about a medical condition or this instruction, always ask your healthcare professional. [place] [address] any warranty or liability for your use of this information. Sepsis: Care Instructions  Your Care Instructions    Sepsis is an intense reaction to an infection. It can cause deadly damage to the body and lead to a dangerously low blood pressure. You may have inflammation across large areas of your body. It can damage tissue and even go deep into your organs. Infections that can lead to sepsis include:  · A skin infection such as from a cut. · A lung infection like pneumonia. · A kidney infection. · A gut infection such as E. coli. It's important to care for yourself and try to avoid infections so that you don't get sepsis again. Follow-up care is a key part of your treatment and safety. Be sure to make and go to all appointments, and call your doctor if you are having problems. It's also a good idea to know your test results and keep a list of the medicines you take. How can you care for yourself at home? · If your doctor prescribed antibiotics, take them as directed. Do not stop taking them just because you feel better. You need to take the full course of antibiotics. · Help prevent infections that could lead to sepsis:  ¨ Try to avoid colds and flu.  If you must be around people who have a cold or the flu, wash your hands often. And get a flu vaccine every year. ¨ Get a pneumococcal vaccine shot (to prevent pneumonia, meningitis, and other infections). If you have had one before, ask your doctor if you need another dose. ¨ Clean any wounds or scrapes. · Do not smoke or use other tobacco products. When you quit smoking, you are less likely to get a cold, the flu, bronchitis, and pneumonia. If you need help quitting, talk to your doctor about stop-smoking programs and medicines. These can increase your chances of quitting for good. · To prevent dehydration, drink plenty of fluids. Choose water and other caffeine-free clear liquids until you feel better. If you have kidney, heart, or liver disease and have to limit fluids, talk with your doctor before you increase the amount of fluids you drink. · Eat a healthy diet. Include fruits, vegetables, and whole grains in your diet every day. · If your doctor recommends it, try doing some physical activity. Walking is a good choice. Bit by bit, increase the amount you walk every day. When should you call for help? [id number] anytime you think you may need emergency care. For example, call if:    · You passed out (lost consciousness).    Call your doctor now or seek immediate medical care if:    · You have symptoms of sepsis. These may include:  ¨ Shortness of breath. ¨ A fast heart rate. ¨ Cool, pale, or clammy skin. ¨ Feeling confused.     · You are dizzy or lightheaded, or you feel like you may faint.     · You have a fever or chills.    Watch closely for changes in your health, and be sure to contact your doctor if:    · You do not get better as expected. Where can you learn more? Go to http://thiago-mikhail.info/. Enter O674 in the search box to learn more about \"Sepsis: Care Instructions. \"  Current as of: November 20, 2017  Content Version: 11.7  © 8623-8154 [employer].  Care instructions adapted under license by [employer] (which disclaims liability or warranty for this information). If you have questions about a medical condition or this instruction, always ask your healthcare professional. [address] any warranty or liability for your use of this information. Learning About Peritonitis  What is peritonitis? Peritonitis is an infection of the lining of the belly (peritoneum). It causes pain and swelling inside the belly. It may also cause a fever. It can be the result of medical problems in the belly, such as a burst appendix. It can also be the result of surgery or an injury in the belly. This can lead to an infection that is caused by bacteria. This infection is serious. It needs medical care right away. If the illness is not treated, it gets worse fast and can become life-threatening. What are the symptoms? Symptoms of peritonitis include:  · Swelling of the belly, which may feel hard (rigid). · Severe pain and tenderness in the belly that gets worse when you move, cough, or press on the belly. The pain sometimes reaches into the shoulder. · Nausea and vomiting. · Diarrhea. · A rapid pulse. · Chills and fever. · Rapid breathing. · Confusion, memory loss, or feeling less alert. How is it treated? You may have a CT (computed tomography) scan or other imaging test. This can help find out if there is damage to an organ in the belly. If so, then you will likely need surgery to repair the damage. If your symptoms are severe, you may need surgery right away. Peritonitis will also be treated with antibiotics. If there is fluid in your belly, your doctor may do a procedure called paracentesis (say \"[id number]-pe-ivp-PRASHANTH-blayne\") to get a sample of the fluid. The fluid can be tested to help your doctor decide which antibiotics will work best to treat the infection. The test can also help find the cause of your symptoms.   Follow-up care is a key part of your treatment and safety. Be sure to make and go to all appointments, and call your doctor if you are having problems. It's also a good idea to know your test results and keep a list of the medicines you take. Where can you learn more? Go to http://thiago-mikhail.info/. Enter A416 in the search box to learn more about \"Learning About Peritonitis. \"  Current as of: May 12, 2017  Content Version: 11.7  © 6892-8077 [employer]. Care instructions adapted under license by [employer] (which disclaims liability or warranty for this information). If you have questions about a medical condition or this instruction, always ask your healthcare professional. [address] any warranty or liability for your use of this information. Pancreatitis: Care Instructions  Your Care Instructions    The pancreas is an organ behind the stomach. It makes hormones and enzymes to help your body digest food. But if these enzymes attack the pancreas, it can get inflamed. This is called pancreatitis. Most cases are caused by gallstones or by heavy alcohol use. If you take care of yourself at home, it will help you get better. It will also help you avoid more problems with your pancreas. Follow-up care is a key part of your treatment and safety. Be sure to make and go to all appointments, and call your doctor if you are having problems. It's also a good idea to know your test results and keep a list of the medicines you take. How can you care for yourself at home? · Drink clear liquids and eat bland foods until you feel better. [place] foods include rice, dry toast, and crackers. They also include bananas and applesauce. · Eat a low-fat diet until your doctor says your pancreas is healed. · Do not drink alcohol. Tell your doctor if you need help to quit. Counseling, support groups, and sometimes medicines can help you stay sober. · Be safe with medicines.  Read and follow all instructions on the label. ¨ If the doctor gave you a prescription medicine for pain, take it as prescribed. ¨ If you are not taking a prescription pain medicine, ask your doctor if you can take an over-the-counter medicine. · If your doctor prescribed antibiotics, take them as directed. Do not stop taking them just because you feel better. You need to take the full course of antibiotics. · Get extra rest until you feel better. To prevent future problems with your pancreas  · Do not drink alcohol. · Tell your doctors and pharmacist that you've had pancreatitis. They can help you avoid medicines that may cause this problem again. When should you call for help? Call 911 anytime you think you may need emergency care. For example, call if:    · You vomit blood or what looks like coffee grounds.     · Your stools are maroon or very bloody.    Call your doctor now or seek immediate medical care if:    · You have new or worse belly pain.     · Your stools are black and look like tar, or they have streaks of blood.     · You are vomiting.    Watch closely for changes in your health, and be sure to contact your doctor if:    · You do not get better as expected. Where can you learn more? Go to http://thiago-mikhail.info/. Enter S929 in the search box to learn more about \"Pancreatitis: Care Instructions. \"  Current as of: May 12, 2017  Content Version: 11.7  © 8829-3932 [employer]. Care instructions adapted under license by [employer] (which disclaims liability or warranty for this information). If you have questions about a medical condition or this instruction, always ask your healthcare professional. [place] [address] any warranty or liability for your use of this information.

## 2018-08-04 NOTE — DISCHARGE SUMMARY
DISCHARGE SUMMARY        PATIENT ID: Anu Danielle  MRN: [id number]   YOB: 1965   AGE: 48 y.o. DATE OF ADMISSION: 7/28/2018  4:03 PM    DATE OF DISCHARGE: 8/4/2018  PRIMARY CARE PROVIDER: Jh Jasmine MD     Procedure Performed:    Cardiology and IR Orders (Through next 24h)    Start     Ordered    07/30/18 1245  IR [facility]  [[id number]]  ONE TIME      07/30/18 1234        CARDIAC and IR        DISCHARGING PHYSICIAN: Adis Vegas MD    Call hospitalist office [phone].       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    Abscess 07/28/2018    Pancreatic fistula 06/28/2018    Central cord syndrome ([address] 06/02/2018    UTI (urinary tract infection) 05/24/2018    Hyperglycemia 05/22/2018    Chronic renal insufficiency 05/22/2018    CHRISTI (acute kidney injury) ([address] 05/22/2018    Enteritis 04/24/2018    Acute alcoholic hepatitis 27/58/8918    Chronic pancreatitis due to acute alcohol intoxication ([address] 04/15/2018    Lactic acidosis 08/05/2017    Colitis, acute 07/17/2017    HCAP (healthcare-associated pneumonia) 05/31/2016    Sepsis ([address] 04/15/2016    Biliary drain displacement 03/23/2016    Hypokalemia 03/23/2016    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    H/O ETOH abuse 03/23/2016    Chronic pain 03/23/2016    Moderate protein-calorie malnutrition ([address] 11/21/2015    Abdominal pain 11/16/2015    Perinephric abscess 10/22/2015    Pneumobilia 10/22/2015    Gastroparesis 05/21/2015    IDDM (insulin dependent diabetes mellitus) ([address] 08/31/2013    Abscess of abdominal cavity ([address] 06/19/2013    Tobacco abuse     Chronic pancreatitis ([address]               CONSULTATIONS: IP CONSULT TO GASTROENTEROLOGY  IP CONSULT TO INFECTIOUS DISEASES  IP CONSULT TO INFECTIOUS DISEASES  IP CONSULT TO PODIATRY  IP CONSULT TO PODIATRY  IP CONSULT TO GENERAL SURGERY    History of Present Ilness:    Eileen Mejía is a 48y.o. year old male who presents with  Increasing abdominal pain starting today in addition to extremity weakness. 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 He has chronic pain syndrome as well. Code s called for extremity weakness but later cancelled, appears to be metabolically related. Patient found to have wbc elevation and hypotension. He will be admitted for further work up. Hospital Course:     James Hernandez a 48 y. o. year old male who presented with increasing abdominal pain in addition to extremity weakness. 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 Arturo Sampson has chronic pain syndrome as well.  Code s called for extremity weakness but later cancelled, appears to be metabolically related.  Patient found to have wbc elevation and hypotension. CT abdm/pelvis c/w chronic fistulous tract leading from the duodenum to a small abscess collection along the right flank with the abscess measures approximately 1.8 x 2.3 cm. Admitted for treatment and further work-up. Blood cx NGTD  Surgery consulted 7/30. Recommend referral to pancreatic surgery. GI following and will arrange referral on patient discharge. IR consulted 7/30 for percutaneous drainage of abscess  ID consulted for infected abdominal abscess 7/31  Body fluid culture +enterococcus faecalis, yeast , candida albicans.  Patient started on Xyvox by ID               8/1 MRI pancreas result noted  8/1 GI signed off   8/2 Zenpap  8/3 Zyvox       Physical Exam on Discharge:  Visit Vitals    /69 (BP 1 Location: Right arm, BP Patient Position: At rest)    Pulse 86    Temp 97.6 °F (36.4 °C)    Resp 20    Ht 6' (1.829 m)    Wt 64 kg (141 lb)    SpO2 100%    BMI 18.6 kg/m2       General:  Alert, cooperative, no distress, appears stated age. Lungs:   Clear to auscultation bilaterally. Chest wall:  No tenderness or deformity. Heart:  Regular rate and rhythm, S1, S2 normal, no murmur, click, rub or gallop. Abdomen:   Soft, non-tender. Bowel sounds normal. No masses,  No organomegaly. Extremities: Extremities normal, atraumatic, no cyanosis or edema. Pulses: 2+ and symmetric all extremities. Skin: Skin color, texture, turgor normal. No rashes or lesions   Neurologic: CNII-XII intact. Pertinent Results:    Recent Labs      08/02/18   1030   BUN  4*   NA  143   CO2  21     Recent Labs      08/02/18   1030   WBC  4.2*   RBC  2.82*   HCT  26.4*   MCV  93.6   MCH  30.9   MCHC  33.0   RDW  14.6*     MRI [facility] 8/1   Comparison: CT abdomen 7/28/2018, MRI 11/23/2015     Indication: Chronic duodenal cutaneous fistula. Chronic pancreatitis.     Technique: Multiplanar, multisequence MRI acquisition of the abdomen with and  without contrast. MRCP sequences were included. Postprocessing 3D rendering was  done on a separate workstation under concurrent physician supervision.     ========     FINDINGS:     LIVER: Normal contour and signal characteristics. No suspicious lesion.     BILIARY: Configuration of the biliary tract with central intrahepatic biliary  dilation and decompressed common bile duct is unchanged in configuration since  11/23/2015. No focal filling defect. The gallbladder has been removed.     SPLEEN: Small splenic cyst is unchanged.     PANCREAS: Diffuse pancreatic ductal dilation within the pancreatic tail, which  may be related to an obstructing calculus, related to chronic pancreatitis is  unchanged in appearance since 11/23/2015.      ADRENALS: Normal.     KIDNEYS: Mild prominence of the central right renal collecting system without  ureteral dilation is unchanged since CT 6/28/2018.  Small bilateral renal cysts  are present.     LYMPH NODES: Increased number of retroperitoneal lymph nodes are present, which  are less than a centimeter, likely reactive.     GI TRACT: Wall thickening with tethering is present at the anterior medial  aspect of the duodenum, likely related to the chronic known duodenal cutaneous  fistula. Otherwise, the visualized portions of small bowel and colon are normal  in caliber and wall thickness.     VASCULATURE: Unremarkable.     OTHER: Lung bases appear clear. No acute osseous findings.     Pigtail for the drainage catheter is just below the level of the pancreatic  head. The previously noted fluid collection appears decompressed. The previously  noted fluid collection is decompressed, with trace residual contrast present on  the precontrast scan. . A small amount of free fluid is present within the right  lower quadrant.     ========     IMPRESSION  IMPRESSION:        1. Interval placement of a drainage catheter inferior to the pancreatic head  within the duodenal cutaneous fistula related to fluid collection, which has  since been decompressed. No significant residual fluid. Small amount of free  fluid is present within the right lower quadrant. Focal tethering with wall  thickening is present at the medial anterior aspect of the second portion the  duodenum, likely the site of the fistula.     2. Pancreatic ductal dilation within the pancreatic tail, related to chronic  pancreatitis is unchanged since 2015. Mild smooth intrahepatic biliary dilation  with normal caliber common bile duct is unchanged in appearance since 2015.     EXAM: CT of the abdomen and pelvis     INDICATION: Left-sided weakness     COMPARISON: CT dated June 28, 2018     TECHNIQUE: Axial CT imaging of the abdomen and pelvis was performed without  intravenous contrast. Multiplanar reformats were generated.     DOSE REDUCTION:  One or more dose reduction techniques were used on this CT:  automated exposure control, adjustment of the mAs and/or kVp according to  patient's size, and iterative reconstruction techniques. The specific techniques  utilized on this CT exam have been documented in the patient's electronic  medical record.     _______________       CT ABD PELV 7/28  FINDINGS: Initial read was given by resident on call.     LOWER CHEST: Mild bronchiectasis is present laterally. No focal airspace  disease. Small hiatal hernia.     LIVER, BILIARY: Liver is normal. No biliary dilation. Cholecystectomy     PANCREAS: Evidence of chronic calcific pancreatitis. Pancreatic stent in place. Dilated distal pancreatic duct.     SPLEEN: Normal.     ADRENALS: Mild left adrenal hyperplasia. The right adrenal is normal..     KIDNEYS/URETERS: There is scarring in the upper pole the left kidney with  parenchymal calcification measuring 6 mm. Cortical cyst seen in the midpole the  left kidney posteriorly measuring 11 mm. Small probable cyst seen in the lower  pole the left kidney measuring 8 mm. Left kidneys otherwise unremarkable.     Right kidney demonstrates 3 mm nonobstructive calculi in the lower pole. Right  kidneys unremarkable otherwise.     VASCULATURE: There is a decompressed inferior vena cava raising the possibility  of hypovolemia. Calcific atherosclerosis present.     LYMPH NODES: No enlarged lymph nodes seen     GASTRO INTESTINAL TRACT: Small hiatal hernia. There is gastric wall thickening  along the fundus and body posteriorly. This may reflect under distention however  gastritis not completely excluded. T     There is duodenal wall thickening along the second portion the duodenum with  chronic stranding around the duodenum. Duodenum is tethered to a right flank  abscess. There is a chronic fistulous tract leading from the duodenum to a small  abscess collection along the right flank with the abscess measures approximately  1.8 x 2.3 cm. The previously noted pigtail catheter in this abscess collection  has been removed.  Inflammatory changes and scarring seen around this abscess.     There are fluid-filled small and large bowel loops throughout the abdomen. This  is nonspecific but may represent gastroenteritis/ileus. No free air or  pneumatosis seen. There is no bowel obstruction.     PELVIC ORGANS/BLADDER: Urinary bladder is under distended therefore difficult to  evaluate. No free fluid.     BONES: There is avascular necrosis of both femoral heads left larger than right. There is osteopenia. Degenerative disc disease present at L5-S1 with a  transitional vertebrae present.     OTHER: None.     _______________     IMPRESSION  IMPRESSION:     Decompressed inferior vena cava raising the possibility of hypovolemia     Fluid-filled small and large bowel loops suggesting gastroenteritis without  obstruction     Again there is a enterocutaneous fistula extending from the duodenum to the  right lateral abdomen wall with a small abscess collection along the right flank  measuring 2.2 x 1.8 cm. Duodenum is tethered to the abscess. The previously  noted pigtail catheter in this collection has been removed. Fistulous tract is  visualized.     Evidence of chronic calcific pancreatitis with a dilated pancreatic duct along  the tail which may represent pancreatic stricturing or obstruction due to  calculi.     Gastric wall thickening which may reflect under distention versus gastritis     Small hiatal hernia     Nonobstructive renal calculi     Avascular necrosis of the femoral heads          DISCHARGE MEDICATIONS:   Discharge Medication List as of 8/4/2018  1:46 PM      START taking these medications    Details   oxyCODONE-acetaminophen (PERCOCET) 5-325 mg per tablet Take 1 Tab by mouth every six (6) hours as needed.  Max Daily Amount: 4 Tabs., Print, Disp-12 Tab, R-0      linezolid (ZYVOX) 600 mg tablet Take 1 Tab by mouth two (2) times a day., Print, Disp-20 Tab, R-0         CONTINUE these medications which have CHANGED    Details   lipase-protease-amylase (ZENPEP 10,000) capsule Take 3 Caps by mouth three (3) times daily (with meals). , Print, Disp-270 Cap, R-0         CONTINUE these medications which have NOT CHANGED    Details   promethazine (PHENERGAN) 25 mg tablet Take 1 Tab by mouth every six (6) hours as needed. , Print, Disp-6 Tab, R-0      insulin lispro (HUMALOG) 100 unit/mL injection 12 Units by SubCUTAneous route nightly. Indications: type 2 diabetes mellitus, Print, Disp-1 Vial, R-0      ondansetron (ZOFRAN ODT) 4 mg disintegrating tablet Take 1 Tab by mouth every eight (8) hours as needed for Nausea. , Print, Disp-9 Tab, R-0      sucralfate (CARAFATE) 1 gram tablet Take 1 Tab by mouth four (4) times daily. Indications: PREVENTION OF STRESS ULCER, Print, Disp-30 Tab, R-0      amitriptyline (ELAVIL) 75 mg tablet Take 1 Tab by mouth nightly. Indications: Depression, Print, Disp-30 Tab, R-0      ascorbic acid, vitamin C, (VITAMIN C) 500 mg tablet Take 1 Tab by mouth daily. Indications: VITAMIN C DEFICIENCY, Print, Disp-30 Tab, R-0      cholecalciferol, vitamin D3, 2,000 unit tab Take 1 Tab by mouth daily. Indications: PREVENTION OF VITAMIN D DEFICIENCY, Print, Disp-30 Tab, R-0      cyanocobalamin (VITAMIN B-12) 1,000 mcg sublingual tablet Take 2 Tabs by mouth daily. Indications: PREVENTION OF VITAMIN B12 DEFICIENCY, Print, Disp-30 Tab, R-0      ferrous sulfate 325 mg (65 mg iron) tablet Take 1 Tab by mouth two (2) times a day. Indications: IRON DEFICIENCY ANEMIA, Print, Disp-60 Tab, R-0      folic acid 326 mcg tablet Take 1 Tab by mouth daily. , Print, Disp-30 Tab, R-0      pantoprazole (PROTONIX) 40 mg tablet Take 1 Tab by mouth two (2) times a day.  Indications: EROSIVE ESOPHAGITIS, gastroesophageal reflux disease, No Print, Disp-60 Tab, R-0         STOP taking these medications       oxyCODONE-acetaminophen (PERCOCET)  mg per tablet Comments:   Reason for Stopping:         lipase-protease-amylase (CREON) 12,000-38,000 -60,000 unit capsule Comments:   Reason for Stopping:               Condition at discharge: Afrebrile  Ambulating  Eating, Drinking, Voiding  Stable    FOLLOW UP APPOINTMENTS:    Follow-up Information     Follow up With Details Comments Contact Info    Maddison Soria MD On 8/6/2018 10:30am [facility]   [phone]             Disposition:  Home       Total discharge preparation time was >30  minutes.

## 2018-08-05 LAB
BACTERIA SPEC CULT: ABNORMAL
GRAM STN SPEC: ABNORMAL
SERVICE CMNT-IMP: ABNORMAL

## 2018-08-05 NOTE — ROUTINE PROCESS
Bedside, Verbal and Written shift change report given to Farzad Falcon RN (oncoming nurse) by Ana Bauer RN (offgoing nurse). Report included the following information SBAR, Kardex, Intake/Output, MAR, Recent Results, Med Rec Status, Procedure Summary and Cardiac Rhythm NSR.    
0730 - Shift assessment completed. Pt alert and oriented x4. No respiratory distress noted. Pt c/o pain to abdomen, unable to administer at this time - last received pain medication at 0511. Pt has NATALIE Drain to abdomen and wound to right foot - 2nd and 3rd toes. Call bell within reach, bed in low position. Will continue to monitor. 
   
0915 - Pt c/o pain to abdomen, PRN Dilaudid administered. 1004 - Notified by Dmitri Chanel in Laboratory that pt's drainage culture positive for VRE. Pt is already on contact for VRE in wound drainage to abdomen. Dr Roberts Juanito aware. 1018 - Pt c/o pain to abdomen, PRN Percocet administered. 1216 - Pt c/o pain to abdomen, PRN Dilaudid administered. 1230 - Shift re-assessment completed, no change in pt condition. 1345 - I have reviewed discharge instructions with the patient. The patient verbalized understanding. Discharge medications reviewed with patient and appropriate educational materials and side effects teaching were provided. Mediport catheter removed and discontinued intact. Site without signs and symptoms of complications. Dressing and pressure applied. Patient armband removed and shredded. 80 - Pt discharged to home with his mother via [employer] vou[employer]. All pt belongings went with him.

## 2018-08-07 NOTE — ANCILLARY DISCHARGE INSTRUCTIONS
[facility]  Discharge Phone Call       After-Care Discharge Phone Call Questions: number not in service    Were you able to get your prescriptions filled? Comment:      [] Yes  []No    Comment if answer is \"No\"   Are you taking your medication(s) as your doctor ordered? Do you understand the purpose of your medications? Comment:    [] Yes  []No    Comment if answer is \"No\"   Are you taking any other medications that are not on the list?  Comment:      [] Yes  []No    Comment if answer is \"Yes\"   Do you have any questions about your medications? Are you aware of potential side effects? Comment:    [] Yes  []No    Comment if answer is \"Yes\"   Did you make your follow-up appointments (if the hospital did not do this before  discharge)? Comment:    [] Yes  []No    Comment if answer is \"No\"   Is there any reason you might not be able to keep your follow-up appointments? Comment:     [] Yes  []No    Comment if answer is \"Yes\"   Do you have any questions about your care plan? Are you aware of what health problems to be alert for? Comment:    [] Yes  []No    Comment if answer is \"Yes\"   Do you have a good understanding of how you should manage your health? Comment:    [] Yes  []No    Comment if answer is \"Yes\"   Do you know which symptoms to watch for that would mean you would need to call your doctor right away? Comment:      [] Yes  []No    Comment if answer is \"No\"   Do you have any questions about the follow up process or any instructions that we have provided? Comment:    [] Yes  []No    Comment if answer is \"Yes\"   Did staff take your preferences into account?         [] Yes  []No    Comment if answer is \"Yes\"

## 2018-09-17 PROBLEM — K56.7 ILEUS (HCC): Status: ACTIVE | Noted: 2018-09-17

## 2019-05-04 ENCOUNTER — HOSPITAL ENCOUNTER (EMERGENCY)
Age: 54
Discharge: HOME | End: 2019-05-04
Attending: EMERGENCY MEDICINE
Payer: MEDICAID

## 2019-05-04 ENCOUNTER — APPOINTMENT (OUTPATIENT)
Dept: CT IMAGING | Age: 54
End: 2019-05-04
Attending: PHYSICIAN ASSISTANT
Payer: MEDICAID

## 2019-05-04 VITALS
SYSTOLIC BLOOD PRESSURE: 111 MMHG | RESPIRATION RATE: 18 BRPM | OXYGEN SATURATION: 98 % | HEART RATE: 94 BPM | TEMPERATURE: 97.3 F | DIASTOLIC BLOOD PRESSURE: 88 MMHG

## 2019-05-04 VITALS
RESPIRATION RATE: 16 BRPM | SYSTOLIC BLOOD PRESSURE: 122 MMHG | OXYGEN SATURATION: 100 % | HEART RATE: 90 BPM | DIASTOLIC BLOOD PRESSURE: 81 MMHG | TEMPERATURE: 97.8 F

## 2019-05-04 DIAGNOSIS — K63.2 ENTEROCUTANEOUS FISTULA: ICD-10-CM

## 2019-05-04 DIAGNOSIS — L03.311 CELLULITIS OF ABDOMINAL WALL: Primary | ICD-10-CM

## 2019-05-04 DIAGNOSIS — K86.1 CHRONIC PANCREATITIS, UNSPECIFIED PANCREATITIS TYPE (HCC): ICD-10-CM

## 2019-05-04 DIAGNOSIS — R73.9 HYPERGLYCEMIA: ICD-10-CM

## 2019-05-04 DIAGNOSIS — R11.2 NON-INTRACTABLE VOMITING WITH NAUSEA, UNSPECIFIED VOMITING TYPE: ICD-10-CM

## 2019-05-04 DIAGNOSIS — L98.8 FISTULA: ICD-10-CM

## 2019-05-04 DIAGNOSIS — R10.9 ABDOMINAL PAIN, UNSPECIFIED ABDOMINAL LOCATION: Primary | ICD-10-CM

## 2019-05-04 LAB
ALBUMIN SERPL-MCNC: 3.4 G/DL (ref 3.4–5)
ALBUMIN/GLOB SERPL: 0.7 {RATIO} (ref 0.8–1.7)
ALP SERPL-CCNC: 216 U/L (ref 45–117)
ALT SERPL-CCNC: 38 U/L (ref 16–61)
ANION GAP SERPL CALC-SCNC: 6 MMOL/L (ref 3–18)
APPEARANCE UR: CLEAR
AST SERPL-CCNC: 42 U/L (ref 15–37)
BACTERIA URNS QL MICRO: ABNORMAL /HPF
BASOPHILS # BLD: 0 K/UL (ref 0–0.1)
BASOPHILS NFR BLD: 1 % (ref 0–2)
BILIRUB SERPL-MCNC: 0.2 MG/DL (ref 0.2–1)
BILIRUB UR QL: NEGATIVE
BUN SERPL-MCNC: 11 MG/DL (ref 7–18)
BUN/CREAT SERPL: 9 (ref 12–20)
CALCIUM SERPL-MCNC: 8.6 MG/DL (ref 8.5–10.1)
CHLORIDE SERPL-SCNC: 106 MMOL/L (ref 100–108)
CO2 SERPL-SCNC: 20 MMOL/L (ref 21–32)
COLOR UR: YELLOW
CREAT SERPL-MCNC: 1.19 MG/DL (ref 0.6–1.3)
DIFFERENTIAL METHOD BLD: ABNORMAL
EOSINOPHIL # BLD: 0.3 K/UL (ref 0–0.4)
EOSINOPHIL NFR BLD: 5 % (ref 0–5)
EPITH CASTS URNS QL MICRO: ABNORMAL /LPF (ref 0–5)
ERYTHROCYTE [DISTWIDTH] IN BLOOD BY AUTOMATED COUNT: 14.9 % (ref 11.6–14.5)
GLOBULIN SER CALC-MCNC: 5.2 G/DL (ref 2–4)
GLUCOSE BLD STRIP.AUTO-MCNC: 405 MG/DL (ref 70–110)
GLUCOSE SERPL-MCNC: 496 MG/DL (ref 74–99)
GLUCOSE UR STRIP.AUTO-MCNC: >1000 MG/DL
HCT VFR BLD AUTO: 38.3 % (ref 36–48)
HGB BLD-MCNC: 12.1 G/DL (ref 13–16)
HGB UR QL STRIP: NEGATIVE
KETONES UR QL STRIP.AUTO: NEGATIVE MG/DL
LACTATE BLD-SCNC: 1.01 MMOL/L (ref 0.4–2)
LEUKOCYTE ESTERASE UR QL STRIP.AUTO: NEGATIVE
LIPASE SERPL-CCNC: 46 U/L (ref 73–393)
LYMPHOCYTES # BLD: 1.3 K/UL (ref 0.9–3.6)
LYMPHOCYTES NFR BLD: 26 % (ref 21–52)
MCH RBC QN AUTO: 27.4 PG (ref 24–34)
MCHC RBC AUTO-ENTMCNC: 31.6 G/DL (ref 31–37)
MCV RBC AUTO: 86.7 FL (ref 74–97)
MONOCYTES # BLD: 0.3 K/UL (ref 0.05–1.2)
MONOCYTES NFR BLD: 6 % (ref 3–10)
NEUTS SEG # BLD: 3.1 K/UL (ref 1.8–8)
NEUTS SEG NFR BLD: 62 % (ref 40–73)
NITRITE UR QL STRIP.AUTO: NEGATIVE
PH UR STRIP: 6 [PH] (ref 5–8)
PLATELET # BLD AUTO: 231 K/UL (ref 135–420)
PMV BLD AUTO: 10.3 FL (ref 9.2–11.8)
POTASSIUM SERPL-SCNC: 5.4 MMOL/L (ref 3.5–5.5)
PROT SERPL-MCNC: 8.6 G/DL (ref 6.4–8.2)
PROT UR STRIP-MCNC: ABNORMAL MG/DL
RBC # BLD AUTO: 4.42 M/UL (ref 4.7–5.5)
RBC #/AREA URNS HPF: ABNORMAL /HPF (ref 0–5)
SODIUM SERPL-SCNC: 132 MMOL/L (ref 136–145)
SP GR UR REFRACTOMETRY: >1.03 (ref 1–1.03)
UROBILINOGEN UR QL STRIP.AUTO: 0.2 EU/DL (ref 0.2–1)
WBC # BLD AUTO: 4.9 K/UL (ref 4.6–13.2)
WBC URNS QL MICRO: ABNORMAL /HPF (ref 0–4)

## 2019-05-04 PROCEDURE — 74011636637 HC RX REV CODE- 636/637: Performed by: PHYSICIAN ASSISTANT

## 2019-05-04 PROCEDURE — 74011000258 HC RX REV CODE- 258: Performed by: EMERGENCY MEDICINE

## 2019-05-04 PROCEDURE — 96376 TX/PRO/DX INJ SAME DRUG ADON: CPT

## 2019-05-04 PROCEDURE — 96365 THER/PROPH/DIAG IV INF INIT: CPT

## 2019-05-04 PROCEDURE — 83605 ASSAY OF LACTIC ACID: CPT

## 2019-05-04 PROCEDURE — 83690 ASSAY OF LIPASE: CPT

## 2019-05-04 PROCEDURE — 99284 EMERGENCY DEPT VISIT MOD MDM: CPT

## 2019-05-04 PROCEDURE — 85025 COMPLETE CBC W/AUTO DIFF WBC: CPT

## 2019-05-04 PROCEDURE — 96361 HYDRATE IV INFUSION ADD-ON: CPT

## 2019-05-04 PROCEDURE — 80053 COMPREHEN METABOLIC PANEL: CPT

## 2019-05-04 PROCEDURE — 82962 GLUCOSE BLOOD TEST: CPT

## 2019-05-04 PROCEDURE — 74011636320 HC RX REV CODE- 636/320: Performed by: EMERGENCY MEDICINE

## 2019-05-04 PROCEDURE — 96375 TX/PRO/DX INJ NEW DRUG ADDON: CPT

## 2019-05-04 PROCEDURE — 74177 CT ABD & PELVIS W/CONTRAST: CPT

## 2019-05-04 PROCEDURE — 96374 THER/PROPH/DIAG INJ IV PUSH: CPT

## 2019-05-04 PROCEDURE — 74011250636 HC RX REV CODE- 250/636: Performed by: EMERGENCY MEDICINE

## 2019-05-04 PROCEDURE — 96366 THER/PROPH/DIAG IV INF ADDON: CPT

## 2019-05-04 PROCEDURE — 81001 URINALYSIS AUTO W/SCOPE: CPT

## 2019-05-04 PROCEDURE — 74011250636 HC RX REV CODE- 250/636: Performed by: PHYSICIAN ASSISTANT

## 2019-05-04 RX ORDER — VANCOMYCIN/0.9 % SOD CHLORIDE 1 G/100 ML
1000 PLASTIC BAG, INJECTION (ML) INTRAVENOUS
Status: COMPLETED | OUTPATIENT
Start: 2019-05-04 | End: 2019-05-04

## 2019-05-04 RX ORDER — MORPHINE SULFATE 4 MG/ML
4 INJECTION INTRAVENOUS
Status: COMPLETED | OUTPATIENT
Start: 2019-05-04 | End: 2019-05-04

## 2019-05-04 RX ORDER — INSULIN PUMP SYRINGE, 3 ML
EACH MISCELLANEOUS
Qty: 1 KIT | Refills: 0 | Status: SHIPPED | OUTPATIENT
Start: 2019-05-04 | End: 2020-08-07

## 2019-05-04 RX ORDER — SODIUM CHLORIDE 0.9 % (FLUSH) 0.9 %
5-10 SYRINGE (ML) INJECTION AS NEEDED
Status: DISCONTINUED | OUTPATIENT
Start: 2019-05-04 | End: 2019-05-05 | Stop reason: HOSPADM

## 2019-05-04 RX ORDER — INSULIN LISPRO 100 [IU]/ML
10 INJECTION, SOLUTION INTRAVENOUS; SUBCUTANEOUS
Status: COMPLETED | OUTPATIENT
Start: 2019-05-04 | End: 2019-05-04

## 2019-05-04 RX ORDER — DOXYCYCLINE 100 MG/1
100 CAPSULE ORAL 2 TIMES DAILY
Qty: 20 CAP | Refills: 0 | Status: SHIPPED | OUTPATIENT
Start: 2019-05-04 | End: 2019-05-14

## 2019-05-04 RX ORDER — OXYCODONE AND ACETAMINOPHEN 5; 325 MG/1; MG/1
1 TABLET ORAL
Qty: 20 TAB | Refills: 0 | Status: SHIPPED | OUTPATIENT
Start: 2019-05-04 | End: 2019-05-09

## 2019-05-04 RX ADMIN — PROMETHAZINE HYDROCHLORIDE 25 MG: 25 INJECTION INTRAMUSCULAR; INTRAVENOUS at 10:04

## 2019-05-04 RX ADMIN — INSULIN LISPRO 10 UNITS: 100 INJECTION, SOLUTION INTRAVENOUS; SUBCUTANEOUS at 13:07

## 2019-05-04 RX ADMIN — HUMAN INSULIN 5 UNITS: 100 INJECTION, SOLUTION SUBCUTANEOUS at 09:38

## 2019-05-04 RX ADMIN — VANCOMYCIN HYDROCHLORIDE 1000 MG: 10 INJECTION, POWDER, LYOPHILIZED, FOR SOLUTION INTRAVENOUS at 19:14

## 2019-05-04 RX ADMIN — MORPHINE SULFATE 4 MG: 4 INJECTION INTRAVENOUS at 10:04

## 2019-05-04 RX ADMIN — MORPHINE SULFATE 4 MG: 4 INJECTION INTRAVENOUS at 19:14

## 2019-05-04 RX ADMIN — SODIUM CHLORIDE 1000 ML: 900 INJECTION, SOLUTION INTRAVENOUS at 10:04

## 2019-05-04 RX ADMIN — MORPHINE SULFATE 4 MG: 4 INJECTION INTRAVENOUS at 13:07

## 2019-05-04 RX ADMIN — IOPAMIDOL 80 ML: 612 INJECTION, SOLUTION INTRAVENOUS at 10:33

## 2019-05-04 NOTE — ED NOTES
I have reviewed discharge instructions with the patient. The patient verbalized understanding. IV removed

## 2019-05-04 NOTE — ED PROVIDER NOTES
EMERGENCY DEPARTMENT HISTORY AND PHYSICAL EXAM 
 
Date: 5/4/2019 Patient Name: Hamida Russell 
 
History of Presenting Illness Chief Complaint Patient presents with  Wound Check  
  new onset of bleeding and pus History Provided By: Patient and Patient's Mother Additional History (Context): Hamida Russell is a 48 y.o. male with diabetes, hypertension and Pancreatitis due to alcohol who presents with skin irritation and abdominal wall just comfort from former right-sided NATALIE drainage site which was draining fluid again today irritating his skin. Patient said that he had the drain removed several months ago. Seen at [facility] earlier this month and is [facility] visit prior to that 1 week for similar complaints. 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 No other signs of inflammation. Patient reports a fever 2 days ago and took Tylenol for relief. PCP: Jeanie Pereira MD 
 
Current Facility-Administered Medications Medication Dose Route Frequency Provider Last Rate Last Dose  sodium chloride (NS) flush 5-10 mL  5-10 mL IntraVENous PRN Sissy Berman PA      
 morphine injection 4 mg  4 mg IntraVENous NOW iSssy Berman PA      
 vancomycin (VANCOCIN) 1000 mg in  ml infusion  1,000 mg IntraVENous NOW Juany Berman PA Current Outpatient Medications Medication Sig Dispense Refill  doxycycline (MONODOX) 100 mg capsule Take 1 Cap by mouth two (2) times a day for 10 days. 20 Cap 0  
 oxyCODONE-acetaminophen (PERCOCET) 5-325 mg per tablet Take 1 Tab by mouth every six (6) hours as needed for Pain for up to 5 days. Max Daily Amount: 4 Tabs. 20 Tab 0  Blood-Glucose Meter (FREESTYLE LITE METER) monitoring kit Test blood sugars 4-6 times a day 1 Kit 0  
 glucose blood VI test strips (FREESTYLE TEST) strip 1 Each by Does Not Apply route See Admin Instructions. Test blood sugars 4-6 times a day. 100 Strip 3  
 insulin glargine (LANTUS U-100 INSULIN) 100 unit/mL injection by SubCUTAneous route nightly.  insulin NPH/insulin regular (NOVOLIN 70/30 U-100 INSULIN) 100 unit/mL (70-30) injection by SubCUTAneous route.  promethazine (PHENERGAN) 25 mg tablet Take 1 Tab by mouth every six (6) hours as needed. 10 Tab 0  
 lipase-protease-amylase (ZENPEP 10,000) capsule Take 3 Caps by mouth three (3) times daily (with meals). 270 Cap 0 Past History Past Medical History: 
Past Medical History:  
Diagnosis Date  Abdominal pain, generalized  Chronic pancreatitis ([address]  Diabetes ([address]   
 hypothyroid  Encounter for long-term (current) use of other medications  Essential hypertension  ETOH abuse  GERD (gastroesophageal reflux disease)  Heart failure ([address]  Hyponatremia  Pain in the abdomen 11/2/2010  Pancreatitis w/ abscess and pseudocyst  
 Pancreatitis  Psychiatric disorder   
 depression, anxiety  Tobacco abuse Past Surgical History: 
Past Surgical History:  
Procedure Laterality Date  HX ABDOMINAL LAPAROSCOPY PANCREATIC STENT  
 HX CHOLECYSTECTOMY Family History: 
Family History Problem Relation Age of Onset  Heart Disease Father Social History: 
Social History Tobacco Use  Smoking status: Current Every Day Smoker Packs/day: 0.50 Years: 0.00 Pack years: 0.00 Types: Cigarettes  Smokeless tobacco: Never Used Substance Use Topics  Alcohol use: Yes  Drug use: No  
 
 
Allergies: Allergies Allergen Reactions  Ampicillin-Sulbactam Rash  Pcn [Penicillins] Itching and Hives  Piperacillin-Tazobactam Rash  Pollen Extracts Rash  Seafood [Shellfish Containing Products] Hives Other reaction(s): unknown Has tolerated iohexol (iodine-containing contrast) Only shrimp Shrimp Review of Systems Review of Systems Constitutional: Positive for fever. Gastrointestinal: Positive for abdominal pain. Skin: Positive for wound. All Other Systems Negative Physical Exam  
 
Vitals:  
 05/04/19 1642 BP: 122/81 Pulse: 90 Resp: 16 Temp: 97.8 °F (36.6 °C) SpO2: 100% Physical Exam  
Constitutional: Vital signs are normal. He appears well-developed and well-nourished. He is active. Non-toxic appearance. He does not appear ill. No distress. Uncomfortable but nontoxic. HENT:  
Head: Normocephalic and atraumatic. Neck: Normal range of motion. Neck supple. Carotid bruit is not present. No tracheal deviation present. No thyromegaly present. Cardiovascular: Normal rate, regular rhythm and normal heart sounds. Exam reveals no gallop and no friction rub. No murmur heard. Pulmonary/Chest: Effort normal and breath sounds normal. No stridor. No respiratory distress. He has no wheezes. He has no rales. He exhibits no tenderness. Abdominal: Soft. He exhibits no distension and no mass. There is no tenderness. There is no rebound, no guarding and no CVA tenderness. Musculoskeletal: Normal range of motion. Neurological: He is alert. Skin: Skin is warm, dry and intact. Rash noted. He is not diaphoretic. No pallor. Right sided lower abdominal wall NATALIE drainage site is mildly erythematous with thin yellow clear fluid on skin as well as on his closed. There is slight tenderness in the to the inferior margin of the redness. The diameter of affected skin areas approximately 5 cm. Psychiatric: He has a normal mood and affect. His speech is normal and behavior is normal. Judgment and thought content normal.  
Nursing note and vitals reviewed. Diagnostic Study Results Labs - Recent Results (from the past 12 hour(s)) CBC WITH AUTOMATED DIFF Collection Time: 05/04/19  8:45 AM  
Result Value Ref Range WBC 4.9 4.6 - 13.2 K/uL RBC 4.42 (L) 4.70 - 5.50 M/uL  
 HGB 12.1 (L) 13.0 - 16.0 g/dL HCT 38.3 36.0 - 48.0 % MCV 86.7 74.0 - 97.0 FL  
 MCH 27.4 24.0 - 34.0 PG  
 MCHC 31.6 31.0 - 37.0 g/dL  
 RDW 14.9 (H) 11.6 - 14.5 % PLATELET 437 032 - 586 K/uL MPV 10.3 9.2 - 11.8 FL  
 NEUTROPHILS 62 40 - 73 % LYMPHOCYTES 26 21 - 52 % MONOCYTES 6 3 - 10 % EOSINOPHILS 5 0 - 5 % BASOPHILS 1 0 - 2 %  
 ABS. NEUTROPHILS 3.1 1.8 - 8.0 K/UL  
 ABS. LYMPHOCYTES 1.3 0.9 - 3.6 K/UL  
 ABS. MONOCYTES 0.3 0.05 - 1.2 K/UL  
 ABS. EOSINOPHILS 0.3 0.0 - 0.4 K/UL  
 ABS. BASOPHILS 0.0 0.0 - 0.1 K/UL  
 DF AUTOMATED METABOLIC PANEL, COMPREHENSIVE Collection Time: 05/04/19  8:45 AM  
Result Value Ref Range Sodium 132 (L) 136 - 145 mmol/L Potassium 5.4 3.5 - 5.5 mmol/L Chloride 106 100 - 108 mmol/L  
 CO2 20 (L) 21 - 32 mmol/L Anion gap 6 3.0 - 18 mmol/L Glucose 496 (HH) 74 - 99 mg/dL BUN 11 7.0 - 18 MG/DL Creatinine 1.19 0.6 - 1.3 MG/DL  
 BUN/Creatinine ratio 9 (L) 12 - 20 GFR est AA >60 >60 ml/min/1.73m2 GFR est non-AA >60 >60 ml/min/1.73m2 Calcium 8.6 8.5 - 10.1 MG/DL Bilirubin, total 0.2 0.2 - 1.0 MG/DL  
 ALT (SGPT) 38 16 - 61 U/L  
 AST (SGOT) 42 (H) 15 - 37 U/L Alk. phosphatase 216 (H) 45 - 117 U/L Protein, total 8.6 (H) 6.4 - 8.2 g/dL Albumin 3.4 3.4 - 5.0 g/dL Globulin 5.2 (H) 2.0 - 4.0 g/dL A-G Ratio 0.7 (L) 0.8 - 1.7 LIPASE Collection Time: 05/04/19  8:45 AM  
Result Value Ref Range Lipase 46 (L) 73 - 393 U/L  
URINALYSIS W/ RFLX MICROSCOPIC Collection Time: 05/04/19 11:20 AM  
Result Value Ref Range Color YELLOW Appearance CLEAR Specific gravity >1.030 (H) 1.005 - 1.030  
 pH (UA) 6.0 5.0 - 8.0 Protein TRACE (A) NEG mg/dL Glucose >1,000 (A) NEG mg/dL Ketone NEGATIVE  NEG mg/dL Bilirubin NEGATIVE  NEG Blood NEGATIVE  NEG Urobilinogen 0.2 0.2 - 1.0 EU/dL Nitrites NEGATIVE  NEG  Leukocyte Esterase NEGATIVE  NEG    
 URINE MICROSCOPIC ONLY Collection Time: 05/04/19 11:20 AM  
Result Value Ref Range WBC 1 to 4 0 - 4 /hpf  
 RBC NONE 0 - 5 /hpf Epithelial cells FEW 0 - 5 /lpf Bacteria FEW (A) NEG /hpf  
GLUCOSE, POC Collection Time: 05/04/19 12:44 PM  
Result Value Ref Range Glucose (POC) 405 (HH) 70 - 110 mg/dL Radiologic Studies - No orders to display CT Results  (Last 48 hours) 05/04/19 1033  CT ABD PELV W CONT Final result Impression:  IMPRESSION:  
   
1. 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  
tract is again seen. 2.   Similar appearance of the pancreatic duct stent. Chronic pancreatitis. 3.  2 mm nonobstructing calculus left kidney with adjacent scarring. 4.  Moderate amount of stool throughout the colon. 5.  Atherosclerotic vascular calcification. 6.  Trace bilateral pleural effusions. 7.  Pneumobilia likely related to prior sphincterotomy Narrative: EXAM: CT ABD PELV W CONT  
   
CLINICAL INDICATION/HISTORY : Abdominal pain. COMPARISON: July 28, 2018 TECHNIQUE: Helical scan of the abdomen and pelvis was obtained  from the  
diaphragm to the symphysis without oral and with uneventful IV contrast  
administration. 80 I[address] 300 All CT scans at this facility are performed using dose optimization technique as  
appropriate to a performed exam, to include automated exposure control,  
adjustment of the MA and/or kV according to patient size (including appropriate  
matching for site specific examinations), or use of iterative reconstruction  
technique. FINDINGS:   
Lung Bases: The atelectasis at the lung bases bilaterally. Trace bilateral  
pleural effusions Liver: Pneumobilia Gallbladder:  Cholecystectomy Spleen: Stable tiny hypodensity anterior spleen likely a small cyst  
   
Adrenals:  Unremarkable Pancreas: There is a pancreatic duct stent present to prior. Appearance of  
chronic pancreatitis with multiple calcifications and pancreatic atrophy Kidneys: Several subcentimeter low density renal lesions are too small to  
characterize accurately. 2 mm nonobstructing calculus upper pole left kidney  
with adjacent scarring Stomach, small bowel, colon: Moderate stool throughout the colon. Again seen is  
induration in the mesenteric fat extending from the duodenum to the right  
lateral abdominal wall which was demonstrated to be an enterocutaneous fistula  
in the past. Appendix is not seen Lymph nodes:  No adenopathy Vasculature/Aorta: There are atherosclerotic vascular calcifications. Peritoneal spaces:  No ascites or free air right enterocutaneous fistula as  
above Bladder:  Unremarkable Pelvic structures: No evidence for mass. Bones:  Unremarkable for age CXR Results  (Last 48 hours) None Medical Decision Making I am the first provider for this patient. I reviewed the vital signs, available nursing notes, past medical history, past surgical history, family history and social history. Vital Signs-Reviewed the patient's vital signs Records Reviewed: Nursing Notes, Old Medical Records, Previous Radiology Studies and Previous Laboratory Studies Procedures: 
Procedures Provider Notes (Medical Decision Making): Patient was on broad-spectrum antibiotics when he was treated on 12 at [facility].  Does have history of C. difficile and ESBL. Have asked for ID assistance with antibiotics. 6:32 PM 
Drain appears to be been ordered from admission in September 2018. IR placed it. Dr. Dayne Knott is his GI doctor. Spoke with Dr. Burnetta Goltz for ID. Will start on Doxy after dose of vancomycin here. Needs to follow-up with GI and his PCP. MED RECONCILIATION: 
Current Facility-Administered Medications Medication Dose Route Frequency  sodium chloride (NS) flush 5-10 mL  5-10 mL IntraVENous PRN  
 morphine injection 4 mg  4 mg IntraVENous NOW  vancomycin (VANCOCIN) 1000 mg in  ml infusion  1,000 mg IntraVENous NOW Current Outpatient Medications Medication Sig  
 doxycycline (MONODOX) 100 mg capsule Take 1 Cap by mouth two (2) times a day for 10 days.  oxyCODONE-acetaminophen (PERCOCET) 5-325 mg per tablet Take 1 Tab by mouth every six (6) hours as needed for Pain for up to 5 days. Max Daily Amount: 4 Tabs.  Blood-Glucose Meter (FREESTYLE LITE METER) monitoring kit Test blood sugars 4-6 times a day  glucose blood VI test strips (FREESTYLE TEST) strip 1 Each by Does Not Apply route See Admin Instructions. Test blood sugars 4-6 times a day.  insulin glargine (LANTUS U-100 INSULIN) 100 unit/mL injection by SubCUTAneous route nightly.  insulin NPH/insulin regular (NOVOLIN 70/30 U-100 INSULIN) 100 unit/mL (70-30) injection by SubCUTAneous route.  promethazine (PHENERGAN) 25 mg tablet Take 1 Tab by mouth every six (6) hours as needed.  lipase-protease-amylase (ZENPEP 10,000) capsule Take 3 Caps by mouth three (3) times daily (with meals). Disposition: 
home DISCHARGE NOTE:  
7:01 PM 
 
Pt has been reexamined. Patient has no new complaints, changes, or physical findings. Care plan outlined and precautions discussed. Results of labs were reviewed with the patient. All medications were reviewed with the patient; will d/c home with doxycycline. All of pt's questions and concerns were addressed. Patient was instructed and agrees to follow up with GI, PCP, as well as to return to the ED upon further deterioration. Patient is ready to go home. Follow-up Information Follow up With Specialties Details Why Contact Info Lelo Freitas MD Family Practice Schedule an appointment as soon as possible for a visit in 2 days  [address] [facility] [id number] 
[phone] Loretta Peck MD Gastroenterology, Internal Medicine Schedule an appointment as soon as possible for a visit in 2 days For wound re-check [address] [place] [id number] 
[phone] [facility] EMERGENCY DEPT Emergency Medicine  If symptoms worsen return immediately [address] Current Discharge Medication List  
  
START taking these medications Details  
doxycycline (MONODOX) 100 mg capsule Take 1 Cap by mouth two (2) times a day for 10 days. Qty: 20 Cap, Refills: 0 Diagnosis Clinical Impression: 1. Cellulitis of abdominal wall 2. Enterocutaneous fistula

## 2019-05-04 NOTE — ED PROVIDER NOTES
EMERGENCY DEPARTMENT HISTORY AND PHYSICAL EXAM 
 
Date: 5/4/2019 Patient Name: Soniya Paulino 
 
History of Presenting Illness Chief Complaint Patient presents with  Abdominal Pain History Provided By: Patient Chief Complaint: Nausea, vomiting, abdominal pain Duration: 2 days Timing: Gradual 
Location: Epigastric Quality: [place] Learn Severity: 10 out of 10 Modifying Factors: None Associated Symptoms: none Additional History (Context): Soniya Paulino is a 48 y.o. male with a history of heart failure, diabetes, GERD, chronic abdominal pain, pancreatitis, alcohol abuse, hypertension who presents today for a 2-day history of nausea, vomiting and abdominal pain. Patient states abdominal pain feels like similar episodes of pancreatitis. Patient reports he was recently admitted on April 29 for sepsis. Patient denies any fevers or chills at home chest pain or shortness of breath. Patient states he had ran out of his test strips at home and has not been able to check his blood sugar. Patient does take insulin. Patient also reports he ran out of his phenergan. Patient has not tried anything for this at home. Denies any urinary symptoms. Presents via EMS. Denies any alcohol drug or tobacco abuse. PCP: Jannie Pizano MD 
 
Current Outpatient Medications Medication Sig Dispense Refill  oxyCODONE-acetaminophen (PERCOCET) 5-325 mg per tablet Take 1 Tab by mouth every six (6) hours as needed for Pain for up to 5 days. Max Daily Amount: 4 Tabs. 20 Tab 0  Blood-Glucose Meter (FREESTYLE LITE METER) monitoring kit Test blood sugars 4-6 times a day 1 Kit 0  
 glucose blood VI test strips (FREESTYLE TEST) strip 1 Each by Does Not Apply route See Admin Instructions. Test blood sugars 4-6 times a day. 100 Strip 3  
 insulin glargine (LANTUS U-100 INSULIN) 100 unit/mL injection by SubCUTAneous route nightly.     
 insulin NPH/insulin regular (NOVOLIN 70/30 U-100 INSULIN) 100 unit/mL (70-30) injection by SubCUTAneous route.  promethazine (PHENERGAN) 25 mg tablet Take 1 Tab by mouth every six (6) hours as needed. 10 Tab 0  
 lipase-protease-amylase (ZENPEP 10,000) capsule Take 3 Caps by mouth three (3) times daily (with meals). 270 Cap 0 Past History Past Medical History: 
Past Medical History:  
Diagnosis Date  Abdominal pain, generalized  Chronic pancreatitis ([facility] [address]  Diabetes ([facility] [address]   
 hypothyroid  Encounter for long-term (current) use of other medications  Essential hypertension  ETOH abuse  GERD (gastroesophageal reflux disease)  Heart failure ([facility] [address]  Hyponatremia  Pain in the abdomen 11/2/2010  Pancreatitis w/ abscess and pseudocyst  
 Pancreatitis  Psychiatric disorder   
 depression, anxiety  Tobacco abuse Past Surgical History: 
Past Surgical History:  
Procedure Laterality Date  HX ABDOMINAL LAPAROSCOPY PANCREATIC STENT  
 HX CHOLECYSTECTOMY Family History: 
Family History Problem Relation Age of Onset  Heart Disease Father Social History: 
Social History Tobacco Use  Smoking status: Current Every Day Smoker Packs/day: 0.50 Years: 0.00 Pack years: 0.00 Types: Cigarettes  Smokeless tobacco: Never Used Substance Use Topics  Alcohol use: Yes  Drug use: No  
 
 
Allergies: Allergies Allergen Reactions  Ampicillin-Sulbactam Rash  Pcn [Penicillins] Itching and Hives  Piperacillin-Tazobactam Rash  Pollen Extracts Rash  Seafood [Shellfish Containing Products] Hives Other reaction(s): unknown Has tolerated iohexol (iodine-containing contrast) Only shrimp Shrimp Review of Systems Review of Systems Constitutional: Negative for chills and fever. HENT: Negative for congestion, rhinorrhea and sore throat. Respiratory: Negative for cough and shortness of breath. Cardiovascular: Negative for chest pain. Gastrointestinal: Positive for abdominal pain, diarrhea, nausea and vomiting. Negative for blood in stool and constipation. Genitourinary: Negative for dysuria, frequency and hematuria. Musculoskeletal: Negative for back pain and myalgias. Skin: Negative for rash and wound. Neurological: Negative for dizziness and headaches. All other systems reviewed and are negative. All Other Systems Negative Physical Exam  
 
Vitals:  
 05/04/19 0756 05/04/19 0800 BP: 121/77 111/88 Pulse: 88 94 Resp: 15 18 Temp: 97.3 °F (36.3 °C) SpO2: 98% 98% Physical Exam  
Constitutional: He is oriented to person, place, and time. He appears well-developed and well-nourished. He appears distressed. HENT:  
Head: Normocephalic and atraumatic. Eyes: Conjunctivae are normal.  
Neck: Normal range of motion. Neck supple. Cardiovascular: Normal rate, regular rhythm and normal heart sounds. Pulmonary/Chest: Effort normal and breath sounds normal. No respiratory distress. He exhibits no tenderness. Abdominal: Soft. Bowel sounds are normal. He exhibits no distension. There is tenderness in the epigastric area. There is guarding. There is no rigidity, no rebound, no CVA tenderness, no tenderness at McBurney's point and negative Magana's sign. Musculoskeletal: Normal range of motion. He exhibits no edema or deformity. Neurological: He is alert and oriented to person, place, and time. Skin: Skin is warm and dry. He is not diaphoretic. Psychiatric: He has a normal mood and affect. His behavior is normal.  
Nursing note and vitals reviewed. Diagnostic Study Results Labs - Recent Results (from the past 12 hour(s)) CBC WITH AUTOMATED DIFF Collection Time: 05/04/19  8:45 AM  
Result Value Ref Range WBC 4.9 4.6 - 13.2 K/uL  
 RBC 4.42 (L) 4.70 - 5.50 M/uL  
 HGB 12.1 (L) 13.0 - 16.0 g/dL HCT 38.3 36.0 - 48.0 %  MCV 86.7 74.0 - 97.0 FL  
 MCH 27.4 24.0 - 34.0 PG  
 MCHC 31.6 31.0 - 37.0 g/dL  
 RDW 14.9 (H) 11.6 - 14.5 % PLATELET 950 560 - 257 K/uL MPV 10.3 9.2 - 11.8 FL  
 NEUTROPHILS 62 40 - 73 % LYMPHOCYTES 26 21 - 52 % MONOCYTES 6 3 - 10 % EOSINOPHILS 5 0 - 5 % BASOPHILS 1 0 - 2 %  
 ABS. NEUTROPHILS 3.1 1.8 - 8.0 K/UL  
 ABS. LYMPHOCYTES 1.3 0.9 - 3.6 K/UL  
 ABS. MONOCYTES 0.3 0.05 - 1.2 K/UL  
 ABS. EOSINOPHILS 0.3 0.0 - 0.4 K/UL  
 ABS. BASOPHILS 0.0 0.0 - 0.1 K/UL  
 DF AUTOMATED METABOLIC PANEL, COMPREHENSIVE Collection Time: 05/04/19  8:45 AM  
Result Value Ref Range Sodium 132 (L) 136 - 145 mmol/L Potassium 5.4 3.5 - 5.5 mmol/L Chloride 106 100 - 108 mmol/L  
 CO2 20 (L) 21 - 32 mmol/L Anion gap 6 3.0 - 18 mmol/L Glucose 496 (HH) 74 - 99 mg/dL BUN 11 7.0 - 18 MG/DL Creatinine 1.19 0.6 - 1.3 MG/DL  
 BUN/Creatinine ratio 9 (L) 12 - 20 GFR est AA >60 >60 ml/min/1.73m2 GFR est non-AA >60 >60 ml/min/1.73m2 Calcium 8.6 8.5 - 10.1 MG/DL Bilirubin, total 0.2 0.2 - 1.0 MG/DL  
 ALT (SGPT) 38 16 - 61 U/L  
 AST (SGOT) 42 (H) 15 - 37 U/L Alk. phosphatase 216 (H) 45 - 117 U/L Protein, total 8.6 (H) 6.4 - 8.2 g/dL Albumin 3.4 3.4 - 5.0 g/dL Globulin 5.2 (H) 2.0 - 4.0 g/dL A-G Ratio 0.7 (L) 0.8 - 1.7 LIPASE Collection Time: 05/04/19  8:45 AM  
Result Value Ref Range Lipase 46 (L) 73 - 393 U/L  
URINALYSIS W/ RFLX MICROSCOPIC Collection Time: 05/04/19 11:20 AM  
Result Value Ref Range Color YELLOW Appearance CLEAR Specific gravity >1.030 (H) 1.005 - 1.030  
 pH (UA) 6.0 5.0 - 8.0 Protein TRACE (A) NEG mg/dL Glucose >1,000 (A) NEG mg/dL Ketone NEGATIVE  NEG mg/dL Bilirubin NEGATIVE  NEG Blood NEGATIVE  NEG Urobilinogen 0.2 0.2 - 1.0 EU/dL Nitrites NEGATIVE  NEG Leukocyte Esterase NEGATIVE  NEG    
URINE MICROSCOPIC ONLY Collection Time: 05/04/19 11:20 AM  
Result Value Ref Range  WBC 1 to 4 0 - 4 /hpf  
 RBC NONE 0 - 5 /hpf  
 Epithelial cells FEW 0 - 5 /lpf Bacteria FEW (A) NEG /hpf  
GLUCOSE, POC Collection Time: 05/04/19 12:44 PM  
Result Value Ref Range Glucose (POC) 405 (HH) 70 - 110 mg/dL Radiologic Studies -  
CT ABD PELV W CONT Final Result IMPRESSION:  
  
1. 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  
tract is again seen. 2.   Similar appearance of the pancreatic duct stent. Chronic pancreatitis. 3.  2 mm nonobstructing calculus left kidney with adjacent scarring. 4.  Moderate amount of stool throughout the colon. 5.  Atherosclerotic vascular calcification. 6.  Trace bilateral pleural effusions. 7.  Pneumobilia likely related to prior sphincterotomy CT Results  (Last 48 hours) 05/04/19 1033  CT ABD PELV W CONT Final result Impression:  IMPRESSION:  
   
1. 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  
tract is again seen. 2.   Similar appearance of the pancreatic duct stent. Chronic pancreatitis. 3.  2 mm nonobstructing calculus left kidney with adjacent scarring. 4.  Moderate amount of stool throughout the colon. 5.  Atherosclerotic vascular calcification. 6.  Trace bilateral pleural effusions. 7.  Pneumobilia likely related to prior sphincterotomy Narrative: EXAM: CT ABD PELV W CONT  
   
CLINICAL INDICATION/HISTORY : Abdominal pain. COMPARISON: July 28, 2018 TECHNIQUE: Helical scan of the abdomen and pelvis was obtained  from the  
diaphragm to the symphysis without oral and with uneventful IV contrast  
administration. 80 [address] 300 All CT scans at this facility are performed using dose optimization technique as  
appropriate to a performed exam, to include automated exposure control,  
adjustment of the MA and/or kV according to patient size (including appropriate  
matching for site specific examinations), or use of iterative reconstruction  
technique. FINDINGS:   
Lung Bases: The atelectasis at the lung bases bilaterally. Trace bilateral  
pleural effusions Liver: Pneumobilia Gallbladder:  Cholecystectomy Spleen: Stable tiny hypodensity anterior spleen likely a small cyst  
   
Adrenals:  Unremarkable Pancreas: There is a pancreatic duct stent present to prior. Appearance of  
chronic pancreatitis with multiple calcifications and pancreatic atrophy Kidneys: Several subcentimeter low density renal lesions are too small to  
characterize accurately. 2 mm nonobstructing calculus upper pole left kidney  
with adjacent scarring Stomach, small bowel, colon: Moderate stool throughout the colon. Again seen is  
induration in the mesenteric fat extending from the duodenum to the right  
lateral abdominal wall which was demonstrated to be an enterocutaneous fistula  
in the past. Appendix is not seen Lymph nodes:  No adenopathy Vasculature/Aorta: There are atherosclerotic vascular calcifications. Peritoneal spaces:  No ascites or free air right enterocutaneous fistula as  
above Bladder:  Unremarkable Pelvic structures: No evidence for mass. Bones:  Unremarkable for age CXR Results  (Last 48 hours) None Medical Decision Making I am the first provider for this patient. I reviewed the vital signs, available nursing notes, past medical history, past surgical history, family history and social history. Vital Signs-Reviewed the patient's vital signs. Records Reviewed: Nursing Notes and Old Medical Records Procedures: None Procedures Provider Notes (Medical Decision Making):  
 
Differential Diagnosis: Biliary disease, hepatitis, pancreatitis, PUD, gastritis, gastroenteritis, hiatal hernia, constipation, SBO, LBO, renal colic, IBS, IBD, celiac disease Plan: Will order labs, CT of abd and pelvis, POC glucose, phenergan and pain meds. 8:12 AM 
Patient does not meet SIRS at this time. 1:39 PM 
Patient states he is feeling better at this time, pain is improved, known nausea or vomiting. Patient is asking to eat. Will p.o. challenge and feed patient. Have discussed CT results with patient. Have advised patient to follow-up with surgeon regarding chronic fistula. Patient states he is out of Percocet at home for chronic pancreatitis, will refill Percocet. Patient is not in DKA at this time however have discussed with patient bicarb is mildly decreased and he is at large risk for going into DKA again. Have stressed the extreme importance of glucose levels and taking insulin as prescribed. Patient states he has plenty of long-acting and short acting insulin at home, but denies having any test strips. Will discharge home with new meter and test strip prescription. Return precautions have been given. Have stressed the importance of follow-up with PCP as well. MED RECONCILIATION: 
No current facility-administered medications for this encounter. Current Outpatient Medications Medication Sig  
 oxyCODONE-acetaminophen (PERCOCET) 5-325 mg per tablet Take 1 Tab by mouth every six (6) hours as needed for Pain for up to 5 days. Max Daily Amount: 4 Tabs.  Blood-Glucose Meter (FREESTYLE LITE METER) monitoring kit Test blood sugars 4-6 times a day  glucose blood VI test strips (FREESTYLE TEST) strip 1 Each by Does Not Apply route See Admin Instructions. Test blood sugars 4-6 times a day.  insulin glargine (LANTUS U-100 INSULIN) 100 unit/mL injection by SubCUTAneous route nightly.  insulin NPH/insulin regular (NOVOLIN 70/30 U-100 INSULIN) 100 unit/mL (70-30) injection by SubCUTAneous route.  promethazine (PHENERGAN) 25 mg tablet Take 1 Tab by mouth every six (6) hours as needed.  lipase-protease-amylase (ZENPEP 10,000) capsule Take 3 Caps by mouth three (3) times daily (with meals). Disposition: 
Home DISCHARGE NOTE:  
Pt has been reexamined. Patient has no new complaints, changes, or physical findings. Care plan outlined and precautions discussed. Results of workup were reviewed with the patient. All medications were reviewed with the patient. All of pt's questions and concerns were addressed. Patient was instructed and agrees to follow up with PCP  as well as to return to the ED upon further deterioration. Patient is ready to go home. Follow-up Information Follow up With Specialties Details Why Contact Info [facility] EMERGENCY DEPT Emergency Medicine  As needed [address] [phone] [id number] 
[phone] Caitlin Porter MD [facility] In 2 days  [facility] [address] [facility] [address] [id number] [phone] Current Discharge Medication List  
  
START taking these medications Details  
oxyCODONE-acetaminophen (PERCOCET) 5-325 mg per tablet Take 1 Tab by mouth every six (6) hours as needed for Pain for up to 5 days. Max Daily Amount: 4 Tabs. Qty: 20 Tab, Refills: 0 Associated Diagnoses: IDDM (insulin dependent diabetes mellitus) ([address] Blood-Glucose Meter (FREESTYLE LITE METER) monitoring kit Test blood sugars 4-6 times a day 
Qty: 1 Kit, Refills: 0  
  
glucose blood VI test strips (FREESTYLE TEST) strip 1 Each by Does Not Apply route See Admin Instructions. Test blood sugars 4-6 times a day. Qty: 100 Strip, Refills: 3 Diagnosis Clinical Impression: 1. Abdominal pain, unspecified abdominal location 2. Non-intractable vomiting with nausea, unspecified vomiting type 3. Chronic pancreatitis, unspecified pancreatitis type ([address] 4. Hyperglycemia 5. Fistula 6. IDDM (insulin dependent diabetes mellitus) ([address]

## 2019-05-04 NOTE — DISCHARGE INSTRUCTIONS
Patient Education        Abdominal Pain: Care Instructions  Your Care Instructions    Abdominal pain has many possible causes. Some aren't serious and get better on their own in a few days. Others need more testing and treatment. If your pain continues or gets worse, you need to be rechecked and may need more tests to find out what is wrong. You may need surgery to correct the problem. Don't ignore new symptoms, such as fever, nausea and vomiting, urination problems, pain that gets worse, and dizziness. These may be signs of a more serious problem. Your doctor may have recommended a follow-up visit in the next 8 to 12 hours. If you are not getting better, you may need more tests or treatment. The doctor has checked you carefully, but problems can develop later. If you notice any problems or new symptoms, get medical treatment right away. Follow-up care is a key part of your treatment and safety. Be sure to make and go to all appointments, and call your doctor if you are having problems. It's also a good idea to know your test results and keep a list of the medicines you take. How can you care for yourself at home? · Rest until you feel better. · To prevent dehydration, drink plenty of fluids, enough so that your urine is light yellow or clear like water. Choose water and other caffeine-free clear liquids until you feel better. If you have kidney, heart, or liver disease and have to limit fluids, talk with your doctor before you increase the amount of fluids you drink. · If your stomach is upset, eat mild foods, such as rice, dry toast or crackers, bananas, and applesauce. Try eating several small meals instead of two or three large ones. · Wait until 48 hours after all symptoms have gone away before you have spicy foods, alcohol, and drinks that contain caffeine. · Do not eat foods that are high in fat. · Avoid anti-inflammatory medicines such as aspirin, ibuprofen (Advil, Motrin), and naproxen (Aleve). These can cause stomach upset. Talk to your doctor if you take daily aspirin for another health problem. When should you call for help? Call 911 anytime you think you may need emergency care. For example, call if:    · You passed out (lost consciousness).     · You pass maroon or very bloody stools.     · You vomit blood or what looks like coffee grounds.     · You have new, severe belly pain.    Call your doctor now or seek immediate medical care if:    · Your pain gets worse, especially if it becomes focused in one area of your belly.     · You have a new or higher fever.     · Your stools are black and look like tar, or they have streaks of blood.     · You have unexpected vaginal bleeding.     · You have symptoms of a urinary tract infection. These may include:  ? Pain when you urinate. ? Urinating more often than usual.  ? Blood in your urine.     · You are dizzy or lightheaded, or you feel like you may faint.    Watch closely for changes in your health, and be sure to contact your doctor if:    · You are not getting better after 1 day (24 hours). Where can you learn more? Go to http://thiago-mikhail.info/. Enter A795 in the search box to learn more about \"Abdominal Pain: Care Instructions. \"  Current as of: September 23, 2018  Content Version: 11.9  © 5083-0140 [employer]. Care instructions adapted under license by [employer] (which disclaims liability or warranty for this information). If you have questions about a medical condition or this instruction, always ask your healthcare professional. [place] [address] any warranty or liability for your use of this information. Patient Education        Diabetes Blood Sugar Emergencies: Your Action Plan  How can you prevent a blood sugar emergency? An important part of living with diabetes is keeping your blood sugar in your target range.  You'll need to know what to do if it's too high or too low. Managing your blood sugar levels helps you avoid emergencies. This care sheet will teach you about the signs of high and low blood sugar. It will help you make an action plan with your doctor for when these signs occur. Low blood sugar is more likely to happen if you take certain medicines for diabetes. It can also happen if you skip a meal, drink alcohol, or exercise more than usual.  You may get high blood sugar if you eat differently than you normally do. One example is eating more carbohydrate than usual. Having a cold, the flu, or other sudden illness can also cause high blood sugar levels. Levels can also rise if you miss a dose of medicine. Any change in how you take your medicine may affect your blood sugar level. So it's important to work with your doctor before you make any changes. Check your blood sugar  Work with your doctor to fill in the blank spaces below that apply to you. Track your levels, know your target range, and write down ways you can get your blood sugar back in your target range. A log book can help you track your levels. Take the book to all of your medical appointments. · Check your blood sugar _____ times a day, at these times:________________________________________________. (For example: Before meals, at bedtime, before exercise, during exercise, other.)  · Your blood sugar target range before a meal is ___________________. Your blood sugar target range after a meal is _______________________. · Do this--___________________________________________________--to get your blood sugar back within your safe range if your blood sugar results are _________________________________________. (For example: Less than 70 or above 250 mg/dL.)  Call your doctor when your blood sugar results are ___________________________________. (For example: Less than 70 or above 250 mg/dL.)  What are the symptoms of low and high blood sugar?   Common symptoms of low blood sugar are sweating and feeling shaky, weak, hungry, or confused. Symptoms can start quickly. Common symptoms of high blood sugar are feeling very thirsty or very hungry. You may also pass urine more often than usual. You may have blurry vision and may lose weight without trying. But some people may have high or low blood sugar without having any symptoms. That's a good reason to check your blood sugar on a regular schedule. What should you do if you have symptoms? Work with your doctor to fill in the blank spaces below that apply to you. Low blood sugar  If you have symptoms of low blood sugar, check your blood sugar. If it's below _____ ( for example, below 70), eat or drink a quick-sugar food that has about 15 grams of carbohydrate. Your goal is to get your level back to your safe range. Check your blood sugar again 15 minutes later. If it's still not in your target range, take another 15 grams of carbohydrate and check your blood sugar again in 15 minutes. Repeat this until you reach your target. Then go back to your regular testing schedule. When you have low blood sugar, it's best to stop or reduce any physical activity until your blood sugar is back in your target range and is stable. If you must stay active, eat or drink 30 grams of carbohydrate. Then check your blood sugar again in 15 minutes. If it's not in your target range, take another 30 grams of carbohydrates. Check your blood sugar again in 15 minutes. Keep doing this until you reach your target. You can then go back to your regular testing schedule. If your symptoms or blood sugar levels are getting worse or have not improved after 15 minutes, seek medical care right away.   Here are some examples of quick-sugar foods with 15 grams of carbohydrate:  · 3 or 4 glucose tablets  · 1 tube of glucose gel  · Hard candy (such as 3 Jolly Ranchers or 5 to 7 Life Savers)  · ½ cup to ¾ cup (4 to 6 ounces) of fruit juice or regular (not diet) soda  High blood sugar  If you have symptoms of high blood sugar, check your blood sugar. Your goal is to get your level back to your target range. If it's above ______ ( for example, above 250), follow these steps:  · If you missed a dose of your diabetes medicine, take it now. Take only the amount of medicine that you have been prescribed. Do not take more or less medicine. · Give yourself insulin if your doctor has prescribed it for high blood sugar. · Test for ketones, if the doctor told you to do so. If the results of the ketone test show a moderate-to-large amount of ketones, call the doctor for advice. · Wait 30 minutes after you take the extra insulin or the missed medicine. Check your blood sugar again. If your symptoms or blood sugar levels are getting worse or have not improved after taking these steps, seek medical care right away. Follow-up care is a key part of your treatment and safety. Be sure to make and go to all appointments, and call your doctor if you are having problems. It's also a good idea to know your test results and keep a list of the medicines you take. Where can you learn more? Go to http://thiago-mikhail.info/. Enter R984 in the search box to learn more about \"Diabetes Blood Sugar Emergencies: Your Action Plan. \"  Current as of: July 25, 2018  Content Version: 11.9  © 8760-3210 [employer]. Care instructions adapted under license by [employer] (which disclaims liability or warranty for this information). If you have questions about a medical condition or this instruction, always ask your healthcare professional. [place] [address] any warranty or liability for your use of this information.

## 2019-05-04 NOTE — ED TRIAGE NOTES
Pt brought to Ed via EMS with c/o new onset of bleeding and puss from wound from previous NATALIE drain. No drain noted at this time.

## 2019-05-04 NOTE — DISCHARGE INSTRUCTIONS

## 2019-05-05 NOTE — ED NOTES
I have reviewed discharge instructions with the patient and parent. The patient and parent verbalized understanding. No further questions at this time. Patient currently sitting in the lobby waiting to discuss possible placement options.

## 2019-05-06 ENCOUNTER — HOSPITAL ENCOUNTER (EMERGENCY)
Age: 54
Discharge: HOME | End: 2019-05-06
Attending: EMERGENCY MEDICINE
Payer: MEDICAID

## 2019-05-06 ENCOUNTER — APPOINTMENT (OUTPATIENT)
Dept: CT IMAGING | Age: 54
End: 2019-05-06
Attending: EMERGENCY MEDICINE
Payer: MEDICAID

## 2019-05-06 VITALS
RESPIRATION RATE: 16 BRPM | WEIGHT: 140 LBS | DIASTOLIC BLOOD PRESSURE: 81 MMHG | HEART RATE: 84 BPM | BODY MASS INDEX: 18.99 KG/M2 | SYSTOLIC BLOOD PRESSURE: 115 MMHG | OXYGEN SATURATION: 100 % | TEMPERATURE: 97 F

## 2019-05-06 DIAGNOSIS — L98.8 FISTULA: ICD-10-CM

## 2019-05-06 DIAGNOSIS — K85.00 IDIOPATHIC ACUTE PANCREATITIS, UNSPECIFIED COMPLICATION STATUS: ICD-10-CM

## 2019-05-06 DIAGNOSIS — R10.31 ABDOMINAL PAIN, RIGHT LOWER QUADRANT: Primary | ICD-10-CM

## 2019-05-06 LAB
ALBUMIN SERPL-MCNC: 3.5 G/DL (ref 3.4–5)
ALBUMIN/GLOB SERPL: 0.6 {RATIO} (ref 0.8–1.7)
ALP SERPL-CCNC: 189 U/L (ref 45–117)
ALT SERPL-CCNC: 32 U/L (ref 16–61)
ANION GAP SERPL CALC-SCNC: 4 MMOL/L (ref 3–18)
ANION GAP SERPL CALC-SCNC: 7 MMOL/L (ref 3–18)
APPEARANCE UR: CLEAR
AST SERPL-CCNC: 40 U/L (ref 15–37)
ATRIAL RATE: 70 BPM
BACTERIA URNS QL MICRO: ABNORMAL /HPF
BASOPHILS # BLD: 0 K/UL (ref 0–0.1)
BASOPHILS NFR BLD: 1 % (ref 0–2)
BILIRUB SERPL-MCNC: 0.4 MG/DL (ref 0.2–1)
BILIRUB UR QL: NEGATIVE
BUN SERPL-MCNC: 14 MG/DL (ref 7–18)
BUN SERPL-MCNC: 16 MG/DL (ref 7–18)
BUN/CREAT SERPL: 13 (ref 12–20)
BUN/CREAT SERPL: 14 (ref 12–20)
CALCIUM SERPL-MCNC: 8.3 MG/DL (ref 8.5–10.1)
CALCIUM SERPL-MCNC: 8.8 MG/DL (ref 8.5–10.1)
CALCULATED P AXIS, ECG09: 74 DEGREES
CALCULATED R AXIS, ECG10: -38 DEGREES
CALCULATED T AXIS, ECG11: 48 DEGREES
CHLORIDE SERPL-SCNC: 100 MMOL/L (ref 100–108)
CHLORIDE SERPL-SCNC: 104 MMOL/L (ref 100–108)
CO2 SERPL-SCNC: 23 MMOL/L (ref 21–32)
CO2 SERPL-SCNC: 23 MMOL/L (ref 21–32)
COLOR UR: YELLOW
CREAT SERPL-MCNC: 1.12 MG/DL (ref 0.6–1.3)
CREAT SERPL-MCNC: 1.15 MG/DL (ref 0.6–1.3)
DIAGNOSIS, 93000: NORMAL
DIFFERENTIAL METHOD BLD: ABNORMAL
EOSINOPHIL # BLD: 0.1 K/UL (ref 0–0.4)
EOSINOPHIL NFR BLD: 3 % (ref 0–5)
EPITH CASTS URNS QL MICRO: ABNORMAL /LPF (ref 0–5)
ERYTHROCYTE [DISTWIDTH] IN BLOOD BY AUTOMATED COUNT: 14.4 % (ref 11.6–14.5)
GLOBULIN SER CALC-MCNC: 5.7 G/DL (ref 2–4)
GLUCOSE BLD STRIP.AUTO-MCNC: 456 MG/DL (ref 70–110)
GLUCOSE SERPL-MCNC: 473 MG/DL (ref 74–99)
GLUCOSE SERPL-MCNC: 533 MG/DL (ref 74–99)
GLUCOSE UR STRIP.AUTO-MCNC: >1000 MG/DL
HCT VFR BLD AUTO: 33.3 % (ref 36–48)
HGB BLD-MCNC: 10.4 G/DL (ref 13–16)
HGB UR QL STRIP: ABNORMAL
KETONES UR QL STRIP.AUTO: ABNORMAL MG/DL
LACTATE BLD-SCNC: 1.09 MMOL/L (ref 0.4–2)
LEUKOCYTE ESTERASE UR QL STRIP.AUTO: NEGATIVE
LIPASE SERPL-CCNC: 30 U/L (ref 73–393)
LYMPHOCYTES # BLD: 1.2 K/UL (ref 0.9–3.6)
LYMPHOCYTES NFR BLD: 25 % (ref 21–52)
MCH RBC QN AUTO: 26.5 PG (ref 24–34)
MCHC RBC AUTO-ENTMCNC: 31.2 G/DL (ref 31–37)
MCV RBC AUTO: 84.7 FL (ref 74–97)
MONOCYTES # BLD: 0.2 K/UL (ref 0.05–1.2)
MONOCYTES NFR BLD: 5 % (ref 3–10)
NEUTS SEG # BLD: 3.3 K/UL (ref 1.8–8)
NEUTS SEG NFR BLD: 66 % (ref 40–73)
NITRITE UR QL STRIP.AUTO: NEGATIVE
P-R INTERVAL, ECG05: 150 MS
PH UR STRIP: 6 [PH] (ref 5–8)
PLATELET # BLD AUTO: 376 K/UL (ref 135–420)
PMV BLD AUTO: 9.6 FL (ref 9.2–11.8)
POTASSIUM SERPL-SCNC: 4.3 MMOL/L (ref 3.5–5.5)
POTASSIUM SERPL-SCNC: 5.8 MMOL/L (ref 3.5–5.5)
PROT SERPL-MCNC: 9.2 G/DL (ref 6.4–8.2)
PROT UR STRIP-MCNC: 30 MG/DL
Q-T INTERVAL, ECG07: 440 MS
QRS DURATION, ECG06: 106 MS
QTC CALCULATION (BEZET), ECG08: 475 MS
RBC # BLD AUTO: 3.93 M/UL (ref 4.7–5.5)
RBC #/AREA URNS HPF: ABNORMAL /HPF (ref 0–5)
SODIUM SERPL-SCNC: 130 MMOL/L (ref 136–145)
SODIUM SERPL-SCNC: 131 MMOL/L (ref 136–145)
SP GR UR REFRACTOMETRY: >1.03 (ref 1–1.03)
UROBILINOGEN UR QL STRIP.AUTO: 0.2 EU/DL (ref 0.2–1)
VENTRICULAR RATE, ECG03: 70 BPM
WBC # BLD AUTO: 5 K/UL (ref 4.6–13.2)
WBC URNS QL MICRO: ABNORMAL /HPF (ref 0–4)

## 2019-05-06 PROCEDURE — 93005 ELECTROCARDIOGRAM TRACING: CPT

## 2019-05-06 PROCEDURE — 81001 URINALYSIS AUTO W/SCOPE: CPT

## 2019-05-06 PROCEDURE — 74011250636 HC RX REV CODE- 250/636: Performed by: EMERGENCY MEDICINE

## 2019-05-06 PROCEDURE — 96375 TX/PRO/DX INJ NEW DRUG ADDON: CPT

## 2019-05-06 PROCEDURE — 80053 COMPREHEN METABOLIC PANEL: CPT

## 2019-05-06 PROCEDURE — 83605 ASSAY OF LACTIC ACID: CPT

## 2019-05-06 PROCEDURE — 83690 ASSAY OF LIPASE: CPT

## 2019-05-06 PROCEDURE — 96374 THER/PROPH/DIAG INJ IV PUSH: CPT

## 2019-05-06 PROCEDURE — 82962 GLUCOSE BLOOD TEST: CPT

## 2019-05-06 PROCEDURE — 74177 CT ABD & PELVIS W/CONTRAST: CPT

## 2019-05-06 PROCEDURE — 74011636320 HC RX REV CODE- 636/320: Performed by: EMERGENCY MEDICINE

## 2019-05-06 PROCEDURE — 96361 HYDRATE IV INFUSION ADD-ON: CPT

## 2019-05-06 PROCEDURE — 85025 COMPLETE CBC W/AUTO DIFF WBC: CPT

## 2019-05-06 PROCEDURE — 99284 EMERGENCY DEPT VISIT MOD MDM: CPT

## 2019-05-06 RX ORDER — METRONIDAZOLE 500 MG/100ML
500 INJECTION, SOLUTION INTRAVENOUS
Status: DISCONTINUED | OUTPATIENT
Start: 2019-05-06 | End: 2019-05-07 | Stop reason: HOSPADM

## 2019-05-06 RX ORDER — MORPHINE SULFATE 4 MG/ML
4 INJECTION INTRAVENOUS
Status: DISCONTINUED | OUTPATIENT
Start: 2019-05-06 | End: 2019-05-06

## 2019-05-06 RX ORDER — METOCLOPRAMIDE HYDROCHLORIDE 5 MG/ML
10 INJECTION INTRAMUSCULAR; INTRAVENOUS ONCE
Status: COMPLETED | OUTPATIENT
Start: 2019-05-06 | End: 2019-05-06

## 2019-05-06 RX ORDER — MORPHINE SULFATE 4 MG/ML
4 INJECTION INTRAVENOUS
Status: COMPLETED | OUTPATIENT
Start: 2019-05-06 | End: 2019-05-06

## 2019-05-06 RX ORDER — METRONIDAZOLE 500 MG/1
500 TABLET ORAL 4 TIMES DAILY
Qty: 40 TAB | Refills: 0 | Status: SHIPPED | OUTPATIENT
Start: 2019-05-06 | End: 2019-05-16

## 2019-05-06 RX ORDER — CIPROFLOXACIN 2 MG/ML
400 INJECTION, SOLUTION INTRAVENOUS ONCE
Status: DISCONTINUED | OUTPATIENT
Start: 2019-05-06 | End: 2019-05-07 | Stop reason: HOSPADM

## 2019-05-06 RX ORDER — HYDROCODONE BITARTRATE AND ACETAMINOPHEN 5; 325 MG/1; MG/1
1 TABLET ORAL
Qty: 12 TAB | Refills: 0 | Status: SHIPPED | OUTPATIENT
Start: 2019-05-06 | End: 2019-05-13

## 2019-05-06 RX ORDER — HYDROMORPHONE HYDROCHLORIDE 1 MG/ML
1 INJECTION, SOLUTION INTRAMUSCULAR; INTRAVENOUS; SUBCUTANEOUS
Status: COMPLETED | OUTPATIENT
Start: 2019-05-06 | End: 2019-05-06

## 2019-05-06 RX ORDER — CIPROFLOXACIN 500 MG/1
500 TABLET ORAL 2 TIMES DAILY
Qty: 20 TAB | Refills: 0 | Status: SHIPPED | OUTPATIENT
Start: 2019-05-06 | End: 2019-05-16

## 2019-05-06 RX ORDER — ONDANSETRON 2 MG/ML
4 INJECTION INTRAMUSCULAR; INTRAVENOUS
Status: DISCONTINUED | OUTPATIENT
Start: 2019-05-06 | End: 2019-05-07 | Stop reason: HOSPADM

## 2019-05-06 RX ADMIN — MORPHINE SULFATE 4 MG: 4 INJECTION INTRAVENOUS at 16:02

## 2019-05-06 RX ADMIN — HYDROMORPHONE HYDROCHLORIDE 1 MG: 1 INJECTION, SOLUTION INTRAMUSCULAR; INTRAVENOUS; SUBCUTANEOUS at 18:52

## 2019-05-06 RX ADMIN — DIATRIZOATE MEGLUMINE AND DIATRIZOATE SODIUM 30 ML: 660; 100 LIQUID ORAL; RECTAL at 15:31

## 2019-05-06 RX ADMIN — METOCLOPRAMIDE 10 MG: 5 INJECTION, SOLUTION INTRAMUSCULAR; INTRAVENOUS at 16:02

## 2019-05-06 RX ADMIN — IOPAMIDOL 100 ML: 612 INJECTION, SOLUTION INTRAVENOUS at 17:40

## 2019-05-06 RX ADMIN — SODIUM CHLORIDE 1000 ML: 900 INJECTION, SOLUTION INTRAVENOUS at 16:00

## 2019-05-06 NOTE — ED NOTES
Pt's ultrasound IV has infiltrated, Dr. Carlos Figueroa made aware and verbal orders received for Dilaudid IM.

## 2019-05-06 NOTE — ED PROVIDER NOTES
EMERGENCY DEPARTMENT HISTORY AND PHYSICAL EXAM 
 
 
Date: 5/6/2019 Patient Name: Adrianna Saenz 
 
History of Presenting Illness Chief Complaint Patient presents with  
 Skin Problem History Provided By: Patient and Patient's Wife Chief Complaint: Abdominal pain and drainage from right flank Additional History (Context): Adrianna Sanez is a 48 y.o. male with History of pancreatitis and intra-abdominal drain who presents with brown drainage from his right flank at the previous site of the NATALIE drain. Patient states that he had a complicated history of pancreatitis requiring intra-abdominal drain placement. Drain has been out for quite some time now. States that for the last 1 to 2 days he has noticed some brown thin discharge from the previous site of his drain as well as diffuse abdominal pain that feels similar to his previous pancreatitis. Denies any fevers, chills, sweats. Denies any nausea, vomiting, diarrhea, blood in the stool, urinary symptoms, rash, trauma. PCP: Marleny Rodríguez MD 
 
Current Facility-Administered Medications Medication Dose Route Frequency Provider Last Rate Last Dose  sodium chloride 0.9 % bolus infusion 1,000 mL  1,000 mL IntraVENous ONCE Evelia Hughes [place]      
 sodium chloride 0.9 % bolus infusion 1,000 mL  1,000 mL IntraVENous ONCE Bhavin Valle MD      
 ciprofloxacin (CIPRO) 400 mg in D5W IVPB (premix)  400 mg IntraVENous ONCE Bhavin Valle MD   Stopped at 05/06/19 6180  metroNIDAZOLE (FLAGYL) IVPB premix 500 mg  500 mg IntraVENous NOW Bhavin Valle MD   Stopped at 05/06/19 5233  ondansetron (ZOFRAN) injection 4 mg  4 mg IntraVENous NOW Bhavin Valle MD   Stopped at 05/06/19 5638 Current Outpatient Medications Medication Sig Dispense Refill  oxyCODONE-acetaminophen (PERCOCET) 5-325 mg per tablet Take 1 Tab by mouth every six (6) hours as needed for Pain for up to 5 days. Max Daily Amount: 4 Tabs.  20 Tab 0  
  Blood-Glucose Meter (FREESTYLE LITE METER) monitoring kit Test blood sugars 4-6 times a day 1 Kit 0  
 glucose blood VI test strips (FREESTYLE TEST) strip 1 Each by Does Not Apply route See Admin Instructions. Test blood sugars 4-6 times a day. 100 Strip 3  
 doxycycline (MONODOX) 100 mg capsule Take 1 Cap by mouth two (2) times a day for 10 days. 20 Cap 0  
 insulin glargine (LANTUS U-100 INSULIN) 100 unit/mL injection by SubCUTAneous route nightly.  insulin NPH/insulin regular (NOVOLIN 70/30 U-100 INSULIN) 100 unit/mL (70-30) injection by SubCUTAneous route.  promethazine (PHENERGAN) 25 mg tablet Take 1 Tab by mouth every six (6) hours as needed. 10 Tab 0  
 lipase-protease-amylase (ZENPEP 10,000) capsule Take 3 Caps by mouth three (3) times daily (with meals). 270 Cap 0 Past History Past Medical History: 
Past Medical History:  
Diagnosis Date  Abdominal pain, generalized  Chronic pancreatitis ([address]  Diabetes ([address]   
 hypothyroid  Encounter for long-term (current) use of other medications  Essential hypertension  ETOH abuse  GERD (gastroesophageal reflux disease)  Heart failure ([address]  Hyponatremia  Pain in the abdomen 11/2/2010  Pancreatitis w/ abscess and pseudocyst  
 Pancreatitis  Psychiatric disorder   
 depression, anxiety  Tobacco abuse Past Surgical History: 
Past Surgical History:  
Procedure Laterality Date  HX ABDOMINAL LAPAROSCOPY PANCREATIC STENT  
 HX CHOLECYSTECTOMY Family History: 
Family History Problem Relation Age of Onset  Heart Disease Father Social History: 
Social History Tobacco Use  Smoking status: Current Every Day Smoker Packs/day: 0.50 Years: 0.00 Pack years: 0.00 Types: Cigarettes  Smokeless tobacco: Never Used Substance Use Topics  Alcohol use: Yes  Drug use: No  
 
 
Allergies: Allergies Allergen Reactions  Ampicillin-Sulbactam Rash  Pcn [Penicillins] Itching and Hives  Piperacillin-Tazobactam Rash  Pollen Extracts Rash  Seafood [Shellfish Containing Products] Hives Other reaction(s): unknown Has tolerated iohexol (iodine-containing contrast) Only shrimp Shrimp Review of Systems Review of Systems Constitutional: Positive for fatigue and unexpected weight change. Negative for chills and fever. He has lost over 30 pounds in the last year unintentionally HENT: Negative for congestion, facial swelling, mouth sores, nosebleeds, rhinorrhea, sore throat and trouble swallowing. Eyes: Negative for discharge, redness and itching. Respiratory: Negative for cough, chest tightness, shortness of breath, wheezing and stridor. Cardiovascular: Negative for chest pain, palpitations and leg swelling. Gastrointestinal: Positive for abdominal pain, nausea and vomiting. Negative for abdominal distention, anal bleeding, blood in stool, constipation and diarrhea. Endocrine: Negative for cold intolerance, heat intolerance, polydipsia, polyphagia and polyuria. Genitourinary: Negative for difficulty urinating, discharge, dysuria, scrotal swelling, testicular pain and urgency. Musculoskeletal: Negative for back pain. Skin: Negative for rash. Neurological: Negative for syncope and light-headedness. Psychiatric/Behavioral: Negative for behavioral problems and confusion. All other systems reviewed and are negative. Physical Exam  
 
Vitals:  
 05/06/19 1103 05/06/19 1359 05/06/19 1919 BP: 115/81  (P) 146/86 Pulse: 84  (P) 62 Resp: 16  (P) 16 Temp: 97 °F (36.1 °C)  (P) 96.9 °F (36.1 °C) SpO2: 100% 100% (P) 99% Weight: 63.5 kg (140 lb) Physical Exam  
Constitutional: He is oriented to person, place, and time. He appears well-developed. No distress. Thin and frail appearing; appears older than stated age. HENT:  
Head: Normocephalic and atraumatic. Mouth/Throat: Oropharynx is clear and moist. No oropharyngeal exudate. Eyes: Pupils are equal, round, and reactive to light. Conjunctivae and EOM are normal. No scleral icterus. Neck: Normal range of motion. Neck supple. No JVD present. No tracheal deviation present. No thyromegaly present. Cardiovascular: Normal rate, regular rhythm and normal heart sounds. No murmur heard. Pulmonary/Chest: Effort normal and breath sounds normal. No stridor. He has no wheezes. Abdominal: Soft. Bowel sounds are normal. There is tenderness. Diffuse but mainly right-sided abdominal tenderness to palpation. Positive voluntary guarding. No rigidity. Right flank at site of previous NATALIE drain has malodorous thin dark brown discharge. No surrounding skin changes at site of drainage. No CVAT. Musculoskeletal: Normal range of motion. He exhibits no edema or tenderness. Lymphadenopathy:  
  He has no cervical adenopathy. Neurological: He is alert and oriented to person, place, and time. Skin: Skin is warm and dry. No rash noted. Psychiatric: He has a normal mood and affect. His behavior is normal.  
 
 
 
Diagnostic Study Results Labs - Recent Results (from the past 12 hour(s)) GLUCOSE, POC Collection Time: 05/06/19 11:11 AM  
Result Value Ref Range Glucose (POC) 456 (HH) 70 - 110 mg/dL URINALYSIS W/ RFLX MICROSCOPIC Collection Time: 05/06/19  1:20 PM  
Result Value Ref Range Color YELLOW Appearance CLEAR Specific gravity >1.030 (H) 1.005 - 1.030  
 pH (UA) 6.0 5.0 - 8.0 Protein 30 (A) NEG mg/dL Glucose >1,000 (A) NEG mg/dL Ketone TRACE (A) NEG mg/dL Bilirubin NEGATIVE  NEG Blood SMALL (A) NEG Urobilinogen 0.2 0.2 - 1.0 EU/dL Nitrites NEGATIVE  NEG Leukocyte Esterase NEGATIVE  NEG    
URINE MICROSCOPIC ONLY Collection Time: 05/06/19  1:20 PM  
Result Value Ref Range  WBC 5 to 7 0 - 4 /hpf  
 RBC 3 to 5 0 - 5 /hpf  
 Epithelial cells 1+ 0 - 5 /lpf Bacteria FEW (A) NEG /hpf  
CBC WITH AUTOMATED DIFF Collection Time: 05/06/19  3:19 PM  
Result Value Ref Range WBC 5.0 4.6 - 13.2 K/uL  
 RBC 3.93 (L) 4.70 - 5.50 M/uL  
 HGB 10.4 (L) 13.0 - 16.0 g/dL HCT 33.3 (L) 36.0 - 48.0 % MCV 84.7 74.0 - 97.0 FL  
 MCH 26.5 24.0 - 34.0 PG  
 MCHC 31.2 31.0 - 37.0 g/dL  
 RDW 14.4 11.6 - 14.5 % PLATELET 333 699 - 278 K/uL MPV 9.6 9.2 - 11.8 FL  
 NEUTROPHILS 66 40 - 73 % LYMPHOCYTES 25 21 - 52 % MONOCYTES 5 3 - 10 % EOSINOPHILS 3 0 - 5 % BASOPHILS 1 0 - 2 %  
 ABS. NEUTROPHILS 3.3 1.8 - 8.0 K/UL  
 ABS. LYMPHOCYTES 1.2 0.9 - 3.6 K/UL  
 ABS. MONOCYTES 0.2 0.05 - 1.2 K/UL  
 ABS. EOSINOPHILS 0.1 0.0 - 0.4 K/UL  
 ABS. BASOPHILS 0.0 0.0 - 0.1 K/UL  
 DF AUTOMATED METABOLIC PANEL, COMPREHENSIVE Collection Time: 05/06/19  3:19 PM  
Result Value Ref Range Sodium 131 (L) 136 - 145 mmol/L Potassium 5.8 (H) 3.5 - 5.5 mmol/L Chloride 104 100 - 108 mmol/L  
 CO2 23 21 - 32 mmol/L Anion gap 4 3.0 - 18 mmol/L Glucose 473 (HH) 74 - 99 mg/dL BUN 16 7.0 - 18 MG/DL Creatinine 1.15 0.6 - 1.3 MG/DL  
 BUN/Creatinine ratio 14 12 - 20 GFR est AA >60 >60 ml/min/1.73m2 GFR est non-AA >60 >60 ml/min/1.73m2 Calcium 8.8 8.5 - 10.1 MG/DL Bilirubin, total 0.4 0.2 - 1.0 MG/DL  
 ALT (SGPT) 32 16 - 61 U/L  
 AST (SGOT) 40 (H) 15 - 37 U/L Alk. phosphatase 189 (H) 45 - 117 U/L Protein, total 9.2 (H) 6.4 - 8.2 g/dL Albumin 3.5 3.4 - 5.0 g/dL Globulin 5.7 (H) 2.0 - 4.0 g/dL A-G Ratio 0.6 (L) 0.8 - 1.7 LIPASE Collection Time: 05/06/19  3:19 PM  
Result Value Ref Range Lipase 30 (L) 73 - 393 U/L  
POC LACTIC ACID Collection Time: 05/06/19  3:24 PM  
Result Value Ref Range Lactic Acid (POC) 1.09 0.40 - 2.00 mmol/L  
EKG, 12 LEAD, INITIAL Collection Time: 05/06/19  4:33 PM  
Result Value Ref Range Ventricular Rate 70 BPM  
 Atrial Rate 70 BPM  
 P-R Interval 150 ms QRS Duration 106 ms  
 Q-T Interval 440 ms QTC Calculation (Bezet) 475 ms Calculated P Axis 74 degrees Calculated R Axis -38 degrees Calculated T Axis 48 degrees Diagnosis Normal sinus rhythm Left axis deviation Abnormal ECG When compared with ECG of 28-JUL-2018 16:36, 
Vent. rate has decreased BY  36 BPM 
QRS duration has increased Radiologic Studies -  
CT ABD PELV W CONT Final Result IMPRESSION:  
  
1. Duodenum to right lateral abdominal wall and skin fistulous tract. 2.  Chronic pancreatitis with catheter placement into the pancreatic duct. 3.  Air pockets in the bile duct and pancreatic duct, probably related to the  
catheter placement. 4.  Subtle left lung base posterior aspect infiltrate. Query developing  
inflammatory change/ infectious process. CT Results  (Last 48 hours) 05/06/19 1740  CT ABD PELV W CONT Final result Impression:  IMPRESSION:  
   
1. Duodenum to right lateral abdominal wall and skin fistulous tract. 2.  Chronic pancreatitis with catheter placement into the pancreatic duct. 3.  Air pockets in the bile duct and pancreatic duct, probably related to the  
catheter placement. 4.  Subtle left lung base posterior aspect infiltrate. Query developing  
inflammatory change/ infectious process. Narrative:  EXAM:  CT Abdomen-Pelvis with Contrast.  
   
CLINICAL INDICATION:  Draining wound. COMPARISON:  05/04/19, 07/28/18 TECHNIQUE:    
   
- Helical volumetric CT imaging of the abdomen and pelvis is performed following IV contrast administration. Coronal and sagittal multiplanar reconstruction  
images are generated for improved anatomic delineation.  
- IV contrast 100 mL Isovue-300.   
- All CT scans at this facility are performed using dose optimization technique  
as appropriate to the performed exam, to include automated exposure control,  
 adjustment of the mA and/or kV according to patient's size (Including  
appropriate matching for site-specific examinations), or use of iterative  
reconstruction technique. FINDINGS:  
   
CT Abdomen. Lung Bases:  Subtle peripheral densities in the left posterior lung base. Small  
left-sided pleural effusion. Trace right pleural effusion. Liver:  No discrete hepatic mass. Scattered air pockets in the common hepatic  
duct and in the left lobe of the liver (axial #14, 15, 20-22). Gallbladder:  Removed. Spleen:  Circumscribed cystic structure in the anterior aspect measuring 1.0 x  
0.7 cm (axial #16). Pancreas:  Calcific densities along the entire length of the pancreas, with the  
pancreatic duct catheter in place. The pancreatic duct appears to contain air  
pockets (axial #26-34). Kidneys:  Multiple tiny cortical hypodensities. The largest cortical  
hypodensity is identified in the left kidney measuring about 0.9 cm (axial #27). GI Tract: - Grossly unremarkable stomach. - Abnormal appearance of the 2nd portion of the duodenum with abnormal  
intermediate density tract emerging from the junction between the 2nd portion of  
the duodenum and extending to the right lateral abdominal wall and eventually  
extending to the skin surface, i.e. duodenal-cutaneous fistula (axial #45-49,  
coronal #33-37). - Nonspecific mild dilation of multiple small bowel loops is identified.  
- The appendix is not confidently visualized. No convincing evidence for acute  
appendicitis is detected. No definite acute colitis or diverticulitis is  
appreciated. Stool retention in the colon. Vascular:  No acute abdominal aortic abnormality. Nodes:  No mesenteric or retroperitoneal adenopathy. Bladder:  Partially contracted. Grossly unremarkable. Prostate:  Grossly unremarkable. Skeletal structures:  No osteoblastic or osteolytic lesions. Sclerotic changes  
to the left femoral head. Query prior bone infarct is present. CXR Results  (Last 48 hours) None Medical Decision Making I am the first provider for this patient. I reviewed the vital signs, available nursing notes, past medical history, past surgical history, family history and social history. Vital Signs-Reviewed the patient's vital signs. EKG: Interpreted by the EP. Time Interpreted: 4832 Rate: 70 bpm 
 Rhythm: Normal sinus rhythm Interpretation: No acute ST or T wave changes Records Reviewed: Old Medical Records ED Course:  
Pain and nausea control during his emergency department stay with morphine and Zofran. IV fluids for resuscitation. .  Ciprofloxacin and metronidazole to cover for any infection. At time of turnover, CT scan is pending. 6:37 PM : Pt care transferred to Dr. Grupo Priest  ,ED provider. History of patient complaint(s), available diagnostic reports and current treatment plan has been discussed thoroughly. Bedside rounding on patient occured : yes . Intended disposition of patient : pending results of CT Pending diagnostics reports and/or labs (please list): Abdominal CT with p.o. and IV contrast 
 
Provider Notes (Medical Decision Making): History of complicated pancreatitis with previous NATALIE drain that was removed about 4 months ago. Now with brown thin drainage from previous site of drain. Reassuring labs thus far. Awaiting CT scan to evaluate for possible intra-abdominal 
 
 
Diagnosis Clinical Impression: 1. Abdominal pain, right lower quadrant 2. Idiopathic acute pancreatitis, unspecified complication status 3. Fistula Note:  Assuming care of patient  
from leaving provider 6:41 PM 
IAylin MD, assumed care of patient from another provider who is ending their shift in the emergency department .  
 
6:41 PM 
 
 Date: 5/6/2019 Patient Name: Nestor Calderon 
 
History of Presenting Illness Chief Complaint Patient presents with  
 Skin Problem Nursing notes regarding the HPI and triage nursing notes were reviewed. Prior medical records were reviewed. Current Facility-Administered Medications Medication Dose Route Frequency Provider Last Rate Last Dose  sodium chloride 0.9 % bolus infusion 1,000 mL  1,000 mL IntraVENous ONCE Evelia [place]      
 sodium chloride 0.9 % bolus infusion 1,000 mL  1,000 mL IntraVENous ONCE Nicolas Maldonado MD      
 ciprofloxacin (CIPRO) 400 mg in D5W IVPB (premix)  400 mg IntraVENous ONCE Nicolas Maldonado MD   Stopped at 05/06/19 3937  metroNIDAZOLE (FLAGYL) IVPB premix 500 mg  500 mg IntraVENous NOW Nicolas Maldonado MD   Stopped at 05/06/19 9539  ondansetron (ZOFRAN) injection 4 mg  4 mg IntraVENous NOW Nicolas Maldonado MD   Stopped at 05/06/19 8004 Current Outpatient Medications Medication Sig Dispense Refill  oxyCODONE-acetaminophen (PERCOCET) 5-325 mg per tablet Take 1 Tab by mouth every six (6) hours as needed for Pain for up to 5 days. Max Daily Amount: 4 Tabs. 20 Tab 0  Blood-Glucose Meter (FREESTYLE LITE METER) monitoring kit Test blood sugars 4-6 times a day 1 Kit 0  
 glucose blood VI test strips (FREESTYLE TEST) strip 1 Each by Does Not Apply route See Admin Instructions. Test blood sugars 4-6 times a day. 100 Strip 3  
 doxycycline (MONODOX) 100 mg capsule Take 1 Cap by mouth two (2) times a day for 10 days. 20 Cap 0  
 insulin glargine (LANTUS U-100 INSULIN) 100 unit/mL injection by SubCUTAneous route nightly.  insulin NPH/insulin regular (NOVOLIN 70/30 U-100 INSULIN) 100 unit/mL (70-30) injection by SubCUTAneous route.  promethazine (PHENERGAN) 25 mg tablet Take 1 Tab by mouth every six (6) hours as needed.  10 Tab 0  
 lipase-protease-amylase (ZENPEP 10,000) capsule Take 3 Caps by mouth three (3) times daily (with meals). 270 Cap 0 Past History Past Medical History: 
Past Medical History:  
Diagnosis Date  Abdominal pain, generalized  Chronic pancreatitis ([address]  Diabetes ([address]   
 hypothyroid  Encounter for long-term (current) use of other medications  Essential hypertension  ETOH abuse  GERD (gastroesophageal reflux disease)  Heart failure ([address]  Hyponatremia  Pain in the abdomen 11/2/2010  Pancreatitis w/ abscess and pseudocyst  
 Pancreatitis  Psychiatric disorder   
 depression, anxiety  Tobacco abuse Past Surgical History: 
Past Surgical History:  
Procedure Laterality Date  HX ABDOMINAL LAPAROSCOPY PANCREATIC STENT  
 HX CHOLECYSTECTOMY Family History: 
Family History Problem Relation Age of Onset  Heart Disease Father Social History: 
Social History Tobacco Use  Smoking status: Current Every Day Smoker Packs/day: 0.50 Years: 0.00 Pack years: 0.00 Types: Cigarettes  Smokeless tobacco: Never Used Substance Use Topics  Alcohol use: Yes  Drug use: No  
 
 
Allergies: Allergies Allergen Reactions  Ampicillin-Sulbactam Rash  Pcn [Penicillins] Itching and Hives  Piperacillin-Tazobactam Rash  Pollen Extracts Rash  Seafood [Shellfish Containing Products] Hives Other reaction(s): unknown Has tolerated iohexol (iodine-containing contrast) Only shrimp Shrimp Patient's primary care provider (as noted in EPIC):  El Reyes MD 
 
Abnormal lab results from this emergency department encounter: 
Labs Reviewed CBC WITH AUTOMATED DIFF - Abnormal; Notable for the following components:  
    Result Value RBC 3.93 (*) HGB 10.4 (*) HCT 33.3 (*) All other components within normal limits METABOLIC PANEL, COMPREHENSIVE - Abnormal; Notable for the following components:  
 Sodium 131 (*) Potassium 5.8 (*) Glucose 473 (*) AST (SGOT) 40 (*) Alk. phosphatase 189 (*) Protein, total 9.2 (*) Globulin 5.7 (*) A-G Ratio 0.6 (*) All other components within normal limits URINALYSIS W/ RFLX MICROSCOPIC - Abnormal; Notable for the following components:  
 Specific gravity >1.030 (*) Protein 30 (*) Glucose >1,000 (*) Ketone TRACE (*) Blood SMALL (*) All other components within normal limits URINE MICROSCOPIC ONLY - Abnormal; Notable for the following components:  
 Bacteria FEW (*) All other components within normal limits LIPASE - Abnormal; Notable for the following components:  
 Lipase 30 (*) All other components within normal limits GLUCOSE, POC - Abnormal; Notable for the following components:  
 Glucose (POC) 456 (*) All other components within normal limits METABOLIC PANEL, BASIC  
POC LACTIC ACID Lab values for this patient within approximately the last 12 hours: 
Recent Results (from the past 12 hour(s)) GLUCOSE, POC Collection Time: 05/06/19 11:11 AM  
Result Value Ref Range Glucose (POC) 456 (HH) 70 - 110 mg/dL URINALYSIS W/ RFLX MICROSCOPIC Collection Time: 05/06/19  1:20 PM  
Result Value Ref Range Color YELLOW Appearance CLEAR Specific gravity >1.030 (H) 1.005 - 1.030  
 pH (UA) 6.0 5.0 - 8.0 Protein 30 (A) NEG mg/dL Glucose >1,000 (A) NEG mg/dL Ketone TRACE (A) NEG mg/dL Bilirubin NEGATIVE  NEG Blood SMALL (A) NEG Urobilinogen 0.2 0.2 - 1.0 EU/dL Nitrites NEGATIVE  NEG Leukocyte Esterase NEGATIVE  NEG    
URINE MICROSCOPIC ONLY Collection Time: 05/06/19  1:20 PM  
Result Value Ref Range WBC 5 to 7 0 - 4 /hpf  
 RBC 3 to 5 0 - 5 /hpf Epithelial cells 1+ 0 - 5 /lpf Bacteria FEW (A) NEG /hpf  
CBC WITH AUTOMATED DIFF Collection Time: 05/06/19  3:19 PM  
Result Value Ref Range WBC 5.0 4.6 - 13.2 K/uL  
 RBC 3.93 (L) 4.70 - 5.50 M/uL  
 HGB 10.4 (L) 13.0 - 16.0 g/dL HCT 33.3 (L) 36.0 - 48.0 % MCV 84.7 74.0 - 97.0 FL  
 MCH 26.5 24.0 - 34.0 PG  
 MCHC 31.2 31.0 - 37.0 g/dL  
 RDW 14.4 11.6 - 14.5 % PLATELET 905 108 - 415 K/uL MPV 9.6 9.2 - 11.8 FL  
 NEUTROPHILS 66 40 - 73 % LYMPHOCYTES 25 21 - 52 % MONOCYTES 5 3 - 10 % EOSINOPHILS 3 0 - 5 % BASOPHILS 1 0 - 2 %  
 ABS. NEUTROPHILS 3.3 1.8 - 8.0 K/UL  
 ABS. LYMPHOCYTES 1.2 0.9 - 3.6 K/UL  
 ABS. MONOCYTES 0.2 0.05 - 1.2 K/UL  
 ABS. EOSINOPHILS 0.1 0.0 - 0.4 K/UL  
 ABS. BASOPHILS 0.0 0.0 - 0.1 K/UL  
 DF AUTOMATED METABOLIC PANEL, COMPREHENSIVE Collection Time: 05/06/19  3:19 PM  
Result Value Ref Range Sodium 131 (L) 136 - 145 mmol/L Potassium 5.8 (H) 3.5 - 5.5 mmol/L Chloride 104 100 - 108 mmol/L  
 CO2 23 21 - 32 mmol/L Anion gap 4 3.0 - 18 mmol/L Glucose 473 (HH) 74 - 99 mg/dL BUN 16 7.0 - 18 MG/DL Creatinine 1.15 0.6 - 1.3 MG/DL  
 BUN/Creatinine ratio 14 12 - 20 GFR est AA >60 >60 ml/min/1.73m2 GFR est non-AA >60 >60 ml/min/1.73m2 Calcium 8.8 8.5 - 10.1 MG/DL Bilirubin, total 0.4 0.2 - 1.0 MG/DL  
 ALT (SGPT) 32 16 - 61 U/L  
 AST (SGOT) 40 (H) 15 - 37 U/L Alk. phosphatase 189 (H) 45 - 117 U/L Protein, total 9.2 (H) 6.4 - 8.2 g/dL Albumin 3.5 3.4 - 5.0 g/dL Globulin 5.7 (H) 2.0 - 4.0 g/dL A-G Ratio 0.6 (L) 0.8 - 1.7 LIPASE Collection Time: 05/06/19  3:19 PM  
Result Value Ref Range Lipase 30 (L) 73 - 393 U/L  
POC LACTIC ACID Collection Time: 05/06/19  3:24 PM  
Result Value Ref Range Lactic Acid (POC) 1.09 0.40 - 2.00 mmol/L  
EKG, 12 LEAD, INITIAL Collection Time: 05/06/19  4:33 PM  
Result Value Ref Range Ventricular Rate 70 BPM  
 Atrial Rate 70 BPM  
 P-R Interval 150 ms QRS Duration 106 ms  
 Q-T Interval 440 ms QTC Calculation (Bezet) 475 ms Calculated P Axis 74 degrees Calculated R Axis -38 degrees Calculated T Axis 48 degrees Diagnosis Normal sinus rhythm Left axis deviation Abnormal ECG 
 When compared with ECG of 28-JUL-2018 16:36, 
Vent. rate has decreased BY  36 BPM 
QRS duration has increased Radiologist and cardiologist interpretations if available at time of this note: 
Radiology results: 
Ct Abd Pelv W Cont Result Date: 5/6/2019 EXAM:  CT Abdomen-Pelvis with Contrast. CLINICAL INDICATION:  Draining wound. COMPARISON:  05/04/19, 07/28/18 TECHNIQUE:  - Helical volumetric CT imaging of the abdomen and pelvis is performed following IV contrast administration. Coronal and sagittal multiplanar reconstruction images are generated for improved anatomic delineation. - IV contrast 100 mL Isovue-300. - All CT scans at this facility are performed using dose optimization technique as appropriate to the performed exam, to include automated exposure control, adjustment of the mA and/or kV according to patient's size (Including appropriate matching for site-specific examinations), or use of iterative reconstruction technique. FINDINGS: CT Abdomen. Lung Bases:  Subtle peripheral densities in the left posterior lung base. Small left-sided pleural effusion. Trace right pleural effusion. Liver:  No discrete hepatic mass. Scattered air pockets in the common hepatic duct and in the left lobe of the liver (axial #14, 15, 20-22). Gallbladder:  Removed. Spleen:  Circumscribed cystic structure in the anterior aspect measuring 1.0 x 0.7 cm (axial #16). Pancreas:  Calcific densities along the entire length of the pancreas, with the pancreatic duct catheter in place. The pancreatic duct appears to contain air pockets (axial #26-34). Kidneys:  Multiple tiny cortical hypodensities. The largest cortical hypodensity is identified in the left kidney measuring about 0.9 cm (axial #27). GI Tract: - Grossly unremarkable stomach.  - Abnormal appearance of the 2nd portion of the duodenum with abnormal intermediate density tract emerging from the junction between the 2nd portion of the duodenum and extending to the right lateral abdominal wall and eventually extending to the skin surface, i.e. duodenal-cutaneous fistula (axial #[id number], coronal #[id number]). - Nonspecific mild dilation of multiple small bowel loops is identified. - The appendix is not confidently visualized. No convincing evidence for acute appendicitis is detected. No definite acute colitis or diverticulitis is appreciated. Stool retention in the colon. Vascular:  No acute abdominal aortic abnormality. Nodes:  No mesenteric or retroperitoneal adenopathy. Bladder:  Partially contracted. Grossly unremarkable. Prostate:  Grossly unremarkable. Skeletal structures:  No osteoblastic or osteolytic lesions. Sclerotic changes to the left femoral head. Query prior bone infarct is present. IMPRESSION: 1. Duodenum to right lateral abdominal wall and skin fistulous tract. 2.  Chronic pancreatitis with catheter placement into the pancreatic duct. 3.  Air pockets in the bile duct and pancreatic duct, probably related to the catheter placement. 4.  Subtle left lung base posterior aspect infiltrate. Query developing inflammatory change/ infectious process. Medication(s) ordered for patient during this emergency visit encounter: 
Medications  
sodium chloride 0.9 % bolus infusion 1,000 mL (has no administration in time range)  
sodium chloride 0.9 % bolus infusion 1,000 mL (has no administration in time range)  
ciprofloxacin (CIPRO) 400 mg in D5W IVPB (premix) (0 mg IntraVENous Held 5/6/19 1853) metroNIDAZOLE (FLAGYL) IVPB premix 500 mg (0 mg IntraVENous Held 5/6/19 1812) ondansetron [facility]) injection 4 mg (0 mg IntraVENous Held 5/6/19 1813) metoclopramide HCl (REGLAN) injection 10 mg (10 mg IntraVENous Given 5/6/19 1602) morphine injection 4 mg (4 mg IntraVENous Given 5/6/19 1602) sodium chloride 0.9 % bolus infusion 1,000 mL (1,000 mL IntraVENous New Bag 5/6/19 1600) diatrizoate gaston-diatrizoat sod (MD-GASTROVIEW,GASTROGRAFIN) 66-10 % contrast solution 30 mL (30 mL Oral Given 5/6/19 1531) iopamidol (ISOVUE 300) 61 % contrast injection  mL (100 mL IntraVENous Given 5/6/19 1740) HYDROmorphone (DILAUDID) injection 1 mg (1 mg IntraMUSCular Given 5/6/19 0812) Pt care assumed from Dr. Albert Puga , ED provider. Pt complaint(s), current treatment plan, progression and available diagnostic results have been discussed thoroughly. Rounding occurred: no Intended Disposition: TBD. Pending diagnostic reports and/or labs (please list): CT abdomen pelvis to assess right inferolateral abdominal wall draining and possible fistula. SPECIFIC PATIENT INSTRUCTIONS FROM THE PHYSICIAN WHO TREATED YOU IN THE ER TODAY: 
1. Return if worse. 2. Follow up with your primary doctor in the next 1-2 days for outpatient reevaluation. 3. Ciprofloxacin and metronidazole as prescribed until finished. 4. Norco as prescribed for pain not controlled over-the-counter pain medicine. Patient is improved, resting quietly and comfortably. The patient will be discharged home. The patient was reassured that these symptoms do not appear to represent a serious or life threatening condition at this time. Warning signs of worsening condition were discussed and understood by the patient. Based on patient's age, coexisting illness, exam, and the results of this ED evaluation, the decision to treat as an outpatient was made. Based on the information available at time of discharge, acute pathology requiring immediate intervention was deemed relative unlikely. While it is impossible to completely exclude the possibility of underlying serious disease or worsening of condition, I feel the relative likelihood is extremely low. I discussed this uncertainty with the patient, who understood ED evaluation and treatment and felt comfortable with the outpatient treatment plan. All questions regarding care, test results, and follow up were answered. The patient is stable and appropriate to discharge. They understand that they should return to the emergency department for any new or worsening symptoms. I stressed the importance of follow up for repeat assessment and possibly further evaluation/treatment. Dictation disclaimer:  Please note that this dictation was completed with [employer], the computer voice recognition software. Quite often unanticipated grammatical, syntax, homophones, and other interpretive errors are inadvertently transcribed by the computer software. Please disregard these errors. Please excuse any errors that have escaped final proofreading. Coding Diagnoses Clinical Impression: 1. Abdominal pain, right lower quadrant 2. Idiopathic acute pancreatitis, unspecified complication status 3. Fistula Disposition Disposition:  Home. Dewanda Opitz L. Evetta Dingwall, M.D. JUAREZ Board Certified Emergency Physician

## 2019-05-06 NOTE — DISCHARGE INSTRUCTIONS
SPECIFIC PATIENT INSTRUCTIONS FROM THE PHYSICIAN WHO TREATED YOU IN THE ER TODAY:  1. Return if worse. 2. Follow up with your primary doctor in the next 1-2 days for outpatient reevaluation. 3. Ciprofloxacin and metronidazole as prescribed until finished. 4. Norco as prescribed for pain not controlled over-the-counter pain medicine. Patient Education        Abdominal Pain: Care Instructions  Your Care Instructions    Abdominal pain has many possible causes. Some aren't serious and get better on their own in a few days. Others need more testing and treatment. If your pain continues or gets worse, you need to be rechecked and may need more tests to find out what is wrong. You may need surgery to correct the problem. Don't ignore new symptoms, such as fever, nausea and vomiting, urination problems, pain that gets worse, and dizziness. These may be signs of a more serious problem. Your doctor may have recommended a follow-up visit in the next 8 to 12 hours. If you are not getting better, you may need more tests or treatment. The doctor has checked you carefully, but problems can develop later. If you notice any problems or new symptoms, get medical treatment right away. Follow-up care is a key part of your treatment and safety. Be sure to make and go to all appointments, and call your doctor if you are having problems. It's also a good idea to know your test results and keep a list of the medicines you take. How can you care for yourself at home? · Rest until you feel better. · To prevent dehydration, drink plenty of fluids, enough so that your urine is light yellow or clear like water. Choose water and other caffeine-free clear liquids until you feel better. If you have kidney, heart, or liver disease and have to limit fluids, talk with your doctor before you increase the amount of fluids you drink. · If your stomach is upset, eat mild foods, such as rice, dry toast or crackers, bananas, and applesauce. Try eating several small meals instead of two or three large ones. · Wait until 48 hours after all symptoms have gone away before you have spicy foods, alcohol, and drinks that contain caffeine. · Do not eat foods that are high in fat. · Avoid anti-inflammatory medicines such as aspirin, ibuprofen (Advil, Motrin), and naproxen (Aleve). These can cause stomach upset. Talk to your doctor if you take daily aspirin for another health problem. When should you call for help? Call 911 anytime you think you may need emergency care. For example, call if:    · You passed out (lost consciousness).     · You pass maroon or very bloody stools.     · You vomit blood or what looks like coffee grounds.     · You have new, severe belly pain.    Call your doctor now or seek immediate medical care if:    · Your pain gets worse, especially if it becomes focused in one area of your belly.     · You have a new or higher fever.     · Your stools are black and look like tar, or they have streaks of blood.     · You have unexpected vaginal bleeding.     · You have symptoms of a urinary tract infection. These may include:  ? Pain when you urinate. ? Urinating more often than usual.  ? Blood in your urine.     · You are dizzy or lightheaded, or you feel like you may faint.    Watch closely for changes in your health, and be sure to contact your doctor if:    · You are not getting better after 1 day (24 hours). Where can you learn more? Go to http://thiago-mikhail.info/. Enter T587 in the search box to learn more about \"Abdominal Pain: Care Instructions. \"  Current as of: September 23, 2018  Content Version: 11.9  © 2703-2204 [employer]. Care instructions adapted under license by [employer] (which disclaims liability or warranty for this information).  If you have questions about a medical condition or this instruction, always ask your healthcare professional. [employer] disclaims any warranty or liability for your use of this information. MyChart Activation    Thank you for requesting access to [employer]. Please follow the instructions below to securely access and download your online medical record. [employer] allows you to send messages to your doctor, view your test results, renew your prescriptions, schedule appointments, and more. How Do I Sign Up? 1. In your internet browser, go to [email or web address]. 2. Click on the First Time User? Click Here link in the Sign In box. You will see the New Member Sign Up page. 3. Enter your [employer] Access Code exactly as it appears below. You will not need to use this code after youve completed the sign-up process. If you do not sign up before the expiration date, you must request a new code. [employer] Access Code: [email or web address]  Expires: 2019  4:26 AM (This is the date your [employer]My[employer] access code will )    4. Enter the last four digits of your Social Security Number (xxxx) and Date of Birth (mm/dd/yyyy) as indicated and click Submit. You will be taken to the next sign-up page. 5. Create a [employer]MyProcess ID. This will be your [employer] login ID and cannot be changed, so think of one that is secure and easy to remember. 6. Create a [employer] password. You can change your password at any time. 7. Enter your Password Reset Question and Answer. This can be used at a later time if you forget your password. 8. Enter your e-mail address. You will receive e-mail notification when new information is available in [address] 9. Click Sign Up. You can now view and download portions of your medical record. 10. Click the Download Summary menu link to download a portable copy of your medical information. Additional Information    If you have questions, please visit the Frequently Asked Questions section of the [employer] website at [email or web address] Remember, [employer] is NOT to be used for urgent needs. For medical emergencies, dial 911.

## 2019-05-06 NOTE — ED TRIAGE NOTES
\"I need my blood sugar checked and I have drainage from and old NATALIE drain that is burring my skin

## 2019-05-18 ENCOUNTER — APPOINTMENT (OUTPATIENT)
Dept: GENERAL RADIOLOGY | Age: 54
DRG: 053 | End: 2019-05-18
Attending: STUDENT IN AN ORGANIZED HEALTH CARE EDUCATION/TRAINING PROGRAM
Payer: MEDICAID

## 2019-05-18 ENCOUNTER — APPOINTMENT (OUTPATIENT)
Dept: CT IMAGING | Age: 54
DRG: 053 | End: 2019-05-18
Attending: STUDENT IN AN ORGANIZED HEALTH CARE EDUCATION/TRAINING PROGRAM
Payer: MEDICAID

## 2019-05-18 ENCOUNTER — HOSPITAL ENCOUNTER (INPATIENT)
Age: 54
LOS: 1 days | Discharge: HOME | DRG: 053 | End: 2019-05-19
Attending: EMERGENCY MEDICINE | Admitting: HOSPITALIST
Payer: MEDICAID

## 2019-05-18 DIAGNOSIS — R56.9 PARTIAL SEIZURE (HCC): Primary | ICD-10-CM

## 2019-05-18 DIAGNOSIS — K63.2 ENTEROCUTANEOUS FISTULA: ICD-10-CM

## 2019-05-18 DIAGNOSIS — G83.84 TODD'S PARALYSIS (HCC): ICD-10-CM

## 2019-05-18 LAB
ALBUMIN SERPL-MCNC: 4.2 G/DL (ref 3.4–5)
ALBUMIN/GLOB SERPL: 0.8 {RATIO} (ref 0.8–1.7)
ALP SERPL-CCNC: 185 U/L (ref 45–117)
ALT SERPL-CCNC: 29 U/L (ref 16–61)
ANION GAP SERPL CALC-SCNC: 8 MMOL/L (ref 3–18)
APPEARANCE UR: ABNORMAL
ARTERIAL PATENCY WRIST A: ABNORMAL
AST SERPL-CCNC: 21 U/L (ref 15–37)
ATRIAL RATE: 75 BPM
BACTERIA URNS QL MICRO: ABNORMAL /HPF
BASE DEFICIT BLDV-SCNC: 8 MMOL/L
BASOPHILS # BLD: 0.1 K/UL (ref 0–0.1)
BASOPHILS NFR BLD: 1 % (ref 0–2)
BDY SITE: ABNORMAL
BILIRUB DIRECT SERPL-MCNC: 0.1 MG/DL (ref 0–0.2)
BILIRUB SERPL-MCNC: 0.3 MG/DL (ref 0.2–1)
BILIRUB UR QL: NEGATIVE
BNP SERPL-MCNC: 119 PG/ML (ref 0–900)
BODY TEMPERATURE: 97
BUN SERPL-MCNC: 14 MG/DL (ref 7–18)
BUN/CREAT SERPL: 9 (ref 12–20)
CALCIUM SERPL-MCNC: 9.5 MG/DL (ref 8.5–10.1)
CALCULATED P AXIS, ECG09: 73 DEGREES
CALCULATED R AXIS, ECG10: -28 DEGREES
CALCULATED T AXIS, ECG11: 63 DEGREES
CHLORIDE SERPL-SCNC: 101 MMOL/L (ref 100–108)
CK MB CFR SERPL CALC: NORMAL % (ref 0–4)
CK MB SERPL-MCNC: <1 NG/ML (ref 5–25)
CK SERPL-CCNC: 45 U/L (ref 39–308)
CO2 SERPL-SCNC: 20 MMOL/L (ref 21–32)
COLOR UR: YELLOW
CREAT SERPL-MCNC: 1.5 MG/DL (ref 0.6–1.3)
DIAGNOSIS, 93000: NORMAL
DIFFERENTIAL METHOD BLD: ABNORMAL
EOSINOPHIL # BLD: 0.2 K/UL (ref 0–0.4)
EOSINOPHIL NFR BLD: 3 % (ref 0–5)
EPITH CASTS URNS QL MICRO: ABNORMAL /LPF (ref 0–5)
ERYTHROCYTE [DISTWIDTH] IN BLOOD BY AUTOMATED COUNT: 15 % (ref 11.6–14.5)
GAS FLOW.O2 O2 DELIVERY SYS: ABNORMAL L/MIN
GAS FLOW.O2 SETTING OXYMISER: 4 L/M
GLOBULIN SER CALC-MCNC: 5.1 G/DL (ref 2–4)
GLUCOSE SERPL-MCNC: 524 MG/DL (ref 74–99)
GLUCOSE UR STRIP.AUTO-MCNC: >1000 MG/DL
HCO3 BLDV-SCNC: 19.2 MMOL/L (ref 23–28)
HCT VFR BLD AUTO: 42.4 % (ref 36–48)
HGB BLD-MCNC: 13.8 G/DL (ref 13–16)
HGB UR QL STRIP: NEGATIVE
INR PPP: 0.9 (ref 0.8–1.2)
KETONES UR QL STRIP.AUTO: ABNORMAL MG/DL
LACTATE BLD-SCNC: 0.47 MMOL/L (ref 0.4–2)
LEUKOCYTE ESTERASE UR QL STRIP.AUTO: ABNORMAL
LIPASE SERPL-CCNC: 74 U/L (ref 73–393)
LYMPHOCYTES # BLD: 1.7 K/UL (ref 0.9–3.6)
LYMPHOCYTES NFR BLD: 23 % (ref 21–52)
MAGNESIUM SERPL-MCNC: 2.5 MG/DL (ref 1.6–2.6)
MCH RBC QN AUTO: 27.2 PG (ref 24–34)
MCHC RBC AUTO-ENTMCNC: 32.5 G/DL (ref 31–37)
MCV RBC AUTO: 83.6 FL (ref 74–97)
MONOCYTES # BLD: 0.3 K/UL (ref 0.05–1.2)
MONOCYTES NFR BLD: 4 % (ref 3–10)
NEUTS SEG # BLD: 5.1 K/UL (ref 1.8–8)
NEUTS SEG NFR BLD: 69 % (ref 40–73)
NITRITE UR QL STRIP.AUTO: NEGATIVE
P-R INTERVAL, ECG05: 150 MS
PCO2 BLDV: 42.5 MMHG (ref 41–51)
PH BLDV: 7.26 [PH] (ref 7.32–7.42)
PH UR STRIP: 5.5 [PH] (ref 5–8)
PLATELET # BLD AUTO: 287 K/UL (ref 135–420)
PMV BLD AUTO: 10.2 FL (ref 9.2–11.8)
PO2 BLDV: 20 MMHG (ref 25–40)
POTASSIUM SERPL-SCNC: 5 MMOL/L (ref 3.5–5.5)
PROT SERPL-MCNC: 9.3 G/DL (ref 6.4–8.2)
PROT UR STRIP-MCNC: ABNORMAL MG/DL
PROTHROMBIN TIME: 12.3 SEC (ref 11.5–15.2)
Q-T INTERVAL, ECG07: 394 MS
QRS DURATION, ECG06: 96 MS
QTC CALCULATION (BEZET), ECG08: 439 MS
RBC # BLD AUTO: 5.07 M/UL (ref 4.7–5.5)
RBC #/AREA URNS HPF: ABNORMAL /HPF (ref 0–5)
SAO2 % BLDV: 27 % (ref 65–88)
SERVICE CMNT-IMP: ABNORMAL
SODIUM SERPL-SCNC: 129 MMOL/L (ref 136–145)
SP GR UR REFRACTOMETRY: >1.03 (ref 1–1.03)
SPECIMEN TYPE: ABNORMAL
TOTAL RESP. RATE, ITRR: 20
TROPONIN I SERPL-MCNC: <0.02 NG/ML (ref 0–0.04)
UROBILINOGEN UR QL STRIP.AUTO: 0.2 EU/DL (ref 0.2–1)
VENTRICULAR RATE, ECG03: 75 BPM
WBC # BLD AUTO: 7.3 K/UL (ref 4.6–13.2)
WBC URNS QL MICRO: ABNORMAL /HPF (ref 0–4)
YEAST URNS QL MICRO: ABNORMAL

## 2019-05-18 PROCEDURE — 74011636320 HC RX REV CODE- 636/320: Performed by: EMERGENCY MEDICINE

## 2019-05-18 PROCEDURE — 99285 EMERGENCY DEPT VISIT HI MDM: CPT

## 2019-05-18 PROCEDURE — 96374 THER/PROPH/DIAG INJ IV PUSH: CPT

## 2019-05-18 PROCEDURE — 83880 ASSAY OF NATRIURETIC PEPTIDE: CPT

## 2019-05-18 PROCEDURE — 96361 HYDRATE IV INFUSION ADD-ON: CPT

## 2019-05-18 PROCEDURE — 83690 ASSAY OF LIPASE: CPT

## 2019-05-18 PROCEDURE — 80076 HEPATIC FUNCTION PANEL: CPT

## 2019-05-18 PROCEDURE — 81001 URINALYSIS AUTO W/SCOPE: CPT

## 2019-05-18 PROCEDURE — 74011250636 HC RX REV CODE- 250/636: Performed by: STUDENT IN AN ORGANIZED HEALTH CARE EDUCATION/TRAINING PROGRAM

## 2019-05-18 PROCEDURE — 82803 BLOOD GASES ANY COMBINATION: CPT

## 2019-05-18 PROCEDURE — 82550 ASSAY OF CK (CPK): CPT

## 2019-05-18 PROCEDURE — 83735 ASSAY OF MAGNESIUM: CPT

## 2019-05-18 PROCEDURE — 82330 ASSAY OF CALCIUM: CPT

## 2019-05-18 PROCEDURE — 82962 GLUCOSE BLOOD TEST: CPT

## 2019-05-18 PROCEDURE — 80047 BASIC METABLC PNL IONIZED CA: CPT

## 2019-05-18 PROCEDURE — 96375 TX/PRO/DX INJ NEW DRUG ADDON: CPT

## 2019-05-18 PROCEDURE — 70496 CT ANGIOGRAPHY HEAD: CPT

## 2019-05-18 PROCEDURE — 80048 BASIC METABOLIC PNL TOTAL CA: CPT

## 2019-05-18 PROCEDURE — 82310 ASSAY OF CALCIUM: CPT

## 2019-05-18 PROCEDURE — 85025 COMPLETE CBC W/AUTO DIFF WBC: CPT

## 2019-05-18 PROCEDURE — 83605 ASSAY OF LACTIC ACID: CPT

## 2019-05-18 PROCEDURE — 70450 CT HEAD/BRAIN W/O DYE: CPT

## 2019-05-18 PROCEDURE — 65660000004 HC RM CVT STEPDOWN

## 2019-05-18 PROCEDURE — 71045 X-RAY EXAM CHEST 1 VIEW: CPT

## 2019-05-18 PROCEDURE — 85610 PROTHROMBIN TIME: CPT

## 2019-05-18 PROCEDURE — 93005 ELECTROCARDIOGRAM TRACING: CPT

## 2019-05-18 RX ORDER — FENTANYL CITRATE 50 UG/ML
50 INJECTION, SOLUTION INTRAMUSCULAR; INTRAVENOUS
Status: COMPLETED | OUTPATIENT
Start: 2019-05-18 | End: 2019-05-18

## 2019-05-18 RX ORDER — MORPHINE SULFATE 4 MG/ML
4 INJECTION INTRAVENOUS
Status: DISCONTINUED | OUTPATIENT
Start: 2019-05-18 | End: 2019-05-18

## 2019-05-18 RX ORDER — ONDANSETRON 2 MG/ML
4 INJECTION INTRAMUSCULAR; INTRAVENOUS
Status: COMPLETED | OUTPATIENT
Start: 2019-05-18 | End: 2019-05-18

## 2019-05-18 RX ORDER — SODIUM CHLORIDE, SODIUM LACTATE, POTASSIUM CHLORIDE, CALCIUM CHLORIDE 600; 310; 30; 20 MG/100ML; MG/100ML; MG/100ML; MG/100ML
500 INJECTION, SOLUTION INTRAVENOUS ONCE
Status: COMPLETED | OUTPATIENT
Start: 2019-05-18 | End: 2019-05-19

## 2019-05-18 RX ORDER — MORPHINE SULFATE 4 MG/ML
4 INJECTION INTRAVENOUS
Status: COMPLETED | OUTPATIENT
Start: 2019-05-18 | End: 2019-05-19

## 2019-05-18 RX ORDER — LIDOCAINE HYDROCHLORIDE 20 MG/ML
15 SOLUTION OROPHARYNGEAL
Status: COMPLETED | OUTPATIENT
Start: 2019-05-18 | End: 2019-05-19

## 2019-05-18 RX ADMIN — IOPAMIDOL 75 ML: 755 INJECTION, SOLUTION INTRAVENOUS at 19:15

## 2019-05-18 RX ADMIN — ONDANSETRON 4 MG: 2 INJECTION INTRAMUSCULAR; INTRAVENOUS at 21:54

## 2019-05-18 RX ADMIN — FENTANYL CITRATE 50 MCG: 50 INJECTION INTRAMUSCULAR; INTRAVENOUS at 21:54

## 2019-05-18 RX ADMIN — SODIUM CHLORIDE, SODIUM LACTATE, POTASSIUM CHLORIDE, AND CALCIUM CHLORIDE 500 ML: 600; 310; 30; 20 INJECTION, SOLUTION INTRAVENOUS at 21:56

## 2019-05-19 ENCOUNTER — APPOINTMENT (OUTPATIENT)
Dept: MRI IMAGING | Age: 54
DRG: 053 | End: 2019-05-19
Attending: HOSPITALIST
Payer: MEDICAID

## 2019-05-19 VITALS
SYSTOLIC BLOOD PRESSURE: 119 MMHG | DIASTOLIC BLOOD PRESSURE: 73 MMHG | TEMPERATURE: 98.7 F | OXYGEN SATURATION: 99 % | RESPIRATION RATE: 20 BRPM | HEART RATE: 84 BPM

## 2019-05-19 PROBLEM — R56.9 SEIZURE (HCC): Status: ACTIVE | Noted: 2019-05-19

## 2019-05-19 LAB
ANION GAP SERPL CALC-SCNC: 6 MMOL/L (ref 3–18)
BUN SERPL-MCNC: 11 MG/DL (ref 7–18)
BUN/CREAT SERPL: 10 (ref 12–20)
CA-I BLD-MCNC: 0.75 MMOL/L (ref 1.12–1.32)
CA-I SERPL-SCNC: 1.18 MMOL/L (ref 1.12–1.32)
CALCIUM SERPL-MCNC: 8.1 MG/DL (ref 8.5–10.1)
CALCIUM SERPL-MCNC: 8.8 MG/DL (ref 8.5–10.1)
CHLORIDE SERPL-SCNC: 107 MMOL/L (ref 100–108)
CO2 BLD-SCNC: 10 MMOL/L (ref 19–24)
CO2 SERPL-SCNC: 23 MMOL/L (ref 21–32)
CREAT SERPL-MCNC: 1.07 MG/DL (ref 0.6–1.3)
CREAT UR-MCNC: 0.3 MG/DL (ref 0.6–1.3)
EST. AVERAGE GLUCOSE BLD GHB EST-MCNC: 346 MG/DL
GLUCOSE BLD STRIP.AUTO-MCNC: 125 MG/DL (ref 70–110)
GLUCOSE BLD STRIP.AUTO-MCNC: 212 MG/DL (ref 70–110)
GLUCOSE BLD STRIP.AUTO-MCNC: 275 MG/DL (ref 74–106)
GLUCOSE BLD STRIP.AUTO-MCNC: 383 MG/DL (ref 70–110)
GLUCOSE BLD STRIP.AUTO-MCNC: 436 MG/DL (ref 70–110)
GLUCOSE BLD STRIP.AUTO-MCNC: 444 MG/DL (ref 70–110)
GLUCOSE BLD STRIP.AUTO-MCNC: 497 MG/DL (ref 70–110)
GLUCOSE BLD STRIP.AUTO-MCNC: 504 MG/DL (ref 70–110)
GLUCOSE BLD STRIP.AUTO-MCNC: 507 MG/DL (ref 70–110)
GLUCOSE SERPL-MCNC: 171 MG/DL (ref 74–99)
HBA1C MFR BLD: 13.7 % (ref 4.2–5.6)
POTASSIUM SERPL-SCNC: 3.7 MMOL/L (ref 3.5–5.5)
SODIUM BLD-SCNC: <100 MMOL/L (ref 136–145)
SODIUM SERPL-SCNC: 136 MMOL/L (ref 136–145)

## 2019-05-19 PROCEDURE — 74011636637 HC RX REV CODE- 636/637: Performed by: STUDENT IN AN ORGANIZED HEALTH CARE EDUCATION/TRAINING PROGRAM

## 2019-05-19 PROCEDURE — 74011250636 HC RX REV CODE- 250/636: Performed by: HOSPITALIST

## 2019-05-19 PROCEDURE — 83036 HEMOGLOBIN GLYCOSYLATED A1C: CPT

## 2019-05-19 PROCEDURE — 74011000250 HC RX REV CODE- 250: Performed by: STUDENT IN AN ORGANIZED HEALTH CARE EDUCATION/TRAINING PROGRAM

## 2019-05-19 PROCEDURE — 74011636637 HC RX REV CODE- 636/637: Performed by: INTERNAL MEDICINE

## 2019-05-19 PROCEDURE — 74011250637 HC RX REV CODE- 250/637: Performed by: NURSE PRACTITIONER

## 2019-05-19 PROCEDURE — 65660000004 HC RM CVT STEPDOWN

## 2019-05-19 PROCEDURE — 74011250637 HC RX REV CODE- 250/637: Performed by: INTERNAL MEDICINE

## 2019-05-19 PROCEDURE — 92610 EVALUATE SWALLOWING FUNCTION: CPT

## 2019-05-19 PROCEDURE — 74011636637 HC RX REV CODE- 636/637: Performed by: NURSE PRACTITIONER

## 2019-05-19 PROCEDURE — 80048 BASIC METABOLIC PNL TOTAL CA: CPT

## 2019-05-19 PROCEDURE — 36415 COLL VENOUS BLD VENIPUNCTURE: CPT

## 2019-05-19 PROCEDURE — 74011250636 HC RX REV CODE- 250/636: Performed by: EMERGENCY MEDICINE

## 2019-05-19 PROCEDURE — 74011250637 HC RX REV CODE- 250/637: Performed by: HOSPITALIST

## 2019-05-19 PROCEDURE — 77030010538 HC BG FLEXSEAL FMS BMS -A

## 2019-05-19 PROCEDURE — A9575 INJ GADOTERATE MEGLUMI 0.1ML: HCPCS | Performed by: INTERNAL MEDICINE

## 2019-05-19 PROCEDURE — 74011250636 HC RX REV CODE- 250/636: Performed by: STUDENT IN AN ORGANIZED HEALTH CARE EDUCATION/TRAINING PROGRAM

## 2019-05-19 PROCEDURE — 74011636637 HC RX REV CODE- 636/637: Performed by: HOSPITALIST

## 2019-05-19 PROCEDURE — 74011636320 HC RX REV CODE- 636/320: Performed by: INTERNAL MEDICINE

## 2019-05-19 PROCEDURE — 70553 MRI BRAIN STEM W/O & W/DYE: CPT

## 2019-05-19 RX ORDER — INSULIN GLARGINE 100 [IU]/ML
50 INJECTION, SOLUTION SUBCUTANEOUS
Status: DISCONTINUED | OUTPATIENT
Start: 2019-05-19 | End: 2019-05-19

## 2019-05-19 RX ORDER — LORAZEPAM 0.5 MG/1
0.5 TABLET ORAL
Status: DISCONTINUED | OUTPATIENT
Start: 2019-05-19 | End: 2019-05-20 | Stop reason: HOSPADM

## 2019-05-19 RX ORDER — GUAIFENESIN 100 MG/5ML
81 LIQUID (ML) ORAL DAILY
Status: CANCELLED | OUTPATIENT
Start: 2019-05-19

## 2019-05-19 RX ORDER — ENOXAPARIN SODIUM 100 MG/ML
40 INJECTION SUBCUTANEOUS EVERY 24 HOURS
Status: CANCELLED | OUTPATIENT
Start: 2019-05-19

## 2019-05-19 RX ORDER — THERA TABS 400 MCG
1 TAB ORAL DAILY
Status: DISCONTINUED | OUTPATIENT
Start: 2019-05-19 | End: 2019-05-20 | Stop reason: HOSPADM

## 2019-05-19 RX ORDER — INSULIN LISPRO 100 [IU]/ML
INJECTION, SOLUTION INTRAVENOUS; SUBCUTANEOUS
Status: DISCONTINUED | OUTPATIENT
Start: 2019-05-19 | End: 2019-05-20 | Stop reason: HOSPADM

## 2019-05-19 RX ORDER — THERA TABS 400 MCG
1 TAB ORAL DAILY
Qty: 30 TAB | Refills: 0 | Status: SHIPPED | OUTPATIENT
Start: 2019-05-20

## 2019-05-19 RX ORDER — INSULIN GLARGINE 100 [IU]/ML
20 INJECTION, SOLUTION SUBCUTANEOUS DAILY
Status: DISCONTINUED | OUTPATIENT
Start: 2019-05-19 | End: 2019-05-19

## 2019-05-19 RX ORDER — OXYCODONE AND ACETAMINOPHEN 5; 325 MG/1; MG/1
1 TABLET ORAL
Status: DISCONTINUED | OUTPATIENT
Start: 2019-05-19 | End: 2019-05-20 | Stop reason: HOSPADM

## 2019-05-19 RX ORDER — LANOLIN ALCOHOL/MO/W.PET/CERES
100 CREAM (GRAM) TOPICAL DAILY
Qty: 30 TAB | Refills: 0 | Status: ON HOLD | OUTPATIENT
Start: 2019-05-20 | End: 2019-11-06

## 2019-05-19 RX ORDER — INSULIN GLARGINE 100 [IU]/ML
20 INJECTION, SOLUTION SUBCUTANEOUS
Status: DISCONTINUED | OUTPATIENT
Start: 2019-05-19 | End: 2019-05-19

## 2019-05-19 RX ORDER — MORPHINE SULFATE 4 MG/ML
6 INJECTION INTRAVENOUS
Status: COMPLETED | OUTPATIENT
Start: 2019-05-19 | End: 2019-05-19

## 2019-05-19 RX ORDER — ACETAMINOPHEN 500 MG
500 TABLET ORAL
Status: DISCONTINUED | OUTPATIENT
Start: 2019-05-19 | End: 2019-05-20 | Stop reason: HOSPADM

## 2019-05-19 RX ORDER — SODIUM CHLORIDE 0.9 % (FLUSH) 0.9 %
5-40 SYRINGE (ML) INJECTION EVERY 8 HOURS
Status: DISCONTINUED | OUTPATIENT
Start: 2019-05-19 | End: 2019-05-20 | Stop reason: HOSPADM

## 2019-05-19 RX ORDER — PANTOPRAZOLE SODIUM 40 MG/1
40 TABLET, DELAYED RELEASE ORAL
Qty: 30 TAB | Refills: 0 | Status: ON HOLD | OUTPATIENT
Start: 2019-05-20 | End: 2019-11-06

## 2019-05-19 RX ORDER — INSULIN GLARGINE 100 [IU]/ML
10 INJECTION, SOLUTION SUBCUTANEOUS
Status: CANCELLED | OUTPATIENT
Start: 2019-05-19

## 2019-05-19 RX ORDER — ONDANSETRON 2 MG/ML
4 INJECTION INTRAMUSCULAR; INTRAVENOUS
Status: DISCONTINUED | OUTPATIENT
Start: 2019-05-19 | End: 2019-05-20 | Stop reason: HOSPADM

## 2019-05-19 RX ORDER — SODIUM CHLORIDE 0.9 % (FLUSH) 0.9 %
5-40 SYRINGE (ML) INJECTION AS NEEDED
Status: DISCONTINUED | OUTPATIENT
Start: 2019-05-19 | End: 2019-05-20 | Stop reason: HOSPADM

## 2019-05-19 RX ORDER — POLYETHYLENE GLYCOL 3350 17 G/17G
17 POWDER, FOR SOLUTION ORAL DAILY
Status: DISCONTINUED | OUTPATIENT
Start: 2019-05-19 | End: 2019-05-20 | Stop reason: HOSPADM

## 2019-05-19 RX ORDER — SODIUM CHLORIDE 9 MG/ML
125 INJECTION, SOLUTION INTRAVENOUS AS NEEDED
Status: DISCONTINUED | OUTPATIENT
Start: 2019-05-19 | End: 2019-05-20 | Stop reason: HOSPADM

## 2019-05-19 RX ORDER — POLYETHYLENE GLYCOL 3350 17 G/17G
17 POWDER, FOR SOLUTION ORAL DAILY
Qty: 30 PACKET | Refills: 0 | Status: ON HOLD | OUTPATIENT
Start: 2019-05-20 | End: 2019-11-06

## 2019-05-19 RX ORDER — LANOLIN ALCOHOL/MO/W.PET/CERES
100 CREAM (GRAM) TOPICAL DAILY
Status: DISCONTINUED | OUTPATIENT
Start: 2019-05-19 | End: 2019-05-20 | Stop reason: HOSPADM

## 2019-05-19 RX ORDER — MORPHINE SULFATE 2 MG/ML
1 INJECTION, SOLUTION INTRAMUSCULAR; INTRAVENOUS
Status: DISCONTINUED | OUTPATIENT
Start: 2019-05-19 | End: 2019-05-19

## 2019-05-19 RX ORDER — INSULIN GLARGINE 100 [IU]/ML
25 INJECTION, SOLUTION SUBCUTANEOUS
Status: DISCONTINUED | OUTPATIENT
Start: 2019-05-19 | End: 2019-05-19

## 2019-05-19 RX ORDER — LORAZEPAM 1 MG/1
1 TABLET ORAL
Status: DISCONTINUED | OUTPATIENT
Start: 2019-05-19 | End: 2019-05-19

## 2019-05-19 RX ORDER — PANTOPRAZOLE SODIUM 40 MG/1
40 TABLET, DELAYED RELEASE ORAL
Status: DISCONTINUED | OUTPATIENT
Start: 2019-05-20 | End: 2019-05-20 | Stop reason: HOSPADM

## 2019-05-19 RX ORDER — PROMETHAZINE HYDROCHLORIDE 25 MG/1
25 TABLET ORAL
Status: DISCONTINUED | OUTPATIENT
Start: 2019-05-19 | End: 2019-05-19

## 2019-05-19 RX ORDER — INSULIN GLARGINE 100 [IU]/ML
20 INJECTION, SOLUTION SUBCUTANEOUS
Status: DISCONTINUED | OUTPATIENT
Start: 2019-05-19 | End: 2019-05-20 | Stop reason: HOSPADM

## 2019-05-19 RX ORDER — INSULIN LISPRO 100 [IU]/ML
8 INJECTION, SOLUTION INTRAVENOUS; SUBCUTANEOUS ONCE
Status: COMPLETED | OUTPATIENT
Start: 2019-05-19 | End: 2019-05-19

## 2019-05-19 RX ADMIN — THERA TABS 1 TABLET: TAB at 12:23

## 2019-05-19 RX ADMIN — MORPHINE SULFATE 4 MG: 4 INJECTION INTRAVENOUS at 00:24

## 2019-05-19 RX ADMIN — PANCRELIPASE 5 CAPSULE: 30000; 6000; 19000 CAPSULE, DELAYED RELEASE PELLETS ORAL at 08:54

## 2019-05-19 RX ADMIN — INSULIN LISPRO 10 UNITS: 100 INJECTION, SOLUTION INTRAVENOUS; SUBCUTANEOUS at 08:54

## 2019-05-19 RX ADMIN — GADOTERATE MEGLUMINE 13 ML: 376.9 INJECTION INTRAVENOUS at 15:20

## 2019-05-19 RX ADMIN — MORPHINE SULFATE 1 MG: 2 INJECTION, SOLUTION INTRAMUSCULAR; INTRAVENOUS at 01:27

## 2019-05-19 RX ADMIN — ACETAMINOPHEN 500 MG: 500 TABLET ORAL at 17:55

## 2019-05-19 RX ADMIN — PANCRELIPASE 5 CAPSULE: 30000; 6000; 19000 CAPSULE, DELAYED RELEASE PELLETS ORAL at 12:22

## 2019-05-19 RX ADMIN — SODIUM CHLORIDE, SODIUM LACTATE, POTASSIUM CHLORIDE, AND CALCIUM CHLORIDE 1000 ML: 600; 310; 30; 20 INJECTION, SOLUTION INTRAVENOUS at 00:23

## 2019-05-19 RX ADMIN — OXYCODONE HYDROCHLORIDE AND ACETAMINOPHEN 1 TABLET: 5; 325 TABLET ORAL at 13:06

## 2019-05-19 RX ADMIN — Medication 10 ML: at 17:56

## 2019-05-19 RX ADMIN — MORPHINE SULFATE 1 MG: 2 INJECTION, SOLUTION INTRAMUSCULAR; INTRAVENOUS at 09:01

## 2019-05-19 RX ADMIN — INSULIN LISPRO 15 UNITS: 100 INJECTION, SOLUTION INTRAVENOUS; SUBCUTANEOUS at 21:30

## 2019-05-19 RX ADMIN — POLYETHYLENE GLYCOL 3350 17 G: 17 POWDER, FOR SOLUTION ORAL at 12:23

## 2019-05-19 RX ADMIN — Medication 10 ML: at 22:47

## 2019-05-19 RX ADMIN — INSULIN LISPRO 8 UNITS: 100 INJECTION, SOLUTION INTRAVENOUS; SUBCUTANEOUS at 10:00

## 2019-05-19 RX ADMIN — INSULIN LISPRO 4 UNITS: 100 INJECTION, SOLUTION INTRAVENOUS; SUBCUTANEOUS at 13:05

## 2019-05-19 RX ADMIN — LIDOCAINE HYDROCHLORIDE 15 ML: 20 SOLUTION ORAL; TOPICAL at 00:24

## 2019-05-19 RX ADMIN — Medication 10 ML: at 09:01

## 2019-05-19 RX ADMIN — INSULIN GLARGINE 20 UNITS: 100 INJECTION, SOLUTION SUBCUTANEOUS at 08:54

## 2019-05-19 RX ADMIN — MORPHINE SULFATE 6 MG: 4 INJECTION INTRAVENOUS at 05:06

## 2019-05-19 RX ADMIN — Medication 100 MG: at 12:23

## 2019-05-19 RX ADMIN — PANCRELIPASE 5 CAPSULE: 30000; 6000; 19000 CAPSULE, DELAYED RELEASE PELLETS ORAL at 17:57

## 2019-05-19 RX ADMIN — HUMAN INSULIN 10 UNITS: 100 INJECTION, SOLUTION SUBCUTANEOUS at 00:23

## 2019-05-19 RX ADMIN — INSULIN GLARGINE 20 UNITS: 100 INJECTION, SOLUTION SUBCUTANEOUS at 21:29

## 2019-05-19 NOTE — CONSULTS
Elisa Marx been asked to follow up with this patient who had two episodes of witnessed seizure like activity. The patient was seen last night by  neurology with an impression of possible seizure in the setting of marked hyperglycemia versus hyperglycemia sequelae. The plan has been to image with MRI and perform EEG    Patient reports that he is back to his baseline with no residual from the event. I see no medications that the patient is on in the outpatient setting that would cause seizures. Problem alcohol drinking is noted to be on the chart. He is awake alert and cooperative at the bedside. Complains of discomfort in the abdomen. Faces symmetrical. Cranial nerves are normal. The increased tone and left side was not appreciated on my exam. No focal weakness. I did not ambulate the patient. CT scan was reviewed. Assessment: patient with two events that have been described as being seizure. I did note the impression of one nurse who was available for me to interview to witness these events. Her impression that it was not typical of seizures that she has seen in the past. I believe seeing the plan through is appropriate. At this point I would not initiate anticonvulsant  therapy. MRI would be appropriate. EEG can be performed outpatient if needed. At this point diagnosis is seizure due to hyperglycemia vs non epileptic event. Patient should not drive and if he does have a drivers license he needs to report to the [facility] department of motor vehicles about what occurred. Showers only no tub bath. No operating heavy machinery. No climbing ladders, or performing activities where a loss of consciousness can be life-threatening. Out- patient follow up with neurology If EEG is performed in an outpatient setting.

## 2019-05-19 NOTE — PROGRESS NOTES
Received pt from ED via stretcher accompanied by his mother. Pt is alert and oriented. Pain tolerable at this moment, call light within pt's reach 
0641: Pt with seizure like episode with face twitching lasting less than a minute, pt regain consciousness, he informed his mother \"mom I have my seizure again\" MD informed. Ativan withheld as pt will like to have breakfast. 
 
Bedside shift change report given to Griffin Grissom (oncoming nurse) by Annika Umana RN (offgoing nurse). Report included the following information SBAR, ED Summary, Intake/Output, MAR, Recent Results and Cardiac Rhythm NSR.

## 2019-05-19 NOTE — DISCHARGE SUMMARY
[facility]  Discharge Summary       Patient: Keturah Monique Age: 48 y.o. : 1965 MR#: [id number] SSN: [id number]  PCP on record: Kennedy Covington MD  Admit date: 2019  Discharge date: 2019    Disposition:    []Home   [x]Home with Home Health / [address]  []SNF/NH   []Rehab   []Home with family   []Alternate Facility:____________________    Admission Diagnoses:  Partial seizure ([address] [R56.9]  Seizure ([address] [R56.9]    Discharge Diagnoses:                             1. Left side subjective weakness and multiple intermittent partial non epileptical seizure - CVA ruled out. 2. IDDM with hyperglycemia   3. CKD-III  4. Chronic Pancreaticoduodenalcutaneous fistula since .    5. H/o drug seeking behavior  6. Medical non compliance  7. Hx seizure disorder   8. Tobacco abuse   9. Severe protein calorie malnutrition 2ry malabsorption r/t pancreatic insufficiency    10. Chronic pancreatitis     Discharge Medications:     Current Discharge Medication List      START taking these medications    Details   thiamine HCL (B-1) 100 mg tablet Take 1 Tab by mouth daily. Qty: 30 Tab, Refills: 0      therapeutic multivitamin (THERAGRAN) tablet Take 1 Tab by mouth daily. Qty: 30 Tab, Refills: 0      polyethylene glycol (MIRALAX) 17 gram packet Take 1 Packet by mouth daily. Qty: 30 Packet, Refills: 0      pantoprazole (PROTONIX) 40 mg tablet Take 1 Tab by mouth Daily (before breakfast). Qty: 30 Tab, Refills: 0      OTHER NO DRIVING OR OPERATING HEAVY MACHINERY FOR 6 MONTHS OR UNTIL CLEARED BY NEUROLOGY. Qty: 1 cm, Refills: 0         CONTINUE these medications which have NOT CHANGED    Details   Blood-Glucose Meter (FREESTYLE LITE METER) monitoring kit Test blood sugars 4-6 times a day  Qty: 1 Kit, Refills: 0      insulin NPH/insulin regular (NOVOLIN 70/30 U-100 INSULIN) 100 unit/mL (70-30) injection by SubCUTAneous route.       promethazine (PHENERGAN) 25 mg tablet Take 1 Tab by mouth every six (6) hours as needed. Qty: 10 Tab, Refills: 0      lipase-protease-amylase (ZENPEP 10,000) capsule Take 3 Caps by mouth three (3) times daily (with meals). Qty: 270 Cap, Refills: 0      glucose blood VI test strips (FREESTYLE TEST) strip 1 Each by Does Not Apply route See Admin Instructions. Test blood sugars 4-6 times a day. Qty: 100 Strip, Refills: 3         STOP taking these medications       insulin glargine (LANTUS U-100 INSULIN) 100 unit/mL injection Comments:   Reason for Stopping:               Consults:    - Neurology   - GI    Procedures:  -   NONE    Significant Diagnostic Studies:   -  Study Result     CT HEAD WITHOUT IV CONTRAST     INDICATION: Altered mental status. Stroke. Left leg weakness.     COMPARISON: CT head 7/28/2018.     TECHNIQUE: Serial axial CT images were obtained from the skull vertex to foramen  magnum without IV contrast. All CT scans at this facility are performed using  dose optimization technique as appropriate to a performed exam, to include  automated exposure control, adjustment of the mA and/or kV according to  patient's size (including appropriate matching for site-specific examinations),  or use of iterative reconstruction technique.     FINDINGS:  Interval bilateral craniotomies. Possible underlying dural thickening along the  right frontal extra-axial region versus thin subdural hemorrhage measuring up to  5 mm. Gray-white matter differentiation appears preserved. No evidence for acute  infarct or mass lesion. Oris Monique Oris Monique Mild cerebral atrophy with associated prominence of the  CSF spaces. No evidence for hydrocephalus. .     Visualized paranasal sinuses are free from significant mucosal disease. Chronic  non-pneumatized left mastoid. Prior bilateral craniotomies.     IMPRESSION  IMPRESSION:     1. Interval bilateral frontal craniotomies. -5 mm dural thickening versus trace subdural hematoma underlying the right  frontal craniotomy site.     2. Otherwise, no additional evidence for acute intracranial process.  -Please note that CT is relatively insensitive for acute ischemia and first  24-48 hours, and MRI may be helpful if clinically indicated.     Message regarding 1-2 of the above impression was communicated to Dr. Andrew Canales at  1933 hours 5/18/2019       Study Result     CTA BRAIN/NECK     CPT CODE: 08196/95766     CLINICAL INDICATIONS: Left leg weakness.     CTA BRAIN:     TECHNIQUE: Helical CT scan of the brain was performed at 0.625 mm intervals  during rapid IV bolus contrast administration. These data were reconstructed at  0.625 mm intervals for vascular analysis. The data was also reviewed at 2.5 mm  intervals for accompanying soft tissue analysis. Multiple sets of 3 dimensional  reformatted images and coronal and sagittal reformations of the extracranial and  intracranial arteries were generated and reviewed independently on a 3-D  workstation and saved to permanent archive.     CT Dose optimization was applied using one of the following techniques;  automated exposure, patient based adjustment of delivered mA and/or KV, and or  iterative reconstruction.              CTA  SOFT TISSUE ANALYSIS: Ventricular system, basilar cisterns, and cortical  sulci unremarkable for patient's stated age. There are no areas of hemorrhage,  mass effect, or edema appreciated. Vessels of the brain are enhancing normally. There are no enhancing abnormalities of parenchyma or leptomeninges. Indications  prior bilateral high vertex craniotomies noted.     Airway is patent from nasopharynx to thoracic inlet. As is unremarkable. Salivary glands and thyroid gland normal. Images through the root of the neck,  extending to superior mediastinum including lung apices unremarkable without  cervical/mediastinal mass or adenopathy.     Axial skeleton unremarkable without lytic or blastic change, fracture or  subluxation of the cervical spine.  There is asymmetric sclerosis of the left  hemimandible extending across the midline, without any obvious bony destruction  or soft tissue superimposed. This is of uncertain etiology.     .     CTA VASCULAR ANALYSIS: Normal aortic arch anatomy. Origins of great vessels are  widely patent and normally arranged.       Right innominate, and both subclavian arteries are widely patent.      Vertebral arteries each originate respectively from ipsilateral subclavian  arteries with normal subclavian origins, and normal cervical course and caliber  to the intradural confluence.     Common carotid arteries, common carotid arterial bifurcations, and cervical  internal carotid arteries widely patent to the skull base. External carotid  branches are normal.      Distal cervical, petrous, cavernous, supraclinoid ICA segments are widely  patent.      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 Left A1 is dominant with normal communicating segment. Runoff  to MCA and GISELLE territories unremarkable.     Posterior fossa circulatory anatomy is unremarkable. .     IMPRESSION  IMPRESSION:      Unremarkable vascular analysis CTA head and neck.      No specific soft tissue abnormalities are identified.     Postoperative changes after bilateral high vertex craniotomies.     Asymmetric sclerosis is seen of the left hemimandible extending across the  midline of uncertain etiology. No obvious associated soft tissue changes  suggestive \"active\" process. Findings may be related to prior trauma or  infection. Although not clearly ruled out, active infection disc component of  sclerosis is not considered highly likely. .     Study Result     EXAM: XR CHEST PORT     CLINICAL INDICATION/HISTORY : ams - sepsis ? .     COMPARISON: July 28, 2018     TECHNIQUE: 1 VIEWS     FINDINGS:   EKG leads and wires overlie the chest  Mild scarring left lateral lower lung. No focal lung consolidation. The heart and mediastinum are unremarkable.   No evidence of pleural effusion.        IMPRESSION  IMPRESSION:    1. No acute findings     Study Result     MR brain with and without contrast     HISTORY: Altered mental status and seizure. During seizure expressed numbness  and weakness over the entire left side of body. Stroke alert was subsequently  called. History of subdural hematomas.     COMPARISON: CT and CTA 5/18/2019. Numerous imaging studies in the [facility] PACS  including a brain MRI from 3/10/2019.     TECHNIQUE: Brain scanned with axial and sagittal T1W scans, axial T2W scans,  axial FLAIR, axial diffusion weighted images, SWAN and post gadolinium axial and  coronal T1W scans.     CONTRAST: 13 cc Dotarem administered intravenously.     FINDINGS:      No restricted diffusion lesions to suggest an acute ischemic stroke. No  pathologic intra-axial enhancement or mass. Expected arterial flow voids are  present at the base of brain. There are a few punctate T2/FLAIR hyperintense  subcortical white matter lesions.     Bilateral parietal craniotomies. There is a small amount of smooth dural  enhancement over the right frontoparietal convexity. No evidence of a new or  enlarging extra-axial fluid collection.     Mucus retention cyst in the right maxillary sinus. Bilateral mastoid effusions.     IMPRESSION  IMPRESSION:     1. No evidence of an acute intracranial process. No acute stroke.     2. Small amount of smooth dural thickening and enhancement over the right  cerebral convexity most consistent with enhancing granulation tissue either  postsurgical or due to prior subdural hemorrhage. Hospital Course by Problem   HPI per admitting MD  \"49 y. o. male with past medical history of diabetes, chronic pancreatitis, duodenal fistula who presents for evaluation of altered mental status and seizure.  Patient is chronically ill and has  multiple admissions in different [address] [place] [address] initially called EMS due to fluid drainage from his right lower quadrant. Sharan Hughes has a chronic duodenal fistula following a NATALIE drain which he  has multiple evaluations for. In route to the hospital he had partial seizure during which he expressed numbness and weakness over the entire left side of his body.  Due to that a code stroke was called while in route to the ER. Initially when evaluated was slightly postictal, with weakness in his left side.  Code stroke was called due to his left upper extremity and left lower extremity drift.  He otherwise had sensation, cranial nerves, coordination, intact.  Patient was immediately taken to CT, and evaluated by telemetry neurology.  By the time he returned to the ED, he was back to his baseline.  His weakness had resolved, and was likely secondary to his postictal state.  He was found to be hyperglycemic with a blood sugar 530, and telemetry neurology thought this was likely secondary to a metabolic cause more so than a primary neurologic.  TPA was not recommended and and Tele Neurologist recommended MRI and EEG. Remainder of his work-up was completed.  Other than the ascitic fluid drainage and the temporary weakness the patient no acute complaints.  He had no fever, chills, night sweats, cough, congestion, other mentioned abdominal pain, chest pain, shortness of breath, or lower extremity edema. No fever. No chills. His mother is in the room. As per patient he had NATALIE drain in the RUQ which was removed 3 months ago. He says that always has little drainage but has gotten worse in the past 3 weeks. Fluid color is yellowish. No abdominal pain. No nausea and vomiting. \"    1. Left side subjective weakness and multiple intermittent partial non epileptical seizure - in pt with hx seizure disorder. CVA ruled out. Admitting diagnosis. Neurology followed. CT head, CTA head / neck MRI brain with No evidence of an acute intracranial process. No acute stroke. Exam reassuring. Symptoms resolved prior to admission.  Per neurology, anticonvulsants not warranted at this time. EEG to be done outpatient with OP f/u with neurology in 1-2 weeks. 2. IDDM with hyperglycemia likely d/t non-compliance. Anion gap wnl. 10units IV insulin given in ED. Initiated on SSI, Lantus while inpatient. Blood glucose improved. Exam reassuring. May resume yane insulin regimen with close f/u with PCP. .  3. CKD-III. No acute issues. 4. Chronic Pancreaticoduodenalcutaneous fistula since 2012 in patient with hx chronic pancreatitis. Foul smelling yellow /gastric drainage. Patient report s/p NATALIE drain removal 3 months or so ago. States output increased over last 3 weeks. Incision site to right flank thumb sized with excoriation to surrounding skin. Patient non-toxic appearing. GI consulted. Per GI recommendations- no immediate indication for repeat endoscopy warranted; patient will need wound care, possibly ostomy device over fistula; General surgery eval for debridement if warranted; as well as f/u with [facility] in [place]. Per GI note, this is chronic ongoing issues will require close outpatient follow ups. Patient and mother who was at bedside given instructions to f/u with [facility] GI services as patient frequently is seen there. Both patient and mother state understanding. Discharged home with [place] [address] / wound care services. 5. H/o drug seeking behavior and medical non compliance. No changes in behavior this admission. Counseling provided to patient and mother at bedside. No further inpatient interventions warranted. 6. Tobacco abuse. Counseling cessation provided. Patient with no desire to quit at this time. Instructed to f/u with PCP for OP resources. 7. Chronic Severe protein calorie malnutrition 2ry malabsorption r/t pancreatic insufficiency. Initiated on supplements / multivitamin this admission. OP F/U PCP. Today's examination of the patient revealed:     Subjective:   Alert and oriented X 3. NAD. Resting comfortably in bed. Mother at bedside.  No c/o chest pain / chest discomfort. No n/v/d/c. RN reports X 1 atypical seizure like activity this morning. Objective:   VS:   Visit Vitals  /70   Pulse 85   Temp 98.2 °F (36.8 °C)   Resp 18   SpO2 100%      Tmax/24hrs: Temp (24hrs), Av.7 °F (36.5 °C), Min:97 °F (36.1 °C), Max:98.5 °F (36.9 °C)     Input/Output:     Intake/Output Summary (Last 24 hours) at 2019 1836  Last data filed at 2019 1759  Gross per 24 hour   Intake 2220 ml   Output 800 ml   Net 1420 ml       General:  Alert, NAD, THIN   Cardiovascular:  RRR  Pulmonary:  LSC throughout; respiratory effort WNL  GI:  FISTULA RIGHT FLANK +BS in all four quadrants, soft, tenderness  Extremities:  No edema; 2+ dorsalis pedis pulses bilaterally      Labs:    Recent Results (from the past 24 hour(s))   CBC WITH AUTOMATED DIFF    Collection Time: 19  7:05 PM   Result Value Ref Range    WBC 7.3 4.6 - 13.2 K/uL    RBC 5.07 4.70 - 5.50 M/uL    HGB 13.8 13.0 - 16.0 g/dL    HCT 42.4 36.0 - 48.0 %    MCV 83.6 74.0 - 97.0 FL    MCH 27.2 24.0 - 34.0 PG    MCHC 32.5 31.0 - 37.0 g/dL    RDW 15.0 (H) 11.6 - 14.5 %    PLATELET 809 800 - 209 K/uL    MPV 10.2 9.2 - 11.8 FL    NEUTROPHILS 69 40 - 73 %    LYMPHOCYTES 23 21 - 52 %    MONOCYTES 4 3 - 10 %    EOSINOPHILS 3 0 - 5 %    BASOPHILS 1 0 - 2 %    ABS. NEUTROPHILS 5.1 1.8 - 8.0 K/UL    ABS. LYMPHOCYTES 1.7 0.9 - 3.6 K/UL    ABS. MONOCYTES 0.3 0.05 - 1.2 K/UL    ABS. EOSINOPHILS 0.2 0.0 - 0.4 K/UL    ABS.  BASOPHILS 0.1 0.0 - 0.1 K/UL    DF AUTOMATED     PROTHROMBIN TIME + INR    Collection Time: 19  7:05 PM   Result Value Ref Range    Prothrombin time 12.3 11.5 - 15.2 sec    INR 0.9 0.8 - 1.2     METABOLIC PANEL, BASIC    Collection Time: 19  7:05 PM   Result Value Ref Range    Sodium 129 (L) 136 - 145 mmol/L    Potassium 5.0 3.5 - 5.5 mmol/L    Chloride 101 100 - 108 mmol/L    CO2 20 (L) 21 - 32 mmol/L    Anion gap 8 3.0 - 18 mmol/L    Glucose 524 (HH) 74 - 99 mg/dL    BUN 14 7.0 - 18 MG/DL Creatinine 1.50 (H) 0.6 - 1.3 MG/DL    BUN/Creatinine ratio 9 (L) 12 - 20      GFR est AA 59 (L) >60 ml/min/1.73m2    GFR est non-AA 49 (L) >60 ml/min/1.73m2    Calcium 9.5 8.5 - 10.1 MG/DL   HEPATIC FUNCTION PANEL    Collection Time: 05/18/19  7:05 PM   Result Value Ref Range    Protein, total 9.3 (H) 6.4 - 8.2 g/dL    Albumin 4.2 3.4 - 5.0 g/dL    Globulin 5.1 (H) 2.0 - 4.0 g/dL    A-G Ratio 0.8 0.8 - 1.7      Bilirubin, total 0.3 0.2 - 1.0 MG/DL    Bilirubin, direct 0.1 0.0 - 0.2 MG/DL    Alk.  phosphatase 185 (H) 45 - 117 U/L    AST (SGOT) 21 15 - 37 U/L    ALT (SGPT) 29 16 - 61 U/L   NT-PRO BNP    Collection Time: 05/18/19  7:05 PM   Result Value Ref Range    NT pro- 0 - 900 PG/ML   LIPASE    Collection Time: 05/18/19  7:05 PM   Result Value Ref Range    Lipase 74 73 - 393 U/L   MAGNESIUM    Collection Time: 05/18/19  7:05 PM   Result Value Ref Range    Magnesium 2.5 1.6 - 2.6 mg/dL   CARDIAC PANEL,(CK, CKMB & TROPONIN)    Collection Time: 05/18/19  7:05 PM   Result Value Ref Range    CK 45 39 - 308 U/L    CK - MB <1.0 <3.6 ng/ml    CK-MB Index  0.0 - 4.0 %     CALCULATION NOT PERFORMED WHEN RESULT IS BELOW LINEAR LIMIT    Troponin-I, QT <0.02 0.0 - 0.045 NG/ML   POC LACTIC ACID    Collection Time: 05/18/19  7:52 PM   Result Value Ref Range    Lactic Acid (POC) 0.47 0.40 - 2.00 mmol/L   POC CHEM8    Collection Time: 05/18/19  7:53 PM   Result Value Ref Range    CO2, POC 10 (L) 19 - 24 MMOL/L    Glucose,  (H) 74 - 106 MG/DL    Creatinine, POC 0.3 (L) 0.6 - 1.3 MG/DL    GFRAA, POC >60 >60 ml/min/1.73m2    GFRNA, POC >60 >60 ml/min/1.73m2    Sodium, POC <100 (LL) 136 - 145 MMOL/L    Calcium, ionized (POC) 0.75 (LL) 1.12 - 1.32 mmol/L   EKG, 12 LEAD, INITIAL    Collection Time: 05/18/19  7:59 PM   Result Value Ref Range    Ventricular Rate 75 BPM    Atrial Rate 75 BPM    P-R Interval 150 ms    QRS Duration 96 ms    Q-T Interval 394 ms    QTC Calculation (Bezet) 439 ms    Calculated P Axis 73 degrees Calculated R Axis -28 degrees    Calculated T Axis 63 degrees    Diagnosis       Normal sinus rhythm  Normal ECG  When compared with ECG of 06-MAY-2019 16:33,  Nonspecific T wave abnormality no longer evident in Inferior leads  T wave amplitude has increased in Anterior leads  Confirmed by Maxi Acuña [id number]) on 5/18/2019 9:35:43 PM     GLUCOSE, POC    Collection Time: 05/18/19  8:06 PM   Result Value Ref Range    Glucose (POC) 497 (HH) 70 - 110 mg/dL   URINALYSIS W/ RFLX MICROSCOPIC    Collection Time: 05/18/19 10:22 PM   Result Value Ref Range    Color YELLOW      Appearance CLOUDY      Specific gravity >1.030 (H) 1.005 - 1.030    pH (UA) 5.5 5.0 - 8.0      Protein TRACE (A) NEG mg/dL    Glucose >1,000 (A) NEG mg/dL    Ketone TRACE (A) NEG mg/dL    Bilirubin NEGATIVE  NEG      Blood NEGATIVE  NEG      Urobilinogen 0.2 0.2 - 1.0 EU/dL    Nitrites NEGATIVE  NEG      Leukocyte Esterase SMALL (A) NEG     URINE MICROSCOPIC ONLY    Collection Time: 05/18/19 10:22 PM   Result Value Ref Range    WBC 10 to 15 0 - 4 /hpf    RBC 0 to 1 0 - 5 /hpf    Epithelial cells FEW 0 - 5 /lpf    Bacteria FEW (A) NEG /hpf    Yeast 1+ (A) NEG   POC VENOUS BLOOD GAS    Collection Time: 05/18/19 11:25 PM   Result Value Ref Range    Device: NASAL CANNULA      Flow rate (POC) 4 L/M    pH, venous (POC) 7.257 (L) 7.32 - 7.42      pCO2, venous (POC) 42.5 41 - 51 MMHG    pO2, venous (POC) 20 (L) 25 - 40 mmHg    HCO3, venous (POC) 19.2 (L) 23.0 - 28.0 MMOL/L    sO2, venous (POC) 27 (L) 65 - 88 %    Base deficit, venous (POC) 8 mmol/L    Allens test (POC) N/A      Total resp. rate 20      Site RIGHT BRACHIAL      Patient temp.  97.0      Specimen type (POC) VENOUS BLOOD      Performed by Radha Gu Traveler    CALCIUM, IONIZED    Collection Time: 05/18/19 11:36 PM   Result Value Ref Range    Ionized Calcium 1.18 1.12 - 1.32 MMOL/L   CALCIUM    Collection Time: 05/18/19 11:36 PM   Result Value Ref Range    Calcium 8.1 (L) 8.5 - 10.1 MG/DL GLUCOSE, POC    Collection Time: 05/19/19  1:23 AM   Result Value Ref Range    Glucose (POC) 444 (HH) 70 - 110 mg/dL   GLUCOSE, POC    Collection Time: 05/19/19  5:11 AM   Result Value Ref Range    Glucose (POC) 383 (H) 70 - 110 mg/dL   GLUCOSE, POC    Collection Time: 05/19/19  7:12 AM   Result Value Ref Range    Glucose (POC) 507 (HH) 70 - 110 mg/dL   GLUCOSE, POC    Collection Time: 05/19/19  9:18 AM   Result Value Ref Range    Glucose (POC) 504 (HH) 70 - 110 mg/dL   HEMOGLOBIN A1C WITH EAG    Collection Time: 05/19/19 10:00 AM   Result Value Ref Range    Hemoglobin A1c 13.7 (H) 4.2 - 5.6 %    Est. average glucose 346 mg/dL   GLUCOSE, POC    Collection Time: 05/19/19 11:01 AM   Result Value Ref Range    Glucose (POC) 212 (H) 70 - 110 mg/dL   GLUCOSE, POC    Collection Time: 05/19/19  3:51 PM   Result Value Ref Range    Glucose (POC) 125 (H) 70 - 950 mg/dL   METABOLIC PANEL, BASIC    Collection Time: 05/19/19  4:30 PM   Result Value Ref Range    Sodium 136 136 - 145 mmol/L    Potassium 3.7 3.5 - 5.5 mmol/L    Chloride 107 100 - 108 mmol/L    CO2 23 21 - 32 mmol/L    Anion gap 6 3.0 - 18 mmol/L    Glucose 171 (H) 74 - 99 mg/dL    BUN 11 7.0 - 18 MG/DL    Creatinine 1.07 0.6 - 1.3 MG/DL    BUN/Creatinine ratio 10 (L) 12 - 20      GFR est AA >60 >60 ml/min/1.73m2    GFR est non-AA >60 >60 ml/min/1.73m2    Calcium 8.8 8.5 - 10.1 MG/DL     ADDITIONAL INFORMATION: If you experience any of the following symptoms but not limited to Fever, chills, nausea, vomiting, diarrhea, change in mentation, falling, bleeding, shortness of breath, chest pain, please call your primary care physician or return to the emergency room if you cannot get hold of your doctor:     Condition: Stable    Follow-up Appointments: Follow-up Appointments   Procedures    FOLLOW UP VISIT Appointment in: Other ([address] [place] [address]) 1. Follow up with PCP in 5-7 days 2. Follow up with Neurology in 2-3 weeks.  3. [facility] or Joy CARO in 1 week. 4. Follow up with Kolton Brown. ..     1. Follow up with PCP in 5-7 days  2. Follow up with Neurology in 2-3 weeks. 3. Follow up with [facility] or [facility]O in 1 week. 4. Follow up with Kolton Guerrero in 7-10 days. Standing Status:   Standing     Number of Occurrences:   1     Order Specific Question:   Appointment in     Answer:    Other (Specify)         >30 minutes spent coordinating this discharge (review instructions/follow-up, prescriptions, preparing report for sign off)    Signed:  Diana Thomas NP  5/19/2019  6:36 PM

## 2019-05-19 NOTE — PROGRESS NOTES
The patient was seen and examined independently. Please read my note for additional detail. The plan was discussed with APC. Patient states he feels seizures are coming on left side and lasting for a minute or so. No incontinence. Remains awake during episodes. No headaches. Chronic abdominal pain with discharge - follows with [facility] but have not seen them for a while. He also states he has [facility] PCP but does not know name. Mother is at bedside. Patient denies for chest pain or SOB or nausea and vomiting. Takes lantus and insulin 70/30 at home. /75   Pulse 91   Temp 98.2 °F (36.8 °C)   Resp 18   SpO2 99% General appearance - alert, well appearing, and in no distress Eyes - sclera anicteric, no pallor Nose - no nasal discharge. Neck - supple, no JVD, trachea is midline Chest -  Good air entry noted in bases, no wheezes Heart - S1 and S2 normal 
Abdomen - soft, RUQ dressing in situ, nondistended, Bowel sounds present Neurological - alert, oriented, normal speech, no focal findings noted while in bed. Extremities - no pedal edema noted ASSESSMENT: 
 
1. Left side subjective weakness and multiple intermittent partial non epileptical seizure 2. IDDM with hyperglycemia 3. CKD-III 4. Chronic stable abdominal wall fistula 5. H/o drug seeking behavior 6. Medical non compliance PLAN: 
 
Cont current management Recheck BMP now Stat MRI - if nurse can not obtain screening form from patient, will order KUB/CXR/and xray orbit for MRI screening Neurology input noted about outpatient EEG 
CAN BE DISCHARGED IN later today or tomorrow based on MRI report with outpatient specialist follow ups.

## 2019-05-19 NOTE — PROGRESS NOTES
Assumed pt;s care, discharge order received. Informed pt about d/c order, he stated he does not have a means home and he still feel sick. Will notify charge nurse and nursing supervisor. Nursing supervisor informed,  paged for transportation. RN will notify pt of ingoing d/c process. [id number]\" Verified pt;s destination as he stated his address has change, the following address was provided by pt\" [place] [address]

## 2019-05-19 NOTE — CONSULTS
[facility]   [email or web address]      Impression: 1. Possible Sz event. Being seen by Neuro. 2. 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 Origins are probably related to acute/Chronic pancreatitis events with PD disruption and Pseudocyst formation. 3. DM  And malabsorbtion secondary to relative pancreatic insufficiency. 4. Alcohol abuse  5, Chronic pain issues. 6. Smoker      Plan:     1. Wound nurse eval to consider an ostomy device over fistula to contain drainage. 2. Gen surg eval to consider need for any debridement and to arrange for appropriate Specialized Surgery referral at a [address] [place] [address]. 3. No immediate indication for repeat endoscopic eval.  4. Would suggest f/u with [facility] as they have the most recent detailed interface with him and he lives in [place]. 5. His chronic problem , most likely , can be addressed as an outpt after he has the wound care assessed. 6. I would recommend, however, getting appt's lined up before he goes out because he has demonstrated previous  Noncompliance and unreliability issues. Chief Complaint: Pancreaticoduodenalcutaneous fistula. HPI:  Nestor Calderon is a 48 y.o. male who is being seen on consult for Chronic pancreaticoduodenal cutaneous fistula. Pt is a poor historian . He was admitted after experiencing sz activity and acute left sided weakness. He was put into the acute stroke protocol and underwent head CT. When he got back to the ER , his neuro logic sx's had ressolved except for possibly postictal confusion. He had a blood sugar > 500. He has a hx of Chronic pancraetitis and it dates back at least 9 yrs upon chart review. His most recent CT from a week ago showed changes of chronic panc, dilated PD with a PD stent and some amorphous fluid collection/inflamation along right flank.  I have found EGD's from last couple of yrs, last in summer 2018. He had gastritis, duodenitis and esophagitis. He had ERCP with stent placement in PD in 2015. Stent remains in place. He has chronic pancreatic insufficiency with DM and diarrhea. He takes Creon with meals,   He has had NATALIE drains in his right flank wound and ostomy devices. Last drain was pulled about 3 months ago. He states the volume of drainage varies. Of note, he has been d/c'd from [facility] , GI practice , for noncompliance. He has been seen by Linda Oliver and Jennifer Montemayor in the last yr. Surgery consultant at [place] [address], last yr suggested referral to a tertiary center for eval by a biliary/pancreatic surgeon . Cooper Perez     PMH:   Past Medical History:   Diagnosis Date    Abdominal pain, generalized     Chronic pancreatitis ([address]     Diabetes ([address]     hypothyroid    Encounter for long-term (current) use of other medications     Essential hypertension     ETOH abuse     GERD (gastroesophageal reflux disease)     Heart failure (HCC)     Hyponatremia     Pain in the abdomen 11/2/2010    Pancreatitis     w/ abscess and pseudocyst    Pancreatitis     Psychiatric disorder     depression, anxiety    Tobacco abuse        PSH:   Past Surgical History:   Procedure Laterality Date    HX ABDOMINAL LAPAROSCOPY      PANCREATIC STENT    HX CHOLECYSTECTOMY         Social HX:   Social History     Socioeconomic History    Marital status: SINGLE     Spouse name: Not on file    Number of children: Not on file    Years of education: Not on file    Highest education level: Not on file   Occupational History    Not on file   Social Needs    Financial resource strain: Not on file    Food insecurity:     Worry: Not on file     Inability: Not on file    Transportation needs:     Medical: Not on file     Non-medical: Not on file   Tobacco Use    Smoking status: Current Every Day Smoker     Packs/day: 0.50     Years: 0.00     Pack years: 0.00     Types: Cigarettes    Smokeless tobacco: Never Used Substance and Sexual Activity    Alcohol use: Yes    Drug use: No    Sexual activity: Yes     Birth control/protection: None   Lifestyle    Physical activity:     Days per week: Not on file     Minutes per session: Not on file    Stress: Not on file   Relationships    Social connections:     Talks on phone: Not on file     Gets together: Not on file     Attends Moravian service: Not on file     Active member of club or organization: Not on file     Attends meetings of clubs or organizations: Not on file     Relationship status: Not on file    Intimate partner violence:     Fear of current or ex partner: Not on file     Emotionally abused: Not on file     Physically abused: Not on file     Forced sexual activity: Not on file   Other Topics Concern    Not on file   Social History Narrative    Not on file       FHX:   Family History   Problem Relation Age of Onset    Heart Disease Father        Allergy:   Allergies   Allergen Reactions    Ampicillin-Sulbactam Rash    Pcn [Penicillins] Itching and Hives    Piperacillin-Tazobactam Rash    Pollen Extracts Rash    Seafood [Shellfish Containing Products] Hives     Other reaction(s): unknown  Has tolerated iohexol (iodine-containing contrast)  Only shrimp  Shrimp       Home Medications:     Medications Prior to Admission   Medication Sig    Blood-Glucose Meter (FREESTYLE LITE METER) monitoring kit Test blood sugars 4-6 times a day    insulin glargine (LANTUS U-100 INSULIN) 100 unit/mL injection by SubCUTAneous route nightly.  insulin NPH/insulin regular (NOVOLIN 70/30 U-100 INSULIN) 100 unit/mL (70-30) injection by SubCUTAneous route.  promethazine (PHENERGAN) 25 mg tablet Take 1 Tab by mouth every six (6) hours as needed.  lipase-protease-amylase (ZENPEP 10,000) capsule Take 3 Caps by mouth three (3) times daily (with meals).  glucose blood VI test strips (FREESTYLE TEST) strip 1 Each by Does Not Apply route See Admin Instructions.  Test blood sugars 4-6 times a day. Review of Systems:     Constitutional: Wt loss and fatigue. Skin: No rashes, pruritis, jaundice, ulcerations, erythema. HENT: No headaches, nosebleeds, sinus pressure, rhinorrhea, sore throat. Eyes: No visual changes, blurred vision, eye pain, photophobia, jaundice. Cardiovascular: No chest pain, heart palpitations. Respiratory: No cough, SOB, wheezing, chest discomfort, orthopnea. Gastrointestinal: Abd pain diffusely, especially along his fistula area on right flank. Genitourinary: No dysuria, bleeding, discharge, pyuria. Musculoskeletal: Generalized weakness   Endo: No sweats. Heme: No bruising, easy bleeding. Allergies: As noted. Neurological: Cranial nerves intact. Alert and oriented. Gait not assessed. Psychiatric:  depressed                 Visit Vitals  /75   Pulse 91   Temp 98.2 °F (36.8 °C)   Resp 18   SpO2 99%       Physical Assessment:     constitutional: appearance: thin habitus in mild distress  Skin:some skin breakdown near his chronic fistula  eyes: inspection: normal conjunctivae and lids; no jaundice pupils:   ENMT: mouth: normal oral mucosa,lips and gums;oropharynx: normal tongue, hard and soft palate; posterior pharynx without erithema, exudate or lesions. respiratory: effort: normal chest excursion; no intercostal retraction or accessory muscle use. cardiovascular: abdominal aorta: normal size and position; no bruits. palpation: PMI of normal size and position; normal rhythm; no thrill or murmurs. abdominal: abdomen: normal consistency; on his right flank, he has a large gaping , chronic fistula , foul smelling with a loose bandage. No rebound or rigidity  rectal: hemoccult/guaiac: not performed. musculoskeletal: digits and nails: no clubbing, cyanosis, psychiatric:  orientation: oriented to time, space and person.         Basic Metabolic Profile   Recent Labs     05/18/19  2336 05/18/19  1905   NA  --  129*   K  --  5.0   CL  -- 101   CO2  --  20*   BUN  --  14   GLU  --  524*   CA 8.1* 9.5   MG  --  2.5         CBC w/Diff    Recent Labs     05/18/19 1905   WBC 7.3   RBC 5.07   HGB 13.8   HCT 42.4   MCV 83.6   MCH 27.2   MCHC 32.5   RDW 15.0*       Recent Labs     05/18/19 1905   GRANS 69   LYMPH 23   EOS 3        Hepatic Function   Recent Labs     05/18/19 1905   ALB 4.2   TP 9.3*   TBILI 0.3   SGOT 21   *   LPSE 74          Mary Dixon MD, M.D. [facility], [address] [place] [address]  Pager [phone]  [email or web address]

## 2019-05-19 NOTE — PROGRESS NOTES
conducted an initial consultation and Spiritual Assessment for Frank Quinonez, who is a 48 y. o.,male. Patients Primary Language is: [place]. According to the patients EMR Christian Affiliation is: Lutheran. The reason the Patient came to the hospital is:  
Patient Active Problem List  
 Diagnosis Date Noted  Seizure ([address] 05/19/2019  Partial seizure ([address] 05/18/2019  Ileus ([address] 09/17/2018  Enterocutaneous fistula 07/28/2018  Abscess 07/28/2018  Pancreatic fistula 06/28/2018  Central cord syndrome ([address] 06/02/2018  UTI (urinary tract infection) 05/24/2018  Hyperglycemia 05/22/2018  Chronic renal insufficiency 05/22/2018  CHRISTI (acute kidney injury) ([address] 05/22/2018  Enteritis 04/24/2018  Acute alcoholic hepatitis 02/13/6329  Chronic pancreatitis due to acute alcohol intoxication ([address] 04/15/2018  Lactic acidosis 08/05/2017  Colitis, acute 07/17/2017  HCAP (healthcare-associated pneumonia) 05/31/2016  Sepsis ([address] 04/15/2016  Biliary drain displacement 03/23/2016  Hypokalemia 03/23/2016  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 
Initiated a relationship of care and support. Provided information about Spiritual Care Services. Chart reviewed. The following outcomes where achieved: 
 confirmed Patient's Christian Affiliation. Patient expressed gratitude for 's visit.  
 
Assessment: 
Patient does not have any Catholic/cultural needs that will affect patients preferences in health care. There are no spiritual or Nondenominational issues which require intervention at this time. Plan: 
Chaplains will continue to follow and will provide pastoral care on an as needed/requested basis.  recommends bedside caregivers page  on duty if patient shows signs of acute spiritual or emotional distress. Abhijit Amanda [facility] 
([phone])

## 2019-05-19 NOTE — PROGRESS NOTES
Problem: Risk for Spread of Infection Goal: Prevent transmission of infectious organism to others Description Prevent the transmission of infectious organisms to other patients, staff members, and visitors. 5/19/2019 1055 by Rianna Watson RN Outcome: Progressing Towards Goal 
5/19/2019 1046 by Rianna Watson RN Outcome: Progressing Towards Goal 
  
Problem: Patient Education:  Go to Education Activity Goal: Patient/Family Education 5/19/2019 1055 by Rianna Watson RN Outcome: Progressing Towards Goal 
5/19/2019 1046 by Rianna Watson RN Outcome: Progressing Towards Goal 
  
Problem: Falls - Risk of 
Goal: *Absence of Falls Description Document Edgar Brunner Fall Risk and appropriate interventions in the flowsheet. 5/19/2019 1055 by Rianna Watson RN Outcome: Progressing Towards Goal 
5/19/2019 1046 by Rianna Watson RN Outcome: Progressing Towards Goal 
  
Problem: Pressure Injury - Risk of 
Goal: *Prevention of pressure injury Description Document Austin Scale and appropriate interventions in the flowsheet. 5/19/2019 1055 by Rianna Watson RN Outcome: Progressing Towards Goal 
5/19/2019 1046 by Rianna Watson RN Outcome: Progressing Towards Goal 
  
Problem: Diabetes Self-Management Goal: *Sick day guidelines 5/19/2019 1055 by Rianna Watson RN Outcome: Progressing Towards Goal 
5/19/2019 1046 by Rianna Watson RN Outcome: Progressing Towards Goal 
  
Problem: Diabetes Self-Management Goal: *Patient Specific Goal (EDIT GOAL, INSERT TEXT) 5/19/2019 1055 by Rianna Watson RN Outcome: Progressing Towards Goal 
5/19/2019 1046 by Rianna Watson RN Outcome: Progressing Towards Goal

## 2019-05-19 NOTE — ED PROVIDER NOTES
EMERGENCY DEPARTMENT HISTORY AND PHYSICAL EXAM 
 
9:49 PM 
Date: 5/18/2019 Patient Name: Mireille Shannon 
 
History of Presenting Illness Chief Complaint Patient presents with  Extremity Weakness History Provided By: Patient HPI: Mireille Shannon is a 48 y.o. male with diabetes, chronic pancreatitis, duodenal fistula who presents for evaluation of altered mental status and seizure. Patient is chronically on his had multiple admissions in the [facility] system and and orders. He initially called EMS due to fluid drainage from his right lower quadrant. 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 In route to the hospital he had partial seizure during which he expressed numbness and weakness over the entire left side of his body. Due to that a code stroke was called while in route to the ER. Initially when evaluated was slightly postictal, with weakness in his left side. Code stroke was called due to his left upper extremity and left lower extremity drift. He otherwise had sensation, cranial nerves, coordination, intact. Patient was immediately taken to CT, and evaluated by telemetry neurology. By the time he returned to the ED, he was back to his baseline. His weakness had resolved, and was likely secondary to his postictal state. He was found to be hyperglycemic with a blood sugar 530, and telemetry neurology thought this was likely secondary to a metabolic cause more so than a primary neurologic. TPA was not recommended and the remainder of his work-up was completed. Other than the ascitic fluid drainage and the temporary weakness the patient no acute complaints. He had no fever, chills, night sweats, cough, congestion, other mentioned abdominal pain, chest pain, shortness of breath, or lower extremity edema. PCP: Karen Leavitt MD 
 
Past History Past Medical History: 
Past Medical History:  
Diagnosis Date  Abdominal pain, generalized  Chronic pancreatitis ([facility] [address]  Diabetes ([facility] [address]   
 hypothyroid  Encounter for long-term (current) use of other medications  Essential hypertension  ETOH abuse  GERD (gastroesophageal reflux disease)  Heart failure ([facility] [address]  Hyponatremia  Pain in the abdomen 11/2/2010  Pancreatitis w/ abscess and pseudocyst  
 Pancreatitis  Psychiatric disorder   
 depression, anxiety  Tobacco abuse Past Surgical History: 
Past Surgical History:  
Procedure Laterality Date  HX ABDOMINAL LAPAROSCOPY PANCREATIC STENT  
 HX CHOLECYSTECTOMY Family History: 
Family History Problem Relation Age of Onset  Heart Disease Father Social History: 
Social History Tobacco Use  Smoking status: Current Every Day Smoker Packs/day: 0.50 Years: 0.00 Pack years: 0.00 Types: Cigarettes  Smokeless tobacco: Never Used Substance Use Topics  Alcohol use: Yes  Drug use: No  
 
 
Allergies: Allergies Allergen Reactions  Ampicillin-Sulbactam Rash  Pcn [Penicillins] Itching and Hives  Piperacillin-Tazobactam Rash  Pollen Extracts Rash  Seafood [Shellfish Containing Products] Hives Other reaction(s): unknown Has tolerated iohexol (iodine-containing contrast) Only shrimp Shrimp Review of Systems Constitutional: No fevers. Eyes: No visual symptoms. ENT: No sore throat, runny nose, or ear pain. Respiratory: No cough, dyspnea, or wheezing. Cardiovascular: No chest pain, palpitations, or heaviness. Gastrointestinal: No vomiting, diarrhea. Patient has nausea. Genitourinary: No dysuria, frequency, or urgency. Musculoskeletal: No joint pain or swelling. Integumentary: No rashes. Neurological: No headaches, sensory or motor symptoms. All other systems negative. Physical Exam  
 
Patient Vitals for the past 12 hrs: 
 Temp Pulse Resp BP SpO2 05/19/19 0015 97.2 °F (36.2 °C) 83 20 104/78 100 % 05/19/19 0005     98 % 05/19/19 0000    110/75   
05/18/19 2345    111/72 100 % 05/18/19 2200 97 °F (36.1 °C) 73 20  100 % 05/18/19 2115  89  126/77 100 % 05/18/19 2100  81 15 106/75 100 % 05/18/19 2015  83 20 114/71 97 % Physical Exam  
Constitutional: Chronically ill and cachectic male presenting in a postictal state. Eyes: Conjunctiva clear, EOMI Head: Normocephalic and atraumatic. Neck: Normal range of motion. No stridor or tracheal deviation. Cardiovascular: Intact distal pulses. Pulmonary/Chest: Effort normal and no respiratory distress. Abdominal: Right lower quadrant fistula with active drainage of ascitic looking stomach contents. Nondistended abdomen with no tenderness to palpation along any quadrant. Musculoskeletal: Normal range of motion. Exhibits no tenderness. Neurological: Initially the patient was postictal following a left-sided partial seizure. Left facial twitching with left arm spasm, no other findings in the right lower leg or right upper extremity. No generalized tonic-clonic motion. Following the patient seizure he was alert, oriented, with a normal neurologic exam.  Cranial nerves are intact. He had adequate strength, sensation, rapid alternating movement, and coordination. He was conversant, alert. Skin: Skin is warm and dry. Psychiatric: Has a normal mood and affect. Behavior is normal.  
Nursing note and vitals reviewed. Diagnostic Study Results Labs - Recent Results (from the past 12 hour(s)) CBC WITH AUTOMATED DIFF Collection Time: 05/18/19  7:05 PM  
Result Value Ref Range WBC 7.3 4.6 - 13.2 K/uL  
 RBC 5.07 4.70 - 5.50 M/uL  
 HGB 13.8 13.0 - 16.0 g/dL HCT 42.4 36.0 - 48.0 % MCV 83.6 74.0 - 97.0 FL  
 MCH 27.2 24.0 - 34.0 PG  
 MCHC 32.5 31.0 - 37.0 g/dL  
 RDW 15.0 (H) 11.6 - 14.5 % PLATELET 645 217 - 496 K/uL  MPV 10.2 9.2 - 11.8 FL  
 NEUTROPHILS 69 40 - 73 % LYMPHOCYTES 23 21 - 52 % MONOCYTES 4 3 - 10 % EOSINOPHILS 3 0 - 5 % BASOPHILS 1 0 - 2 %  
 ABS. NEUTROPHILS 5.1 1.8 - 8.0 K/UL  
 ABS. LYMPHOCYTES 1.7 0.9 - 3.6 K/UL  
 ABS. MONOCYTES 0.3 0.05 - 1.2 K/UL  
 ABS. EOSINOPHILS 0.2 0.0 - 0.4 K/UL  
 ABS. BASOPHILS 0.1 0.0 - 0.1 K/UL  
 DF AUTOMATED PROTHROMBIN TIME + INR Collection Time: 05/18/19  7:05 PM  
Result Value Ref Range Prothrombin time 12.3 11.5 - 15.2 sec INR 0.9 0.8 - 1.2 METABOLIC PANEL, BASIC Collection Time: 05/18/19  7:05 PM  
Result Value Ref Range Sodium 129 (L) 136 - 145 mmol/L Potassium 5.0 3.5 - 5.5 mmol/L Chloride 101 100 - 108 mmol/L  
 CO2 20 (L) 21 - 32 mmol/L Anion gap 8 3.0 - 18 mmol/L Glucose 524 (HH) 74 - 99 mg/dL BUN 14 7.0 - 18 MG/DL Creatinine 1.50 (H) 0.6 - 1.3 MG/DL  
 BUN/Creatinine ratio 9 (L) 12 - 20 GFR est AA 59 (L) >60 ml/min/1.73m2 GFR est non-AA 49 (L) >60 ml/min/1.73m2 Calcium 9.5 8.5 - 10.1 MG/DL  
HEPATIC FUNCTION PANEL Collection Time: 05/18/19  7:05 PM  
Result Value Ref Range Protein, total 9.3 (H) 6.4 - 8.2 g/dL Albumin 4.2 3.4 - 5.0 g/dL Globulin 5.1 (H) 2.0 - 4.0 g/dL A-G Ratio 0.8 0.8 - 1.7 Bilirubin, total 0.3 0.2 - 1.0 MG/DL Bilirubin, direct 0.1 0.0 - 0.2 MG/DL Alk. phosphatase 185 (H) 45 - 117 U/L  
 AST (SGOT) 21 15 - 37 U/L  
 ALT (SGPT) 29 16 - 61 U/L  
NT-PRO BNP Collection Time: 05/18/19  7:05 PM  
Result Value Ref Range NT pro- 0 - 900 PG/ML  
LIPASE Collection Time: 05/18/19  7:05 PM  
Result Value Ref Range Lipase 74 73 - 393 U/L MAGNESIUM Collection Time: 05/18/19  7:05 PM  
Result Value Ref Range Magnesium 2.5 1.6 - 2.6 mg/dL CARDIAC PANEL,(CK, CKMB & TROPONIN) Collection Time: 05/18/19  7:05 PM  
Result Value Ref Range CK 45 39 - 308 U/L  
 CK - MB <1.0 <3.6 ng/ml  CK-MB Index  0.0 - 4.0 %  
 CALCULATION NOT PERFORMED WHEN RESULT IS BELOW LINEAR LIMIT Troponin-I, QT <0.02 0.0 - 0.045 NG/ML  
POC LACTIC ACID Collection Time: 05/18/19  7:52 PM  
Result Value Ref Range Lactic Acid (POC) 0.47 0.40 - 2.00 mmol/L  
EKG, 12 LEAD, INITIAL Collection Time: 05/18/19  7:59 PM  
Result Value Ref Range Ventricular Rate 75 BPM  
 Atrial Rate 75 BPM  
 P-R Interval 150 ms QRS Duration 96 ms  
 Q-T Interval 394 ms QTC Calculation (Bezet) 439 ms Calculated P Axis 73 degrees Calculated R Axis -28 degrees Calculated T Axis 63 degrees Diagnosis Normal sinus rhythm Normal ECG When compared with ECG of 06-MAY-2019 16:33, Nonspecific T wave abnormality no longer evident in Inferior leads T wave amplitude has increased in Anterior leads Confirmed by Barbara Valdez ([id number]) on 5/18/2019 9:35:43 PM 
  
URINALYSIS W/ RFLX MICROSCOPIC Collection Time: 05/18/19 10:22 PM  
Result Value Ref Range Color YELLOW Appearance CLOUDY Specific gravity >1.030 (H) 1.005 - 1.030  
 pH (UA) 5.5 5.0 - 8.0 Protein TRACE (A) NEG mg/dL Glucose >1,000 (A) NEG mg/dL Ketone TRACE (A) NEG mg/dL Bilirubin NEGATIVE  NEG Blood NEGATIVE  NEG Urobilinogen 0.2 0.2 - 1.0 EU/dL Nitrites NEGATIVE  NEG Leukocyte Esterase SMALL (A) NEG URINE MICROSCOPIC ONLY Collection Time: 05/18/19 10:22 PM  
Result Value Ref Range WBC 10 to 15 0 - 4 /hpf  
 RBC 0 to 1 0 - 5 /hpf Epithelial cells FEW 0 - 5 /lpf Bacteria FEW (A) NEG /hpf Yeast 1+ (A) NEG  
POC VENOUS BLOOD GAS Collection Time: 05/18/19 11:25 PM  
Result Value Ref Range Device: NASAL CANNULA Flow rate (POC) 4 L/M  
 pH, venous (POC) 7.257 (L) 7.32 - 7.42    
 pCO2, venous (POC) 42.5 41 - 51 MMHG  
 pO2, venous (POC) 20 (L) 25 - 40 mmHg HCO3, venous (POC) 19.2 (L) 23.0 - 28.0 MMOL/L  
 sO2, venous (POC) 27 (L) 65 - 88 % Base deficit, venous (POC) 8 mmol/L  Allens test (POC) N/A    
 Total resp. rate 20 Site RIGHT BRACHIAL Patient temp. 97.0 Specimen type (POC) VENOUS BLOOD Performed by Thiago Bennett CALCIUM, IONIZED Collection Time: 05/18/19 11:36 PM  
Result Value Ref Range Ionized Calcium 1.18 1.12 - 1.32 MMOL/L  
CALCIUM Collection Time: 05/18/19 11:36 PM  
Result Value Ref Range Calcium 8.1 (L) 8.5 - 10.1 MG/DL Radiologic Studies - Ct Head Wo Cont Result Date: 5/18/2019 IMPRESSION: 1. Interval bilateral frontal craniotomies. -5 mm dural thickening versus trace subdural hematoma underlying the right frontal craniotomy site. 2. Otherwise, no additional evidence for acute intracranial process. -Please note that CT is relatively insensitive for acute ischemia and first 24-48 hours, and MRI may be helpful if clinically indicated. Message regarding 1-2 of the above impression was communicated to Dr. Gary Tate at 1933 hours 5/18/2019 Xr Chest Demetria Middleton Result Date: 5/18/2019 IMPRESSION:  1. No acute findings Medical Decision Making ED Course: Progress Notes, Reevaluation, and Consults: 
 
49-year-old chronically on male who is been admitted multiple times in 2019 re-presenting to the ED initially for drainage of her right lower quadrant fistula. While in route the patient had a partial seizure with prolonged weakness following the event. 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 Telemetry neurology has seen the patient, and stated this is likely metabolic and no TPA should be given Due to the patient's presentation a large cardiopulmonary/GI/infectious work-up was completed. The CT and a CTA head and neck was completed, which showed no new area of ischemia, but a small subdural hematoma. The patient has frontal craniotomies from a previous fall and subdurals. This was evaluated with a CT in mid March 2019 and MR on March 10 of 2019.   Per the reads of care everywhere the subdural seem chronic, no unchanged in nature. Both of the previously mentioned studies mention chronic subdural hematomas with residual fluid measuring between 2 to 3 mm. The patient work-up is as above. He was hyperglycemic with a blood sugar of 530, but no evidence of DKA. His pH was 7.24, potassium 5.0. The patient was not acidotic. Patient had no other infectious findings. His lactate was normal, chest x-ray was without infectious findings, his urinalysis had small leukoesterase but no other infectious findings. The patient is abdomen was soft nontender nondistended. His fistula is chronic, and unchanged from prior. The patient's cardiac work-up is unremarkable. His troponin is negative. His CK-MB is unchanged. His EKG has no new ischemic findings. Overall we have a 29-year-old chronically ill male presenting with spontaneously resolving weakness following a complex partial seizure. The patient and his mother state that the seizures are new, and only been occurring for the last week. He has had 2-3 of these complex partial seizures while in the.  Telemetry neurology was recalled due to the onset of the seizures, and stated that it is likely secondary to his known subdural.  He has no new mass or areas of ischemia that they can see. They recommended Ativan for complex partial seizures that lasted over 1 minute, or evidence of status. They stated do not load the patient with Keppra or phenytoin at this time, but admit him for an MRI/EEG. Patient was discussed with the on-call hospitalist, and he will be admitted to the stepdown unit for further evaluation. He was given fluids to help correct his hyperglycemia, and 10 units of IV insulin. We will closely follow his blood sugar, pH, anion gap to determine if he is heading towards DKA. The patient was reevaluated multiple times during the stay.   His pain is improving after treatment. He has had no evidence of status. We will admit the patient to the stepdown unit for further evaluation. Records Reviewed: Nursing Notes, Old Medical Records, Ambulance Run Sheet, Previous Radiology Studies and Previous Laboratory Studies (Time of Review: 9:49 PM) 
-I am the first provider for this patient. 
-I reviewed the vital signs, available nursing notes, past medical history, past surgical history, family history and social history. Current Facility-Administered Medications Medication Dose Route Frequency Provider Last Rate Last Dose  lactated ringers bolus infusion 1,000 mL  1,000 mL IntraVENous ONCE Georges Navarro MD 2,000 mL/hr at 05/19/19 0023 1,000 mL at 05/19/19 0023 Current Outpatient Medications Medication Sig Dispense Refill  Blood-Glucose Meter (FREESTYLE LITE METER) monitoring kit Test blood sugars 4-6 times a day 1 Kit 0  
 glucose blood VI test strips (FREESTYLE TEST) strip 1 Each by Does Not Apply route See Admin Instructions. Test blood sugars 4-6 times a day. 100 Strip 3  
 insulin glargine (LANTUS U-100 INSULIN) 100 unit/mL injection by SubCUTAneous route nightly.  insulin NPH/insulin regular (NOVOLIN 70/30 U-100 INSULIN) 100 unit/mL (70-30) injection by SubCUTAneous route.  promethazine (PHENERGAN) 25 mg tablet Take 1 Tab by mouth every six (6) hours as needed. 10 Tab 0  
 lipase-protease-amylase (ZENPEP 10,000) capsule Take 3 Caps by mouth three (3) times daily (with meals). 270 Cap 0 Clinical Impression Clinical Impression: 1. Partial seizure ([address] 2. Enterocutaneous fistula 3. Arnol's paralysis ([address] Disposition: admitted to the step down

## 2019-05-19 NOTE — PROGRESS NOTES
Problem: Dysphagia (Adult) Goal: *Acute Goals and Plan of Care (Insert Text) Description Patient will: 1. Tolerate PO trials with 0 s/s overt distress in 4/5 trials 2. Utilize compensatory swallow strategies/maneuvers (decrease bite/sip, size/rate, alt. liq/sol) with min cues in 4/5 trials Rec:    
Reg solid with thin liquids Aspiration precautions HOB >45 during po intake, remain >30 for 30-45 minutes after po Small bites/sips; alternate liquid/solid with slow feeding rate Oral care TID Meds per pt preference Outcome: Progressing Towards Goal 
 
SPEECH LANGUAGE PATHOLOGY BEDSIDE SWALLOW EVALUATION/TREATMENT Patient: Mireille Shannon (74 y.o. male) Date: 5/19/2019 Primary Diagnosis: Partial seizure ([address] [R56.9] Seizure ([facility] [address] [R56.9] Precautions: aspiration PLOF: As per H&P 
 
ASSESSMENT : 
Based on the objective data described below, the patient presents with oropharyngeal swallow fxn essentially WFL. Strength, ROM, and coordination of the orofacial musculature were all found to be [facility]. Pt edentulous and reported that he had all of his teeth extracted earlier this year and is waiting on dentures. He also reported that he has been consuming reg solids without difficulty minus dentition. The pt presented with adequate oral transit times on all consistencies. Mastication time was appropriate. No s/sx of aspiration noted; hyolaryngeal elevation and excursion appeared adequate on all consistencies. No oral stasis noted post swallow. The pt denied globus sensation post swallow. Speech production was fluent. No dysarthria was observed. Expressive/receptive speech production appeared WFL to informal observation. Pt communicated in sentences of appropriate form, content, and use. Rec reg solid diet with thin liquids, aspiration precautions, oral care TID and meds as tolerated. ST will continue to follow x 1-2 visits to ensure safety of diet tolerance. Patient will benefit from skilled intervention to address the above impairments. Patient's rehabilitation potential is considered to be Good Factors which may influence rehabilitation potential include: ? None noted PLAN : 
Recommendations and Planned Interventions: See above Frequency/Duration: Patient will be followed by speech-language pathology x 1-2 more visits to address goals. Discharge Recommendations: Outpatient and To Be Determined SUBJECTIVE:  
Patient stated ? I'm just really thankful for everything that y'all have been doing for me while I've been here? . 
 
OBJECTIVE:  
 
Past Medical History:  
Diagnosis Date Abdominal pain, generalized Chronic pancreatitis ([address] Diabetes ([facility] [address]   
 hypothyroid Encounter for long-term (current) use of other medications Essential hypertension ETOH abuse GERD (gastroesophageal reflux disease) Heart failure ([facility] [address] Hyponatremia Pain in the abdomen 11/2/2010 Pancreatitis w/ abscess and pseudocyst  
 Pancreatitis Psychiatric disorder   
 depression, anxiety Tobacco abuse Past Surgical History:  
Procedure Laterality Date HX ABDOMINAL LAPAROSCOPY PANCREATIC STENT  
 HX CHOLECYSTECTOMY Prior Level of Function/Home Situation: see below Home Situation Home Environment: Apartment One/Two Story Residence: One story Living Alone: No 
Support Systems: Parent, Family member(s) Patient Expects to be Discharged to[de-identified] QSOBOIVPA Current DME Used/Available at Home: [employer] Diet prior to admission: reg solid with thin Current Diet:  reg solid with thin   
Cognitive and Communication Status: 
Neurologic State: Alert Orientation Level: Oriented X4 Cognition: Follows commands Oral Assessment: 
Oral Assessment Labial: No impairment Dentition: Edentulous; Limited Oral Hygiene: adequate Lingual: No impairment Velum: No impairment Mandible: No impairment P.O. Trials: Patient Position: 60 at [facility] Vocal quality prior to P.O.: No impairment Consistency Presented: Solid;Puree; Thin liquid How Presented: Self-fed/presented;Cup/sip;Spoon;Straw Bolus Acceptance: No impairment Bolus Formation/Control: No impairment Propulsion: No impairment Oral Residue: None Initiation of Swallow: No impairment Laryngeal Elevation: Functional 
Aspiration Signs/Symptoms: None Pharyngeal Phase Characteristics: No impairment, issues, or problems Effective Modifications: Small sips and bites; Alternate liquids/solids Cues for Modifications: Minimal 
  
Oral Phase Severity: No impairment Pharyngeal Phase Severity : No impairment PAIN: 
Start of Eval: 0 End of Eval: 0 After treatment:  
?            Patient left in no apparent distress sitting up in chair ? Patient left in no apparent distress in bed ? Call bell left within reach ? Nursing notified ? Family present ? Caregiver present ? Bed alarm activated COMMUNICATION/EDUCATION:  
?            Aspiration precautions; swallow safety; compensatory techniques. ?            Patient/family have participated as able in goal setting and plan of care. ? Posted safety precautions in patient's room. Thank you for this referral. 
 
Ravinder Reyes M.S. CCC-SLP/L Speech-Language Pathologist

## 2019-05-19 NOTE — PROGRESS NOTES
Bedside and Verbal shift change report received from  Melany Bowles, [place] (off going nurse). Report included the following information SBAR, Kardex, MAR and Recent Results. Assumed care patient in bed awake ,mother @ bedside. Fall prevention program maintained,call bell and bedside table within reach. No problems identified at this time,no complaints made by patient ,will continue care. 0830- Ref. Range 5/19/2019 07:12 GLUCOSE,FAST - POC Latest Ref Range: 70 - 110 mg/dL 507 (HH) 10 units of lispro given per protocol. And paged  for the bld glucose result. Waiting for call back. 134 Vandana Horne NP regarding the bld glucose results ,before and recheck after giving 10 units of Lispro. With order placed by NP for additional coverage. Ref. Range 5/19/2019 07:12 5/19/2019 09:18 GLUCOSE,FAST - POC Latest Ref Range: 70 - 110 mg/dL 507 (HH) 504 (HH)  
 
1000- additional 8 units of lispro given subQ. Will recheck in 2 hrs per protocol. 1500- Patient left the floor for MRI. Telemetry box removed. Patient stable upon leaving the floor. 1655- Patient back to floor. Telemetry box hooked back by CNA. VS taken and recorded.

## 2019-05-19 NOTE — H&P
History & Physical 
Patient: Merissa Levi MRN: [id number]  CSN: [id number] YOB: 1965  Age: 48 y.o. Sex: male DOA: 5/18/2019 Chief Complaint:  
Seizure and Left sided weakness. HPI:  
 
48 y.o. male with past medical history of diabetes, chronic pancreatitis, duodenal fistula who presents for evaluation of altered mental status and seizure. Patient is chronically ill and has  multiple admissions in different hospitals. He initially called EMS due to fluid drainage from his right lower quadrant. 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 In route to the hospital he had partial seizure during which he expressed numbness and weakness over the entire left side of his body. Due to that a code stroke was called while in route to the ER. 
  
Initially when evaluated was slightly postictal, with weakness in his left side. Code stroke was called due to his left upper extremity and left lower extremity drift. He otherwise had sensation, cranial nerves, coordination, intact. Patient was immediately taken to CT, and evaluated by telemetry neurology. By the time he returned to the ED, he was back to his baseline. His weakness had resolved, and was likely secondary to his postictal state. He was found to be hyperglycemic with a blood sugar 530, and telemetry neurology thought this was likely secondary to a metabolic cause more so than a primary neurologic. TPA was not recommended and and Tele Neurologist recommended MRI and EEG. Remainder of his work-up was completed. Other than the ascitic fluid drainage and the temporary weakness the patient no acute complaints. He had no fever, chills, night sweats, cough, congestion, other mentioned abdominal pain, chest pain, shortness of breath, or lower extremity edema No fever. No chills. His mother is in the room.  As per patient he had NATALIE drain in the RUQ which was removed 3 months ago. He says that always has little drainage but has gotten worse in the past 3 weeks. Fluid color is yellowish. No abdominal pain. No nausea and vomiting. Past Medical History:  
Diagnosis Date  Abdominal pain, generalized  Chronic pancreatitis ([address]  Diabetes ([facility]   
 hypothyroid  Encounter for long-term (current) use of other medications  Essential hypertension  ETOH abuse  GERD (gastroesophageal reflux disease)  Heart failure ([address]  Hyponatremia  Pain in the abdomen 11/2/2010  Pancreatitis w/ abscess and pseudocyst  
 Pancreatitis  Psychiatric disorder   
 depression, anxiety  Tobacco abuse Past Surgical History:  
Procedure Laterality Date  HX ABDOMINAL LAPAROSCOPY PANCREATIC STENT  
 HX CHOLECYSTECTOMY Family History Problem Relation Age of Onset  Heart Disease Father Social History Socioeconomic History  Marital status: SINGLE Spouse name: Not on file  Number of children: Not on file  Years of education: Not on file  Highest education level: Not on file Tobacco Use  Smoking status: Current Every Day Smoker Packs/day: 0.50 Years: 0.00 Pack years: 0.00 Types: Cigarettes  Smokeless tobacco: Never Used Substance and Sexual Activity  Alcohol use: Yes  Drug use: No  
 Sexual activity: Yes Birth control/protection: None Prior to Admission medications Medication Sig Start Date End Date Taking? Authorizing Provider Blood-Glucose Meter (FREESTYLE LITE METER) monitoring kit Test blood sugars 4-6 times a day 5/4/19   Asia Gonzalez PA  
glucose blood VI test strips (FREESTYLE TEST) strip 1 Each by Does Not Apply route See Admin Instructions. Test blood sugars 4-6 times a day.  5/4/19   RACHEL Crowder  
insulin glargine (LANTUS U-100 INSULIN) 100 unit/mL injection by SubCUTAneous route nightly. Lorna, MD Zachary  
insulin NPH/insulin regular (NOVOLIN 70/30 U-100 INSULIN) 100 unit/mL (70-30) injection by SubCUTAneous route. Other, MD Zachary  
promethazine (PHENERGAN) 25 mg tablet Take 1 Tab by mouth every six (6) hours as needed. 9/21/18   Jimenez Bradshaw MD  
lipase-protease-amylase (ZENPEP 10,000) capsule Take 3 Caps by mouth three (3) times daily (with meals). 8/4/18   Oumar Barber MD  
 
 
Allergies Allergen Reactions  Ampicillin-Sulbactam Rash  Pcn [Penicillins] Itching and Hives  Piperacillin-Tazobactam Rash  Pollen Extracts Rash  Seafood [Shellfish Containing Products] Hives Other reaction(s): unknown Has tolerated iohexol (iodine-containing contrast) Only shrimp Shrimp Review of Systems Pertinent positive as noted in HPI. All other systems reviewed and are negative. Physical Exam:  
 
Physical Exam: 
Visit Vitals /78 Pulse 83 Temp 97.2 °F (36.2 °C) Resp 20 SpO2 100% O2 Flow Rate (L/min): 4 l/min O2 Device: Room air General:  Patient is awake,  cooperative, no distress. Head: Normocephalic, atraumatic, without obvious abnormality. Eyes:  Conjunctivae/corneas clear. PERRL, EOMs intact. Nose: Nares normal. No drainage or sinus tenderness. Neck: Supple, symmetrical, trachea midline, no adenopathy, thyroid: no enlargement, no carotid bruit and no JVD. Lungs:   Clear to auscultation bilaterally. No rales. No Wheezing. Heart:  Regular rate and rhythm, S1, S2 normal.  
  Abdomen: Soft, non-tender. Bowel sounds normal. No organomegaly. Has open fistula on the right upper lateral abdomen with mild yellowish drainage. Extremities: Extremities normal, atraumatic, no cyanosis or edema. Pulses: 2+ and symmetric all extremities. Skin:  No rashes , Ulcer or lesions Neurologic: AAOx3, No focal motor or sensory deficit. His upper and lower extremities are generally weak. Labs Reviewed and discussed with patient and patient's Family. Procedures/imaging:  
See electronic medical records for all procedures/Xrays and details which were not copied into this note but were reviewed prior to creation of Plan Assessment/Plan #- Left side weakness and multiple intermittent partial seizure: Will admit him to stepdown. Start him on acute ischemic CVA protocol. Avoid using aspirin because of chronic  sub dural hematoma. As per ED physician Krishna Dean who spoke with  Neurology and Neurology reviewed head CT and says that this subdural hematoma is chronic and does not need any work up. Neurology did not recommend any antiepileptic at this point. They recommended PRN Ativan. MRI and EEG. Will follow their recommendation. # IDDM: Continue Lantus and Cover with SSI. Check HB A1C. # CKD-III: Monitor BMP. Avoid nephrotoxic agents. # Chronic stable abdominal wall fistula: Will follow with his surgeon. # Physical deconditioning : Consult PT/OT and Case management. Further evaluation and treatment per patient's attending, Consultants recommendation, test results and patient's clinical course. Plan of the care was discussed with patient and patient's mother. They are in agreement. Patient is Full Code.  
 
Emmanuelle Montoya MD 
5/19/2019 12:58 AM

## 2019-05-20 NOTE — DISCHARGE INSTRUCTIONS
Patient armband removed and shredded      DISCHARGE SUMMARY from Nurse    PATIENT INSTRUCTIONS:    After general anesthesia or intravenous sedation, for 24 hours or while taking prescription Narcotics:  · Limit your activities  · Do not drive and operate hazardous machinery  · Do not make important personal or business decisions  · Do  not drink alcoholic beverages  · If you have not urinated within 8 hours after discharge, please contact your surgeon on call. Report the following to your surgeon:  · Excessive pain, swelling, redness or odor of or around the surgical area  · Temperature over 100.5  · Nausea and vomiting lasting longer than 4 hours or if unable to take medications  · Any signs of decreased circulation or nerve impairment to extremity: change in color, persistent  numbness, tingling, coldness or increase pain  · Any questions    What to do at Home:  Recommended activity: Activity as tolerated. If you experience any of the following symptoms severe nausea and vomiting, short of breath, fever or any other concerns, please follow up with primary care provider. *  Please give a list of your current medications to your Primary Care Provider. *  Please update this list whenever your medications are discontinued, doses are      changed, or new medications (including over-the-counter products) are added. *  Please carry medication information at all times in case of emergency situations. These are general instructions for a healthy lifestyle:    No smoking/ No tobacco products/ Avoid exposure to second hand smoke  Surgeon General's Warning:  Quitting smoking now greatly reduces serious risk to your health.     Obesity, smoking, and sedentary lifestyle greatly increases your risk for illness    A healthy diet, regular physical exercise & weight monitoring are important for maintaining a healthy lifestyle    You may be retaining fluid if you have a history of heart failure or if you experience any of the following symptoms:  Weight gain of 3 pounds or more overnight or 5 pounds in a week, increased swelling in our hands or feet or shortness of breath while lying flat in bed. Please call your doctor as soon as you notice any of these symptoms; do not wait until your next office visit. Recognize signs and symptoms of STROKE:    F-face looks uneven    A-arms unable to move or move unevenly    S-speech slurred or non-existent    T-time-call 911 as soon as signs and symptoms begin-DO NOT go       Back to bed or wait to see if you get better-TIME IS BRAIN. Warning Signs of HEART ATTACK     Call 911 if you have these symptoms:   Chest discomfort. Most heart attacks involve discomfort in the center of the chest that lasts more than a few minutes, or that goes away and comes back. It can feel like uncomfortable pressure, squeezing, fullness, or pain.  Discomfort in other areas of the upper body. Symptoms can include pain or discomfort in one or both arms, the back, neck, jaw, or stomach.  Shortness of breath with or without chest discomfort.  Other signs may include breaking out in a cold sweat, nausea, or lightheadedness. Don't wait more than five minutes to call 911 - MINUTES MATTER! Fast action can save your life. Calling 911 is almost always the fastest way to get lifesaving treatment. Emergency Medical Services staff can begin treatment when they arrive -- up to an hour sooner than if someone gets to the hospital by car. The discharge information has been reviewed with the patient. The patient verbalized understanding. Discharge medications reviewed with the patient and appropriate educational materials and side effects teaching were provided. ___________________________________________________________________________________________________________________________________1. NO DRIVING OR OPERATING HEAVY MACHINERY FOR 6 MONTHS OR UNTIL CLEARED BY NEUROLOGY.     Patient should not drive and if he does have a drivers license he needs to report to the Count includes the [facility] department of motor vehicles about what occurred. Showers only no tub bath. No operating heavy machinery. No climbing ladders, or performing activities where a loss of consciousness can be life-threatening. Out- patient follow up with neurology If EEG is performed in an outpatient setting. 2. FOLLOW UP WITH NEUROLOGY IN 1-2 WEEKS. 3. FOLLOW UP WITH PCP in 5-7 DAYS   4. FOLLOW UP WITH GI AND SURGERY AT [facility] IN 7-10 DAYS.

## 2019-05-20 NOTE — PROGRESS NOTES
Discharge: 
 
Patient will be discharged this evening (5/19/2019). This writer was paged due to patient not having transport home. He will be going to a hotel in [facility]. Patient has Medicaid. This writer phoned [phone]  (WG#[phone]) (Confirmation#[id number]) and arranged patient's Medicaid cab. Cab will pick patient up from the [address] [place] [address] entrance to [facility]. Nursing will receive a call with an ETA and then will need to take patient to the [address] [place] [address] entrance to meet the cab. There are no other care management concerns regarding this discharge. Yara Bhatia. MSW Care Manager Pager#: [phone]

## 2019-05-23 ENCOUNTER — HOSPITAL ENCOUNTER (EMERGENCY)
Age: 54
Discharge: HOME | End: 2019-05-24
Attending: EMERGENCY MEDICINE | Admitting: EMERGENCY MEDICINE
Payer: MEDICAID

## 2019-05-23 ENCOUNTER — APPOINTMENT (OUTPATIENT)
Dept: CT IMAGING | Age: 54
End: 2019-05-23
Attending: HEALTH CARE PROVIDER
Payer: MEDICAID

## 2019-05-23 DIAGNOSIS — R73.9 HYPERGLYCEMIA: ICD-10-CM

## 2019-05-23 DIAGNOSIS — N28.9 RENAL INSUFFICIENCY: Primary | ICD-10-CM

## 2019-05-23 LAB
ALBUMIN SERPL-MCNC: 3.8 G/DL (ref 3.4–5)
ALBUMIN/GLOB SERPL: 0.8 {RATIO} (ref 0.8–1.7)
ALP SERPL-CCNC: 164 U/L (ref 45–117)
ALT SERPL-CCNC: 27 U/L (ref 16–61)
ANION GAP SERPL CALC-SCNC: 11 MMOL/L (ref 3–18)
AST SERPL-CCNC: 15 U/L (ref 15–37)
BASOPHILS # BLD: 0 K/UL (ref 0–0.1)
BASOPHILS NFR BLD: 1 % (ref 0–2)
BILIRUB SERPL-MCNC: 0.3 MG/DL (ref 0.2–1)
BUN SERPL-MCNC: 35 MG/DL (ref 7–18)
BUN/CREAT SERPL: 20 (ref 12–20)
CALCIUM SERPL-MCNC: 8.8 MG/DL (ref 8.5–10.1)
CHLORIDE SERPL-SCNC: 97 MMOL/L (ref 100–108)
CO2 SERPL-SCNC: 19 MMOL/L (ref 21–32)
CREAT SERPL-MCNC: 1.71 MG/DL (ref 0.6–1.3)
DIFFERENTIAL METHOD BLD: ABNORMAL
EOSINOPHIL # BLD: 0.1 K/UL (ref 0–0.4)
EOSINOPHIL NFR BLD: 2 % (ref 0–5)
ERYTHROCYTE [DISTWIDTH] IN BLOOD BY AUTOMATED COUNT: 14.7 % (ref 11.6–14.5)
GLOBULIN SER CALC-MCNC: 4.7 G/DL (ref 2–4)
GLUCOSE SERPL-MCNC: 737 MG/DL (ref 74–99)
HCT VFR BLD AUTO: 37.6 % (ref 36–48)
HGB BLD-MCNC: 12.7 G/DL (ref 13–16)
LACTATE BLD-SCNC: 1.52 MMOL/L (ref 0.4–2)
LIPASE SERPL-CCNC: 132 U/L (ref 73–393)
LYMPHOCYTES # BLD: 1.5 K/UL (ref 0.9–3.6)
LYMPHOCYTES NFR BLD: 22 % (ref 21–52)
MCH RBC QN AUTO: 27.7 PG (ref 24–34)
MCHC RBC AUTO-ENTMCNC: 33.8 G/DL (ref 31–37)
MCV RBC AUTO: 81.9 FL (ref 74–97)
MONOCYTES # BLD: 0.4 K/UL (ref 0.05–1.2)
MONOCYTES NFR BLD: 7 % (ref 3–10)
NEUTS SEG # BLD: 4.5 K/UL (ref 1.8–8)
NEUTS SEG NFR BLD: 68 % (ref 40–73)
PLATELET # BLD AUTO: 232 K/UL (ref 135–420)
PMV BLD AUTO: 10.7 FL (ref 9.2–11.8)
POTASSIUM SERPL-SCNC: 4.6 MMOL/L (ref 3.5–5.5)
PROT SERPL-MCNC: 8.5 G/DL (ref 6.4–8.2)
RBC # BLD AUTO: 4.59 M/UL (ref 4.7–5.5)
SODIUM SERPL-SCNC: 127 MMOL/L (ref 136–145)
WBC # BLD AUTO: 6.5 K/UL (ref 4.6–13.2)

## 2019-05-23 PROCEDURE — 99285 EMERGENCY DEPT VISIT HI MDM: CPT

## 2019-05-23 PROCEDURE — 80053 COMPREHEN METABOLIC PANEL: CPT

## 2019-05-23 PROCEDURE — 96360 HYDRATION IV INFUSION INIT: CPT

## 2019-05-23 PROCEDURE — 74011636637 HC RX REV CODE- 636/637: Performed by: HEALTH CARE PROVIDER

## 2019-05-23 PROCEDURE — 96361 HYDRATE IV INFUSION ADD-ON: CPT

## 2019-05-23 PROCEDURE — 83605 ASSAY OF LACTIC ACID: CPT

## 2019-05-23 PROCEDURE — 74011250636 HC RX REV CODE- 250/636: Performed by: HEALTH CARE PROVIDER

## 2019-05-23 PROCEDURE — 74011250637 HC RX REV CODE- 250/637: Performed by: HEALTH CARE PROVIDER

## 2019-05-23 PROCEDURE — 85025 COMPLETE CBC W/AUTO DIFF WBC: CPT

## 2019-05-23 PROCEDURE — 74176 CT ABD & PELVIS W/O CONTRAST: CPT

## 2019-05-23 PROCEDURE — 83690 ASSAY OF LIPASE: CPT

## 2019-05-23 RX ORDER — PROMETHAZINE HYDROCHLORIDE 25 MG/1
25 TABLET ORAL
Status: DISCONTINUED | OUTPATIENT
Start: 2019-05-23 | End: 2019-05-24 | Stop reason: HOSPADM

## 2019-05-23 RX ORDER — INSULIN LISPRO 100 [IU]/ML
10 INJECTION, SOLUTION INTRAVENOUS; SUBCUTANEOUS
Status: COMPLETED | OUTPATIENT
Start: 2019-05-23 | End: 2019-05-23

## 2019-05-23 RX ORDER — OXYCODONE AND ACETAMINOPHEN 5; 325 MG/1; MG/1
2 TABLET ORAL
Status: COMPLETED | OUTPATIENT
Start: 2019-05-23 | End: 2019-05-23

## 2019-05-23 RX ADMIN — INSULIN LISPRO 10 UNITS: 100 INJECTION, SOLUTION INTRAVENOUS; SUBCUTANEOUS at 21:39

## 2019-05-23 RX ADMIN — OXYCODONE HYDROCHLORIDE AND ACETAMINOPHEN 2 TABLET: 5; 325 TABLET ORAL at 21:52

## 2019-05-23 RX ADMIN — PROMETHAZINE HYDROCHLORIDE 25 MG: 25 TABLET ORAL at 19:43

## 2019-05-23 RX ADMIN — SODIUM CHLORIDE 1000 ML: 900 INJECTION, SOLUTION INTRAVENOUS at 21:53

## 2019-05-23 NOTE — ED NOTES
Patient asking for pain medication and phenergan for nausea.    Dr Carmichael informed of patients request

## 2019-05-23 NOTE — ED PROVIDER NOTES
ER03/03    48 y.o. OTHER male    Presents to the ED with   Chief Complaint   Patient presents with    Post OP Complication     new drainage          HPI: 6:16 PM  Presents to the emergency department complaining of abdominal discomfort. The patient states he has had symptoms for the past 5 years, but they have been worse in the past 12-18 hours. He was recently admitted to  [facility] and had a drain placed, it was subsequently removed. He states that a greenish color discharge from his fistula to his right flank has been increasing, and mouth are prompted his visit to the emergency department. He has history of chronic pancreatitis with fistula formation. Symptoms are constant, moderate, with no other relieving or exacerbating factors    ROS:  14 organ system review of systems is complete and is negative except as addressed in the HPI        Social History:   Social History     Socioeconomic History    Marital status: SINGLE     Spouse name: Not on file    Number of children: Not on file    Years of education: Not on file    Highest education level: Not on file   Occupational History    Not on file   Social Needs    Financial resource strain: Not on file    Food insecurity:     Worry: Not on file     Inability: Not on file    Transportation needs:     Medical: Not on file     Non-medical: Not on file   Tobacco Use    Smoking status: Current Every Day Smoker     Packs/day: 0.50     Years: 0.00     Pack years: 0.00     Types: Cigarettes    Smokeless tobacco: Never Used   Substance and Sexual Activity    Alcohol use:  Yes    Drug use: No    Sexual activity: Yes     Birth control/protection: None   Lifestyle    Physical activity:     Days per week: Not on file     Minutes per session: Not on file    Stress: Not on file   Relationships    Social connections:     Talks on phone: Not on file     Gets together: Not on file     Attends Druze service: Not on file     Active member of club or organization: Not on file     Attends meetings of clubs or organizations: Not on file     Relationship status: Not on file    Intimate partner violence:     Fear of current or ex partner: Not on file     Emotionally abused: Not on file     Physically abused: Not on file     Forced sexual activity: Not on file   Other Topics Concern    Not on file   Social History Narrative    Not on file      reports that he has been smoking cigarettes. He has been smoking about 0.50 packs per day for the past 0.00 years. He has never used smokeless tobacco.    Family History:   Family History   Problem Relation Age of Onset    Heart Disease Father        Past Medical History:   Past Medical History:   Diagnosis Date    Abdominal pain, generalized     [facility] [address] [address]     Diabetes ([facility]     hypothyroid    Encounter for long-term (current) use of other medications     Essential hypertension     ETOH abuse     GERD (gastroesophageal reflux disease)     Heart failure (HCC)     Hyponatremia     Pain in the abdomen 11/2/2010    Pancreatitis     w/ abscess and pseudocyst    Pancreatitis     Psychiatric disorder     depression, anxiety    Tobacco abuse          Past Surgical History:   Past Surgical History:   Procedure Laterality Date    HX ABDOMINAL LAPAROSCOPY      PANCREATIC STENT    HX CHOLECYSTECTOMY         Primary Care: Shannan Pedersen MD    Immunizations:     Medications: No current facility-administered medications for this encounter. Current Outpatient Medications:     thiamine HCL (B-1) 100 mg tablet, Take 1 Tab by mouth daily. , Disp: 30 Tab, Rfl: 0    therapeutic multivitamin (THERAGRAN) tablet, Take 1 Tab by mouth daily. , Disp: 30 Tab, Rfl: 0    polyethylene glycol (MIRALAX) 17 gram packet, Take 1 Packet by mouth daily. , Disp: 30 Packet, Rfl: 0    pantoprazole (PROTONIX) 40 mg tablet, Take 1 Tab by mouth Daily (before breakfast). , Disp: 30 Tab, Rfl: 0    OTHER, NO DRIVING OR OPERATING HEAVY MACHINERY FOR 6 MONTHS OR UNTIL CLEARED BY NEUROLOGY., Disp: 1 cm, Rfl: 0    Blood-Glucose Meter (FREESTYLE LITE METER) monitoring kit, Test blood sugars 4-6 times a day, Disp: 1 Kit, Rfl: 0    glucose blood VI test strips (FREESTYLE TEST) strip, 1 Each by Does Not Apply route See Admin Instructions. Test blood sugars 4-6 times a day., Disp: 100 Strip, Rfl: 3    insulin NPH/insulin regular (NOVOLIN 70/30 U-100 INSULIN) 100 unit/mL (70-30) injection, by SubCUTAneous route., Disp: , Rfl:     promethazine (PHENERGAN) 25 mg tablet, Take 1 Tab by mouth every six (6) hours as needed. , Disp: 10 Tab, Rfl: 0    lipase-protease-amylase (ZENPEP 10,000) capsule, Take 3 Caps by mouth three (3) times daily (with meals). , Disp: 270 Cap, Rfl: 0    Allergies: Allergies   Allergen Reactions    Ampicillin-Sulbactam Rash    Pcn [Penicillins] Itching and Hives    Piperacillin-Tazobactam Rash    Pollen Extracts Rash    Seafood [Shellfish Containing Products] Hives     Other reaction(s): unknown  Has tolerated iohexol (iodine-containing contrast)  Only shrimp  Shrimp         Last Cath       Last Stress Test       Prior:ECHO               Physical Exam:  . Patient Vitals for the past 12 hrs:   Temp Pulse Resp BP SpO2   05/23/19 1730 98.3 °F (36.8 °C) 97 15 105/78 100 %     Gen: Well developed, well nourished 48 y.o. male  HEENT: Normocephalic, atraumatic. No scleral icterus. Extraocular movements intact. .  Normal mucous membranes. Uvula midline. Airway widely patent. Respiratory: No accessory muscle use. No wheeze, No rales, No rhonchi. Normal chest wall excursion. No subcutaneous air, no rib crepitus  Cardiovascular: Regular rhythm and rate, Normal pulses, Normal perfusion. No edema. Gastrointestinal: Non distended,Moderate diffusely tender No masses. No ascites. No organomegaly. No evidence of trauma, Fistula to the right flank with a small amount of discharge.   No evidence of abscess. Musculoskeletal: Full range of motion at all other tested joints. No joint effusions. Neurological: Normal strength, Normal sensation. Normal speech. No ataxia. Cranial nerves II-XII normal as tested. Skin: No rash, petechia or purpura. Warm and dry  Psychiatric: No suicidal ideation, No homicidal ideation. No hallucinations. Organized thoughts. Normal mood. normal affect. Heme: No lymphadenopathy. : Deferred    Orders:   Orders Placed This Encounter    CBC WITH AUTOMATED DIFF    COMPREHENSIVE METABOLIC PANEL    LIPASE    LACTIC ACID, PLASMA       ECG:   Current:      Comparison: .    Imaging:   No results found. Labs:  Labs Reviewed   CBC WITH AUTOMATED DIFF   METABOLIC PANEL, COMPREHENSIVE   LIPASE   LACTIC ACID       EMERGENCY DEPARTMENT COURSE  8:49 PM care was endorsed to Dr. Elisha Montaño pending CT scan and laboratory data    0006: Care transferred to Dr. Elisha Montaño. Diagnosis: No diagnosis found. Disposition:        Discharge Medications:   Current Discharge Medication List            Referral:     Follow-up Information    None            (This chart was created with dictation software and an EHR.   It may contain unintended unedited historical and or dictation errors)

## 2019-05-23 NOTE — ED TRIAGE NOTES
PT arrived via EMS with c/o new onset of drainage from post-op site and high blood sugar.  Per EMS, Pt also had a high blood pressure with systolic of over 728

## 2019-05-24 VITALS
TEMPERATURE: 98.3 F | OXYGEN SATURATION: 99 % | RESPIRATION RATE: 15 BRPM | DIASTOLIC BLOOD PRESSURE: 80 MMHG | HEART RATE: 108 BPM | SYSTOLIC BLOOD PRESSURE: 103 MMHG

## 2019-05-24 LAB
GLUCOSE BLD STRIP.AUTO-MCNC: 231 MG/DL (ref 70–110)
GLUCOSE BLD STRIP.AUTO-MCNC: 544 MG/DL (ref 70–110)

## 2019-05-24 PROCEDURE — 96361 HYDRATE IV INFUSION ADD-ON: CPT

## 2019-05-24 PROCEDURE — 82962 GLUCOSE BLOOD TEST: CPT

## 2019-05-24 PROCEDURE — 74011636637 HC RX REV CODE- 636/637: Performed by: HEALTH CARE PROVIDER

## 2019-05-24 PROCEDURE — 74011250636 HC RX REV CODE- 250/636: Performed by: EMERGENCY MEDICINE

## 2019-05-24 RX ORDER — INSULIN LISPRO 100 [IU]/ML
15 INJECTION, SOLUTION INTRAVENOUS; SUBCUTANEOUS
Status: COMPLETED | OUTPATIENT
Start: 2019-05-24 | End: 2019-05-24

## 2019-05-24 RX ADMIN — INSULIN LISPRO 15 UNITS: 100 INJECTION, SOLUTION INTRAVENOUS; SUBCUTANEOUS at 00:12

## 2019-05-24 RX ADMIN — SODIUM CHLORIDE 1000 ML: 900 INJECTION, SOLUTION INTRAVENOUS at 01:00

## 2019-05-24 NOTE — DISCHARGE INSTRUCTIONS
Patient Education        Learning About High Blood Sugar  What is high blood sugar? Your body turns the food you eat into glucose (sugar), which it uses for energy. But if your body isn't able to use the sugar right away, it can build up in your blood and lead to high blood sugar. When the amount of sugar in your blood stays too high for too much of the time, you may have diabetes. Diabetes is a disease that can cause serious health problems. The good news is that lifestyle changes may help you get your blood sugar back to normal and avoid or delay diabetes. What causes high blood sugar? Sugar (glucose) can build up in your blood if you:  · Are overweight. · Have a family history of diabetes. · Take certain medicines, such as steroids. What are the symptoms? Having high blood sugar may not cause any symptoms at all. Or it may make you feel very thirsty or very hungry. You may also urinate more often than usual, have blurry vision, or lose weight without trying. How is high blood sugar treated? You can take steps to lower your blood sugar level if you understand what makes it get higher. Your doctor may want you to learn how to test your blood sugar level at home. Then you can see how illness, stress, or different kinds of food or medicine raise or lower your blood sugar level. Other tests may be needed to see if you have diabetes. How can you prevent high blood sugar? · Watch your weight. If you're overweight, losing just a small amount of weight may help. Reducing fat around your waist is most important. · Limit the amount of calories, sweets, and unhealthy fat you eat. Ask your doctor if a dietitian can help you. A registered dietitian can help you create meal plans that fit your lifestyle. · Get at least 30 minutes of exercise on most days of the week. Exercise helps control your blood sugar. It also helps you maintain a healthy weight. Walking is a good choice.  You also may want to do other activities, such as running, swimming, cycling, or playing tennis or team sports. · If your doctor prescribed medicines, take them exactly as prescribed. Call your doctor if you think you are having a problem with your medicine. You will get more details on the specific medicines your doctor prescribes. Follow-up care is a key part of your treatment and safety. Be sure to make and go to all appointments, and call your doctor if you are having problems. It's also a good idea to know your test results and keep a list of the medicines you take. Where can you learn more? Go to http://thiago-mikhail.info/. Enter O108 in the search box to learn more about \"Learning About High Blood Sugar. \"  Current as of: July 25, 2018  Content Version: 11.9  © 1160-1957 [employer]. Care instructions adapted under license by [employer] (which disclaims liability or warranty for this information). If you have questions about a medical condition or this instruction, always ask your healthcare professional. [address] any warranty or liability for your use of this information.

## 2019-05-24 NOTE — ED NOTES
Walked  Out of room  Glanced into patients room and family member is unwrapping a muffin. She got up and walked out of my vision. Walked into patients room and he is eating a muffin.  reexplained to pt that he is to have nothing to eat or drink. Pt states but I am hungry I haven not eaten in a couple of days. Explained to mom that pt is not to have anything to eat or drink.   Mother voiced understanding

## 2019-05-24 NOTE — ED NOTES
Patient refusing to let me put a dressing on the draining wound on his lower abdomen.   Pt mumbling that the [place] will hold the drainage against his skin and it is acetic and making his skin turn red

## 2019-05-28 ENCOUNTER — HOSPITAL ENCOUNTER (EMERGENCY)
Age: 54
Discharge: HOME | End: 2019-05-28
Attending: EMERGENCY MEDICINE
Payer: MEDICAID

## 2019-05-28 VITALS
DIASTOLIC BLOOD PRESSURE: 77 MMHG | SYSTOLIC BLOOD PRESSURE: 104 MMHG | HEIGHT: 72 IN | BODY MASS INDEX: 18.96 KG/M2 | OXYGEN SATURATION: 100 % | WEIGHT: 140 LBS | RESPIRATION RATE: 16 BRPM | TEMPERATURE: 98.4 F | HEART RATE: 76 BPM

## 2019-05-28 DIAGNOSIS — E11.65 UNCONTROLLED TYPE 2 DIABETES MELLITUS WITH HYPERGLYCEMIA (HCC): Primary | ICD-10-CM

## 2019-05-28 LAB
ALBUMIN SERPL-MCNC: 3.2 G/DL (ref 3.4–5)
ALBUMIN/GLOB SERPL: 0.8 {RATIO} (ref 0.8–1.7)
ALP SERPL-CCNC: 138 U/L (ref 45–117)
ALT SERPL-CCNC: 20 U/L (ref 16–61)
ANION GAP SERPL CALC-SCNC: 7 MMOL/L (ref 3–18)
APPEARANCE UR: CLEAR
AST SERPL-CCNC: 15 U/L (ref 15–37)
BASOPHILS # BLD: 0 K/UL (ref 0–0.1)
BASOPHILS NFR BLD: 0 % (ref 0–2)
BILIRUB DIRECT SERPL-MCNC: 0.1 MG/DL (ref 0–0.2)
BILIRUB SERPL-MCNC: 0.3 MG/DL (ref 0.2–1)
BILIRUB UR QL: NEGATIVE
BUN SERPL-MCNC: 22 MG/DL (ref 7–18)
BUN/CREAT SERPL: 17 (ref 12–20)
CALCIUM SERPL-MCNC: 8.4 MG/DL (ref 8.5–10.1)
CHLORIDE SERPL-SCNC: 106 MMOL/L (ref 100–108)
CO2 SERPL-SCNC: 23 MMOL/L (ref 21–32)
COLOR UR: YELLOW
CREAT SERPL-MCNC: 1.26 MG/DL (ref 0.6–1.3)
DIFFERENTIAL METHOD BLD: ABNORMAL
EOSINOPHIL # BLD: 0.2 K/UL (ref 0–0.4)
EOSINOPHIL NFR BLD: 4 % (ref 0–5)
ERYTHROCYTE [DISTWIDTH] IN BLOOD BY AUTOMATED COUNT: 15.6 % (ref 11.6–14.5)
GLOBULIN SER CALC-MCNC: 4.1 G/DL (ref 2–4)
GLUCOSE BLD STRIP.AUTO-MCNC: 255 MG/DL (ref 70–110)
GLUCOSE BLD STRIP.AUTO-MCNC: 321 MG/DL (ref 70–110)
GLUCOSE BLD STRIP.AUTO-MCNC: 390 MG/DL (ref 70–110)
GLUCOSE BLD STRIP.AUTO-MCNC: 443 MG/DL (ref 70–110)
GLUCOSE SERPL-MCNC: 396 MG/DL (ref 74–99)
GLUCOSE UR STRIP.AUTO-MCNC: >1000 MG/DL
HCT VFR BLD AUTO: 32.9 % (ref 36–48)
HGB BLD-MCNC: 10.1 G/DL (ref 13–16)
HGB UR QL STRIP: NEGATIVE
KETONES UR QL STRIP.AUTO: NEGATIVE MG/DL
LEUKOCYTE ESTERASE UR QL STRIP.AUTO: NEGATIVE
LIPASE SERPL-CCNC: 32 U/L (ref 73–393)
LYMPHOCYTES # BLD: 1.1 K/UL (ref 0.9–3.6)
LYMPHOCYTES NFR BLD: 19 % (ref 21–52)
MCH RBC QN AUTO: 26.6 PG (ref 24–34)
MCHC RBC AUTO-ENTMCNC: 30.7 G/DL (ref 31–37)
MCV RBC AUTO: 86.8 FL (ref 74–97)
MONOCYTES # BLD: 0.4 K/UL (ref 0.05–1.2)
MONOCYTES NFR BLD: 6 % (ref 3–10)
NEUTS SEG # BLD: 4.1 K/UL (ref 1.8–8)
NEUTS SEG NFR BLD: 71 % (ref 40–73)
NITRITE UR QL STRIP.AUTO: NEGATIVE
PH UR STRIP: 6 [PH] (ref 5–8)
PLATELET # BLD AUTO: 187 K/UL (ref 135–420)
PMV BLD AUTO: 9.7 FL (ref 9.2–11.8)
POTASSIUM SERPL-SCNC: 5.1 MMOL/L (ref 3.5–5.5)
PROT SERPL-MCNC: 7.3 G/DL (ref 6.4–8.2)
PROT UR STRIP-MCNC: NEGATIVE MG/DL
RBC # BLD AUTO: 3.79 M/UL (ref 4.7–5.5)
SODIUM SERPL-SCNC: 136 MMOL/L (ref 136–145)
SP GR UR REFRACTOMETRY: 1.02 (ref 1–1.03)
UROBILINOGEN UR QL STRIP.AUTO: 0.2 EU/DL (ref 0.2–1)
WBC # BLD AUTO: 5.9 K/UL (ref 4.6–13.2)

## 2019-05-28 PROCEDURE — 82248 BILIRUBIN DIRECT: CPT

## 2019-05-28 PROCEDURE — 81003 URINALYSIS AUTO W/O SCOPE: CPT

## 2019-05-28 PROCEDURE — 96374 THER/PROPH/DIAG INJ IV PUSH: CPT

## 2019-05-28 PROCEDURE — 96361 HYDRATE IV INFUSION ADD-ON: CPT

## 2019-05-28 PROCEDURE — 85025 COMPLETE CBC W/AUTO DIFF WBC: CPT

## 2019-05-28 PROCEDURE — 80053 COMPREHEN METABOLIC PANEL: CPT

## 2019-05-28 PROCEDURE — 83690 ASSAY OF LIPASE: CPT

## 2019-05-28 PROCEDURE — 74011250636 HC RX REV CODE- 250/636: Performed by: EMERGENCY MEDICINE

## 2019-05-28 PROCEDURE — 74011636637 HC RX REV CODE- 636/637

## 2019-05-28 PROCEDURE — 82962 GLUCOSE BLOOD TEST: CPT

## 2019-05-28 PROCEDURE — 96376 TX/PRO/DX INJ SAME DRUG ADON: CPT

## 2019-05-28 PROCEDURE — 99285 EMERGENCY DEPT VISIT HI MDM: CPT

## 2019-05-28 PROCEDURE — 74011636637 HC RX REV CODE- 636/637: Performed by: EMERGENCY MEDICINE

## 2019-05-28 PROCEDURE — 74011250637 HC RX REV CODE- 250/637: Performed by: EMERGENCY MEDICINE

## 2019-05-28 RX ADMIN — SODIUM CHLORIDE 1000 ML: 900 INJECTION, SOLUTION INTRAVENOUS at 14:14

## 2019-05-28 RX ADMIN — INSULIN HUMAN 10 UNITS: 100 INJECTION, SOLUTION PARENTERAL at 17:40

## 2019-05-28 RX ADMIN — INSULIN HUMAN 10 UNITS: 100 INJECTION, SOLUTION PARENTERAL at 14:15

## 2019-05-28 RX ADMIN — Medication: at 17:00

## 2019-05-28 NOTE — ED NOTES
Patient states that fluids are hurting his arm and that he wants them stopped. No sign of infiltration.

## 2019-05-28 NOTE — ED TRIAGE NOTES
Pt was here with mother. Pt homeless with mother. Non compliant diabetic. When mother was discharged, came to registration and wanted blood sugar checked. 440/443. States vision blurred. Has fistula on right flank from pancreatitis. Says hurts.  Pt was noted drinking ginger ale earlier x 3

## 2019-05-28 NOTE — ED NOTES
Patient states that he is a \"hard stick\". States, \"they always have to use the ultrasound\". Balta, ED Tech in room to attempt to get blood sample from patient.

## 2019-05-28 NOTE — ED PROVIDER NOTES
EMERGENCY DEPARTMENT HISTORY AND PHYSICAL EXAM    1:10 PM      Date: 5/28/2019  Patient Name: Masha Graham    History of Presenting Illness     Chief Complaint   Patient presents with    High Blood Sugar         History Provided By: patient    Additional History (Context): Masha Graham is a 48 y.o. male presents with complaint of high blood sugar. He and his mother are currently homeless and she was just seen as a patient and discharged. He approached triage staff wanting his blood pressure checked and so was signed into the ER. His blood sugar was found to be elevated. 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 Yessy Mathis PCP: Lelo Freitas MD    Chief Complaint:   Duration:    Timing:    Location:   Quality:   Severity:   Modifying Factors:   Associated Symptoms:       Current Facility-Administered Medications   Medication Dose Route Frequency Provider Last Rate Last Dose    insulin regular (NOVOLIN R, HUMULIN R) injection 10 Units  10 Units IntraVENous NOW Abran Martinez MD        sodium chloride 0.9 % bolus infusion 1,000 mL  1,000 mL IntraVENous ONCE Lionel Martinez MD        Followed by   Ling sodium chloride 0.9 % bolus infusion 1,000 mL  1,000 mL IntraVENous ONCE Lionel Martinez MD        zinc oxide-cod liver oil (DESITIN) 40 % ointment   Topical PRN Sheyla Martin MD         Current Outpatient Medications   Medication Sig Dispense Refill    thiamine HCL (B-1) 100 mg tablet Take 1 Tab by mouth daily. 30 Tab 0    therapeutic multivitamin (THERAGRAN) tablet Take 1 Tab by mouth daily. 30 Tab 0    polyethylene glycol (MIRALAX) 17 gram packet Take 1 Packet by mouth daily. 30 Packet 0    pantoprazole (PROTONIX) 40 mg tablet Take 1 Tab by mouth Daily (before breakfast).  30 Tab 0    OTHER NO DRIVING OR OPERATING HEAVY MACHINERY FOR 6 MONTHS OR UNTIL CLEARED BY NEUROLOGY. 1 cm 0    Blood-Glucose Meter (FREESTYLE LITE METER) monitoring kit Test blood sugars 4-6 times a day 1 Kit 0    glucose blood VI test strips (FREESTYLE TEST) strip 1 Each by Does Not Apply route See Admin Instructions. Test blood sugars 4-6 times a day. 100 Strip 3    insulin NPH/insulin regular (NOVOLIN 70/30 U-100 INSULIN) 100 unit/mL (70-30) injection by SubCUTAneous route.  promethazine (PHENERGAN) 25 mg tablet Take 1 Tab by mouth every six (6) hours as needed. 10 Tab 0    lipase-protease-amylase (ZENPEP 10,000) capsule Take 3 Caps by mouth three (3) times daily (with meals). 270 Cap 0       Past History     Past Medical History:  Past Medical History:   Diagnosis Date    Abdominal pain, generalized     Chronic pancreatitis ([address]     Diabetes ([facility] [address]     hypothyroid    Encounter for long-term (current) use of other medications     Essential hypertension     ETOH abuse     GERD (gastroesophageal reflux disease)     Heart failure (HCC)     Hyponatremia     Pain in the abdomen 11/2/2010    Pancreatitis     w/ abscess and pseudocyst    Pancreatitis     Psychiatric disorder     depression, anxiety    Tobacco abuse        Past Surgical History:  Past Surgical History:   Procedure Laterality Date    HX ABDOMINAL LAPAROSCOPY      PANCREATIC STENT    HX CHOLECYSTECTOMY         Family History:  Family History   Problem Relation Age of Onset    Heart Disease Father        Social History:  Social History     Tobacco Use    Smoking status: Current Every Day Smoker     Packs/day: 0.50     Years: 0.00     Pack years: 0.00     Types: Cigarettes    Smokeless tobacco: Never Used   Substance Use Topics    Alcohol use: Yes    Drug use: No       Allergies:   Allergies   Allergen Reactions    Ampicillin-Sulbactam Rash    Pcn [Penicillins] Itching and Hives    Piperacillin-Tazobactam Rash    Pollen Extracts Rash    Seafood [Shellfish Containing Products] Hives     Other reaction(s): unknown  Has tolerated iohexol (iodine-containing contrast)  Only shrimp  Shrimp         Review of Systems     Review of Systems   Constitutional: Negative for diaphoresis and fever. HENT: Negative for congestion and sore throat. Eyes: Negative for pain and itching. Respiratory: Negative for cough and shortness of breath. Cardiovascular: Negative for chest pain and palpitations. Gastrointestinal: Negative for abdominal pain and diarrhea. Endocrine: Negative for polydipsia and polyuria. Genitourinary: Negative for dysuria and hematuria. Musculoskeletal: Negative for arthralgias and myalgias. Skin: Negative for rash and wound. Neurological: Negative for seizures and syncope. Hematological: Does not bruise/bleed easily. Psychiatric/Behavioral: Negative for agitation and hallucinations. Physical Exam       Patient Vitals for the past 12 hrs:   Temp Pulse Resp BP SpO2   05/28/19 1230    105/63 100 %   05/28/19 1205     96 %   05/28/19 1204    109/72    05/28/19 1145  76  124/74 100 %   05/28/19 1130  (!) 59  118/73 100 %   05/28/19 1118  69  113/72 100 %   05/28/19 1100  64  111/82 100 %   05/28/19 1045  67  126/77 100 %   05/28/19 1003 98.4 °F (36.9 °C) 87 16 114/82 99 %       Physical Exam   Constitutional: He appears well-developed and well-nourished. HENT:   Head: Normocephalic and atraumatic. Eyes: Conjunctivae are normal. No scleral icterus. Neck: Normal range of motion. Neck supple. No JVD present. Cardiovascular: Normal rate, regular rhythm and normal heart sounds. 4 intact extremity pulses   Pulmonary/Chest: Effort normal and breath sounds normal.   Abdominal: Soft. He exhibits no mass. There is tenderness. Right abdominal wall fistula present with thin yellow clear drainage   Musculoskeletal: Normal range of motion. Lymphadenopathy:     He has no cervical adenopathy.    Neurological: He is alert.   Skin: Skin is warm and dry. Nursing note and vitals reviewed. Diagnostic Study Results   Labs -  Recent Results (from the past 12 hour(s))   GLUCOSE, POC    Collection Time: 05/28/19 10:02 AM   Result Value Ref Range    Glucose (POC) 443 (HH) 70 - 110 mg/dL   URINALYSIS W/ RFLX MICROSCOPIC    Collection Time: 05/28/19 10:30 AM   Result Value Ref Range    Color YELLOW      Appearance CLEAR      Specific gravity 1.020 1.005 - 1.030      pH (UA) 6.0 5.0 - 8.0      Protein NEGATIVE  NEG mg/dL    Glucose >1,000 (A) NEG mg/dL    Ketone NEGATIVE  NEG mg/dL    Bilirubin NEGATIVE  NEG      Blood NEGATIVE  NEG      Urobilinogen 0.2 0.2 - 1.0 EU/dL    Nitrites NEGATIVE  NEG      Leukocyte Esterase NEGATIVE  NEG     CBC WITH AUTOMATED DIFF    Collection Time: 05/28/19  1:11 PM   Result Value Ref Range    WBC 5.9 4.6 - 13.2 K/uL    RBC 3.79 (L) 4.70 - 5.50 M/uL    HGB 10.1 (L) 13.0 - 16.0 g/dL    HCT 32.9 (L) 36.0 - 48.0 %    MCV 86.8 74.0 - 97.0 FL    MCH 26.6 24.0 - 34.0 PG    MCHC 30.7 (L) 31.0 - 37.0 g/dL    RDW 15.6 (H) 11.6 - 14.5 %    PLATELET 201 998 - 669 K/uL    MPV 9.7 9.2 - 11.8 FL    NEUTROPHILS 71 40 - 73 %    LYMPHOCYTES 19 (L) 21 - 52 %    MONOCYTES 6 3 - 10 %    EOSINOPHILS 4 0 - 5 %    BASOPHILS 0 0 - 2 %    ABS. NEUTROPHILS 4.1 1.8 - 8.0 K/UL    ABS. LYMPHOCYTES 1.1 0.9 - 3.6 K/UL    ABS. MONOCYTES 0.4 0.05 - 1.2 K/UL    ABS. EOSINOPHILS 0.2 0.0 - 0.4 K/UL    ABS.  BASOPHILS 0.0 0.0 - 0.1 K/UL    DF AUTOMATED     METABOLIC PANEL, COMPREHENSIVE    Collection Time: 05/28/19  1:11 PM   Result Value Ref Range    Sodium 136 136 - 145 mmol/L    Potassium 5.1 3.5 - 5.5 mmol/L    Chloride 106 100 - 108 mmol/L    CO2 23 21 - 32 mmol/L    Anion gap 7 3.0 - 18 mmol/L    Glucose 396 (H) 74 - 99 mg/dL    BUN 22 (H) 7.0 - 18 MG/DL    Creatinine 1.26 0.6 - 1.3 MG/DL    BUN/Creatinine ratio 17 12 - 20      GFR est AA >60 >60 ml/min/1.73m2    GFR est non-AA 60 (L) >60 ml/min/1.73m2    Calcium 8.4 (L) 8.5 - 10.1 MG/DL    Bilirubin, total 0.3 0.2 - 1.0 MG/DL    ALT (SGPT) 20 16 - 61 U/L    AST (SGOT) 15 15 - 37 U/L    Alk. phosphatase 138 (H) 45 - 117 U/L    Protein, total 7.3 6.4 - 8.2 g/dL    Albumin 3.2 (L) 3.4 - 5.0 g/dL    Globulin 4.1 (H) 2.0 - 4.0 g/dL    A-G Ratio 0.8 0.8 - 1.7     BILIRUBIN, DIRECT    Collection Time: 05/28/19  1:11 PM   Result Value Ref Range    Bilirubin, direct 0.1 0.0 - 0.2 MG/DL   LIPASE    Collection Time: 05/28/19  1:11 PM   Result Value Ref Range    Lipase 32 (L) 73 - 393 U/L       Radiologic Studies -   No orders to display     No results found. Medications ordered:   Medications   insulin regular (NOVOLIN R, HUMULIN R) injection 10 Units (has no administration in time range)   sodium chloride 0.9 % bolus infusion 1,000 mL (has no administration in time range)     Followed by   sodium chloride 0.9 % bolus infusion 1,000 mL (has no administration in time range)   zinc oxide-cod liver oil (DESITIN) 40 % ointment (has no administration in time range)         Medical Decision Making   Initial Medical Decision Making and DDx:     Concern for DKA versus uncontrolled diabetes due to lack of medication. Secondarily he complains of sensation like his pancreas is acting up so we will get a lipase. ED Course: Progress Notes, Reevaluation, and Consults:     Procedures    Ultrasound-guided IV:  Indication need for IV access and labs  Survey of left antecubital fossa with ultrasound, suitable compressible nonpulsatile vein found. Cleaned with alcohol. Vena puncture with 20-gauge Angiocath, flashback, easily advanced catheter removed needle. Collected labs, flushed with saline without signs of infiltration. Covered with Bioclusive. Patient tolerated procedure well      I am the first provider for this patient. I reviewed the vital signs, available nursing notes, past medical history, past surgical history, family history and social history.     Patient Vitals for the past 12 hrs:   Temp Pulse Resp BP SpO2   05/28/19 1230    105/63 100 %   05/28/19 1205     96 %   05/28/19 1204    109/72    05/28/19 1145  76  124/74 100 %   05/28/19 1130  (!) 59  118/73 100 %   05/28/19 1118  69  113/72 100 %   05/28/19 1100  64  111/82 100 %   05/28/19 1045  67  126/77 100 %   05/28/19 1003 98.4 °F (36.9 °C) 87 16 114/82 99 %       Vital Signs-Reviewed the patient's vital signs. Pulse Oximetry Analysis, Cardiac Monitor, 12 lead ekg:     Sinus rhythm 76, 98% on room air  Interpreted by the EP.    2:04 PM Pt reevaluated at this time. Discussed results and findings, as well as, diagnosis and plan for discharge. Follow up with doctors/services listed. Return to the emergency department for alarming symptoms. Pt verbalizes understanding and agreement with plan. All questions addressed. No DKA, note pancreatitis. No alarming electrolyte abnormalities. Will give 10 units of insulin 2 L of fluid, follow-up with renee or Dr. Ely Veliz office. Multiple references have been given to his mother. Records Reviewed: Nursing notes reviewed (Time of Review: 1:10 PM)    Procedures:   Critical Care Time:   Aspirin: (was aspirin given for stroke?)    Diagnosis     Clinical Impression:   1.  Uncontrolled type 2 diabetes mellitus with hyperglycemia ([facility] [address]        Disposition: Discharged      Follow-up Information     Follow up With Specialties Details Why Contact Liam Edwards MD [facility], Internal Medicine   [address] [place] [address]  [phone]      [place] [address] [address]  [phone]           Patient's Medications   Start Taking    No medications on file   Continue Taking    BLOOD-GLUCOSE METER (FREESTYLE LITE METER) MONITORING KIT    Test blood sugars 4-6 times a day    GLUCOSE BLOOD VI TEST STRIPS (FREESTYLE TEST) STRIP    1 Each by Does Not Apply route See Admin Instructions. Test blood sugars 4-6 times a day. INSULIN NPH/INSULIN REGULAR (NOVOLIN 70/30 U-100 INSULIN) 100 UNIT/ML (70-30) INJECTION    by SubCUTAneous route. LIPASE-PROTEASE-AMYLASE (ZENPEP 10,000) CAPSULE    Take 3 Caps by mouth three (3) times daily (with meals). OTHER    NO DRIVING OR OPERATING HEAVY MACHINERY FOR 6 MONTHS OR UNTIL CLEARED BY NEUROLOGY. PANTOPRAZOLE (PROTONIX) 40 MG TABLET    Take 1 Tab by mouth Daily (before breakfast). POLYETHYLENE GLYCOL (MIRALAX) 17 GRAM PACKET    Take 1 Packet by mouth daily. PROMETHAZINE (PHENERGAN) 25 MG TABLET    Take 1 Tab by mouth every six (6) hours as needed. THERAPEUTIC MULTIVITAMIN (THERAGRAN) TABLET    Take 1 Tab by mouth daily. THIAMINE HCL (B-1) 100 MG TABLET    Take 1 Tab by mouth daily.    These Medications have changed    No medications on file   Stop Taking    No medications on file     _______________________________    Notes:    Ameena Camejo MD using Dragon dictation      _______________________________

## 2019-05-28 NOTE — ED NOTES
Per Dr. Christian Rojas - wait 45 minutes after giving 10 units IV, recheck BG, if 300 or above give 20 units sq and discharge.

## 2019-05-28 NOTE — DISCHARGE INSTRUCTIONS
Patient Education        Type 2 Diabetes: Care Instructions  Your Care Instructions    Type 2 diabetes is a disease that develops when the body's tissues cannot use insulin properly. Over time, the pancreas cannot make enough insulin. Insulin is a hormone that helps the body's cells use sugar (glucose) for energy. It also helps the body store extra sugar in muscle, fat, and liver cells. Without insulin, the sugar cannot get into the cells to do its work. It stays in the blood instead. This can cause high blood sugar levels. A person has diabetes when the blood sugar stays too high too much of the time. Over time, diabetes can lead to diseases of the heart, blood vessels, nerves, kidneys, and eyes. You may be able to control your blood sugar by losing weight, eating a healthy diet, and getting daily exercise. You may also have to take insulin or other diabetes medicine. Follow-up care is a key part of your treatment and safety. Be sure to make and go to all appointments. Call your doctor if you are having problems. It's also a good idea to know your test results and keep a list of the medicines you take. How can you care for yourself at home? · Keep your blood sugar at a target level (which you set with your doctor). ? Eat a good diet that spreads carbohydrate throughout the day. Carbohydrate--the body's main source of fuel--affects blood sugar more than any other nutrient. Carbohydrate is in fruits, vegetables, milk, and yogurt. It also is in breads, cereals, vegetables such as potatoes and corn, and sugary foods such as candy and cakes. ? Aim for 30 minutes of exercise on most, preferably all, days of the week. Walking is a good choice. You also may want to do other activities, such as running, swimming, cycling, or playing tennis or team sports. If your doctor says it's okay, do muscle-strengthening exercises at least 2 times a week. ? Take your medicines exactly as prescribed.  Call your doctor if you think you are having a problem with your medicine. You will get more details on the specific medicines your doctor prescribes. · Check your blood sugar as often as your doctor recommends. It is important to keep track of any symptoms you have, such as low blood sugar. Also tell your doctor if you have any changes in your activities, diet, or insulin use. · Talk to your doctor before you start taking aspirin every day. Aspirin can help certain people lower their risk of a heart attack or stroke. But taking aspirin isn't right for everyone, because it can cause serious bleeding. · Do not smoke. If you need help quitting, talk to your doctor about stop-smoking programs and medicines. These can increase your chances of quitting for good. · Keep your cholesterol and blood pressure at normal levels. You may need to take one or more medicines to reach your goals. Take them exactly as directed. Do not stop or change a medicine without talking to your doctor first.  When should you call for help? Call 911 anytime you think you may need emergency care. For example, call if:    · You passed out (lost consciousness), or you suddenly become very sleepy or confused. (You may have very low blood sugar.)    Call your doctor now or seek immediate medical care if:    · Your blood sugar is 300 mg/dL or is higher than the level your doctor has set for you.     · You have symptoms of low blood sugar, such as:  ? Sweating. ? Feeling nervous, shaky, and weak. ? Extreme hunger and slight nausea. ? Dizziness and headache.  ? Blurred vision. ? Confusion.    Watch closely for changes in your health, and be sure to contact your doctor if:    · You often have problems controlling your blood sugar.     · You have symptoms of long-term diabetes problems, such as:  ? New vision changes. ? New pain, numbness, or tingling in your hands or feet. ? Skin problems. Where can you learn more?   Go to http://thiago-mikhail.info/. Enter C553 in the search box to learn more about \"Type 2 Diabetes: Care Instructions. \"  Current as of: July 25, 2018  Content Version: 11.9  © 9098-5098 [employer]. Care instructions adapted under license by [employer] (which disclaims liability or warranty for this information). If you have questions about a medical condition or this instruction, always ask your healthcare professional. [place] [address] any warranty or liability for your use of this information.

## 2019-05-28 NOTE — ED NOTES
I have reviewed discharge instructions with the patient. The patient verbalized understanding. Patient NAD, VSS and denies pain at discharge. Patient armband removed and shredded.

## 2019-06-05 NOTE — ED NOTES
Problem: Pain  Goal: Verbalize satisfaction of level of comfort and symptom control  Outcome: Not Met  Note:   Monitor and assess for signs and symptoms of pain. Treat pain as needed. Problem: Comfort Deficit  Goal: Reduce/control pain  Description  Patient will report that pain has been reduced or controlled through verbal and nonverbal means and that measures to promote comfort are effective. Outcome: Not Met  Note:   Monitor for signs and symptoms of discomfort     Problem: Falls - Risk of  Goal: *Absence of Falls  Description  Document Ana Moreno Fall Risk and appropriate interventions in the flowsheet.   Outcome: Not Met  Note:   Fall Risk Interventions:       Mentation Interventions: Adequate sleep, hydration, pain control, Door open when patient unattended, Evaluate medications/consider consulting pharmacy, Eyeglasses and hearing aids, Room close to nurse's station, Toileting rounds, Update white board    Medication Interventions: Evaluate medications/consider consulting pharmacy    Elimination Interventions: Call light in reach TRANSFER - OUT REPORT: 
 
Verbal report given to [facility] RERICA(name) on Terry Waddell  being transferred to  [id number] (unit) for routine progression of care Report consisted of patients Situation, Background, Assessment and  
Recommendations(SBAR). Information from the following report(s) SBAR, ED Summary, Intake/Output, MAR and Recent Results was reviewed with the receiving nurse. Lines:  
Peripheral IV 05/19/19 Right;Upper Arm (Active) Site Assessment Clean, dry, & intact 5/19/2019 12:12 AM  
Phlebitis Assessment 0 5/19/2019 12:12 AM  
Infiltration Assessment 0 5/19/2019 12:12 AM  
Dressing Status Clean, dry, & intact 5/19/2019 12:12 AM  
Dressing Type Transparent 5/19/2019 12:12 AM  
Hub Color/Line Status Pink 5/19/2019 12:12 AM  
  
 
Opportunity for questions and clarification was provided. Patient transported with: 
 Registered Nurse

## 2019-06-27 ENCOUNTER — APPOINTMENT (OUTPATIENT)
Dept: GENERAL RADIOLOGY | Age: 54
End: 2019-06-27
Attending: EMERGENCY MEDICINE
Payer: MEDICAID

## 2019-06-27 ENCOUNTER — APPOINTMENT (OUTPATIENT)
Dept: CT IMAGING | Age: 54
End: 2019-06-27
Attending: EMERGENCY MEDICINE
Payer: MEDICAID

## 2019-06-27 ENCOUNTER — HOSPITAL ENCOUNTER (OUTPATIENT)
Age: 54
Setting detail: OBSERVATION
Discharge: HOME | End: 2019-06-28
Attending: EMERGENCY MEDICINE | Admitting: HOSPITALIST
Payer: MEDICAID

## 2019-06-27 DIAGNOSIS — N39.0 ACUTE UTI: ICD-10-CM

## 2019-06-27 DIAGNOSIS — N17.9 ACUTE KIDNEY INJURY (HCC): ICD-10-CM

## 2019-06-27 DIAGNOSIS — R73.9 ACUTE HYPERGLYCEMIA: Primary | ICD-10-CM

## 2019-06-27 DIAGNOSIS — T81.31XD OPEN ABDOMINAL INCISION WITH DRAINAGE, SUBSEQUENT ENCOUNTER: ICD-10-CM

## 2019-06-27 DIAGNOSIS — L03.311 CELLULITIS, ABDOMINAL WALL: ICD-10-CM

## 2019-06-27 DIAGNOSIS — E87.1 ACUTE HYPONATREMIA: ICD-10-CM

## 2019-06-27 LAB
ADMINISTERED INITIALS, ADMINIT: NORMAL
ALBUMIN SERPL-MCNC: 3.9 G/DL (ref 3.4–5)
ALBUMIN/GLOB SERPL: 0.7 {RATIO} (ref 0.8–1.7)
ALP SERPL-CCNC: 209 U/L (ref 45–117)
ALT SERPL-CCNC: 34 U/L (ref 16–61)
ANION GAP SERPL CALC-SCNC: 12 MMOL/L (ref 3–18)
ANION GAP SERPL CALC-SCNC: 12 MMOL/L (ref 3–18)
ANION GAP SERPL CALC-SCNC: 15 MMOL/L (ref 3–18)
APPEARANCE UR: CLEAR
AST SERPL-CCNC: 13 U/L (ref 15–37)
BACTERIA URNS QL MICRO: ABNORMAL /HPF
BASOPHILS # BLD: 0 K/UL (ref 0–0.1)
BASOPHILS NFR BLD: 0 % (ref 0–2)
BILIRUB SERPL-MCNC: 0.6 MG/DL (ref 0.2–1)
BILIRUB UR QL: NEGATIVE
BUN SERPL-MCNC: 33 MG/DL (ref 7–18)
BUN SERPL-MCNC: 40 MG/DL (ref 7–18)
BUN SERPL-MCNC: 54 MG/DL (ref 7–18)
BUN/CREAT SERPL: 22 (ref 12–20)
BUN/CREAT SERPL: 25 (ref 12–20)
BUN/CREAT SERPL: 28 (ref 12–20)
CALCIUM SERPL-MCNC: 8 MG/DL (ref 8.5–10.1)
CALCIUM SERPL-MCNC: 8.6 MG/DL (ref 8.5–10.1)
CALCIUM SERPL-MCNC: 8.7 MG/DL (ref 8.5–10.1)
CHLORIDE SERPL-SCNC: 100 MMOL/L (ref 100–108)
CHLORIDE SERPL-SCNC: 81 MMOL/L (ref 100–108)
CHLORIDE SERPL-SCNC: 99 MMOL/L (ref 100–108)
CO2 SERPL-SCNC: 17 MMOL/L (ref 21–32)
CO2 SERPL-SCNC: 17 MMOL/L (ref 21–32)
CO2 SERPL-SCNC: 20 MMOL/L (ref 21–32)
COLOR UR: YELLOW
CREAT SERPL-MCNC: 1.45 MG/DL (ref 0.6–1.3)
CREAT SERPL-MCNC: 1.47 MG/DL (ref 0.6–1.3)
CREAT SERPL-MCNC: 2.18 MG/DL (ref 0.6–1.3)
D50 ADMINISTERED, D50ADM: 0 ML
D50 ORDER, D50ORD: 0 ML
DIFFERENTIAL METHOD BLD: ABNORMAL
EOSINOPHIL # BLD: 0.1 K/UL (ref 0–0.4)
EOSINOPHIL NFR BLD: 1 % (ref 0–5)
EPITH CASTS URNS QL MICRO: ABNORMAL /LPF (ref 0–5)
ERYTHROCYTE [DISTWIDTH] IN BLOOD BY AUTOMATED COUNT: 14.6 % (ref 11.6–14.5)
EST. AVERAGE GLUCOSE BLD GHB EST-MCNC: 352 MG/DL
EST. AVERAGE GLUCOSE BLD GHB EST-MCNC: 372 MG/DL
ETHANOL SERPL-MCNC: <3 MG/DL (ref 0–3)
GLOBULIN SER CALC-MCNC: 5.3 G/DL (ref 2–4)
GLUCOSE BLD STRIP.AUTO-MCNC: 122 MG/DL (ref 70–110)
GLUCOSE BLD STRIP.AUTO-MCNC: 139 MG/DL (ref 70–110)
GLUCOSE BLD STRIP.AUTO-MCNC: 157 MG/DL (ref 70–110)
GLUCOSE BLD STRIP.AUTO-MCNC: 227 MG/DL (ref 70–110)
GLUCOSE BLD STRIP.AUTO-MCNC: 265 MG/DL (ref 70–110)
GLUCOSE BLD STRIP.AUTO-MCNC: 271 MG/DL (ref 70–110)
GLUCOSE BLD STRIP.AUTO-MCNC: 300 MG/DL (ref 70–110)
GLUCOSE BLD STRIP.AUTO-MCNC: 318 MG/DL (ref 70–110)
GLUCOSE BLD STRIP.AUTO-MCNC: 332 MG/DL (ref 70–110)
GLUCOSE BLD STRIP.AUTO-MCNC: 469 MG/DL (ref 70–110)
GLUCOSE BLD STRIP.AUTO-MCNC: 546 MG/DL (ref 70–110)
GLUCOSE BLD STRIP.AUTO-MCNC: 98 MG/DL (ref 70–110)
GLUCOSE BLD STRIP.AUTO-MCNC: >600 MG/DL (ref 70–110)
GLUCOSE BLD STRIP.AUTO-MCNC: >600 MG/DL (ref 70–110)
GLUCOSE SERPL-MCNC: 140 MG/DL (ref 74–99)
GLUCOSE SERPL-MCNC: 148 MG/DL (ref 74–99)
GLUCOSE SERPL-MCNC: 993 MG/DL (ref 74–99)
GLUCOSE UR STRIP.AUTO-MCNC: >1000 MG/DL
GLUCOSE, GLC: 127 MG/DL
GLUCOSE, GLC: 139 MG/DL
GLUCOSE, GLC: 157 MG/DL
GLUCOSE, GLC: 227 MG/DL
GLUCOSE, GLC: 265 MG/DL
GLUCOSE, GLC: 271 MG/DL
GLUCOSE, GLC: 300 MG/DL
GLUCOSE, GLC: 313 MG/DL
GLUCOSE, GLC: 332 MG/DL
GLUCOSE, GLC: 363 MG/DL
GLUCOSE, GLC: 469 MG/DL
GLUCOSE, GLC: 546 MG/DL
GLUCOSE, GLC: 602 MG/DL
GLUCOSE, GLC: 603 MG/DL
GLUCOSE, GLC: 98 MG/DL
GLUCOSE, GLC: 993 MG/DL
HBA1C MFR BLD: 13.9 % (ref 4.2–5.6)
HBA1C MFR BLD: 14.6 % (ref 4.2–5.6)
HCT VFR BLD AUTO: 43.3 % (ref 36–48)
HGB BLD-MCNC: 14.3 G/DL (ref 13–16)
HGB UR QL STRIP: ABNORMAL
HIGH TARGET, HITG: 180 MG/DL
INSULIN ADMINSTERED, INSADM: 0 UNITS/HOUR
INSULIN ADMINSTERED, INSADM: 0.8 UNITS/HOUR
INSULIN ADMINSTERED, INSADM: 1.9 UNITS/HOUR
INSULIN ADMINSTERED, INSADM: 10.9 UNITS/HOUR
INSULIN ADMINSTERED, INSADM: 14.6 UNITS/HOUR
INSULIN ADMINSTERED, INSADM: 18.7 UNITS/HOUR
INSULIN ADMINSTERED, INSADM: 2.5 UNITS/HOUR
INSULIN ADMINSTERED, INSADM: 2.7 UNITS/HOUR
INSULIN ADMINSTERED, INSADM: 3.3 UNITS/HOUR
INSULIN ADMINSTERED, INSADM: 4.1 UNITS/HOUR
INSULIN ADMINSTERED, INSADM: 4.2 UNITS/HOUR
INSULIN ADMINSTERED, INSADM: 5.4 UNITS/HOUR
INSULIN ADMINSTERED, INSADM: 7.2 UNITS/HOUR
INSULIN ADMINSTERED, INSADM: 8.2 UNITS/HOUR
INSULIN ORDER, INSORD: 0 UNITS/HOUR
INSULIN ORDER, INSORD: 0.8 UNITS/HOUR
INSULIN ORDER, INSORD: 1.9 UNITS/HOUR
INSULIN ORDER, INSORD: 10.9 UNITS/HOUR
INSULIN ORDER, INSORD: 14.6 UNITS/HOUR
INSULIN ORDER, INSORD: 18.7 UNITS/HOUR
INSULIN ORDER, INSORD: 2.5 UNITS/HOUR
INSULIN ORDER, INSORD: 2.7 UNITS/HOUR
INSULIN ORDER, INSORD: 3.3 UNITS/HOUR
INSULIN ORDER, INSORD: 4.1 UNITS/HOUR
INSULIN ORDER, INSORD: 4.2 UNITS/HOUR
INSULIN ORDER, INSORD: 5.4 UNITS/HOUR
INSULIN ORDER, INSORD: 7.2 UNITS/HOUR
INSULIN ORDER, INSORD: 8.2 UNITS/HOUR
KETONES UR QL STRIP.AUTO: ABNORMAL MG/DL
LACTATE BLD-SCNC: 1.68 MMOL/L (ref 0.4–2)
LEUKOCYTE ESTERASE UR QL STRIP.AUTO: NEGATIVE
LIPASE SERPL-CCNC: 73 U/L (ref 73–393)
LOW TARGET, LOT: 140 MG/DL
LYMPHOCYTES # BLD: 1.1 K/UL (ref 0.9–3.6)
LYMPHOCYTES NFR BLD: 13 % (ref 21–52)
MAGNESIUM SERPL-MCNC: 2.6 MG/DL (ref 1.6–2.6)
MAGNESIUM SERPL-MCNC: 2.7 MG/DL (ref 1.6–2.6)
MAGNESIUM SERPL-MCNC: 2.8 MG/DL (ref 1.6–2.6)
MAGNESIUM SERPL-MCNC: 3.2 MG/DL (ref 1.6–2.6)
MCH RBC QN AUTO: 28.3 PG (ref 24–34)
MCHC RBC AUTO-ENTMCNC: 33 G/DL (ref 31–37)
MCV RBC AUTO: 85.6 FL (ref 74–97)
MINUTES UNTIL NEXT BG, NBG: 60 MIN
MONOCYTES # BLD: 0.5 K/UL (ref 0.05–1.2)
MONOCYTES NFR BLD: 6 % (ref 3–10)
MULTIPLIER, MUL: 0
MULTIPLIER, MUL: 0.01
MULTIPLIER, MUL: 0.02
MULTIPLIER, MUL: 0.03
MULTIPLIER, MUL: 0.03
NEUTS SEG # BLD: 6.9 K/UL (ref 1.8–8)
NEUTS SEG NFR BLD: 80 % (ref 40–73)
NITRITE UR QL STRIP.AUTO: NEGATIVE
ORDER INITIALS, ORDINIT: NORMAL
PH UR STRIP: 5.5 [PH] (ref 5–8)
PHOSPHATE SERPL-MCNC: 4.4 MG/DL (ref 2.5–4.9)
PLATELET # BLD AUTO: 303 K/UL (ref 135–420)
PMV BLD AUTO: 9.9 FL (ref 9.2–11.8)
POTASSIUM SERPL-SCNC: 3.5 MMOL/L (ref 3.5–5.5)
POTASSIUM SERPL-SCNC: 3.6 MMOL/L (ref 3.5–5.5)
POTASSIUM SERPL-SCNC: 5 MMOL/L (ref 3.5–5.5)
PROT SERPL-MCNC: 9.2 G/DL (ref 6.4–8.2)
PROT UR STRIP-MCNC: ABNORMAL MG/DL
RBC # BLD AUTO: 5.06 M/UL (ref 4.7–5.5)
RBC #/AREA URNS HPF: ABNORMAL /HPF (ref 0–5)
SODIUM SERPL-SCNC: 113 MMOL/L (ref 136–145)
SODIUM SERPL-SCNC: 129 MMOL/L (ref 136–145)
SODIUM SERPL-SCNC: 131 MMOL/L (ref 136–145)
SP GR UR REFRACTOMETRY: 1.03 (ref 1–1.03)
UROBILINOGEN UR QL STRIP.AUTO: 0.2 EU/DL (ref 0.2–1)
WBC # BLD AUTO: 8.6 K/UL (ref 4.6–13.2)
WBC URNS QL MICRO: ABNORMAL /HPF (ref 0–4)

## 2019-06-27 PROCEDURE — 83036 HEMOGLOBIN GLYCOSYLATED A1C: CPT

## 2019-06-27 PROCEDURE — 96376 TX/PRO/DX INJ SAME DRUG ADON: CPT

## 2019-06-27 PROCEDURE — 74011636637 HC RX REV CODE- 636/637: Performed by: HOSPITALIST

## 2019-06-27 PROCEDURE — 87086 URINE CULTURE/COLONY COUNT: CPT

## 2019-06-27 PROCEDURE — 99218 HC RM OBSERVATION: CPT

## 2019-06-27 PROCEDURE — 93005 ELECTROCARDIOGRAM TRACING: CPT

## 2019-06-27 PROCEDURE — 99285 EMERGENCY DEPT VISIT HI MDM: CPT

## 2019-06-27 PROCEDURE — 83735 ASSAY OF MAGNESIUM: CPT

## 2019-06-27 PROCEDURE — 65270000029 HC RM PRIVATE

## 2019-06-27 PROCEDURE — 84100 ASSAY OF PHOSPHORUS: CPT

## 2019-06-27 PROCEDURE — 96366 THER/PROPH/DIAG IV INF ADDON: CPT

## 2019-06-27 PROCEDURE — 36415 COLL VENOUS BLD VENIPUNCTURE: CPT

## 2019-06-27 PROCEDURE — 71045 X-RAY EXAM CHEST 1 VIEW: CPT

## 2019-06-27 PROCEDURE — 80048 BASIC METABOLIC PNL TOTAL CA: CPT

## 2019-06-27 PROCEDURE — 74011250636 HC RX REV CODE- 250/636: Performed by: EMERGENCY MEDICINE

## 2019-06-27 PROCEDURE — 74011636637 HC RX REV CODE- 636/637: Performed by: EMERGENCY MEDICINE

## 2019-06-27 PROCEDURE — 74011250636 HC RX REV CODE- 250/636: Performed by: HOSPITALIST

## 2019-06-27 PROCEDURE — 96365 THER/PROPH/DIAG IV INF INIT: CPT

## 2019-06-27 PROCEDURE — 96361 HYDRATE IV INFUSION ADD-ON: CPT

## 2019-06-27 PROCEDURE — 74011250637 HC RX REV CODE- 250/637: Performed by: HOSPITALIST

## 2019-06-27 PROCEDURE — 82962 GLUCOSE BLOOD TEST: CPT

## 2019-06-27 PROCEDURE — 83605 ASSAY OF LACTIC ACID: CPT

## 2019-06-27 PROCEDURE — 96372 THER/PROPH/DIAG INJ SC/IM: CPT

## 2019-06-27 PROCEDURE — 83690 ASSAY OF LIPASE: CPT

## 2019-06-27 PROCEDURE — 96375 TX/PRO/DX INJ NEW DRUG ADDON: CPT

## 2019-06-27 PROCEDURE — 81001 URINALYSIS AUTO W/SCOPE: CPT

## 2019-06-27 PROCEDURE — 74176 CT ABD & PELVIS W/O CONTRAST: CPT

## 2019-06-27 PROCEDURE — 85025 COMPLETE CBC W/AUTO DIFF WBC: CPT

## 2019-06-27 PROCEDURE — 77030018836 HC SOL IRR NACL ICUM -A

## 2019-06-27 PROCEDURE — 96368 THER/DIAG CONCURRENT INF: CPT

## 2019-06-27 PROCEDURE — 80307 DRUG TEST PRSMV CHEM ANLYZR: CPT

## 2019-06-27 PROCEDURE — 87040 BLOOD CULTURE FOR BACTERIA: CPT

## 2019-06-27 PROCEDURE — 80053 COMPREHEN METABOLIC PANEL: CPT

## 2019-06-27 PROCEDURE — 74011000258 HC RX REV CODE- 258: Performed by: EMERGENCY MEDICINE

## 2019-06-27 PROCEDURE — 74011000250 HC RX REV CODE- 250: Performed by: EMERGENCY MEDICINE

## 2019-06-27 PROCEDURE — 96367 TX/PROPH/DG ADDL SEQ IV INF: CPT

## 2019-06-27 RX ORDER — INSULIN GLARGINE 100 [IU]/ML
10 INJECTION, SOLUTION SUBCUTANEOUS ONCE
Status: COMPLETED | OUTPATIENT
Start: 2019-06-27 | End: 2019-06-27

## 2019-06-27 RX ORDER — PANTOPRAZOLE SODIUM 40 MG/1
40 TABLET, DELAYED RELEASE ORAL
Status: DISCONTINUED | OUTPATIENT
Start: 2019-06-28 | End: 2019-06-29 | Stop reason: HOSPADM

## 2019-06-27 RX ORDER — MORPHINE SULFATE 4 MG/ML
2 INJECTION INTRAVENOUS
Status: COMPLETED | OUTPATIENT
Start: 2019-06-27 | End: 2019-06-27

## 2019-06-27 RX ORDER — PROMETHAZINE HYDROCHLORIDE 25 MG/ML
25 INJECTION, SOLUTION INTRAMUSCULAR; INTRAVENOUS
Status: COMPLETED | OUTPATIENT
Start: 2019-06-27 | End: 2019-06-27

## 2019-06-27 RX ORDER — TRAMADOL HYDROCHLORIDE 50 MG/1
50 TABLET ORAL
Status: DISCONTINUED | OUTPATIENT
Start: 2019-06-27 | End: 2019-06-29 | Stop reason: HOSPADM

## 2019-06-27 RX ORDER — SUCRALFATE 1 G/1
1 TABLET ORAL 4 TIMES DAILY
Status: DISCONTINUED | OUTPATIENT
Start: 2019-06-27 | End: 2019-06-29 | Stop reason: HOSPADM

## 2019-06-27 RX ORDER — PROMETHAZINE HYDROCHLORIDE 25 MG/ML
INJECTION, SOLUTION INTRAMUSCULAR; INTRAVENOUS
Status: DISPENSED
Start: 2019-06-27 | End: 2019-06-27

## 2019-06-27 RX ORDER — ZOLPIDEM TARTRATE 5 MG/1
5 TABLET ORAL
Status: DISCONTINUED | OUTPATIENT
Start: 2019-06-27 | End: 2019-06-29 | Stop reason: HOSPADM

## 2019-06-27 RX ORDER — ACETAMINOPHEN 325 MG/1
650 TABLET ORAL
Status: DISCONTINUED | OUTPATIENT
Start: 2019-06-27 | End: 2019-06-29 | Stop reason: HOSPADM

## 2019-06-27 RX ORDER — ASPIRIN 325 MG/1
100 TABLET, FILM COATED ORAL DAILY
Status: DISCONTINUED | OUTPATIENT
Start: 2019-06-28 | End: 2019-06-29 | Stop reason: HOSPADM

## 2019-06-27 RX ORDER — MAGNESIUM SULFATE 100 %
4 CRYSTALS MISCELLANEOUS AS NEEDED
Status: DISCONTINUED | OUTPATIENT
Start: 2019-06-27 | End: 2019-06-29 | Stop reason: HOSPADM

## 2019-06-27 RX ORDER — DEXTROSE MONOHYDRATE 100 MG/ML
250 INJECTION, SOLUTION INTRAVENOUS ONCE
Status: DISCONTINUED | OUTPATIENT
Start: 2019-06-27 | End: 2019-06-27

## 2019-06-27 RX ORDER — INSULIN LISPRO 100 [IU]/ML
INJECTION, SOLUTION INTRAVENOUS; SUBCUTANEOUS
Status: DISCONTINUED | OUTPATIENT
Start: 2019-06-27 | End: 2019-06-29 | Stop reason: HOSPADM

## 2019-06-27 RX ORDER — ONDANSETRON 2 MG/ML
4 INJECTION INTRAMUSCULAR; INTRAVENOUS
Status: DISCONTINUED | OUTPATIENT
Start: 2019-06-27 | End: 2019-06-27 | Stop reason: SDUPTHER

## 2019-06-27 RX ORDER — PROMETHAZINE HYDROCHLORIDE 25 MG/1
25 TABLET ORAL
Status: DISCONTINUED | OUTPATIENT
Start: 2019-06-27 | End: 2019-06-29 | Stop reason: HOSPADM

## 2019-06-27 RX ORDER — LEVOFLOXACIN 5 MG/ML
750 INJECTION, SOLUTION INTRAVENOUS
Status: DISCONTINUED | OUTPATIENT
Start: 2019-06-27 | End: 2019-06-28

## 2019-06-27 RX ORDER — ASPIRIN 81 MG/1
81 TABLET ORAL DAILY
Status: DISCONTINUED | OUTPATIENT
Start: 2019-06-28 | End: 2019-06-29 | Stop reason: HOSPADM

## 2019-06-27 RX ORDER — MAGNESIUM SULFATE 100 %
4 CRYSTALS MISCELLANEOUS AS NEEDED
Status: DISCONTINUED | OUTPATIENT
Start: 2019-06-27 | End: 2019-06-27 | Stop reason: SDUPTHER

## 2019-06-27 RX ORDER — POTASSIUM CHLORIDE AND SODIUM CHLORIDE 900; 300 MG/100ML; MG/100ML
INJECTION, SOLUTION INTRAVENOUS CONTINUOUS
Status: DISPENSED | OUTPATIENT
Start: 2019-06-27 | End: 2019-06-28

## 2019-06-27 RX ORDER — VANCOMYCIN/0.9 % SOD CHLORIDE 1.5G/250ML
1500 PLASTIC BAG, INJECTION (ML) INTRAVENOUS ONCE
Status: COMPLETED | OUTPATIENT
Start: 2019-06-27 | End: 2019-06-27

## 2019-06-27 RX ORDER — THERA TABS 400 MCG
1 TAB ORAL DAILY
Status: DISCONTINUED | OUTPATIENT
Start: 2019-06-28 | End: 2019-06-29 | Stop reason: HOSPADM

## 2019-06-27 RX ORDER — SODIUM CHLORIDE 0.9 % (FLUSH) 0.9 %
5-10 SYRINGE (ML) INJECTION AS NEEDED
Status: DISCONTINUED | OUTPATIENT
Start: 2019-06-27 | End: 2019-06-29 | Stop reason: HOSPADM

## 2019-06-27 RX ORDER — POLYETHYLENE GLYCOL 3350 17 G/17G
17 POWDER, FOR SOLUTION ORAL DAILY
Status: DISCONTINUED | OUTPATIENT
Start: 2019-06-28 | End: 2019-06-29 | Stop reason: HOSPADM

## 2019-06-27 RX ORDER — LEVETIRACETAM 500 MG/1
1000 TABLET ORAL 2 TIMES DAILY
Status: DISCONTINUED | OUTPATIENT
Start: 2019-06-27 | End: 2019-06-29 | Stop reason: HOSPADM

## 2019-06-27 RX ORDER — PROMETHAZINE HYDROCHLORIDE 25 MG/1
25 TABLET ORAL
Status: DISCONTINUED | OUTPATIENT
Start: 2019-06-27 | End: 2019-06-27 | Stop reason: SDUPTHER

## 2019-06-27 RX ADMIN — INSULIN GLARGINE 10 UNITS: 100 INJECTION, SOLUTION SUBCUTANEOUS at 14:21

## 2019-06-27 RX ADMIN — PROMETHAZINE HYDROCHLORIDE 25 MG: 25 TABLET ORAL at 17:49

## 2019-06-27 RX ADMIN — TRAMADOL HYDROCHLORIDE 50 MG: 50 TABLET, FILM COATED ORAL at 09:57

## 2019-06-27 RX ADMIN — FOLIC ACID: 5 INJECTION, SOLUTION INTRAMUSCULAR; INTRAVENOUS; SUBCUTANEOUS at 01:41

## 2019-06-27 RX ADMIN — PROMETHAZINE HYDROCHLORIDE 25 MG: 25 INJECTION INTRAMUSCULAR; INTRAVENOUS at 01:20

## 2019-06-27 RX ADMIN — SODIUM CHLORIDE 18.7 UNITS/HR: 900 INJECTION, SOLUTION INTRAVENOUS at 02:17

## 2019-06-27 RX ADMIN — LEVOFLOXACIN 750 MG: 5 INJECTION, SOLUTION INTRAVENOUS at 03:54

## 2019-06-27 RX ADMIN — SODIUM CHLORIDE 1000 ML: 900 INJECTION, SOLUTION INTRAVENOUS at 01:18

## 2019-06-27 RX ADMIN — PROMETHAZINE HYDROCHLORIDE 25 MG: 25 TABLET ORAL at 09:57

## 2019-06-27 RX ADMIN — SODIUM CHLORIDE AND POTASSIUM CHLORIDE: 9; 2.98 INJECTION, SOLUTION INTRAVENOUS at 14:41

## 2019-06-27 RX ADMIN — SODIUM CHLORIDE 3.3 UNITS/HR: 900 INJECTION, SOLUTION INTRAVENOUS at 12:00

## 2019-06-27 RX ADMIN — VANCOMYCIN HYDROCHLORIDE 1500 MG: 10 INJECTION, POWDER, LYOPHILIZED, FOR SOLUTION INTRAVENOUS at 05:40

## 2019-06-27 RX ADMIN — MORPHINE SULFATE 2 MG: 4 INJECTION INTRAVENOUS at 02:56

## 2019-06-27 RX ADMIN — MORPHINE SULFATE 2 MG: 4 INJECTION INTRAVENOUS at 06:06

## 2019-06-27 RX ADMIN — LEVETIRACETAM 1000 MG: 500 TABLET ORAL at 21:50

## 2019-06-27 RX ADMIN — SUCRALFATE 1 G: 1 TABLET ORAL at 21:50

## 2019-06-27 RX ADMIN — ACETAMINOPHEN 650 MG: 325 TABLET, FILM COATED ORAL at 19:16

## 2019-06-27 NOTE — ED PROVIDER NOTES
Barry Ramos is a 47 y.o. Male with history of chronic alcohol abuse chronic liver disease chronic pancreatitis, pancreatic fistula who was recently admitted for hyperglycemia and discharged on 20 June with complaints of drainage from his previous ostomy site on the right flank for the last couple days. Is slightly erythematous and is draining yellow fluid. Patient denies any fever but has had intermittent vomiting. He states he has had believe the last 2 days. He has making urine but has no burning when he pees. He has some chronic diarrhea.   symptoms are worse with any activity or exertion. The history is provided by the patient and medical records.         Past Medical History:   Diagnosis Date    Abdominal pain, generalized     Chronic pancreatitis ([address]     [address] [address]     hypothyroid    Encounter for long-term (current) use of other medications     Essential hypertension     ETOH abuse     GERD (gastroesophageal reflux disease)     Heart failure (HCC)     Hyponatremia     Pain in the abdomen 11/2/2010    Pancreatitis     w/ abscess and pseudocyst    Pancreatitis     Psychiatric disorder     depression, anxiety    Tobacco abuse        Past Surgical History:   Procedure Laterality Date    HX ABDOMINAL LAPAROSCOPY      PANCREATIC STENT    HX CHOLECYSTECTOMY           Family History:   Problem Relation Age of Onset    Heart Disease Father        Social History     Socioeconomic History    Marital status: SINGLE     Spouse name: Not on file    Number of children: Not on file    Years of education: Not on file    Highest education level: Not on file   Occupational History    Not on file   Social Needs    Financial resource strain: Not on file    Food insecurity:     Worry: Not on file     Inability: Not on file    Transportation needs:     Medical: Not on file     Non-medical: Not on file   Tobacco Use    Smoking status: Current Every Day Smoker     Packs/day: 0.50     Years: 0.00     Pack years: 0.00     Types: Cigarettes    Smokeless tobacco: Never Used   Substance and Sexual Activity    Alcohol use: Yes    Drug use: No    Sexual activity: Yes     Birth control/protection: None   Lifestyle    Physical activity:     Days per week: Not on file     Minutes per session: Not on file    Stress: Not on file   Relationships    Social connections:     Talks on phone: Not on file     Gets together: Not on file     Attends [employer] service: Not on file     Active member of club or organization: Not on file     Attends meetings of clubs or organizations: Not on file     Relationship status: Not on file    Intimate partner violence:     Fear of current or ex partner: Not on file     Emotionally abused: Not on file     Physically abused: Not on file     Forced sexual activity: Not on file   Other Topics Concern    Not on file   Social History Narrative    Not on file         ALLERGIES: Ampicillin-sulbactam; Pcn [penicillins]; Piperacillin-tazobactam; Pollen extracts; and Seafood [shellfish containing products]    Review of Systems   Constitutional: Positive for appetite change and fatigue. Negative for fever. HENT: Negative for sore throat. Eyes: Negative for visual disturbance. Respiratory: Positive for cough. Cardiovascular: Negative for chest pain. Gastrointestinal: Positive for abdominal pain. Endocrine: Negative for polyuria. Genitourinary: Positive for decreased urine volume. Musculoskeletal: Positive for gait problem. Skin: Positive for wound. Allergic/Immunologic: Positive for food allergies. Neurological: Positive for light-headedness. Psychiatric/Behavioral: Positive for sleep disturbance.        Vitals:    06/27/19 0033 06/27/19 0035 06/27/19 0037 06/27/19 0300   BP: 103/72  105/82 106/69   Pulse:   88 86   Resp:   20 21   Temp:       SpO2:  100% 100% 100%   Weight:       Height:                Physical Exam   Constitutional: He is oriented to person, place, and time. Non-toxic appearance. He does not appear ill. He appears distressed. Patient has a chronic sickly and ill appearance but does not look any more acutely ill than he has in the past.   HENT:   Head: Normocephalic and atraumatic. Right Ear: External ear normal.   Left Ear: External ear normal.   Nose: Nose normal.   Mouth/Throat: Oropharynx is clear and moist. No oropharyngeal exudate. Eyes: Conjunctivae are normal.   Neck: Normal range of motion. Cardiovascular: Normal rate, regular rhythm, normal heart sounds and intact distal pulses. Pulmonary/Chest: Effort normal and breath sounds normal. No respiratory distress. Abdominal: Soft. He exhibits no distension and no mass. There is no tenderness. There is guarding. There is no rebound. There is a opening in the right flank/abdominal wall with slight erythema surrounding it and yellow type fluid. There is minimal tenderness. Musculoskeletal: Normal range of motion. He exhibits edema. Neurological: He is alert and oriented to person, place, and time. Skin: Skin is warm and dry. Capillary refill takes less than 2 seconds. He is not diaphoretic. Psychiatric: His behavior is normal.   Nursing note and vitals reviewed.        MDM       Procedures    Vitals:  Patient Vitals for the past 12 hrs:   Temp Pulse Resp BP SpO2   06/27/19 0300  86 21 106/69 100 %   06/27/19 0037  88 20 105/82 100 %   06/27/19 0035     100 %   06/27/19 0033    103/72    06/27/19 0009 97.1 °F (36.2 °C) 97 16 (!) 88/65 99 %         Medications ordered:   Medications   sodium chloride (NS) flush 5-10 mL (has no administration in time range)   ondansetron (ZOFRAN) injection 4 mg (4 mg IntraVENous Refused 6/27/19 0050)   insulin regular (NOVOLIN R, HUMULIN R) 100 Units in 0.9% sodium chloride 100 mL infusion (18.7 Units/hr IntraVENous New Bag 6/27/19 0217)   glucose chewable tablet 16 g (has no administration in time range)   glucagon (GLUCAGEN) injection 1 mg (has no administration in time range)   promethazine (PHENERGAN) 25 mg/mL injection (has no administration in time range)   dextrose 10 % infusion 125-250 mL (has no administration in time range)   insulin regular (Novolin,Humulin) premix infusion (has no administration in time range)   levoFLOXacin (LEVAQUIN) 750 mg in D5W IVPB (has no administration in time range)   vancomycin (VANCOCIN) 1,000 mg in 0.9% sodium chloride (MBP/ADV) 250 mL adv (has no administration in time range)   sodium chloride 0.9 % bolus infusion 1,000 mL (1,000 mL IntraVENous New Bag 6/27/19 0118)   0.9% sodium chloride 1,000 mL with mvi, adult no. 4 with vit K 10 mL, thiamine 960 mg, folic acid 1 mg, magnesium sulfate 2 g infusion ( IntraVENous New Bag 6/27/19 0141)   promethazine (PHENERGAN) injection 25 mg (25 mg IntraMUSCular Given 6/27/19 0120)   morphine injection 2 mg (2 mg IntraVENous Given 6/27/19 0256)         Lab findings:  Recent Results (from the past 12 hour(s))   METABOLIC PANEL, COMPREHENSIVE    Collection Time: 06/27/19  1:00 AM   Result Value Ref Range    Sodium 113 (LL) 136 - 145 mmol/L    Potassium 5.0 3.5 - 5.5 mmol/L    Chloride 81 (L) 100 - 108 mmol/L    CO2 17 (L) 21 - 32 mmol/L    Anion gap 15 3.0 - 18 mmol/L    Glucose 993 (HH) 74 - 99 mg/dL    BUN 54 (H) 7.0 - 18 MG/DL    Creatinine 2.18 (H) 0.6 - 1.3 MG/DL    BUN/Creatinine ratio 25 (H) 12 - 20      GFR est AA 38 (L) >60 ml/min/1.73m2    GFR est non-AA 32 (L) >60 ml/min/1.73m2    Calcium 8.6 8.5 - 10.1 MG/DL    Bilirubin, total 0.6 0.2 - 1.0 MG/DL    ALT (SGPT) 34 16 - 61 U/L    AST (SGOT) 13 (L) 15 - 37 U/L    Alk.  phosphatase 209 (H) 45 - 117 U/L    Protein, total 9.2 (H) 6.4 - 8.2 g/dL    Albumin 3.9 3.4 - 5.0 g/dL    Globulin 5.3 (H) 2.0 - 4.0 g/dL    A-G Ratio 0.7 (L) 0.8 - 1.7     CBC WITH AUTOMATED DIFF    Collection Time: 06/27/19  1:00 AM   Result Value Ref Range    WBC 8.6 4.6 - 13.2 K/uL    RBC 5.06 4.70 - 5.50 M/uL    HGB 14.3 13.0 - 16.0 g/dL    HCT 43.3 36.0 - 48.0 %    MCV 85.6 74.0 - 97.0 FL    MCH 28.3 24.0 - 34.0 PG    MCHC 33.0 31.0 - 37.0 g/dL    RDW 14.6 (H) 11.6 - 14.5 %    PLATELET 573 661 - 527 K/uL    MPV 9.9 9.2 - 11.8 FL    NEUTROPHILS 80 (H) 40 - 73 %    LYMPHOCYTES 13 (L) 21 - 52 %    MONOCYTES 6 3 - 10 %    EOSINOPHILS 1 0 - 5 %    BASOPHILS 0 0 - 2 %    ABS. NEUTROPHILS 6.9 1.8 - 8.0 K/UL    ABS. LYMPHOCYTES 1.1 0.9 - 3.6 K/UL    ABS. MONOCYTES 0.5 0.05 - 1.2 K/UL    ABS. EOSINOPHILS 0.1 0.0 - 0.4 K/UL    ABS.  BASOPHILS 0.0 0.0 - 0.1 K/UL    DF AUTOMATED     MAGNESIUM    Collection Time: 06/27/19  1:00 AM   Result Value Ref Range    Magnesium 2.7 (H) 1.6 - 2.6 mg/dL   LIPASE    Collection Time: 06/27/19  1:00 AM   Result Value Ref Range    Lipase 73 73 - 393 U/L   ETHYL ALCOHOL    Collection Time: 06/27/19  1:00 AM   Result Value Ref Range    ALCOHOL(ETHYL),SERUM <3 0 - 3 MG/DL   EKG, 12 LEAD, INITIAL    Collection Time: 06/27/19  1:07 AM   Result Value Ref Range    Ventricular Rate 91 BPM    Atrial Rate 91 BPM    P-R Interval 154 ms    QRS Duration 100 ms    Q-T Interval 366 ms    QTC Calculation (Bezet) 450 ms    Calculated P Axis 80 degrees    Calculated R Axis -77 degrees    Calculated T Axis 72 degrees    Diagnosis       Normal sinus rhythm  Biatrial enlargement  Left axis deviation  Pulmonary disease pattern  Abnormal ECG  When compared with ECG of 18-MAY-2019 19:59,  No significant change was found     POC LACTIC ACID    Collection Time: 06/27/19  1:21 AM   Result Value Ref Range    Lactic Acid (POC) 1.68 0.40 - 2.00 mmol/L   URINALYSIS W/ RFLX MICROSCOPIC    Collection Time: 06/27/19  2:00 AM   Result Value Ref Range    Color YELLOW      Appearance CLEAR      Specific gravity 1.030 1.005 - 1.030      pH (UA) 5.5 5.0 - 8.0      Protein TRACE (A) NEG mg/dL    Glucose >1,000 (A) NEG mg/dL    Ketone TRACE (A) NEG mg/dL    Bilirubin NEGATIVE  NEG      Blood TRACE (A) NEG      Urobilinogen 0.2 0.2 - 1.0 EU/dL    Nitrites NEGATIVE  NEG Leukocyte Esterase NEGATIVE  NEG     URINE MICROSCOPIC ONLY    Collection Time: 06/27/19  2:00 AM   Result Value Ref Range    WBC 21 to 35 0 - 4 /hpf    RBC 0 to 3 0 - 5 /hpf    Epithelial cells FEW 0 - 5 /lpf    Bacteria FEW (A) NEG /hpf   GLUCOSTABILIZER    Collection Time: 06/27/19  2:49 AM   Result Value Ref Range    Glucose 993 mg/dL    Insulin order 18.7 units/hour    Insulin adminstered 18.7 units/hour    Multiplier 0.020     Low target 140 mg/dL    High target 180 mg/dL    D50 order 0.0 ml    D50 administered 0.00 ml    Minutes until next BG 60 min    Order initials ao     Administered initials ao        EKG interpretion:     Normal sinus rhythm with no acute ST or T wave changes. There is a pulmonary disease pattern. Rate 91    No significant changes      Cardiac monitor: Normal rate with regular rhythm no ectopy  Pulse ox: 100% room air     X-Ray, CT or other radiology findings or impressions:  XR CHEST PORT    (Results Pending)   CT ABD PELV WO CONT    (Results Pending)   No acute process per my interpretation    CT abdomen pelvis: Similar duodenal cutaneous fistula. Lack of intravenous and oral contrast limits evaluation of the tract for inflammation. Nonobstructing renal stones. Chronic pancreatitis with stent. Similar colonic stool burden    Progress notes, Consult notes or additional Procedure notes:   Patient with recurrent hyponatremia and severe hyperglycemia. Do not suspect this is DKA. Patient with significant volume depletion. Patient will require readmission to the hospital for glucose control and correction of hyponatremia. Patient with noted UTI as well as possible cellulitis of the abdominal wall. Will place on antibiotics as well.     Discussed with Dr. Anthony Flores on-call for the hospitalist service who will admit    ED Critical Care Note    System at risk for life threatening failure: Cardiac, renal, pulmonary, endocrine  Associated problems: Hyperglycemia, hyponatremia, acute kidney injury, UTI    Critical Care services provided: Bedside management of severe electrolyte and glucose abnormalities along with infection, documentation, consultation and bedside reassessment  Excluded procedures (time not included in critical care): EKG interpretation    Total Critical Care Time (in minutes) 42          Reevaluation of patient:   stable    Disposition:  Diagnosis:   1. Acute hyperglycemia    2. Acute hyponatremia    3. Acute kidney injury ([address]    4. Open abdominal incision with drainage, subsequent encounter    5. Acute UTI    6. Cellulitis, abdominal wall        Disposition: admit    Follow-up Information    None           Patient's Medications   Start Taking    No medications on file   Continue Taking    ACETAMINOPHEN (TYLENOL EXTRA STRENGTH) 500 MG TABLET    Take 500 mg by mouth every six (6) hours as needed for Pain. ASPIRIN DELAYED-RELEASE 81 MG TABLET    Take 81 mg by mouth daily. BLOOD-GLUCOSE METER (FREESTYLE LITE METER) MONITORING KIT    Test blood sugars 4-6 times a day    GLUCOSE BLOOD VI TEST STRIPS (FREESTYLE TEST) STRIP    1 Each by Does Not Apply route See Admin Instructions. Test blood sugars 4-6 times a day. GLUCOSE BLOOD VI TEST STRIPS (FREESTYLE TEST) STRIP    Use as directed    INSULIN NPH-REGULAR HUMAN REC (NOVOLIN 70-30 FLEXPEN U-100) 100 UNIT/ML (70-30) INPN    45 Units by SubCUTAneous route Daily (before dinner). INSULIN NPH/INSULIN REGULAR (NOVOLIN 70/30 U-100 INSULIN) 100 UNIT/ML (70-30) INJECTION    55 Units by SubCUTAneous route Daily (before breakfast). LEVETIRACETAM (KEPPRA) 500 MG TABLET    Take 2 Tabs by mouth two (2) times a day. LIPASE-PROTEASE-AMYLASE (ZENPEP 10,000) CAPSULE    Take 3 Caps by mouth three (3) times daily (with meals). OTHER    NO DRIVING OR OPERATING HEAVY MACHINERY FOR 6 MONTHS OR UNTIL CLEARED BY NEUROLOGY. PANTOPRAZOLE (PROTONIX) 40 MG TABLET    Take 1 Tab by mouth Daily (before breakfast). POLYETHYLENE GLYCOL (MIRALAX) 17 GRAM PACKET    Take 1 Packet by mouth daily. PROMETHAZINE (PHENERGAN) 25 MG TABLET    Take 1 Tab by mouth every six (6) hours as needed. SUCRALFATE (CARAFATE) 1 GRAM TABLET    Take 1 g by mouth four (4) times daily. THERAPEUTIC MULTIVITAMIN (THERAGRAN) TABLET    Take 1 Tab by mouth daily. THIAMINE HCL (B-1) 100 MG TABLET    Take 1 Tab by mouth daily.    These Medications have changed    No medications on file   Stop Taking    No medications on file

## 2019-06-27 NOTE — ED TRIAGE NOTES
Patient with hx of NATALIE drain to right flank. 2 day history of yellow drainage coming from wound site. Skin around area red and irritated from drainage. Patient c/o of burning sensation in area. Reports N/V x 2-3 days.

## 2019-06-27 NOTE — H&P
History and Physical    Patient: Kimberly Enamorado               Sex: male          DOA: 6/27/2019       YOB: 1965      Age:  47 y.o.        LOS:  LOS: 0 days        HPI:     Kimberly Enamorado is a 47 y.o. male who has a known history of biliary stent placement with enterocutaneous fistula. He reports that \"this had been going relatively well of late\" and that he \"wasn't having any issues until yesterday. \"  He reports that the fistula \"began draining like a waterfall. \"  This was associated with burning pain. He does not have nausea or vomiting. He does not have diarrhea. His fistula site is draining yellowish drainage. He does not have fever. He will be admitted for ongoing management. Past Medical History:   Diagnosis Date    Abdominal pain, generalized     Chronic pancreatitis ([address]     Diabetes [facility] [address]     hypothyroid    Encounter for long-term (current) use of other medications     Essential hypertension     ETOH abuse     GERD (gastroesophageal reflux disease)     Heart failure (HCC)     Hyponatremia     Pain in the abdomen 11/2/2010    Pancreatitis     w/ abscess and pseudocyst    Pancreatitis     Psychiatric disorder     depression, anxiety    Tobacco abuse        Social History:   Tobacco use:  Patient does not use tobacco   Alcohol use:  Patient does not use alcohol   Patient lives with his mother    Family History:   Multiple family members with HTN    Review of Systems    Constitutional:  No fever or weight loss  HEENT:  No headache or visual changes  Cardiovascular:  No chest pain or diaphoresis  Respiratory:  No coughing, wheezing, or shortness of breath.   GI:  Abdominal pain as above with biliary drainage  :  No hematuria or dysuria  Skin:  No rashes or moles  Neuro:  No seizures or syncope  Hematological:  No bruising or bleeding  Endocrine:  Patient has known diabetes, no thyroid disease    Physical Exam:      Visit Vitals  /74   Pulse 74   Temp 97.7 °F (36.5 °C) Resp 20   Ht 6' (1.829 m)   Wt 51.4 kg (113 lb 6.4 oz)   SpO2 92%   BMI 15.38 kg/m²       Physical Exam:    Gen:  No distress, alert  HEENT:  Normal cephalic atraumatic, extra-occular movements are intact. Neck:  Supple, No JVD  Lungs:  Clear bilaterally, no wheeze, no rales, normal effort  Heart:  Regular Rate and Rhythm, normal S1 and S2, no edema  Abdomen:  Soft, non tender, normal bowel sounds, no guarding. Extremities:  Well perfused, no cyanosis or edema  Neurological:  Awake and alert, CN's are intact, normal strength throughout extremities  Skin:  No rashes or moles  Mental Status:  Normal thought process, does not appear anxious    Laboratory Studies:    BMP:   Lab Results   Component Value Date/Time     (LL) 06/27/2019 01:00 AM    K 5.0 06/27/2019 01:00 AM    CL 81 (L) 06/27/2019 01:00 AM    CO2 17 (L) 06/27/2019 01:00 AM    AGAP 15 06/27/2019 01:00 AM     (HH) 06/27/2019 01:00 AM    BUN 54 (H) 06/27/2019 01:00 AM    CREA 2.18 (H) 06/27/2019 01:00 AM    GFRAA 38 (L) 06/27/2019 01:00 AM    GFRNA 32 (L) 06/27/2019 01:00 AM     CBC:   Lab Results   Component Value Date/Time    WBC 8.6 06/27/2019 01:00 AM    HGB 14.3 06/27/2019 01:00 AM    HCT 43.3 06/27/2019 01:00 AM     06/27/2019 01:00 AM       Assessment/Plan     Principal Problem:    Abdominal pain (11/16/2015)    Active Problems:    Pancreatic fistula (6/28/2018)      IDDM (insulin dependent diabetes mellitus) ([facility] [address] (8/31/2013)      Partial seizure ([facility] [address] (5/18/2019)      Chronic pancreatitis (HCC) ()      Overview: Continue PPI IV      H/O ETOH abuse (3/23/2016)    Renal Insufficiency    PLAN:    Will need wound care  GI consult  May need IR intervention  Pain control  BS control  Continue anticonvulsant  DVT prophylaxis.

## 2019-06-27 NOTE — ROUTINE PROCESS
Admit pt from ED via stretcher. Pt alert & oriented X 4 complaints of RLQ pain, no distress noted. 0600 - - Bedside and Verbal shift change report given to Jennifer Schmid [id number] (oncoming nurse) by Roman Davis RN BSN ( off going Nurse ) inclusive of SBAR and plan of care, Intake & Output.

## 2019-06-27 NOTE — PROGRESS NOTES
Problem: Diabetes Self-Management  Goal: *Disease process and treatment process  Description  Define diabetes and identify own type of diabetes; list 3 options for treating diabetes. Outcome: Progressing Towards Goal  Goal: *Incorporating nutritional management into lifestyle  Description  Describe effect of type, amount and timing of food on blood glucose; list 3 methods for planning meals. Outcome: Progressing Towards Goal  Goal: *Incorporating physical activity into lifestyle  Description  State effect of exercise on blood glucose levels. Outcome: Progressing Towards Goal  Goal: *Developing strategies to promote health/change behavior  Description  Define the ABC's of diabetes; identify appropriate screenings, schedule and personal plan for screenings. Outcome: Progressing Towards Goal  Goal: *Using medications safely  Description  State effect of diabetes medications on diabetes; name diabetes medication taking, action and side effects. Outcome: Progressing Towards Goal  Goal: *Monitoring blood glucose, interpreting and using results  Description  Identify recommended blood glucose targets  and personal targets. Outcome: Progressing Towards Goal  Goal: *Prevention, detection, treatment of acute complications  Description  List symptoms of hyper- and hypoglycemia; describe how to treat low blood sugar and actions for lowering  high blood glucose level. Outcome: Progressing Towards Goal  Goal: *Prevention, detection and treatment of chronic complications  Description  Define the natural course of diabetes and describe the relationship of blood glucose levels to long term complications of diabetes.   Outcome: Progressing Towards Goal  Goal: *Developing strategies to address psychosocial issues  Description  Describe feelings about living with diabetes; identify support needed and support network  Outcome: Progressing Towards Goal  Goal: *Insulin pump training  Outcome: Progressing Towards Goal  Goal: *Sick day guidelines  Outcome: Progressing Towards Goal  Goal: *Patient Specific Goal (EDIT GOAL, INSERT TEXT)  Outcome: Progressing Towards Goal     Problem: Patient Education: Go to Patient Education Activity  Goal: Patient/Family Education  Outcome: Progressing Towards Goal     Problem: Pressure Injury - Risk of  Goal: *Prevention of pressure injury  Description  Document Austin Scale and appropriate interventions in the flowsheet. Outcome: Progressing Towards Goal     Problem: Patient Education: Go to Patient Education Activity  Goal: Patient/Family Education  Outcome: Progressing Towards Goal     Problem: Falls - Risk of  Goal: *Absence of Falls  Description  Document Edgar Brunner Fall Risk and appropriate interventions in the flowsheet.   Outcome: Progressing Towards Goal     Problem: Patient Education: Go to Patient Education Activity  Goal: Patient/Family Education  Outcome: Progressing Towards Goal

## 2019-06-27 NOTE — PROGRESS NOTES
Pharmacy Dosing Services: Vancomycin    Indication: Skin and Soft Tissue Infection    Day of therapy: 0    Other Antimicrobials (Include dose, start day & day of therapy):  Cefepime 2 grams every 24 hours    Loading dose (date given): 1.500 mg  Current Maintenance dose: New start    Goal Vancomycin Level: 15-20 mcg/mL  (Trough 15-20 for most infections, 20 for meningitis/osteomyelitis, pre-HD level ~25)    Vancomycin Level (if drawn): New start     Significant Cultures: New start    Renal function stable? (unstable defined as SCr increase of 0.5 mg/dL or > 50% increase from baseline, whichever is greater) (Y/N): N     CAPD, Hemodialysis or Renal Replacement Therapy (Y/N): N     Recent Labs     19  0100   CREA 2.18*   BUN 54*   WBC 8.6     Temp (24hrs), Av.1 °F (36.2 °C), Min:97.1 °F (36.2 °C), Max:97.1 °F (36.2 °C)    Creatinine Clearance (Creatinine Clearance (ml/min)): 34.8 mL/min     Regimen assessment: New start  Maintenance dose: Dosing per level Scr 1.2 (2019), Scr 2.18 (2019)  Next scheduled level: 2019 at 0400       Pharmacy will follow daily and adjust medications as appropriate for renal function and/or serum levels.     Thank you,  Codey Pfeiffer, WILLIAMD

## 2019-06-27 NOTE — PROGRESS NOTES
Assuming care, pt temp 102.2 oral, day nurse has given prn tylenol (see MAR). Insulin drip not infusing per protocol. Day nurse checked pt's BS as 363. Per glucostabilizer protocol, insulin drip is to still be on hold. Spoke with Dr. Tonio Reynoso via phone, notified MD of the above, no new orders received at this time, MD states ok to leave drip off per protocol, will continue to follow protocol. 2015- Temp recorded as 97.3 orally    2122- , insulin gtt restarted at 2.5 U per hour per glucostabilizer protocol. 2300- , insulin gtt rate changed to 4.2 U per hour per glucostabilizer protocol.

## 2019-06-27 NOTE — PROGRESS NOTES
conducted an initial consultation and Spiritual Assessment for Onofre Childress, who is a 47 y.o.,male. Patients Primary Language is: [place]. According to the patients EMR Christian Affiliation is: Evangelical. 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    Abscess 07/28/2018    Pancreatic fistula 06/28/2018    Central cord syndrome ([address] 06/02/2018    UTI (urinary tract infection) 05/24/2018    Hyperglycemia 05/22/2018    Chronic renal insufficiency 05/22/2018    CHRISTI (acute kidney injury) ([address] 05/22/2018    Enteritis 04/24/2018    Acute alcoholic hepatitis 76/86/4524    Chronic pancreatitis due to acute alcohol intoxication ([address] 04/15/2018    Lactic acidosis 08/05/2017    Colitis, acute 07/17/2017    HCAP (healthcare-associated pneumonia) 05/31/2016    Sepsis ([address] 04/15/2016    Biliary drain displacement 03/23/2016    Hypokalemia 03/23/2016    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 Explored issues of velvet, spirituality and/or Evangelical needs while hospitalized. Listened empathically. Provided chaplaincy education. Provided information about Spiritual Care Services. Offered prayer and assurance of continued prayers on patient's behalf. Chart reviewed.     The following outcomes were achieved:  Patient shared some information about their medical narrative and spiritual journey/beliefs. Patient processed feeling about current hospitalization. Patient expressed gratitude for the 's visit. Assessment:  Patient did not indicate any spiritual or Judaism issues which require Spiritual Care Services interventions at this time. Patient does not have any Judaism/cultural needs that will affect patients preferences in health care. Plan:  Chaplains will continue to follow and will provide pastoral care on an as needed or requested basis.  recommends bedside caregivers page  on duty if patient shows signs of acute spiritual or emotional distress.     [address] [facility]   Staff [address] [place] [address]   [phone]

## 2019-06-27 NOTE — PROGRESS NOTES
[facility] and in bed a&o X4, no distress noted and denies pain. c/o mild nausea. Frequently used items are within reach, call light in place bed in lowest position. Patient verbalizes understanding on how use call bell for any needs or assistance. Mother at bedside   Skin man completed with Rudi Fuentes Rn    1000 changed pt dressing it was saturated with yellow purulent drainage, with a strong odor wound bed around is red with edema    1750pt. c/o nausea denies pain    1840 pt resting comfortably

## 2019-06-27 NOTE — ROUTINE PROCESS
Gave report to [facility] and patient was alert and oriented x4 and sitting in bed. Pt is sleepy and complains of fatigue. Most frequently used items within reach bed in lowest position call light in place side rails up. Paged Dr. Warden Pryor to report increased Temp. Patient Vitals for the past 4 hrs: 
 Temp Pulse Resp BP SpO2  
06/27/19 1909 (!) 102.2 °F (39 °C) 92 18 116/77 100 % 06/27/19 1539 97.6 °F (36.4 °C) 90 18 125/86 100 % Gave pt tylenol. Rechecked Blood glucose it was 363. Notified oncoming nurse.

## 2019-06-28 VITALS
TEMPERATURE: 98.4 F | OXYGEN SATURATION: 97 % | RESPIRATION RATE: 18 BRPM | HEART RATE: 111 BPM | DIASTOLIC BLOOD PRESSURE: 54 MMHG | SYSTOLIC BLOOD PRESSURE: 90 MMHG | WEIGHT: 110 LBS | BODY MASS INDEX: 14.9 KG/M2 | HEIGHT: 72 IN

## 2019-06-28 LAB
ADMINISTERED INITIALS, ADMINIT: NORMAL
ANION GAP SERPL CALC-SCNC: 8 MMOL/L (ref 3–18)
ATRIAL RATE: 91 BPM
BACTERIA SPEC CULT: NORMAL
BASOPHILS # BLD: 0 K/UL (ref 0–0.1)
BASOPHILS NFR BLD: 0 % (ref 0–2)
BUN SERPL-MCNC: 24 MG/DL (ref 7–18)
BUN/CREAT SERPL: 16 (ref 12–20)
CALCIUM SERPL-MCNC: 7.8 MG/DL (ref 8.5–10.1)
CALCULATED P AXIS, ECG09: 80 DEGREES
CALCULATED R AXIS, ECG10: -77 DEGREES
CALCULATED T AXIS, ECG11: 72 DEGREES
CHLORIDE SERPL-SCNC: 110 MMOL/L (ref 100–108)
CO2 SERPL-SCNC: 16 MMOL/L (ref 21–32)
CREAT SERPL-MCNC: 1.52 MG/DL (ref 0.6–1.3)
D50 ADMINISTERED, D50ADM: 0 ML
D50 ADMINISTERED, D50ADM: 17 ML
D50 ORDER, D50ORD: 0 ML
D50 ORDER, D50ORD: 17 ML
DIAGNOSIS, 93000: NORMAL
DIFFERENTIAL METHOD BLD: ABNORMAL
EOSINOPHIL # BLD: 0 K/UL (ref 0–0.4)
EOSINOPHIL NFR BLD: 0 % (ref 0–5)
ERYTHROCYTE [DISTWIDTH] IN BLOOD BY AUTOMATED COUNT: 15.2 % (ref 11.6–14.5)
GLUCOSE BLD STRIP.AUTO-MCNC: 100 MG/DL (ref 70–110)
GLUCOSE BLD STRIP.AUTO-MCNC: 104 MG/DL (ref 70–110)
GLUCOSE BLD STRIP.AUTO-MCNC: 116 MG/DL (ref 70–110)
GLUCOSE BLD STRIP.AUTO-MCNC: 142 MG/DL (ref 70–110)
GLUCOSE BLD STRIP.AUTO-MCNC: 172 MG/DL (ref 70–110)
GLUCOSE BLD STRIP.AUTO-MCNC: 195 MG/DL (ref 70–110)
GLUCOSE BLD STRIP.AUTO-MCNC: 250 MG/DL (ref 70–110)
GLUCOSE BLD STRIP.AUTO-MCNC: 262 MG/DL (ref 70–110)
GLUCOSE BLD STRIP.AUTO-MCNC: 278 MG/DL (ref 70–110)
GLUCOSE BLD STRIP.AUTO-MCNC: 295 MG/DL (ref 70–110)
GLUCOSE BLD STRIP.AUTO-MCNC: 363 MG/DL (ref 70–110)
GLUCOSE BLD STRIP.AUTO-MCNC: 57 MG/DL (ref 70–110)
GLUCOSE SERPL-MCNC: 171 MG/DL (ref 74–99)
GLUCOSE, GLC: 104 MG/DL
GLUCOSE, GLC: 115 MG/DL
GLUCOSE, GLC: 142 MG/DL
GLUCOSE, GLC: 172 MG/DL
GLUCOSE, GLC: 250 MG/DL
GLUCOSE, GLC: 262 MG/DL
GLUCOSE, GLC: 278 MG/DL
GLUCOSE, GLC: 295 MG/DL
GLUCOSE, GLC: 57 MG/DL
HCT VFR BLD AUTO: 33.2 % (ref 36–48)
HGB BLD-MCNC: 11.3 G/DL (ref 13–16)
HIGH TARGET, HITG: 180 MG/DL
INSULIN ADMINSTERED, INSADM: 0 UNITS/HOUR
INSULIN ADMINSTERED, INSADM: 0.9 UNITS/HOUR
INSULIN ADMINSTERED, INSADM: 1.6 UNITS/HOUR
INSULIN ADMINSTERED, INSADM: 2 UNITS/HOUR
INSULIN ADMINSTERED, INSADM: 3.8 UNITS/HOUR
INSULIN ADMINSTERED, INSADM: 6.5 UNITS/HOUR
INSULIN ADMINSTERED, INSADM: 7.1 UNITS/HOUR
INSULIN ORDER, INSORD: 0 UNITS/HOUR
INSULIN ORDER, INSORD: 0.9 UNITS/HOUR
INSULIN ORDER, INSORD: 1.6 UNITS/HOUR
INSULIN ORDER, INSORD: 2 UNITS/HOUR
INSULIN ORDER, INSORD: 3.8 UNITS/HOUR
INSULIN ORDER, INSORD: 6.5 UNITS/HOUR
INSULIN ORDER, INSORD: 7.1 UNITS/HOUR
LOW TARGET, LOT: 140 MG/DL
LYMPHOCYTES # BLD: 1.1 K/UL (ref 0.9–3.6)
LYMPHOCYTES NFR BLD: 10 % (ref 21–52)
MAGNESIUM SERPL-MCNC: 2.3 MG/DL (ref 1.6–2.6)
MCH RBC QN AUTO: 28.1 PG (ref 24–34)
MCHC RBC AUTO-ENTMCNC: 34 G/DL (ref 31–37)
MCV RBC AUTO: 82.6 FL (ref 74–97)
MINUTES UNTIL NEXT BG, NBG: 15 MIN
MINUTES UNTIL NEXT BG, NBG: 60 MIN
MONOCYTES # BLD: 0.7 K/UL (ref 0.05–1.2)
MONOCYTES NFR BLD: 6 % (ref 3–10)
MULTIPLIER, MUL: 0
MULTIPLIER, MUL: 0
MULTIPLIER, MUL: 0.01
MULTIPLIER, MUL: 0.01
MULTIPLIER, MUL: 0.02
MULTIPLIER, MUL: 0.03
MULTIPLIER, MUL: 0.03
NEUTS SEG # BLD: 9.3 K/UL (ref 1.8–8)
NEUTS SEG NFR BLD: 84 % (ref 40–73)
ORDER INITIALS, ORDINIT: NORMAL
P-R INTERVAL, ECG05: 154 MS
PLATELET # BLD AUTO: 191 K/UL (ref 135–420)
PMV BLD AUTO: 10 FL (ref 9.2–11.8)
POTASSIUM SERPL-SCNC: 3.9 MMOL/L (ref 3.5–5.5)
Q-T INTERVAL, ECG07: 366 MS
QRS DURATION, ECG06: 100 MS
QTC CALCULATION (BEZET), ECG08: 450 MS
RBC # BLD AUTO: 4.02 M/UL (ref 4.7–5.5)
SERVICE CMNT-IMP: NORMAL
SODIUM SERPL-SCNC: 134 MMOL/L (ref 136–145)
VANCOMYCIN SERPL-MCNC: 5.6 UG/ML (ref 5–40)
VENTRICULAR RATE, ECG03: 91 BPM
WBC # BLD AUTO: 11.1 K/UL (ref 4.6–13.2)

## 2019-06-28 PROCEDURE — 96366 THER/PROPH/DIAG IV INF ADDON: CPT

## 2019-06-28 PROCEDURE — 80048 BASIC METABOLIC PNL TOTAL CA: CPT

## 2019-06-28 PROCEDURE — 74011636637 HC RX REV CODE- 636/637: Performed by: HOSPITALIST

## 2019-06-28 PROCEDURE — 74011000258 HC RX REV CODE- 258: Performed by: HOSPITALIST

## 2019-06-28 PROCEDURE — 83735 ASSAY OF MAGNESIUM: CPT

## 2019-06-28 PROCEDURE — 80202 ASSAY OF VANCOMYCIN: CPT

## 2019-06-28 PROCEDURE — 74011250637 HC RX REV CODE- 250/637: Performed by: HOSPITALIST

## 2019-06-28 PROCEDURE — 74011250636 HC RX REV CODE- 250/636: Performed by: HOSPITALIST

## 2019-06-28 PROCEDURE — 82962 GLUCOSE BLOOD TEST: CPT

## 2019-06-28 PROCEDURE — 36415 COLL VENOUS BLD VENIPUNCTURE: CPT

## 2019-06-28 PROCEDURE — 85025 COMPLETE CBC W/AUTO DIFF WBC: CPT

## 2019-06-28 PROCEDURE — 74011000250 HC RX REV CODE- 250: Performed by: HOSPITALIST

## 2019-06-28 PROCEDURE — 99218 HC RM OBSERVATION: CPT

## 2019-06-28 RX ORDER — INSULIN GLARGINE 100 [IU]/ML
30 INJECTION, SOLUTION SUBCUTANEOUS DAILY
Status: DISCONTINUED | OUTPATIENT
Start: 2019-06-28 | End: 2019-06-29 | Stop reason: HOSPADM

## 2019-06-28 RX ORDER — SODIUM CHLORIDE 9 MG/ML
1000 INJECTION, SOLUTION INTRAVENOUS ONCE
Status: COMPLETED | OUTPATIENT
Start: 2019-06-28 | End: 2019-06-28

## 2019-06-28 RX ORDER — INSULIN GLARGINE 100 [IU]/ML
15 INJECTION, SOLUTION SUBCUTANEOUS DAILY
Status: DISCONTINUED | OUTPATIENT
Start: 2019-06-28 | End: 2019-06-28

## 2019-06-28 RX ADMIN — INSULIN LISPRO 2 UNITS: 100 INJECTION, SOLUTION INTRAVENOUS; SUBCUTANEOUS at 17:34

## 2019-06-28 RX ADMIN — PANCRELIPASE LIPASE, PANCRELIPASE PROTEASE, PANCRELIPASE AMYLASE 3 CAPSULE: 10000; 32000; 42000 CAPSULE, DELAYED RELEASE ORAL at 17:35

## 2019-06-28 RX ADMIN — LEVETIRACETAM 1000 MG: 500 TABLET ORAL at 10:10

## 2019-06-28 RX ADMIN — SUCRALFATE 1 G: 1 TABLET ORAL at 17:35

## 2019-06-28 RX ADMIN — SODIUM CHLORIDE 1000 ML: 900 INJECTION, SOLUTION INTRAVENOUS at 11:33

## 2019-06-28 RX ADMIN — Medication 100 MG: at 10:11

## 2019-06-28 RX ADMIN — SUCRALFATE 1 G: 1 TABLET ORAL at 13:27

## 2019-06-28 RX ADMIN — LEVETIRACETAM 1000 MG: 500 TABLET ORAL at 17:35

## 2019-06-28 RX ADMIN — INSULIN GLARGINE 30 UNITS: 100 INJECTION, SOLUTION SUBCUTANEOUS at 08:06

## 2019-06-28 RX ADMIN — DEXTROSE MONOHYDRATE 125 ML: 10 INJECTION, SOLUTION INTRAVENOUS at 02:40

## 2019-06-28 RX ADMIN — PANTOPRAZOLE SODIUM 40 MG: 40 TABLET, DELAYED RELEASE ORAL at 10:11

## 2019-06-28 RX ADMIN — SUCRALFATE 1 G: 1 TABLET ORAL at 10:10

## 2019-06-28 RX ADMIN — THERA TABS 1 TABLET: TAB at 10:10

## 2019-06-28 RX ADMIN — PANCRELIPASE LIPASE, PANCRELIPASE PROTEASE, PANCRELIPASE AMYLASE 3 CAPSULE: 10000; 32000; 42000 CAPSULE, DELAYED RELEASE ORAL at 10:11

## 2019-06-28 RX ADMIN — PROMETHAZINE HYDROCHLORIDE 25 MG: 25 TABLET ORAL at 10:23

## 2019-06-28 RX ADMIN — SODIUM CHLORIDE 3.8 UNITS/HR: 900 INJECTION, SOLUTION INTRAVENOUS at 06:50

## 2019-06-28 RX ADMIN — ASPIRIN 81 MG: 81 TABLET, COATED ORAL at 10:11

## 2019-06-28 RX ADMIN — PANCRELIPASE LIPASE, PANCRELIPASE PROTEASE, PANCRELIPASE AMYLASE 3 CAPSULE: 10000; 32000; 42000 CAPSULE, DELAYED RELEASE ORAL at 13:29

## 2019-06-28 RX ADMIN — SODIUM CHLORIDE AND POTASSIUM CHLORIDE: 9; 2.98 INJECTION, SOLUTION INTRAVENOUS at 06:45

## 2019-06-28 NOTE — ROUTINE PROCESS
0730 Bedside, Verbal and Written shift change report given to Donaldo Costa RN (oncoming nurse) by Abdirahman Anderson RN (offgoing nurse). Report included the following information SBAR and Kardex. Patient currently resting in bed, alert and oriented to all spheres, denies pain or shortness of breath, call bell in reach, 5 Ps and hourly rounding completed, bed locked in low position, family member at bedside 1010 insulin drip discontinued- 2 hours post administration of lantus 1128 pt HR tachycardic with activity, currently in 130s with patient up to bathroom. Notified Dr Alisha Tucker, he gave order for 1 L normal saline bolus 1800 I have reviewed discharge instructions with the patient and parent. The patient and parent verbalized understanding. Patient and mother verbalize understanding of instructions. Pt stating he has no ride. Dawson Chiu  setting up [employer] ride 1825 patient is saying he is no longer able to go to homeless shelter where he was saying, steven is working on disposition now

## 2019-06-28 NOTE — DIABETES MGMT
Glycemic Control Plan of Care    Assessment/Recommendation(s): patient known from previous admissions and has received DM education on multiple occasions. Mother at bedside. Reviewed his current A1c and most recent prescription for 70/30 from discharge on 6/19/19 - patient verbalized understanding. Patient encouraged to keep his follow up appointments - he has orders to d/c home this afternoon. Provided him with cans of Boost for discharge. He has outstanding unfilled prescriptions for his insulin and test strips - instructed him to pick these up today     Most recent blood glucose values:       Current A1C:    Your A1C  was   Lab Results   Component Value Date/Time    Hemoglobin A1c 13.9 (H) 06/27/2019 09:30 AM   .    Current hospital diabetes medications: lantus 30 units daily. Corrective lispro, normal insulin sensitivity, four times daily. Total daily dose insulin requirement previous day:  Transitioned off insulin drip today    Home diabetes medications: patient uncertain of doses - states \"around 7-8 units\". Most recent d/c summary from [facility] on 6/19/19 shows Novolin, 70/30, 45 units daily.     Goals:  Blood glucose will be within target range of  mg/dL by 7/1/19    Education: provided with outpatient DM schedule - printed materials for target BG and A1c -     Manda Celestin MPH RN  Pager [phone]  Office [phone]

## 2019-06-28 NOTE — DIABETES MGMT
NUTRITIONAL ASSESSMENT GLYCEMIC CONTROL/ PLAN OF CARE     Poly Ya           47 y.o.           6/27/2019                 1. Acute hyperglycemia    2. Acute hyponatremia    3. Acute kidney injury ([address]    4. Open abdominal incision with drainage, subsequent encounter    5. Acute UTI    6. Cellulitis, abdominal wall      T2DM   INTERVENTIONS/PLAN:   1. Ensure TID -  Alberto or stw  2. Obtain new weight to ensure accuracy  3. Monitor po intake, labs and weights. 4.  On-going diabetes education when pt feeling better. ASSESSMENT:   Nutritional Status: According to current weight, pt is 64% ideal wt;  Question accuracy of weight as yesterday's weight is 36 lbs less than his documented weight on 6/10/19. Nevertheless, pt with thin appearance and stating he cannot eat this morning. Lipase noted, WNL's. Nutrition Diagnoses:   Underweight due to inadequate energy intake as evidenced by BMI of 15.2 kg/m2. Altered nutrition related labs due to DM as evidenced by A1C of 13.9%. Altered GI function due to chronic pancreatitis as evidenced by use of Zenpep. Diabetes Management:   Unable to obtain full home DM management history - pt complained of not feeling well. He seems annoyed with visit and questions. Note CO2 - 17; noted hypoglycemia on drip at 0231 this morning. Recent blood glucose:     6/28:  Last 7 readings - 57, 116, 172, 262, 250, 278, 142  Within target range (non-ICU: <140; ICU<180): [] Yes   [x]  No    Current Insulin regimen:   Lantus 30 units/day and being transitioned off insulin drip. Home medication/insulin regimen: per pt, 7-8 units Lantus/day (syringe and vial)  Chart review also lists Novolin 70/30 BID but not sure if pt is taking this (Novoling 70/30 is not usually paired with Lantus). HbA1c: 13.9% - ave BG has been ~ 352 mg/dL over past 3 months.    Adequate glycemic control PTA:  [] Yes  [x] No       SUBJECTIVE/OBJECTIVE:   Information obtained from: Pt, chart review  Pt states he has a poor appetite and cannot eat his breakfast.  He is offering his breakfast to his mother who is at his bedside. Pt providing limited history, saying he is not feeling well and wincing. States he is allergic to shellfish (noted in chart). When pt was asked if he takes his Zenpep PTA, he replied \"I don't know what I'm taking\". Pt known from prior admissions. Chart review indicates pt was admitted to [facility] on 6/19/19 for hyperglycemia and was on insulin drip. Diet: CHO consistent 1800 calories - zero po intake at breakfast this morning. Patient Vitals for the past 100 hrs:   % Diet Eaten   06/27/19 1854 75 %         Medications: [x]                Reviewed   Pertinent:  Theragran, thiamine  IVF:  NS with 40 mEq KCl/L at 75 ml/hr    Most Recent POC Glucose:   Recent Labs     06/27/19  1725 06/27/19  0930 06/27/19  0100   * 148* 993*        Labs:   Lab Results   Component Value Date/Time    Hemoglobin A1c 13.9 (H) 06/27/2019 09:30 AM     Lab Results   Component Value Date/Time    Hemoglobin A1c 13.9 (H) 06/27/2019 09:30 AM    Hemoglobin A1c 14.6 (H) 06/27/2019 01:00 AM    Hemoglobin A1c 13.7 (H) 05/19/2019 10:00 AM     7/18/18 A1C - 7.3%  Lab Results   Component Value Date/Time    Sodium 129 (L) 06/27/2019 05:25 PM    Potassium 3.6 06/27/2019 05:25 PM    Chloride 100 06/27/2019 05:25 PM    CO2 17 (L) 06/27/2019 05:25 PM    Anion gap 12 06/27/2019 05:25 PM    Glucose 140 (H) 06/27/2019 05:25 PM    BUN 33 (H) 06/27/2019 05:25 PM    Creatinine 1.47 (H) 06/27/2019 05:25 PM    Calcium 8.0 (L) 06/27/2019 05:25 PM    Magnesium 2.6 06/27/2019 05:25 PM    Phosphorus 4.4 06/27/2019 01:00 AM    Albumin 3.9 06/27/2019 01:00 AM     Results for Radha Chapa (MRN [id number]) as of 6/28/2019 11:38   Ref.  Range 6/27/2019 01:00   Lipase Latest Ref Range: 73 - 393 U/L 73     Anthropometrics: IBW : 80.7 kg (178 lb), % IBW (Calculated): 63.71 %, BMI (calculated): 15.4  Wt Readings from Last 1 Encounters:   06/27/19 51.4 kg (113 lb 6.4 oz)      Ht Readings from Last 1 Encounters:   06/27/19 6' (1.829 m)     Last Weight Metrics:  Weight Loss Metrics 6/27/2019 6/17/2019 6/9/2019 5/28/2019 5/6/2019 4/12/2019 10/16/2018   Today's Wt 113 lb 6.4 oz 149 lb 14.6 oz 145 lb 140 lb 140 lb 145 lb 125 lb 14.4 oz   BMI 15.38 kg/m2 20.33 kg/m2 19.67 kg/m2 18.99 kg/m2 18.99 kg/m2 19.67 kg/m2 16.61 kg/m2     Estimated Nutrition Needs:  2090 Kcals/day, Protein (g): 77 g Fluid (ml): 2100 ml  Based on:   [x]          Actual BW    []          ABW   []            Adjusted BW             Nutrition Interventions:  Ensure TID  Goal:   Blood glucose will be within target range of  mg/dL by 7/1/19. Pt will consume > 75% meals by 7/3/19. Weight maintenance (+/- 1-2 kg) or weight gain of 1-2 lbs/week by 7/8/19.         Nutrition Monitoring and Evaluation      [x]     Monitor po intake on meal rounds  [x]     Continue inpatient monitoring and intervention  []     Other:      Nutrition Risk:  [x]   High     []  Moderate    []  Minimal/Uncompromised    Jerrell Espinoza, [address] [place] [address], [facility]   Office:  [address] [place] [address] Pager:  [phone]

## 2019-06-28 NOTE — DISCHARGE INSTRUCTIONS
Your A1C  was   Lab Results   Component Value Date/Time    Hemoglobin A1c 13.9 (H) 06/27/2019 09:30 AM       This lab test reflects that your blood sugar averaged  352 mg/dL  over the past 3 months. It is important to follow up with your provider on a routine basis to continue to evaluate your blood sugar discuss any necessary changes in treatment. I have reviewed discharge instructions with the patient and parent. The patient and parent verbalized understanding. General Discharge Instructions    Patient ID:  Terry Waddell  [id number]  47 y.o.  1965    Patient Instructions        The following personal items were collected during your admission and were returned to you. Valuables Screen  Clothing: Shirt, Socks, Footwear  Computer: None  Dental Appliances: None  Home Medications: None  Jewelry: None  Luggage with Personal Belongings: None  Other Valuables: None        Take Home Medications           What to do at Home    Recommended diet: Diabetic Diet    Recommended activity: Activity as tolerated    If you experience any of the following symptoms chest pain or shortness of breath, please follow up with PCP. Information obtained by :  I understand that if any problems occur once I am at home I am to contact my physician. I understand and acknowledge receipt of the instructions indicated above.                                                                                                                                            Physician's or R.N.'s Signature                                                                  Date/Time                                                                                                                                              Patient or Representative Signature                                                          Date/Time

## 2019-06-28 NOTE — PROGRESS NOTES
Problem: Discharge Planning  Goal: *Discharge to safe environment  Outcome: Progressing Towards Goal   Marc

## 2019-06-28 NOTE — PROGRESS NOTES
Bedside and verbal report given to [place], RN, with use of kardex. Rounded on pt Q1 throughout shift, no s/s distress nor discomfort noted.

## 2019-06-28 NOTE — MANAGEMENT PLAN
Discharge/Transition Planning    Problem: Discharge Planning  Goal: *Discharge to safe environment  Outcome: Progressing Towards Goal   Motel    Reason for Admission:   Abdominal Pain               RRAT Score:     29             Resources/supports as identified by patient/family:   Has [facility] Medicaid                Top Challenges facing patient (as identified by patient/family and CM): Finances/Medication cost?                    Transportation?  cab              Support system or lack thereof?  mother                     Living arrangements? In motels           Self-care/ADLs/Cognition? Needs assist          Current Advanced Directive/Advance Care Plan: On file                          Plan for utilizing home health:    n/a                 Transition of Care Plan:       Pt well known in CM. Visits all hospitals in area every few days to ER vs getting admitted. Stays in [facility] with mother. States was at [facility] before this admit. Has never followed up with any physicians or appointments made for him and utilizes emergent care only for care. Pt will discharge back to a mot with mother      Patient has designated _mother_______________________ to participate in his/her discharge plan and to receive any needed information. Name: Freda Farrar  Phone number:     No phone    Care Management Interventions  PCP Verified by CM: No  Mode of Transport at Discharge: Other (see comment)(cab)  Transition of Care Consult (CM Consult): Discharge Planning  Current Support Network:  Other  Confirm Follow Up Transport: Cab  Plan discussed with Pt/Family/Caregiver: Yes  Discharge Location  Discharge Placement: Other:

## 2019-06-28 NOTE — PROGRESS NOTES
Problem: Diabetes Self-Management  Goal: *Disease process and treatment process  Description  Define diabetes and identify own type of diabetes; list 3 options for treating diabetes. Outcome: Progressing Towards Goal  Goal: *Incorporating nutritional management into lifestyle  Description  Describe effect of type, amount and timing of food on blood glucose; list 3 methods for planning meals. Outcome: Progressing Towards Goal  Goal: *Incorporating physical activity into lifestyle  Description  State effect of exercise on blood glucose levels. Outcome: Progressing Towards Goal  Goal: *Developing strategies to promote health/change behavior  Description  Define the ABC's of diabetes; identify appropriate screenings, schedule and personal plan for screenings. Outcome: Progressing Towards Goal  Goal: *Using medications safely  Description  State effect of diabetes medications on diabetes; name diabetes medication taking, action and side effects. Outcome: Progressing Towards Goal  Goal: *Monitoring blood glucose, interpreting and using results  Description  Identify recommended blood glucose targets  and personal targets. Outcome: Progressing Towards Goal  Goal: *Prevention, detection, treatment of acute complications  Description  List symptoms of hyper- and hypoglycemia; describe how to treat low blood sugar and actions for lowering  high blood glucose level. Outcome: Progressing Towards Goal  Goal: *Prevention, detection and treatment of chronic complications  Description  Define the natural course of diabetes and describe the relationship of blood glucose levels to long term complications of diabetes.   Outcome: Progressing Towards Goal  Goal: *Developing strategies to address psychosocial issues  Description  Describe feelings about living with diabetes; identify support needed and support network  Outcome: Progressing Towards Goal  Goal: *Insulin pump training  Outcome: Progressing Towards Goal  Goal: *Sick day guidelines  Outcome: Progressing Towards Goal  Goal: *Patient Specific Goal (EDIT GOAL, INSERT TEXT)  Outcome: Progressing Towards Goal     Problem: Patient Education: Go to Patient Education Activity  Goal: Patient/Family Education  Outcome: Progressing Towards Goal     Problem: Pressure Injury - Risk of  Goal: *Prevention of pressure injury  Description  Document Austin Scale and appropriate interventions in the flowsheet. Outcome: Progressing Towards Goal     Problem: Patient Education: Go to Patient Education Activity  Goal: Patient/Family Education  Outcome: Progressing Towards Goal     Problem: Falls - Risk of  Goal: *Absence of Falls  Description  Document Christine Law Fall Risk and appropriate interventions in the flowsheet.   Outcome: Progressing Towards Goal     Problem: Patient Education: Go to Patient Education Activity  Goal: Patient/Family Education  Outcome: Progressing Towards Goal     Problem: Nutrition Deficit  Goal: *Optimize nutritional status  Outcome: Progressing Towards Goal     Problem: Diabetes Maintenance:Admission  Goal: *Blood glucose 80 to 180 mg/dl  Outcome: Progressing Towards Goal  Goal: *Adequate nutrition  Outcome: Progressing Towards Goal     Problem: Discharge Planning  Goal: *Discharge to safe environment  Outcome: Progressing Towards Goal

## 2019-06-28 NOTE — PROGRESS NOTES
Assumed care of patient, patient currently on Insulin Drip. No c/o pain at this time. Patient continue on contact precaution for ESBL. Patient currently resting. Sbar report received from [address] [place] [address]: Blood Glucose 295, increase drip to 7.1 units. Recheck in 1hr.        0128: Blood glucose 105, patient drip adjusted to 0.9.      0235: Blood glucose reading 57, insulin drip stop, Dextrose 10% given, per pharmacy their is a shortage of D50, so D10 Iv is being utilized. 0240: D10 125ml administered over 10 mis.       0308 : Patient blood glucose after 125 ml of  over 10 mins was 115, Gluco stabalizer instruction  to continue to hold insulin drip, recheck at 4:08am     416:  insulin drip  Held.     5541: Patient Insulin drip restarted at 2.0 for .     0644: Patient  increased to 3.8 units. Dressing change done to patient left lower incision area. Large amount of drainage noted, bed linens changed.  Phlebotomy unable to draw patient labs, no vanc trough received, informed pharmacy of this

## 2019-06-28 NOTE — PROGRESS NOTES
Problem: Diabetes Self-Management  Goal: *Disease process and treatment process  Description  Define diabetes and identify own type of diabetes; list 3 options for treating diabetes. Outcome: Progressing Towards Goal  Goal: *Incorporating nutritional management into lifestyle  Description  Describe effect of type, amount and timing of food on blood glucose; list 3 methods for planning meals. Outcome: Progressing Towards Goal  Goal: *Incorporating physical activity into lifestyle  Description  State effect of exercise on blood glucose levels. Outcome: Progressing Towards Goal  Goal: *Developing strategies to promote health/change behavior  Description  Define the ABC's of diabetes; identify appropriate screenings, schedule and personal plan for screenings. Outcome: Progressing Towards Goal  Goal: *Using medications safely  Description  State effect of diabetes medications on diabetes; name diabetes medication taking, action and side effects. Outcome: Progressing Towards Goal  Goal: *Monitoring blood glucose, interpreting and using results  Description  Identify recommended blood glucose targets  and personal targets. Outcome: Progressing Towards Goal  Goal: *Prevention, detection, treatment of acute complications  Description  List symptoms of hyper- and hypoglycemia; describe how to treat low blood sugar and actions for lowering  high blood glucose level. Outcome: Progressing Towards Goal  Goal: *Prevention, detection and treatment of chronic complications  Description  Define the natural course of diabetes and describe the relationship of blood glucose levels to long term complications of diabetes.   Outcome: Progressing Towards Goal  Goal: *Developing strategies to address psychosocial issues  Description  Describe feelings about living with diabetes; identify support needed and support network  Outcome: Progressing Towards Goal  Goal: *Insulin pump training  Outcome: Progressing Towards Goal  Goal: *Sick day guidelines  Outcome: Progressing Towards Goal  Goal: *Patient Specific Goal (EDIT GOAL, INSERT TEXT)  Outcome: Progressing Towards Goal     Problem: Patient Education: Go to Patient Education Activity  Goal: Patient/Family Education  Outcome: Progressing Towards Goal     Problem: Pressure Injury - Risk of  Goal: *Prevention of pressure injury  Description  Document Austin Scale and appropriate interventions in the flowsheet. Outcome: Progressing Towards Goal     Problem: Patient Education: Go to Patient Education Activity  Goal: Patient/Family Education  Outcome: Progressing Towards Goal     Problem: Falls - Risk of  Goal: *Absence of Falls  Description  Document Clide Failing Fall Risk and appropriate interventions in the flowsheet.   Outcome: Progressing Towards Goal     Problem: Patient Education: Go to Patient Education Activity  Goal: Patient/Family Education  Outcome: Progressing Towards Goal

## 2019-06-28 NOTE — PROGRESS NOTES
I was asked to arrange transportation for pt for dc. I went in to verify address and patient said he has no where to go. I discussed the hotel he had been at and he says he has not got any money. I tried different options with him and he had an excuse not to do any of them. I called Director Gracia Chiu and shared these issues. Magy Lipscomb came down and spoke to patient  And mom. He said he could not move by him self, Gene TOSCANO stated to him that he was able to move this morning with out problem. Patient potentially could go to [employer] by 8 :45 pm if he has a picture ID, is not a sex offender and was able to independently move about and care for self. A set of scrubs obtained from ER for pt since he said he has no clothes. Pt and his mom state they will go to hotel. Lyft ride arranged for them . To arrive in 7 minutes, Gene TOSCANO aware. Patient taken down to lyft with wheelchair by Gene TOSCANO. Melchor Hercules.  Jenn Aguilar, BSN  [facility]  [phone], Pager [phone]

## 2019-07-03 LAB
BACTERIA SPEC CULT: NORMAL
BACTERIA SPEC CULT: NORMAL
SERVICE CMNT-IMP: NORMAL
SERVICE CMNT-IMP: NORMAL

## 2019-08-27 ENCOUNTER — HOSPITAL ENCOUNTER (INPATIENT)
Age: 54
LOS: 2 days | Discharge: HOME HEALTH | DRG: 420 | End: 2019-08-29
Attending: EMERGENCY MEDICINE | Admitting: INTERNAL MEDICINE
Payer: MEDICAID

## 2019-08-27 ENCOUNTER — APPOINTMENT (OUTPATIENT)
Dept: CT IMAGING | Age: 54
DRG: 420 | End: 2019-08-27
Attending: EMERGENCY MEDICINE
Payer: MEDICAID

## 2019-08-27 ENCOUNTER — APPOINTMENT (OUTPATIENT)
Dept: GENERAL RADIOLOGY | Age: 54
DRG: 420 | End: 2019-08-27
Attending: PHYSICIAN ASSISTANT
Payer: MEDICAID

## 2019-08-27 DIAGNOSIS — R11.2 NAUSEA AND VOMITING, INTRACTABILITY OF VOMITING NOT SPECIFIED, UNSPECIFIED VOMITING TYPE: ICD-10-CM

## 2019-08-27 DIAGNOSIS — N17.9 AKI (ACUTE KIDNEY INJURY) (HCC): ICD-10-CM

## 2019-08-27 DIAGNOSIS — E87.0 HYPEROSMOLAR HYPONATREMIA: Primary | ICD-10-CM

## 2019-08-27 DIAGNOSIS — R73.9 HYPERGLYCEMIA: ICD-10-CM

## 2019-08-27 DIAGNOSIS — E87.1 HYPEROSMOLAR HYPONATREMIA: Primary | ICD-10-CM

## 2019-08-27 DIAGNOSIS — K63.2 ENTEROCUTANEOUS FISTULA: ICD-10-CM

## 2019-08-27 PROBLEM — E11.00 TYPE 2 DIABETES MELLITUS WITH HYPEROSMOLAR NONKETOTIC HYPERGLYCEMIA (HCC): Status: ACTIVE | Noted: 2019-08-27

## 2019-08-27 LAB
ADMINISTERED INITIALS, ADMINIT: NORMAL
ALBUMIN SERPL-MCNC: 3.6 G/DL (ref 3.4–5)
ALBUMIN/GLOB SERPL: 0.8 {RATIO} (ref 0.8–1.7)
ALP SERPL-CCNC: 184 U/L (ref 45–117)
ALT SERPL-CCNC: 22 U/L (ref 16–61)
ANION GAP SERPL CALC-SCNC: 6 MMOL/L (ref 3–18)
ANION GAP SERPL CALC-SCNC: 8 MMOL/L (ref 3–18)
ANION GAP SERPL CALC-SCNC: 9 MMOL/L (ref 3–18)
APPEARANCE UR: CLEAR
ARTERIAL PATENCY WRIST A: ABNORMAL
ARTERIAL PATENCY WRIST A: YES
AST SERPL-CCNC: 21 U/L (ref 10–38)
ATRIAL RATE: 63 BPM
BASE DEFICIT BLD-SCNC: 8 MMOL/L
BASE DEFICIT BLDV-SCNC: 8 MMOL/L
BASOPHILS # BLD: 0 K/UL (ref 0–0.1)
BASOPHILS NFR BLD: 0 % (ref 0–2)
BDY SITE: ABNORMAL
BDY SITE: ABNORMAL
BILIRUB SERPL-MCNC: 0.4 MG/DL (ref 0.2–1)
BILIRUB UR QL: NEGATIVE
BNP SERPL-MCNC: 269 PG/ML (ref 0–900)
BUN SERPL-MCNC: 13 MG/DL (ref 7–18)
BUN SERPL-MCNC: 16 MG/DL (ref 7–18)
BUN SERPL-MCNC: 20 MG/DL (ref 7–18)
BUN/CREAT SERPL: 11 (ref 12–20)
BUN/CREAT SERPL: 12 (ref 12–20)
BUN/CREAT SERPL: 13 (ref 12–20)
CALCIUM SERPL-MCNC: 8 MG/DL (ref 8.5–10.1)
CALCIUM SERPL-MCNC: 8.1 MG/DL (ref 8.5–10.1)
CALCIUM SERPL-MCNC: 8.3 MG/DL (ref 8.5–10.1)
CALCULATED P AXIS, ECG09: 74 DEGREES
CALCULATED R AXIS, ECG10: -42 DEGREES
CALCULATED T AXIS, ECG11: 37 DEGREES
CHLORIDE SERPL-SCNC: 105 MMOL/L (ref 100–111)
CHLORIDE SERPL-SCNC: 89 MMOL/L (ref 100–111)
CHLORIDE SERPL-SCNC: 99 MMOL/L (ref 100–111)
CO2 SERPL-SCNC: 21 MMOL/L (ref 21–32)
CO2 SERPL-SCNC: 24 MMOL/L (ref 21–32)
CO2 SERPL-SCNC: 24 MMOL/L (ref 21–32)
COLOR UR: YELLOW
CREAT SERPL-MCNC: 1.03 MG/DL (ref 0.6–1.3)
CREAT SERPL-MCNC: 1.34 MG/DL (ref 0.6–1.3)
CREAT SERPL-MCNC: 1.81 MG/DL (ref 0.6–1.3)
D50 ADMINISTERED, D50ADM: 0 ML
D50 ORDER, D50ORD: 0 ML
DIAGNOSIS, 93000: NORMAL
DIFFERENTIAL METHOD BLD: ABNORMAL
EOSINOPHIL # BLD: 0.1 K/UL (ref 0–0.4)
EOSINOPHIL NFR BLD: 1 % (ref 0–5)
ERYTHROCYTE [DISTWIDTH] IN BLOOD BY AUTOMATED COUNT: 15 % (ref 11.6–14.5)
GAS FLOW.O2 O2 DELIVERY SYS: ABNORMAL L/MIN
GAS FLOW.O2 O2 DELIVERY SYS: ABNORMAL L/MIN
GLOBULIN SER CALC-MCNC: 4.4 G/DL (ref 2–4)
GLUCOSE BLD STRIP.AUTO-MCNC: 134 MG/DL (ref 70–110)
GLUCOSE BLD STRIP.AUTO-MCNC: 145 MG/DL (ref 70–110)
GLUCOSE BLD STRIP.AUTO-MCNC: 153 MG/DL (ref 70–110)
GLUCOSE BLD STRIP.AUTO-MCNC: 211 MG/DL (ref 70–110)
GLUCOSE BLD STRIP.AUTO-MCNC: 222 MG/DL (ref 70–110)
GLUCOSE BLD STRIP.AUTO-MCNC: 258 MG/DL (ref 70–110)
GLUCOSE BLD STRIP.AUTO-MCNC: 411 MG/DL (ref 70–110)
GLUCOSE BLD STRIP.AUTO-MCNC: 97 MG/DL (ref 70–110)
GLUCOSE BLD STRIP.AUTO-MCNC: >600 MG/DL (ref 70–110)
GLUCOSE BLD STRIP.AUTO-MCNC: >600 MG/DL (ref 70–110)
GLUCOSE SERPL-MCNC: 1336 MG/DL (ref 74–99)
GLUCOSE SERPL-MCNC: 184 MG/DL (ref 74–99)
GLUCOSE SERPL-MCNC: 537 MG/DL (ref 74–99)
GLUCOSE UR STRIP.AUTO-MCNC: >1000 MG/DL
GLUCOSE, GLC: 134 MG/DL
GLUCOSE, GLC: 211 MG/DL
GLUCOSE, GLC: 222 MG/DL
GLUCOSE, GLC: 258 MG/DL
GLUCOSE, GLC: 411 MG/DL
GLUCOSE, GLC: 600 MG/DL
GLUCOSE, GLC: 601 MG/DL
GLUCOSE, GLC: 97 MG/DL
HCO3 BLD-SCNC: 17.2 MMOL/L (ref 22–26)
HCO3 BLDV-SCNC: 19.7 MMOL/L (ref 23–28)
HCT VFR BLD AUTO: 39.2 % (ref 36–48)
HGB BLD-MCNC: 11.6 G/DL (ref 13–16)
HGB UR QL STRIP: NEGATIVE
HIGH TARGET, HITG: 180 MG/DL
INSULIN ADMINSTERED, INSADM: 0 UNITS/HOUR
INSULIN ADMINSTERED, INSADM: 0.4 UNITS/HOUR
INSULIN ADMINSTERED, INSADM: 1.6 UNITS/HOUR
INSULIN ADMINSTERED, INSADM: 10.8 UNITS/HOUR
INSULIN ADMINSTERED, INSADM: 16.2 UNITS/HOUR
INSULIN ADMINSTERED, INSADM: 3 UNITS/HOUR
INSULIN ADMINSTERED, INSADM: 4 UNITS/HOUR
INSULIN ADMINSTERED, INSADM: 7 UNITS/HOUR
INSULIN ORDER, INSORD: 0 UNITS/HOUR
INSULIN ORDER, INSORD: 0.4 UNITS/HOUR
INSULIN ORDER, INSORD: 1.6 UNITS/HOUR
INSULIN ORDER, INSORD: 10.8 UNITS/HOUR
INSULIN ORDER, INSORD: 16.2 UNITS/HOUR
INSULIN ORDER, INSORD: 3 UNITS/HOUR
INSULIN ORDER, INSORD: 4 UNITS/HOUR
INSULIN ORDER, INSORD: 7 UNITS/HOUR
KETONES UR QL STRIP.AUTO: NEGATIVE MG/DL
LACTATE BLD-SCNC: 3.5 MMOL/L (ref 0.4–2)
LACTATE BLD-SCNC: 5.89 MMOL/L (ref 0.4–2)
LEUKOCYTE ESTERASE UR QL STRIP.AUTO: NEGATIVE
LIPASE SERPL-CCNC: 49 U/L (ref 73–393)
LOW TARGET, LOT: 140 MG/DL
LYMPHOCYTES # BLD: 1 K/UL (ref 0.9–3.6)
LYMPHOCYTES NFR BLD: 14 % (ref 21–52)
MAGNESIUM SERPL-MCNC: 1.8 MG/DL (ref 1.6–2.6)
MAGNESIUM SERPL-MCNC: 2.2 MG/DL (ref 1.6–2.6)
MCH RBC QN AUTO: 29.9 PG (ref 24–34)
MCHC RBC AUTO-ENTMCNC: 29.6 G/DL (ref 31–37)
MCV RBC AUTO: 101 FL (ref 74–97)
MINUTES UNTIL NEXT BG, NBG: 60 MIN
MONOCYTES # BLD: 0.2 K/UL (ref 0.05–1.2)
MONOCYTES NFR BLD: 3 % (ref 3–10)
MULTIPLIER, MUL: 0
MULTIPLIER, MUL: 0.01
MULTIPLIER, MUL: 0.01
MULTIPLIER, MUL: 0.02
MULTIPLIER, MUL: 0.03
NEUTS SEG # BLD: 5.7 K/UL (ref 1.8–8)
NEUTS SEG NFR BLD: 82 % (ref 40–73)
NITRITE UR QL STRIP.AUTO: NEGATIVE
O2/TOTAL GAS SETTING VFR VENT: 21 %
ORDER INITIALS, ORDINIT: NORMAL
P-R INTERVAL, ECG05: 164 MS
PCO2 BLD: 35.7 MMHG (ref 35–45)
PCO2 BLDV: 47.6 MMHG (ref 41–51)
PH BLD: 7.29 [PH] (ref 7.35–7.45)
PH BLDV: 7.23 [PH] (ref 7.32–7.42)
PH BLDV: 7.24 [PH] (ref 7.32–7.42)
PH UR STRIP: 6.5 [PH] (ref 5–8)
PHOSPHATE SERPL-MCNC: 2.7 MG/DL (ref 2.5–4.9)
PHOSPHATE SERPL-MCNC: 3.7 MG/DL (ref 2.5–4.9)
PLATELET # BLD AUTO: 196 K/UL (ref 135–420)
PMV BLD AUTO: 10.4 FL (ref 9.2–11.8)
PO2 BLD: 90 MMHG (ref 80–100)
PO2 BLDV: 42 MMHG (ref 25–40)
POTASSIUM SERPL-SCNC: 3.3 MMOL/L (ref 3.5–5.5)
POTASSIUM SERPL-SCNC: 4.1 MMOL/L (ref 3.5–5.5)
POTASSIUM SERPL-SCNC: 5.6 MMOL/L (ref 3.5–5.5)
PROT SERPL-MCNC: 8 G/DL (ref 6.4–8.2)
PROT UR STRIP-MCNC: NEGATIVE MG/DL
Q-T INTERVAL, ECG07: 428 MS
QRS DURATION, ECG06: 98 MS
QTC CALCULATION (BEZET), ECG08: 437 MS
RBC # BLD AUTO: 3.88 M/UL (ref 4.7–5.5)
SAO2 % BLD: 96 % (ref 92–97)
SAO2 % BLDV: 67 % (ref 65–88)
SERVICE CMNT-IMP: ABNORMAL
SERVICE CMNT-IMP: ABNORMAL
SODIUM SERPL-SCNC: 118 MMOL/L (ref 136–145)
SODIUM SERPL-SCNC: 132 MMOL/L (ref 136–145)
SODIUM SERPL-SCNC: 135 MMOL/L (ref 136–145)
SP GR UR REFRACTOMETRY: 1.03 (ref 1–1.03)
SPECIMEN TYPE: ABNORMAL
SPECIMEN TYPE: ABNORMAL
TOTAL RESP. RATE, ITRR: 22
UROBILINOGEN UR QL STRIP.AUTO: 0.2 EU/DL (ref 0.2–1)
VENTRICULAR RATE, ECG03: 63 BPM
WBC # BLD AUTO: 7.1 K/UL (ref 4.6–13.2)

## 2019-08-27 PROCEDURE — 96361 HYDRATE IV INFUSION ADD-ON: CPT

## 2019-08-27 PROCEDURE — 74011636637 HC RX REV CODE- 636/637: Performed by: INTERNAL MEDICINE

## 2019-08-27 PROCEDURE — 96375 TX/PRO/DX INJ NEW DRUG ADDON: CPT

## 2019-08-27 PROCEDURE — 85025 COMPLETE CBC W/AUTO DIFF WBC: CPT

## 2019-08-27 PROCEDURE — 80048 BASIC METABOLIC PNL TOTAL CA: CPT

## 2019-08-27 PROCEDURE — 83880 ASSAY OF NATRIURETIC PEPTIDE: CPT

## 2019-08-27 PROCEDURE — 80053 COMPREHEN METABOLIC PANEL: CPT

## 2019-08-27 PROCEDURE — 36600 WITHDRAWAL OF ARTERIAL BLOOD: CPT

## 2019-08-27 PROCEDURE — 93005 ELECTROCARDIOGRAM TRACING: CPT

## 2019-08-27 PROCEDURE — 65660000004 HC RM CVT STEPDOWN

## 2019-08-27 PROCEDURE — 83735 ASSAY OF MAGNESIUM: CPT

## 2019-08-27 PROCEDURE — 81003 URINALYSIS AUTO W/O SCOPE: CPT

## 2019-08-27 PROCEDURE — 74011250636 HC RX REV CODE- 250/636: Performed by: EMERGENCY MEDICINE

## 2019-08-27 PROCEDURE — 74011250637 HC RX REV CODE- 250/637: Performed by: INTERNAL MEDICINE

## 2019-08-27 PROCEDURE — 87040 BLOOD CULTURE FOR BACTERIA: CPT

## 2019-08-27 PROCEDURE — 74176 CT ABD & PELVIS W/O CONTRAST: CPT

## 2019-08-27 PROCEDURE — 96366 THER/PROPH/DIAG IV INF ADDON: CPT

## 2019-08-27 PROCEDURE — 82962 GLUCOSE BLOOD TEST: CPT

## 2019-08-27 PROCEDURE — 36415 COLL VENOUS BLD VENIPUNCTURE: CPT

## 2019-08-27 PROCEDURE — 74011250636 HC RX REV CODE- 250/636: Performed by: INTERNAL MEDICINE

## 2019-08-27 PROCEDURE — 99285 EMERGENCY DEPT VISIT HI MDM: CPT

## 2019-08-27 PROCEDURE — 84100 ASSAY OF PHOSPHORUS: CPT

## 2019-08-27 PROCEDURE — 82800 BLOOD PH: CPT

## 2019-08-27 PROCEDURE — 82803 BLOOD GASES ANY COMBINATION: CPT

## 2019-08-27 PROCEDURE — 96365 THER/PROPH/DIAG IV INF INIT: CPT

## 2019-08-27 PROCEDURE — 74011636637 HC RX REV CODE- 636/637: Performed by: EMERGENCY MEDICINE

## 2019-08-27 PROCEDURE — 83605 ASSAY OF LACTIC ACID: CPT

## 2019-08-27 PROCEDURE — 83690 ASSAY OF LIPASE: CPT

## 2019-08-27 PROCEDURE — 96360 HYDRATION IV INFUSION INIT: CPT

## 2019-08-27 PROCEDURE — 71045 X-RAY EXAM CHEST 1 VIEW: CPT

## 2019-08-27 RX ORDER — DEXTROSE MONOHYDRATE 100 MG/ML
125-250 INJECTION, SOLUTION INTRAVENOUS AS NEEDED
Status: DISCONTINUED | OUTPATIENT
Start: 2019-08-27 | End: 2019-08-28 | Stop reason: SDUPTHER

## 2019-08-27 RX ORDER — LANOLIN ALCOHOL/MO/W.PET/CERES
100 CREAM (GRAM) TOPICAL DAILY
Status: DISCONTINUED | OUTPATIENT
Start: 2019-08-28 | End: 2019-08-29 | Stop reason: HOSPADM

## 2019-08-27 RX ORDER — SODIUM CHLORIDE 9 MG/ML
125 INJECTION, SOLUTION INTRAVENOUS CONTINUOUS
Status: DISCONTINUED | OUTPATIENT
Start: 2019-08-27 | End: 2019-08-27

## 2019-08-27 RX ORDER — HEPARIN SODIUM 5000 [USP'U]/ML
5000 INJECTION, SOLUTION INTRAVENOUS; SUBCUTANEOUS EVERY 8 HOURS
Status: DISCONTINUED | OUTPATIENT
Start: 2019-08-27 | End: 2019-08-29 | Stop reason: HOSPADM

## 2019-08-27 RX ORDER — ASPIRIN 81 MG/1
81 TABLET ORAL DAILY
Status: DISCONTINUED | OUTPATIENT
Start: 2019-08-28 | End: 2019-08-29 | Stop reason: HOSPADM

## 2019-08-27 RX ORDER — INSULIN GLARGINE 100 [IU]/ML
20 INJECTION, SOLUTION SUBCUTANEOUS ONCE
Status: COMPLETED | OUTPATIENT
Start: 2019-08-27 | End: 2019-08-27

## 2019-08-27 RX ORDER — LORAZEPAM 1 MG/1
1 TABLET ORAL
Status: DISCONTINUED | OUTPATIENT
Start: 2019-08-27 | End: 2019-08-29 | Stop reason: HOSPADM

## 2019-08-27 RX ORDER — DEXTROSE 50 % IN WATER (D50W) INTRAVENOUS SYRINGE
25-50 AS NEEDED
Status: DISCONTINUED | OUTPATIENT
Start: 2019-08-27 | End: 2019-08-28 | Stop reason: SDUPTHER

## 2019-08-27 RX ORDER — MAGNESIUM SULFATE 100 %
4 CRYSTALS MISCELLANEOUS AS NEEDED
Status: DISCONTINUED | OUTPATIENT
Start: 2019-08-27 | End: 2019-08-27

## 2019-08-27 RX ORDER — DEXTROSE MONOHYDRATE 100 MG/ML
125-250 INJECTION, SOLUTION INTRAVENOUS AS NEEDED
Status: DISCONTINUED | OUTPATIENT
Start: 2019-08-27 | End: 2019-08-29 | Stop reason: HOSPADM

## 2019-08-27 RX ORDER — LEVETIRACETAM 500 MG/1
1000 TABLET ORAL 2 TIMES DAILY
Status: DISCONTINUED | OUTPATIENT
Start: 2019-08-27 | End: 2019-08-29 | Stop reason: HOSPADM

## 2019-08-27 RX ORDER — HYDROCODONE BITARTRATE AND ACETAMINOPHEN 5; 325 MG/1; MG/1
1 TABLET ORAL
Status: DISCONTINUED | OUTPATIENT
Start: 2019-08-27 | End: 2019-08-29 | Stop reason: HOSPADM

## 2019-08-27 RX ORDER — MAGNESIUM SULFATE 100 %
4 CRYSTALS MISCELLANEOUS AS NEEDED
Status: DISCONTINUED | OUTPATIENT
Start: 2019-08-27 | End: 2019-08-27 | Stop reason: SDUPTHER

## 2019-08-27 RX ORDER — ONDANSETRON 2 MG/ML
4 INJECTION INTRAMUSCULAR; INTRAVENOUS
Status: COMPLETED | OUTPATIENT
Start: 2019-08-27 | End: 2019-08-27

## 2019-08-27 RX ORDER — INSULIN LISPRO 100 [IU]/ML
INJECTION, SOLUTION INTRAVENOUS; SUBCUTANEOUS EVERY 4 HOURS
Status: DISCONTINUED | OUTPATIENT
Start: 2019-08-27 | End: 2019-08-28

## 2019-08-27 RX ORDER — ONDANSETRON 2 MG/ML
4 INJECTION INTRAMUSCULAR; INTRAVENOUS
Status: DISCONTINUED | OUTPATIENT
Start: 2019-08-27 | End: 2019-08-29 | Stop reason: HOSPADM

## 2019-08-27 RX ORDER — MORPHINE SULFATE 2 MG/ML
4 INJECTION, SOLUTION INTRAMUSCULAR; INTRAVENOUS
Status: COMPLETED | OUTPATIENT
Start: 2019-08-27 | End: 2019-08-27

## 2019-08-27 RX ORDER — DEXTROSE 50 % IN WATER (D50W) INTRAVENOUS SYRINGE
25-50 AS NEEDED
Status: DISCONTINUED | OUTPATIENT
Start: 2019-08-27 | End: 2019-08-27 | Stop reason: CLARIF

## 2019-08-27 RX ORDER — SODIUM CHLORIDE 0.9 % (FLUSH) 0.9 %
5-10 SYRINGE (ML) INJECTION AS NEEDED
Status: DISCONTINUED | OUTPATIENT
Start: 2019-08-27 | End: 2019-08-29 | Stop reason: HOSPADM

## 2019-08-27 RX ORDER — SODIUM CHLORIDE 9 MG/ML
150 INJECTION, SOLUTION INTRAVENOUS CONTINUOUS
Status: DISCONTINUED | OUTPATIENT
Start: 2019-08-27 | End: 2019-08-27

## 2019-08-27 RX ORDER — IBUPROFEN 200 MG
2-14 TABLET ORAL
Qty: 100 SYRINGE | Refills: 0 | Status: SHIPPED | OUTPATIENT
Start: 2019-08-27 | End: 2020-08-07

## 2019-08-27 RX ORDER — DEXTROSE 50 % IN WATER (D50W) INTRAVENOUS SYRINGE
25-50 AS NEEDED
Status: DISCONTINUED | OUTPATIENT
Start: 2019-08-27 | End: 2019-08-27 | Stop reason: SDUPTHER

## 2019-08-27 RX ORDER — ACETAMINOPHEN 325 MG/1
650 TABLET ORAL
Status: DISCONTINUED | OUTPATIENT
Start: 2019-08-27 | End: 2019-08-29 | Stop reason: HOSPADM

## 2019-08-27 RX ORDER — MAGNESIUM SULFATE 100 %
4 CRYSTALS MISCELLANEOUS AS NEEDED
Status: DISCONTINUED | OUTPATIENT
Start: 2019-08-27 | End: 2019-08-29 | Stop reason: HOSPADM

## 2019-08-27 RX ORDER — DEXTROSE MONOHYDRATE 50 MG/ML
125 INJECTION, SOLUTION INTRAVENOUS CONTINUOUS
Status: DISCONTINUED | OUTPATIENT
Start: 2019-08-27 | End: 2019-08-28

## 2019-08-27 RX ORDER — SUCRALFATE 1 G/1
1 TABLET ORAL 4 TIMES DAILY
Status: DISCONTINUED | OUTPATIENT
Start: 2019-08-27 | End: 2019-08-29 | Stop reason: HOSPADM

## 2019-08-27 RX ORDER — FOLIC ACID 1 MG/1
1 TABLET ORAL DAILY
Status: DISCONTINUED | OUTPATIENT
Start: 2019-08-28 | End: 2019-08-29 | Stop reason: HOSPADM

## 2019-08-27 RX ADMIN — SODIUM CHLORIDE 125 ML/HR: 900 INJECTION, SOLUTION INTRAVENOUS at 18:27

## 2019-08-27 RX ADMIN — INSULIN GLARGINE 20 UNITS: 100 INJECTION, SOLUTION SUBCUTANEOUS at 20:57

## 2019-08-27 RX ADMIN — SODIUM CHLORIDE 905 ML: 900 INJECTION, SOLUTION INTRAVENOUS at 14:43

## 2019-08-27 RX ADMIN — HEPARIN SODIUM 5000 UNITS: 5000 INJECTION INTRAVENOUS; SUBCUTANEOUS at 20:57

## 2019-08-27 RX ADMIN — Medication 10.8 UNITS/HR: at 14:26

## 2019-08-27 RX ADMIN — Medication 4 UNITS/HR: at 18:23

## 2019-08-27 RX ADMIN — SODIUM CHLORIDE 1000 ML: 900 INJECTION, SOLUTION INTRAVENOUS at 13:17

## 2019-08-27 RX ADMIN — SODIUM CHLORIDE 125 ML/HR: 900 INJECTION, SOLUTION INTRAVENOUS at 13:17

## 2019-08-27 RX ADMIN — DEXTROSE MONOHYDRATE 125 ML/HR: 5 INJECTION, SOLUTION INTRAVENOUS at 20:15

## 2019-08-27 RX ADMIN — SODIUM CHLORIDE 1000 ML: 900 INJECTION, SOLUTION INTRAVENOUS at 14:13

## 2019-08-27 RX ADMIN — MORPHINE SULFATE 4 MG: 2 INJECTION, SOLUTION INTRAMUSCULAR; INTRAVENOUS at 14:45

## 2019-08-27 RX ADMIN — HYDROCODONE BITARTRATE AND ACETAMINOPHEN 1 TABLET: 5; 325 TABLET ORAL at 18:55

## 2019-08-27 RX ADMIN — LEVETIRACETAM 1000 MG: 500 TABLET, FILM COATED ORAL at 20:57

## 2019-08-27 RX ADMIN — Medication 7 UNITS/HR: at 16:34

## 2019-08-27 RX ADMIN — HYDROCODONE BITARTRATE AND ACETAMINOPHEN 1 TABLET: 5; 325 TABLET ORAL at 23:33

## 2019-08-27 RX ADMIN — SUCRALFATE 1 G: 1 TABLET ORAL at 20:57

## 2019-08-27 RX ADMIN — ONDANSETRON 4 MG: 2 INJECTION INTRAMUSCULAR; INTRAVENOUS at 14:45
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
## 2019-08-27 NOTE — ED NOTES
I performed a brief history of the patient here in triage and I have determined that pt will need further treatment and evaluation from the main side ER physician. I have placed initial orders based on the history to help in expediting patients care.       Visit Vitals  /70 (BP 1 Location: Left arm, BP Patient Position: At rest)   Pulse 73   Temp 97.6 °F (36.4 °C)   Resp 12   Ht 6' (1.829 m)   Wt 63.5 kg (140 lb)   SpO2 100%   BMI 18.99 kg/m²     RACHEL Farley  08/27/19

## 2019-08-27 NOTE — PROGRESS NOTES
1745 pt arrived to unit. Changed gown and linens, saturated in wound drainage. Dressing changed, natasha area cleaned with dermal wound cleanser and gauze. Applied ABD and medipore tape. Pt complains of severe abd pain. History of pancreatitis, lipase WNL. 1850 pain 8/10 PRN meds given. Pt declines scheduled meds due to stomach pain when swallowing.

## 2019-08-27 NOTE — H&P
Hospitalist Admission Note    NAME: Michelle Luong   :  1965   MRN:  [id number]     Date/Time of admission:  2019 4:52 PM    Patient PCP: None  ________________________________________________________________________    My assessment of this patient's clinical condition and my plan of care is as follows. Assessment / Plan:  1. Hyperosmolar Hyperglycemia  2. Insulin dependent type 2 DM  3. Nausea with vomiting likely d/t above  4. Chronic pancreatitis  5. Chronic abdominal wall fistulas, currently not infected  6. Pseudohyponatremia, improved  7. Medical noncompliance  8. ETOH abuse  9. No primary care monitoring    1. Admit to step down for insulin gtt and electrolyte monitoring  2. Hydrate aggressively with prn antiemetics  3. Monitor wound (wound care)  4. Prn ativan for agitation  5. Will need assistance with PCP arrangement upon discharge. 6. Continue gi regimen with creon, carafate, ppi. Code Status: full  Surrogate Decision Maker: patient    DVT Prophylaxis: hep  GI Prophylaxis: not indicated          Subjective:   CHIEF COMPLAINT: nausea with vomiting; elevated blood glucose    HISTORY OF PRESENT ILLNESS:     Michelle Luong is a 47 y.o.  male who presents with elevated blood glucose of 1338. Pt has been in and out of hospitals multiple times over the past few weeks to months and has gone from city to city. He currently lives in a group facility with his mother and they ran out of his insulin, reportedly, this past weekend. He developed nausea and vomiting and lethargy. He was brought to the ED and was noted to have a significantly elevated blood glucose level and low sodium. He has chronic abdominal wall fistulas that were draining, but did not appear infected. He was started on an insulin gtt and hydration. Pt also has a h/o etoh abuse. Does not appear inebriated, currently. We were asked to admit for work up and evaluation of the above problems.      Of note, pt states his insulin was actually taken from him as his caregiver \"did not know how to give it. \" He also continues to have epigastric pain, but appears stable. Past Medical History:   Diagnosis Date    Abdominal pain, generalized     Chronic pancreatitis ([address]     Diabetes ([facility] [address]     hypothyroid    Encounter for long-term (current) use of other medications     Essential hypertension     ETOH abuse     GERD (gastroesophageal reflux disease)     Heart failure (HCC)     Hyponatremia     Pain in the abdomen 11/2/2010    Pancreatitis     w/ abscess and pseudocyst    Pancreatitis     Psychiatric disorder     depression, anxiety    Tobacco abuse         Past Surgical History:   Procedure Laterality Date    HX ABDOMINAL LAPAROSCOPY      PANCREATIC STENT    HX CHOLECYSTECTOMY         Social History     Tobacco Use    Smoking status: Former Smoker     Packs/day: 0.50     Years: 0.00     Pack years: 0.00     Types: Cigarettes    Smokeless tobacco: Never Used   Substance Use Topics    Alcohol use: Yes        Family History   Problem Relation Age of Onset    Heart Disease Father      Allergies   Allergen Reactions    Ampicillin-Sulbactam Rash    Pcn [Penicillins] Itching and Hives    Piperacillin-Tazobactam Rash    Pollen Extracts Rash    Seafood [Shellfish Containing Products] Hives     Other reaction(s): unknown  Has tolerated iohexol (iodine-containing contrast)  Only shrimp  Shrimp        Prior to Admission medications    Medication Sig Start Date End Date Taking? Authorizing Provider   insulin NPH/insulin regular (HUMULIN 70/30 U-100 INSULIN) 100 unit/mL (70-30) injection 30 Units SC BID 8/27/19  Yes Casie Moy MD   Insulin Syringe-Needle U-100 0.3 mL 29 gauge syrg 2-14 Units by Does Not Apply route Before breakfast, lunch, and dinner. 8/27/19  Yes Casie Moy MD   lipase-protease-amylase (CREON) 6,000-19,000 -30,000 unit capsule Take 1 Cap by mouth three (3) times daily (with meals).  8/8/19 Manuela Patton MD   diclofenac potassium (CATAFLAM) 50 mg tablet Take 1 Tab by mouth three (3) times daily as needed for Pain. 8/8/19   Gisele Pizano MD   levETIRAcetam (KEPPRA) 500 mg tablet Take 2 Tabs by mouth two (2) times a day. 6/19/19   Aram Wadsworth MD   aspirin delayed-release 81 mg tablet Take 81 mg by mouth daily. Provider, Historical   acetaminophen (TYLENOL EXTRA STRENGTH) 500 mg tablet Take 500 mg by mouth every six (6) hours as needed for Pain. Provider, Historical   sucralfate (CARAFATE) 1 gram tablet Take 1 g by mouth four (4) times daily. Other, MD Zachary   thiamine HCL (B-1) 100 mg tablet Take 1 Tab by mouth daily. 5/20/19   Jessica Arrieta NP   therapeutic multivitamin [facility]) tablet Take 1 Tab by mouth daily. 5/20/19   Jessica Arrieta NP   polyethylene glycol (MIRALAX) 17 gram packet Take 1 Packet by mouth daily. 5/20/19   Jessica Arrieta NP   pantoprazole (PROTONIX) 40 mg tablet Take 1 Tab by mouth Daily (before breakfast). 5/20/19   Jessica Arrieta, SUJATHA   OTHER NO DRIVING OR OPERATING HEAVY MACHINERY FOR 6 MONTHS OR UNTIL CLEARED BY NEUROLOGY. 5/19/19   Jessica Arrieta NP   Blood-Glucose Meter (FREESTYLE LITE METER) monitoring kit Test blood sugars 4-6 times a day 5/4/19   RACHEL Horn   glucose blood VI test strips (FREESTYLE TEST) strip 1 Each by Does Not Apply route See Admin Instructions. Test blood sugars 4-6 times a day. 5/4/19   RACHEL Horn   promethazine (PHENERGAN) 25 mg tablet Take 1 Tab by mouth every six (6) hours as needed. 9/21/18   Eileen Liang MD       REVIEW OF SYSTEMS:     I am not able to complete the review of systems because:    The patient is intubated and sedated    The patient has altered mental status due to his acute medical problems    The patient has baseline aphasia from prior stroke(s)    The patient has baseline dementia and is not reliable historian    The patient is in acute medical distress and unable to provide information           Total of 12 systems reviewed as follows:       POSITIVE= bolded text  Negative = text not underlined  General:  fever, chills, sweats, generalized weakness, weight loss/gain,      loss of appetite   Eyes:    blurred vision, eye pain, loss of vision, double vision  ENT:    rhinorrhea, pharyngitis   Respiratory:   cough, sputum production, SOB, MAJOR, wheezing, pleuritic pain   Cardiology:   chest pain, palpitations, orthopnea, PND, edema, syncope   Gastrointestinal:  abdominal pain , N/V, diarrhea, dysphagia, constipation, bleeding   Genitourinary:  frequency, urgency, dysuria, hematuria, incontinence   Muskuloskeletal :  arthralgia, myalgia, back pain  Hematology:  easy bruising, nose or gum bleeding, lymphadenopathy   Dermatological: rash, ulceration, pruritis, color change / jaundice  Endocrine:   hot flashes or polydipsia   Neurological:  headache, dizziness, confusion, focal weakness, paresthesia,     Speech difficulties, memory loss, gait difficulty  Psychological: Feelings of anxiety, depression, agitation    Objective:   VITALS:    Visit Vitals  /78   Pulse 62   Temp 97.6 °F (36.4 °C)   Resp 19   Ht 6' (1.829 m)   Wt 63.5 kg (140 lb)   SpO2 100%   BMI 18.99 kg/m²       PHYSICAL EXAM:    General:    Alert, cooperative, mild distress, appears stated age. HEENT: Atraumatic, anicteric sclerae, pink conjunctivae     No oral ulcers, mucosa moist, throat clear, dentition fair  Neck:  Supple, symmetrical,  thyroid: non tender  Lungs:   Clear to auscultation bilaterally. No Wheezing or Rhonchi. No rales. Chest wall:  No tenderness  No Accessory muscle use. Heart:   Regular  rhythm,  No  murmur   No edema  Abdomen:   Soft, significant epigastric tenderness; fistulas dressed and pt does not want to remove bandages. Not distended. Bowel sounds normal  Extremities: No cyanosis.   No clubbing,      Skin turgor normal, Capillary refill normal, Radial dial pulse 2+  Skin:     Bandaged fistulas  Psych:  Not anxious or agitated. Neurologic: EOMs intact. No facial asymmetry. No aphasia or slurred speech. Symmetrical strength, Sensation grossly intact. Alert and oriented X 4.     _______________________________________________________________________  Care Plan discussed with:    Comments   Patient x    Family  x    RN     Care Manager                    Consultant:      _______________________________________________________________________  Expected  Disposition:   Home with Family    HH/PT/OT/RN x   SNF/LTC    DINA    ________________________________________________________________________  TOTAL TIME:  48 Minutes    Critical Care Provided     Minutes non procedure based      Comments    x Reviewed previous records   >50% of visit spent in counseling and coordination of care x Discussion with patient and/or family and questions answered       ________________________________________________________________________      Procedures: see electronic medical records for all procedures/Xrays and details which were not copied into this note but were reviewed prior to creation of Plan. LAB DATA REVIEWED:    Recent Results (from the past 24 hour(s))   GLUCOSE, POC    Collection Time: 08/27/19 11:38 AM   Result Value Ref Range    Glucose (POC) >600 (HH) 70 - 684 mg/dL   METABOLIC PANEL, COMPREHENSIVE    Collection Time: 08/27/19  1:12 PM   Result Value Ref Range    Sodium 118 (LL) 136 - 145 mmol/L    Potassium 5.6 (H) 3.5 - 5.5 mmol/L    Chloride 89 (L) 100 - 111 mmol/L    CO2 21 21 - 32 mmol/L    Anion gap 8 3.0 - 18 mmol/L    Glucose 1,336 (HH) 74 - 99 mg/dL    BUN 20 (H) 7.0 - 18 MG/DL    Creatinine 1.81 (H) 0.6 - 1.3 MG/DL    BUN/Creatinine ratio 11 (L) 12 - 20      GFR est AA 48 (L) >60 ml/min/1.73m2    GFR est non-AA 39 (L) >60 ml/min/1.73m2    Calcium 8.0 (L) 8.5 - 10.1 MG/DL    Bilirubin, total 0.4 0.2 - 1.0 MG/DL    ALT (SGPT) 22 16 - 61 U/L    AST (SGOT) 21 10 - 38 U/L    Alk.  phosphatase 184 (H) 45 - 117 U/L    Protein, total 8.0 6.4 - 8.2 g/dL    Albumin 3.6 3.4 - 5.0 g/dL    Globulin 4.4 (H) 2.0 - 4.0 g/dL    A-G Ratio 0.8 0.8 - 1.7     CBC WITH AUTOMATED DIFF    Collection Time: 08/27/19  1:12 PM   Result Value Ref Range    WBC 7.1 4.6 - 13.2 K/uL    RBC 3.88 (L) 4.70 - 5.50 M/uL    HGB 11.6 (L) 13.0 - 16.0 g/dL    HCT 39.2 36.0 - 48.0 %    .0 (H) 74.0 - 97.0 FL    MCH 29.9 24.0 - 34.0 PG    MCHC 29.6 (L) 31.0 - 37.0 g/dL    RDW 15.0 (H) 11.6 - 14.5 %    PLATELET 904 332 - 750 K/uL    MPV 10.4 9.2 - 11.8 FL    NEUTROPHILS 82 (H) 40 - 73 %    LYMPHOCYTES 14 (L) 21 - 52 %    MONOCYTES 3 3 - 10 %    EOSINOPHILS 1 0 - 5 %    BASOPHILS 0 0 - 2 %    ABS. NEUTROPHILS 5.7 1.8 - 8.0 K/UL    ABS. LYMPHOCYTES 1.0 0.9 - 3.6 K/UL    ABS. MONOCYTES 0.2 0.05 - 1.2 K/UL    ABS. EOSINOPHILS 0.1 0.0 - 0.4 K/UL    ABS. BASOPHILS 0.0 0.0 - 0.1 K/UL    DF AUTOMATED     MAGNESIUM    Collection Time: 08/27/19  1:12 PM   Result Value Ref Range    Magnesium 2.2 1.6 - 2.6 mg/dL   PH, VENOUS    Collection Time: 08/27/19  1:18 PM   Result Value Ref Range    VENOUS PH 7.24 (L) 7.32 - 7.42     POC LACTIC ACID    Collection Time: 08/27/19  1:18 PM   Result Value Ref Range    Lactic Acid (POC) 3.50 (HH) 0.40 - 2.00 mmol/L   LIPASE    Collection Time: 08/27/19  1:18 PM   Result Value Ref Range    Lipase 49 (L) 73 - 393 U/L   PHOSPHORUS    Collection Time: 08/27/19  1:18 PM   Result Value Ref Range    Phosphorus 3.7 2.5 - 4.9 MG/DL   POC G3    Collection Time: 08/27/19  1:19 PM   Result Value Ref Range    Device: ROOM AIR      FIO2 (POC) 21 %    pH (POC) 7.291 (L) 7.35 - 7.45      pCO2 (POC) 35.7 35.0 - 45.0 MMHG    pO2 (POC) 90 80 - 100 MMHG    HCO3 (POC) 17.2 (L) 22 - 26 MMOL/L    sO2 (POC) 96 92 - 97 %    Base deficit (POC) 8 mmol/L    Allens test (POC) N/A      Total resp.  rate 22      Site RIGHT RADIAL      Specimen type (POC) ARTERIAL      Performed by Mihir Bain    POC VENOUS BLOOD GAS    Collection Time: 08/27/19  1:34 PM   Result Value Ref Range    Device: ROOM AIR      pH, venous (POC) 7.226 (L) 7.32 - 7.42      pCO2, venous (POC) 47.6 41 - 51 MMHG    pO2, venous (POC) 42 (H) 25 - 40 mmHg    HCO3, venous (POC) 19.7 (L) 23.0 - 28.0 MMOL/L    sO2, venous (POC) 67 65 - 88 %    Base deficit, venous (POC) 8 mmol/L    Allens test (POC) YES      Site LEFT BRACHIAL      Specimen type (POC) VENOUS BLOOD      Performed by Lauren Mckeon    URINALYSIS W/ RFLX MICROSCOPIC    Collection Time: 08/27/19  1:45 PM   Result Value Ref Range    Color YELLOW      Appearance CLEAR      Specific gravity 1.028 1.005 - 1.030      pH (UA) 6.5 5.0 - 8.0      Protein NEGATIVE  NEG mg/dL    Glucose >1,000 (A) NEG mg/dL    Ketone NEGATIVE  NEG mg/dL    Bilirubin NEGATIVE  NEG      Blood NEGATIVE  NEG      Urobilinogen 0.2 0.2 - 1.0 EU/dL    Nitrites NEGATIVE  NEG      Leukocyte Esterase NEGATIVE  NEG     GLUCOSTABILIZER    Collection Time: 08/27/19  2:25 PM   Result Value Ref Range    Glucose 600 mg/dL    Insulin order 10.8 units/hour    Insulin adminstered 10.8 units/hour    Multiplier 0.020     Low target 140 mg/dL    High target 180 mg/dL    D50 order 0.0 ml    D50 administered 0.00 ml    Minutes until next BG 60 min    Order initials ta     Administered initials ta    EKG, 12 LEAD, INITIAL    Collection Time: 08/27/19  3:12 PM   Result Value Ref Range    Ventricular Rate 63 BPM    Atrial Rate 63 BPM    P-R Interval 164 ms    QRS Duration 98 ms    Q-T Interval 428 ms    QTC Calculation (Bezet) 437 ms    Calculated P Axis 74 degrees    Calculated R Axis -42 degrees    Calculated T Axis 37 degrees    Diagnosis       Normal sinus rhythm  Left axis deviation  Abnormal ECG  When compared with ECG of 27-JUN-2019 01:07,  No significant change was found  Confirmed by Onofre Pino MD, Leatha Wilson ([id number]) on 8/27/2019 3:24:54 PM     GLUCOSE, POC    Collection Time: 08/27/19  3:21 PM   Result Value Ref Range    Glucose (POC) >600 (HH) 70 - 110 mg/dL GLUCOSTABILIZER    Collection Time: 08/27/19  3:22 PM   Result Value Ref Range    Glucose 601 mg/dL    Insulin order 16.2 units/hour    Insulin adminstered 16.2 units/hour    Multiplier 0.030     Low target 140 mg/dL    High target 180 mg/dL    D50 order 0.0 ml    D50 administered 0.00 ml    Minutes until next BG 60 min    Order initials ta     Administered initials ta    METABOLIC PANEL, BASIC    Collection Time: 08/27/19  4:00 PM   Result Value Ref Range    Sodium 132 (L) 136 - 145 mmol/L    Potassium 4.1 3.5 - 5.5 mmol/L    Chloride 99 (L) 100 - 111 mmol/L    CO2 24 21 - 32 mmol/L    Anion gap 9 3.0 - 18 mmol/L    Glucose 537 (HH) 74 - 99 mg/dL    BUN 16 7.0 - 18 MG/DL    Creatinine 1.34 (H) 0.6 - 1.3 MG/DL    BUN/Creatinine ratio 12 12 - 20      GFR est AA >60 >60 ml/min/1.73m2    GFR est non-AA 56 (L) >60 ml/min/1.73m2    Calcium 8.3 (L) 8.5 - 10.1 MG/DL   GLUCOSE, POC    Collection Time: 08/27/19  4:32 PM   Result Value Ref Range    Glucose (POC) 411 (HH) 70 - 110 mg/dL   GLUCOSTABILIZER    Collection Time: 08/27/19  4:33 PM   Result Value Ref Range    Glucose 411 mg/dL    Insulin order 7.0 units/hour    Insulin adminstered 7.0 units/hour    Multiplier 0.020     Low target 140 mg/dL    High target 180 mg/dL    D50 order 0.0 ml    D50 administered 0.00 ml    Minutes until next BG 60 min    Order initials ta     Administered initials ta    POC LACTIC ACID    Collection Time: 08/27/19  4:47 PM   Result Value Ref Range    Lactic Acid (POC) 5.89 (HH) 0.40 - 2.00 mmol/L       Monique Weiner MD  Internal Medicine  Hospitalist Division

## 2019-08-27 NOTE — ROUTINE PROCESS
TRANSFER - OUT REPORT:    Verbal report given to Renetta(name) on Jazz Melvin  being transferred to [facility](unit) for routine progression of care       Report consisted of patients Situation, Background, Assessment and   Recommendations(SBAR). Information from the following report(s) SBAR was reviewed with the receiving nurse. Lines:   Peripheral IV 08/27/19 Right External jugular (Active)   Site Assessment Clean, dry, & intact 8/27/2019  1:34 PM   Phlebitis Assessment 0 8/27/2019  1:34 PM   Infiltration Assessment 0 8/27/2019  1:34 PM   Dressing Status Clean, dry, & intact 8/27/2019  1:34 PM       Peripheral IV 08/27/19 Right Antecubital (Active)   Site Assessment Clean, dry, & intact 8/27/2019  4:07 PM   Phlebitis Assessment 0 8/27/2019  4:07 PM   Infiltration Assessment 0 8/27/2019  4:07 PM   Dressing Status Clean, dry, & intact 8/27/2019  4:07 PM        Opportunity for questions and clarification was provided.       Patient transported with:   Registered Nurse

## 2019-08-28 LAB
ANION GAP SERPL CALC-SCNC: 7 MMOL/L (ref 3–18)
BUN SERPL-MCNC: 10 MG/DL (ref 7–18)
BUN SERPL-MCNC: 13 MG/DL (ref 7–18)
BUN SERPL-MCNC: 9 MG/DL (ref 7–18)
BUN/CREAT SERPL: 11 (ref 12–20)
BUN/CREAT SERPL: 11 (ref 12–20)
BUN/CREAT SERPL: 9 (ref 12–20)
CALCIUM SERPL-MCNC: 8 MG/DL (ref 8.5–10.1)
CALCIUM SERPL-MCNC: 8.1 MG/DL (ref 8.5–10.1)
CALCIUM SERPL-MCNC: 8.1 MG/DL (ref 8.5–10.1)
CHLORIDE SERPL-SCNC: 105 MMOL/L (ref 100–111)
CHLORIDE SERPL-SCNC: 105 MMOL/L (ref 100–111)
CHLORIDE SERPL-SCNC: 108 MMOL/L (ref 100–111)
CO2 SERPL-SCNC: 22 MMOL/L (ref 21–32)
CO2 SERPL-SCNC: 22 MMOL/L (ref 21–32)
CO2 SERPL-SCNC: 24 MMOL/L (ref 21–32)
CREAT SERPL-MCNC: 0.95 MG/DL (ref 0.6–1.3)
CREAT SERPL-MCNC: 0.97 MG/DL (ref 0.6–1.3)
CREAT SERPL-MCNC: 1.21 MG/DL (ref 0.6–1.3)
ERYTHROCYTE [DISTWIDTH] IN BLOOD BY AUTOMATED COUNT: 14.3 % (ref 11.6–14.5)
GLUCOSE BLD STRIP.AUTO-MCNC: 158 MG/DL (ref 70–110)
GLUCOSE BLD STRIP.AUTO-MCNC: 240 MG/DL (ref 70–110)
GLUCOSE BLD STRIP.AUTO-MCNC: 261 MG/DL (ref 70–110)
GLUCOSE BLD STRIP.AUTO-MCNC: 384 MG/DL (ref 70–110)
GLUCOSE BLD STRIP.AUTO-MCNC: 415 MG/DL (ref 70–110)
GLUCOSE BLD STRIP.AUTO-MCNC: 66 MG/DL (ref 70–110)
GLUCOSE BLD STRIP.AUTO-MCNC: 73 MG/DL (ref 70–110)
GLUCOSE BLD STRIP.AUTO-MCNC: 86 MG/DL (ref 70–110)
GLUCOSE SERPL-MCNC: 148 MG/DL (ref 74–99)
GLUCOSE SERPL-MCNC: 223 MG/DL (ref 74–99)
GLUCOSE SERPL-MCNC: 267 MG/DL (ref 74–99)
HCT VFR BLD AUTO: 32.9 % (ref 36–48)
HGB BLD-MCNC: 11.3 G/DL (ref 13–16)
MCH RBC QN AUTO: 29.4 PG (ref 24–34)
MCHC RBC AUTO-ENTMCNC: 34.3 G/DL (ref 31–37)
MCV RBC AUTO: 85.7 FL (ref 74–97)
PLATELET # BLD AUTO: 181 K/UL (ref 135–420)
PMV BLD AUTO: 10.5 FL (ref 9.2–11.8)
POTASSIUM SERPL-SCNC: 3.2 MMOL/L (ref 3.5–5.5)
POTASSIUM SERPL-SCNC: 4 MMOL/L (ref 3.5–5.5)
POTASSIUM SERPL-SCNC: 4.1 MMOL/L (ref 3.5–5.5)
RBC # BLD AUTO: 3.84 M/UL (ref 4.7–5.5)
SODIUM SERPL-SCNC: 134 MMOL/L (ref 136–145)
SODIUM SERPL-SCNC: 136 MMOL/L (ref 136–145)
SODIUM SERPL-SCNC: 137 MMOL/L (ref 136–145)
WBC # BLD AUTO: 8.1 K/UL (ref 4.6–13.2)

## 2019-08-28 PROCEDURE — 82962 GLUCOSE BLOOD TEST: CPT

## 2019-08-28 PROCEDURE — 74011250636 HC RX REV CODE- 250/636: Performed by: INTERNAL MEDICINE

## 2019-08-28 PROCEDURE — 80048 BASIC METABOLIC PNL TOTAL CA: CPT

## 2019-08-28 PROCEDURE — 74011636637 HC RX REV CODE- 636/637: Performed by: HOSPITALIST

## 2019-08-28 PROCEDURE — 36415 COLL VENOUS BLD VENIPUNCTURE: CPT

## 2019-08-28 PROCEDURE — 74011250637 HC RX REV CODE- 250/637: Performed by: HOSPITALIST

## 2019-08-28 PROCEDURE — 74011250637 HC RX REV CODE- 250/637: Performed by: INTERNAL MEDICINE

## 2019-08-28 PROCEDURE — 85027 COMPLETE CBC AUTOMATED: CPT

## 2019-08-28 PROCEDURE — 74011636637 HC RX REV CODE- 636/637: Performed by: INTERNAL MEDICINE

## 2019-08-28 PROCEDURE — 65270000029 HC RM PRIVATE

## 2019-08-28 RX ORDER — SODIUM CHLORIDE 9 MG/ML
75 INJECTION, SOLUTION INTRAVENOUS CONTINUOUS
Status: DISCONTINUED | OUTPATIENT
Start: 2019-08-28 | End: 2019-08-29

## 2019-08-28 RX ORDER — INSULIN GLARGINE 100 [IU]/ML
16 INJECTION, SOLUTION SUBCUTANEOUS
Status: DISCONTINUED | OUTPATIENT
Start: 2019-08-28 | End: 2019-08-29

## 2019-08-28 RX ORDER — POTASSIUM CHLORIDE 20 MEQ/1
40 TABLET, EXTENDED RELEASE ORAL
Status: COMPLETED | OUTPATIENT
Start: 2019-08-28 | End: 2019-08-28

## 2019-08-28 RX ORDER — INSULIN LISPRO 100 [IU]/ML
INJECTION, SOLUTION INTRAVENOUS; SUBCUTANEOUS
Status: DISCONTINUED | OUTPATIENT
Start: 2019-08-28 | End: 2019-08-29 | Stop reason: HOSPADM

## 2019-08-28 RX ADMIN — PANCRELIPASE 1 CAPSULE: 30000; 6000; 19000 CAPSULE, DELAYED RELEASE PELLETS ORAL at 19:00

## 2019-08-28 RX ADMIN — INSULIN LISPRO 9 UNITS: 100 INJECTION, SOLUTION INTRAVENOUS; SUBCUTANEOUS at 21:39

## 2019-08-28 RX ADMIN — SUCRALFATE 1 G: 1 TABLET ORAL at 18:55

## 2019-08-28 RX ADMIN — LEVETIRACETAM 1000 MG: 500 TABLET, FILM COATED ORAL at 08:13

## 2019-08-28 RX ADMIN — POTASSIUM CHLORIDE 40 MEQ: 20 TABLET, EXTENDED RELEASE ORAL at 11:16

## 2019-08-28 RX ADMIN — FOLIC ACID 1 MG: 1 TABLET ORAL at 08:13

## 2019-08-28 RX ADMIN — HYDROCODONE BITARTRATE AND ACETAMINOPHEN 1 TABLET: 5; 325 TABLET ORAL at 04:12

## 2019-08-28 RX ADMIN — HYDROCODONE BITARTRATE AND ACETAMINOPHEN 1 TABLET: 5; 325 TABLET ORAL at 23:51

## 2019-08-28 RX ADMIN — Medication 81 MG: at 08:12

## 2019-08-28 RX ADMIN — SUCRALFATE 1 G: 1 TABLET ORAL at 13:00

## 2019-08-28 RX ADMIN — INSULIN LISPRO 10 UNITS: 100 INJECTION, SOLUTION INTRAVENOUS; SUBCUTANEOUS at 01:38

## 2019-08-28 RX ADMIN — HEPARIN SODIUM 5000 UNITS: 5000 INJECTION INTRAVENOUS; SUBCUTANEOUS at 04:13

## 2019-08-28 RX ADMIN — SUCRALFATE 1 G: 1 TABLET ORAL at 08:13

## 2019-08-28 RX ADMIN — HYDROCODONE BITARTRATE AND ACETAMINOPHEN 1 TABLET: 5; 325 TABLET ORAL at 09:05

## 2019-08-28 RX ADMIN — INSULIN LISPRO 4 UNITS: 100 INJECTION, SOLUTION INTRAVENOUS; SUBCUTANEOUS at 12:56

## 2019-08-28 RX ADMIN — INSULIN LISPRO 15 UNITS: 100 INJECTION, SOLUTION INTRAVENOUS; SUBCUTANEOUS at 16:30

## 2019-08-28 RX ADMIN — PANCRELIPASE 1 CAPSULE: 30000; 6000; 19000 CAPSULE, DELAYED RELEASE PELLETS ORAL at 08:13

## 2019-08-28 RX ADMIN — PANCRELIPASE 1 CAPSULE: 30000; 6000; 19000 CAPSULE, DELAYED RELEASE PELLETS ORAL at 12:00

## 2019-08-28 RX ADMIN — HEPARIN SODIUM 5000 UNITS: 5000 INJECTION INTRAVENOUS; SUBCUTANEOUS at 21:39

## 2019-08-28 RX ADMIN — SUCRALFATE 1 G: 1 TABLET ORAL at 21:39

## 2019-08-28 RX ADMIN — LEVETIRACETAM 1000 MG: 500 TABLET, FILM COATED ORAL at 18:55

## 2019-08-28 RX ADMIN — HYDROCODONE BITARTRATE AND ACETAMINOPHEN 1 TABLET: 5; 325 TABLET ORAL at 18:55

## 2019-08-28 RX ADMIN — INSULIN GLARGINE 16 UNITS: 100 INJECTION, SOLUTION SUBCUTANEOUS at 21:39

## 2019-08-28 RX ADMIN — HYDROCODONE BITARTRATE AND ACETAMINOPHEN 1 TABLET: 5; 325 TABLET ORAL at 12:55

## 2019-08-28 RX ADMIN — SODIUM CHLORIDE 125 ML/HR: 900 INJECTION, SOLUTION INTRAVENOUS at 07:00

## 2019-08-28 RX ADMIN — Medication 100 MG: at 08:13

## 2019-08-28 RX ADMIN — HEPARIN SODIUM 5000 UNITS: 5000 INJECTION INTRAVENOUS; SUBCUTANEOUS at 11:17

## 2019-08-28 NOTE — CONSULTS
Interventional Radiology     Consult received from Dr. King Parents for evaluation of abdominal fluid collection.     Case and images reviewed by Dr. Montse Mcgraw. Patient has a known history of recurrent pancreatitis requiring drainage complicated by enterocutaneous fistula formation. CT A/P from 8/27 showed a small but chronic fluid collection in the RLQ. Given that the patient is stable without evidence of pancreatitis or sepsis and the fluid collection is small/chronic, no intervention planned at this time.  Discussion held between Dr. Montse Mcgraw and Dr. King Parents who is amenable with the plan.   [address] [place] [address].

## 2019-08-28 NOTE — WOUND CARE
Physical Exam   Room 2302: met with pt. Pt is frustrated that he has not slept last night. Pt states he has tried zinc cream & protectant paste to right flank site without improvement. Pt states he has tried an ostomy pouch & that did not help. Pt agreeable to using adhesive releaser spray for easier tape removal & no sting skin prep wipes for perifistular skin protection. Provided with both at pt's bedside. Will write topical orders. Will turn over care to nursing staff at this time.   Elder GUAJARDON, RN, [facility], [address]

## 2019-08-28 NOTE — PROGRESS NOTES
0730- Bedside shift change report given to Janae Woods RN (oncoming nurse) by Jesslyn Moritz (offgoing nurse). Report included the following information SBAR, Kardex, Procedure Summary, Intake/Output, MAR and Recent Results. 0800-AM assessment performed; meds given; pt tolerated. Wound dressing changed. Pt c/o pain-pt informed that he could not get pain med until 0900. Pain rating 8/10.    0900-1 norco tab given for pain 8/10 right lower quadrant. 1300- Afternoon meds given. Dressing changed. 1 norco given for pain 8/10. Tray ordered for pt after cleared to eat. Pt took a few bites then c/o pain. 1630- 15 units insulin given for blood glc > 350. Provider informed; bedtime lantus dose ordered. 1630- TRANSFER - OUT REPORT:    Verbal report given to Ebonie TOSCANO(name) on [facility]  being transferred to (unit) for routine progression of care       Report consisted of patients Situation, Background, Assessment and   Recommendations(SBAR). Information from the following report(s) SBAR, Kardex, Procedure Summary, Intake/Output, MAR and Recent Results was reviewed with the receiving nurse.     Lines:   Peripheral IV 08/27/19 Right External jugular (Active)   Site Assessment Clean, dry, & intact 8/28/2019 12:00 PM   Phlebitis Assessment 0 8/28/2019 12:00 PM   Infiltration Assessment 0 8/28/2019 12:00 PM   Dressing Status Clean, dry, & intact 8/28/2019 12:00 PM   Dressing Type Tape;Transparent 8/28/2019 12:00 PM   Hub Color/Line Status Pink 8/28/2019 12:00 PM   Action Taken Open ports on tubing capped 8/28/2019 12:00 PM   Alcohol Cap Used Yes 8/28/2019 12:00 PM       Peripheral IV 08/27/19 Right Antecubital (Active)   Site Assessment Clean, dry, & intact 8/28/2019 12:00 PM   Phlebitis Assessment 0 8/28/2019 12:00 PM   Infiltration Assessment 0 8/28/2019 12:00 PM   Dressing Status Clean, dry, & intact 8/28/2019 12:00 PM   Dressing Type Tape;Transparent 8/28/2019 12:00 PM   Hub Color/Line Status Pink 8/28/2019 12:00 PM   Action Taken Open ports on tubing capped 8/28/2019 12:00 PM   Alcohol Cap Used Yes 8/28/2019 12:00 PM        Opportunity for questions and clarification was provided. [facility] arrived to take pt to new unit.

## 2019-08-28 NOTE — PROGRESS NOTES
Reason for Admission:   Hyperglycemia [R73.9]  Type 2 diabetes mellitus with hyperosmolar nonketotic hyperglycemia ([facility] [address] [E11.00]               RRAT Score:    29             Resources/supports as identified by patient/family:   Mother and Nick Joseph with group or boarding home. Top Challenges facing patient (as identified by patient/family and CM): Finances/Medication cost?    No concerns voiced. Transportation     Medicaid cab. Support system or lack thereof? Mother and Nick Joseph are support system. Living arrangements? Lives in group home or boarding home. Self-care/ADLs/Cognition? Selfcare and alert and oriented. Current Advanced Directive/Advance Care Plan:   yes                          Plan for utilizing home health: To be determined. Likelihood of readmission:   HIGH                    Initial assessment completed with patient. Cognitive status of patient: oriented to time, place, person and situation. Face sheet information confirmed:  yes. The patient designates Nick Joseph, owner of boarding or group home ([phone]) to participate in his discharge plan and to receive any needed information. This patient lives in a boarding house or group home with mother. Patient is able to navigate steps as needed. Prior to hospitalization, patient was considered to be independent with ADLs/IADLS : yes . Patient has a current ACP document on file: yes     The patient  will need medicaid cab for  transport upon discharge. The patient  has no medical equipment available in the home. Patient is not currently active with home health. Patient has stayed in a skilled nursing facility or rehab about 2 years ago per patient. This patient is on dialysis :no    Freedom of choice signed: no.     Currently, the discharge plan is Group Home. - Will need Medicaid cab. - May need  for medication management.   Writer called [employer] to see if patient is active with [facility] per pt's request. Waiting for response. The patient states that he can obtain his medications from the pharmacy, and take his medications as directed with assistance. Patient's current insurance is [facility] Medicaid/[employer]. Care Management Interventions  PCP Verified by CM: Yes(provided pcp list.)  Mode of Transport at Discharge: Other (see comment)(will need medicaid cab.)  Transition of Care Consult (CM Consult): Discharge Planning  Current Support Network: Adult Group Home  Confirm Follow Up Transport: Cab  Plan discussed with Pt/Family/Caregiver: Yes  Discharge Location  Discharge Placement: Other:(Group home or boarding house)      BENNETT HinesN, RN  Pager # [phone]  Care Manager

## 2019-08-28 NOTE — PROGRESS NOTES
NUTRITION    Nursing Referral: [facility]     RECOMMENDATIONS / PLAN:     - Add supplements: Gerardoerna Shake, TIIVAN.  - Monitor and encourage po/supplement intake. - Continue IV fluids as medically appropriate.  - Continue RD inpatient monitoring and evaluation. NUTRITION INTERVENTIONS & DIAGNOSIS:     [x] Meals/snacks: modify composition  [x] Medical food supplement therapy: initiate  [x] IV fluids: NS at 75 mL/hr    Nutrition Diagnosis: Chronic disease or condition related malnutrition related to predicted inadequate energy intake over time with altered GI function as evidenced by severe muscle/fat loss in multiple body regions     Patient meets criteria for Severe Protein Calorie Malnutrition as evidenced by:   ASPEN Malnutrition Criteria  Acute Illness, Chronic Illness, or Social/Enviornmental: Chronic illness  Body Fat: Severe(orbital, thoracic & lumbar body regions)  Muscle Mass: Severe(temple, clavicle, dorsal hand & patellar body regions)  ASPEN Malnutrition Score - Chronic Illness: 12  Chronic Illness - Malnutrition Diagnosis: Severe malnutrition. ASSESSMENT:     Admitted with hyperglycemia after losing his insulin PTA. Hx of enterocutaneous fistulas, IR and surgery consulted. Reports previously eating well at group home with an overall good appetite,  noted stable weight x year but has had significant weight loss x many years ago. NFPE completed. Diet recently started today for lunch, pt hungry and agreeable to supplements. Average po intake adequate to meet patients estimated nutritional needs:   [] Yes     [] No   [x] Unable to determine at this time    Diet: DIET CARDIAC Regular; 2 GM NA (House Low NA);  Consistent Carb 1800kcal      Food Allergies: Seafood  Current Appetite:   [x] Good     [] Fair     [] Poor     [] Other  Appetite/meal intake prior to admission:   [x] Good     [] Fair     [] Poor     [x] Other: 3 meals/day at group home; occasional Ensure- pt estimated around 3x per week  Feeding Limitations:  [] Swallowing difficulty    [] Chewing difficulty    [] Other:  Current Meal Intake: No data found. BM: PTA   Skin Integrity: chronic abdominal fistula to abdomen  Edema:   [x] No     [] Yes   Pertinent Medications: Reviewed: folic acid, SSI, creon 6,000 (TID), zofran, carafate, thiamine    Recent Labs     08/28/19  0522 08/27/19  2230 08/27/19  1600 08/27/19  1318 08/27/19  1312   * 135* 132*  --  118*   K 3.2* 3.3* 4.1  --  5.6*    105 99*  --  89*   CO2 22 24 24  --  21   * 184* 537*  --  1,336*   BUN 10 13 16  --  20*   CREA 0.95 1.03 1.34*  --  1.81*   CA 8.0* 8.1* 8.3*  --  8.0*   MG  --  1.8  --   --  2.2   PHOS  --  2.7  --  3.7  --    ALB  --   --   --   --  3.6   SGOT  --   --   --   --  21   ALT  --   --   --   --  22       Intake/Output Summary (Last 24 hours) at 8/28/2019 1145  Last data filed at 8/28/2019 0800  Gross per 24 hour   Intake 4549.77 ml   Output 2850 ml   Net 1699.77 ml       Anthropometrics:  Ht Readings from Last 1 Encounters:   08/27/19 6' (1.829 m)     Last 3 Recorded Weights in this Encounter    08/27/19 1136   Weight: 63.5 kg (140 lb)     Body mass index is 18.99 kg/m². Weight History: Pt reports a highest weight of 180 lbs x many years ago.  Per chart overall stable weight x year     Weight Metrics 8/27/2019 8/8/2019 6/28/2019 6/17/2019 6/9/2019 5/28/2019 5/6/2019   Weight 140 lb 136 lb 14.5 oz 110 lb 149 lb 14.6 oz 145 lb 140 lb 140 lb   BMI 18.99 kg/m2 18.57 kg/m2 14.92 kg/m2 20.33 kg/m2 19.67 kg/m2 18.99 kg/m2 18.99 kg/m2        Admitting Diagnosis: Hyperglycemia [R73.9]  Type 2 diabetes mellitus with hyperosmolar nonketotic hyperglycemia (HCC) [E11.00]  Pertinent PMHx: chronic pancreatitis with abscess, DM, ETOH abuse, GERD, heart failure, enterocutaneous fistula    Education Needs:        [x] None identified  [] Identified - Not appropriate at this time  []  Identified and addressed - refer to education log  Learning Limitations:   [x] None identified  [] Identified    Cultural, Scientologist & ethnic food preferences:  [x] None identified    [] Identified and addressed     ESTIMATED NUTRITION NEEDS:     Calories: 9455-1831 kcal (MSJx1.2-1.3) based on  [] Actual BW     [x] IBW: 81 kg   Protein: 63-95 gm (1-1.5 gm/kg) based on  [x] Actual BW: 63 kg      [] IBW   Fluid: 1 mL/kcal     MONITORING & EVALUATION:     Nutrition Goal(s):   1. Po intake of meals will meet >75% of patient estimated nutritional needs within the next 7 days.   Outcome:  [] Met/Ongoing    [] Progressing towards goal    [] Not progressing towards goal    [x] New/Initial goal     Monitoring:   [x] Food and beverage intake   [x] Diet order   [x] Nutrition-focused physical findings   [x] Treatment/therapy   [] Weight   [] Enteral nutrition intake        Previous Recommendations (for follow-up assessments only):     []   Implemented       []   Not Implemented (RD to address)      [] No Longer Appropriate     [] No Recommendation Made     Discharge Planning: cardiac, diabetic diet + Glucerna Shake, TID  [x] Participated in care planning, discharge planning, & interdisciplinary rounds as appropriate      Keesha Medina RD   Pager: [phone]

## 2019-08-28 NOTE — PROGRESS NOTES
Pt seen and examined. Labs/CT reviewed. 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 No need for urgent surgical or IR intervention.       Consult dictated #[id number]    RP

## 2019-08-28 NOTE — PROGRESS NOTES
[facility]  Progress Note    Patient: Chito Law Age: 47 y.o. : 1965 MR#: [id number] SSN: [id number]  Date/Time: 2019     Subjective: pt feels ok, c/o more drainage from fistula, no fevers or chills   He lost insulin 3-4 days back, he started having N/V, better now      Assessment/Plan:   1. Hyperosmolar Hyperglycemia due to non compliance, better  2. Chronic abdominal wall fistulas with some fluid collection right iliac fossa, currently no signs of infection   3. Insulin dependent type 2 DM  4. Nausea with vomiting likely d/t above, better   5. Chronic pancreatitis  6. Pseudohyponatremia, improved  7. Hypokalemia    8. Medical noncompliance  9. ETOH abuse  10. No primary care monitoring     Plan:   1. Will get IR and Gen Sx eval, no need for ABx for now  2. Decrease IVF and start long acting insulin   3. wound care   4. Will need assistance with PCP arrangement upon discharge. 5. Continue gi regimen with creon, carafate, ppi. D/w pt and mother at bedside in detail     Addendum:  D/w IR Dr. Vlad Bunch, he reviewed CT, doesn't need any intervention, very small fluid collection and doesn't look infected. D/w Dr. Lino Armenta, will see pt and recommend       I spent 40 minutes with the patient in face-to-face consultation, of which greater than 50% was spent in counseling and coordination of care as described above.     Case discussed with:  [x]Patient  [x]Family  [x]Nursing  []Case Management  DVT Prophylaxis:  []Lovenox  [x]Hep SQ  []SCDs  []Coumadin   []On Heparin gtt    Objective:   VS:   Visit Vitals  /67   Pulse 70   Temp 97.8 °F (36.6 °C)   Resp 16   Ht 6' (1.829 m)   Wt 63.5 kg (140 lb)   SpO2 100%   BMI 18.99 kg/m²      Tmax/24hrs: Temp (24hrs), Av.3 °F (36.8 °C), Min:97.8 °F (36.6 °C), Max:98.7 °F (37.1 °C)  IOBRIEF    Intake/Output Summary (Last 24 hours) at 2019 1137  Last data filed at 2019 0800  Gross per 24 hour   Intake 4549.77 ml Output 2850 ml   Net 1699.77 ml       General:  Alert, cooperative, no acute distress    Pulmonary:  CTA Bilaterally. No Wheezing/Rales. Cardiovascular: Regular rate and Rhythm. GI:  Soft, Non distended, Non tender. + Bowel sounds. Extremities:  No edema. Psych: Good insight. Not anxious or agitated. Neurologic: Alert and oriented X 3. No acute neuro deficits. Additional: R flank area, fistula with mild redness around, bilious drain noted.      Medications:   Current Facility-Administered Medications   Medication Dose Route Frequency    0.9% sodium chloride infusion  75 mL/hr IntraVENous CONTINUOUS    sodium chloride (NS) flush 5-10 mL  5-10 mL IntraVENous PRN    aspirin delayed-release tablet 81 mg  81 mg Oral DAILY    levETIRAcetam (KEPPRA) tablet 1,000 mg  1,000 mg Oral BID    lipase-protease-amylase (CREON 6,000) capsule 1 Cap  1 Cap Oral TID WITH MEALS    sucralfate (CARAFATE) tablet 1 g  1 g Oral QID    thiamine HCL (B-1) tablet 100 mg  100 mg Oral DAILY    acetaminophen (TYLENOL) tablet 650 mg  650 mg Oral Q4H PRN    HYDROcodone-acetaminophen (NORCO) 5-325 mg per tablet 1 Tab  1 Tab Oral Q4H PRN    ondansetron (ZOFRAN) injection 4 mg  4 mg IntraVENous Q4H PRN    heparin (porcine) injection 5,000 Units  5,000 Units SubCUTAneous Q8H    LORazepam (ATIVAN) tablet 1 mg  1 mg Oral U9F PRN    folic acid (FOLVITE) tablet 1 mg  1 mg Oral DAILY    insulin lispro (HUMALOG) injection   SubCUTAneous Q4H    glucose chewable tablet 16 g  4 Tab Oral PRN    glucagon (GLUCAGEN) injection 1 mg  1 mg IntraMUSCular PRN    dextrose 10% infusion 125-250 mL  125-250 mL IntraVENous PRN       Labs:    Recent Results (from the past 24 hour(s))   GLUCOSE, POC    Collection Time: 08/27/19 11:38 AM   Result Value Ref Range    Glucose (POC) >600 (HH) 70 - 347 mg/dL   METABOLIC PANEL, COMPREHENSIVE    Collection Time: 08/27/19  1:12 PM   Result Value Ref Range    Sodium 118 (LL) 136 - 145 mmol/L    Potassium 5.6 (H) 3.5 - 5.5 mmol/L    Chloride 89 (L) 100 - 111 mmol/L    CO2 21 21 - 32 mmol/L    Anion gap 8 3.0 - 18 mmol/L    Glucose 1,336 (HH) 74 - 99 mg/dL    BUN 20 (H) 7.0 - 18 MG/DL    Creatinine 1.81 (H) 0.6 - 1.3 MG/DL    BUN/Creatinine ratio 11 (L) 12 - 20      GFR est AA 48 (L) >60 ml/min/1.73m2    GFR est non-AA 39 (L) >60 ml/min/1.73m2    Calcium 8.0 (L) 8.5 - 10.1 MG/DL    Bilirubin, total 0.4 0.2 - 1.0 MG/DL    ALT (SGPT) 22 16 - 61 U/L    AST (SGOT) 21 10 - 38 U/L    Alk. phosphatase 184 (H) 45 - 117 U/L    Protein, total 8.0 6.4 - 8.2 g/dL    Albumin 3.6 3.4 - 5.0 g/dL    Globulin 4.4 (H) 2.0 - 4.0 g/dL    A-G Ratio 0.8 0.8 - 1.7     CBC WITH AUTOMATED DIFF    Collection Time: 08/27/19  1:12 PM   Result Value Ref Range    WBC 7.1 4.6 - 13.2 K/uL    RBC 3.88 (L) 4.70 - 5.50 M/uL    HGB 11.6 (L) 13.0 - 16.0 g/dL    HCT 39.2 36.0 - 48.0 %    .0 (H) 74.0 - 97.0 FL    MCH 29.9 24.0 - 34.0 PG    MCHC 29.6 (L) 31.0 - 37.0 g/dL    RDW 15.0 (H) 11.6 - 14.5 %    PLATELET 932 480 - 367 K/uL    MPV 10.4 9.2 - 11.8 FL    NEUTROPHILS 82 (H) 40 - 73 %    LYMPHOCYTES 14 (L) 21 - 52 %    MONOCYTES 3 3 - 10 %    EOSINOPHILS 1 0 - 5 %    BASOPHILS 0 0 - 2 %    ABS. NEUTROPHILS 5.7 1.8 - 8.0 K/UL    ABS. LYMPHOCYTES 1.0 0.9 - 3.6 K/UL    ABS. MONOCYTES 0.2 0.05 - 1.2 K/UL    ABS. EOSINOPHILS 0.1 0.0 - 0.4 K/UL    ABS.  BASOPHILS 0.0 0.0 - 0.1 K/UL    DF AUTOMATED     MAGNESIUM    Collection Time: 08/27/19  1:12 PM   Result Value Ref Range    Magnesium 2.2 1.6 - 2.6 mg/dL   PH, VENOUS    Collection Time: 08/27/19  1:18 PM   Result Value Ref Range    VENOUS PH 7.24 (L) 7.32 - 7.42     POC LACTIC ACID    Collection Time: 08/27/19  1:18 PM   Result Value Ref Range    Lactic Acid (POC) 3.50 (HH) 0.40 - 2.00 mmol/L   LIPASE    Collection Time: 08/27/19  1:18 PM   Result Value Ref Range    Lipase 49 (L) 73 - 393 U/L   PHOSPHORUS    Collection Time: 08/27/19  1:18 PM   Result Value Ref Range    Phosphorus 3.7 2.5 - 4.9 MG/DL   POC G3 Collection Time: 08/27/19  1:19 PM   Result Value Ref Range    Device: ROOM AIR      FIO2 (POC) 21 %    pH (POC) 7.291 (L) 7.35 - 7.45      pCO2 (POC) 35.7 35.0 - 45.0 MMHG    pO2 (POC) 90 80 - 100 MMHG    HCO3 (POC) 17.2 (L) 22 - 26 MMOL/L    sO2 (POC) 96 92 - 97 %    Base deficit (POC) 8 mmol/L    Allens test (POC) N/A      Total resp.  rate 22      Site RIGHT RADIAL      Specimen type (POC) ARTERIAL      Performed by Chacorta Bryson    POC VENOUS BLOOD GAS    Collection Time: 08/27/19  1:34 PM   Result Value Ref Range    Device: ROOM AIR      pH, venous (POC) 7.226 (L) 7.32 - 7.42      pCO2, venous (POC) 47.6 41 - 51 MMHG    pO2, venous (POC) 42 (H) 25 - 40 mmHg    HCO3, venous (POC) 19.7 (L) 23.0 - 28.0 MMOL/L    sO2, venous (POC) 67 65 - 88 %    Base deficit, venous (POC) 8 mmol/L    Allens test (POC) YES      Site LEFT BRACHIAL      Specimen type (POC) VENOUS BLOOD      Performed by Judy Ramos    CULTURE, BLOOD    Collection Time: 08/27/19  1:40 PM   Result Value Ref Range    Special Requests: NO SPECIAL REQUESTS      Culture result: NO GROWTH AFTER 17 HOURS     URINALYSIS W/ RFLX MICROSCOPIC    Collection Time: 08/27/19  1:45 PM   Result Value Ref Range    Color YELLOW      Appearance CLEAR      Specific gravity 1.028 1.005 - 1.030      pH (UA) 6.5 5.0 - 8.0      Protein NEGATIVE  NEG mg/dL    Glucose >1,000 (A) NEG mg/dL    Ketone NEGATIVE  NEG mg/dL    Bilirubin NEGATIVE  NEG      Blood NEGATIVE  NEG      Urobilinogen 0.2 0.2 - 1.0 EU/dL    Nitrites NEGATIVE  NEG      Leukocyte Esterase NEGATIVE  NEG     GLUCOSTABILIZER    Collection Time: 08/27/19  2:25 PM   Result Value Ref Range    Glucose 600 mg/dL    Insulin order 10.8 units/hour    Insulin adminstered 10.8 units/hour    Multiplier 0.020     Low target 140 mg/dL    High target 180 mg/dL    D50 order 0.0 ml    D50 administered 0.00 ml    Minutes until next BG 60 min    Order initials ta     Administered initials ta    EKG, 12 LEAD, INITIAL Collection Time: 08/27/19  3:12 PM   Result Value Ref Range    Ventricular Rate 63 BPM    Atrial Rate 63 BPM    P-R Interval 164 ms    QRS Duration 98 ms    Q-T Interval 428 ms    QTC Calculation (Bezet) 437 ms    Calculated P Axis 74 degrees    Calculated R Axis -42 degrees    Calculated T Axis 37 degrees    Diagnosis       Normal sinus rhythm  Left axis deviation  Abnormal ECG  When compared with ECG of 27-JUN-2019 01:07,  No significant change was found  Confirmed by Andi Koch MD, Kari Cardenas ([id number]) on 8/27/2019 3:24:54 PM     GLUCOSE, POC    Collection Time: 08/27/19  3:21 PM   Result Value Ref Range    Glucose (POC) >600 (HH) 70 - 110 mg/dL   GLUCOSTABILIZER    Collection Time: 08/27/19  3:22 PM   Result Value Ref Range    Glucose 601 mg/dL    Insulin order 16.2 units/hour    Insulin adminstered 16.2 units/hour    Multiplier 0.030     Low target 140 mg/dL    High target 180 mg/dL    D50 order 0.0 ml    D50 administered 0.00 ml    Minutes until next BG 60 min    Order initials ta     Administered initials ta    METABOLIC PANEL, BASIC    Collection Time: 08/27/19  4:00 PM   Result Value Ref Range    Sodium 132 (L) 136 - 145 mmol/L    Potassium 4.1 3.5 - 5.5 mmol/L    Chloride 99 (L) 100 - 111 mmol/L    CO2 24 21 - 32 mmol/L    Anion gap 9 3.0 - 18 mmol/L    Glucose 537 (HH) 74 - 99 mg/dL    BUN 16 7.0 - 18 MG/DL    Creatinine 1.34 (H) 0.6 - 1.3 MG/DL    BUN/Creatinine ratio 12 12 - 20      GFR est AA >60 >60 ml/min/1.73m2    GFR est non-AA 56 (L) >60 ml/min/1.73m2    Calcium 8.3 (L) 8.5 - 10.1 MG/DL   GLUCOSE, POC    Collection Time: 08/27/19  4:32 PM   Result Value Ref Range    Glucose (POC) 411 (HH) 70 - 110 mg/dL   GLUCOSTABILIZER    Collection Time: 08/27/19  4:33 PM   Result Value Ref Range    Glucose 411 mg/dL    Insulin order 7.0 units/hour    Insulin adminstered 7.0 units/hour    Multiplier 0.020     Low target 140 mg/dL    High target 180 mg/dL    D50 order 0.0 ml    D50 administered 0.00 ml    Minutes until next BG 60 min    Order initials ta     Administered initials ta    POC LACTIC ACID    Collection Time: 08/27/19  4:47 PM   Result Value Ref Range    Lactic Acid (POC) 5.89 (HH) 0.40 - 2.00 mmol/L   GLUCOSE, POC    Collection Time: 08/27/19  6:18 PM   Result Value Ref Range    Glucose (POC) 258 (H) 70 - 110 mg/dL   GLUCOSTABILIZER    Collection Time: 08/27/19  6:23 PM   Result Value Ref Range    Glucose 258 mg/dL    Insulin order 4.0 units/hour    Insulin adminstered 4.0 units/hour    Multiplier 0.020     Low target 140 mg/dL    High target 180 mg/dL    D50 order 0.0 ml    D50 administered 0.00 ml    Minutes until next BG 60 min    Order initials bw     Administered initials bw    GLUCOSE, POC    Collection Time: 08/27/19  6:24 PM   Result Value Ref Range    Glucose (POC) 145 (H) 70 - 110 mg/dL   GLUCOSE, POC    Collection Time: 08/27/19  6:25 PM   Result Value Ref Range    Glucose (POC) 153 (H) 70 - 110 mg/dL   GLUCOSE, POC    Collection Time: 08/27/19  7:54 PM   Result Value Ref Range    Glucose (POC) 97 70 - 110 mg/dL   GLUCOSTABILIZER    Collection Time: 08/27/19  7:54 PM   Result Value Ref Range    Glucose 97 mg/dL    Insulin order 0.4 units/hour    Insulin adminstered 0.4 units/hour    Multiplier 0.010     Low target 140 mg/dL    High target 180 mg/dL    D50 order 0.0 ml    D50 administered 0.00 ml    Minutes until next BG 60 min    Order initials bronwyn     Administered initials bronwyn    GLUCOSE, POC    Collection Time: 08/27/19  8:56 PM   Result Value Ref Range    Glucose (POC) 134 (H) 70 - 110 mg/dL   GLUCOSTABILIZER    Collection Time: 08/27/19  8:56 PM   Result Value Ref Range    Glucose 134 mg/dL    Insulin order 0.0 units/hour    Insulin adminstered 0.0 units/hour    Multiplier 0.000     Low target 140 mg/dL    High target 180 mg/dL    D50 order 0.0 ml    D50 administered 0.00 ml    Minutes until next BG 60 min    Order initials bronwyn     Administered initials bronwyn    GLUCOSE, POC    Collection Time: 08/27/19 10:01 PM   Result Value Ref Range    Glucose (POC) 222 (H) 70 - 110 mg/dL   GLUCOSTABILIZER    Collection Time: 08/27/19 10:07 PM   Result Value Ref Range    Glucose 222 mg/dL    Insulin order 1.6 units/hour    Insulin adminstered 1.6 units/hour    Multiplier 0.010     Low target 140 mg/dL    High target 180 mg/dL    D50 order 0.0 ml    D50 administered 0.00 ml    Minutes until next BG 60 min    Order initials bronwyn     Administered initials bronwyn    MAGNESIUM    Collection Time: 08/27/19 10:30 PM   Result Value Ref Range    Magnesium 1.8 1.6 - 2.6 mg/dL   PHOSPHORUS    Collection Time: 08/27/19 10:30 PM   Result Value Ref Range    Phosphorus 2.7 2.5 - 4.9 MG/DL   METABOLIC PANEL, BASIC    Collection Time: 08/27/19 10:30 PM   Result Value Ref Range    Sodium 135 (L) 136 - 145 mmol/L    Potassium 3.3 (L) 3.5 - 5.5 mmol/L    Chloride 105 100 - 111 mmol/L    CO2 24 21 - 32 mmol/L    Anion gap 6 3.0 - 18 mmol/L    Glucose 184 (H) 74 - 99 mg/dL    BUN 13 7.0 - 18 MG/DL    Creatinine 1.03 0.6 - 1.3 MG/DL    BUN/Creatinine ratio 13 12 - 20      GFR est AA >60 >60 ml/min/1.73m2    GFR est non-AA >60 >60 ml/min/1.73m2    Calcium 8.1 (L) 8.5 - 10.1 MG/DL   NT-PRO BNP    Collection Time: 08/27/19 10:30 PM   Result Value Ref Range    NT pro- 0 - 900 PG/ML   GLUCOSE, POC    Collection Time: 08/27/19 11:26 PM   Result Value Ref Range    Glucose (POC) 211 (H) 70 - 110 mg/dL   GLUCOSTABILIZER    Collection Time: 08/27/19 11:28 PM   Result Value Ref Range    Glucose 211 mg/dL    Insulin order 3.0 units/hour    Insulin adminstered 3.0 units/hour    Multiplier 0.020     Low target 140 mg/dL    High target 180 mg/dL    D50 order 0.0 ml    D50 administered 0.00 ml    Minutes until next BG 60 min    Order initials km     Administered initials km    GLUCOSE, POC    Collection Time: 08/28/19 12:52 AM   Result Value Ref Range    Glucose (POC) 415 (HH) 70 - 110 mg/dL   GLUCOSE, POC    Collection Time: 08/28/19  4:20 AM   Result Value Ref Range    Glucose (POC) 66 (L) 70 - 110 mg/dL   GLUCOSE, POC    Collection Time: 08/28/19  4:25 AM   Result Value Ref Range    Glucose (POC) 73 70 - 110 mg/dL   GLUCOSE, POC    Collection Time: 08/28/19  5:19 AM   Result Value Ref Range    Glucose (POC) 158 (H) 70 - 110 mg/dL   CBC W/O DIFF    Collection Time: 08/28/19  5:22 AM   Result Value Ref Range    WBC 8.1 4.6 - 13.2 K/uL    RBC 3.84 (L) 4.70 - 5.50 M/uL    HGB 11.3 (L) 13.0 - 16.0 g/dL    HCT 32.9 (L) 36.0 - 48.0 %    MCV 85.7 74.0 - 97.0 FL    MCH 29.4 24.0 - 34.0 PG    MCHC 34.3 31.0 - 37.0 g/dL    RDW 14.3 11.6 - 14.5 %    PLATELET 656 532 - 942 K/uL    MPV 10.5 9.2 - 69.0 FL   METABOLIC PANEL, BASIC    Collection Time: 08/28/19  5:22 AM   Result Value Ref Range    Sodium 134 (L) 136 - 145 mmol/L    Potassium 3.2 (L) 3.5 - 5.5 mmol/L    Chloride 105 100 - 111 mmol/L    CO2 22 21 - 32 mmol/L    Anion gap 7 3.0 - 18 mmol/L    Glucose 148 (H) 74 - 99 mg/dL    BUN 10 7.0 - 18 MG/DL    Creatinine 0.95 0.6 - 1.3 MG/DL    BUN/Creatinine ratio 11 (L) 12 - 20      GFR est AA >60 >60 ml/min/1.73m2    GFR est non-AA >60 >60 ml/min/1.73m2    Calcium 8.0 (L) 8.5 - 10.1 MG/DL   GLUCOSE, POC    Collection Time: 08/28/19  7:53 AM   Result Value Ref Range    Glucose (POC) 86 70 - 110 mg/dL       Signed By: Milvia Larios MD     August 28, 2019

## 2019-08-28 NOTE — DIABETES MGMT
GLYCEMIC CONTROL:    Follow-up review this afternoon. Received report BG of 384 before dinner. Prior BG before lunch was 240. Recommendations:  1.) Modified correctional lispro insulin to very resistant dose. 2.) Notified Dr. Haven Warren of elevated BG values and he entered order for  lantus insulin 16 units daily at bedtime, first dose 8/28/2019.     Chasity Isaac RN [facility]  Pager: [phone]

## 2019-08-28 NOTE — DIABETES MGMT
Glycemic Control Plan of Care    Patient reported that his insulin, syringes, and BG meter with testing supplies disappeared from the boarding house where he is staying therefore he will need prescriptions prior to discharge. T2DM with current A1c level of 10.7% (8/05/2019). See separate notes, 8/28/2019, for partial assessment of home diabetes management and education - patient driven. He requested that I return another time to do the rest.  Home diabetes medications: Patient reported on 8/28/2019:  Humulin 70/30 mix insulin 30 units twice daily before meals: (breakfast and lunch). POC BG range on 8/27/2019: 145 - >600 mg/dL. Placed on regular insulin drip via GlucoStabilizer. Noted that he was given lantus insulin 20 units at 8:57 PM before the Murtis Slate was discontinued. POC BG report on 8/28/2019 at time of review: 145, 66, 73, 158, 86, 240 mg/dL. Received nursing report that patient was given fruit juice for treatment of low blood sugar. Patient requested that I return to complete discussion about diabetes. I left patient eating his lunch. Recommendation(s):  1.) Cont glycemic monitoring and correctional lispro insulin as ordered. 2.) Consider adding 10 units basal lantus insulin daily. Assessment:  Patient is a 47year old with past medical history including type 2 diabetes mellitus, seizure, ETOH abuse, tobacco abuse, chronic pancreatitis s/p stent, pancreatic fistula, biliary drain displacement, abscess, chronic renal insufficiency, colitis and gastroparesis - was admitted on 8/27/2019 with report of high blood sugar, tired, blurry vision, urinary frequency and increased right flank drainage. Noted:  Hyperglycemia Hyperosmolar. T2DM with current A1c of 10.7% (8/05/2019). Chronic enterocutaneous fistula. Seen by Dr. Kerri Guerra and noted no plan for urgent surgery or IR intervention.     Most recent blood glucose values:    Results for Dino Diaz (MRN [id number]) as of 8/28/2019 13:12   Ref. Range 8/27/2019 11:38 8/27/2019 13:18 8/27/2019 15:21 8/27/2019 16:32 8/27/2019 16:47 8/27/2019 18:18 8/27/2019 18:24 8/27/2019 18:25 8/27/2019 19:54 8/27/2019 20:56 8/27/2019 22:01 8/27/2019 23:26   GLUCOSE,FAST - POC Latest Ref Range: 70 - 110 mg/dL >600 (HH)  >600 (HH) 411 (HH)  258 (H) 145 (H) 153 (H) 97 134 (H) 222 (H) 211 (H)     Results for Oly Saenz (MRN [id number]) as of 8/28/2019 13:12   Ref. Range 8/28/2019 00:52 8/28/2019 04:20 8/28/2019 04:25 8/28/2019 05:19 8/28/2019 07:53 8/28/2019 11:45   GLUCOSE,FAST - POC Latest Ref Range: 70 - 110 mg/dL 415 (HH) 66 (L) 73 158 (H) 86 240 (H)     Current A1C: 10.7% (8/05/2019) which is equivalent to estimated average blood glucose of 260 mg/dL during the past 2-3 months. Current hospital diabetes medications:   Correctional lispro insulin ACHS. Normal sensitivity dose. Total daily dose insulin requirement previous day: 8/27/2019:  Regular insulin drip via GlucoStabilizer: 40 units  Lantus: 20 units  TDD insulin: 60 units    Home diabetes medications: Patient reported on 8/28/2019:  Humulin 70/30 mix insulin 30 units twice daily before meals: (breakfast and lunch). Diet: Cardiac regular; 2 Gm NA; consistent carb 1800kcal.    Goals:  Blood glucose will be within target range of  mg/dL by 8/31/2019.     Education:  __X_  Refer to Diabetes Education Record: 8/28/2019             ___  Education not indicated at this time    Ninette Schwab, RN [facility]  Pager: [phone]

## 2019-08-29 ENCOUNTER — APPOINTMENT (OUTPATIENT)
Dept: CT IMAGING | Age: 54
End: 2019-08-29
Attending: EMERGENCY MEDICINE
Payer: MEDICAID

## 2019-08-29 ENCOUNTER — HOSPITAL ENCOUNTER (EMERGENCY)
Age: 54
Discharge: HOME | End: 2019-08-29
Attending: EMERGENCY MEDICINE
Payer: MEDICAID

## 2019-08-29 VITALS
HEIGHT: 72 IN | TEMPERATURE: 98.4 F | BODY MASS INDEX: 18.96 KG/M2 | DIASTOLIC BLOOD PRESSURE: 82 MMHG | OXYGEN SATURATION: 99 % | RESPIRATION RATE: 12 BRPM | SYSTOLIC BLOOD PRESSURE: 103 MMHG | HEART RATE: 90 BPM | WEIGHT: 140 LBS

## 2019-08-29 VITALS
BODY MASS INDEX: 17.19 KG/M2 | OXYGEN SATURATION: 99 % | TEMPERATURE: 98.3 F | WEIGHT: 126.9 LBS | HEIGHT: 72 IN | RESPIRATION RATE: 18 BRPM | SYSTOLIC BLOOD PRESSURE: 113 MMHG | HEART RATE: 73 BPM | DIASTOLIC BLOOD PRESSURE: 70 MMHG

## 2019-08-29 DIAGNOSIS — K63.2 ENTEROCUTANEOUS FISTULA: Primary | ICD-10-CM

## 2019-08-29 LAB
ALBUMIN SERPL-MCNC: 3.6 G/DL (ref 3.4–5)
ALBUMIN/GLOB SERPL: 0.7 {RATIO} (ref 0.8–1.7)
ALP SERPL-CCNC: 154 U/L (ref 45–117)
ALT SERPL-CCNC: 22 U/L (ref 16–61)
ANION GAP SERPL CALC-SCNC: 6 MMOL/L (ref 3–18)
ANION GAP SERPL CALC-SCNC: 7 MMOL/L (ref 3–18)
ANION GAP SERPL CALC-SCNC: 8 MMOL/L (ref 3–18)
AST SERPL-CCNC: 22 U/L (ref 10–38)
BASOPHILS # BLD: 0 K/UL (ref 0–0.1)
BASOPHILS NFR BLD: 0 % (ref 0–2)
BILIRUB SERPL-MCNC: 0.3 MG/DL (ref 0.2–1)
BUN SERPL-MCNC: 12 MG/DL (ref 7–18)
BUN/CREAT SERPL: 11 (ref 12–20)
BUN/CREAT SERPL: 12 (ref 12–20)
BUN/CREAT SERPL: 12 (ref 12–20)
CALCIUM SERPL-MCNC: 7.9 MG/DL (ref 8.5–10.1)
CALCIUM SERPL-MCNC: 8.3 MG/DL (ref 8.5–10.1)
CALCIUM SERPL-MCNC: 8.8 MG/DL (ref 8.5–10.1)
CHLORIDE SERPL-SCNC: 105 MMOL/L (ref 100–111)
CHLORIDE SERPL-SCNC: 108 MMOL/L (ref 100–111)
CHLORIDE SERPL-SCNC: 111 MMOL/L (ref 100–111)
CO2 SERPL-SCNC: 23 MMOL/L (ref 21–32)
CO2 SERPL-SCNC: 23 MMOL/L (ref 21–32)
CO2 SERPL-SCNC: 26 MMOL/L (ref 21–32)
CREAT SERPL-MCNC: 1.02 MG/DL (ref 0.6–1.3)
CREAT SERPL-MCNC: 1.04 MG/DL (ref 0.6–1.3)
CREAT SERPL-MCNC: 1.12 MG/DL (ref 0.6–1.3)
DIFFERENTIAL METHOD BLD: ABNORMAL
EOSINOPHIL # BLD: 0.3 K/UL (ref 0–0.4)
EOSINOPHIL NFR BLD: 4 % (ref 0–5)
ERYTHROCYTE [DISTWIDTH] IN BLOOD BY AUTOMATED COUNT: 14.5 % (ref 11.6–14.5)
GLOBULIN SER CALC-MCNC: 5 G/DL (ref 2–4)
GLUCOSE BLD STRIP.AUTO-MCNC: 114 MG/DL (ref 70–110)
GLUCOSE BLD STRIP.AUTO-MCNC: 162 MG/DL (ref 70–110)
GLUCOSE BLD STRIP.AUTO-MCNC: 247 MG/DL (ref 70–110)
GLUCOSE BLD STRIP.AUTO-MCNC: 353 MG/DL (ref 70–110)
GLUCOSE BLD STRIP.AUTO-MCNC: 63 MG/DL (ref 70–110)
GLUCOSE BLD STRIP.AUTO-MCNC: 63 MG/DL (ref 70–110)
GLUCOSE SERPL-MCNC: 209 MG/DL (ref 74–99)
GLUCOSE SERPL-MCNC: 383 MG/DL (ref 74–99)
GLUCOSE SERPL-MCNC: 59 MG/DL (ref 74–99)
HCT VFR BLD AUTO: 36.5 % (ref 36–48)
HGB BLD-MCNC: 12.2 G/DL (ref 13–16)
LYMPHOCYTES # BLD: 1.5 K/UL (ref 0.9–3.6)
LYMPHOCYTES NFR BLD: 19 % (ref 21–52)
MCH RBC QN AUTO: 29.2 PG (ref 24–34)
MCHC RBC AUTO-ENTMCNC: 33.4 G/DL (ref 31–37)
MCV RBC AUTO: 87.3 FL (ref 74–97)
MONOCYTES # BLD: 0.4 K/UL (ref 0.05–1.2)
MONOCYTES NFR BLD: 5 % (ref 3–10)
NEUTS SEG # BLD: 5.8 K/UL (ref 1.8–8)
NEUTS SEG NFR BLD: 72 % (ref 40–73)
PLATELET # BLD AUTO: 203 K/UL (ref 135–420)
PMV BLD AUTO: 10.3 FL (ref 9.2–11.8)
POTASSIUM SERPL-SCNC: 3.5 MMOL/L (ref 3.5–5.5)
POTASSIUM SERPL-SCNC: 3.5 MMOL/L (ref 3.5–5.5)
POTASSIUM SERPL-SCNC: 4.2 MMOL/L (ref 3.5–5.5)
PROT SERPL-MCNC: 8.6 G/DL (ref 6.4–8.2)
RBC # BLD AUTO: 4.18 M/UL (ref 4.7–5.5)
SODIUM SERPL-SCNC: 137 MMOL/L (ref 136–145)
SODIUM SERPL-SCNC: 138 MMOL/L (ref 136–145)
SODIUM SERPL-SCNC: 142 MMOL/L (ref 136–145)
WBC # BLD AUTO: 8 K/UL (ref 4.6–13.2)

## 2019-08-29 PROCEDURE — 74011250636 HC RX REV CODE- 250/636: Performed by: EMERGENCY MEDICINE

## 2019-08-29 PROCEDURE — 74011250636 HC RX REV CODE- 250/636: Performed by: INTERNAL MEDICINE

## 2019-08-29 PROCEDURE — 74011636637 HC RX REV CODE- 636/637: Performed by: HOSPITALIST

## 2019-08-29 PROCEDURE — 80048 BASIC METABOLIC PNL TOTAL CA: CPT

## 2019-08-29 PROCEDURE — 74176 CT ABD & PELVIS W/O CONTRAST: CPT

## 2019-08-29 PROCEDURE — 96372 THER/PROPH/DIAG INJ SC/IM: CPT

## 2019-08-29 PROCEDURE — 96376 TX/PRO/DX INJ SAME DRUG ADON: CPT

## 2019-08-29 PROCEDURE — 99283 EMERGENCY DEPT VISIT LOW MDM: CPT

## 2019-08-29 PROCEDURE — 82962 GLUCOSE BLOOD TEST: CPT

## 2019-08-29 PROCEDURE — 80053 COMPREHEN METABOLIC PANEL: CPT

## 2019-08-29 PROCEDURE — 74011250637 HC RX REV CODE- 250/637: Performed by: INTERNAL MEDICINE

## 2019-08-29 PROCEDURE — 85025 COMPLETE CBC W/AUTO DIFF WBC: CPT

## 2019-08-29 PROCEDURE — 36415 COLL VENOUS BLD VENIPUNCTURE: CPT

## 2019-08-29 PROCEDURE — 96374 THER/PROPH/DIAG INJ IV PUSH: CPT

## 2019-08-29 RX ORDER — ONDANSETRON 2 MG/ML
4 INJECTION INTRAMUSCULAR; INTRAVENOUS
Status: COMPLETED | OUTPATIENT
Start: 2019-08-29 | End: 2019-08-29

## 2019-08-29 RX ORDER — MORPHINE SULFATE 4 MG/ML
4 INJECTION INTRAVENOUS
Status: COMPLETED | OUTPATIENT
Start: 2019-08-29 | End: 2019-08-29

## 2019-08-29 RX ORDER — MORPHINE SULFATE 4 MG/ML
4 INJECTION INTRAVENOUS
Status: DISCONTINUED | OUTPATIENT
Start: 2019-08-29 | End: 2019-08-29 | Stop reason: SDUPTHER

## 2019-08-29 RX ORDER — MORPHINE SULFATE 30 MG/1
30 TABLET ORAL
Qty: 15 TAB | Refills: 0 | Status: SHIPPED | OUTPATIENT
Start: 2019-08-29 | End: 2019-09-01

## 2019-08-29 RX ORDER — INSULIN GLARGINE 100 [IU]/ML
14 INJECTION, SOLUTION SUBCUTANEOUS
Status: DISCONTINUED | OUTPATIENT
Start: 2019-08-29 | End: 2019-08-29 | Stop reason: HOSPADM

## 2019-08-29 RX ORDER — NALOXONE HYDROCHLORIDE 4 MG/.1ML
SPRAY NASAL
Qty: 1 EACH | Refills: 0 | Status: SHIPPED | OUTPATIENT
Start: 2019-08-29

## 2019-08-29 RX ORDER — ONDANSETRON 2 MG/ML
4 INJECTION INTRAMUSCULAR; INTRAVENOUS
Status: DISCONTINUED | OUTPATIENT
Start: 2019-08-29 | End: 2019-08-29

## 2019-08-29 RX ORDER — MORPHINE SULFATE 2 MG/ML
4 INJECTION, SOLUTION INTRAMUSCULAR; INTRAVENOUS ONCE
Status: DISCONTINUED | OUTPATIENT
Start: 2019-08-29 | End: 2019-08-29

## 2019-08-29 RX ADMIN — SUCRALFATE 1 G: 1 TABLET ORAL at 09:00

## 2019-08-29 RX ADMIN — HEPARIN SODIUM 5000 UNITS: 5000 INJECTION INTRAVENOUS; SUBCUTANEOUS at 12:04

## 2019-08-29 RX ADMIN — SODIUM CHLORIDE 1000 ML: 900 INJECTION, SOLUTION INTRAVENOUS at 19:01

## 2019-08-29 RX ADMIN — SUCRALFATE 1 G: 1 TABLET ORAL at 13:13

## 2019-08-29 RX ADMIN — Medication 100 MG: at 09:00

## 2019-08-29 RX ADMIN — HYDROCODONE BITARTRATE AND ACETAMINOPHEN 1 TABLET: 5; 325 TABLET ORAL at 13:13

## 2019-08-29 RX ADMIN — PANCRELIPASE 1 CAPSULE: 30000; 6000; 19000 CAPSULE, DELAYED RELEASE PELLETS ORAL at 09:00

## 2019-08-29 RX ADMIN — MORPHINE SULFATE 4 MG: 4 INJECTION INTRAVENOUS at 18:54

## 2019-08-29 RX ADMIN — INSULIN LISPRO 15 UNITS: 100 INJECTION, SOLUTION INTRAVENOUS; SUBCUTANEOUS at 12:03

## 2019-08-29 RX ADMIN — HYDROCODONE BITARTRATE AND ACETAMINOPHEN 1 TABLET: 5; 325 TABLET ORAL at 03:57

## 2019-08-29 RX ADMIN — FOLIC ACID 1 MG: 1 TABLET ORAL at 09:00

## 2019-08-29 RX ADMIN — PANCRELIPASE 1 CAPSULE: 30000; 6000; 19000 CAPSULE, DELAYED RELEASE PELLETS ORAL at 12:04

## 2019-08-29 RX ADMIN — HYDROCODONE BITARTRATE AND ACETAMINOPHEN 1 TABLET: 5; 325 TABLET ORAL at 08:59

## 2019-08-29 RX ADMIN — HEPARIN SODIUM 5000 UNITS: 5000 INJECTION INTRAVENOUS; SUBCUTANEOUS at 03:54

## 2019-08-29 RX ADMIN — Medication 81 MG: at 09:00

## 2019-08-29 RX ADMIN — MORPHINE SULFATE 4 MG: 4 INJECTION INTRAVENOUS at 16:45

## 2019-08-29 RX ADMIN — GLUCAGON HYDROCHLORIDE 1 MG: KIT at 08:03

## 2019-08-29 RX ADMIN — ONDANSETRON 4 MG: 2 INJECTION INTRAMUSCULAR; INTRAVENOUS at 19:00

## 2019-08-29 RX ADMIN — LEVETIRACETAM 1000 MG: 500 TABLET, FILM COATED ORAL at 09:00

## 2019-08-29 NOTE — PROGRESS NOTES
Per Willard Champion) called [address] transportation at [phone] to schedule 1:00 pm regular car (cab/lyft) transport to patient's home ([address] [place] [address] Timothy Russ, [address] [place], [place] [address]kelly Alcaraz) with Caitlin Cowan and received confirmation number [id number]. Per Caitlin Cowan, the  will call the nurse's station upon arrival, patient must be in main lobby for transport. Informed Tanvi of transportation arrangements.

## 2019-08-29 NOTE — PROGRESS NOTES
Received pt from [facility] per bed transported by transportation tech to room 209. Mother at bedside. Awake and alert. Sitting up in bed. Call bell at side. Pt  On contact isolation.  to be placed outside of door. No c/o nausea. sts he has continuous abd pain from chronic pancreatitis. Call bell at bedside. Instructed not to get OOB w/o help. 1855 medicated for abd pain. No c.o nausea. Mom at bedside. 1945 Bedside and Verbal shift change report given to Patti Aparicio (oncoming nurse) by Martha Coronel RN (offgoing nurse). Report given with ELDER, Hal and MAR.

## 2019-08-29 NOTE — ROUTINE PROCESS
Bedside and Verbal shift change report given to [place] (oncoming nurse) by Yasmin Cali   (offgoing nurse). Report included the following information SBAR, Intake/Output, MAR and Recent Results.

## 2019-08-29 NOTE — PROGRESS NOTES
D/C orders received. Spoke with Dr Bobby Bethea about discharge,  stated Chapin Mayer care needed. No needs identified by . Chart reviewed. Pt will be transported home by [facility] .  available as needed.     Robbin Kate, RN  Care Manager  [phone]

## 2019-08-29 NOTE — ED PROVIDER NOTES
EMERGENCY DEPARTMENT HISTORY AND PHYSICAL EXAM    4:54 PM      Date: 8/29/2019  Patient Name: Yuliana Funk    History of Presenting Illness     Chief Complaint   Patient presents with    Blurred Vision         History Provided By: Patient      Additional History (Context): Yuliana Funk is a 47 y.o. male with PMH of diabetes, enteric cutaneous foot fistula presents with abdominal pain and feeling unwell. Patient was discharged from the hospital an hour ago after being treated for DKA. He has a surgeon at [facility] and was instructed to follow-up with him for the enterocutaneous fistula and leakage from the NATALIE site. He states he is nauseous and has not been able to eat anything. PCP: None          Current Outpatient Medications   Medication Sig Dispense Refill    morphine IR (MS IR) 30 mg tablet Take 1 Tab by mouth every four (4) hours as needed for Pain for up to 3 days. Max Daily Amount: 180 mg. 15 Tab 0    naloxone (NARCAN) 4 mg/actuation nasal spray Use 1 spray intranasally, then discard. Repeat with new spray every 2 min as needed for opioid overdose symptoms, alternating nostrils. 1 Each 0    insulin NPH/insulin regular (HUMULIN 70/30 U-100 INSULIN) 100 unit/mL (70-30) injection 14 Units by SubCUTAneous route Before breakfast and dinner. 10 mL 0    Insulin Syringe-Needle U-100 0.3 mL 29 gauge syrg 2-14 Units by Does Not Apply route Before breakfast, lunch, and dinner. 100 Syringe 0    lipase-protease-amylase (CREON) 6,000-19,000 -30,000 unit capsule Take 1 Cap by mouth three (3) times daily (with meals). 90 Cap 0    diclofenac potassium (CATAFLAM) 50 mg tablet Take 1 Tab by mouth three (3) times daily as needed for Pain. 30 Tab 0    levETIRAcetam (KEPPRA) 500 mg tablet Take 2 Tabs by mouth two (2) times a day. 120 Tab 3    aspirin delayed-release 81 mg tablet Take 81 mg by mouth daily.       acetaminophen (TYLENOL EXTRA STRENGTH) 500 mg tablet Take 500 mg by mouth every six (6) hours as needed for Pain.  sucralfate (CARAFATE) 1 gram tablet Take 1 g by mouth four (4) times daily.  thiamine HCL (B-1) 100 mg tablet Take 1 Tab by mouth daily. 30 Tab 0    therapeutic multivitamin (THERAGRAN) tablet Take 1 Tab by mouth daily. 30 Tab 0    polyethylene glycol (MIRALAX) 17 gram packet Take 1 Packet by mouth daily. 30 Packet 0    pantoprazole (PROTONIX) 40 mg tablet Take 1 Tab by mouth Daily (before breakfast). 30 Tab 0    OTHER NO DRIVING OR OPERATING HEAVY MACHINERY FOR 6 MONTHS OR UNTIL CLEARED BY NEUROLOGY. 1 cm 0    Blood-Glucose Meter (FREESTYLE LITE METER) monitoring kit Test blood sugars 4-6 times a day 1 Kit 0    glucose blood VI test strips (FREESTYLE TEST) strip 1 Each by Does Not Apply route See Admin Instructions. Test blood sugars 4-6 times a day. 100 Strip 3    promethazine (PHENERGAN) 25 mg tablet Take 1 Tab by mouth every six (6) hours as needed.  10 Tab 0       Past History     Past Medical History:  Past Medical History:   Diagnosis Date    Abdominal pain, generalized     Chronic pancreatitis ([address]     Diabetes ([facility]     hypothyroid    Encounter for long-term (current) use of other medications     Essential hypertension     ETOH abuse     GERD (gastroesophageal reflux disease)     Heart failure (HCC)     Hyponatremia     Pain in the abdomen 11/2/2010    Pancreatitis     w/ abscess and pseudocyst    Pancreatitis     Psychiatric disorder     depression, anxiety    Tobacco abuse        Past Surgical History:  Past Surgical History:   Procedure Laterality Date    HX ABDOMINAL LAPAROSCOPY      PANCREATIC STENT    HX CHOLECYSTECTOMY         Family History:  Family History   Problem Relation Age of Onset    Heart Disease Father        Social History:  Social History     Tobacco Use    Smoking status: Former Smoker     Packs/day: 0.50     Years: 0.00     Pack years: 0.00     Types: Cigarettes    Smokeless tobacco: Never Used   Substance Use Topics    Alcohol use: Yes    Drug use: No       Allergies: Allergies   Allergen Reactions    Ampicillin-Sulbactam Rash    Pcn [Penicillins] Itching and Hives    Piperacillin-Tazobactam Rash    Pollen Extracts Rash    Seafood [Shellfish Containing Products] Hives     Other reaction(s): unknown  Has tolerated iohexol (iodine-containing contrast)  Only shrimp  Shrimp         Review of Systems       Review of Systems   Constitutional: Negative for chills and fever. Respiratory: Negative for shortness of breath. Cardiovascular: Negative for chest pain. Gastrointestinal: Positive for nausea and vomiting. All other systems reviewed and are negative. Physical Exam     Visit Vitals  /82 (BP 1 Location: Right arm, BP Patient Position: Sitting)   Pulse 90   Temp 98.4 °F (36.9 °C)   Resp 12   Ht 6' (1.829 m)   Wt 63.5 kg (140 lb)   SpO2 99%   BMI 18.99 kg/m²       Physical Exam   Constitutional: He is oriented to person, place, and time. He appears well-developed and well-nourished. No distress. Cachectic   HENT:   Head: Normocephalic and atraumatic. Eyes: Conjunctivae and EOM are normal. Right eye exhibits no discharge. Left eye exhibits no discharge. No scleral icterus. Neck: Normal range of motion. Neck supple. No tracheal deviation present. Cardiovascular: Normal rate, regular rhythm and normal heart sounds. No murmur heard. Pulmonary/Chest: Effort normal and breath sounds normal. No respiratory distress. He has no wheezes. He has no rales. Abdominal: Soft. He exhibits no distension. There is tenderness. There is no rebound and no guarding. Surgical scar and open NATALIE drain site in the right flank   Musculoskeletal: Normal range of motion. He exhibits no edema or deformity. Neurological: He is alert and oriented to person, place, and time. No cranial nerve deficit. Skin: Skin is warm and dry. He is not diaphoretic. Psychiatric: He has a normal mood and affect.  His behavior is normal. Judgment and thought content normal.         Diagnostic Study Results     Labs -  No results found for this or any previous visit (from the past 12 hour(s)). Radiologic Studies -   CT ABD PELV WO CONT   Final Result   IMPRESSION:      No acute abnormalities. Stable appearance retroperitoneal sinus tract from duodenum to the flank. Nonobstructive right kidney stone. Biliary air also seen on previous studies compatible with previous papillotomy. Chronic pancreatitis with stable position of the pancreatic duct stent. Medical Decision Making   I am the first provider for this patient. I reviewed the vital signs, available nursing notes, past medical history, past surgical history, family history and social history. Vital Signs-Reviewed the patient's vital signs. Provider Notes (Medical Decision Making): Patient with enterocutaneous fistula with worsening abdominal pain. CT scan has been ordered and patient was transition to Dr. Bashir Singh pending results. If no acute surgical emergency patient will be discharged. Diagnosis     Clinical Impression:   1. Enterocutaneous fistula        Disposition: Pending    Follow-up Information     Follow up With Specialties Details Why Contact Info    Your surgeon  In 1 week      [facility] EMERGENCY DEPT Emergency Medicine  As needed, If symptoms worsen [address] [place] [id number]  [phone]           Discharge Medication List as of 8/29/2019  9:32 PM      START taking these medications    Details   morphine IR (MS IR) 30 mg tablet Take 1 Tab by mouth every four (4) hours as needed for Pain for up to 3 days. Max Daily Amount: 180 mg., Print, Disp-15 Tab, R-0      naloxone (NARCAN) 4 mg/actuation nasal spray Use 1 spray intranasally, then discard. Repeat with new spray every 2 min as needed for opioid overdose symptoms, alternating nostrils. , Normal, Disp-1 Each, R-0         CONTINUE these medications which have NOT CHANGED    Details insulin NPH/insulin regular (HUMULIN 70/30 U-100 INSULIN) 100 unit/mL (70-30) injection 14 Units by SubCUTAneous route Before breakfast and dinner., No Print, Disp-10 mL, R-0      Insulin Syringe-Needle U-100 0.3 mL 29 gauge syrg 2-14 Units by Does Not Apply route Before breakfast, lunch, and dinner., Print, Disp-100 Syringe, R-0      lipase-protease-amylase (CREON) 6,000-19,000 -30,000 unit capsule Take 1 Cap by mouth three (3) times daily (with meals). , Print, Disp-90 Cap, R-0      diclofenac potassium (CATAFLAM) 50 mg tablet Take 1 Tab by mouth three (3) times daily as needed for Pain., Print, Disp-30 Tab, R-0      levETIRAcetam (KEPPRA) 500 mg tablet Take 2 Tabs by mouth two (2) times a day., Normal, Disp-120 Tab, R-3      aspirin delayed-release 81 mg tablet Take 81 mg by mouth daily. , Historical Med      acetaminophen (TYLENOL EXTRA STRENGTH) 500 mg tablet Take 500 mg by mouth every six (6) hours as needed for Pain., Historical Med      sucralfate (CARAFATE) 1 gram tablet Take 1 g by mouth four (4) times daily. , Historical Med      thiamine HCL (B-1) 100 mg tablet Take 1 Tab by mouth daily. , Normal, Disp-30 Tab, R-0      therapeutic multivitamin (THERAGRAN) tablet Take 1 Tab by mouth daily. , Normal, Disp-30 Tab, R-0      polyethylene glycol (MIRALAX) 17 gram packet Take 1 Packet by mouth daily. , Normal, Disp-30 Packet, R-0      pantoprazole (PROTONIX) 40 mg tablet Take 1 Tab by mouth Daily (before breakfast). , Normal, Disp-30 Tab, R-0      OTHER NO DRIVING OR OPERATING HEAVY MACHINERY FOR 6 MONTHS OR UNTIL CLEARED BY NEUROLOGY., Print, Disp-1 cm, R-0      Blood-Glucose Meter (FREESTYLE LITE METER) monitoring kit Test blood sugars 4-6 times a day, Print, Disp-1 Kit, R-0      glucose blood VI test strips (FREESTYLE TEST) strip 1 Each by Does Not Apply route See Admin Instructions.  Test blood sugars 4-6 times a day., Print, Disp-100 Strip, R-3      promethazine (PHENERGAN) 25 mg tablet Take 1 Tab by mouth every six (6) hours as needed. , Print, Disp-10 Tab, R-0           _______________________________  Kishore Ramirez MD  _______________________________

## 2019-08-29 NOTE — CONSULTS
[address] [facility]    Name:  Cortez Her  MR#:   [id number]  :  1965  ACCOUNT #:  [de-identified]  DATE OF SERVICE:  2019      ADMITTING HOSPITALIST:  Gale Mazariegos MD    PRIMARY CARE PHYSICIAN:  The patient had no primary care physician. REASON FOR CONSULTATION:  Chronic enterocutaneous fistula. HISTORY OF PRESENT ILLNESS:  The patient is a 51-year-old male with multiple medical problems including chronic pancreatitis, history of alcohol abuse, known enterocutaneous fistula from the duodenum (precise etiology/cause not well defined in the admission H and P or per patient history), admitted to the hospitalist service for hyperosmolar hyperglycemia. General surgery consultation was obtained for evaluation and advice regarding his chronic enterocutaneous fistula. It should be noted he has seen multiple surgeons, gastroenterologist, and interventional radiologist in the past with no definitive plans to proceed with surgery due to the significant risk, per patient. He says that he had a laparoscopic cholecystectomy more than 10 years ago, and at some point after that, he is unsure how long after that procedure he developed a fluid collection, which was subsequently drained, and he has had multiple drainage procedures and exchanges of drains to manage his chronic enterocutaneous fistula. Apparently, there is no drainage catheter in place. It has not been placed for at least several years per the patient, but has been managed with dressing changes alone. The patient states that there were times when the fistula would completely \"dry up\" with no drainage for a month, but then would spontaneously open up and drain murky brownish, occasionally bilious fluid. He denies any change in the amount of drainage over the last several weeks. He denies any change in the abdominal pain. He moves his bowels. He tolerates diet.   He does not relate any changes in the fistula output related to quantity or quality of foods taken, and he denies any fever or chills. He denies any other complaint at this time. PAST MEDICAL HISTORY:  1. Chronic pancreatitis. 2.  Diabetes. 3.  Hypothyroid. 4.  History of medical noncompliance. 5.  Hypertension. 6.  History of alcohol abuse. 7.  Reflux. 8.  History of heart failure. 9.  Depression. 10.  History of tobacco abuse, although the patient denies any cigarette use. PAST SURGICAL HISTORY:  Laparoscopic cholecystectomy 10 plus years ago; endoscopic pancreatic stent placement, the patient believes within the last seven or eight years. Denies any other procedures other than the above-mentioned drainage procedure for the enterocutaneous fistula. MEDICATIONS:  Please see med rec sheet. ALLERGIES:  TO AUGMENTIN, PENICILLIN, ZOSYN, ALL OF WHICH CAUSE RASH; AND SEAFOOD. SOCIAL HISTORY:  He states he quit smoking more than two years prior to which he smoked anywhere from half pack to three-quarter packs of cigarettes per day. He denies any alcohol use, but his history does elicit that he has a history of alcohol abuse. He denies any other illicit drug use. FAMILY HISTORY:  Significant for heart disease. REVIEW OF SYSTEMS:  A 12-point review of systems was reviewed with the patient, is negative apart from that listed in the HPI. PHYSICAL EXAMINATION:  GENERAL:  He is well developed, well nourished, somewhat slender in appearance, but in no acute distress. VITAL SIGNS:  He has been afebrile since arrival.  Last set of vitals:  Blood pressure 121/78, last temperature 98.2, satting 98% on room air, pulse 85, respirations 18. HEENT:  Normocephalic, atraumatic. Pupils equal, round, and reactive to light and accommodation. Extraocular movements intact. Sclerae anicteric bilaterally. NECK:  Supple. No JVD. CARDIOVASCULAR:  Regular rate and rhythm. LUNGS:  Clear to auscultation. ABDOMEN:  Soft, nondistended.   Good bowel sounds. No localized abdominal tenderness. Coming out of the right flank lower aspect, approximately midaxillary line just over the level of the pelvis/iliac bone, there is what appears to be a chronically draining fistula with some brown-tinged fluid on the gauze. The dressing has been changed twice already today. Minimal surrounding skin irritation. No fluctuance, no crepitus. No obvious areas of cellulitis or abscess. RECTAL:  Deferred. EXTREMITIES:  No clubbing, cyanosis, or edema. ANCILLARY STUDIES:  White count has been normal for the last two days, last was 8.1; H and H 11.3 and 32.9; platelets 965. There was minimal left neutrophil shift on admission of 82. BMP:  Potassium was low this morning at 3.2, now normalized at 4.0 this afternoon. Glucose yesterday on admission was 537 and has been little up and down today, the last recorded one this afternoon at 12:50 was 267, this morning was 148. Lipase on admission was 49. CT of the abdomen and pelvis dated 08/27 showed a hypodense tract demonstrated from the region of the second portion of the duodenum. It has been across the junction between the ascending colon and retroperitoneum surfacing along the right lateral flank. CT was done unfortunately without contrast; therefore, not really able to define much more than that. Also incidentally noted is chronic pancreatitis with main pancreatic duct catheter placement similar to prior scans. 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 The fistula has been evaluated by interventional radiology who feels that no drainage procedure or other radiologic intervention is necessary or warranted at this time. Similarly, I do not feel any acute surgical intervention is necessary at this time.   That being said, I would be curious to see what the other surgeons at [facility] and more importantly at [facility] had discussed with the patient in the past, whether or not some kind of gastric diversion or bypass, gastric jejunostomy, duodenal exclusion, etc. would be warranted in this relatively high-risk surgical patient. I do not feel that the potential benefit of a Whipple would be worth the risk in a patient with possibly some kind of diversion, exclusion, or bypass (MAY) help in decreasing or even closing this chronic enterocutaneous fistula. No need for any antibiotic coverage at this time. Would advise the patient to follow with his primary surgeon. Cleared from surgery for discharge whenever the patient can be stabilized from a medical point of view. Viri Baig MD      [id number]  D:  08/28/2019 19:57  T:  08/29/2019 0:14  JOB #:  [id number]  CC:   Maggie Sanchez MD

## 2019-08-29 NOTE — PROGRESS NOTES
conducted an initial consultation and Spiritual Assessment for Nick Webb, who is a 47 y.o.,male. Patient's Primary Language is: [place]. According to the patient's EMR Latter day Affiliation is: Mandaen. 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    Abscess 07/28/2018    Pancreatic fistula 06/28/2018    Central cord syndrome ([address] 06/02/2018    UTI (urinary tract infection) 05/24/2018    Hyperglycemia 05/22/2018    Chronic renal insufficiency 05/22/2018    CHRISTI (acute kidney injury) ([address] 05/22/2018    Enteritis 04/24/2018    Acute alcoholic hepatitis 40/16/4645    Chronic pancreatitis due to acute alcohol intoxication ([address] 04/15/2018    Lactic acidosis 08/05/2017    Colitis, acute 07/17/2017    HCAP (healthcare-associated pneumonia) 05/31/2016    Sepsis ([address] 04/15/2016    Biliary drain displacement 03/23/2016    Hypokalemia 03/23/2016    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 Explored issues of velvet, belief, spirituality and Moravian/ritual needs while hospitalized. Listened empathically. Offered prayer and assurance of continued prayers on patient's behalf.      The following outcomes where achieved:  Patient shared limited information about both their medical narrative and spiritual journey/beliefs.  confirmed Patient's Quaker Affiliation. Patient processed feeling about current hospitalization. Patient expressed gratitude for 's visit. Assessment:  Patient does not have any Alevism/cultural needs that will affect patient's preferences in health care. There are no spiritual or Alevism issues which require intervention at this time. Plan:  Chaplains will continue to follow and will provide pastoral care on an as needed/requested basis.  recommends bedside caregivers page  on duty if patient shows signs of acute spiritual or emotional distress.  Fr.  [address]   [phone]

## 2019-08-29 NOTE — CDMP QUERY
Pt admitted with Hyperglycemia and has malnutrition documented PER RD. Please further specify type of malnutrition.  Malnutrition -Severe protein calorie    Other (please specify)   Unable to determine    The medical record reflects the following:    Risk Factors: diabetes,     Clinical Indicators: PER RD: Patient meets criteria for Severe Protein Calorie Malnutrition as evidenced by:   ASPEN Malnutrition Criteria  Acute Illness, Chronic Illness, or Social/Enviornmental: Chronic illness  Body Fat: Severe(orbital, thoracic & lumbar body regions)  Muscle Mass: Severe(temple, clavicle, dorsal hand & patellar body regions)  ASPEN Malnutrition Score - Chronic Illness: 12  Chronic Illness - Malnutrition Diagnosis: S,evere malnutrition. BMI 18.99, Height 6', Weight 140lbs, significant weight loss x many years.       Treatment: Glucerna shake TIIVAN,

## 2019-08-29 NOTE — PROGRESS NOTES
Patient discharging. Patient states he understands his medications and discharge instructions.  He was also informed of and stated he understood his follow up appointments with Dr.E. Reena Najjar

## 2019-08-29 NOTE — DISCHARGE SUMMARY
Discharge Summary    Patient: Destin Salcedo MRN: [id number]  CSN: [id number]    YOB: 1965  Age: 47 y.o. Sex: male    DOA: 8/27/2019 LOS:  LOS: 2 days   Discharge Date:      Disposition: Home     Admission Diagnoses: Hyperglycemia [R73.9]  Type 2 diabetes mellitus with hyperosmolar nonketotic hyperglycemia ([address] [E11.00]    Discharge Diagnoses:  PLEASE SEE DICTATION. Discharge Condition: Stable    PHYSICAL EXAM  Visit Vitals  /69 (BP 1 Location: Right arm, BP Patient Position: At rest)   Pulse 82   Temp 97.5 °F (36.4 °C)   Resp 16   Ht 6' (1.829 m)   Wt 57.6 kg (126 lb 14.4 oz)   SpO2 100%   BMI 17.21 kg/m²       General: Alert, cooperative, no acute distress    Lungs:  CTA Bilaterally. No Wheezing/Rales. Heart:  Regular rate and Rhythm. Abdomen: Soft, Non distended, Non tender. + Bowel sounds. Extremities: No edema  Psych:   Good insight. Not anxious or agitated. Neurologic:  AA oriented X 3. Moves all extremities. R flank area, fistula with mild bilious drain noted. no erythema or swelling. Hospital Course: Please see dictation. code # [id number]. Discharge Medications:     Current Discharge Medication List      CONTINUE these medications which have CHANGED    Details   insulin NPH/insulin regular (HUMULIN 70/30 U-100 INSULIN) 100 unit/mL (70-30) injection 14 Units by SubCUTAneous route Before breakfast and dinner. Qty: 10 mL, Refills: 0         CONTINUE these medications which have NOT CHANGED    Details   lipase-protease-amylase (CREON) 6,000-19,000 -30,000 unit capsule Take 1 Cap by mouth three (3) times daily (with meals). Qty: 90 Cap, Refills: 0      levETIRAcetam (KEPPRA) 500 mg tablet Take 2 Tabs by mouth two (2) times a day. Qty: 120 Tab, Refills: 3      aspirin delayed-release 81 mg tablet Take 81 mg by mouth daily. thiamine HCL (B-1) 100 mg tablet Take 1 Tab by mouth daily.   Qty: 30 Tab, Refills: 0      therapeutic multivitamin [facility]) tablet Take 1 Tab by mouth daily. Qty: 30 Tab, Refills: 0      promethazine (PHENERGAN) 25 mg tablet Take 1 Tab by mouth every six (6) hours as needed. Qty: 10 Tab, Refills: 0      Insulin Syringe-Needle U-100 0.3 mL 29 gauge syrg 2-14 Units by Does Not Apply route Before breakfast, lunch, and dinner. Qty: 100 Syringe, Refills: 0      diclofenac potassium (CATAFLAM) 50 mg tablet Take 1 Tab by mouth three (3) times daily as needed for Pain. Qty: 30 Tab, Refills: 0      acetaminophen (TYLENOL EXTRA STRENGTH) 500 mg tablet Take 500 mg by mouth every six (6) hours as needed for Pain. sucralfate (CARAFATE) 1 gram tablet Take 1 g by mouth four (4) times daily. polyethylene glycol (MIRALAX) 17 gram packet Take 1 Packet by mouth daily. Qty: 30 Packet, Refills: 0      pantoprazole (PROTONIX) 40 mg tablet Take 1 Tab by mouth Daily (before breakfast). Qty: 30 Tab, Refills: 0      OTHER NO DRIVING OR OPERATING HEAVY MACHINERY FOR 6 MONTHS OR UNTIL CLEARED BY NEUROLOGY. Qty: 1 cm, Refills: 0      Blood-Glucose Meter (FREESTYLE LITE METER) monitoring kit Test blood sugars 4-6 times a day  Qty: 1 Kit, Refills: 0      glucose blood VI test strips (FREESTYLE TEST) strip 1 Each by Does Not Apply route See Admin Instructions. Test blood sugars 4-6 times a day. Qty: 100 Strip, Refills: 3         STOP taking these medications       insulin glargine (LANTUS) 100 unit/mL injection Comments:   Reason for Stopping:         insulin lispro (HUMALOG) 100 unit/mL injection Comments:   Reason for Stopping:             · It is important that you take the medication exactly as they are prescribed. · Keep your medication in the bottles provided by the pharmacist and keep a list of the medication names, dosages, and times to be taken in your wallet. · Do not take other medications without consulting your doctor.      DIET:  Cardiac Diet and Diabetic Diet    ACTIVITY: Activity as tolerated      ADDITIONAL INFORMATION: If you experience any of the following symptoms but not limited to Fever, chills, nausea, vomiting, diarrhea, change in mentation, falling, bleeding, shortness of breath, chest pain, please call your primary care physician or return to the emergency room if you cannot get hold of your doctor:     FOLLOW UP CARE:  SUJATHA Miguel in 5-7 days.  Please call and set up an appointment  Maira Gonzalez, Surgery in 2 week    Minutes spent on discharge: 40 minutes spent coordinating this discharge (review instructions/follow-up, prescriptions, preparing report for sign off)    Kasey Mccrary MD  8/29/2019 10:12 AM

## 2019-08-29 NOTE — DISCHARGE INSTRUCTIONS
Discharge Instructions    Patient: Jan Easton MRN: [id number]  CSN: [id number]    YOB: 1965  Age: 47 y.o. Sex: male    DOA: 8/27/2019 LOS:  LOS: 2 days   Discharge Date:      DIET:  Cardiac Diet and Diabetic Diet    ACTIVITY: Activity as tolerated      ADDITIONAL INFORMATION: If you experience any of the following symptoms but not limited to Fever, chills, nausea, vomiting, diarrhea, change in mentation, falling, bleeding, shortness of breath, chest pain, please call your primary care physician or return to the emergency room if you cannot get hold of your doctor:     FOLLOW UP CARE:  PCP in 5-7 days.  Please call and set up an appointment  Luis Arndt, Surgery in 2 week      Barrie Crigler, MD  8/29/2019 9:52 AM

## 2019-08-29 NOTE — PROGRESS NOTES
Problem: Diabetes Self-Management  Goal: *Disease process and treatment process  Description  Define diabetes and identify own type of diabetes; list 3 options for treating diabetes. Outcome: Progressing Towards Goal     Problem: Falls - Risk of  Goal: *Absence of Falls  Description  Document Sobeida Payment Fall Risk and appropriate interventions in the flowsheet.   Outcome: Progressing Towards Goal  Note:   Fall Risk Interventions:                                Problem: Patient Education: Go to Patient Education Activity  Goal: Patient/Family Education  Outcome: Progressing Towards Goal

## 2019-08-29 NOTE — ED TRIAGE NOTES
Pt arrived via EMS. Pt was discharged from [facility] an hour ago. Originally here for DKA. Pt presents with blurred vision and extreme pain in his right side. Open wound from previous NATALIE drain. Wound draining thin brown drainage with some blood.

## 2019-08-29 NOTE — ROUTINE PROCESS
8447 assumed care of pt after bedside verbal report was given by off going nurse, pt resting in bed awake with family at bedside, no distress noted, dressing to right flank dry and intact, will monitor     1159 pt resting in bed awake, no acute distress noted, will monitor     1331 pt discharged to home, instructions given by Mynor Sheridan RN, opportunity given for question

## 2019-08-29 NOTE — DISCHARGE SUMMARY
[address] [place] [address]    Name:  Jr Dhaliwal  MR#:   [id number]  :  1965  ACCOUNT #:  [de-identified]  ADMIT DATE:  2019  DISCHARGE DATE:  2019      PRIMARY CARE PHYSICIAN:  Does not have one, we will set up a new PCP. DISPOSITION:  Discharged to home. DISCHARGE CONDITION:  Stable. DISCHARGE DIAGNOSES:  1. Hyperosmolar hyperglycemia due to noncompliance with insulin, improved now. 2.  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 3.  Insulin-dependent diabetes mellitus type 2.  4.  Nausea and vomiting due to #1, resolved now. 5.  History of chronic pancreatitis. 6.  Pseudohyponatremia, improved now. 7.  Hypokalemia, improved now. 8.  Noncompliance to the medication. 9.  History of ethanol abuse. 10. Malnutrition -Severe protein calorie     DISCHARGE MEDICATIONS:  1. Aspirin 81 mg p.o. daily. 2.  Carafate 1 g four times daily. 3.  Diclofenac 50 mg three times daily as needed for pain. 4.  Novolin 70/30, 14 units b.i.d.  5.  Keppra 500 mg two tablets b.i.d.  6.  Creon capsules, one capsule three times daily. 7.  Protonix 40 mg daily. 8.  MiraLax 17 g daily. 9.  Phenergan 25 mg every 6 hours p.r.n. 10.  Multivitamin one tablet daily. 11.  Thiamine 100 mg daily. 12.  Tylenol extra strength as needed. CONSULTATIONS IN THE HOSPITAL:  1. Consultation with Dr. Nahun Bains, General Surgery. 2.  Consultation with Dr. Jeny Osorio, Interventional Radiology. PROCEDURES:  None.     INVESTIGATIONS IN THE HOSPITAL:  The patient had a CT of the abdomen and pelvis, which showed evolving or changing appearance of suspected enterocutaneous fistula, tracked from duodenum to the right flank region, developing focal hypodense inflammatory material collection suspected in the superior aspect of the right iliac fossa with new focal calcification, chronic pancreatitis, with main pancreatic duct catheter placement. HOSPITAL COURSE:  This is a 60-year-old male who comes to the emergency room with nausea, vomiting, and elevated blood sugar. The patient has been not taking his insulin for a few days as he lost his insulin, and his blood sugars were in the 1300 range in the ED. He was started on insulin drip, IV fluids. He was not in DKA, but he was in hyperosmolar state. With IV fluids and IV heparin, his blood sugars improved. His sodium was low due to his high sugars, which also improved with hydration and insulin drip. He also had a CT of the abdomen and pelvis on admission, which showed chronic duodenopancreatic fistula with some minimal fluid collection in the right iliac fossa region. Surgery and IR were consulted. IR reviewed the CT. The patient was very well known to IR from [facility].  They did not think that the patient needs any intervention as his fluid is very minimal and does not show any signs of infection, and the patient is nontoxic and does not have any signs of infection. IR recommended no intervention. General Surgery was also consulted. General Surgery, Dr. Roldan Monaco, saw the patient and also reviewed the CT. Did not think he required any intervention, recommended outpatient followup with his primary surgeon. Discussed with Surgery the patient, he says his general surgeon is Dr. Mazin Ballard , and he will like to follow with him. Reviewing the chart and also [facility], the patient does not seem to be following with the PCPs and also Surgery. I have advised the patient and his mother who was at the bedside about outpatient followups with the PCP and also with the Surgery. The patient says he does not have a PCP. Every time they make a PCP, he did not follow up because his group home has kept changing locations. I have talked to the patient and mother, and I have made appointments for both PCP and also to see Dr. Mazin Ballard.   The patient is currently hemodynamically and medically stable for discharge. The patient will be discharged home. The patient currently has insulin, which was prescribed in the ED. He has already filled his prescription. I do see insulin in his medications with him at the bedside, so I have advised the patient to resume back his Novolin 70/30 at 14 units b.i.d.  I have advised him to do more close monitoring of his blood sugars. I advised him to follow up closely and also advised him about that. The patient and his mother who is at bedside completely understood and agreed with the plan. I answered all their questions and concerns appropriately.       Jorge Luis Cook MD      [id number]  D:  08/29/2019 10:25  T:  08/29/2019 13:02  JOB #:  [id number]  CC:  Brandi Laguna NP

## 2019-08-30 NOTE — ED NOTES
Assumed patient from Dr. Aubrey Rodriguez. The patient is known to have a enterocutaneous fistula. The patient is having pain around that site. The patient has had fluid collections in the past.  The plan per Dr. Aubrey Rodriguez is to follow-up the CT scan. If there is a drainable collection, consult for transfer. If the collection is not drainable, discharge with follow-up with surgery. This should be done in close interval.  Is my understanding that this is been discussed with the patient. ED Course as of Aug 29 2126   Thu Aug 29, 2019   1951 Spoke to transfer center. Will call them back after CT scan is resulted. [DT]   2123 CT reviewed and negative. Will discharge patient home with surgical follow-up and close interval.   CT ABD PELV WO CONT [DT]      ED Course User Index  [DT] Heather Hale MD       Results were discussed with the patient. The patient demonstrated adequate understanding. I will discharge the patient home with abdominal pain related to his EC fistula. The patient will need to follow-up with his surgeon in close interval.  The patient demonstrates understanding of this. The patient is stable at time of discharge. The patient will be discharged home at this time. The patient understands and is happy with his plan.       Clinical impression: EC fistula

## 2019-09-02 LAB
BACTERIA SPEC CULT: NORMAL
SERVICE CMNT-IMP: NORMAL

## 2019-10-29 ENCOUNTER — HOSPITAL ENCOUNTER (EMERGENCY)
Age: 54
Discharge: HOME | End: 2019-10-29
Attending: EMERGENCY MEDICINE
Payer: MEDICAID

## 2019-10-29 VITALS
TEMPERATURE: 98.2 F | SYSTOLIC BLOOD PRESSURE: 115 MMHG | RESPIRATION RATE: 20 BRPM | DIASTOLIC BLOOD PRESSURE: 74 MMHG | OXYGEN SATURATION: 100 % | HEART RATE: 71 BPM

## 2019-10-29 DIAGNOSIS — R10.9 CHRONIC ABDOMINAL PAIN: ICD-10-CM

## 2019-10-29 DIAGNOSIS — E11.00 TYPE 2 DIABETES MELLITUS WITH HYPEROSMOLAR NONKETOTIC HYPERGLYCEMIA (HCC): Primary | ICD-10-CM

## 2019-10-29 DIAGNOSIS — G89.29 CHRONIC ABDOMINAL PAIN: ICD-10-CM

## 2019-10-29 DIAGNOSIS — K86.0 ALCOHOL-INDUCED CHRONIC PANCREATITIS (HCC): ICD-10-CM

## 2019-10-29 LAB
ADMINISTERED INITIALS, ADMINIT: NORMAL
ALBUMIN SERPL-MCNC: 3.7 G/DL (ref 3.4–5)
ALBUMIN/GLOB SERPL: 0.9 {RATIO} (ref 0.8–1.7)
ALP SERPL-CCNC: 127 U/L (ref 45–117)
ALT SERPL-CCNC: 45 U/L (ref 16–61)
ANION GAP SERPL CALC-SCNC: 8 MMOL/L (ref 3–18)
APPEARANCE UR: CLEAR
AST SERPL-CCNC: 22 U/L (ref 10–38)
ATRIAL RATE: 61 BPM
BACTERIA URNS QL MICRO: NEGATIVE /HPF
BASOPHILS # BLD: 0.1 K/UL (ref 0–0.1)
BASOPHILS NFR BLD: 1 % (ref 0–2)
BILIRUB SERPL-MCNC: 0.4 MG/DL (ref 0.2–1)
BILIRUB UR QL: NEGATIVE
BUN SERPL-MCNC: 17 MG/DL (ref 7–18)
BUN/CREAT SERPL: 13 (ref 12–20)
CALCIUM SERPL-MCNC: 9 MG/DL (ref 8.5–10.1)
CALCULATED P AXIS, ECG09: 61 DEGREES
CALCULATED R AXIS, ECG10: -54 DEGREES
CALCULATED T AXIS, ECG11: 46 DEGREES
CHLORIDE SERPL-SCNC: 103 MMOL/L (ref 100–111)
CO2 SERPL-SCNC: 24 MMOL/L (ref 21–32)
COLOR UR: YELLOW
CREAT SERPL-MCNC: 1.27 MG/DL (ref 0.6–1.3)
D50 ADMINISTERED, D50ADM: 0 ML
D50 ORDER, D50ORD: 0 ML
DIAGNOSIS, 93000: NORMAL
DIFFERENTIAL METHOD BLD: ABNORMAL
EOSINOPHIL # BLD: 0.2 K/UL (ref 0–0.4)
EOSINOPHIL NFR BLD: 3 % (ref 0–5)
EPITH CASTS URNS QL MICRO: NORMAL /LPF (ref 0–5)
ERYTHROCYTE [DISTWIDTH] IN BLOOD BY AUTOMATED COUNT: 15.6 % (ref 11.6–14.5)
EST. AVERAGE GLUCOSE BLD GHB EST-MCNC: 301 MG/DL
ETHANOL SERPL-MCNC: <3 MG/DL (ref 0–3)
GLOBULIN SER CALC-MCNC: 4.3 G/DL (ref 2–4)
GLUCOSE BLD STRIP.AUTO-MCNC: 187 MG/DL (ref 70–110)
GLUCOSE BLD STRIP.AUTO-MCNC: 321 MG/DL (ref 70–110)
GLUCOSE BLD STRIP.AUTO-MCNC: 456 MG/DL (ref 70–110)
GLUCOSE BLD STRIP.AUTO-MCNC: >600 MG/DL (ref 70–110)
GLUCOSE BLD STRIP.AUTO-MCNC: >600 MG/DL (ref 70–110)
GLUCOSE SERPL-MCNC: 578 MG/DL (ref 74–99)
GLUCOSE UR STRIP.AUTO-MCNC: >1000 MG/DL
GLUCOSE, GLC: 321 MG/DL
GLUCOSE, GLC: 456 MG/DL
GLUCOSE, GLC: 600 MG/DL
GLUCOSE, GLC: 600 MG/DL
HBA1C MFR BLD: 12.1 % (ref 4.2–5.6)
HCT VFR BLD AUTO: 36.3 % (ref 36–48)
HGB BLD-MCNC: 12.1 G/DL (ref 13–16)
HGB UR QL STRIP: NEGATIVE
HIGH TARGET, HITG: 180 MG/DL
INSULIN ADMINSTERED, INSADM: 10.8 UNITS/HOUR
INSULIN ADMINSTERED, INSADM: 16.2 UNITS/HOUR
INSULIN ADMINSTERED, INSADM: 2.6 UNITS/HOUR
INSULIN ADMINSTERED, INSADM: 7.9 UNITS/HOUR
INSULIN ORDER, INSORD: 10.8 UNITS/HOUR
INSULIN ORDER, INSORD: 16.2 UNITS/HOUR
INSULIN ORDER, INSORD: 2.6 UNITS/HOUR
INSULIN ORDER, INSORD: 7.9 UNITS/HOUR
KETONES UR QL STRIP.AUTO: NEGATIVE MG/DL
LEUKOCYTE ESTERASE UR QL STRIP.AUTO: NEGATIVE
LIPASE SERPL-CCNC: 66 U/L (ref 73–393)
LOW TARGET, LOT: 140 MG/DL
LYMPHOCYTES # BLD: 1 K/UL (ref 0.9–3.6)
LYMPHOCYTES NFR BLD: 19 % (ref 21–52)
MAGNESIUM SERPL-MCNC: 2.1 MG/DL (ref 1.6–2.6)
MAGNESIUM SERPL-MCNC: 2.1 MG/DL (ref 1.6–2.6)
MCH RBC QN AUTO: 29.4 PG (ref 24–34)
MCHC RBC AUTO-ENTMCNC: 33.3 G/DL (ref 31–37)
MCV RBC AUTO: 88.3 FL (ref 74–97)
MINUTES UNTIL NEXT BG, NBG: 60 MIN
MONOCYTES # BLD: 0.4 K/UL (ref 0.05–1.2)
MONOCYTES NFR BLD: 7 % (ref 3–10)
MULTIPLIER, MUL: 0.01
MULTIPLIER, MUL: 0.02
MULTIPLIER, MUL: 0.02
MULTIPLIER, MUL: 0.03
NEUTS SEG # BLD: 3.7 K/UL (ref 1.8–8)
NEUTS SEG NFR BLD: 70 % (ref 40–73)
NITRITE UR QL STRIP.AUTO: NEGATIVE
ORDER INITIALS, ORDINIT: NORMAL
P-R INTERVAL, ECG05: 148 MS
PH UR STRIP: 6 [PH] (ref 5–8)
PHOSPHATE SERPL-MCNC: 3.4 MG/DL (ref 2.5–4.9)
PLATELET # BLD AUTO: 177 K/UL (ref 135–420)
POTASSIUM SERPL-SCNC: 3.8 MMOL/L (ref 3.5–5.5)
PROT SERPL-MCNC: 8 G/DL (ref 6.4–8.2)
PROT UR STRIP-MCNC: ABNORMAL MG/DL
Q-T INTERVAL, ECG07: 408 MS
QRS DURATION, ECG06: 88 MS
QTC CALCULATION (BEZET), ECG08: 410 MS
RBC # BLD AUTO: 4.11 M/UL (ref 4.7–5.5)
RBC #/AREA URNS HPF: NEGATIVE /HPF (ref 0–5)
SODIUM SERPL-SCNC: 135 MMOL/L (ref 136–145)
SP GR UR REFRACTOMETRY: 1.03 (ref 1–1.03)
UROBILINOGEN UR QL STRIP.AUTO: 0.2 EU/DL (ref 0.2–1)
VENTRICULAR RATE, ECG03: 61 BPM
WBC # BLD AUTO: 5.3 K/UL (ref 4.6–13.2)
WBC URNS QL MICRO: NORMAL /HPF (ref 0–4)

## 2019-10-29 PROCEDURE — 96365 THER/PROPH/DIAG IV INF INIT: CPT

## 2019-10-29 PROCEDURE — 74011250636 HC RX REV CODE- 250/636: Performed by: EMERGENCY MEDICINE

## 2019-10-29 PROCEDURE — C9113 INJ PANTOPRAZOLE SODIUM, VIA: HCPCS | Performed by: EMERGENCY MEDICINE

## 2019-10-29 PROCEDURE — 74011250636 HC RX REV CODE- 250/636

## 2019-10-29 PROCEDURE — 83690 ASSAY OF LIPASE: CPT

## 2019-10-29 PROCEDURE — 84100 ASSAY OF PHOSPHORUS: CPT

## 2019-10-29 PROCEDURE — 74011000258 HC RX REV CODE- 258: Performed by: EMERGENCY MEDICINE

## 2019-10-29 PROCEDURE — 83735 ASSAY OF MAGNESIUM: CPT

## 2019-10-29 PROCEDURE — 96361 HYDRATE IV INFUSION ADD-ON: CPT

## 2019-10-29 PROCEDURE — 80307 DRUG TEST PRSMV CHEM ANLYZR: CPT

## 2019-10-29 PROCEDURE — 93005 ELECTROCARDIOGRAM TRACING: CPT

## 2019-10-29 PROCEDURE — 96366 THER/PROPH/DIAG IV INF ADDON: CPT

## 2019-10-29 PROCEDURE — 80053 COMPREHEN METABOLIC PANEL: CPT

## 2019-10-29 PROCEDURE — 81001 URINALYSIS AUTO W/SCOPE: CPT

## 2019-10-29 PROCEDURE — 74011000250 HC RX REV CODE- 250: Performed by: EMERGENCY MEDICINE

## 2019-10-29 PROCEDURE — 82962 GLUCOSE BLOOD TEST: CPT

## 2019-10-29 PROCEDURE — 74011250637 HC RX REV CODE- 250/637: Performed by: EMERGENCY MEDICINE

## 2019-10-29 PROCEDURE — 99285 EMERGENCY DEPT VISIT HI MDM: CPT

## 2019-10-29 PROCEDURE — 85025 COMPLETE CBC W/AUTO DIFF WBC: CPT

## 2019-10-29 PROCEDURE — 83036 HEMOGLOBIN GLYCOSYLATED A1C: CPT

## 2019-10-29 PROCEDURE — 74011000258 HC RX REV CODE- 258

## 2019-10-29 PROCEDURE — 96375 TX/PRO/DX INJ NEW DRUG ADDON: CPT

## 2019-10-29 PROCEDURE — 74011636637 HC RX REV CODE- 636/637: Performed by: EMERGENCY MEDICINE

## 2019-10-29 RX ORDER — PANTOPRAZOLE SODIUM 40 MG/1
40 TABLET, DELAYED RELEASE ORAL DAILY
Qty: 20 TAB | Refills: 0 | Status: ON HOLD | OUTPATIENT
Start: 2019-10-29 | End: 2019-11-06

## 2019-10-29 RX ORDER — DEXTROSE 50 % IN WATER (D50W) INTRAVENOUS SYRINGE
25-50 AS NEEDED
Status: DISCONTINUED | OUTPATIENT
Start: 2019-10-29 | End: 2019-10-29 | Stop reason: HOSPADM

## 2019-10-29 RX ORDER — MAGNESIUM SULFATE 100 %
4 CRYSTALS MISCELLANEOUS AS NEEDED
Status: DISCONTINUED | OUTPATIENT
Start: 2019-10-29 | End: 2019-10-29 | Stop reason: HOSPADM

## 2019-10-29 RX ORDER — MORPHINE SULFATE 4 MG/ML
4 INJECTION, SOLUTION INTRAMUSCULAR; INTRAVENOUS
Status: COMPLETED | OUTPATIENT
Start: 2019-10-29 | End: 2019-10-29

## 2019-10-29 RX ORDER — PROMETHAZINE HYDROCHLORIDE 25 MG/1
25 SUPPOSITORY RECTAL
Qty: 12 SUPPOSITORY | Refills: 0 | Status: SHIPPED | OUTPATIENT
Start: 2019-10-29 | End: 2019-11-08

## 2019-10-29 RX ORDER — POTASSIUM CHLORIDE 20 MEQ/1
40 TABLET, EXTENDED RELEASE ORAL
Status: COMPLETED | OUTPATIENT
Start: 2019-10-29 | End: 2019-10-29

## 2019-10-29 RX ORDER — SODIUM CHLORIDE 9 MG/ML
100 INJECTION, SOLUTION INTRAVENOUS CONTINUOUS
Status: DISCONTINUED | OUTPATIENT
Start: 2019-10-29 | End: 2019-10-29 | Stop reason: HOSPADM

## 2019-10-29 RX ORDER — DICYCLOMINE HYDROCHLORIDE 20 MG/1
20 TABLET ORAL EVERY 6 HOURS
Qty: 20 TAB | Refills: 0 | Status: SHIPPED | OUTPATIENT
Start: 2019-10-29 | End: 2019-11-03

## 2019-10-29 RX ORDER — PANTOPRAZOLE SODIUM 40 MG/10ML
40 INJECTION, POWDER, LYOPHILIZED, FOR SOLUTION INTRAVENOUS
Status: DISCONTINUED | OUTPATIENT
Start: 2019-10-29 | End: 2019-10-29

## 2019-10-29 RX ADMIN — MORPHINE SULFATE 4 MG: 4 INJECTION, SOLUTION INTRAMUSCULAR; INTRAVENOUS at 10:51

## 2019-10-29 RX ADMIN — HUMAN INSULIN 10.8 UNITS/HR: 100 INJECTION, SOLUTION SUBCUTANEOUS at 12:01

## 2019-10-29 RX ADMIN — SODIUM CHLORIDE 1000 ML: 900 INJECTION, SOLUTION INTRAVENOUS at 14:10

## 2019-10-29 RX ADMIN — SODIUM CHLORIDE 100 ML/HR: 900 INJECTION, SOLUTION INTRAVENOUS at 10:16

## 2019-10-29 RX ADMIN — SODIUM CHLORIDE 1000 ML: 900 INJECTION, SOLUTION INTRAVENOUS at 14:35

## 2019-10-29 RX ADMIN — SODIUM CHLORIDE 25 MG: 900 INJECTION, SOLUTION INTRAVENOUS at 10:51

## 2019-10-29 RX ADMIN — PANTOPRAZOLE SODIUM 40 MG: 40 INJECTION, POWDER, FOR SOLUTION INTRAVENOUS at 14:10

## 2019-10-29 RX ADMIN — POTASSIUM CHLORIDE 40 MEQ: 20 TABLET, EXTENDED RELEASE ORAL at 12:01

## 2019-10-29 NOTE — ED PROVIDER NOTES
EMERGENCY DEPARTMENT HISTORY AND PHYSICAL EXAM    Date: 10/29/2019  Patient Name: Aminata Nieto    History of Presenting Illness     Chief Complaint   Patient presents with    Abdominal Pain         History Provided By: Patient    Additional History (Context): Aminata Nieto is a 47 y.o. male with diabetes and chronic pancreatitis who presents with abdominal pain nausea vomiting and hyperglycemia. His morning, his blood sugars was in the 600s. Drinks alcohol primarily on the weekends but has not had alcohol in the home for quite a while. Last hospital visit was 3 to 4 days ago. Denies any change in his bowel movements; stool is normal brown color. PCP: Zachary Rothman MD    Current Facility-Administered Medications   Medication Dose Route Frequency Provider Last Rate Last Dose    0.9% sodium chloride infusion  100 mL/hr IntraVENous CONTINUOUS Sissy Berman  mL/hr at 10/29/19 1016 100 mL/hr at 10/29/19 1016    glucose chewable tablet 16 g  4 Tab Oral PRN RACHEL Bah        glucagon (GLUCAGEN) injection 1 mg  1 mg IntraMUSCular PRN Sissy Berman PA        dextrose (D50W) injection syrg 12.5-25 g  25-50 mL IntraVENous PRN Sissy Berman PA        insulin regular (NOVOLIN,HUMULIN) 100 units/100 ml NS infusion (premix)  0-50 Units/hr IntraVENous TITRATE Sissy Berman PA 2.6 mL/hr at 10/29/19 1515 2.6 Units/hr at 10/29/19 1515    sodium chloride 0.9 % bolus infusion 1,000 mL  1,000 mL IntraVENous ONCE Sissy Berman PA         Current Outpatient Medications   Medication Sig Dispense Refill    promethazine (PHENERGAN) 25 mg suppository Insert 1 Suppository into rectum every six (6) hours as needed for Nausea for up to 7 days. 12 Suppository 0    pantoprazole (PROTONIX) 40 mg tablet Take 1 Tab by mouth daily for 20 days. 20 Tab 0    dicyclomine (BENTYL) 20 mg tablet Take 1 Tab by mouth every six (6) hours for 20 doses.  20 Tab 0    insulin NPH/insulin regular (HUMULIN 70/30 U-100 INSULIN) 100 unit/mL (70-30) injection 14 Units by SubCUTAneous route Before breakfast and dinner. 10 mL 0    naloxone (NARCAN) 4 mg/actuation nasal spray Use 1 spray intranasally, then discard. Repeat with new spray every 2 min as needed for opioid overdose symptoms, alternating nostrils. 1 Each 0    Insulin Syringe-Needle U-100 0.3 mL 29 gauge syrg 2-14 Units by Does Not Apply route Before breakfast, lunch, and dinner. 100 Syringe 0    lipase-protease-amylase (CREON) 6,000-19,000 -30,000 unit capsule Take 1 Cap by mouth three (3) times daily (with meals). 90 Cap 0    diclofenac potassium (CATAFLAM) 50 mg tablet Take 1 Tab by mouth three (3) times daily as needed for Pain. 30 Tab 0    levETIRAcetam (KEPPRA) 500 mg tablet Take 2 Tabs by mouth two (2) times a day. 120 Tab 3    aspirin delayed-release 81 mg tablet Take 81 mg by mouth daily.  acetaminophen (TYLENOL EXTRA STRENGTH) 500 mg tablet Take 500 mg by mouth every six (6) hours as needed for Pain.  sucralfate (CARAFATE) 1 gram tablet Take 1 g by mouth four (4) times daily.  thiamine HCL (B-1) 100 mg tablet Take 1 Tab by mouth daily. 30 Tab 0    therapeutic multivitamin (THERAGRAN) tablet Take 1 Tab by mouth daily. 30 Tab 0    polyethylene glycol (MIRALAX) 17 gram packet Take 1 Packet by mouth daily. 30 Packet 0    pantoprazole (PROTONIX) 40 mg tablet Take 1 Tab by mouth Daily (before breakfast). 30 Tab 0    OTHER NO DRIVING OR OPERATING HEAVY MACHINERY FOR 6 MONTHS OR UNTIL CLEARED BY NEUROLOGY. 1 cm 0    Blood-Glucose Meter (FREESTYLE LITE METER) monitoring kit Test blood sugars 4-6 times a day 1 Kit 0    glucose blood VI test strips (FREESTYLE TEST) strip 1 Each by Does Not Apply route See Admin Instructions. Test blood sugars 4-6 times a day. 100 Strip 3    promethazine (PHENERGAN) 25 mg tablet Take 1 Tab by mouth every six (6) hours as needed.  10 Tab 0       Past History     Past Medical History:  Past Medical History:   Diagnosis Date    Abdominal pain, generalized     Chronic pancreatitis ([facility] [address]     Diabetes ([facility] [address]     hypothyroid    Encounter for long-term (current) use of other medications     Essential hypertension     ETOH abuse     GERD (gastroesophageal reflux disease)     Heart failure (HCC)     Hyponatremia     Pain in the abdomen 11/2/2010    Pancreatitis     w/ abscess and pseudocyst    Pancreatitis     Psychiatric disorder     depression, anxiety    Tobacco abuse        Past Surgical History:  Past Surgical History:   Procedure Laterality Date    HX ABDOMINAL LAPAROSCOPY      PANCREATIC STENT    HX CHOLECYSTECTOMY         Family History:  Family History   Problem Relation Age of Onset    Heart Disease Father        Social History:  Social History     Tobacco Use    Smoking status: Former Smoker     Packs/day: 0.50     Years: 0.00     Pack years: 0.00     Types: Cigarettes    Smokeless tobacco: Never Used   Substance Use Topics    Alcohol use: Yes    Drug use: No       Allergies: Allergies   Allergen Reactions    Ampicillin-Sulbactam Rash    Pcn [Penicillins] Itching and Hives    Piperacillin-Tazobactam Rash    Pollen Extracts Rash    Seafood [Shellfish Containing Products] Hives     Other reaction(s): unknown  Has tolerated iohexol (iodine-containing contrast)  Only shrimp  Shrimp         Review of Systems   Review of Systems   Constitutional: Negative for fever. Respiratory: Negative for shortness of breath. Cardiovascular: Negative for chest pain. Gastrointestinal: Positive for abdominal pain, nausea and vomiting. Negative for blood in stool, constipation and diarrhea. Genitourinary: Negative for dysuria and flank pain.      All Other Systems Negative  Physical Exam     Vitals:    10/29/19 1400 10/29/19 1415 10/29/19 1430 10/29/19 1630   BP: 111/78 115/77 112/75 115/74   Pulse: 79 73 72 71   Resp: 21 17 21 20   Temp:    98.2 °F (36.8 °C)   SpO2: 100% 100% 100% 100%     Physical Exam   Constitutional: Vital signs are normal. He appears well-developed and well-nourished. He is active. Non-toxic appearance. He does not appear ill. No distress. HENT:   Head: Normocephalic and atraumatic. Neck: Normal range of motion. Neck supple. Carotid bruit is not present. No tracheal deviation present. No thyromegaly present. Cardiovascular: Normal rate, regular rhythm and normal heart sounds. Exam reveals no gallop and no friction rub. No murmur heard. Pulmonary/Chest: Effort normal and breath sounds normal. No stridor. No respiratory distress. He has no wheezes. He has no rales. He exhibits no tenderness. Abdominal: Soft. He exhibits no distension and no mass. There is tenderness. There is no rebound, no guarding and no CVA tenderness. Epigastric TTP   Musculoskeletal: Normal range of motion. Neurological: He is alert. Skin: Skin is warm, dry and intact. He is not diaphoretic. No pallor. Psychiatric: He has a normal mood and affect. His speech is normal and behavior is normal. Judgment and thought content normal.   Nursing note and vitals reviewed.              Diagnostic Study Results     Labs -     Recent Results (from the past 12 hour(s))   EKG, 12 LEAD, INITIAL    Collection Time: 10/29/19  9:28 AM   Result Value Ref Range    Ventricular Rate 61 BPM    Atrial Rate 61 BPM    P-R Interval 148 ms    QRS Duration 88 ms    Q-T Interval 408 ms    QTC Calculation (Bezet) 410 ms    Calculated P Axis 61 degrees    Calculated R Axis -54 degrees    Calculated T Axis 46 degrees    Diagnosis       Normal sinus rhythm  Left anterior fascicular block  Abnormal ECG  When compared with ECG of 27-AUG-2019 15:12,  No significant change was found  Confirmed by Deepthi Harvey MD, Yumiko Bauer ([id number]) on 10/29/2019 12:57:46 PM     MAGNESIUM    Collection Time: 10/29/19  9:45 AM   Result Value Ref Range    Magnesium 2.1 1.6 - 2.6 mg/dL   PHOSPHORUS    Collection Time: 10/29/19  9:45 AM   Result Value Ref Range    Phosphorus 3.4 2.5 - 4.9 MG/DL   HEMOGLOBIN A1C WITH EAG    Collection Time: 10/29/19  9:45 AM   Result Value Ref Range    Hemoglobin A1c 12.1 (H) 4.2 - 5.6 %    Est. average glucose 810 mg/dL   METABOLIC PANEL, COMPREHENSIVE    Collection Time: 10/29/19 10:09 AM   Result Value Ref Range    Sodium 135 (L) 136 - 145 mmol/L    Potassium 3.8 3.5 - 5.5 mmol/L    Chloride 103 100 - 111 mmol/L    CO2 24 21 - 32 mmol/L    Anion gap 8 3.0 - 18 mmol/L    Glucose 578 (HH) 74 - 99 mg/dL    BUN 17 7.0 - 18 MG/DL    Creatinine 1.27 0.6 - 1.3 MG/DL    BUN/Creatinine ratio 13 12 - 20      GFR est AA >60 >60 ml/min/1.73m2    GFR est non-AA 59 (L) >60 ml/min/1.73m2    Calcium 9.0 8.5 - 10.1 MG/DL    Bilirubin, total 0.4 0.2 - 1.0 MG/DL    ALT (SGPT) 45 16 - 61 U/L    AST (SGOT) 22 10 - 38 U/L    Alk. phosphatase 127 (H) 45 - 117 U/L    Protein, total 8.0 6.4 - 8.2 g/dL    Albumin 3.7 3.4 - 5.0 g/dL    Globulin 4.3 (H) 2.0 - 4.0 g/dL    A-G Ratio 0.9 0.8 - 1.7     LIPASE    Collection Time: 10/29/19 10:09 AM   Result Value Ref Range    Lipase 66 (L) 73 - 393 U/L   CBC WITH AUTOMATED DIFF    Collection Time: 10/29/19 10:09 AM   Result Value Ref Range    WBC 5.3 4.6 - 13.2 K/uL    RBC 4.11 (L) 4.70 - 5.50 M/uL    HGB 12.1 (L) 13.0 - 16.0 g/dL    HCT 36.3 36.0 - 48.0 %    MCV 88.3 74.0 - 97.0 FL    MCH 29.4 24.0 - 34.0 PG    MCHC 33.3 31.0 - 37.0 g/dL    RDW 15.6 (H) 11.6 - 14.5 %    PLATELET 312 363 - 056 K/uL    NEUTROPHILS 70 40 - 73 %    LYMPHOCYTES 19 (L) 21 - 52 %    MONOCYTES 7 3 - 10 %    EOSINOPHILS 3 0 - 5 %    BASOPHILS 1 0 - 2 %    ABS. NEUTROPHILS 3.7 1.8 - 8.0 K/UL    ABS. LYMPHOCYTES 1.0 0.9 - 3.6 K/UL    ABS. MONOCYTES 0.4 0.05 - 1.2 K/UL    ABS. EOSINOPHILS 0.2 0.0 - 0.4 K/UL    ABS.  BASOPHILS 0.1 0.0 - 0.1 K/UL    DF AUTOMATED     ETHYL ALCOHOL    Collection Time: 10/29/19 10:09 AM   Result Value Ref Range    ALCOHOL(ETHYL),SERUM <3 0 - 3 MG/DL   MAGNESIUM    Collection Time: 10/29/19 10:09 AM   Result Value Ref Range    Magnesium 2.1 1.6 - 2.6 mg/dL   URINALYSIS W/ RFLX MICROSCOPIC    Collection Time: 10/29/19 11:47 AM   Result Value Ref Range    Color YELLOW      Appearance CLEAR      Specific gravity 1.028 1.005 - 1.030      pH (UA) 6.0 5.0 - 8.0      Protein TRACE (A) NEG mg/dL    Glucose >1,000 (A) NEG mg/dL    Ketone NEGATIVE  NEG mg/dL    Bilirubin NEGATIVE  NEG      Blood NEGATIVE  NEG      Urobilinogen 0.2 0.2 - 1.0 EU/dL    Nitrites NEGATIVE  NEG      Leukocyte Esterase NEGATIVE  NEG     URINE MICROSCOPIC ONLY    Collection Time: 10/29/19 11:47 AM   Result Value Ref Range    WBC 0 to 2 0 - 4 /hpf    RBC NEGATIVE  0 - 5 /hpf    Epithelial cells FEW 0 - 5 /lpf    Bacteria NEGATIVE  NEG /hpf   GLUCOSE, POC    Collection Time: 10/29/19 11:51 AM   Result Value Ref Range    Glucose (POC) >600 (HH) 70 - 110 mg/dL   GLUCOSTABILIZER    Collection Time: 10/29/19 12:01 PM   Result Value Ref Range    Glucose 600 mg/dL    Insulin order 10.8 units/hour    Insulin adminstered 10.8 units/hour    Multiplier 0.020     Low target 140 mg/dL    High target 180 mg/dL    D50 order 0.0 ml    D50 administered 0.00 ml    Minutes until next BG 60 min    Order initials aw      Administered initials aw    GLUCOSE, POC    Collection Time: 10/29/19  1:04 PM   Result Value Ref Range    Glucose (POC) >600 (HH) 70 - 110 mg/dL   GLUCOSTABILIZER    Collection Time: 10/29/19  1:05 PM   Result Value Ref Range    Glucose 600 mg/dL    Insulin order 16.2 units/hour    Insulin adminstered 16.2 units/hour    Multiplier 0.030     Low target 140 mg/dL    High target 180 mg/dL    D50 order 0.0 ml    D50 administered 0.00 ml    Minutes until next BG 60 min    Order initials aw     Administered initials aw    GLUCOSE, POC    Collection Time: 10/29/19  2:08 PM   Result Value Ref Range    Glucose (POC) 456 (HH) 70 - 110 mg/dL   GLUCOSTABILIZER    Collection Time: 10/29/19  2:12 PM   Result Value Ref Range    Glucose 456 mg/dL    Insulin order 7.9 units/hour    Insulin adminstered 7.9 units/hour    Multiplier 0.020     Low target 140 mg/dL    High target 180 mg/dL    D50 order 0.0 ml    D50 administered 0.00 ml    Minutes until next BG 60 min    Order initials aw     Administered initials aw    GLUCOSE, POC    Collection Time: 10/29/19  3:14 PM   Result Value Ref Range    Glucose (POC) 321 (H) 70 - 110 mg/dL   GLUCOSTABILIZER    Collection Time: 10/29/19  3:15 PM   Result Value Ref Range    Glucose 321 mg/dL    Insulin order 2.6 units/hour    Insulin adminstered 2.6 units/hour    Multiplier 0.010     Low target 140 mg/dL    High target 180 mg/dL    D50 order 0.0 ml    D50 administered 0.00 ml    Minutes until next BG 60 min    Order initials aw     Administered initials aw        Radiologic Studies -   No orders to display     CT Results  (Last 48 hours)    None        CXR Results  (Last 48 hours)    None            Medical Decision Making   I am the first provider for this patient. I reviewed the vital signs, available nursing notes, past medical history, past surgical history, family history and social history. Vital Signs-Reviewed the patient's vital signs. Records Reviewed: Nursing Notes, Old Medical Records, Previous Radiology Studies and Previous Laboratory Studies    Procedures:  Right external jugular venous access and IV placement  Date/Time: 10/29/2019 10:30 AM  Performed by: RACHEL Sarmiento  Authorized by: RACHEL Sarmiento     Consent:     Consent obtained:  Verbal    Consent given by:  Patient  Indications:     Indications:  No peripheral IV  Pre-procedure details:     Skin preparation:  ChloraPrep  Post-procedure details:     Patient tolerance of procedure: Tolerated well, no immediate complications        Provider Notes (Medical Decision Making): Chronic presentation per patient for his symptoms and disease. He has not vomited here in the emergency department. He is not having any change in his stooling for color consistency or frequency. He is afebrile.   His labs are stable other than his blood sugar which was now brought down to under 200. He can be discharged home. MED RECONCILIATION:  Current Facility-Administered Medications   Medication Dose Route Frequency    0.9% sodium chloride infusion  100 mL/hr IntraVENous CONTINUOUS    glucose chewable tablet 16 g  4 Tab Oral PRN    glucagon (GLUCAGEN) injection 1 mg  1 mg IntraMUSCular PRN    dextrose (D50W) injection syrg 12.5-25 g  25-50 mL IntraVENous PRN    insulin regular (NOVOLIN,HUMULIN) 100 units/100 ml NS infusion (premix)  0-50 Units/hr IntraVENous TITRATE    sodium chloride 0.9 % bolus infusion 1,000 mL  1,000 mL IntraVENous ONCE     Current Outpatient Medications   Medication Sig    promethazine (PHENERGAN) 25 mg suppository Insert 1 Suppository into rectum every six (6) hours as needed for Nausea for up to 7 days.  pantoprazole (PROTONIX) 40 mg tablet Take 1 Tab by mouth daily for 20 days.  dicyclomine (BENTYL) 20 mg tablet Take 1 Tab by mouth every six (6) hours for 20 doses.  insulin NPH/insulin regular (HUMULIN 70/30 U-100 INSULIN) 100 unit/mL (70-30) injection 14 Units by SubCUTAneous route Before breakfast and dinner.  naloxone (NARCAN) 4 mg/actuation nasal spray Use 1 spray intranasally, then discard. Repeat with new spray every 2 min as needed for opioid overdose symptoms, alternating nostrils.  Insulin Syringe-Needle U-100 0.3 mL 29 gauge syrg 2-14 Units by Does Not Apply route Before breakfast, lunch, and dinner.  lipase-protease-amylase (CREON) 6,000-19,000 -30,000 unit capsule Take 1 Cap by mouth three (3) times daily (with meals).  diclofenac potassium (CATAFLAM) 50 mg tablet Take 1 Tab by mouth three (3) times daily as needed for Pain.  levETIRAcetam (KEPPRA) 500 mg tablet Take 2 Tabs by mouth two (2) times a day.  aspirin delayed-release 81 mg tablet Take 81 mg by mouth daily.     acetaminophen (TYLENOL EXTRA STRENGTH) 500 mg tablet Take 500 mg by mouth every six (6) hours as needed for Pain.  sucralfate (CARAFATE) 1 gram tablet Take 1 g by mouth four (4) times daily.  thiamine HCL (B-1) 100 mg tablet Take 1 Tab by mouth daily.  therapeutic multivitamin (THERAGRAN) tablet Take 1 Tab by mouth daily.  polyethylene glycol (MIRALAX) 17 gram packet Take 1 Packet by mouth daily.  pantoprazole (PROTONIX) 40 mg tablet Take 1 Tab by mouth Daily (before breakfast).  OTHER NO DRIVING OR OPERATING HEAVY MACHINERY FOR 6 MONTHS OR UNTIL CLEARED BY NEUROLOGY.  Blood-Glucose Meter (FREESTYLE LITE METER) monitoring kit Test blood sugars 4-6 times a day    glucose blood VI test strips (FREESTYLE TEST) strip 1 Each by Does Not Apply route See Admin Instructions. Test blood sugars 4-6 times a day.  promethazine (PHENERGAN) 25 mg tablet Take 1 Tab by mouth every six (6) hours as needed. Disposition:  home    DISCHARGE NOTE:   4:43 PM    Pt has been reexamined. Patient has no new complaints, changes, or physical findings. Care plan outlined and precautions discussed. Results of labs were reviewed with the patient. All medications were reviewed with the patient; will d/c home with protonix, phenergan, bentyl. All of pt's questions and concerns were addressed. Patient was instructed and agrees to follow up with PCP, as well as to return to the ED upon further deterioration. Patient is ready to go home.     Follow-up Information     Follow up With Specialties Details Why 3 Andrea Maldonado  Schedule an appointment as soon as possible for a visit in 2 days  [address] [id number]  [phone]    [facility] EMERGENCY DEPT Emergency Medicine  If symptoms worsen return immediately [address] [facility] [address]  [phone]          Current Discharge Medication List      START taking these medications    Details   promethazine (PHENERGAN) 25 mg suppository Insert 1 Suppository into rectum every six (6) hours as needed for Nausea for up to 7 days. Qty: 12 Suppository, Refills: 0      !! pantoprazole (PROTONIX) 40 mg tablet Take 1 Tab by mouth daily for 20 days. Qty: 20 Tab, Refills: 0      dicyclomine (BENTYL) 20 mg tablet Take 1 Tab by mouth every six (6) hours for 20 doses. Qty: 20 Tab, Refills: 0       !! - Potential duplicate medications found. Please discuss with provider. CONTINUE these medications which have NOT CHANGED    Details   ! ! pantoprazole (PROTONIX) 40 mg tablet Take 1 Tab by mouth Daily (before breakfast). Qty: 30 Tab, Refills: 0      promethazine (PHENERGAN) 25 mg tablet Take 1 Tab by mouth every six (6) hours as needed. Qty: 10 Tab, Refills: 0       !! - Potential duplicate medications found. Please discuss with provider. Core Measures:    Critical Care Time:   Critical Care Time:   I have spent 35 minutes of critical care time involved in lab review, consultations with specialist, family decision-making, and documentation. During this entire length of time I was immediately available to the patient.     Critical Care: The reason for providing this level of medical care for this critically ill patient was due a critical illness that impaired one or more vital organ systems such that there was a high probability of imminent or life threatening deterioration in the patients condition. This care involved high complexity decision making to assess, manipulate, and support vital system functions, to treat this degreee vital organ system failure and to prevent further life threatening deterioration of the patients condition. Diagnosis     Clinical Impression:   1. Type 2 diabetes mellitus with hyperosmolar nonketotic hyperglycemia (HCC)    2. Alcohol-induced chronic pancreatitis ([facility] [address]    3.  Chronic abdominal pain

## 2019-10-29 NOTE — DISCHARGE INSTRUCTIONS
Patient Education        Chronic Pain: Care Instructions  Your Care Instructions    Chronic pain is pain that lasts a long time (months or even years) and may or may not have a clear cause. It is different from acute pain, which usually does have a clear cause--like an injury or illness--and gets better over time. Chronic pain:  · Lasts over time but may vary from day to day. · Does not go away despite efforts to end it. · May disrupt your sleep and lead to fatigue. · May cause depression or anxiety. · May make your muscles tense, causing more pain. · Can disrupt your work, hobbies, home life, and relationships with friends and family. Chronic pain is a very real condition. It is not just in your head. Treatment can help and usually includes several methods used together, such as medicines, physical therapy, exercise, and other treatments. Learning how to relax and changing negative thought patterns can also help you cope. Chronic pain is complex. Taking an active role in your treatment will help you better manage your pain. Tell your doctor if you have trouble dealing with your pain. You may have to try several things before you find what works best for you. Follow-up care is a key part of your treatment and safety. Be sure to make and go to all appointments, and call your doctor if you are having problems. It's also a good idea to know your test results and keep a list of the medicines you take. How can you care for yourself at home? · Pace yourself. Break up large jobs into smaller tasks. Save harder tasks for days when you have less pain, or go back and forth between hard tasks and easier ones. Take rest breaks. · Relax, and reduce stress. Relaxation techniques such as deep breathing or meditation can help. · Keep moving. Gentle, daily exercise can help reduce pain over the long run. Try low- or no-impact exercises such as walking, swimming, and stationary biking.  Do stretches to stay flexible. · Try heat, cold packs, and massage. · Get enough sleep. Chronic pain can make you tired and drain your energy. Talk with your doctor if you have trouble sleeping because of pain. · Think positive. Your thoughts can affect your pain level. Do things that you enjoy to distract yourself when you have pain instead of focusing on the pain. See a movie, read a book, listen to music, or spend time with a friend. · If you think you are depressed, talk to your doctor about treatment. · Keep a daily pain diary. Record how your moods, thoughts, sleep patterns, activities, and medicine affect your pain. You may find that your pain is worse during or after certain activities or when you are feeling a certain emotion. Having a record of your pain can help you and your doctor find the best ways to treat your pain. · Take pain medicines exactly as directed. ? If the doctor gave you a prescription medicine for pain, take it as prescribed. ? If you are not taking a prescription pain medicine, ask your doctor if you can take an over-the-counter medicine. Reducing constipation caused by pain medicine  · Include fruits, vegetables, beans, and whole grains in your diet each day. These foods are high in fiber. · Drink plenty of fluids, enough so that your urine is light yellow or clear like water. If you have kidney, heart, or liver disease and have to limit fluids, talk with your doctor before you increase the amount of fluids you drink. · If your doctor recommends it, get more exercise. Walking is a good choice. Bit by bit, increase the amount you walk every day. Try for at least 30 minutes on most days of the week. · Schedule time each day for a bowel movement. A daily routine may help. Take your time and do not strain when having a bowel movement. When should you call for help?   Call your doctor now or seek immediate medical care if:    · Your pain gets worse or is out of control.     · You feel down or blue, or you do not enjoy things like you once did. You may be depressed, which is common in people with chronic pain. Depression can be treated.     · You have vomiting or cramps for more than 2 hours.    Watch closely for changes in your health, and be sure to contact your doctor if:    · You cannot sleep because of pain.     · You are very worried or anxious about your pain.     · You have trouble taking your pain medicine.     · You have any concerns about your pain medicine.     · You have trouble with bowel movements, such as:  ? No bowel movement in 3 days. ? Blood in the anal area, in your stool, or on the toilet paper. ? Diarrhea for more than 24 hours. Where can you learn more? Go to http://thiago-mikhail.info/. Enter N004 in the search box to learn more about \"Chronic Pain: Care Instructions. \"  Current as of: March 28, 2019  Content Version: 12.2  © 5430-5264 [employer]. Care instructions adapted under license by [employer] (which disclaims liability or warranty for this information). If you have questions about a medical condition or this instruction, always ask your healthcare professional. [place] [address] any warranty or liability for your use of this information. Patient Education        Diet for Chronic Pancreatitis: Care Instructions  Your Care Instructions    The pancreas is an organ behind the stomach that makes hormones and enzymes to help your body digest food. Sometimes the enzymes attack another part of the pancreas, which can cause pain and swelling. This is called pancreatitis. Chronic pancreatitis may cause you to be in pain much of the time. You may be able to help the pain by avoiding alcohol and eating a low-fat diet. Your doctor and dietitian can help you make an eating plan that does not irritate your digestive system. Always talk with your doctor or dietitian before you make changes in your diet.   Follow-up care is a key part of your treatment and safety. Be sure to make and go to all appointments, and call your doctor if you are having problems. It's also a good idea to know your test results and keep a list of the medicines you take. How can you care for yourself at home? · Do not drink alcohol. It may make your pain worse and cause other problems. Tell your doctor if you need help to quit. Counseling, support groups, and sometimes medicines can help you stay sober. · Ask your doctor if you need to take pancreatic enzyme pills to help your body digest fat and protein. · Drink plenty of fluids, enough so that your urine is light yellow or clear like water. If you have kidney, heart, or liver disease and have to limit fluids, talk with your doctor before you increase the amount of fluids you drink. Eat a low-fat diet  · Eat many small meals and snacks each day instead of three large meals. · Choose lean meats. ? Eat no more than 5 to 6½ ounces of meat a day. ? Cut off all fat you can see. ? Eat chicken and turkey without the skin. ? Many types of fish, such as salmon, lake trout, tuna, and herring, provide healthy omega-3 fat. But avoid fish canned in oil, such as sardines in olive oil. ? Bake, broil, or grill meats, poultry, or fish instead of frying them in butter or fat. · Drink or eat nonfat or low-fat milk, yogurt, cheese, or other milk products each day. ? Read the labels on cheeses, and choose those with less than 5 grams of fat an ounce. ? Try fat-free sour cream, cream cheese, or yogurt. ? Avoid cream soups and cream sauces on pasta. ? Eat low-fat ice cream, frozen yogurt, or sorbet. Avoid regular ice cream.  · Eat whole-grain cereals, breads, crackers, rice, or pasta. Avoid high-fat foods such as croissants, scones, biscuits, waffles, doughnuts, muffins, granola, and high-fat breads.   · Flavor your foods with herbs and spices (such as basil, tarragon, or mint), fat-free sauces, or lemon juice instead of butter. You can also use butter substitutes, fat-free mayonnaise, or fat-free dressing. · Try applesauce, prune puree, or mashed bananas to replace some or all of the fat when you bake. · Limit fats and oils, such as butter, margarine, mayonnaise, and salad dressing, to no more than 1 tablespoon a meal.  · Avoid high-fat foods, such as:  ? Chocolate, whole milk, ice cream, processed cheese, and egg yolks. ? Fried or buttered foods. ? Sausage, salami, and guardado. ? Cinnamon rolls, cakes, pies, cookies, and other pastries. ? Prepared snack foods, such as potato chips, nut and granola bars, and mixed nuts. ? Coconut and avocado. · Learn how to read food labels for serving sizes and ingredients. Fast-food and convenience-food meals often have lots of fat. Where can you learn more? Go to http://thiago-mikhail.info/. Enter F107 in the search box to learn more about \"Diet for Chronic Pancreatitis: Care Instructions. \"  Current as of: November 7, 2018  Content Version: 12.2  © 2204-1463 [employer]. Care instructions adapted under license by [employer] (which disclaims liability or warranty for this information). If you have questions about a medical condition or this instruction, always ask your healthcare professional. [place] [address] any warranty or liability for your use of this information.

## 2019-10-29 NOTE — ED TRIAGE NOTES
Patient has a hx of pancreatitis, BS is 565, patient refused to have IV started.  C/O abdominal pain, n/v/d

## 2019-11-05 ENCOUNTER — HOSPITAL ENCOUNTER (INPATIENT)
Age: 54
LOS: 3 days | Discharge: HOME | DRG: 420 | End: 2019-11-08
Attending: EMERGENCY MEDICINE | Admitting: EMERGENCY MEDICINE
Payer: MEDICAID

## 2019-11-05 ENCOUNTER — APPOINTMENT (OUTPATIENT)
Dept: GENERAL RADIOLOGY | Age: 54
DRG: 420 | End: 2019-11-05
Attending: EMERGENCY MEDICINE
Payer: MEDICAID

## 2019-11-05 ENCOUNTER — APPOINTMENT (OUTPATIENT)
Dept: CT IMAGING | Age: 54
DRG: 420 | End: 2019-11-05
Attending: NURSE PRACTITIONER
Payer: MEDICAID

## 2019-11-05 DIAGNOSIS — K86.0 ALCOHOL-INDUCED CHRONIC PANCREATITIS (HCC): Chronic | ICD-10-CM

## 2019-11-05 DIAGNOSIS — E11.00 DIABETIC HYPEROSMOLAR NON-KETOTIC STATE (HCC): Primary | ICD-10-CM

## 2019-11-05 LAB
ADMINISTERED INITIALS, ADMINIT: NORMAL
ALBUMIN SERPL-MCNC: 3.6 G/DL (ref 3.4–5)
ALBUMIN/GLOB SERPL: 0.7 {RATIO} (ref 0.8–1.7)
ALP SERPL-CCNC: 159 U/L (ref 45–117)
ALT SERPL-CCNC: 46 U/L (ref 16–61)
ANION GAP SERPL CALC-SCNC: 5 MMOL/L (ref 3–18)
APPEARANCE UR: CLEAR
APTT PPP: 26.7 SEC (ref 23–36.4)
AST SERPL-CCNC: 52 U/L (ref 10–38)
BACTERIA URNS QL MICRO: NEGATIVE /HPF
BASOPHILS # BLD: 0.1 K/UL (ref 0–0.1)
BASOPHILS NFR BLD: 1 % (ref 0–2)
BILIRUB SERPL-MCNC: 0.4 MG/DL (ref 0.2–1)
BILIRUB UR QL: NEGATIVE
BUN SERPL-MCNC: 27 MG/DL (ref 7–18)
BUN/CREAT SERPL: 14 (ref 12–20)
CALCIUM SERPL-MCNC: 8.9 MG/DL (ref 8.5–10.1)
CHLORIDE SERPL-SCNC: 98 MMOL/L (ref 100–111)
CO2 SERPL-SCNC: 22 MMOL/L (ref 21–32)
COLOR UR: YELLOW
CREAT SERPL-MCNC: 1.95 MG/DL (ref 0.6–1.3)
D50 ADMINISTERED, D50ADM: 0 ML
D50 ORDER, D50ORD: 0 ML
DIFFERENTIAL METHOD BLD: ABNORMAL
EOSINOPHIL # BLD: 0.2 K/UL (ref 0–0.4)
EOSINOPHIL NFR BLD: 2 % (ref 0–5)
EPITH CASTS URNS QL MICRO: ABNORMAL /LPF (ref 0–5)
ERYTHROCYTE [DISTWIDTH] IN BLOOD BY AUTOMATED COUNT: 15.6 % (ref 11.6–14.5)
EST. AVERAGE GLUCOSE BLD GHB EST-MCNC: 335 MG/DL
GLOBULIN SER CALC-MCNC: 5.1 G/DL (ref 2–4)
GLUCOSE BLD STRIP.AUTO-MCNC: 149 MG/DL (ref 70–110)
GLUCOSE BLD STRIP.AUTO-MCNC: 189 MG/DL (ref 70–110)
GLUCOSE BLD STRIP.AUTO-MCNC: 348 MG/DL (ref 70–110)
GLUCOSE BLD STRIP.AUTO-MCNC: 540 MG/DL (ref 70–110)
GLUCOSE BLD STRIP.AUTO-MCNC: >600 MG/DL (ref 70–110)
GLUCOSE SERPL-MCNC: 737 MG/DL (ref 74–99)
GLUCOSE UR STRIP.AUTO-MCNC: >1000 MG/DL
GLUCOSE, GLC: 149 MG/DL
GLUCOSE, GLC: 348 MG/DL
GLUCOSE, GLC: 540 MG/DL
GLUCOSE, GLC: 600 MG/DL
GLUCOSE, GLC: 601 MG/DL
HBA1C MFR BLD: 13.3 % (ref 4.2–5.6)
HCT VFR BLD AUTO: 34.9 % (ref 36–48)
HGB BLD-MCNC: 11.6 G/DL (ref 13–16)
HGB UR QL STRIP: NEGATIVE
HIGH TARGET, HITG: 180 MG/DL
INR PPP: 1 (ref 0.8–1.2)
INSULIN ADMINSTERED, INSADM: 1.8 UNITS/HOUR
INSULIN ADMINSTERED, INSADM: 10.8 UNITS/HOUR
INSULIN ADMINSTERED, INSADM: 16.2 UNITS/HOUR
INSULIN ADMINSTERED, INSADM: 19.2 UNITS/HOUR
INSULIN ADMINSTERED, INSADM: 8.6 UNITS/HOUR
INSULIN ORDER, INSORD: 1.8 UNITS/HOUR
INSULIN ORDER, INSORD: 10.8 UNITS/HOUR
INSULIN ORDER, INSORD: 16.2 UNITS/HOUR
INSULIN ORDER, INSORD: 19.2 UNITS/HOUR
INSULIN ORDER, INSORD: 8.6 UNITS/HOUR
KETONES UR QL STRIP.AUTO: NEGATIVE MG/DL
LEUKOCYTE ESTERASE UR QL STRIP.AUTO: ABNORMAL
LIPASE SERPL-CCNC: 56 U/L (ref 73–393)
LOW TARGET, LOT: 140 MG/DL
LYMPHOCYTES # BLD: 1.6 K/UL (ref 0.9–3.6)
LYMPHOCYTES NFR BLD: 17 % (ref 21–52)
MAGNESIUM SERPL-MCNC: 2.7 MG/DL (ref 1.6–2.6)
MCH RBC QN AUTO: 28.3 PG (ref 24–34)
MCHC RBC AUTO-ENTMCNC: 33.2 G/DL (ref 31–37)
MCV RBC AUTO: 85.1 FL (ref 74–97)
MINUTES UNTIL NEXT BG, NBG: 60 MIN
MONOCYTES # BLD: 0.4 K/UL (ref 0.05–1.2)
MONOCYTES NFR BLD: 4 % (ref 3–10)
MUCOUS THREADS URNS QL MICRO: ABNORMAL /LPF
MULTIPLIER, MUL: 0.02
MULTIPLIER, MUL: 0.02
MULTIPLIER, MUL: 0.03
MULTIPLIER, MUL: 0.03
MULTIPLIER, MUL: 0.04
NEUTS SEG # BLD: 7 K/UL (ref 1.8–8)
NEUTS SEG NFR BLD: 76 % (ref 40–73)
NITRITE UR QL STRIP.AUTO: NEGATIVE
ORDER INITIALS, ORDINIT: NORMAL
PH UR STRIP: 6 [PH] (ref 5–8)
PHOSPHATE SERPL-MCNC: 3.9 MG/DL (ref 2.5–4.9)
PLATELET # BLD AUTO: 253 K/UL (ref 135–420)
PMV BLD AUTO: 10.7 FL (ref 9.2–11.8)
POTASSIUM SERPL-SCNC: 5 MMOL/L (ref 3.5–5.5)
PROT SERPL-MCNC: 8.7 G/DL (ref 6.4–8.2)
PROT UR STRIP-MCNC: ABNORMAL MG/DL
PROTHROMBIN TIME: 12.7 SEC (ref 11.5–15.2)
RBC # BLD AUTO: 4.1 M/UL (ref 4.7–5.5)
RBC #/AREA URNS HPF: NEGATIVE /HPF (ref 0–5)
SODIUM SERPL-SCNC: 125 MMOL/L (ref 136–145)
SP GR UR REFRACTOMETRY: >1.03 (ref 1–1.03)
UROBILINOGEN UR QL STRIP.AUTO: 0.2 EU/DL (ref 0.2–1)
WBC # BLD AUTO: 9.1 K/UL (ref 4.6–13.2)
WBC URNS QL MICRO: ABNORMAL /HPF (ref 0–4)
YEAST URNS QL MICRO: ABNORMAL

## 2019-11-05 PROCEDURE — 74011250636 HC RX REV CODE- 250/636: Performed by: STUDENT IN AN ORGANIZED HEALTH CARE EDUCATION/TRAINING PROGRAM

## 2019-11-05 PROCEDURE — 71045 X-RAY EXAM CHEST 1 VIEW: CPT

## 2019-11-05 PROCEDURE — 77030040393 HC DRSG OPTIFOAM GENT MDII -B

## 2019-11-05 PROCEDURE — 74176 CT ABD & PELVIS W/O CONTRAST: CPT

## 2019-11-05 PROCEDURE — 83036 HEMOGLOBIN GLYCOSYLATED A1C: CPT

## 2019-11-05 PROCEDURE — 84100 ASSAY OF PHOSPHORUS: CPT

## 2019-11-05 PROCEDURE — 81001 URINALYSIS AUTO W/SCOPE: CPT

## 2019-11-05 PROCEDURE — 85025 COMPLETE CBC W/AUTO DIFF WBC: CPT

## 2019-11-05 PROCEDURE — 85730 THROMBOPLASTIN TIME PARTIAL: CPT

## 2019-11-05 PROCEDURE — 74011250637 HC RX REV CODE- 250/637: Performed by: NURSE PRACTITIONER

## 2019-11-05 PROCEDURE — 74011636637 HC RX REV CODE- 636/637: Performed by: EMERGENCY MEDICINE

## 2019-11-05 PROCEDURE — 74011000258 HC RX REV CODE- 258: Performed by: STUDENT IN AN ORGANIZED HEALTH CARE EDUCATION/TRAINING PROGRAM

## 2019-11-05 PROCEDURE — 74011250636 HC RX REV CODE- 250/636: Performed by: EMERGENCY MEDICINE

## 2019-11-05 PROCEDURE — 87086 URINE CULTURE/COLONY COUNT: CPT

## 2019-11-05 PROCEDURE — 96375 TX/PRO/DX INJ NEW DRUG ADDON: CPT

## 2019-11-05 PROCEDURE — 65660000000 HC RM CCU STEPDOWN

## 2019-11-05 PROCEDURE — 96365 THER/PROPH/DIAG IV INF INIT: CPT

## 2019-11-05 PROCEDURE — 83735 ASSAY OF MAGNESIUM: CPT

## 2019-11-05 PROCEDURE — 99285 EMERGENCY DEPT VISIT HI MDM: CPT

## 2019-11-05 PROCEDURE — 96361 HYDRATE IV INFUSION ADD-ON: CPT

## 2019-11-05 PROCEDURE — 74011250636 HC RX REV CODE- 250/636: Performed by: NURSE PRACTITIONER

## 2019-11-05 PROCEDURE — 85610 PROTHROMBIN TIME: CPT

## 2019-11-05 PROCEDURE — 77030037878 HC DRSG MEPILEX >48IN BORD MOLN -B

## 2019-11-05 PROCEDURE — 87077 CULTURE AEROBIC IDENTIFY: CPT

## 2019-11-05 PROCEDURE — 80053 COMPREHEN METABOLIC PANEL: CPT

## 2019-11-05 PROCEDURE — 82962 GLUCOSE BLOOD TEST: CPT

## 2019-11-05 PROCEDURE — 83690 ASSAY OF LIPASE: CPT

## 2019-11-05 RX ORDER — ONDANSETRON 2 MG/ML
4 INJECTION INTRAMUSCULAR; INTRAVENOUS
Status: DISCONTINUED | OUTPATIENT
Start: 2019-11-05 | End: 2019-11-08 | Stop reason: HOSPADM

## 2019-11-05 RX ORDER — DEXTROSE 50 % IN WATER (D50W) INTRAVENOUS SYRINGE
25-50 AS NEEDED
Status: DISCONTINUED | OUTPATIENT
Start: 2019-11-05 | End: 2019-11-05

## 2019-11-05 RX ORDER — MAGNESIUM SULFATE 100 %
4 CRYSTALS MISCELLANEOUS AS NEEDED
Status: DISCONTINUED | OUTPATIENT
Start: 2019-11-05 | End: 2019-11-05

## 2019-11-05 RX ORDER — MORPHINE SULFATE 2 MG/ML
2 INJECTION, SOLUTION INTRAMUSCULAR; INTRAVENOUS
Status: DISCONTINUED | OUTPATIENT
Start: 2019-11-05 | End: 2019-11-06

## 2019-11-05 RX ORDER — HEPARIN SODIUM 10000 [USP'U]/100ML
18-36 INJECTION, SOLUTION INTRAVENOUS
Status: DISCONTINUED | OUTPATIENT
Start: 2019-11-05 | End: 2019-11-06

## 2019-11-05 RX ORDER — FAMOTIDINE 10 MG/ML
20 INJECTION INTRAVENOUS EVERY 24 HOURS
Status: DISCONTINUED | OUTPATIENT
Start: 2019-11-06 | End: 2019-11-06

## 2019-11-05 RX ORDER — KETOROLAC TROMETHAMINE 15 MG/ML
15 INJECTION, SOLUTION INTRAMUSCULAR; INTRAVENOUS
Status: COMPLETED | OUTPATIENT
Start: 2019-11-05 | End: 2019-11-05

## 2019-11-05 RX ORDER — SODIUM CHLORIDE 9 MG/ML
100 INJECTION, SOLUTION INTRAVENOUS CONTINUOUS
Status: DISCONTINUED | OUTPATIENT
Start: 2019-11-05 | End: 2019-11-08

## 2019-11-05 RX ORDER — PANTOPRAZOLE SODIUM 40 MG/1
40 TABLET, DELAYED RELEASE ORAL EVERY 12 HOURS
Status: DISCONTINUED | OUTPATIENT
Start: 2019-11-05 | End: 2019-11-05

## 2019-11-05 RX ORDER — INSULIN LISPRO 100 [IU]/ML
INJECTION, SOLUTION INTRAVENOUS; SUBCUTANEOUS
Status: DISCONTINUED | OUTPATIENT
Start: 2019-11-06 | End: 2019-11-08 | Stop reason: HOSPADM

## 2019-11-05 RX ORDER — MAGNESIUM SULFATE 100 %
4 CRYSTALS MISCELLANEOUS AS NEEDED
Status: DISCONTINUED | OUTPATIENT
Start: 2019-11-05 | End: 2019-11-08 | Stop reason: HOSPADM

## 2019-11-05 RX ORDER — DEXTROSE 50 % IN WATER (D50W) INTRAVENOUS SYRINGE
25-50 AS NEEDED
Status: DISCONTINUED | OUTPATIENT
Start: 2019-11-05 | End: 2019-11-06

## 2019-11-05 RX ORDER — ACETAMINOPHEN 500 MG
1000 TABLET ORAL
Status: DISCONTINUED | OUTPATIENT
Start: 2019-11-05 | End: 2019-11-08 | Stop reason: HOSPADM

## 2019-11-05 RX ORDER — PANTOPRAZOLE SODIUM 40 MG/10ML
40 INJECTION, POWDER, LYOPHILIZED, FOR SOLUTION INTRAVENOUS EVERY 12 HOURS
Status: DISCONTINUED | OUTPATIENT
Start: 2019-11-05 | End: 2019-11-05 | Stop reason: CLARIF

## 2019-11-05 RX ADMIN — PANTOPRAZOLE SODIUM 40 MG: 40 TABLET, DELAYED RELEASE ORAL at 20:51

## 2019-11-05 RX ADMIN — HUMAN INSULIN 10.8 UNITS/HR: 100 INJECTION, SOLUTION SUBCUTANEOUS at 17:02

## 2019-11-05 RX ADMIN — SODIUM CHLORIDE 1000 ML: 900 INJECTION, SOLUTION INTRAVENOUS at 16:01

## 2019-11-05 RX ADMIN — SODIUM CHLORIDE 150 ML/HR: 900 INJECTION, SOLUTION INTRAVENOUS at 18:00

## 2019-11-05 RX ADMIN — HUMAN INSULIN 1.8 UNITS/HR: 100 INJECTION, SOLUTION SUBCUTANEOUS at 21:12

## 2019-11-05 RX ADMIN — MORPHINE SULFATE 2 MG: 2 INJECTION, SOLUTION INTRAMUSCULAR; INTRAVENOUS at 20:51

## 2019-11-05 RX ADMIN — SODIUM CHLORIDE 1000 ML: 900 INJECTION, SOLUTION INTRAVENOUS at 18:00

## 2019-11-05 RX ADMIN — HUMAN INSULIN 8.6 UNITS/HR: 100 INJECTION, SOLUTION SUBCUTANEOUS at 20:06

## 2019-11-05 RX ADMIN — KETOROLAC TROMETHAMINE 15 MG: 15 INJECTION, SOLUTION INTRAMUSCULAR; INTRAVENOUS at 16:33

## 2019-11-05 RX ADMIN — SODIUM CHLORIDE 12.5 MG: 900 INJECTION, SOLUTION INTRAVENOUS at 16:07

## 2019-11-05 RX ADMIN — HEPARIN SODIUM 18 UNITS/KG/HR: 10000 INJECTION, SOLUTION INTRAVENOUS at 22:30

## 2019-11-05 RX ADMIN — ONDANSETRON 4 MG: 2 INJECTION INTRAMUSCULAR; INTRAVENOUS at 20:51

## 2019-11-05 NOTE — ED NOTES
EMERGENCY DEPARTMENT HISTORY AND PHYSICAL EXAM    3:18 PM      Date: 11/5/2019  Patient Name: Neema Jackson    History of Presenting Illness     Chief Complaint   Patient presents with    High Blood Sugar         History Provided By: Patient  Location/Duration/Severity/Modifying factors   HPI   Pt is a 48 yo male w/PMH significant for IDDM, HTN, EtOH abuse, GERD, chronic Pancreatitis, Depression/Anxiety and Tobacco abuse who presents for \"high blood sugar\". Pt states that his BG was 200 this morning: typically in range 115-120. Indicated his L arm began twitching this morning and stated that he had a \"diabetic seizure\" 1 yr ago. Pt endorses several episodes of non-bloody vomiting s/p 2 days and loose, non-bloody stools x3days. Endorses darker stools. Further stated that he has b/l upper quadrant pain that began this morning. Describes pain as sharp, constant, 9/10 in intensity. States palpation worsens pain and and nothing alleviates the pain. Pt indicates that he takes 30U of Lantus qday in the morning and Humalog 70/30 per a sliding scale before meals. Further, pt states he takes creon for pancreatitis and phenergan for nausea and denies taking any other medications. Pt states he does not consume EtOH. Endorses tobacco 1/2 pack per day for 25 years and denies illicits. Resides w/his mother who is bedside.       PCP: Lorna, MD Zachary    Current Facility-Administered Medications   Medication Dose Route Frequency Provider Last Rate Last Dose    sodium chloride 0.9 % bolus infusion 1,000 mL  1,000 mL IntraVENous ONCE Gume Castillo MD        glucose chewable tablet 16 g  4 Tab Oral PRN Gume Castillo MD        glucagon (GLUCAGEN) injection 1 mg  1 mg IntraMUSCular PRN Gume Castillo MD        dextrose (D50W) injection syrg 12.5-25 g  25-50 mL IntraVENous PRN Gume Castillo MD        insulin regular (NOVOLIN,HUMULIN) 100 units/100 ml NS infusion (premix)  0-50 Units/hr IntraVENous TITRATE Gume Castillo MD 10.8 mL/hr at 11/05/19 1702 10.8 Units/hr at 11/05/19 1702    0.9% sodium chloride infusion  150 mL/hr IntraVENous CONTINUOUS Willy Manning MD         Current Outpatient Medications   Medication Sig Dispense Refill    promethazine (PHENERGAN) 25 mg suppository Insert 1 Suppository into rectum every six (6) hours as needed for Nausea for up to 7 days. 12 Suppository 0    pantoprazole (PROTONIX) 40 mg tablet Take 1 Tab by mouth daily for 20 days. 20 Tab 0    insulin NPH/insulin regular (HUMULIN 70/30 U-100 INSULIN) 100 unit/mL (70-30) injection 14 Units by SubCUTAneous route Before breakfast and dinner. 10 mL 0    naloxone (NARCAN) 4 mg/actuation nasal spray Use 1 spray intranasally, then discard. Repeat with new spray every 2 min as needed for opioid overdose symptoms, alternating nostrils. 1 Each 0    Insulin Syringe-Needle U-100 0.3 mL 29 gauge syrg 2-14 Units by Does Not Apply route Before breakfast, lunch, and dinner. 100 Syringe 0    lipase-protease-amylase (CREON) 6,000-19,000 -30,000 unit capsule Take 1 Cap by mouth three (3) times daily (with meals). 90 Cap 0    diclofenac potassium (CATAFLAM) 50 mg tablet Take 1 Tab by mouth three (3) times daily as needed for Pain. 30 Tab 0    levETIRAcetam (KEPPRA) 500 mg tablet Take 2 Tabs by mouth two (2) times a day. 120 Tab 3    acetaminophen (TYLENOL EXTRA STRENGTH) 500 mg tablet Take 500 mg by mouth every six (6) hours as needed for Pain.  sucralfate (CARAFATE) 1 gram tablet Take 1 g by mouth four (4) times daily.  thiamine HCL (B-1) 100 mg tablet Take 1 Tab by mouth daily. 30 Tab 0    therapeutic multivitamin (THERAGRAN) tablet Take 1 Tab by mouth daily. 30 Tab 0    polyethylene glycol (MIRALAX) 17 gram packet Take 1 Packet by mouth daily. 30 Packet 0    pantoprazole (PROTONIX) 40 mg tablet Take 1 Tab by mouth Daily (before breakfast).  30 Tab 0    OTHER NO DRIVING OR OPERATING HEAVY MACHINERY FOR 6 MONTHS OR UNTIL CLEARED BY NEUROLOGY. 1 cm 0    Blood-Glucose Meter (FREESTYLE LITE METER) monitoring kit Test blood sugars 4-6 times a day 1 Kit 0    glucose blood VI test strips (FREESTYLE TEST) strip 1 Each by Does Not Apply route See Admin Instructions. Test blood sugars 4-6 times a day. 100 Strip 3    promethazine (PHENERGAN) 25 mg tablet Take 1 Tab by mouth every six (6) hours as needed. 10 Tab 0       Past History     Past Medical History:  Past Medical History:   Diagnosis Date    Abdominal pain, generalized     Chronic pancreatitis ([address]     Diabetes ([facility] [address]     hypothyroid    Encounter for long-term (current) use of other medications     Essential hypertension     ETOH abuse     GERD (gastroesophageal reflux disease)     Heart failure (HCC)     Hyponatremia     Pain in the abdomen 11/2/2010    Pancreatitis     w/ abscess and pseudocyst    Pancreatitis     Psychiatric disorder     depression, anxiety    Tobacco abuse        Past Surgical History:  Past Surgical History:   Procedure Laterality Date    HX ABDOMINAL LAPAROSCOPY      PANCREATIC STENT    HX CHOLECYSTECTOMY         Family History:  Family History   Problem Relation Age of Onset    Heart Disease Father        Social History:  Social History     Tobacco Use    Smoking status: Former Smoker     Packs/day: 0.50     Years: 0.00     Pack years: 0.00     Types: Cigarettes    Smokeless tobacco: Never Used   Substance Use Topics    Alcohol use: Yes    Drug use: No       Allergies: Allergies   Allergen Reactions    Ampicillin-Sulbactam Rash    Pcn [Penicillins] Itching and Hives    Piperacillin-Tazobactam Rash    Pollen Extracts Rash    Seafood [Shellfish Containing Products] Hives     Other reaction(s): unknown  Has tolerated iohexol (iodine-containing contrast)  Only shrimp  Shrimp         Review of Systems     Review of Systems   ENDORSES: vomiting, diarrhea, Abd pain confined to upper quadrants.     Denies: HA, fever, chills, CP, SOB, dysuria, hematuria, constipation, muscle pain, joint pain      Physical Exam     Visit Vitals  /86   Pulse 81   Temp 98.2 °F (36.8 °C)   Resp 17   Wt 63.5 kg (140 lb)   SpO2 100%   BMI 18.99 kg/m²     Physical Exam  GEN: cooperative male who periodically winces in pain that he attributes to abd  HEENT: atraumatic, normocephalic. EOMI, PERRLA. CN 2-12 grossly intact  CV: RRR w/no murmurs appreciated. Radial and DP pulses 2+ equal b/l  RESP: lungs CTAB w/no adventitious breath sounds auscultated  ABD: (+) BS. Soft, non-distended, tender in upper quadrants b/l  EXT: (-) edema b/l lower limbs. INTEG: no rashes or lesions noted      Diagnostic Study Results     Labs -  Recent Results (from the past 12 hour(s))   GLUCOSE, POC    Collection Time: 11/05/19  2:42 PM   Result Value Ref Range    Glucose (POC) >600 (HH) 70 - 110 mg/dL   CBC WITH AUTOMATED DIFF    Collection Time: 11/05/19  3:49 PM   Result Value Ref Range    WBC 9.1 4.6 - 13.2 K/uL    RBC 4.10 (L) 4.70 - 5.50 M/uL    HGB 11.6 (L) 13.0 - 16.0 g/dL    HCT 34.9 (L) 36.0 - 48.0 %    MCV 85.1 74.0 - 97.0 FL    MCH 28.3 24.0 - 34.0 PG    MCHC 33.2 31.0 - 37.0 g/dL    RDW 15.6 (H) 11.6 - 14.5 %    PLATELET 159 861 - 956 K/uL    MPV 10.7 9.2 - 11.8 FL    NEUTROPHILS 76 (H) 40 - 73 %    LYMPHOCYTES 17 (L) 21 - 52 %    MONOCYTES 4 3 - 10 %    EOSINOPHILS 2 0 - 5 %    BASOPHILS 1 0 - 2 %    ABS. NEUTROPHILS 7.0 1.8 - 8.0 K/UL    ABS. LYMPHOCYTES 1.6 0.9 - 3.6 K/UL    ABS. MONOCYTES 0.4 0.05 - 1.2 K/UL    ABS. EOSINOPHILS 0.2 0.0 - 0.4 K/UL    ABS.  BASOPHILS 0.1 0.0 - 0.1 K/UL    DF AUTOMATED     METABOLIC PANEL, COMPREHENSIVE    Collection Time: 11/05/19  3:49 PM   Result Value Ref Range    Sodium 125 (L) 136 - 145 mmol/L    Potassium 5.0 3.5 - 5.5 mmol/L    Chloride 98 (L) 100 - 111 mmol/L    CO2 22 21 - 32 mmol/L    Anion gap 5 3.0 - 18 mmol/L    Glucose 737 (HH) 74 - 99 mg/dL    BUN 27 (H) 7.0 - 18 MG/DL    Creatinine 1.95 (H) 0.6 - 1.3 MG/DL    BUN/Creatinine ratio 14 12 - 20      GFR est AA 44 (L) >60 ml/min/1.73m2    GFR est non-AA 36 (L) >60 ml/min/1.73m2    Calcium 8.9 8.5 - 10.1 MG/DL    Bilirubin, total 0.4 0.2 - 1.0 MG/DL    ALT (SGPT) 46 16 - 61 U/L    AST (SGOT) 52 (H) 10 - 38 U/L    Alk. phosphatase 159 (H) 45 - 117 U/L    Protein, total 8.7 (H) 6.4 - 8.2 g/dL    Albumin 3.6 3.4 - 5.0 g/dL    Globulin 5.1 (H) 2.0 - 4.0 g/dL    A-G Ratio 0.7 (L) 0.8 - 1.7     LIPASE    Collection Time: 11/05/19  3:49 PM   Result Value Ref Range    Lipase 56 (L) 73 - 393 U/L   MAGNESIUM    Collection Time: 11/05/19  3:49 PM   Result Value Ref Range    Magnesium 2.7 (H) 1.6 - 2.6 mg/dL   URINALYSIS W/ RFLX MICROSCOPIC    Collection Time: 11/05/19  4:35 PM   Result Value Ref Range    Color YELLOW      Appearance CLEAR      Specific gravity >1.030 (H) 1.005 - 1.030    pH (UA) 6.0 5.0 - 8.0      Protein TRACE (A) NEG mg/dL    Glucose >1,000 (A) NEG mg/dL    Ketone NEGATIVE  NEG mg/dL    Bilirubin NEGATIVE  NEG      Blood NEGATIVE  NEG      Urobilinogen 0.2 0.2 - 1.0 EU/dL    Nitrites NEGATIVE  NEG      Leukocyte Esterase TRACE (A) NEG     GLUCOSE, POC    Collection Time: 11/05/19  4:54 PM   Result Value Ref Range    Glucose (POC) >600 (HH) 70 - 110 mg/dL   GLUCOSTABILIZER    Collection Time: 11/05/19  5:03 PM   Result Value Ref Range    Glucose 600 mg/dL    Insulin order 10.8 units/hour    Insulin adminstered 10.8 units/hour    Multiplier 0.020     Low target 140 mg/dL    High target 180 mg/dL    D50 order 0.0 ml    D50 administered 0.00 ml    Minutes until next BG 60 min    Order initials jl     Administered initials jl        Radiologic Studies -   XR CHEST PORT    (Results Pending)     Na/glucose correction: 137.7    Medical Decision Making   I am the first provider for this patient. I reviewed the vital signs, available nursing notes, past medical history, past surgical history, family history and social history. Vital Signs-Reviewed the patient's vital signs.       EKG: NA    Records Reviewed: Nursing Notes and Old Medical Records (Time of Review: 3:18 PM)    ED Course: Progress Notes, Reevaluation, and Consults:  CBC w/Diff, UA, CMP, POC glucose, Lipase, US guided IJ access required       Provider Notes (Medical Decision Making):   MDM  Number of Diagnoses or Management Options     Amount and/or Complexity of Data Reviewed  Clinical lab tests: ordered and reviewed  Tests in the radiology section of CPT®: ordered and reviewed  Tests in the medicine section of CPT®: reviewed  Discussion of test results with the performing providers: yes  Review and summarize past medical records: yes  Discuss the patient with other providers: yes  Independent visualization of images, tracings, or specimens: yes    Risk of Complications, Morbidity, and/or Mortality  Presenting problems: moderate  Diagnostic procedures: low  Management options: moderate    Patient Progress  Patient progress: stable     Pt w/HHS on chronic pancreatitis. Corrected Na 138 and K 5.0. NS bolus 1L followed by 125cc/hr continuous. Insulin infusion admit to stepdown. This is likely related to recurrent vomiting from his chronic pancreatitis. Procedures  US guided IJ guided access    Critical Care Time: 30 mins      Diagnosis     Clinical Impression: No diagnosis found. Disposition: Stepdown    Follow-up Information    None          Patient's Medications   Start Taking    No medications on file   Continue Taking    ACETAMINOPHEN (TYLENOL EXTRA STRENGTH) 500 MG TABLET    Take 500 mg by mouth every six (6) hours as needed for Pain. BLOOD-GLUCOSE METER (FREESTYLE LITE METER) MONITORING KIT    Test blood sugars 4-6 times a day    DICLOFENAC POTASSIUM (CATAFLAM) 50 MG TABLET    Take 1 Tab by mouth three (3) times daily as needed for Pain. GLUCOSE BLOOD VI TEST STRIPS (FREESTYLE TEST) STRIP    1 Each by Does Not Apply route See Admin Instructions. Test blood sugars 4-6 times a day.     INSULIN NPH/INSULIN REGULAR (HUMULIN 70/30 U-100 INSULIN) 100 UNIT/ML (70-30) INJECTION    14 Units by SubCUTAneous route Before breakfast and dinner. INSULIN SYRINGE-NEEDLE U-100 0.3 ML 29 GAUGE SYRG    2-14 Units by Does Not Apply route Before breakfast, lunch, and dinner. LEVETIRACETAM (KEPPRA) 500 MG TABLET    Take 2 Tabs by mouth two (2) times a day. LIPASE-PROTEASE-AMYLASE (CREON) 6,000-19,000 -30,000 UNIT CAPSULE    Take 1 Cap by mouth three (3) times daily (with meals). NALOXONE (NARCAN) 4 MG/ACTUATION NASAL SPRAY    Use 1 spray intranasally, then discard. Repeat with new spray every 2 min as needed for opioid overdose symptoms, alternating nostrils. OTHER    NO DRIVING OR OPERATING HEAVY MACHINERY FOR 6 MONTHS OR UNTIL CLEARED BY NEUROLOGY. PANTOPRAZOLE (PROTONIX) 40 MG TABLET    Take 1 Tab by mouth Daily (before breakfast). PANTOPRAZOLE (PROTONIX) 40 MG TABLET    Take 1 Tab by mouth daily for 20 days. POLYETHYLENE GLYCOL (MIRALAX) 17 GRAM PACKET    Take 1 Packet by mouth daily. PROMETHAZINE (PHENERGAN) 25 MG SUPPOSITORY    Insert 1 Suppository into rectum every six (6) hours as needed for Nausea for up to 7 days. PROMETHAZINE (PHENERGAN) 25 MG TABLET    Take 1 Tab by mouth every six (6) hours as needed. SUCRALFATE (CARAFATE) 1 GRAM TABLET    Take 1 g by mouth four (4) times daily. THERAPEUTIC MULTIVITAMIN (THERAGRAN) TABLET    Take 1 Tab by mouth daily. THIAMINE HCL (B-1) 100 MG TABLET    Take 1 Tab by mouth daily. These Medications have changed    No medications on file   Stop Taking    ASPIRIN DELAYED-RELEASE 81 MG TABLET    Take 81 mg by mouth daily. Disclaimer: Sections of this note are dictated using utilizing voice recognition software. Minor typographical errors may be present. If questions arise, please do not hesitate to contact me or call our department.         I have reviewed the chart and agree with the documentation recorded by the resident, including the assessment, treatment plan, and disposition. I performed a history and physical examination of the patient and discussed the management with the resident.        Mariia Chilel MD

## 2019-11-05 NOTE — ED TRIAGE NOTES
Pt arrived via EMS from group home c/o left sided facial and arm twitching. Per EMS, BG read HIGH. Pt states this has happened before and his left side twitches when his blood sugar is high. Pt states he has \"diabetic seizures\" and his \"pacreas hurts\". Hx of pancreatitis.

## 2019-11-06 LAB
ALBUMIN SERPL-MCNC: 2.8 G/DL (ref 3.4–5)
ALBUMIN/GLOB SERPL: 0.7 {RATIO} (ref 0.8–1.7)
ALP SERPL-CCNC: 117 U/L (ref 45–117)
ALT SERPL-CCNC: 32 U/L (ref 16–61)
ANION GAP SERPL CALC-SCNC: 6 MMOL/L (ref 3–18)
APTT PPP: 62.2 SEC (ref 23–36.4)
APTT PPP: >180 SEC (ref 23–36.4)
AST SERPL-CCNC: 33 U/L (ref 10–38)
BASOPHILS # BLD: 0 K/UL (ref 0–0.1)
BASOPHILS NFR BLD: 1 % (ref 0–2)
BILIRUB SERPL-MCNC: 0.3 MG/DL (ref 0.2–1)
BUN SERPL-MCNC: 25 MG/DL (ref 7–18)
BUN/CREAT SERPL: 20 (ref 12–20)
CALCIUM SERPL-MCNC: 7.3 MG/DL (ref 8.5–10.1)
CHLORIDE SERPL-SCNC: 110 MMOL/L (ref 100–111)
CO2 SERPL-SCNC: 20 MMOL/L (ref 21–32)
CREAT SERPL-MCNC: 1.26 MG/DL (ref 0.6–1.3)
DIFFERENTIAL METHOD BLD: ABNORMAL
EOSINOPHIL # BLD: 0.3 K/UL (ref 0–0.4)
EOSINOPHIL NFR BLD: 5 % (ref 0–5)
ERYTHROCYTE [DISTWIDTH] IN BLOOD BY AUTOMATED COUNT: 16 % (ref 11.6–14.5)
GLOBULIN SER CALC-MCNC: 3.9 G/DL (ref 2–4)
GLUCOSE BLD STRIP.AUTO-MCNC: 115 MG/DL (ref 70–110)
GLUCOSE BLD STRIP.AUTO-MCNC: 211 MG/DL (ref 70–110)
GLUCOSE BLD STRIP.AUTO-MCNC: 245 MG/DL (ref 70–110)
GLUCOSE BLD STRIP.AUTO-MCNC: 247 MG/DL (ref 70–110)
GLUCOSE BLD STRIP.AUTO-MCNC: 330 MG/DL (ref 70–110)
GLUCOSE SERPL-MCNC: 247 MG/DL (ref 74–99)
HCT VFR BLD AUTO: 30.1 % (ref 36–48)
HGB BLD-MCNC: 9.9 G/DL (ref 13–16)
LIPASE SERPL-CCNC: 38 U/L (ref 73–393)
LYMPHOCYTES # BLD: 1.9 K/UL (ref 0.9–3.6)
LYMPHOCYTES NFR BLD: 30 % (ref 21–52)
MAGNESIUM SERPL-MCNC: 2.3 MG/DL (ref 1.6–2.6)
MCH RBC QN AUTO: 28 PG (ref 24–34)
MCHC RBC AUTO-ENTMCNC: 32.9 G/DL (ref 31–37)
MCV RBC AUTO: 85.3 FL (ref 74–97)
MONOCYTES # BLD: 0.4 K/UL (ref 0.05–1.2)
MONOCYTES NFR BLD: 6 % (ref 3–10)
NEUTS SEG # BLD: 3.8 K/UL (ref 1.8–8)
NEUTS SEG NFR BLD: 58 % (ref 40–73)
PLATELET # BLD AUTO: 202 K/UL (ref 135–420)
PMV BLD AUTO: 9.7 FL (ref 9.2–11.8)
POTASSIUM SERPL-SCNC: 3.7 MMOL/L (ref 3.5–5.5)
PROT SERPL-MCNC: 6.7 G/DL (ref 6.4–8.2)
RBC # BLD AUTO: 3.53 M/UL (ref 4.7–5.5)
SODIUM SERPL-SCNC: 136 MMOL/L (ref 136–145)
WBC # BLD AUTO: 6.5 K/UL (ref 4.6–13.2)

## 2019-11-06 PROCEDURE — 74011000258 HC RX REV CODE- 258: Performed by: INTERNAL MEDICINE

## 2019-11-06 PROCEDURE — 36415 COLL VENOUS BLD VENIPUNCTURE: CPT

## 2019-11-06 PROCEDURE — 85730 THROMBOPLASTIN TIME PARTIAL: CPT

## 2019-11-06 PROCEDURE — 83735 ASSAY OF MAGNESIUM: CPT

## 2019-11-06 PROCEDURE — 65660000000 HC RM CCU STEPDOWN

## 2019-11-06 PROCEDURE — 74011250636 HC RX REV CODE- 250/636: Performed by: EMERGENCY MEDICINE

## 2019-11-06 PROCEDURE — 83690 ASSAY OF LIPASE: CPT

## 2019-11-06 PROCEDURE — 74011636637 HC RX REV CODE- 636/637: Performed by: INTERNAL MEDICINE

## 2019-11-06 PROCEDURE — 80053 COMPREHEN METABOLIC PANEL: CPT

## 2019-11-06 PROCEDURE — 74011250636 HC RX REV CODE- 250/636: Performed by: INTERNAL MEDICINE

## 2019-11-06 PROCEDURE — 74011250636 HC RX REV CODE- 250/636

## 2019-11-06 PROCEDURE — 97162 PT EVAL MOD COMPLEX 30 MIN: CPT

## 2019-11-06 PROCEDURE — 82962 GLUCOSE BLOOD TEST: CPT

## 2019-11-06 PROCEDURE — 74011250637 HC RX REV CODE- 250/637: Performed by: INTERNAL MEDICINE

## 2019-11-06 PROCEDURE — 85025 COMPLETE CBC W/AUTO DIFF WBC: CPT

## 2019-11-06 PROCEDURE — 74011636637 HC RX REV CODE- 636/637: Performed by: EMERGENCY MEDICINE

## 2019-11-06 PROCEDURE — 74011250636 HC RX REV CODE- 250/636: Performed by: NURSE PRACTITIONER

## 2019-11-06 PROCEDURE — 97165 OT EVAL LOW COMPLEX 30 MIN: CPT

## 2019-11-06 RX ORDER — HEPARIN SODIUM 1000 [USP'U]/ML
INJECTION, SOLUTION INTRAVENOUS; SUBCUTANEOUS
Status: COMPLETED
Start: 2019-11-06 | End: 2019-11-06

## 2019-11-06 RX ORDER — DEXTROSE MONOHYDRATE 100 MG/ML
125-250 INJECTION, SOLUTION INTRAVENOUS AS NEEDED
Status: DISCONTINUED | OUTPATIENT
Start: 2019-11-06 | End: 2019-11-08 | Stop reason: HOSPADM

## 2019-11-06 RX ORDER — INSULIN GLARGINE 100 [IU]/ML
30 INJECTION, SOLUTION SUBCUTANEOUS
Status: ON HOLD | COMMUNITY
Start: 2019-10-22 | End: 2019-11-08 | Stop reason: SDUPTHER

## 2019-11-06 RX ORDER — MIRTAZAPINE 15 MG/1
15 TABLET, FILM COATED ORAL
Status: ON HOLD | COMMUNITY
Start: 2019-09-28 | End: 2019-11-08 | Stop reason: SDUPTHER

## 2019-11-06 RX ORDER — MIRTAZAPINE 15 MG/1
15 TABLET, FILM COATED ORAL
Status: DISCONTINUED | OUTPATIENT
Start: 2019-11-06 | End: 2019-11-08 | Stop reason: HOSPADM

## 2019-11-06 RX ORDER — LANOLIN ALCOHOL/MO/W.PET/CERES
3 CREAM (GRAM) TOPICAL AS NEEDED
COMMUNITY
Start: 2019-09-28

## 2019-11-06 RX ORDER — ASPIRIN 81 MG/1
TABLET ORAL DAILY
Status: ON HOLD | COMMUNITY
End: 2019-11-08 | Stop reason: SDUPTHER

## 2019-11-06 RX ORDER — PROMETHAZINE HYDROCHLORIDE 25 MG/ML
25 INJECTION, SOLUTION INTRAMUSCULAR; INTRAVENOUS
Status: DISCONTINUED | OUTPATIENT
Start: 2019-11-06 | End: 2019-11-08

## 2019-11-06 RX ORDER — HYDROMORPHONE HYDROCHLORIDE 1 MG/ML
2 INJECTION, SOLUTION INTRAMUSCULAR; INTRAVENOUS; SUBCUTANEOUS
Status: DISCONTINUED | OUTPATIENT
Start: 2019-11-06 | End: 2019-11-08

## 2019-11-06 RX ORDER — INSULIN GLARGINE 100 [IU]/ML
15 INJECTION, SOLUTION SUBCUTANEOUS DAILY
Status: DISCONTINUED | OUTPATIENT
Start: 2019-11-06 | End: 2019-11-08 | Stop reason: HOSPADM

## 2019-11-06 RX ORDER — FAMOTIDINE 10 MG/ML
20 INJECTION INTRAVENOUS EVERY 12 HOURS
Status: DISCONTINUED | OUTPATIENT
Start: 2019-11-06 | End: 2019-11-08

## 2019-11-06 RX ORDER — INSULIN LISPRO 100 [IU]/ML
INJECTION, SOLUTION INTRAVENOUS; SUBCUTANEOUS
Status: ON HOLD | COMMUNITY
End: 2019-11-06

## 2019-11-06 RX ORDER — LEVETIRACETAM 500 MG/1
1000 TABLET ORAL 2 TIMES DAILY
Status: DISCONTINUED | OUTPATIENT
Start: 2019-11-06 | End: 2019-11-06

## 2019-11-06 RX ORDER — IPRATROPIUM BROMIDE AND ALBUTEROL SULFATE 2.5; .5 MG/3ML; MG/3ML
3 SOLUTION RESPIRATORY (INHALATION)
Status: DISCONTINUED | OUTPATIENT
Start: 2019-11-06 | End: 2019-11-08 | Stop reason: HOSPADM

## 2019-11-06 RX ORDER — INSULIN ASPART 100 [IU]/ML
5 INJECTION, SOLUTION INTRAVENOUS; SUBCUTANEOUS
Status: ON HOLD | COMMUNITY
Start: 2019-10-22 | End: 2020-01-18 | Stop reason: CLARIF

## 2019-11-06 RX ORDER — CHOLECALCIFEROL (VITAMIN D3) 125 MCG
5 CAPSULE ORAL
Status: DISCONTINUED | OUTPATIENT
Start: 2019-11-06 | End: 2019-11-08 | Stop reason: HOSPADM

## 2019-11-06 RX ORDER — OXYCODONE AND ACETAMINOPHEN 5; 325 MG/1; MG/1
1 TABLET ORAL
COMMUNITY
Start: 2019-10-02 | End: 2019-11-08

## 2019-11-06 RX ORDER — MAG HYDROX/ALUMINUM HYD/SIMETH 200-200-20
30 SUSPENSION, ORAL (FINAL DOSE FORM) ORAL
Status: COMPLETED | OUTPATIENT
Start: 2019-11-06 | End: 2019-11-07

## 2019-11-06 RX ORDER — HEPARIN SODIUM 1000 [USP'U]/ML
40 INJECTION, SOLUTION INTRAVENOUS; SUBCUTANEOUS
Status: COMPLETED | OUTPATIENT
Start: 2019-11-06 | End: 2019-11-06

## 2019-11-06 RX ADMIN — HYDROMORPHONE HYDROCHLORIDE 2 MG: 1 INJECTION, SOLUTION INTRAMUSCULAR; INTRAVENOUS; SUBCUTANEOUS at 17:15

## 2019-11-06 RX ADMIN — FAMOTIDINE 20 MG: 10 INJECTION INTRAVENOUS at 09:01

## 2019-11-06 RX ADMIN — MORPHINE SULFATE 2 MG: 2 INJECTION, SOLUTION INTRAMUSCULAR; INTRAVENOUS at 00:46

## 2019-11-06 RX ADMIN — HEPARIN SODIUM 2320 UNITS: 1000 INJECTION, SOLUTION INTRAVENOUS; SUBCUTANEOUS at 07:00

## 2019-11-06 RX ADMIN — INSULIN LISPRO 4 UNITS: 100 INJECTION, SOLUTION INTRAVENOUS; SUBCUTANEOUS at 23:14

## 2019-11-06 RX ADMIN — ONDANSETRON 4 MG: 2 INJECTION INTRAMUSCULAR; INTRAVENOUS at 00:46

## 2019-11-06 RX ADMIN — HEPARIN SODIUM 10000 UNITS: 1000 INJECTION, SOLUTION INTRAVENOUS; SUBCUTANEOUS at 06:35

## 2019-11-06 RX ADMIN — ALUMINUM HYDROXIDE, MAGNESIUM HYDROXIDE, AND SIMETHICONE 30 ML: 200; 200; 20 SUSPENSION ORAL at 17:15

## 2019-11-06 RX ADMIN — HYDROMORPHONE HYDROCHLORIDE 2 MG: 1 INJECTION, SOLUTION INTRAMUSCULAR; INTRAVENOUS; SUBCUTANEOUS at 20:48

## 2019-11-06 RX ADMIN — HEPARIN SODIUM 10000 UNITS: 1000 INJECTION INTRAVENOUS; SUBCUTANEOUS at 06:35

## 2019-11-06 RX ADMIN — MELATONIN TAB 5 MG 5 MG: 5 TAB at 23:16

## 2019-11-06 RX ADMIN — SODIUM CHLORIDE 100 ML/HR: 900 INJECTION, SOLUTION INTRAVENOUS at 00:56

## 2019-11-06 RX ADMIN — ALUMINUM HYDROXIDE, MAGNESIUM HYDROXIDE, AND SIMETHICONE 30 ML: 200; 200; 20 SUSPENSION ORAL at 12:45

## 2019-11-06 RX ADMIN — HYDROMORPHONE HYDROCHLORIDE 2 MG: 1 INJECTION, SOLUTION INTRAMUSCULAR; INTRAVENOUS; SUBCUTANEOUS at 12:45

## 2019-11-06 RX ADMIN — PROMETHAZINE HYDROCHLORIDE 25 MG: 25 INJECTION INTRAMUSCULAR; INTRAVENOUS at 13:46

## 2019-11-06 RX ADMIN — MIRTAZAPINE 15 MG: 15 TABLET, FILM COATED ORAL at 23:16

## 2019-11-06 RX ADMIN — INSULIN LISPRO 8 UNITS: 100 INJECTION, SOLUTION INTRAVENOUS; SUBCUTANEOUS at 12:46

## 2019-11-06 RX ADMIN — MORPHINE SULFATE 2 MG: 2 INJECTION, SOLUTION INTRAMUSCULAR; INTRAVENOUS at 04:57

## 2019-11-06 RX ADMIN — MORPHINE SULFATE 2 MG: 2 INJECTION, SOLUTION INTRAMUSCULAR; INTRAVENOUS at 09:01

## 2019-11-06 RX ADMIN — LEVETIRACETAM 500 MG: 100 INJECTION, SOLUTION INTRAVENOUS at 23:22

## 2019-11-06 RX ADMIN — LEVETIRACETAM 500 MG: 100 INJECTION, SOLUTION INTRAVENOUS at 13:02

## 2019-11-06 RX ADMIN — INSULIN LISPRO 4 UNITS: 100 INJECTION, SOLUTION INTRAVENOUS; SUBCUTANEOUS at 09:02

## 2019-11-06 RX ADMIN — INSULIN GLARGINE 15 UNITS: 100 INJECTION, SOLUTION SUBCUTANEOUS at 12:46

## 2019-11-06 RX ADMIN — FAMOTIDINE 20 MG: 10 INJECTION INTRAVENOUS at 20:53

## 2019-11-06 RX ADMIN — SODIUM CHLORIDE 100 ML/HR: 900 INJECTION, SOLUTION INTRAVENOUS at 23:21

## 2019-11-06 RX ADMIN — APIXABAN 5 MG: 5 TABLET, FILM COATED ORAL at 20:49

## 2019-11-06 NOTE — PROGRESS NOTES
1 Spoke with hospitalist d/t pt now off from insulin gtt and if still wants this pt to be in stepdown as it is the last bed. Per Dr. Reba Triana to place orders for transfer to tele, pt VSS.  Spoke with Nursing supervisor and Carlos Juárez and made aware (ER nurse)

## 2019-11-06 NOTE — PROGRESS NOTES
Problem: Self Care Deficits Care Plan (Adult)  Goal: *Acute Goals and Plan of Care (Insert Text)  Outcome: Resolved/Met   OCCUPATIONAL THERAPY EVALUATION/DISCHARGE    Patient: Angie Joseph (4 y.o. male)  Date: 11/6/2019  Primary Diagnosis: Diabetic hyperosmolar non-ketotic state ([facility] [address] [E11.00]        Precautions: Falls; Aspiration     PLOF: Pt reports he lives with his mom in a [facility] with no NADEGE. Pt was (I) with basic self-care/ADLs, IADLs, and functional mobility PTA. ASSESSMENT AND RECOMMENDATIONS:  Pt cleared to participate in OT evaluation by Rn. Upon entering room, pt supine with HOB elevated, alert, and agreeable to therapy session. Based on the objective data described below, the patient presents he is (I) with most basic self-care/ADLs, requiring supervision for bathroom mobility for toilet transfers. Will defer to PT for functional balance and mobility. Pt safely returned to supine position with HOB elevated. Educated pt on the role of Ot, eval process, and to call for assistance of nursing staff in case he needs to use the bathroom with pt demo good understanding. As pt presents he is at his baseline, skilled occupational therapy is not indicated at this time.     Discharge Recommendations: None  Further Equipment Recommendations for Discharge: N/A     SUBJECTIVE:   Patient stated i'm feeling nauseated\"; RN notified    OBJECTIVE DATA SUMMARY:     Past Medical History:   Diagnosis Date    Abdominal pain, generalized     Chronic pancreatitis ([facility] [address]     Diabetes ([facility] [address]     hypothyroid    Encounter for long-term (current) use of other medications     Essential hypertension     ETOH abuse     GERD (gastroesophageal reflux disease)     Heart failure ([address]     Hyponatremia     Pain in the abdomen 11/2/2010    Pancreatitis     w/ abscess and pseudocyst    Pancreatitis     Psychiatric disorder     depression, anxiety    Tobacco abuse      Past Surgical History:   Procedure Laterality Date    HX ABDOMINAL LAPAROSCOPY      PANCREATIC STENT    HX CHOLECYSTECTOMY       Barriers to Learning/Limitations: None  Compensate with: visual, verbal, tactile, kinesthetic cues/model    Home Situation:   Home Situation  Home Environment: Private residence  # Steps to Enter: 1  One/Two Story Residence: One story  Living Alone: No  Support Systems: Family member(s)  Patient Expects to be Discharged to[de-identified] Private residence  Current DME Used/Available at Home: None  Tub or Shower Type: Shower  ? Right hand dominant   ? Left hand dominant    Cognitive/Behavioral Status:  Neurologic State: Alert  Orientation Level: Appropriate for age  Cognition: Appropriate decision making; Follows commands  Safety/Judgement: Fall prevention    Skin: Visible skin appeared intact  Edema: None noted      Coordination: BUE  Coordination: Within functional limits  Fine Motor Skills-Upper: Left Intact; Right Intact    Gross Motor Skills-Upper: Left Intact; Right Intact    Balance:  Sitting: Intact  Standing: Intact    Strength: BUE  Strength:  Within functional limits    Tone & Sensation: BUE  Tone: Normal  Sensation: Intact    Range of Motion: BUE  AROM: Within functional limits         Functional Mobility and Transfers for ADLs:  Bed Mobility:  Supine to Sit: Modified independent  Sit to Supine: Modified independent  Transfers:  Sit to Stand: Independent  Stand to Sit: Supervision   Toilet Transfer : Supervision   Bathroom Mobility: Supervision/set up    ADL Assessment:  Feeding: Independent    Oral Facial Hygiene/Grooming: Independent    Bathing: Modified independent    Upper Body Dressing: Independent    Lower Body Dressing: Modified independent    Toileting: Supervision      ADL Intervention:  Lower Body Dressing Assistance  Dressing Assistance: Modified independent  Socks: Modified independent    Toileting  Toileting Assistance: Supervision(for txfer)    Cognitive Retraining  Safety/Judgement: Fall prevention      Pain:  Pain level pre-treatment: 7/10 (Abdomen)  Pain level post-treatment: 7/10   Pain Intervention(s): Medication (see MAR); Rest, Ice, Repositioning  Response to intervention: Nurse notified, See doc flow    Activity Tolerance:   Good    Please refer to the flowsheet for vital signs taken during this treatment. After treatment:   ?  Patient left in no apparent distress sitting up in chair  ? Patient left in no apparent distress in bed  ? Call bell left within reach  ? Nursing notified  ? Caregiver present  ? Bed alarm activated    COMMUNICATION/EDUCATION:   ?      Role of Occupational Therapy in the acute care setting  ? Home safety education was provided and the patient/caregiver indicated understanding. ? Patient/family have participated as able and agree with findings and recommendations. ?      Patient is unable to participate in plan of care at this time. Thank you for this referral.  Renzo Casiano MS, OTR/L  Time Calculation: 15 mins      Eval Complexity: History: LOW Complexity : Brief history review ; Examination: LOW Complexity : 1-3 performance deficits relating to physical, cognitive , or psychosocial skils that result in activity limitations and / or participation restrictions ;    Decision Making:LOW Complexity : No comorbidities that affect functional and no verbal or physical assistance needed to complete eval tasks

## 2019-11-06 NOTE — PROGRESS NOTES
Reason for Admission:   Diabetic hyperosmolar non-ketotic state ([facility] [address] [E11.00]               RRAT Score:     29             Resources/supports as identified by patient/family:       Top Challenges facing patient (as identified by patient/family and CM): Finances/Medication cost?     No needs  Transportation      family  Support system or lack thereof?   family  Living arrangements? Lives with mother   Self-care/ADLs/Cognition? Has personal care aide        Current Advanced Directive/Advance Care Plan:   yes                          Plan for utilizing home health:    yes                      Likelihood of readmission:   HIGH    Transition of Care Plan:                    Initial assessment completed with patient. Cognitive status of patient: oriented to time, place, person and situation. Face sheet information confirmed:  yes. The patient designates Mother Flaco Jackson [phone] to participate in his discharge plan and to receive any needed information. This patient lives in a single family home with patient and mother. Patient is able to navigate steps as needed. Prior to hospitalization, patient was considered to be independent with ADLs/IADLS : no . If not independent,  patient needs assist with : bathing, food preparation and cooking    Patient has a current ACP document on file: yes  The patient and mother will be available to transport patient home upon discharge. The patient already has none reported, medical equipment available in the home. Patient is not currently active with home health. Patient has stayed in a skilled nursing facility or rehab. Was  stay within last 60 days : no. This patient is on dialysis :no    List of available Home Health agencies were provided and reviewed with the patient prior to discharge. Freedom of choice signed: yes, for [employer]. Currently, the discharge plan is Home with [address] [place] [address] Abran May.     The patient states that he can obtain his medications from the pharmacy, and take his medications as directed. Patient's current insurance is Medicaid. Care Management Interventions  PCP Verified by CM:  Yes  Mode of Transport at Discharge: Self  Current Support Network: Relative's Home(mom)  Confirm Follow Up Transport: Family  Plan discussed with Pt/Family/Caregiver: Yes  Freedom of Choice Offered: Yes  Discharge Location  Discharge Placement: Home with home health        Nano Barba RN BSN  Care Manager  [phone]

## 2019-11-06 NOTE — H&P
History & Physical    Patient: Giorgio Santos MRN: [id number]  CSN: [id number]    YOB: 1965  Age: 47 y.o. Sex: male      DOA: 11/5/2019    Chief Complaint   Patient presents with    High Blood Sugar          HPI:     Giorgio Santos is a 47 y.o. 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 Patient reports he was in his normal state of health until last night, he had some abdominal pain like his insides were being ripped apart, intermittent nausea and vomiting, loose stools. He also reports generalized weakness, fatigue. he states he believes he  has another flareup of pancreatitis stating his symptoms feel familiar to his last flareup. He denies any fevers or chills. States he has been taking all his medications as prescribed. They are no provoking factors, no alleviating factors.     Past Medical History:   Diagnosis Date    Abdominal pain, generalized     Chronic pancreatitis ([address]     Diabetes ([address]     hypothyroid    Encounter for long-term (current) use of other medications     Essential hypertension     ETOH abuse     GERD (gastroesophageal reflux disease)     Heart failure (HCC)     Hyponatremia     Pain in the abdomen 11/2/2010    Pancreatitis     w/ abscess and pseudocyst    Pancreatitis     Psychiatric disorder     depression, anxiety    Tobacco abuse        Past Surgical History:   Procedure Laterality Date    HX ABDOMINAL LAPAROSCOPY      PANCREATIC STENT    HX CHOLECYSTECTOMY         Family History   Problem Relation Age of Onset    Heart Disease Father        Social History     Socioeconomic History    Marital status: SINGLE     Spouse name: Not on file    Number of children: Not on file    Years of education: Not on file    Highest education level: Not on file   Tobacco Use    Smoking status: Former Smoker     Packs/day: 0.50     Years: 0.00     Pack years: 0.00     Types: Cigarettes    Smokeless tobacco: Never Used   Substance and Sexual Activity    Alcohol use: Yes    Drug use: No    Sexual activity: Yes     Birth control/protection: None       Prior to Admission medications    Medication Sig Start Date End Date Taking? Authorizing Provider   promethazine (PHENERGAN) 25 mg suppository Insert 1 Suppository into rectum every six (6) hours as needed for Nausea for up to 7 days. 10/29/19 11/5/19  RACHEL Mccoy   pantoprazole (PROTONIX) 40 mg tablet Take 1 Tab by mouth daily for 20 days. 10/29/19 11/18/19  RACHEL Boggs   insulin NPH/insulin regular (HUMULIN 70/30 U-100 INSULIN) 100 unit/mL (70-30) injection 14 Units by SubCUTAneous route Before breakfast and dinner. 8/29/19   Britton Hashimoto, MD   naloxone [facility]) 4 mg/actuation nasal spray Use 1 spray intranasally, then discard. Repeat with new spray every 2 min as needed for opioid overdose symptoms, alternating nostrils. 8/29/19   Karri Huang MD   Insulin Syringe-Needle U-100 0.3 mL 29 gauge syrg 2-14 Units by Does Not Apply route Before breakfast, lunch, and dinner. 8/27/19   Sola Mijares MD   lipase-protease-amylase (CREON) 6,000-19,000 -30,000 unit capsule Take 1 Cap by mouth three (3) times daily (with meals). 8/8/19   Abelino Pizano MD   diclofenac potassium (CATAFLAM) 50 mg tablet Take 1 Tab by mouth three (3) times daily as needed for Pain. 8/8/19   Abelino Pizano MD   levETIRAcetam (KEPPRA) 500 mg tablet Take 2 Tabs by mouth two (2) times a day. 6/19/19   Drake Mckee MD   acetaminophen (TYLENOL EXTRA STRENGTH) 500 mg tablet Take 500 mg by mouth every six (6) hours as needed for Pain.     Provider, Historical   sucralfate (CARAFATE) 1 gram tablet Take 1 g by mouth four (4) times daily. Zachary Rothman MD   thiamine HCL (B-1) 100 mg tablet Take 1 Tab by mouth daily. 5/20/19   Beth Calderon NP   therapeutic multivitamin [facility]) tablet Take 1 Tab by mouth daily. 5/20/19   Beth Calderon NP   polyethylene glycol (MIRALAX) 17 gram packet Take 1 Packet by mouth daily. 5/20/19   Beth Calderon NP   pantoprazole (PROTONIX) 40 mg tablet Take 1 Tab by mouth Daily (before breakfast). 5/20/19   Beth Calderon NP   OTHER NO DRIVING OR OPERATING HEAVY MACHINERY FOR 6 MONTHS OR UNTIL CLEARED BY NEUROLOGY. 5/19/19   Beth Calderon NP   Blood-Glucose Meter (FREESTYLE LITE METER) monitoring kit Test blood sugars 4-6 times a day 5/4/19   RACHEL Kidd   glucose blood VI test strips (FREESTYLE TEST) strip 1 Each by Does Not Apply route See Admin Instructions. Test blood sugars 4-6 times a day. 5/4/19   RACHEL Kidd   promethazine (PHENERGAN) 25 mg tablet Take 1 Tab by mouth every six (6) hours as needed. 9/21/18   Christopher Diaz MD       Allergies   Allergen Reactions    Ampicillin-Sulbactam Rash    Pcn [Penicillins] Itching and Hives    Piperacillin-Tazobactam Rash    Pollen Extracts Rash    Seafood [Shellfish Containing Products] Hives     Other reaction(s): unknown  Has tolerated iohexol (iodine-containing contrast)  Only shrimp  Shrimp       Review of Systems  GENERAL: + fevers or chills. HEENT:+ chronic change in vision. no earache, tinnitus, sore throat or sinus congestion. NECK: No pain or stiffness. CARDIOVASCULAR: No chest pain or pressure. No palpitations. PULMONARY: No shortness of breath, cough or wheeze. GASTROINTESTINAL: + severe 10/10 abdominal pain, + nausea,  +vomiting, + diarrhea, no melena or  bright red blood per rectum. GENITOURINARY: No urinary frequency, urgency, hesitancy or dysuria. MUSCULOSKELETAL: + chronic joint or muscle pain, + chronic back pain, no recent trauma.    DERMATOLOGIC: No rash, no itching, no lesions. ENDOCRINE: + polyuria, polydipsia, no heat or cold intolerance. No recent change in weight. HEMATOLOGICAL: No anemia or easy bruising or bleeding. NEUROLOGIC: No headache, seizures, numbness, tingling or weakness. 0  PSYCHIATRIC: + chronic depression, + chronic anxiety, + Chronic mood disorder. Physical Exam:     Physical Exam:  Visit Vitals  BP 97/67   Pulse 87   Temp 98.2 °F (36.8 °C)   Resp 26   Ht 6' 0.01\" (1.829 m)   Wt 63.5 kg (140 lb)   SpO2 100%   BMI 18.98 kg/m²           Temp (24hrs), Av.2 °F (36.8 °C), Min:98.2 °F (36.8 °C), Max:98.2 °F (36.8 °C)       No intake/output data recorded. No intake/output data recorded. General:   Frail, cachectic, ill-appearing, no acute distress. cooperative, appears older than stated age. Lungs:   Clear to auscultation bilaterally. Heart:  Regular rate and rhythm, S1, S2 normal, no murmur. Abdomen:  Guarding, tender. Bowel sounds normal.   Extremities: Extremities normal, atraumatic, no cyanosis or edema. Pulses: 2+ and symmetric all extremities. Skin:  Right flank healing surgical site, mild redness, no drainage. Neurologic: Alert and oriented X 3. No focal motor or sensory deficit. Labs Reviewed:    Recent Results (from the past 24 hour(s))   GLUCOSE, POC    Collection Time: 19  2:42 PM   Result Value Ref Range    Glucose (POC) >600 (HH) 70 - 110 mg/dL   CBC WITH AUTOMATED DIFF    Collection Time: 19  3:49 PM   Result Value Ref Range    WBC 9.1 4.6 - 13.2 K/uL    RBC 4.10 (L) 4.70 - 5.50 M/uL    HGB 11.6 (L) 13.0 - 16.0 g/dL    HCT 34.9 (L) 36.0 - 48.0 %    MCV 85.1 74.0 - 97.0 FL    MCH 28.3 24.0 - 34.0 PG    MCHC 33.2 31.0 - 37.0 g/dL    RDW 15.6 (H) 11.6 - 14.5 %    PLATELET 290 355 - 790 K/uL    MPV 10.7 9.2 - 11.8 FL    NEUTROPHILS 76 (H) 40 - 73 %    LYMPHOCYTES 17 (L) 21 - 52 %    MONOCYTES 4 3 - 10 %    EOSINOPHILS 2 0 - 5 %    BASOPHILS 1 0 - 2 %    ABS.  NEUTROPHILS 7.0 1.8 - 8.0 K/UL    ABS. LYMPHOCYTES 1.6 0.9 - 3.6 K/UL    ABS. MONOCYTES 0.4 0.05 - 1.2 K/UL    ABS. EOSINOPHILS 0.2 0.0 - 0.4 K/UL    ABS. BASOPHILS 0.1 0.0 - 0.1 K/UL    DF AUTOMATED     METABOLIC PANEL, COMPREHENSIVE    Collection Time: 11/05/19  3:49 PM   Result Value Ref Range    Sodium 125 (L) 136 - 145 mmol/L    Potassium 5.0 3.5 - 5.5 mmol/L    Chloride 98 (L) 100 - 111 mmol/L    CO2 22 21 - 32 mmol/L    Anion gap 5 3.0 - 18 mmol/L    Glucose 737 (HH) 74 - 99 mg/dL    BUN 27 (H) 7.0 - 18 MG/DL    Creatinine 1.95 (H) 0.6 - 1.3 MG/DL    BUN/Creatinine ratio 14 12 - 20      GFR est AA 44 (L) >60 ml/min/1.73m2    GFR est non-AA 36 (L) >60 ml/min/1.73m2    Calcium 8.9 8.5 - 10.1 MG/DL    Bilirubin, total 0.4 0.2 - 1.0 MG/DL    ALT (SGPT) 46 16 - 61 U/L    AST (SGOT) 52 (H) 10 - 38 U/L    Alk.  phosphatase 159 (H) 45 - 117 U/L    Protein, total 8.7 (H) 6.4 - 8.2 g/dL    Albumin 3.6 3.4 - 5.0 g/dL    Globulin 5.1 (H) 2.0 - 4.0 g/dL    A-G Ratio 0.7 (L) 0.8 - 1.7     LIPASE    Collection Time: 11/05/19  3:49 PM   Result Value Ref Range    Lipase 56 (L) 73 - 393 U/L   MAGNESIUM    Collection Time: 11/05/19  3:49 PM   Result Value Ref Range    Magnesium 2.7 (H) 1.6 - 2.6 mg/dL   HEMOGLOBIN A1C WITH EAG    Collection Time: 11/05/19  3:49 PM   Result Value Ref Range    Hemoglobin A1c 13.3 (H) 4.2 - 5.6 %    Est. average glucose 335 mg/dL   URINALYSIS W/ RFLX MICROSCOPIC    Collection Time: 11/05/19  4:35 PM   Result Value Ref Range    Color YELLOW      Appearance CLEAR      Specific gravity >1.030 (H) 1.005 - 1.030    pH (UA) 6.0 5.0 - 8.0      Protein TRACE (A) NEG mg/dL    Glucose >1,000 (A) NEG mg/dL    Ketone NEGATIVE  NEG mg/dL    Bilirubin NEGATIVE  NEG      Blood NEGATIVE  NEG      Urobilinogen 0.2 0.2 - 1.0 EU/dL    Nitrites NEGATIVE  NEG      Leukocyte Esterase TRACE (A) NEG     URINE MICROSCOPIC ONLY    Collection Time: 11/05/19  4:35 PM   Result Value Ref Range    WBC 20 to 40 0 - 4 /hpf    RBC NEGATIVE  0 - 5 /hpf Epithelial cells FEW 0 - 5 /lpf    Bacteria NEGATIVE  NEG /hpf    Mucus FEW (A) NEG /lpf    Yeast TRACE (A) NEG     GLUCOSE, POC    Collection Time: 11/05/19  4:54 PM   Result Value Ref Range    Glucose (POC) >600 (HH) 70 - 110 mg/dL   GLUCOSTABILIZER    Collection Time: 11/05/19  5:03 PM   Result Value Ref Range    Glucose 600 mg/dL    Insulin order 10.8 units/hour    Insulin adminstered 10.8 units/hour    Multiplier 0.020     Low target 140 mg/dL    High target 180 mg/dL    D50 order 0.0 ml    D50 administered 0.00 ml    Minutes until next BG 60 min    Order initials jl     Administered initials jl    GLUCOSE, POC    Collection Time: 11/05/19  6:04 PM   Result Value Ref Range    Glucose (POC) >600 (HH) 70 - 110 mg/dL   GLUCOSTABILIZER    Collection Time: 11/05/19  6:05 PM   Result Value Ref Range    Glucose 601 mg/dL    Insulin order 16.2 units/hour    Insulin adminstered 16.2 units/hour    Multiplier 0.030     Low target 140 mg/dL    High target 180 mg/dL    D50 order 0.0 ml    D50 administered 0.00 ml    Minutes until next BG 60 min    Order initials jl     Administered initials jl    GLUCOSE, POC    Collection Time: 11/05/19  7:04 PM   Result Value Ref Range    Glucose (POC) 540 (HH) 70 - 110 mg/dL   GLUCOSTABILIZER    Collection Time: 11/05/19  7:05 PM   Result Value Ref Range    Glucose 540 mg/dL    Insulin order 19.2 units/hour    Insulin adminstered 19.2 units/hour    Multiplier 0.040     Low target 140 mg/dL    High target 180 mg/dL    D50 order 0.0 ml    D50 administered 0.00 ml    Minutes until next BG 60 min    Order initials jl     Administered initials jl    GLUCOSE, POC    Collection Time: 11/05/19  7:58 PM   Result Value Ref Range    Glucose (POC) 348 (H) 70 - 110 mg/dL   GLUCOSTABILIZER    Collection Time: 11/05/19  8:03 PM   Result Value Ref Range    Glucose 348 mg/dL    Insulin order 8.6 units/hour    Insulin adminstered 8.6 units/hour    Multiplier 0.030     Low target 140 mg/dL    High target 180 mg/dL    D50 order 0.0 ml    D50 administered 0.00 ml    Minutes until next BG 60 min    Order initials MM     Administered initials MM        Procedures/imaging: see electronic medical records for all procedures/Xrays and details which were not copied into this note but were reviewed prior to creation of Plan      Assessment/Plan     1. Diabetic hyperosmolar hyperglycemic non-ketotic state suspect d/t non compliance. 2. Acute on chronic pancreatitis   3. Intractable nausea / vomiting. 4. Hyponatremia of 125, suspect pseudohyponatremia   5. Acute Kidney injury   6. Uncontrolled insulin dependent DM type 2. A1c 13.3  7. Chronic anemia of chronic disease   8. 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 no signs of infection. 9.  History of chronic pancreatitis. 10.  Noncompliance to the medication. 11.  History of ethanol abuse. 12. Current tobacco abuse. 1 ppd   13. Malnutrition -Severe protein calorie  14. Adult failure to thrive  15. Chronic pain syndrome  16. Documented pain seeking behavior. 17. DVT right upper extremity 2019 on Eliquis. PLAN  1. IV insulin, aggressive IVF. Diabetic educator consult for insulin management post gtt. POC glucose checks per gtt policy. 2. NPO. CT abd/ Pelvis pending. Serum lipase 56, will cont aggressive IVF, pain management, PPI. Advance to clear liquids tomorrow if improved. 3. Cycle chemistry  + Mg Q 6 hrs. Replete lytes as warranted. 4. Wound care consult  5. Cessation counseling. 6. Given NPO status. Will place on Hep gtt. Can transition back to home regimen Eliquis once able to tolerate PO diet. 7. FULL CODE  8. DVT prophylaxis: on Hep gtt.        Sully Silverio NP  November 5, 2019

## 2019-11-06 NOTE — WOUND CARE
Physical Exam  
Room 218: pt's EC fistula is dry, no drainage at this time. Suggest leave open to air for now. Pt is in agreement. Discussed with primary nurse Codi Stone. Will turn over care to nursing staff at this time.  
Juan Carlos GUAJARDON, RN, [facility] [place], [address]

## 2019-11-06 NOTE — DIABETES MGMT
Diabetes Patient/Family Education Record  Factors That  May Influence Patients Ability  to Learn or  Comply with Recommendations   []   Language barrier    []   Cultural needs   []   Motivation    []   Cognitive limitation    []   Physical   [x]   Education    []   Physiological factors   []   Hearing/vision/speaking impairment   []   Yazidism beliefs    []   Financial factors   []  Other:   []  No factors identified at this time.      Person Instructed:   [x]   Patient   [x]   Family   []  Other     Preference for Learning:   [x]   Verbal   [x]   Written   []  Demonstration     Level of Comprehension & Competence:    []  Good                                      [] Fair                                     []  Poor                             [x]  Needs Reinforcement   [x]  Teachback completed    Education Component:   [x]  Medication management, including how to administer insulin (if appropriate) and potential medication interactions    [x]  Nutritional management -obtain usual meal pattern   []  Exercise   [x]  Signs, symptoms, and treatment of hyperglycemia and hypoglycemia   [x] Prevention, recognition and treatment of hyperglycemia and hypoglycemia   [x]  Importance of blood glucose monitoring and how to obtain a blood glucose meter    []  Instruction on use of the blood glucose meter   [x]  Discuss the importance of HbA1C monitoring    []  Sick day guidelines   []  Proper use and disposal of lancets, needles, syringes or insulin pens (if appropriate)   []  Potential long-term complications (retinopathy, kidney disease, neuropathy, foot care)   [] Information about whom to contact in case of emergency or for more information    [x]  Goal:  Patient/family will demonstrate understanding of Diabetes Self Management Skills by: 11/13/19  Plan for post-discharge education or self-management support:    [x] Outpatient class schedule provided            [] Patient Declined    [] Scheduled for outpatient classes (date) _______  Verify:  Does patient understand how diabetes medications work? review  Does patient know what their most recent A1c is? 13.3%  Does patient monitor glucose at home? yes  Does patient have difficulty obtaining diabetes medications or testing supplies?  No difficulty reported       Jeny Roach RD, CDE

## 2019-11-06 NOTE — PROGRESS NOTES
Hospitalist Progress Note    Patient: Lea Ambrose Age: 47 y.o. : 1965 MR#: [id number] SSN: [id number]  Date/Time: 2019 10:44 AM    DOA: 2019  PCP: Lorna, MD Zachary    Subjective:     His mother at bedside. Still with significant epigastric pain, has not eaten anything, has nausea with little help from zofran   Has been off insulin gtt since yesterday. On ISS. At home he missed his Lantus dosing because of the recent painful abdomen and not able to have oral intake. 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 He also stated that he had nausea/vomiting and non-bloody diarrhea for 3 days prior to presentation. Further workup in ER showed elevated glucose, he was placed in on insulin gtt. He was admitted for further care. CT abd/pelv without clear pathology. Since he remains NPO, hep gtt was started in place of his eliquis. ROS: no fever/chills, no headache, no dizziness, no facial pain, no sinus congestion,   No swallowing pain, No chest pain, no palpitation, no shortness of breath, + abd pain, +nausea  No diarrhea, no urinary complaint, no leg pain or swelling      Assessment/Plan:   1. Suspected hyperosmolar non-ketotic hyperglycemia   2. Hyponatremia, speudo, in the setting of hyperglycemia   3. Hyperglycemia with uncontrolled DM2  4.  Epigastric pain, intractable nausea/vomiting, suspected with acute on chronic pancreatitis   5. 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 Prerenal azotemia, on CKD3, resolved   7. Severe protein calorie malnutrition   8.   Acute DVT of upper extremity, recently on Eliquis   9. Active tobacco smoking   10. H/o partial seizure, recent left-sided twitches resolved, has not taking his medication,     He is off IV insulin gtt, will start lantus 15 units with ISS, he can advance liquid diet, anti-emetic agent prn  Advance oral intake as soon as possible. IV fluid resume. Phenergan prn   Wound care needed. ON hep gtt for acute DVT, will get him back on eliquis when able to take oral medications. Can have dilaudid prn   Needs education on smoking cessation   Resume his keppra. Full code     Additional Notes:    Time spent >30 minutes    Case discussed with:  [x]Patient  [x]Family  [x]Nursing  [x]Case Management  DVT Prophylaxis:  []Lovenox  []Hep SQ  []SCDs  []Coumadin   [x]On Heparin gtt    Signed By: Ingrid Wade MD     2019 10:44 AM              Objective:   VS:   Visit Vitals  /69 (BP 1 Location: Right arm, BP Patient Position: At rest)   Pulse 74   Temp 97.7 °F (36.5 °C)   Resp 18   Ht 6' 0.01\" (1.829 m)   Wt 58.1 kg (128 lb)   SpO2 100%   BMI 17.36 kg/m²      Tmax/24hrs: Temp (24hrs), Av.9 °F (36.6 °C), Min:97.7 °F (36.5 °C), Max:98.2 °F (36.8 °C)      Intake/Output Summary (Last 24 hours) at 2019 1044  Last data filed at 2019 6598  Gross per 24 hour   Intake 634.68 ml   Output 600 ml   Net 34.68 ml     General:  Cooperative, Not in acute distress, speaks in full sentence while in bed  HEENT: PERRL, EOMI, supple neck, no JVD, dry oral mucosa  Cardiovascular: S1S2 regular, no rub/gallop   Pulmonary: Clear air entry bilaterally, no wheezing, no crackle  GI:  Soft, +epigastric tender, non distended, +bs, no guarding, right abd wall fistula, dry  Extremities:  No pedal edema, +distal pulses appreciated   Neuro: AOx3, moving all extremities, no gross deficit.      Additional:       Current Facility-Administered Medications   Medication Dose Route Frequency    dextrose 10% infusion 125-250 mL  125-250 mL IntraVENous PRN    HYDROmorphone (DILAUDID) injection 2 mg  2 mg IntraVENous Q4H PRN    alum-mag hydroxide-simeth (MYLANTA) oral suspension 30 mL  30 mL Oral TID WITH MEALS    famotidine (PF) (PEPCID) injection 20 mg  20 mg IntraVENous Q12H    albuterol-ipratropium (DUO-NEB) 2.5 MG-0.5 MG/3 ML  3 mL Nebulization Q6H PRN    insulin glargine (LANTUS) injection 15 Units  15 Units SubCUTAneous DAILY    levETIRAcetam (KEPPRA) 500 mg in 0.9% sodium chloride (MBP/ADV) 100 mL MBP  500 mg IntraVENous Q12H    melatonin tablet 5 mg  5 mg Oral QHS    promethazine (PHENERGAN) injection 25 mg  25 mg IntraMUSCular Q6H PRN    0.9% sodium chloride infusion  100 mL/hr IntraVENous CONTINUOUS    acetaminophen (TYLENOL) tablet 1,000 mg  1,000 mg Oral Q8H PRN    heparin 25,000 units in D5W 250 ml infusion  18-36 Units/kg/hr IntraVENous TITRATE    ondansetron (ZOFRAN) injection 4 mg  4 mg IntraVENous Q6H PRN    insulin lispro (HUMALOG) injection   SubCUTAneous AC&HS    glucose chewable tablet 16 g  4 Tab Oral PRN    glucagon (GLUCAGEN) injection 1 mg  1 mg IntraMUSCular PRN            Lab/Data Review:  Labs: Results:       Chemistry Recent Labs     11/06/19 0415 11/05/19  1549   * 737*    125*   K 3.7 5.0    98*   CO2 20* 22   BUN 25* 27*   CREA 1.26 1.95*   BUCR 20 14   AGAP 6 5   CA 7.3* 8.9   PHOS  --  3.9     Recent Labs     11/06/19 0415 11/05/19  1549   ALT 32 46   SGOT 33 52*   TP 6.7 8.7*   ALB 2.8* 3.6   GLOB 3.9 5.1*   AGRAT 0.7* 0.7*      CBC w/Diff Recent Labs     11/06/19 0415 11/05/19  1549   WBC 6.5 9.1   RBC 3.53* 4.10*   HGB 9.9* 11.6*   HCT 30.1* 34.9*   MCV 85.3 85.1   MCH 28.0 28.3   MCHC 32.9 33.2   RDW 16.0* 15.6*    253   GRANS 58 76*   LYMPH 30 17*   EOS 5 2      Coagulation Recent Labs     11/06/19 0415 11/05/19 2050   PTP  --  12.7   INR  --  1.0   APTT 62.2* 26.7       Iron/Ferritin Lab Results   Component Value Date/Time    Iron 26 (L) 06/04/2018 06:00 AM    TIBC 157 (L) 06/04/2018 06:00 AM    Iron % saturation 17 (L) 06/04/2018 06:00 AM    Ferritin 364.3 06/04/2018 06:00 AM       BNP    Cardiac Enzymes Lab Results   Component Value Date/Time    CK 45 05/18/2019 07:05 PM    CK - MB <1.0 05/18/2019 07:05 PM    CK-MB Index  05/18/2019 07:05 PM     CALCULATION NOT PERFORMED WHEN RESULT IS BELOW LINEAR LIMIT    Troponin-I <0.015 06/02/2018 11:50 PM    Troponin-I, QT <0.02 05/18/2019 07:05 PM        Lactic Acid    Thyroid Studies          All Micro Results     Procedure Component Value Units Date/Time    CULTURE, URINE [[id number]]     Order Status:  Sent Specimen:  Urine from Clean catch             Images:    CT (Most Recent). CT Results (most recent):  Results from Hospital Encounter encounter on 11/05/19   CT ABD PELV WO CONT    Narrative EXAM: CT ABDOMEN AND PELVIS WITHOUT CONTRAST    CLINICAL HISTORY/INDICATION:  abd pain / chronic pancreatitis , T2 H abuse,  hypertension, diabetes, patient presents with high blood sugar, several episodes  of nonbloody vomiting over the past 2 days with loose nonbloody stools    COMPARISON: CT abdomen and pelvis August 29, 2019. TECHNIQUE: No intravenous contrast was utilized for this helical thin section CT  of the abdomen and pelvis. Coronal and sagittal reformations obtained. Evaluation of the solid organs, bowel wall, and vascular structures are  therefore limited. All CT scans at this facility are performed using dose optimization technique as  appropriate to a performed exam, to include automated exposure control,  adjustment of the mA and/or kV according to patient's size (including  appropriate matching for site-specific examinations), or use of iterative  reconstruction technique. FINDINGS:     Abdomen-     Subtle hazy opacity is seen in the inferior lingula, right middle lobe, and  right lower lobe. Mild bronchial wall thickening.  Less prominent than on the  previous exam.  The included portions of the chest wall and the abdominal wall  are  unremarkable. Clips at the elizabeth hepatis. Very minimal dilatation in the intrahepatic biliary  system of the left lobe less pronounced than on the previous study. Mild  intrahepatic with moderate extrahepatic biliary dilatation. The common bile duct  measures 1.4 cm and tapers to the ampulla. Unchanged from the previous study. The gallbladder is surgically removed. .    There is a stent in the pancreatic duct. Multiple calcifications in the pancreas  consistent with chronic calcific pancreatitis. Thickening of the left adrenal gland. Right adrenal is unremarkable. Kidneys are unremarkable. Tiny amount of free fluid at the inferior extent of the right lobe of the liver  as well as in the right paracolic gutter. Persistent soft tissue abnormality  located at the inferior pole of the right kidney in the anterior intraperitoneal  space appearing confluent with the duodenum with possibly pulling the duodenum  laterally. The soft tissue extends to the lateral abdominal wall located   Small amount of internal almost tubular hypodensity and several calcifications. Confluent with increased density at the level of the lateral abdominal wall with  retraction of the overlying skin. Appearance is suggestive of a postsurgical  linear band of scarring or perhaps secondary to previous percutaneous catheter  placement. This has decreased in the central low density component compared to the previous  CT. Increased stool throughout the colon. Pelvis -    There is no free pelvic fluid. The pelvic viscera are unremarkable. Urinary bladder is moderately distended. Degenerative disc disease at the lower lumbar spine. Impression IMPRESSION:     Interval decrease in the soft tissue fluid track extending from the lateral  aspect of the duodenum to the right lateral abdominal wall. Markedly distended urinary bladder.   Evaluation of the bowel and solid organs is limited by the lack of IV contrast.  Increased stool throughout the colon. Chronic calcific pancreatitis. There is a pancreatic ductal stent in expected  position. Persistent biliary dilatation with a tiny amount of gas in the intrahepatic  biliary system improved in appearance compared to the previous exam.      Report provided to the emergency department at 2221 hrs. XRAY (Most Recent)      EKG No results found for this or any previous visit.      2D ECHO

## 2019-11-06 NOTE — PROGRESS NOTES
NUTRITION    Nutrition Screen     RECOMMENDATIONS / PLAN:     - Monitor GI symptoms and readiness for diet advancement  - Add supplements: Gerardoerna Shake, TID.  - Continue RD inpatient monitoring and evaluation. NUTRITION INTERVENTIONS & DIAGNOSIS:     - Meals/snacks: modify composition  - Medical food supplement therapy: initiate    Nutrition Diagnosis: Chronic disease or condition related malnutrition related to predicted inadqeuate energy intake with altered GI function as evidenced by pt with significant weight loss and severe muscle/fat loss    Patient meets criteria for Severe Protein Calorie Malnutrition as evidenced by:   ASPEN Malnutrition Criteria  Acute Illness, Chronic Illness, or Social/Enviornmental: Chronic illness  Weight Loss: Greater than 7.5% x 3 mos  Body Fat Loss: Severe(orbital)  Muscle Mass Loss: Severe(clavicle, dorsal hand, patellar & anterior thigh regions)  ASPEN Malnutrition Score - Chronic Illness: 18  Chronic Illness - Malnutrition Diagnosis: Severe malnutrition. ASSESSMENT:     Admitted with hyperglycemia with hx of chronic pancreatitis. C/o nausea/vomiting PTA. Started on full liquid diet today after being NPO. Reports some weight loss since last admission despite normal appetite/meal intake, not consuming supplements. Pt hesitant to start liquid diet today 2/2 some nausea and abdominal pain, encouraged intake as tolerated. NFPE completed. Nutritional intake adequate to meet patients estimated nutritional needs:  No    Diet: DIET FULL LIQUID      Food Allergies: Seafood  Current Appetite: Fair; hesitant to eat  Appetite/meal intake prior to admission: Good prior to day of admission; lives with mother  Feeding Limitations:  [] Swallowing difficulty    [] Chewing difficulty    [] Other:  Current Meal Intake: No data found.     BM: 11/5  Skin Integrity: open fistula to right abdomen (scant drainage)  Edema:   [x] No     [] Yes   Pertinent Medications: Reviewed: NS at 100 mL/hr, mylanta,lantus (15 units), SSI, ondansetron, promethazine prn, (famotidine to start)    Recent Labs     11/06/19  0415 11/05/19  1549    125*   K 3.7 5.0    98*   CO2 20* 22   * 737*   BUN 25* 27*   CREA 1.26 1.95*   CA 7.3* 8.9   MG 2.3 2.7*   PHOS  --  3.9   ALB 2.8* 3.6   SGOT 33 52*   ALT 32 46       Intake/Output Summary (Last 24 hours) at 11/6/2019 1245  Last data filed at 11/6/2019 0643  Gross per 24 hour   Intake 634.68 ml   Output 600 ml   Net 34.68 ml       Anthropometrics:  Ht Readings from Last 1 Encounters:   11/05/19 6' 0.01\" (1.829 m)     Last 3 Recorded Weights in this Encounter    11/05/19 1622 11/06/19 0409   Weight: 63.5 kg (140 lb) 58.1 kg (128 lb)     Body mass index is 17.36 kg/m². Underweight     Weight History: Pt reports a weight of 140-150 lbs x 3 months PTA.  Per chart -12 lbs, 8.5% x 3 months PTA    Weight Metrics 11/6/2019 8/29/2019 8/29/2019 8/8/2019 6/28/2019 6/17/2019 6/9/2019   Weight 128 lb 140 lb 126 lb 14.4 oz 136 lb 14.5 oz 110 lb 149 lb 14.6 oz 145 lb   BMI 17.36 kg/m2 18.99 kg/m2 17.21 kg/m2 18.57 kg/m2 14.92 kg/m2 20.33 kg/m2 19.67 kg/m2        Admitting Diagnosis: Diabetic hyperosmolar non-ketotic state ([facility] [address] [E11.00]  Pertinent PMHx: chronic pancreatitis with abscess, DM, ETOH abuse, GERD, heart failure, enterocutaneous fistula    Education Needs:        [x] None identified  [] Identified - Not appropriate at this time  []  Identified and addressed - refer to education log  Learning Limitations:   [x] None identified  [] Identified    Cultural, Yarsani & ethnic food preferences:  [x] None identified    [] Identified and addressed     ESTIMATED NUTRITION NEEDS:     Calories: 0844-5160 kcal (MSJx1.2-1.3) based on  [] Actual BW     [x] IBW: 81 kg   Protein: 58-87 gm (1-1.5 gm/kg) based on  [x] Actual BW: 58 kg      [] IBW   Fluid: 1 mL/kcal     MONITORING & EVALUATION:     Nutrition Goal(s):   - PO nutrition intake will meet >75% of patient estimated nutritional needs within the next 7 days. Outcome: New/Initial goal     Monitoring:   [x] Food and nutrient intake   [x] Food and nutrient administration  [x] Comparative standards   [x] Nutrition-focused physical findings   [x] Anthropometric Measurements   [x] Treatment/therapy   [x] Biochemical data, medical tests, and procedures        Previous Recommendations (for follow-up assessments only):  Not Applicable     Discharge Planning: cardiac, diabetic diet + Glucerna PIEDAD Islas  Participated in care planning, discharge planning, & interdisciplinary rounds as appropriate.       Giuliana Cabrera RD  Pager: [phone]

## 2019-11-06 NOTE — PROGRESS NOTES
Problem: Mobility Impaired (Adult and Pediatric)  Goal: *Acute Goals and Plan of Care (Insert Text)  Description  Physical Therapy Goals  Initiated 11/6/2019 and to be accomplished within 7 day(s)  1. Patient will move from supine to sit and sit to supine  in bed with independence. 2.  Patient will transfer from bed to chair and chair to bed with independence using the least restrictive device. 3.  Patient will perform sit to stand with independence. 4.  Patient will ambulate with independence for 200 feet with the least restrictive device. 5.  Patient will ascend/descend 1 stair with independence. PLOF: Pt states he was independent with ADLs and mobility PTA and will be staying with mother in 1 story home with 1 NADEGE. Outcome: Progressing Towards Goal   PHYSICAL THERAPY EVALUATION    Patient: Norah Hung (49 y.o. male)  Date: 11/6/2019  Primary Diagnosis: Diabetic hyperosmolar non-ketotic state ([facility] [address] [E11.00]  Precautions: falls     FWB  PLOF: Pt states he was independent with ADLs and mobility PTA and will be staying with mother in 1 story home with 1 NADEGE. ASSESSMENT :  Based on the objective data described below, the patient presents with functional ROM and strength, but with decreased balance and endurance limiting safety and functional independence. Patient will benefit from skilled intervention to address the above impairments. Patient's rehabilitation potential is considered to be Excellent  Factors which may influence rehabilitation potential include:   ? None noted  ? Mental ability/status  ? Medical condition  ? Home/family situation and support systems  ? Safety awareness  ? Pain tolerance/management  ? Other:      PLAN :  Recommendations and Planned Interventions:   ?           Bed Mobility Training             ? Neuromuscular Re-Education  ? Transfer Training                   ?     Orthotic/Prosthetic Training  ? Gait Training                          ? Modalities  ? Therapeutic Exercises           ? Edema Management/Control  ? Therapeutic Activities            ? Family Training/Education  ? Patient Education  ? Other (comment):    Frequency/Duration: Patient will be followed by physical therapy 1-2 times per day/4-7 days per week to address goals. Discharge Recommendations: None  Further Equipment Recommendations for Discharge: N/A     SUBJECTIVE:   Patient stated I just feel a little weak and unsteady.     OBJECTIVE DATA SUMMARY:     Past Medical History:   Diagnosis Date    Abdominal pain, generalized     Chronic pancreatitis ([facility] [address]     Diabetes ([facility] [address]     hypothyroid    Encounter for long-term (current) use of other medications     Essential hypertension     ETOH abuse     GERD (gastroesophageal reflux disease)     Heart failure (HCC)     Hyponatremia     Pain in the abdomen 11/2/2010    Pancreatitis     w/ abscess and pseudocyst    Pancreatitis     Psychiatric disorder     depression, anxiety    Tobacco abuse      Past Surgical History:   Procedure Laterality Date    HX ABDOMINAL LAPAROSCOPY      PANCREATIC STENT    HX CHOLECYSTECTOMY       Barriers to Learning/Limitations: None  Compensate with: N/A  Home Situation:  Home Situation  Home Environment: Private residence  # Steps to Enter: 1  One/Two Story Residence: One story  Living Alone: No  Support Systems: Parent  Patient Expects to be Discharged to[de-identified] Private residence  Current DME Used/Available at Home: None  Tub or Shower Type: Shower  Critical Behavior:  Neurologic State: Alert; Appropriate for age  Orientation Level: Appropriate for age  Cognition: Appropriate decision making  Strength:  Within functional limits  Tone & Sensation:   Tone: Normal  Sensation: Intact  Range Of Motion:  AROM: Within functional limits  Functional Mobility:  Bed Mobility:  Supine to Sit: Modified independent  Sit to Supine: Modified independent  Transfers:  Sit to Stand: Independent  Stand to Sit: Supervision  Balance:   Sitting: Intact  Standing: Impaired  Standing - Static: Good  Standing - Dynamic : Fair  Ambulation/Gait Training:  Distance (ft): 80 Feet (ft)  Assistive Device: Other (comment)(declines need for AD, but periodically reaches out for wall )  Ambulation - Level of Assistance: Contact guard assistance  Gait Description (WDL): Exceptions to WDL  Speed/Maryse: Pace decreased (<100 feet/min)  Pain:  Pain level pre-treatment: 0/10   Pain level post-treatment: 0/10   Pain Intervention(s) : Medication (see MAR); Rest, Ice, Repositioning  Response to intervention: Nurse notified, See doc flow  Please refer to the flowsheet for vital signs taken during this treatment. After treatment:   ?         Patient left in no apparent distress sitting up in chair  ? Patient left in no apparent distress in bed  ? Call bell left within reach  ? Nursing notified (RN notified that when asked if he had any questions, pt responded \"is it normal for my car window to ice up like it did this morning? \" - uncertain if pt was making a joke or was confused)  ? Caregiver present (mother)  ? Bed alarm activated  ? SCDs applied    COMMUNICATION/EDUCATION:   ?         Role of Physical Therapy in the acute care setting. ?         Fall prevention education was provided and the patient/caregiver indicated understanding. ? Patient/family have participated as able in goal setting and plan of care. ?         Patient/family agree to work toward stated goals and plan of care. ?         Patient understands intent and goals of therapy, but is neutral about his/her participation. ? Patient is unable to participate in goal setting/plan of care: ongoing with therapy staff. ?         Other:     Thank you for this referral.  Jh Mora, PT   Time Calculation: 15 mins  Eval Complexity: History: MEDIUM  Complexity : 1-2 comorbidities / personal factors will impact the outcome/ POC Exam:MEDIUM Complexity : 3 Standardized tests and measures addressing body structure, function, activity limitation and / or participation in recreation  Presentation: MEDIUM Complexity : Evolving with changing characteristics  Clinical Decision Making:Medium Complexity    Overall Complexity:MEDIUM

## 2019-11-06 NOTE — ROUTINE PROCESS
Bedside and Verbal shift change report given to Pily Verduzco RN (oncoming nurse) by Shahram Luna RN  
 (offgoing nurse). Report included the following information SBAR, Kardex, Procedure Summary, Intake/Output, MAR, Recent Results, Med Rec Status and Cardiac Rhythm NSR.

## 2019-11-06 NOTE — DIABETES MGMT
GLYCEMIC CONTROL PLAN OF CARE     Assessment/Recommendations:  Blood glucose elevated, noted addition of Lantus insulin, monitor need to increase   Clarify home medications, recommend Lantus and Novolog at discharge (Not 70/30 mixed insulin)  Diabetes education     Most recent blood glucose values:  11/5/2019 23:39 11/6/2019 00:53 11/6/2019 08:29 11/6/2019 11:57   189 (H) 245 (H) 247 (H) 330 (H)     Current A1C of 13.3% is equivalent to average blood glucose of 335 mg/dl over the past 2-3 months. Current hospital diabetes medications:   Lantus insulin 15 units daily   Correctional Lispro insulin 4 times daily ACHS    Home diabetes medications:  Pt somewhat confused about home insulin regimen, states it has been changed a number of times. Pt reports taking Lantus insulin 30 units nightly and Novolin 70/30 insulin TID per sliding scale. Explained 70/30 insulin usually not taken with Lantus. Pt having episodes of hypoglycemia as he is not able to eat consistently with episodes of pancreatitis. Pt states he has moved to [place] and is finding a new doctor here, encouraged regular follow up for adjustment and clarification of medication.      Diet:  Full Liquid    Education:  ___x_Refer to Diabetes Education Record             ____Education not indicated at this time      Artis Harris RD, CDE

## 2019-11-06 NOTE — PROGRESS NOTES
conducted an ER consultation and Spiritual Assessment for Leif Acuña, who is a 47 y.o.,male. Patient's Primary Language is: [place]. According to the patient's EMR Uats[employer] Affiliation is: [employer]. 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    Abscess 07/28/2018    Pancreatic fistula 06/28/2018    Central cord syndrome ([address] 06/02/2018    UTI (urinary tract infection) 05/24/2018    Hyperglycemia 05/22/2018    Chronic renal insufficiency 05/22/2018    CHRISTI (acute kidney injury) ([address] 05/22/2018    Enteritis 04/24/2018    Acute alcoholic hepatitis 56/06/9234    Chronic pancreatitis due to acute alcohol intoxication ([address] 04/15/2018    Lactic acidosis 08/05/2017    Colitis, acute 07/17/2017    HCAP (healthcare-associated pneumonia) 05/31/2016    Sepsis ([address] 04/15/2016    Biliary drain displacement 03/23/2016    Hypokalemia 03/23/2016    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 Listened empathically. Provided chaplaincy education. Provided information about Spiritual Care Services. Offered prayer and assurance of continued prayers on patient's behalf.    Chart reviewed. The following outcomes where achieved:  Patient shared limited information about both their medical narrative and spiritual journey/beliefs.  confirmed Patient's Zoroastrianism Affiliation. Patient processed feeling about current hospitalization. Patient expressed gratitude for 's visit. Assessment:  Patient does not have any Alevism/cultural needs that will affect patient's preferences in health care. There are no spiritual or Alevism issues which require intervention at this time. Plan:  Chaplains will continue to follow and will provide pastoral care on an as needed/requested basis.  recommends bedside caregivers page  on duty if patient shows signs of acute spiritual or emotional distress.     [id number]   [phone]

## 2019-11-06 NOTE — CDMP QUERY
Patient admitted with Hyperosmolar nonketotic hyperglycemia, noted to have recently diagnosed right upper extremity DVT. If possible, please document in progress notes and d/c summary if you are evaluating and/or treating any of the following: 
 
 
=> Acute DVT RUE 
=> Acute Recurrent DVT RUE 
=> Chronic DVT RUE 
=> Personal History of DVT RUE 
=> Other Explanation of clinical findings 
=> Clinically Undetermined (no explanation for clinical findings) The medical record reflects the following: 
   Risk Factors: recent RUE DVT Clinical Indicators: DVT 2019 on Eliquis-per H&P Treatment:  Eliquis Reference: 
Acute DVT:  \"Acute\" defines the period of time beginning with the initial diagnosis, up to and including the entire period of time where anticoagulation is instituted (3-12 months) Acute Recurrent DVT: \"Recurrent\" incorporates the definition of acute with the diagnosis of recurrent and ends 3-12 months beyond that time. These patients will likely require lifelong anticoagulation therapy. Chronic DVT:  \"Chronic\" treatment period extending beyond initial 12 months of treatment. Medical condition still exists and is actively being treated. Personal History of DVT: explains the patient's past medical condition that no longer exists, and is not receiving any treatment, but that has the potential for recurrence, and therefore may require monitoring Thank you, Ruth Gonzales RN/CCDS 
[phone]

## 2019-11-07 LAB
ALBUMIN SERPL-MCNC: 2.8 G/DL (ref 3.4–5)
ALBUMIN/GLOB SERPL: 0.8 {RATIO} (ref 0.8–1.7)
ALP SERPL-CCNC: 110 U/L (ref 45–117)
ALT SERPL-CCNC: 44 U/L (ref 16–61)
ANION GAP SERPL CALC-SCNC: 5 MMOL/L (ref 3–18)
AST SERPL-CCNC: 114 U/L (ref 10–38)
BILIRUB SERPL-MCNC: 0.8 MG/DL (ref 0.2–1)
BUN SERPL-MCNC: 15 MG/DL (ref 7–18)
BUN/CREAT SERPL: 14 (ref 12–20)
CALCIUM SERPL-MCNC: 7.6 MG/DL (ref 8.5–10.1)
CHLORIDE SERPL-SCNC: 113 MMOL/L (ref 100–111)
CO2 SERPL-SCNC: 22 MMOL/L (ref 21–32)
CREAT SERPL-MCNC: 1.04 MG/DL (ref 0.6–1.3)
ERYTHROCYTE [DISTWIDTH] IN BLOOD BY AUTOMATED COUNT: 16.3 % (ref 11.6–14.5)
GLOBULIN SER CALC-MCNC: 3.5 G/DL (ref 2–4)
GLUCOSE BLD STRIP.AUTO-MCNC: 108 MG/DL (ref 70–110)
GLUCOSE BLD STRIP.AUTO-MCNC: 135 MG/DL (ref 70–110)
GLUCOSE BLD STRIP.AUTO-MCNC: 178 MG/DL (ref 70–110)
GLUCOSE BLD STRIP.AUTO-MCNC: 183 MG/DL (ref 70–110)
GLUCOSE BLD STRIP.AUTO-MCNC: 59 MG/DL (ref 70–110)
GLUCOSE BLD STRIP.AUTO-MCNC: 93 MG/DL (ref 70–110)
GLUCOSE SERPL-MCNC: 53 MG/DL (ref 74–99)
HCT VFR BLD AUTO: 29 % (ref 36–48)
HGB BLD-MCNC: 9.3 G/DL (ref 13–16)
MAGNESIUM SERPL-MCNC: 2.3 MG/DL (ref 1.6–2.6)
MCH RBC QN AUTO: 28.3 PG (ref 24–34)
MCHC RBC AUTO-ENTMCNC: 32.1 G/DL (ref 31–37)
MCV RBC AUTO: 88.1 FL (ref 74–97)
PLATELET # BLD AUTO: 181 K/UL (ref 135–420)
PMV BLD AUTO: 9.7 FL (ref 9.2–11.8)
POTASSIUM SERPL-SCNC: 3.7 MMOL/L (ref 3.5–5.5)
PROT SERPL-MCNC: 6.3 G/DL (ref 6.4–8.2)
RBC # BLD AUTO: 3.29 M/UL (ref 4.7–5.5)
SODIUM SERPL-SCNC: 140 MMOL/L (ref 136–145)
WBC # BLD AUTO: 10.4 K/UL (ref 4.6–13.2)

## 2019-11-07 PROCEDURE — 65660000000 HC RM CCU STEPDOWN

## 2019-11-07 PROCEDURE — 74011000258 HC RX REV CODE- 258: Performed by: EMERGENCY MEDICINE

## 2019-11-07 PROCEDURE — 74011250636 HC RX REV CODE- 250/636: Performed by: EMERGENCY MEDICINE

## 2019-11-07 PROCEDURE — 80053 COMPREHEN METABOLIC PANEL: CPT

## 2019-11-07 PROCEDURE — 74011636637 HC RX REV CODE- 636/637: Performed by: INTERNAL MEDICINE

## 2019-11-07 PROCEDURE — 83735 ASSAY OF MAGNESIUM: CPT

## 2019-11-07 PROCEDURE — 74011636637 HC RX REV CODE- 636/637: Performed by: EMERGENCY MEDICINE

## 2019-11-07 PROCEDURE — 74011000258 HC RX REV CODE- 258

## 2019-11-07 PROCEDURE — 36415 COLL VENOUS BLD VENIPUNCTURE: CPT

## 2019-11-07 PROCEDURE — 74011250636 HC RX REV CODE- 250/636: Performed by: INTERNAL MEDICINE

## 2019-11-07 PROCEDURE — 85027 COMPLETE CBC AUTOMATED: CPT

## 2019-11-07 PROCEDURE — 82962 GLUCOSE BLOOD TEST: CPT

## 2019-11-07 PROCEDURE — 74011250637 HC RX REV CODE- 250/637: Performed by: INTERNAL MEDICINE

## 2019-11-07 RX ORDER — LEVETIRACETAM 500 MG/1
500 TABLET ORAL 2 TIMES DAILY
Status: DISCONTINUED | OUTPATIENT
Start: 2019-11-07 | End: 2019-11-08 | Stop reason: HOSPADM

## 2019-11-07 RX ORDER — SODIUM CHLORIDE 900 MG/100ML
INJECTION INTRAVENOUS
Status: COMPLETED
Start: 2019-11-07 | End: 2019-11-07

## 2019-11-07 RX ORDER — MAG HYDROX/ALUMINUM HYD/SIMETH 200-200-20
30 SUSPENSION, ORAL (FINAL DOSE FORM) ORAL
Status: DISCONTINUED | OUTPATIENT
Start: 2019-11-07 | End: 2019-11-08 | Stop reason: HOSPADM

## 2019-11-07 RX ADMIN — PROMETHAZINE HYDROCHLORIDE 25 MG: 25 INJECTION INTRAMUSCULAR; INTRAVENOUS at 00:54

## 2019-11-07 RX ADMIN — FAMOTIDINE 20 MG: 10 INJECTION INTRAVENOUS at 09:13

## 2019-11-07 RX ADMIN — HYDROMORPHONE HYDROCHLORIDE 2 MG: 1 INJECTION, SOLUTION INTRAMUSCULAR; INTRAVENOUS; SUBCUTANEOUS at 18:31

## 2019-11-07 RX ADMIN — LEVETIRACETAM 500 MG: 500 TABLET ORAL at 09:13

## 2019-11-07 RX ADMIN — DEXTROSE MONOHYDRATE 150 ML: 100 INJECTION, SOLUTION INTRAVENOUS at 05:59

## 2019-11-07 RX ADMIN — HYDROMORPHONE HYDROCHLORIDE 2 MG: 1 INJECTION, SOLUTION INTRAMUSCULAR; INTRAVENOUS; SUBCUTANEOUS at 15:27

## 2019-11-07 RX ADMIN — SODIUM CHLORIDE 50 ML: 900 INJECTION INTRAVENOUS at 09:15

## 2019-11-07 RX ADMIN — HYDROMORPHONE HYDROCHLORIDE 2 MG: 1 INJECTION, SOLUTION INTRAMUSCULAR; INTRAVENOUS; SUBCUTANEOUS at 07:31

## 2019-11-07 RX ADMIN — HYDROMORPHONE HYDROCHLORIDE 2 MG: 1 INJECTION, SOLUTION INTRAMUSCULAR; INTRAVENOUS; SUBCUTANEOUS at 00:53

## 2019-11-07 RX ADMIN — ALUMINUM HYDROXIDE, MAGNESIUM HYDROXIDE, AND SIMETHICONE 30 ML: 200; 200; 20 SUSPENSION ORAL at 11:47

## 2019-11-07 RX ADMIN — PROMETHAZINE HYDROCHLORIDE 25 MG: 25 INJECTION INTRAMUSCULAR; INTRAVENOUS at 09:13

## 2019-11-07 RX ADMIN — HYDROMORPHONE HYDROCHLORIDE 2 MG: 1 INJECTION, SOLUTION INTRAMUSCULAR; INTRAVENOUS; SUBCUTANEOUS at 11:47

## 2019-11-07 RX ADMIN — APIXABAN 5 MG: 5 TABLET, FILM COATED ORAL at 09:13

## 2019-11-07 RX ADMIN — INSULIN LISPRO 2 UNITS: 100 INJECTION, SOLUTION INTRAVENOUS; SUBCUTANEOUS at 11:52

## 2019-11-07 RX ADMIN — MELATONIN TAB 5 MG 5 MG: 5 TAB at 22:06

## 2019-11-07 RX ADMIN — INSULIN LISPRO 2 UNITS: 100 INJECTION, SOLUTION INTRAVENOUS; SUBCUTANEOUS at 18:32

## 2019-11-07 RX ADMIN — LEVETIRACETAM 500 MG: 500 TABLET ORAL at 18:31

## 2019-11-07 RX ADMIN — ALUMINUM HYDROXIDE, MAGNESIUM HYDROXIDE, AND SIMETHICONE 30 ML: 200; 200; 20 SUSPENSION ORAL at 18:31

## 2019-11-07 RX ADMIN — ALUMINUM HYDROXIDE, MAGNESIUM HYDROXIDE, AND SIMETHICONE 30 ML: 200; 200; 20 SUSPENSION ORAL at 09:14

## 2019-11-07 RX ADMIN — FAMOTIDINE 20 MG: 10 INJECTION INTRAVENOUS at 22:07

## 2019-11-07 RX ADMIN — HYDROMORPHONE HYDROCHLORIDE 2 MG: 1 INJECTION, SOLUTION INTRAMUSCULAR; INTRAVENOUS; SUBCUTANEOUS at 22:01

## 2019-11-07 RX ADMIN — SODIUM CHLORIDE 100 ML/HR: 900 INJECTION, SOLUTION INTRAVENOUS at 20:42

## 2019-11-07 RX ADMIN — APIXABAN 5 MG: 5 TABLET, FILM COATED ORAL at 18:31

## 2019-11-07 RX ADMIN — MIRTAZAPINE 15 MG: 15 TABLET, FILM COATED ORAL at 22:11

## 2019-11-07 RX ADMIN — INSULIN GLARGINE 15 UNITS: 100 INJECTION, SOLUTION SUBCUTANEOUS at 09:15

## 2019-11-07 NOTE — ROUTINE PROCESS
Report received from [facility]. Report included SBAR, Kardex, MAR, Recent Results, and Plan of Care. Pt in bed with call light in reach.

## 2019-11-07 NOTE — CDMP QUERY
Registered dietician has documented pt meets Aspen criteria for severe protein caloris malnutrition based on :  
Chronic illness Weight Loss: Greater than 7.5% x 3 mos Body Fat Loss: Severe(orbital) Muscle Mass Loss: Severe(clavicle, dorsal hand, patellar & anterior thigh regions) Please further specify type of malnutrition. ? Malnutrition (mild, moderate, severe) ? Protein calorie malnutrition (mild, moderate, severe) ? Other (please specify) ? Unable to determine The medical record reflects the following: 
  Risk Factors: Chronic illness with abd pain and nausea Clinical Indicators: Inadqeuate energy intake with altered GI function as evidenced by pt with significant weight loss and severe muscle/fat loss. Body mass index is 17.36 kg/m². Underweight Treatment: - Monitor GI symptoms and readiness for diet advancement - Add supplements: Glucerna Shake, TID. 
- Continue RD inpatient monitoring and evaluation. Thank you, [address] [facility], [address]

## 2019-11-07 NOTE — PROGRESS NOTES
Problem: Diabetes Self-Management  Goal: *Disease process and treatment process  Description  Define diabetes and identify own type of diabetes; list 3 options for treating diabetes. Outcome: Progressing Towards Goal  Goal: *Incorporating nutritional management into lifestyle  Description  Describe effect of type, amount and timing of food on blood glucose; list 3 methods for planning meals. Outcome: Progressing Towards Goal  Goal: *Incorporating physical activity into lifestyle  Description  State effect of exercise on blood glucose levels. Outcome: Progressing Towards Goal  Goal: *Developing strategies to promote health/change behavior  Description  Define the ABC's of diabetes; identify appropriate screenings, schedule and personal plan for screenings. Outcome: Progressing Towards Goal  Goal: *Using medications safely  Description  State effect of diabetes medications on diabetes; name diabetes medication taking, action and side effects. Outcome: Progressing Towards Goal  Goal: *Monitoring blood glucose, interpreting and using results  Description  Identify recommended blood glucose targets  and personal targets. Outcome: Progressing Towards Goal  Goal: *Prevention, detection, treatment of acute complications  Description  List symptoms of hyper- and hypoglycemia; describe how to treat low blood sugar and actions for lowering  high blood glucose level. Outcome: Progressing Towards Goal  Goal: *Prevention, detection and treatment of chronic complications  Description  Define the natural course of diabetes and describe the relationship of blood glucose levels to long term complications of diabetes.   Outcome: Progressing Towards Goal  Goal: *Developing strategies to address psychosocial issues  Description  Describe feelings about living with diabetes; identify support needed and support network  Outcome: Progressing Towards Goal  Goal: *Insulin pump training  Outcome: Progressing Towards Goal  Goal: *Sick day guidelines  Outcome: Progressing Towards Goal  Goal: *Patient Specific Goal (EDIT GOAL, INSERT TEXT)  Outcome: Progressing Towards Goal     Problem: Patient Education: Go to Patient Education Activity  Goal: Patient/Family Education  Outcome: Progressing Towards Goal     Problem: Falls - Risk of  Goal: *Absence of Falls  Description  Document Javier Aguilar Fall Risk and appropriate interventions in the flowsheet. Outcome: Progressing Towards Goal  Note:   Fall Risk Interventions:  Mobility Interventions: Patient to call before getting OOB         Medication Interventions: Patient to call before getting OOB    Elimination Interventions: Patient to call for help with toileting needs    History of Falls Interventions: Door open when patient unattended, Evaluate medications/consider consulting pharmacy         Problem: Patient Education: Go to Patient Education Activity  Goal: Patient/Family Education  Outcome: Progressing Towards Goal     Problem: Pain  Goal: *Control of Pain  Outcome: Progressing Towards Goal     Problem: Patient Education: Go to Patient Education Activity  Goal: Patient/Family Education  Outcome: Progressing Towards Goal     Problem: Pressure Injury - Risk of  Goal: *Prevention of pressure injury  Description  Document Uastin Scale and appropriate interventions in the flowsheet. Outcome: Progressing Towards Goal  Note:   Pressure Injury Interventions:             Activity Interventions: Increase time out of bed    Mobility Interventions: HOB 30 degrees or less, PT/OT evaluation, Pressure redistribution bed/mattress (bed type)    Nutrition Interventions: Document food/fluid/supplement intake, Offer support with meals,snacks and hydration                     Problem: Patient Education: Go to Patient Education Activity  Goal: Patient/Family Education  Outcome: Progressing Towards Goal     Problem: Nutrition Deficit  Goal: *Optimize nutritional status  Outcome: Progressing Towards Goal     Problem: Patient Education: Go to Patient Education Activity  Goal: Patient/Family Education  Outcome: Progressing Towards Goal     Problem: Patient Education: Go to Patient Education Activity  Goal: Patient/Family Education  Outcome: Progressing Towards Goal

## 2019-11-07 NOTE — PROGRESS NOTES
NUTRITION    Nutrition Screen     RECOMMENDATIONS / PLAN:     - Continue current nutrition interventions. - Continue RD inpatient monitoring and evaluation. NUTRITION INTERVENTIONS & DIAGNOSIS:     - Meals/snacks: modify composition  - Medical food supplement therapy: Glucerna Shake, TID  - Collaboration and referral of nutrition care: interdisciplinary rounds    Nutrition Diagnosis: Chronic disease or condition related malnutrition related to predicted inadqeuate energy intake with altered GI function as evidenced by pt with significant weight loss and severe muscle/fat loss    Patient meets criteria for Severe Protein Calorie Malnutrition as evidenced by:   ASPEN Malnutrition Criteria  Acute Illness, Chronic Illness, or Social/Enviornmental: Chronic illness  Weight Loss: Greater than 7.5% x 3 mos  Body Fat Loss: Severe(orbital)  Muscle Mass Loss: Severe(clavicle, dorsal hand, patellar & anterior thigh regions)  ASPEN Malnutrition Score - Chronic Illness: 18  Chronic Illness - Malnutrition Diagnosis: Severe malnutrition. ASSESSMENT:     11/7: Pt with episode of emesis this am prior to breakfast. Ate a few bites of hot cereal and drank glucerna shake without issue. Diet advanced. 11/6: Admitted with hyperglycemia with hx of chronic pancreatitis. C/o nausea/vomiting PTA. Started on full liquid diet today after being NPO. Reports some weight loss since last admission despite normal appetite/meal intake, not consuming supplements. Pt hesitant to start liquid diet today 2/2 some nausea and abdominal pain, encouraged intake as tolerated. NFPE completed.     Nutritional intake adequate to meet patients estimated nutritional needs:  Unable to determine at this time    Diet: DIET NUTRITIONAL SUPPLEMENTS Breakfast, Lunch, Dinner, All Meals; [address] CONSISTENT CARB Soft Solids; 2 GM NA (House Low NA)      Food Allergies: Seafood  Current Appetite: Fair  Appetite/meal intake prior to admission: Good prior to day of admission; lives with mother  Feeding Limitations:  [] Swallowing difficulty    [] Chewing difficulty    [] Other:  Current Meal Intake:   Patient Vitals for the past 100 hrs:   % Diet Eaten   11/07/19 0927 20 %       BM: 11/5  Skin Integrity: open fistula to right abdomen (scant drainage)  Edema:   [x] No     [] Yes   Pertinent Medications: Reviewed: NS at 100 mL/hr, mylanta, fa,ptodome. lantus (15 units), SSI, remeron, ondansetron, promethazine prn    Recent Labs     11/07/19  0425 11/06/19  0415 11/05/19  1549    136 125*   K 3.7 3.7 5.0   * 110 98*   CO2 22 20* 22   GLU 53* 247* 737*   BUN 15 25* 27*   CREA 1.04 1.26 1.95*   CA 7.6* 7.3* 8.9   MG 2.3 2.3 2.7*   PHOS  --   --  3.9   ALB 2.8* 2.8* 3.6   SGOT 114* 33 52*   ALT 44 32 46       Intake/Output Summary (Last 24 hours) at 11/7/2019 1309  Last data filed at 11/7/2019 0927  Gross per 24 hour   Intake 360 ml   Output 100 ml   Net 260 ml       Anthropometrics:  Ht Readings from Last 1 Encounters:   11/05/19 6' 0.01\" (1.829 m)     Last 3 Recorded Weights in this Encounter    11/05/19 1622 11/06/19 0409 11/07/19 0355   Weight: 63.5 kg (140 lb) 58.1 kg (128 lb) 60.1 kg (132 lb 8 oz)     Body mass index is 17.97 kg/m². Underweight     Weight History: Pt reports a weight of 140-150 lbs x 3 months PTA.  Per chart -12 lbs, 8.5% x 3 months PTA    Weight Metrics 11/7/2019 8/29/2019 8/29/2019 8/8/2019 6/28/2019 6/17/2019 6/9/2019   Weight 132 lb 8 oz 140 lb 126 lb 14.4 oz 136 lb 14.5 oz 110 lb 149 lb 14.6 oz 145 lb   BMI 17.97 kg/m2 18.99 kg/m2 17.21 kg/m2 18.57 kg/m2 14.92 kg/m2 20.33 kg/m2 19.67 kg/m2        Admitting Diagnosis: Diabetic hyperosmolar non-ketotic state ([facility] [E11.00]  Pertinent PMHx: chronic pancreatitis with abscess, DM, ETOH abuse, GERD, heart failure, enterocutaneous fistula    Education Needs:        [x] None identified  [] Identified - Not appropriate at this time  []  Identified and addressed - refer to education log  Learning Limitations:   [x] None identified  [] Identified    Cultural, Shinto & ethnic food preferences:  [x] None identified    [] Identified and addressed     ESTIMATED NUTRITION NEEDS:     Calories: 5364-7117 kcal (MSJx1.2-1.3) based on  [] Actual BW     [x] IBW: 81 kg   Protein: 58-87 gm (1-1.5 gm/kg) based on  [x] Actual BW: 58 kg      [] IBW   Fluid: 1 mL/kcal     MONITORING & EVALUATION:     Nutrition Goal(s):   - PO nutrition intake will meet >75% of patient estimated nutritional needs within the next 7 days. Outcome: Progressing towards goal     Monitoring:   [x] Food and nutrient intake   [x] Food and nutrient administration  [x] Comparative standards   [x] Nutrition-focused physical findings   [x] Anthropometric Measurements   [x] Treatment/therapy   [x] Biochemical data, medical tests, and procedures        Previous Recommendations (for follow-up assessments only): Implemented      Discharge Planning: cardiac, diabetic diet + Glucerna PIEDAD Islas  Participated in care planning, discharge planning, & interdisciplinary rounds as appropriate.       Anders Villarreal RD  Pager: [phone]

## 2019-11-07 NOTE — PROGRESS NOTES
conducted an initial consultation and Spiritual Assessment for Shilpi Barron, who is a 47 y.o.,male. Patient's Primary Language is: [place]. According to the patient's EMR Sikhism Affiliation is: Church. 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    Abscess 07/28/2018    Pancreatic fistula 06/28/2018    Central cord syndrome ([address] 06/02/2018    UTI (urinary tract infection) 05/24/2018    Hyperglycemia 05/22/2018    Chronic renal insufficiency 05/22/2018    CHRISTI (acute kidney injury) ([address] 05/22/2018    Enteritis 04/24/2018    Acute alcoholic hepatitis 27/72/3345    Chronic pancreatitis due to acute alcohol intoxication ([address] 04/15/2018    Lactic acidosis 08/05/2017    Colitis, acute 07/17/2017    HCAP (healthcare-associated pneumonia) 05/31/2016    Sepsis ([address] 04/15/2016    Biliary drain displacement 03/23/2016    Hypokalemia 03/23/2016    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 Explored issues of velvet, belief, spirituality and Sikh/ritual needs while hospitalized. Listened empathically.   Offered prayer and assurance of continued prayers on patient's behalf. The following outcomes where achieved:  Patient shared limited information about both their medical narrative and spiritual journey/beliefs.  confirmed Patient's Adventism Affiliation. Patient processed feeling about current hospitalization. Patient expressed gratitude for 's visit. Assessment:  Patient does not have any Gnosticist/cultural needs that will affect patient's preferences in health care. There are no spiritual or Gnosticist issues which require intervention at this time. Plan:  Chaplains will continue to follow and will provide pastoral care on an as needed/requested basis.  recommends bedside caregivers page  on duty if patient shows signs of acute spiritual or emotional distress.  Fr.  [address]   [phone]

## 2019-11-07 NOTE — ROUTINE PROCESS
Bedside shift change report given to EVERTON RICHARDS. Report included SBAR, Kardex, MAR, Recent Results, and Plan of Care. Pt in bed with family member at bedside.

## 2019-11-07 NOTE — PROGRESS NOTES
Hospitalist Progress Note    Patient: Sen Conception Age: 47 y.o. : 1965 MR#: [id number] SSN: [id number]  Date/Time: 2019 8:54 AM    DOA: 2019  PCP: Lorna, MD Zachary    Subjective:     Nausea/vomiting this AM.   Has low FSBS this AM, asymptomatic. No fever. Still with stomach pain, improved with dilaudid   No seizure. 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 He also stated that he had nausea/vomiting and non-bloody diarrhea for 3 days prior to presentation. Further workup in ER showed elevated glucose, he was placed in on insulin gtt. He was admitted for further care. CT abd/pelv without clear pathology. Since he remains NPO, hep gtt was started in place of his eliquis. ROS: no fever/chills, no headache, no dizziness, no facial pain, no sinus congestion,   No swallowing pain, No chest pain, no palpitation, no shortness of breath, no abd pain,  No diarrhea, no urinary complaint, no leg pain or swelling      Assessment/Plan:     1. Persistent epigastric pain, nausea/vomiting now improved      Likely acute on chronic pancreatitis   2. Hyperglycemia with uncontrolled DM2  3. Suspected hyperosmolar non-ketotic hyperglycemia  4. Pseudo-hyponatremia in the setting of hyperglycemia, resolved. 5.  Chronic abd wall fistula, no infection   6. Prerenal azotemia, on CKD3, resolved   7. Severe protein calorie malnutrition   8. Acute DVT of upper extremity, on Eliquis   9. Active tobacco smoking,       Risks associate with tobacco smoking educated, advised tobacco smoking cessation (<7minutes)  10.   Partial seizure, recent left-sided twitches resolved, has not taking his medications, resume keppra     Continue advance diet, nausea/vomiting improved with phenergan,   Can continue current lantus and ISS. Contin dilaudid prn will switch to oral tomorrow   Wound care. Off Hep gtt, Off Insulin gtt. Continue with Eliquis, keppra, Remeron. Mylanta   Education on tobacco cessation     Full Code    Additional Notes:  Time spent >30 minutes    Case discussed with:  [x]Patient  [x]Family  [x]Nursing  [x]Case Management  DVT Prophylaxis:  []Lovenox  []Hep SQ  []SCDs  []Coumadin   [x]On Heparin gtt    Signed By: Jose Aagrwal MD     2019 8:54 AM              Objective:   VS:   Visit Vitals  /73 (BP 1 Location: Left arm, BP Patient Position: Sitting)   Pulse 81   Temp 98.7 °F (37.1 °C)   Resp 18   Ht 6' 0.01\" (1.829 m)   Wt 60.1 kg (132 lb 8 oz)   SpO2 99%   BMI 17.97 kg/m²      Tmax/24hrs: Temp (24hrs), Av.1 °F (36.7 °C), Min:97.1 °F (36.2 °C), Max:99 °F (37.2 °C)      Intake/Output Summary (Last 24 hours) at 2019 0854  Last data filed at 2019 0615  Gross per 24 hour   Intake 120 ml   Output 100 ml   Net 20 ml     General:  Cooperative, Not in acute distress, speaks in full sentence while in bed  HEENT: PERRL, EOMI, supple neck, no JVD, dry oral mucosa  Cardiovascular: S1S2 regular, no rub/gallop   Pulmonary: Clear air entry bilaterally, no wheezing, no crackle  GI:  Soft, +epigastric tender, non distended, +bs, no guarding, right abd wall fistula, dry  Extremities:  No pedal edema, +distal pulses appreciated   Neuro: AOx3, moving all extremities, no gross deficit.      Additional:       Current Facility-Administered Medications   Medication Dose Route Frequency    0.9% sodium chloride (MBP/ADV) infusion        levETIRAcetam (KEPPRA) tablet 500 mg  500 mg Oral BID    alum-mag hydroxide-simeth (MYLANTA) oral suspension 30 mL  30 mL Oral TID WITH MEALS    dextrose 10% infusion 125-250 mL  125-250 mL IntraVENous PRN    HYDROmorphone (DILAUDID) injection 2 mg  2 mg IntraVENous Q4H PRN    alum-mag hydroxide-simeth (MYLANTA) oral suspension 30 mL  30 mL Oral TID WITH MEALS    famotidine (PF) (PEPCID) injection 20 mg  20 mg IntraVENous Q12H    albuterol-ipratropium (DUO-NEB) 2.5 MG-0.5 MG/3 ML  3 mL Nebulization Q6H PRN    insulin glargine (LANTUS) injection 15 Units  15 Units SubCUTAneous DAILY    melatonin tablet 5 mg  5 mg Oral QHS    promethazine (PHENERGAN) injection 25 mg  25 mg IntraMUSCular Q6H PRN    apixaban (ELIQUIS) tablet 5 mg  5 mg Oral BID    mirtazapine (REMERON) tablet 15 mg  15 mg Oral QHS    0.9% sodium chloride infusion  100 mL/hr IntraVENous CONTINUOUS    acetaminophen (TYLENOL) tablet 1,000 mg  1,000 mg Oral Q8H PRN    ondansetron (ZOFRAN) injection 4 mg  4 mg IntraVENous Q6H PRN    insulin lispro (HUMALOG) injection   SubCUTAneous AC&HS    glucose chewable tablet 16 g  4 Tab Oral PRN    glucagon (GLUCAGEN) injection 1 mg  1 mg IntraMUSCular PRN            Lab/Data Review:  Labs: Results:       Chemistry Recent Labs     11/07/19 0425 11/06/19 0415 11/05/19  1549   GLU 53* 247* 737*    136 125*   K 3.7 3.7 5.0   * 110 98*   CO2 22 20* 22   BUN 15 25* 27*   CREA 1.04 1.26 1.95*   BUCR 14 20 14   AGAP 5 6 5   CA 7.6* 7.3* 8.9   PHOS  --   --  3.9     Recent Labs     11/07/19 0425 11/06/19 0415 11/05/19  1549   ALT 44 32 46   SGOT 114* 33 52*   TP 6.3* 6.7 8.7*   ALB 2.8* 2.8* 3.6   GLOB 3.5 3.9 5.1*   AGRAT 0.8 0.7* 0.7*      CBC w/Diff Recent Labs     11/07/19 0425 11/06/19  0415 11/05/19  1549   WBC 10.4 6.5 9.1   RBC 3.29* 3.53* 4.10*   HGB 9.3* 9.9* 11.6*   HCT 29.0* 30.1* 34.9*   MCV 88.1 85.3 85.1   MCH 28.3 28.0 28.3   MCHC 32.1 32.9 33.2   RDW 16.3* 16.0* 15.6*    202 253   GRANS  --  58 76*   LYMPH  --  30 17*   EOS  --  5 2      Coagulation Recent Labs     11/06/19  1315 11/06/19  0415 11/05/19 2050   PTP  --   --  12.7   INR  --   --  1.0   APTT >180.0* 62.2* 26.7 Iron/Ferritin Lab Results   Component Value Date/Time    Iron 26 (L) 06/04/2018 06:00 AM    TIBC 157 (L) 06/04/2018 06:00 AM    Iron % saturation 17 (L) 06/04/2018 06:00 AM    Ferritin 364.3 06/04/2018 06:00 AM       BNP    Cardiac Enzymes Lab Results   Component Value Date/Time    CK 45 05/18/2019 07:05 PM    CK - MB <1.0 05/18/2019 07:05 PM    CK-MB Index  05/18/2019 07:05 PM     CALCULATION NOT PERFORMED WHEN RESULT IS BELOW LINEAR LIMIT    Troponin-I <0.015 06/02/2018 11:50 PM    Troponin-I, QT <0.02 05/18/2019 07:05 PM        Lactic Acid    Thyroid Studies          All Micro Results     Procedure Component Value Units Date/Time    CULTURE, URINE [[id number]] Collected:  11/05/19 1635    Order Status:  Completed Specimen:  Urine from Clean catch Updated:  11/06/19 1219            Images:    CT (Most Recent). CT Results (most recent):  Results from Hospital Encounter encounter on 11/05/19   CT ABD PELV WO CONT    Narrative EXAM: CT ABDOMEN AND PELVIS WITHOUT CONTRAST    CLINICAL HISTORY/INDICATION:  abd pain / chronic pancreatitis , T2 H abuse,  hypertension, diabetes, patient presents with high blood sugar, several episodes  of nonbloody vomiting over the past 2 days with loose nonbloody stools    COMPARISON: CT abdomen and pelvis August 29, 2019. TECHNIQUE: No intravenous contrast was utilized for this helical thin section CT  of the abdomen and pelvis. Coronal and sagittal reformations obtained. Evaluation of the solid organs, bowel wall, and vascular structures are  therefore limited. All CT scans at this facility are performed using dose optimization technique as  appropriate to a performed exam, to include automated exposure control,  adjustment of the mA and/or kV according to patient's size (including  appropriate matching for site-specific examinations), or use of iterative  reconstruction technique.     FINDINGS:     Abdomen-     Subtle hazy opacity is seen in the inferior lingula, right middle lobe, and  right lower lobe. Mild bronchial wall thickening. Less prominent than on the  previous exam.  The included portions of the chest wall and the abdominal wall  are  unremarkable. Clips at the elizabeth hepatis. Very minimal dilatation in the intrahepatic biliary  system of the left lobe less pronounced than on the previous study. Mild  intrahepatic with moderate extrahepatic biliary dilatation. The common bile duct  measures 1.4 cm and tapers to the ampulla. Unchanged from the previous study. The gallbladder is surgically removed. .    There is a stent in the pancreatic duct. Multiple calcifications in the pancreas  consistent with chronic calcific pancreatitis. Thickening of the left adrenal gland. Right adrenal is unremarkable. Kidneys are unremarkable. Tiny amount of free fluid at the inferior extent of the right lobe of the liver  as well as in the right paracolic gutter. Persistent soft tissue abnormality  located at the inferior pole of the right kidney in the anterior intraperitoneal  space appearing confluent with the duodenum with possibly pulling the duodenum  laterally. The soft tissue extends to the lateral abdominal wall located   Small amount of internal almost tubular hypodensity and several calcifications. Confluent with increased density at the level of the lateral abdominal wall with  retraction of the overlying skin. Appearance is suggestive of a postsurgical  linear band of scarring or perhaps secondary to previous percutaneous catheter  placement. This has decreased in the central low density component compared to the previous  CT. Increased stool throughout the colon. Pelvis -    There is no free pelvic fluid. The pelvic viscera are unremarkable. Urinary bladder is moderately distended. Degenerative disc disease at the lower lumbar spine.       Impression IMPRESSION:     Interval decrease in the soft tissue fluid track extending from the lateral  aspect of the duodenum to the right lateral abdominal wall. Markedly distended urinary bladder. Evaluation of the bowel and solid organs is limited by the lack of IV contrast.  Increased stool throughout the colon. Chronic calcific pancreatitis. There is a pancreatic ductal stent in expected  position. Persistent biliary dilatation with a tiny amount of gas in the intrahepatic  biliary system improved in appearance compared to the previous exam.      Report provided to the emergency department at 2221 hrs. XRAY (Most Recent)      EKG No results found for this or any previous visit.      2D ECHO

## 2019-11-07 NOTE — PROGRESS NOTES
Critical Lab value Glucose 53. Checked Glucose on Accucheck; read 59. Pt complained of feeling weak and tired. 125mL bolus of 10% Dextrose given along with 120mL orange juice. Rechecked blood sugar; read 135. Pt states he no longer feels weak and \"is feeling much better\".

## 2019-11-08 VITALS
SYSTOLIC BLOOD PRESSURE: 126 MMHG | OXYGEN SATURATION: 98 % | BODY MASS INDEX: 18.01 KG/M2 | TEMPERATURE: 99 F | RESPIRATION RATE: 18 BRPM | HEIGHT: 72 IN | DIASTOLIC BLOOD PRESSURE: 82 MMHG | WEIGHT: 133 LBS | HEART RATE: 90 BPM

## 2019-11-08 LAB
ANION GAP SERPL CALC-SCNC: 4 MMOL/L (ref 3–18)
BUN SERPL-MCNC: 13 MG/DL (ref 7–18)
BUN/CREAT SERPL: 12 (ref 12–20)
CALCIUM SERPL-MCNC: 7.5 MG/DL (ref 8.5–10.1)
CHLORIDE SERPL-SCNC: 110 MMOL/L (ref 100–111)
CO2 SERPL-SCNC: 23 MMOL/L (ref 21–32)
CREAT SERPL-MCNC: 1.07 MG/DL (ref 0.6–1.3)
ERYTHROCYTE [DISTWIDTH] IN BLOOD BY AUTOMATED COUNT: 16.1 % (ref 11.6–14.5)
GLUCOSE BLD STRIP.AUTO-MCNC: 194 MG/DL (ref 70–110)
GLUCOSE BLD STRIP.AUTO-MCNC: 288 MG/DL (ref 70–110)
GLUCOSE BLD STRIP.AUTO-MCNC: 82 MG/DL (ref 70–110)
GLUCOSE SERPL-MCNC: 122 MG/DL (ref 74–99)
HCT VFR BLD AUTO: 30 % (ref 36–48)
HGB BLD-MCNC: 9.6 G/DL (ref 13–16)
MCH RBC QN AUTO: 28.7 PG (ref 24–34)
MCHC RBC AUTO-ENTMCNC: 32 G/DL (ref 31–37)
MCV RBC AUTO: 89.6 FL (ref 74–97)
PLATELET # BLD AUTO: 161 K/UL (ref 135–420)
PMV BLD AUTO: 10.1 FL (ref 9.2–11.8)
POTASSIUM SERPL-SCNC: 3.8 MMOL/L (ref 3.5–5.5)
RBC # BLD AUTO: 3.35 M/UL (ref 4.7–5.5)
SODIUM SERPL-SCNC: 137 MMOL/L (ref 136–145)
WBC # BLD AUTO: 7.6 K/UL (ref 4.6–13.2)

## 2019-11-08 PROCEDURE — 97116 GAIT TRAINING THERAPY: CPT

## 2019-11-08 PROCEDURE — 74011636637 HC RX REV CODE- 636/637: Performed by: STUDENT IN AN ORGANIZED HEALTH CARE EDUCATION/TRAINING PROGRAM

## 2019-11-08 PROCEDURE — 80048 BASIC METABOLIC PNL TOTAL CA: CPT

## 2019-11-08 PROCEDURE — 36415 COLL VENOUS BLD VENIPUNCTURE: CPT

## 2019-11-08 PROCEDURE — 82962 GLUCOSE BLOOD TEST: CPT

## 2019-11-08 PROCEDURE — 74011250636 HC RX REV CODE- 250/636: Performed by: INTERNAL MEDICINE

## 2019-11-08 PROCEDURE — 74011250636 HC RX REV CODE- 250/636: Performed by: EMERGENCY MEDICINE

## 2019-11-08 PROCEDURE — 85027 COMPLETE CBC AUTOMATED: CPT

## 2019-11-08 PROCEDURE — 74011636637 HC RX REV CODE- 636/637: Performed by: INTERNAL MEDICINE

## 2019-11-08 PROCEDURE — 74011636637 HC RX REV CODE- 636/637: Performed by: EMERGENCY MEDICINE

## 2019-11-08 PROCEDURE — 74011250637 HC RX REV CODE- 250/637: Performed by: INTERNAL MEDICINE

## 2019-11-08 RX ORDER — MIRTAZAPINE 15 MG/1
15 TABLET, FILM COATED ORAL
Qty: 90 TAB | Refills: 1 | Status: ON HOLD | OUTPATIENT
Start: 2019-11-08 | End: 2020-01-18 | Stop reason: CLARIF

## 2019-11-08 RX ORDER — INSULIN GLARGINE 100 [IU]/ML
30 INJECTION, SOLUTION SUBCUTANEOUS DAILY
Qty: 5 PEN | Refills: 1 | Status: SHIPPED | OUTPATIENT
Start: 2019-11-08 | End: 2020-08-07

## 2019-11-08 RX ORDER — HYDROMORPHONE HYDROCHLORIDE 2 MG/1
2 TABLET ORAL
Qty: 20 TAB | Refills: 0 | Status: SHIPPED | OUTPATIENT
Start: 2019-11-08 | End: 2019-11-13

## 2019-11-08 RX ORDER — HYDROMORPHONE HYDROCHLORIDE 2 MG/1
2 TABLET ORAL
Status: DISCONTINUED | OUTPATIENT
Start: 2019-11-08 | End: 2019-11-08 | Stop reason: HOSPADM

## 2019-11-08 RX ORDER — PANTOPRAZOLE SODIUM 40 MG/1
40 TABLET, DELAYED RELEASE ORAL
Status: DISCONTINUED | OUTPATIENT
Start: 2019-11-08 | End: 2019-11-08 | Stop reason: HOSPADM

## 2019-11-08 RX ORDER — PROMETHAZINE HYDROCHLORIDE 25 MG/1
25 TABLET ORAL
Qty: 40 TAB | Refills: 1 | Status: SHIPPED | OUTPATIENT
Start: 2019-11-08 | End: 2020-08-07

## 2019-11-08 RX ORDER — ASPIRIN 81 MG/1
81 TABLET ORAL
Qty: 90 TAB | Refills: 1 | Status: SHIPPED | OUTPATIENT
Start: 2019-11-08

## 2019-11-08 RX ORDER — SUCRALFATE 1 G/1
1 TABLET ORAL 4 TIMES DAILY
Qty: 90 TAB | Refills: 1 | Status: ON HOLD | OUTPATIENT
Start: 2019-11-08 | End: 2020-01-18 | Stop reason: CLARIF

## 2019-11-08 RX ORDER — PROMETHAZINE HYDROCHLORIDE 25 MG/ML
25 INJECTION, SOLUTION INTRAMUSCULAR; INTRAVENOUS
Status: DISCONTINUED | OUTPATIENT
Start: 2019-11-08 | End: 2019-11-08 | Stop reason: HOSPADM

## 2019-11-08 RX ORDER — PANTOPRAZOLE SODIUM 40 MG/1
40 TABLET, DELAYED RELEASE ORAL
Qty: 90 TAB | Refills: 1 | Status: ON HOLD | OUTPATIENT
Start: 2019-11-08 | End: 2020-01-18 | Stop reason: CLARIF

## 2019-11-08 RX ORDER — LEVETIRACETAM 750 MG/1
750 TABLET ORAL 2 TIMES DAILY
Qty: 180 TAB | Refills: 1 | Status: SHIPPED | OUTPATIENT
Start: 2019-11-08 | End: 2020-01-23

## 2019-11-08 RX ADMIN — ALUMINUM HYDROXIDE, MAGNESIUM HYDROXIDE, AND SIMETHICONE 30 ML: 200; 200; 20 SUSPENSION ORAL at 09:05

## 2019-11-08 RX ADMIN — PROMETHAZINE HYDROCHLORIDE 25 MG: 25 INJECTION INTRAMUSCULAR; INTRAVENOUS at 15:33

## 2019-11-08 RX ADMIN — FAMOTIDINE 20 MG: 10 INJECTION INTRAVENOUS at 09:05

## 2019-11-08 RX ADMIN — HYDROMORPHONE HYDROCHLORIDE 2 MG: 1 INJECTION, SOLUTION INTRAMUSCULAR; INTRAVENOUS; SUBCUTANEOUS at 06:11

## 2019-11-08 RX ADMIN — HYDROMORPHONE HYDROCHLORIDE 2 MG: 1 INJECTION, SOLUTION INTRAMUSCULAR; INTRAVENOUS; SUBCUTANEOUS at 09:14

## 2019-11-08 RX ADMIN — APIXABAN 5 MG: 5 TABLET, FILM COATED ORAL at 09:05

## 2019-11-08 RX ADMIN — LEVETIRACETAM 500 MG: 500 TABLET ORAL at 09:05

## 2019-11-08 RX ADMIN — PROMETHAZINE HYDROCHLORIDE 25 MG: 25 INJECTION INTRAMUSCULAR; INTRAVENOUS at 05:30

## 2019-11-08 RX ADMIN — INSULIN GLARGINE 15 UNITS: 100 INJECTION, SOLUTION SUBCUTANEOUS at 09:00

## 2019-11-08 RX ADMIN — PROMETHAZINE HYDROCHLORIDE 25 MG: 25 INJECTION INTRAMUSCULAR; INTRAVENOUS at 11:39

## 2019-11-08 RX ADMIN — HYDROMORPHONE HYDROCHLORIDE 2 MG: 2 TABLET ORAL at 15:33

## 2019-11-08 RX ADMIN — INSULIN LISPRO 2 UNITS: 100 INJECTION, SOLUTION INTRAVENOUS; SUBCUTANEOUS at 09:06

## 2019-11-08 RX ADMIN — HYDROMORPHONE HYDROCHLORIDE 2 MG: 1 INJECTION, SOLUTION INTRAMUSCULAR; INTRAVENOUS; SUBCUTANEOUS at 01:54

## 2019-11-08 RX ADMIN — INSULIN LISPRO 6 UNITS: 100 INJECTION, SOLUTION INTRAVENOUS; SUBCUTANEOUS at 12:31

## 2019-11-08 RX ADMIN — ALUMINUM HYDROXIDE, MAGNESIUM HYDROXIDE, AND SIMETHICONE 30 ML: 200; 200; 20 SUSPENSION ORAL at 11:39

## 2019-11-08 RX ADMIN — SODIUM CHLORIDE 100 ML/HR: 900 INJECTION, SOLUTION INTRAVENOUS at 06:18

## 2019-11-08 NOTE — PROGRESS NOTES
Problem: Falls - Risk of  Goal: *Absence of Falls  Description  [facility] and appropriate interventions in the flowsheet.   Note:   Fall Risk Interventions:  Mobility Interventions: Patient to call before getting OOB         Medication Interventions: Patient to call before getting OOB, Evaluate medications/consider consulting pharmacy    Elimination Interventions: Call light in reach    History of Falls Interventions: Evaluate medications/consider consulting pharmacy

## 2019-11-08 NOTE — PROGRESS NOTES
Discharge instructions and prescriptions gone over with pt and family. Both stated understanding and had no further questions. Pt IV discontinued without issue, telemetry box removed.

## 2019-11-08 NOTE — ROUTINE PROCESS
Bedside and verbal report received from [address] [place] [address]  (offgoing nurse). Report included the following information; SBAR, Intake/output, Kardex, and summary of care. Patient alert and resting quietly in bed. Call bell and phone in reach.

## 2019-11-08 NOTE — ROUTINE PROCESS
Bedside and Verbal shift change report given to Deysi Lowe RN (oncoming nurse) by Rosezena Severance RN (offgoing nurse). Report included the following information SBAR, Kardex, Intake/Output, MAR and Recent Results. Patient alert and resting quietly in bed. Mother at bedside, call bell in reach.

## 2019-11-08 NOTE — CONSULTS
[email or web address]  [phone]    GASTROENTEROLOGY CONSULT      Impression:   1. Nausea and vomiting - chronic, \"flares up\" 2-3 times per month  - Last EGD ~2017 w/ esophagitis, done at [facility]  2. 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 Origins are probably related to acute/Chronic pancreatitis events with PD disruption and Pseudocyst formation  3. Chronic abdominal pain  4. DM - uncontrolled  5. Protein calorie malnutrition          Plan:     1. Continue Phenergan as needed for his nausea  2. Continue daily PPI  3. Needs outpatient GI follow up. Given his prior problems w/ compliance, recommend appointment scheduled prior to discharge. Thank you for this consultation and the opportunity to participate in the care of this patient. Please do not hesitate to call with any questions or concerns, or should event occur that may necessitate additional GI evaluation. Chief Complaint: Nausea and vomiting      HPI:  Tylor Umaña is a 47 y.o. male who I am being asked to see in consultation for an opinion regarding Nausea and vomiting. He was admitted 2 days ago for nausea, vomiting and diarrhea. He has chronic pancreatitis w/ cutaneous fistula which has been present for many years, history of numerous prior drains. He has not been complaint with outpatient GI follow up. He states that nausea flares up 2-3 times per month and responds well to Phenergan. Today he is requesting he be given lunch and dinner before he is discharged.      PMH:   Past Medical History:   Diagnosis Date    Abdominal pain, generalized     Chronic pancreatitis ([address]     Diabetes ([address]     hypothyroid    Encounter for long-term (current) use of other medications     Essential hypertension     ETOH abuse     GERD (gastroesophageal reflux disease)     Heart failure (HCC)     Hyponatremia     Pain in the abdomen 11/2/2010    Pancreatitis     w/ abscess and pseudocyst    Pancreatitis     Psychiatric disorder     depression, anxiety    Tobacco abuse        PSH:   Past Surgical History:   Procedure Laterality Date    HX ABDOMINAL LAPAROSCOPY      PANCREATIC STENT    HX CHOLECYSTECTOMY         Social HX:   Social History     Socioeconomic History    Marital status: SINGLE     Spouse name: Not on file    Number of children: Not on file    Years of education: Not on file    Highest education level: Not on file   Occupational History    Not on file   Social Needs    Financial resource strain: Not on file    Food insecurity:     Worry: Not on file     Inability: Not on file    Transportation needs:     Medical: Not on file     Non-medical: Not on file   Tobacco Use    Smoking status: Former Smoker     Packs/day: 0.50     Years: 0.00     Pack years: 0.00     Types: Cigarettes    Smokeless tobacco: Never Used   Substance and Sexual Activity    Alcohol use:  Yes    Drug use: No    Sexual activity: Yes     Birth control/protection: None   Lifestyle    Physical activity:     Days per week: Not on file     Minutes per session: Not on file    Stress: Not on file   Relationships    Social connections:     Talks on phone: Not on file     Gets together: Not on file     Attends Protestant service: Not on file     Active member of club or organization: Not on file     Attends meetings of clubs or organizations: Not on file     Relationship status: Not on file    Intimate partner violence:     Fear of current or ex partner: Not on file     Emotionally abused: Not on file     Physically abused: Not on file     Forced sexual activity: Not on file   Other Topics Concern    Not on file   Social History Narrative    Not on file       FHX:   Family History   Problem Relation Age of Onset    Heart Disease Father        Allergy:   Allergies   Allergen Reactions    Ampicillin-Sulbactam Rash    Pcn [Penicillins] Itching and Hives    Piperacillin-Tazobactam Rash    Pollen Extracts Rash  Seafood [Shellfish Containing Products] Hives     Other reaction(s): unknown  Has tolerated iohexol (iodine-containing contrast)  Only shrimp  Shrimp       Patient Active Problem List   Diagnosis Code    Chronic pancreatitis ([facility] K86.1    Tobacco abuse Z72.0    Abscess of abdominal cavity (HCC) K65.1    IDDM (insulin dependent diabetes mellitus) ([facility] E11.9, Z79.4    Gastroparesis K31.84    Perinephric abscess N15.1    Pneumobilia K83.8    Abdominal pain R10.9    Moderate protein-calorie malnutrition (HCC) E44.0    Biliary drain displacement T85.520A    Hypokalemia E87.6    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    H/O ETOH abuse F10.11    Chronic pain G89.29    Sepsis ([facility]) A41.9    HCAP (healthcare-associated pneumonia) J18.9    Colitis, acute K52.9    Lactic acidosis E87.2    Acute alcoholic hepatitis K38.62    Chronic pancreatitis due to acute alcohol intoxication ([facility]) K86.0, F10.929    Enteritis K52.9    Hyperglycemia R73.9    Chronic renal insufficiency N18.9    CHRISTI (acute kidney injury) ([facility]) N17.9    UTI (urinary tract infection) N39.0    Central cord syndrome [facility]) D97.804Z    Pancreatic fistula K86.89    Enterocutaneous fistula K63.2    Abscess L02.91    Ileus (HCC) K56.7    Partial seizure (HCC) R56.9    Seizure (HCC) R56.9    Type 2 diabetes mellitus with hyperosmolar nonketotic hyperglycemia ([facility]) E11.00    Diabetic hyperosmolar non-ketotic state ([facility] E11.00       Home Medications:     Medications Prior to Admission   Medication Sig    aspirin delayed-release 81 mg tablet Take  by mouth daily.  apixaban (ELIQUIS) 5 mg tablet Take 5 mg by mouth.  insulin glargine (LANTUS,BASAGLAR) 100 unit/mL (3 mL) inpn 30 Units by SubCUTAneous route.  insulin aspart U-100 (NOVOLOG) 100 unit/mL (3 mL) inpn 5 Units by SubCUTAneous route.  melatonin 3 mg tablet Take 3 mg by mouth.  mirtazapine (REMERON) 15 mg tablet Take 15 mg by mouth.     oxyCODONE-acetaminophen (PERCOCET) 5-325 mg per tablet Take 1 Tab by mouth.  Insulin Syringe-Needle U-100 0.3 mL 29 gauge syrg 2-14 Units by Does Not Apply route Before breakfast, lunch, and dinner.  lipase-protease-amylase (CREON) 6,000-19,000 -30,000 unit capsule Take 1 Cap by mouth three (3) times daily (with meals).  acetaminophen (TYLENOL EXTRA STRENGTH) 500 mg tablet Take 500 mg by mouth every six (6) hours as needed for Pain.  therapeutic multivitamin (THERAGRAN) tablet Take 1 Tab by mouth daily.  promethazine (PHENERGAN) 25 mg tablet Take 1 Tab by mouth every six (6) hours as needed.  [] promethazine (PHENERGAN) 25 mg suppository Insert 1 Suppository into rectum every six (6) hours as needed for Nausea for up to 7 days.  naloxone (NARCAN) 4 mg/actuation nasal spray Use 1 spray intranasally, then discard. Repeat with new spray every 2 min as needed for opioid overdose symptoms, alternating nostrils.  levETIRAcetam (KEPPRA) 500 mg tablet Take 2 Tabs by mouth two (2) times a day.  sucralfate (CARAFATE) 1 gram tablet Take 1 g by mouth four (4) times daily.  OTHER NO DRIVING OR OPERATING HEAVY MACHINERY FOR 6 MONTHS OR UNTIL CLEARED BY NEUROLOGY.  Blood-Glucose Meter (FREESTYLE LITE METER) monitoring kit Test blood sugars 4-6 times a day    glucose blood VI test strips (FREESTYLE TEST) strip 1 Each by Does Not Apply route See Admin Instructions. Test blood sugars 4-6 times a day. Review of Systems:     Constitutional: No fevers, chills, weight loss, fatigue. Skin: No rashes, pruritis, jaundice, ulcerations, erythema. HENT: No headaches, nosebleeds, sinus pressure, rhinorrhea, sore throat. Eyes: No visual changes, blurred vision, eye pain, photophobia, jaundice. Cardiovascular: No chest pain, heart palpitations. Respiratory: No cough, SOB, wheezing, chest discomfort, orthopnea. Gastrointestinal:    Genitourinary: No dysuria, bleeding, discharge, pyuria.    Musculoskeletal: No weakness, arthralgias, wasting. Endo: No sweats. Heme: No bruising, easy bleeding. Allergies: As noted. Neurological: Cranial nerves intact. Alert and oriented. Gait not assessed. Psychiatric:  No anxiety, depression, hallucinations. Visit Vitals  /73 (BP 1 Location: Left arm, BP Patient Position: At rest)   Pulse 79   Temp 98.1 °F (36.7 °C)   Resp 17   Ht 6' 0.01\" (1.829 m)   Wt 60.3 kg (133 lb)   SpO2 96%   BMI 18.03 kg/m²       Physical Assessment:     constitutional: well developed, well nourished, normal habitus, no deformities, in no acute distress. skin: no rashes, ulcers, icterus or other lesions  eyes: normal conjunctivae and lids; no jaundice pupils: normal  HEENT: normocephalic, atraumatic  neck: supple, normal ROM   respiratory: normal chest excursion; no intercostal retraction or accessory muscle use; clear to ascultation bilaterally    cardiovascular: regular rate and rhythm, no murmur, rub or gallop.   abdominal: cutaneous fistula noted on right side, no drainage present, normal bowel sounds, soft,+ diffuse tenderness, no masses. no hernias appreciated. liver: normal size and consistency. spleen: not palpable. rectal: hemoccult/guaiac: not performed. extremities: no significant deformity or contracture, no edema. Gait not assessed   neurologic: cranial nerves: II-XII normal.   psychiatric: judgement/insight: within normal limits. memory: within normal limits for recent and remote events. mood and affect: no evidence of depression, anxiety or agitation. orientation: oriented to time, space and person. Basic Metabolic Profile   Recent Labs     11/08/19  0238 11/07/19  0425  11/05/19  1549    140   < > 125*   K 3.8 3.7   < > 5.0    113*   < > 98*   CO2 23 22   < > 22   BUN 13 15   < > 27*   * 53*   < > 737*   CA 7.5* 7.6*   < > 8.9   MG  --  2.3   < > 2.7*   PHOS  --   --   --  3.9    < > = values in this interval not displayed.          CBC w/Diff Recent Labs     11/08/19  0238   WBC 7.6   RBC 3.35*   HGB 9.6*   HCT 30.0*   MCV 89.6   MCH 28.7   MCHC 32.0   RDW 16.1*       Recent Labs     11/06/19  0415   GRANS 58   LYMPH 30   EOS 5        Hepatic Function   Recent Labs     11/07/19  0425 11/06/19  0415   ALB 2.8* 2.8*   TP 6.3* 6.7   TBILI 0.8 0.3   SGOT 114* 33    117   LPSE  --  38*        Coags   Recent Labs     11/06/19  1315 11/06/19  0415 11/05/19 2050   PTP  --   --  12.7   INR  --   --  1.0   APTT >180.0* 62.2* 26.7           RACHEL Morejon.   11/08/19, 1:49 PM   [facility], [address] [place] [address]  Cell: [phone]  [email or web address]

## 2019-11-08 NOTE — PROGRESS NOTES
D/C orders received. No needs identified by . Chart reviewed. Pt will be transported home by [employer] .  available as needed.     Jimbo Mayo, RN BSN  Care Manager  [phone]

## 2019-11-08 NOTE — PROGRESS NOTES
Problem: Mobility Impaired (Adult and Pediatric)  Goal: *Acute Goals and Plan of Care (Insert Text)  Description  Physical Therapy Goals  Initiated 11/6/2019 and to be accomplished within 7 day(s)  1. Patient will move from supine to sit and sit to supine  in bed with independence. 2.  Patient will transfer from bed to chair and chair to bed with independence using the least restrictive device. 3.  Patient will perform sit to stand with independence. 4.  Patient will ambulate with independence for 200 feet with the least restrictive device. 5.  Patient will ascend/descend 1 stair with independence. PLOF: Pt states he was independent with ADLs and mobility PTA and will be staying with mother in 1 story home with 1 NADEGE. Outcome: Progressing Towards Goal    PHYSICAL THERAPY TREATMENT    Patient: Giorgio Santos (49 y.o. male)  Date: 11/8/2019  Diagnosis: Diabetic hyperosmolar non-ketotic state ([facility] [address] [E11.00] <principal problem not specified>       Precautions:    PLOF: see above     ASSESSMENT:  Pt received semi-reclined in bed with mom at bedside. Pt agreeable to PT session. Completed bed mobility with independence. Sit to stand with independence. Requesting to ambulate with UE support on IV pole, reporting R hip pain with light touch. Ambulating x100 feet with CGA, discussing need for cane or RW due to need for several standing rest breaks with trunk lean on wall. Ambulating up 2 steps with L handrail and step to pattern with CGA. Returned to room, reporting he was afraid he would be discharged with this much pain and at this point he cannot go walk due to his pain. Pt left with all needs met and call bell in reach. Progression toward goals:   ?      Improving appropriately and progressing toward goals  ? Improving slowly and progressing toward goals  ?       Not making progress toward goals and plan of care will be adjusted     PLAN:  Patient continues to benefit from skilled intervention to address the above impairments. Continue treatment per established plan of care. Discharge Recommendations:  Home with 24 hour assist  Further Equipment Recommendations for Discharge:  straight cane vs walker     SUBJECTIVE:   Patient stated You guys always come when I am eating, I don't want to be rushed.     OBJECTIVE DATA SUMMARY:   Critical Behavior:  Neurologic State: Alert  Orientation Level: Oriented X4  Cognition: Appropriate decision making, Follows commands  Safety/Judgement: Fall prevention  Functional Mobility Training:  Bed Mobility:     Supine to Sit: Independent  Sit to Supine: Independent  Scooting: Independent         Transfers:  Sit to Stand: Independent  Stand to Sit: Independent                             Balance:  Sitting: Intact  Standing: Impaired; With support  Standing - Static: Good  Standing - Dynamic : Fair(requesting use of IV pole while ambulating, rest breaks)   Range Of Motion:                                   Posture:           Wheelchair Mobility:          Ambulation/Gait Training:  Distance (ft): 100 Feet (ft)  Assistive Device: (IV pole )  Ambulation - Level of Assistance: Contact guard assistance        Gait Abnormalities: Decreased step clearance;Trunk sway increased; Shuffling gait              Speed/Maryse: Slow;Shuffled                      Stairs:      2 steps, unilateral handrail, step to pattern       Pain:  Did not rate pain but reported belly pain, reporting RN had given pain medication within last hour. Activity Tolerance:   Poor activity tolerance, requesting to get back to bed and deferring TE in sitting. Please refer to the flowsheet for vital signs taken during this treatment. After treatment:   ? Patient left in no apparent distress sitting up in chair  ? Patient left in no apparent distress in bed  ? Call bell left within reach  ? Nursing notified  ? Caregiver present  ? Bed alarm activated  ?  SCDs applied      COMMUNICATION/EDUCATION:   ?         Role of Physical Therapy in the acute care setting. ?         Fall prevention education was provided and the patient/caregiver indicated understanding. ? Patient/family have participated as able in working toward goals and plan of care. ?         Patient/family agree to work toward stated goals and plan of care. ?         Patient understands intent and goals of therapy, but is neutral about his/her participation. ? Patient is unable to participate in stated goals/plan of care: ongoing with therapy staff.   ?         Other:        Alba Wahl, PT   Time Calculation: 11 mins

## 2019-11-08 NOTE — PROGRESS NOTES
Bedside shift change report given to EVERTON Partida (oncoming nurse) by Vianca Silverman RN (offgoing nurse). Report included the following information SBAR, Kardex and MAR.

## 2019-11-08 NOTE — DISCHARGE INSTRUCTIONS
Discharge Instructions    Patient: Myra Hayden MRN: [id number]  CSN: [id number]    YOB: 1965  Age: 47 y.o. Sex: male    DOA: 11/5/2019 LOS:  LOS: 3 days   Discharge Date:      ACUTE DIAGNOSES:  1. Persistent epigastric pain, nausea/vomiting, improved      Likely acute on chronic pancreatitis   2. Hyperglycemia with uncontrolled DM2, continues ON Lantus  3. Suspected hyperosmolar non-ketotic hyperglycemia, resolved   4. Pseudo-hyponatremia in the setting of hyperglycemia, resolved. 5.  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 Prerenal azotemia, on CKD3, resolved   7. Severe protein calorie malnutrition, nutritional supplement continues  8. Acute DVT of upper extremity, on Eliquis   9. Active tobacco smoking,   10. Partial seizure, recent left-sided twitches resolved, has not taking his medications, resume keppra       DISCHARGE MEDICATIONS:         · It is important that you take the medication exactly as they are prescribed. · Keep your medication in the bottles provided by the pharmacist and keep a list of the medication names, dosages, and times to be taken in your wallet. · Do not take other medications without consulting your doctor. DIET:  Regular Diet, continue with Ensure supplement with meals    ACTIVITY: Activity as tolerated    ADDITIONAL INFORMATION: If you experience any of the following symptoms then please call your primary care physician or return to the emergency room if you cannot get hold of your doctor: Fever, chills, nausea, vomiting, diarrhea, change in mentation, falling, bleeding, shortness of breath. FOLLOW UP CARE:  [facility] PCP  you are to call and set up an appointment to see them in 1 week. Follow-up with *Dr. Xavier Kc, gastrointestinal within 2 weeks      Information obtained by :  I understand that if any problems occur once I am at home I am to contact my physician.     I understand and acknowledge receipt of the instructions indicated above.                                                                                                                                            Physician's or R.N.'s Signature                                                                  Date/Time                                                                                                                                              Patient or Representative Signature                                                          Date/Time    Florencio Singh MD  11/8/2019  3:49 PM

## 2019-11-08 NOTE — DISCHARGE SUMMARY
Discharge Summary    Patient: Alexis Dominguez               Sex: male          DOA: 11/5/2019         YOB: 1965      Age:  47 y.o.        LOS:  LOS: 3 days                Admit Date: 11/5/2019    Discharge Date: 11/8/2019    Primary care physician: Lorna, MD Zachary    Discharge Diagnoses:    1. Persistent epigastric pain, nausea vomiting, improved        Likely acute on chronic pancreatitis  2. Hyperglycemia with uncontrolled diabetes mellitus type 2, continue on Lantus  3. Suspected hyperosmolar nonketotic hyperglycemia, resolved  4. Pseudohyponatremia in the setting of hypoglycemia, resolved  5. Chronic abdominal wall fascial layer, no infection, chronic  6. Prerenal azotemia, on CKD 3, resolved  7. Severe protein calorie malnutrition, nutritional supplement continues  8. Acute DVT on upper extremity, on Eliquis  9. Active tobacco smoking, risks associated with tobacco smoking educated, advised on smoking cessation  10. Possible seizure, left side twitches prior to presentation has resolved, resumed on Keppra and lower dose    Discharge Condition: Good  Disposition:home  Code Status: full code     Follow up for Primary Care Physician:  1) he needs follow up with GI, in 2-4 weeks for further care  2) he needs close monitor for complaint of his insulin use, hyperglycemia control. 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   He states that he has nausea vomiting with nonbloody diarrhea for 3 days prior to presentation. Further work-up in the ER show elevated glucose hence participated precipitated him being on insulin drip for the treatment of suspected hyperosmolar nonketotic hyperglycemia. He tolerated well and have correction of his hyperglycemia at home he was on Lantus thus it was resumed on home dose insulin. CT of the abdomen pelvic review without any clear pathology. His symptom improved with IV Dilaudid, he was able to maintain oral intake as well as oral medication. Hence his Eliquis was resumed and the heparin drip was discontinued. He continued to be on symptomatic care with goal of maintaining oral intake and oral medication. As for his nausea he was continue on Phenergan which he tolerated well. GI was asked to consulted on his case but no further recommendation for inpatient care he will to follow-up outpatient  He has symptoms of left side facial twisted, likely due to possible seizure and history. He has not been taking his Keppra, hence he has been loaded on Keppra. During the hospitalization his symptoms resolved. Last 24 Hours: nausea persisted, had vomiting this AM, but had eaten his breakfast.   Has tolerated oral medications, has taking keppra and eliquis orally. Able to tolerate oral dilaudid for pain control. GI consulted for further outpt follow up has he has not bee seen in the office. No further inpt workup recommended  Continues to use phenergan with good effect for his chronic nausea.      ROS:  no fever/chills, no headache, no dizziness, no facial pain, no sinus congestion,   No swallowing pain, No chest pain, no palpitation, no shortness of breath, + abd pain, +nausea +vomiting in AM  No diarrhea, no urinary complaint, no leg pain or swelling    VS:   Visit Vitals  /82 (BP 1 Location: Left arm, BP Patient Position: At rest)   Pulse 90   Temp 99 °F (37.2 °C)   Resp 18   Ht 6' 0.01\" (1.829 m)   Wt 60.3 kg (133 lb)   SpO2 98%   BMI 18.03 kg/m²      Tmax/24hrs: Temp (24hrs), Av.8 °F (37.1 °C), Min:98.1 °F (36.7 °C), Max:99.5 °F (37.5 °C)      Intake/Output Summary (Last 24 hours) at 2019 1551  Last data filed at 2019 1223  Gross per 24 hour   Intake    Output 1900 ml   Net -1900 ml       General:  Cooperative, Not in acute distress, speaks in full sentence while in bed  HEENT: PERRL, EOMI, supple neck, no JVD, dry oral mucosa  Cardiovascular: S1S2 regular, no rub/gallop   Pulmonary: Clear air entry bilaterally, no wheezing, no crackle  GI:  Soft, +epigastric tender, non distended, +bs, no guarding, right abd wall fistula, dry, non infection  Extremities:  No pedal edema, +distal pulses appreciated   Neuro: AOx3, moving all extremities, no gross deficit. Consults:   Gastroenterologist: Dr. Vikash Dixon     Significant Diagnostic Studies:   CT Results (most recent):  Results from Hospital Encounter encounter on 19   CT ABD PELV WO CONT    Narrative EXAM: CT ABDOMEN AND PELVIS WITHOUT CONTRAST    CLINICAL HISTORY/INDICATION:  abd pain / chronic pancreatitis , T2 H abuse,  hypertension, diabetes, patient presents with high blood sugar, several episodes  of nonbloody vomiting over the past 2 days with loose nonbloody stools    COMPARISON: CT abdomen and pelvis 2019. TECHNIQUE: No intravenous contrast was utilized for this helical thin section CT  of the abdomen and pelvis. Coronal and sagittal reformations obtained. Evaluation of the solid organs, bowel wall, and vascular structures are  therefore limited. All CT scans at this facility are performed using dose optimization technique as  appropriate to a performed exam, to include automated exposure control,  adjustment of the mA and/or kV according to patient's size (including  appropriate matching for site-specific examinations), or use of iterative  reconstruction technique.     FINDINGS:     Abdomen-     Subtle hazy opacity is seen in the inferior lingula, right middle lobe, and  right lower lobe. Mild bronchial wall thickening. Less prominent than on the  previous exam.  The included portions of the chest wall and the abdominal wall  are  unremarkable. Clips at the elizabeth hepatis. Very minimal dilatation in the intrahepatic biliary  system of the left lobe less pronounced than on the previous study. Mild  intrahepatic with moderate extrahepatic biliary dilatation. The common bile duct  measures 1.4 cm and tapers to the ampulla. Unchanged from the previous study. The gallbladder is surgically removed. .    There is a stent in the pancreatic duct. Multiple calcifications in the pancreas  consistent with chronic calcific pancreatitis. Thickening of the left adrenal gland. Right adrenal is unremarkable. Kidneys are unremarkable. Tiny amount of free fluid at the inferior extent of the right lobe of the liver  as well as in the right paracolic gutter. Persistent soft tissue abnormality  located at the inferior pole of the right kidney in the anterior intraperitoneal  space appearing confluent with the duodenum with possibly pulling the duodenum  laterally. The soft tissue extends to the lateral abdominal wall located   Small amount of internal almost tubular hypodensity and several calcifications. Confluent with increased density at the level of the lateral abdominal wall with  retraction of the overlying skin. Appearance is suggestive of a postsurgical  linear band of scarring or perhaps secondary to previous percutaneous catheter  placement. This has decreased in the central low density component compared to the previous  CT. Increased stool throughout the colon. Pelvis -    There is no free pelvic fluid. The pelvic viscera are unremarkable. Urinary bladder is moderately distended. Degenerative disc disease at the lower lumbar spine.       Impression IMPRESSION:     Interval decrease in the soft tissue fluid track extending from the lateral  aspect of the duodenum to the right lateral abdominal wall. Markedly distended urinary bladder. Evaluation of the bowel and solid organs is limited by the lack of IV contrast.  Increased stool throughout the colon. Chronic calcific pancreatitis. There is a pancreatic ductal stent in expected  position. Persistent biliary dilatation with a tiny amount of gas in the intrahepatic  biliary system improved in appearance compared to the previous exam.      Report provided to the emergency department at 2221 hrs. XR Results (most recent):  Results from Hospital Encounter encounter on 11/05/19   XR CHEST PORT    Narrative EXAM: Chest Radiograph    INDICATION:  sob    TECHNIQUE: AP view of the chest    COMPARISON: 8/27/2019, 6/27/2019, 5/18/2019 and 7/28/2018    FINDINGS: No pneumothorax identified. Scarring changes are noted in the midlung  fields similar to prior imaging. No acute infiltrate appreciated. No effusions  identified. The cardiomediastinal silhouette is unremarkable. The pulmonary  vasculature is unremarkable. Degenerative changes in the shoulder and spine. Impression Impression:  1. No acute infiltrate or effusion appreciated.             Lab/Data Review:  Labs: Results:       Chemistry Recent Labs     11/08/19 0238 11/07/19 0425 11/06/19 0415   * 53* 247*    140 136   K 3.8 3.7 3.7    113* 110   CO2 23 22 20*   BUN 13 15 25*   CREA 1.07 1.04 1.26   CA 7.5* 7.6* 7.3*   AGAP 4 5 6   BUCR 12 14 20   AP  --  110 117   TP  --  6.3* 6.7   ALB  --  2.8* 2.8*   GLOB  --  3.5 3.9   AGRAT  --  0.8 0.7*      CBC w/Diff Recent Labs     11/08/19 0238 11/07/19 0425 11/06/19 0415   WBC 7.6 10.4 6.5   RBC 3.35* 3.29* 3.53*   HGB 9.6* 9.3* 9.9*   HCT 30.0* 29.0* 30.1*    181 202   GRANS  --   --  58   LYMPH  --   --  30   EOS  --   --  5      Coagulation Recent Labs     11/06/19  1315 11/06/19  0415 11/05/19 2050   PTP  --   --  12.7   INR  --   --  1.0   APTT >180.0* 62.2* 26.7       Iron/Ferritin No results for input(s): IRON in the last 72 hours. No lab exists for component: TIBCCALC   BNP No results for input(s): BNPP in the last 72 hours. Cardiac Enzymes No results for input(s): CPK, CKND1, JESU in the last 72 hours. No lab exists for component: CKRMB, TROIP   Liver Enzymes Recent Labs     11/07/19  0425   TP 6.3*   ALB 2.8*      SGOT 114*      Thyroid Studies No results for input(s): T4, T3U, TSH, TSHEXT in the last 72 hours. No lab exists for component: T3RU       All Micro Results     Procedure Component Value Units Date/Time    CULTURE, URINE [[id number]]  (Abnormal) Collected:  11/05/19 1635    Order Status:  Completed Specimen:  Urine from Clean catch Updated:  11/07/19 1047     Special Requests: NO SPECIAL REQUESTS        Culture result:       [id number] COLONIES/mL STREPTOCOCCI, BETA HEMOLYTIC GROUP B            61539 COLONIES/mL YEAST                   Medications at discharge  including reasons for change and indications for new ones:   Current Discharge Medication List      START taking these medications    Details   HYDROmorphone (DILAUDID) 2 mg tablet Take 1 Tab by mouth every six (6) hours as needed for Pain for up to 5 days. Max Daily Amount: 8 mg. Indications: pain, excessive pain  Qty: 20 Tab, Refills: 0    Associated Diagnoses: Alcohol-induced chronic pancreatitis (HCC)      pantoprazole (PROTONIX) 40 mg tablet Take 1 Tab by mouth Before breakfast and dinner. Indications: indigestion, inflammation of the esophagus with erosion  Qty: 90 Tab, Refills: 1         CONTINUE these medications which have CHANGED    Details   aspirin delayed-release 81 mg tablet Take 1 Tab by mouth every six (6) hours as needed for Pain. Indications: treatment to prevent a heart attack  Qty: 90 Tab, Refills: 1      lipase-protease-amylase (CREON) 6,000-19,000 -30,000 unit capsule Take 1 Cap by mouth three (3) times daily (with meals).   Qty: 90 Cap, Refills: 3      promethazine (PHENERGAN) 25 mg tablet Take 1 Tab by mouth every six (6) hours as needed for Nausea. Indications: motion sickness  Qty: 40 Tab, Refills: 1      sucralfate (CARAFATE) 1 gram tablet Take 1 Tab by mouth four (4) times daily. Qty: 90 Tab, Refills: 1      apixaban (ELIQUIS) 5 mg tablet Take 1 Tab by mouth two (2) times a day. Indications: blood clot in a deep vein of the extremities  Qty: 60 Tab, Refills: 1      levETIRAcetam (KEPPRA) 750 mg tablet Take 1 Tab by mouth two (2) times a day. Indications: Additional Medication to Treat Partial Seizures  Qty: 180 Tab, Refills: 1      insulin glargine (LANTUS,BASAGLAR) 100 unit/mL (3 mL) inpn 30 Units by SubCUTAneous route daily. Indications: type 2 diabetes mellitus  Qty: 5 Pen, Refills: 1      mirtazapine (REMERON) 15 mg tablet Take 1 Tab by mouth nightly. Indications: major depressive disorder  Qty: 90 Tab, Refills: 1         CONTINUE these medications which have NOT CHANGED    Details   insulin aspart U-100 (NOVOLOG) 100 unit/mL (3 mL) inpn 5 Units by SubCUTAneous route. melatonin 3 mg tablet Take 3 mg by mouth. Insulin Syringe-Needle U-100 0.3 mL 29 gauge syrg 2-14 Units by Does Not Apply route Before breakfast, lunch, and dinner. Qty: 100 Syringe, Refills: 0      therapeutic multivitamin (THERAGRAN) tablet Take 1 Tab by mouth daily. Qty: 30 Tab, Refills: 0      naloxone (NARCAN) 4 mg/actuation nasal spray Use 1 spray intranasally, then discard. Repeat with new spray every 2 min as needed for opioid overdose symptoms, alternating nostrils. Qty: 1 Each, Refills: 0      OTHER NO DRIVING OR OPERATING HEAVY MACHINERY FOR 6 MONTHS OR UNTIL CLEARED BY NEUROLOGY. Qty: 1 cm, Refills: 0      Blood-Glucose Meter (FREESTYLE LITE METER) monitoring kit Test blood sugars 4-6 times a day  Qty: 1 Kit, Refills: 0      glucose blood VI test strips (FREESTYLE TEST) strip 1 Each by Does Not Apply route See Admin Instructions. Test blood sugars 4-6 times a day.   Qty: 100 Strip, Refills: 3         STOP taking these medications       oxyCODONE-acetaminophen (PERCOCET) 5-325 mg per tablet Comments:   Reason for Stopping:         acetaminophen (TYLENOL EXTRA STRENGTH) 500 mg tablet Comments:   Reason for Stopping:         promethazine (PHENERGAN) 25 mg suppository Comments:   Reason for Stopping:                       Pending laboratory work and tests: none    Activity: Activity as tolerated    Diet: Diabetic Diet and Low fat, Low cholesterol    Wound Care: Keep wound clean and dry        Lorie Cristina MD  11/8/2019  3:51 PM

## 2019-11-09 ENCOUNTER — HOSPITAL ENCOUNTER (EMERGENCY)
Age: 54
Discharge: HOME | End: 2019-11-10
Attending: EMERGENCY MEDICINE
Payer: MEDICAID

## 2019-11-09 DIAGNOSIS — R73.9 HYPERGLYCEMIA: Primary | ICD-10-CM

## 2019-11-09 DIAGNOSIS — R56.9 FOCAL SEIZURE (HCC): ICD-10-CM

## 2019-11-09 LAB
ADMINISTERED INITIALS, ADMINIT: NORMAL
ADMINISTERED INITIALS, ADMINIT: NORMAL
ANION GAP SERPL CALC-SCNC: 9 MMOL/L (ref 3–18)
BACTERIA SPEC CULT: ABNORMAL
BACTERIA SPEC CULT: ABNORMAL
BASOPHILS # BLD: 0 K/UL (ref 0–0.1)
BASOPHILS NFR BLD: 0 % (ref 0–2)
BUN SERPL-MCNC: 21 MG/DL (ref 7–18)
BUN/CREAT SERPL: 13 (ref 12–20)
CALCIUM SERPL-MCNC: 8.7 MG/DL (ref 8.5–10.1)
CHLORIDE SERPL-SCNC: 93 MMOL/L (ref 100–111)
CO2 SERPL-SCNC: 24 MMOL/L (ref 21–32)
CREAT SERPL-MCNC: 1.67 MG/DL (ref 0.6–1.3)
D50 ADMINISTERED, D50ADM: 0 ML
D50 ADMINISTERED, D50ADM: 0 ML
D50 ORDER, D50ORD: 0 ML
D50 ORDER, D50ORD: 0 ML
DIFFERENTIAL METHOD BLD: ABNORMAL
EOSINOPHIL # BLD: 0.2 K/UL (ref 0–0.4)
EOSINOPHIL NFR BLD: 3 % (ref 0–5)
ERYTHROCYTE [DISTWIDTH] IN BLOOD BY AUTOMATED COUNT: 15.2 % (ref 11.6–14.5)
EST. AVERAGE GLUCOSE BLD GHB EST-MCNC: 324 MG/DL
GLUCOSE BLD STRIP.AUTO-MCNC: 516 MG/DL (ref 70–110)
GLUCOSE BLD STRIP.AUTO-MCNC: 577 MG/DL (ref 70–110)
GLUCOSE BLD STRIP.AUTO-MCNC: >600 MG/DL (ref 70–110)
GLUCOSE BLD STRIP.AUTO-MCNC: >600 MG/DL (ref 70–110)
GLUCOSE SERPL-MCNC: 920 MG/DL (ref 74–99)
GLUCOSE, GLC: 577 MG/DL
GLUCOSE, GLC: 601 MG/DL
HBA1C MFR BLD: 12.9 % (ref 4.2–5.6)
HCT VFR BLD AUTO: 32.7 % (ref 36–48)
HGB BLD-MCNC: 10.5 G/DL (ref 13–16)
HIGH TARGET, HITG: 180 MG/DL
HIGH TARGET, HITG: 180 MG/DL
INSULIN ADMINSTERED, INSADM: 10.8 UNITS/HOUR
INSULIN ADMINSTERED, INSADM: 15.5 UNITS/HOUR
INSULIN ORDER, INSORD: 10.8 UNITS/HOUR
INSULIN ORDER, INSORD: 15.5 UNITS/HOUR
LOW TARGET, LOT: 140 MG/DL
LOW TARGET, LOT: 140 MG/DL
LYMPHOCYTES # BLD: 1 K/UL (ref 0.9–3.6)
LYMPHOCYTES NFR BLD: 16 % (ref 21–52)
MAGNESIUM SERPL-MCNC: 2.3 MG/DL (ref 1.6–2.6)
MCH RBC QN AUTO: 28.5 PG (ref 24–34)
MCHC RBC AUTO-ENTMCNC: 32.1 G/DL (ref 31–37)
MCV RBC AUTO: 88.9 FL (ref 74–97)
MINUTES UNTIL NEXT BG, NBG: 60 MIN
MINUTES UNTIL NEXT BG, NBG: 60 MIN
MONOCYTES # BLD: 0.5 K/UL (ref 0.05–1.2)
MONOCYTES NFR BLD: 9 % (ref 3–10)
MULTIPLIER, MUL: 0.02
MULTIPLIER, MUL: 0.03
NEUTS SEG # BLD: 4.5 K/UL (ref 1.8–8)
NEUTS SEG NFR BLD: 72 % (ref 40–73)
ORDER INITIALS, ORDINIT: NORMAL
ORDER INITIALS, ORDINIT: NORMAL
PLATELET # BLD AUTO: 237 K/UL (ref 135–420)
PMV BLD AUTO: 10.2 FL (ref 9.2–11.8)
POTASSIUM SERPL-SCNC: 5.2 MMOL/L (ref 3.5–5.5)
RBC # BLD AUTO: 3.68 M/UL (ref 4.7–5.5)
SERVICE CMNT-IMP: ABNORMAL
SODIUM SERPL-SCNC: 126 MMOL/L (ref 136–145)
WBC # BLD AUTO: 6.3 K/UL (ref 4.6–13.2)

## 2019-11-09 PROCEDURE — 84100 ASSAY OF PHOSPHORUS: CPT

## 2019-11-09 PROCEDURE — 85025 COMPLETE CBC W/AUTO DIFF WBC: CPT

## 2019-11-09 PROCEDURE — 80048 BASIC METABOLIC PNL TOTAL CA: CPT

## 2019-11-09 PROCEDURE — 99285 EMERGENCY DEPT VISIT HI MDM: CPT

## 2019-11-09 PROCEDURE — 96366 THER/PROPH/DIAG IV INF ADDON: CPT

## 2019-11-09 PROCEDURE — 74011000258 HC RX REV CODE- 258: Performed by: EMERGENCY MEDICINE

## 2019-11-09 PROCEDURE — 83036 HEMOGLOBIN GLYCOSYLATED A1C: CPT

## 2019-11-09 PROCEDURE — 83735 ASSAY OF MAGNESIUM: CPT

## 2019-11-09 PROCEDURE — 93005 ELECTROCARDIOGRAM TRACING: CPT

## 2019-11-09 PROCEDURE — 82962 GLUCOSE BLOOD TEST: CPT

## 2019-11-09 PROCEDURE — 96365 THER/PROPH/DIAG IV INF INIT: CPT

## 2019-11-09 PROCEDURE — 74011636637 HC RX REV CODE- 636/637: Performed by: EMERGENCY MEDICINE

## 2019-11-09 RX ORDER — DEXTROSE 50 % IN WATER (D50W) INTRAVENOUS SYRINGE
25-50 AS NEEDED
Status: DISCONTINUED | OUTPATIENT
Start: 2019-11-09 | End: 2019-11-10 | Stop reason: HOSPADM

## 2019-11-09 RX ORDER — MAGNESIUM SULFATE 100 %
4 CRYSTALS MISCELLANEOUS AS NEEDED
Status: DISCONTINUED | OUTPATIENT
Start: 2019-11-09 | End: 2019-11-10 | Stop reason: HOSPADM

## 2019-11-09 RX ADMIN — SODIUM CHLORIDE 10.8 UNITS/HR: 9 INJECTION, SOLUTION INTRAVENOUS at 22:57

## 2019-11-09 RX ADMIN — SODIUM CHLORIDE 15.5 UNITS/HR: 9 INJECTION, SOLUTION INTRAVENOUS at 23:57

## 2019-11-10 ENCOUNTER — APPOINTMENT (OUTPATIENT)
Dept: CT IMAGING | Age: 54
End: 2019-11-10
Attending: PHYSICIAN ASSISTANT
Payer: MEDICAID

## 2019-11-10 VITALS
HEART RATE: 78 BPM | TEMPERATURE: 98.9 F | OXYGEN SATURATION: 10 % | SYSTOLIC BLOOD PRESSURE: 142 MMHG | RESPIRATION RATE: 16 BRPM | DIASTOLIC BLOOD PRESSURE: 68 MMHG

## 2019-11-10 VITALS
TEMPERATURE: 98.4 F | RESPIRATION RATE: 24 BRPM | WEIGHT: 140 LBS | BODY MASS INDEX: 18.96 KG/M2 | OXYGEN SATURATION: 99 % | HEIGHT: 72 IN | HEART RATE: 80 BPM | SYSTOLIC BLOOD PRESSURE: 103 MMHG | DIASTOLIC BLOOD PRESSURE: 67 MMHG

## 2019-11-10 DIAGNOSIS — R73.9 HYPERGLYCEMIA: ICD-10-CM

## 2019-11-10 DIAGNOSIS — K63.2 ENTEROCUTANEOUS FISTULA: ICD-10-CM

## 2019-11-10 DIAGNOSIS — R10.31 ABDOMINAL PAIN, RIGHT LOWER QUADRANT: Primary | ICD-10-CM

## 2019-11-10 LAB
ADMINISTERED INITIALS, ADMINIT: NORMAL
ALBUMIN SERPL-MCNC: 3.6 G/DL (ref 3.4–5)
ALBUMIN/GLOB SERPL: 0.7 {RATIO} (ref 0.8–1.7)
ALP SERPL-CCNC: 142 U/L (ref 45–117)
ALT SERPL-CCNC: 27 U/L (ref 16–61)
ANION GAP SERPL CALC-SCNC: 10 MMOL/L (ref 3–18)
ANION GAP SERPL CALC-SCNC: 9 MMOL/L (ref 3–18)
AST SERPL-CCNC: 13 U/L (ref 10–38)
ATRIAL RATE: 82 BPM
BASOPHILS # BLD: 0 K/UL (ref 0–0.1)
BASOPHILS NFR BLD: 0 % (ref 0–2)
BILIRUB SERPL-MCNC: 0.5 MG/DL (ref 0.2–1)
BUN SERPL-MCNC: 19 MG/DL (ref 7–18)
BUN SERPL-MCNC: 20 MG/DL (ref 7–18)
BUN/CREAT SERPL: 13 (ref 12–20)
BUN/CREAT SERPL: 14 (ref 12–20)
CALCIUM SERPL-MCNC: 8.6 MG/DL (ref 8.5–10.1)
CALCIUM SERPL-MCNC: 9.2 MG/DL (ref 8.5–10.1)
CALCULATED P AXIS, ECG09: 65 DEGREES
CALCULATED R AXIS, ECG10: -42 DEGREES
CALCULATED T AXIS, ECG11: 39 DEGREES
CHLORIDE SERPL-SCNC: 103 MMOL/L (ref 100–111)
CHLORIDE SERPL-SCNC: 104 MMOL/L (ref 100–111)
CO2 SERPL-SCNC: 21 MMOL/L (ref 21–32)
CO2 SERPL-SCNC: 23 MMOL/L (ref 21–32)
CREAT SERPL-MCNC: 1.39 MG/DL (ref 0.6–1.3)
CREAT SERPL-MCNC: 1.41 MG/DL (ref 0.6–1.3)
D50 ADMINISTERED, D50ADM: 0 ML
D50 ORDER, D50ORD: 0 ML
DIAGNOSIS, 93000: NORMAL
DIFFERENTIAL METHOD BLD: ABNORMAL
EOSINOPHIL # BLD: 0.2 K/UL (ref 0–0.4)
EOSINOPHIL NFR BLD: 2 % (ref 0–5)
ERYTHROCYTE [DISTWIDTH] IN BLOOD BY AUTOMATED COUNT: 15.1 % (ref 11.6–14.5)
GLOBULIN SER CALC-MCNC: 5.5 G/DL (ref 2–4)
GLUCOSE BLD STRIP.AUTO-MCNC: 214 MG/DL (ref 70–110)
GLUCOSE BLD STRIP.AUTO-MCNC: 238 MG/DL (ref 70–110)
GLUCOSE BLD STRIP.AUTO-MCNC: 324 MG/DL (ref 70–110)
GLUCOSE SERPL-MCNC: 343 MG/DL (ref 74–99)
GLUCOSE SERPL-MCNC: 582 MG/DL (ref 74–99)
GLUCOSE, GLC: 214 MG/DL
GLUCOSE, GLC: 238 MG/DL
GLUCOSE, GLC: 324 MG/DL
HCT VFR BLD AUTO: 39 % (ref 36–48)
HGB BLD-MCNC: 13 G/DL (ref 13–16)
HIGH TARGET, HITG: 180 MG/DL
INSULIN ADMINSTERED, INSADM: 1.8 UNITS/HOUR
INSULIN ADMINSTERED, INSADM: 3.1 UNITS/HOUR
INSULIN ADMINSTERED, INSADM: 5.3 UNITS/HOUR
INSULIN ORDER, INSORD: 1.8 UNITS/HOUR
INSULIN ORDER, INSORD: 3.1 UNITS/HOUR
INSULIN ORDER, INSORD: 5.3 UNITS/HOUR
LIPASE SERPL-CCNC: 23 U/L (ref 73–393)
LOW TARGET, LOT: 140 MG/DL
LYMPHOCYTES # BLD: 1.1 K/UL (ref 0.9–3.6)
LYMPHOCYTES NFR BLD: 11 % (ref 21–52)
MAGNESIUM SERPL-MCNC: 2.1 MG/DL (ref 1.6–2.6)
MAGNESIUM SERPL-MCNC: 2.2 MG/DL (ref 1.6–2.6)
MAGNESIUM SERPL-MCNC: 2.4 MG/DL (ref 1.6–2.6)
MCH RBC QN AUTO: 29 PG (ref 24–34)
MCHC RBC AUTO-ENTMCNC: 33.3 G/DL (ref 31–37)
MCV RBC AUTO: 86.9 FL (ref 74–97)
MINUTES UNTIL NEXT BG, NBG: 60 MIN
MONOCYTES # BLD: 0.7 K/UL (ref 0.05–1.2)
MONOCYTES NFR BLD: 7 % (ref 3–10)
MULTIPLIER, MUL: 0.01
MULTIPLIER, MUL: 0.02
MULTIPLIER, MUL: 0.02
NEUTS SEG # BLD: 8 K/UL (ref 1.8–8)
NEUTS SEG NFR BLD: 80 % (ref 40–73)
ORDER INITIALS, ORDINIT: NORMAL
P-R INTERVAL, ECG05: 154 MS
PHOSPHATE SERPL-MCNC: 2.2 MG/DL (ref 2.5–4.9)
PLATELET # BLD AUTO: 294 K/UL (ref 135–420)
PMV BLD AUTO: 10.1 FL (ref 9.2–11.8)
POTASSIUM SERPL-SCNC: 3.5 MMOL/L (ref 3.5–5.5)
POTASSIUM SERPL-SCNC: 3.8 MMOL/L (ref 3.5–5.5)
PROT SERPL-MCNC: 9.1 G/DL (ref 6.4–8.2)
Q-T INTERVAL, ECG07: 350 MS
QRS DURATION, ECG06: 92 MS
QTC CALCULATION (BEZET), ECG08: 408 MS
RBC # BLD AUTO: 4.49 M/UL (ref 4.7–5.5)
SODIUM SERPL-SCNC: 135 MMOL/L (ref 136–145)
SODIUM SERPL-SCNC: 135 MMOL/L (ref 136–145)
VENTRICULAR RATE, ECG03: 82 BPM
WBC # BLD AUTO: 10 K/UL (ref 4.6–13.2)

## 2019-11-10 PROCEDURE — 99284 EMERGENCY DEPT VISIT MOD MDM: CPT

## 2019-11-10 PROCEDURE — 74011250636 HC RX REV CODE- 250/636: Performed by: PHYSICIAN ASSISTANT

## 2019-11-10 PROCEDURE — 80053 COMPREHEN METABOLIC PANEL: CPT

## 2019-11-10 PROCEDURE — 74011636637 HC RX REV CODE- 636/637: Performed by: PHYSICIAN ASSISTANT

## 2019-11-10 PROCEDURE — 83735 ASSAY OF MAGNESIUM: CPT

## 2019-11-10 PROCEDURE — 82962 GLUCOSE BLOOD TEST: CPT

## 2019-11-10 PROCEDURE — 83690 ASSAY OF LIPASE: CPT

## 2019-11-10 PROCEDURE — 74177 CT ABD & PELVIS W/CONTRAST: CPT

## 2019-11-10 PROCEDURE — 74011000258 HC RX REV CODE- 258: Performed by: EMERGENCY MEDICINE

## 2019-11-10 PROCEDURE — 85025 COMPLETE CBC W/AUTO DIFF WBC: CPT

## 2019-11-10 PROCEDURE — 74011250637 HC RX REV CODE- 250/637: Performed by: PHYSICIAN ASSISTANT

## 2019-11-10 PROCEDURE — 74011636637 HC RX REV CODE- 636/637: Performed by: EMERGENCY MEDICINE

## 2019-11-10 RX ORDER — OXYCODONE AND ACETAMINOPHEN 5; 325 MG/1; MG/1
1 TABLET ORAL
Status: COMPLETED | OUTPATIENT
Start: 2019-11-10 | End: 2019-11-10

## 2019-11-10 RX ADMIN — INSULIN HUMAN 8 UNITS: 100 INJECTION, SOLUTION PARENTERAL at 10:25

## 2019-11-10 RX ADMIN — SODIUM CHLORIDE 1000 ML: 900 INJECTION, SOLUTION INTRAVENOUS at 07:52

## 2019-11-10 RX ADMIN — SODIUM CHLORIDE 3.1 UNITS/HR: 9 INJECTION, SOLUTION INTRAVENOUS at 03:04

## 2019-11-10 RX ADMIN — SODIUM CHLORIDE 5.3 UNITS/HR: 9 INJECTION, SOLUTION INTRAVENOUS at 00:59

## 2019-11-10 RX ADMIN — SODIUM CHLORIDE 1.8 UNITS/HR: 9 INJECTION, SOLUTION INTRAVENOUS at 01:58

## 2019-11-10 RX ADMIN — OXYCODONE HYDROCHLORIDE AND ACETAMINOPHEN 1 TABLET: 5; 325 TABLET ORAL at 07:39

## 2019-11-10 RX ADMIN — OXYCODONE HYDROCHLORIDE AND ACETAMINOPHEN 1 TABLET: 5; 325 TABLET ORAL at 11:35

## 2019-11-10 NOTE — DISCHARGE INSTRUCTIONS
Patient Education        Learning About High Blood Sugar  What is high blood sugar? Your body turns the food you eat into glucose (sugar), which it uses for energy. But if your body isn't able to use the sugar right away, it can build up in your blood and lead to high blood sugar. When the amount of sugar in your blood stays too high for too much of the time, you may have diabetes. Diabetes is a disease that can cause serious health problems. The good news is that lifestyle changes may help you get your blood sugar back to normal and avoid or delay diabetes. What causes high blood sugar? Sugar (glucose) can build up in your blood if you:  · Are overweight. · Have a family history of diabetes. · Take certain medicines, such as steroids. What are the symptoms? Having high blood sugar may not cause any symptoms at all. Or it may make you feel very thirsty or very hungry. You may also urinate more often than usual, have blurry vision, or lose weight without trying. How is high blood sugar treated? You can take steps to lower your blood sugar level if you understand what makes it get higher. Your doctor may want you to learn how to test your blood sugar level at home. Then you can see how illness, stress, or different kinds of food or medicine raise or lower your blood sugar level. Other tests may be needed to see if you have diabetes. How can you prevent high blood sugar? · Watch your weight. If you're overweight, losing just a small amount of weight may help. Reducing fat around your waist is most important. · Limit the amount of calories, sweets, and unhealthy fat you eat. Ask your doctor if a dietitian can help you. A registered dietitian can help you create meal plans that fit your lifestyle. · Get at least 30 minutes of exercise on most days of the week. Exercise helps control your blood sugar. It also helps you maintain a healthy weight. Walking is a good choice.  You also may want to do other activities, such as running, swimming, cycling, or playing tennis or team sports. · If your doctor prescribed medicines, take them exactly as prescribed. Call your doctor if you think you are having a problem with your medicine. You will get more details on the specific medicines your doctor prescribes. Follow-up care is a key part of your treatment and safety. Be sure to make and go to all appointments, and call your doctor if you are having problems. It's also a good idea to know your test results and keep a list of the medicines you take. Where can you learn more? Go to http://thiagoLinchpinmikhail.info/. Enter O108 in the search box to learn more about \"Learning About High Blood Sugar. \"  Current as of: April 16, 2019  Content Version: 12.2  © 2372-3970 [employer]. Care instructions adapted under license by [employer] (which disclaims liability or warranty for this information). If you have questions about a medical condition or this instruction, always ask your healthcare professional. [place] [address] any warranty or liability for your use of this information. Patient Education        Seizure: Care Instructions  Your Care Instructions    Seizures are caused by abnormal patterns of electrical signals in the brain. They are different for each person. Seizures can affect movement, speech, vision, or awareness. Some people have only slight shaking of a hand and do not pass out. Other people may pass out and have violent shaking of the whole body. Some people appear to stare into space. They are awake, but they can't respond normally. Later, they may not remember what happened. You may need tests to identify the type and cause of the seizures. A seizure may occur only once, or you may have them more than one time. Taking medicines as directed and following up with your doctor may help keep you from having more seizures.   The doctor has checked you carefully, but problems can develop later. If you notice any problems or new symptoms, get medical treatment right away. Follow-up care is a key part of your treatment and safety. Be sure to make and go to all appointments, and call your doctor if you are having problems. It's also a good idea to know your test results and keep a list of the medicines you take. How can you care for yourself at home? · Be safe with medicines. Take your medicines exactly as prescribed. Call your doctor if you think you are having a problem with your medicine. · Do not do any activity that could be dangerous to you or others until your doctor says it is safe to do so. For example, do not drive a car, operate machinery, swim, or climb ladders. · Be sure that anyone treating you for any health problem knows that you have had a seizure and what medicines you are taking for it. · Identify and avoid things that may make you more likely to have a seizure. These may include lack of sleep, alcohol or drug use, stress, or not eating. · Make sure you go to your follow-up appointment. When should you call for help? Call 911 anytime you think you may need emergency care. For example, call if:    · You have another seizure.     · You have more than one seizure in 24 hours.     · You have new symptoms, such as trouble walking, speaking, or thinking clearly.    Call your doctor now or seek immediate medical care if:    · You are not acting normally.    Watch closely for changes in your health, and be sure to contact your doctor if you have any problems. Where can you learn more? Go to http://thiago-mikhail.info/. Enter H171 in the search box to learn more about \"Seizure: Care Instructions. \"  Current as of: March 28, 2019  Content Version: 12.2  © 4915-0153 [employer]. Care instructions adapted under license by [employer] (which disclaims liability or warranty for this information).  If you have questions about a medical condition or this instruction, always ask your healthcare professional. [place] [address] any warranty or liability for your use of this information.

## 2019-11-10 NOTE — ED PROVIDER NOTES
EMERGENCY DEPARTMENT HISTORY AND PHYSICAL EXAM    Date: 11/10/2019  Patient Name: Sen Avery    History of Presenting Illness     Chief Complaint   Patient presents with    Other         History Provided By: Patient    Chief Complaint: NATALIE site draining, skin irritation  Duration: Prior to arrival  Timing: Acute  Location: Right lower quadrant  Quality: Yellow fluid  Severity: 10 out of 10  Modifying Factors: Skin irritation is worse when fluid is draining  Associated Symptoms: none       Additional History (Context): Sen Avery is a 47 y.o. male with a history of diabetes and GERD who presents today for NATALIE site draining worse than usual and skin irritation. Patient was seen last night for hyperglycemia, went upstairs to visit his mother and states the fluid started to drain. Patient does not have a \"bag\" on the site at this time. Patient reports his surgeon is at [facility].  Patient reports he initially had this drain placed for a fistula he had about 8 years ago and has had problems ever since. Patient is very well-known to this ED. Patient denies any fevers or chills. Denies any nausea or vomiting. Reports abdominal pain in the right lower quadrant. Patient reports skin surrounding the site is burning when the fluid from his NATALIE site hits it. Patient has not tried anything for this at home. PCP: Other, MD Zachary    Current Outpatient Medications   Medication Sig Dispense Refill    aspirin delayed-release 81 mg tablet Take 1 Tab by mouth every six (6) hours as needed for Pain. Indications: treatment to prevent a heart attack 90 Tab 1    lipase-protease-amylase (CREON) 6,000-19,000 -30,000 unit capsule Take 1 Cap by mouth three (3) times daily (with meals). 90 Cap 3    promethazine (PHENERGAN) 25 mg tablet Take 1 Tab by mouth every six (6) hours as needed for Nausea.  Indications: motion sickness 40 Tab 1    sucralfate (CARAFATE) 1 gram tablet Take 1 Tab by mouth four (4) times daily. 90 Tab 1    apixaban (ELIQUIS) 5 mg tablet Take 1 Tab by mouth two (2) times a day. Indications: blood clot in a deep vein of the extremities 60 Tab 1    levETIRAcetam (KEPPRA) 750 mg tablet Take 1 Tab by mouth two (2) times a day. Indications: Additional Medication to Treat Partial Seizures 180 Tab 1    insulin glargine (LANTUS,BASAGLAR) 100 unit/mL (3 mL) inpn 30 Units by SubCUTAneous route daily. Indications: type 2 diabetes mellitus 5 Pen 1    mirtazapine (REMERON) 15 mg tablet Take 1 Tab by mouth nightly. Indications: major depressive disorder 90 Tab 1    HYDROmorphone (DILAUDID) 2 mg tablet Take 1 Tab by mouth every six (6) hours as needed for Pain for up to 5 days. Max Daily Amount: 8 mg. Indications: pain, excessive pain 20 Tab 0    pantoprazole (PROTONIX) 40 mg tablet Take 1 Tab by mouth Before breakfast and dinner. Indications: indigestion, inflammation of the esophagus with erosion 90 Tab 1    insulin aspart U-100 (NOVOLOG) 100 unit/mL (3 mL) inpn 5 Units by SubCUTAneous route.  melatonin 3 mg tablet Take 3 mg by mouth.  naloxone (NARCAN) 4 mg/actuation nasal spray Use 1 spray intranasally, then discard. Repeat with new spray every 2 min as needed for opioid overdose symptoms, alternating nostrils. 1 Each 0    Insulin Syringe-Needle U-100 0.3 mL 29 gauge syrg 2-14 Units by Does Not Apply route Before breakfast, lunch, and dinner. 100 Syringe 0    therapeutic multivitamin (THERAGRAN) tablet Take 1 Tab by mouth daily. 30 Tab 0    OTHER NO DRIVING OR OPERATING HEAVY MACHINERY FOR 6 MONTHS OR UNTIL CLEARED BY NEUROLOGY. 1 cm 0    Blood-Glucose Meter (FREESTYLE LITE METER) monitoring kit Test blood sugars 4-6 times a day 1 Kit 0    glucose blood VI test strips (FREESTYLE TEST) strip 1 Each by Does Not Apply route See Admin Instructions. Test blood sugars 4-6 times a day.  100 Strip 3       Past History     Past Medical History:  Past Medical History:   Diagnosis Date    Abdominal pain, generalized     [facility] pancreat[facility] ([facility] [address]     Diabetes ([facility] [address]     hypothyroid    Encounter for long-term (current) use of other medications     Essential hypertension     ETOH abuse     GERD (gastroesophageal reflux disease)     Heart failure (HCC)     Hyponatremia     Pain in the abdomen 11/2/2010    Pancreatitis     w/ abscess and pseudocyst    Pancreatitis     Psychiatric disorder     depression, anxiety    Tobacco abuse        Past Surgical History:  Past Surgical History:   Procedure Laterality Date    HX ABDOMINAL LAPAROSCOPY      PANCREATIC STENT    HX CHOLECYSTECTOMY         Family History:  Family History   Problem Relation Age of Onset    Heart Disease Father        Social History:  Social History     Tobacco Use    Smoking status: Former Smoker     Packs/day: 0.50     Years: 0.00     Pack years: 0.00     Types: Cigarettes    Smokeless tobacco: Never Used   Substance Use Topics    Alcohol use: Yes    Drug use: No       Allergies: Allergies   Allergen Reactions    Ampicillin-Sulbactam Rash    Pcn [Penicillins] Itching and Hives    Piperacillin-Tazobactam Rash    Pollen Extracts Rash    Seafood [Shellfish Containing Products] Hives     Other reaction(s): unknown  Has tolerated iohexol (iodine-containing contrast)  Only shrimp  Shrimp         Review of Systems   Review of Systems   Constitutional: Negative for chills and fever. HENT: Negative for congestion, rhinorrhea and sore throat. Respiratory: Negative for cough and shortness of breath. Cardiovascular: Negative for chest pain. Gastrointestinal: Positive for abdominal pain. Negative for blood in stool, constipation, diarrhea, nausea and vomiting. Genitourinary: Negative for dysuria, frequency and hematuria. Musculoskeletal: Negative for back pain and myalgias. Skin: Positive for rash. Negative for wound. Neurological: Negative for dizziness and headaches.    All other systems reviewed and are negative. All Other Systems Negative  Physical Exam     Vitals:    11/10/19 0704 11/10/19 1706   BP: 96/69 136/72   Pulse: 100 81   Resp: 18 14   Temp: 97.7 °F (36.5 °C) 98.9 °F (37.2 °C)   SpO2: 100% 100%     Physical Exam   Constitutional: He is oriented to person, place, and time. He appears well-developed and well-nourished. No distress. HENT:   Head: Normocephalic and atraumatic. Eyes: Conjunctivae are normal.   Neck: Normal range of motion. Neck supple. Cardiovascular: Normal rate, regular rhythm and normal heart sounds. Pulmonary/Chest: Effort normal and breath sounds normal. No respiratory distress. He exhibits no tenderness. Abdominal: Soft. Bowel sounds are normal. He exhibits no distension. There is tenderness in the right lower quadrant. There is no rebound and no guarding. Musculoskeletal: Normal range of motion. He exhibits no edema or deformity. Neurological: He is alert and oriented to person, place, and time. Skin: Skin is warm and dry. He is not diaphoretic. There is erythema. Psychiatric: He has a normal mood and affect. His behavior is normal.   Nursing note and vitals reviewed. Diagnostic Study Results     Labs -     Recent Results (from the past 12 hour(s))   METABOLIC PANEL, COMPREHENSIVE    Collection Time: 11/10/19  7:45 AM   Result Value Ref Range    Sodium 135 (L) 136 - 145 mmol/L    Potassium 3.8 3.5 - 5.5 mmol/L    Chloride 104 100 - 111 mmol/L    CO2 21 21 - 32 mmol/L    Anion gap 10 3.0 - 18 mmol/L    Glucose 343 (H) 74 - 99 mg/dL    BUN 20 (H) 7.0 - 18 MG/DL    Creatinine 1.39 (H) 0.6 - 1.3 MG/DL    BUN/Creatinine ratio 14 12 - 20      GFR est AA >60 >60 ml/min/1.73m2    GFR est non-AA 53 (L) >60 ml/min/1.73m2    Calcium 9.2 8.5 - 10.1 MG/DL    Bilirubin, total 0.5 0.2 - 1.0 MG/DL    ALT (SGPT) 27 16 - 61 U/L    AST (SGOT) 13 10 - 38 U/L    Alk.  phosphatase 142 (H) 45 - 117 U/L    Protein, total 9.1 (H) 6.4 - 8.2 g/dL    Albumin 3.6 3.4 - 5.0 g/dL Globulin 5.5 (H) 2.0 - 4.0 g/dL    A-G Ratio 0.7 (L) 0.8 - 1.7     LIPASE    Collection Time: 11/10/19  7:45 AM   Result Value Ref Range    Lipase 23 (L) 73 - 393 U/L   CBC WITH AUTOMATED DIFF    Collection Time: 11/10/19  7:45 AM   Result Value Ref Range    WBC 10.0 4.6 - 13.2 K/uL    RBC 4.49 (L) 4.70 - 5.50 M/uL    HGB 13.0 13.0 - 16.0 g/dL    HCT 39.0 36.0 - 48.0 %    MCV 86.9 74.0 - 97.0 FL    MCH 29.0 24.0 - 34.0 PG    MCHC 33.3 31.0 - 37.0 g/dL    RDW 15.1 (H) 11.6 - 14.5 %    PLATELET 043 163 - 937 K/uL    MPV 10.1 9.2 - 11.8 FL    NEUTROPHILS 80 (H) 40 - 73 %    LYMPHOCYTES 11 (L) 21 - 52 %    MONOCYTES 7 3 - 10 %    EOSINOPHILS 2 0 - 5 %    BASOPHILS 0 0 - 2 %    ABS. NEUTROPHILS 8.0 1.8 - 8.0 K/UL    ABS. LYMPHOCYTES 1.1 0.9 - 3.6 K/UL    ABS. MONOCYTES 0.7 0.05 - 1.2 K/UL    ABS. EOSINOPHILS 0.2 0.0 - 0.4 K/UL    ABS. BASOPHILS 0.0 0.0 - 0.1 K/UL    DF AUTOMATED     MAGNESIUM    Collection Time: 11/10/19  7:45 AM   Result Value Ref Range    Magnesium 2.4 1.6 - 2.6 mg/dL       Radiologic Studies -   CT ABD PELV W CONT   Final Result   IMPRESSION:      No acute abnormalities. Persistent retroperitoneal sinus tract from duodenum to the right flank appears   slightly more prominent, now contains a bit of fluid and gas. Nonobstructive right kidney stone. Biliary air also seen on previous studies compatible with previous papillotomy. Chronic pancreatitis with stable position of the pancreatic duct stent. Patient appears constipated. CT Results  (Last 48 hours)               11/10/19 1509  CT ABD PELV W CONT Final result    Impression:  IMPRESSION:       No acute abnormalities. Persistent retroperitoneal sinus tract from duodenum to the right flank appears   slightly more prominent, now contains a bit of fluid and gas. Nonobstructive right kidney stone. Biliary air also seen on previous studies compatible with previous papillotomy.        Chronic pancreatitis with stable position of the pancreatic duct stent. Patient appears constipated. Narrative:  CT Of The Abdomen And Pelvis Without Contrast       CPT CODE 54818,14143       CLINICAL HISTORY: Right-sided abdominal pain. . Chronic enterocutaneous fistula. Multiple medical problems including chronic pancreatitis, enterocutaneous   fistula from the duodenum. TECHNIQUE: 5 mm helical MDCT scan was obtained of the abdomen and pelvis. Sagittal and coronal images created from original data set. All CT scans at   this facility are performed using dose optimization techniques as appropriate to   a performed exam, to include automated exposure control, adjustment of the mA   and/or kV according to patient's size (including appropriate matching for site   specific examinations), or use of iterative reconstruction technique. COMPARISON: 11/5/2019, 8/29/2019, 8/27/2019. FINDINGS:        CT Of The Abdomen and pelvis:       Lung bases: Dependent densities similar to prior. No effusions. Heart and pericardium: Normal.       Liver: Biliary air left lobe. Prior cholecystectomy. Spleen: Normal.       Pancreas: Coarse calcifications throughout the body and tail, head. There is a   drain in the main pancreatic duct ending in the common bile duct which is stable   in position. No surrounding fluid collections. Adrenal glands: Normal.       Kidneys: 2 mm stone lower pole right kidney. Cortical calcifications upper pole   left kidney are punctate. Tiny hypodensity operative midpole left kidney too   small to characterize. Retroperitoneum: Arteriosclerosis aorta. There is thickened soft tissue   retroperitoneal tract from duodenum to the right lateral flank that is slightly   more prominent in appearance now measuring about 8 x 2 cm now contains a bit of   fluid and gas.         GI: Stomach normal. Duodenum tethered in the right lower quadrant to the level   of the retroperitoneal tract. Small bowel normal. Moderate retained fecal   material and gas throughout the colon. Pelvis[de-identified] Prostate and urinary bladder are unremarkable. CXR Results  (Last 48 hours)    None            Medical Decision Making   I am the first provider for this patient. I reviewed the vital signs, available nursing notes, past medical history, past surgical history, family history and social history. Vital Signs-Reviewed the patient's vital signs. Records Reviewed: Nursing Notes and Old Medical Records     Procedures: None   Procedures    Provider Notes (Medical Decision Making):     Differential: Fistula, NATALIE site dysfunction, malingering, abscess      Plan: We will order labs, CT of abdomen and pelvis, pain medication and fluid bolus given blood pressure. 9:11 AM  Pending CT at this time. BG elevated, will order 8 units of subcu insulin. 12:39 PM  Pending CT at this time, having difficulties with IV access. 2:26 PM  Patient has IV access after three inadequate ones. Will call CT     5:08 PM  Have shared reassuring work-up with patient. Have also discussed CT findings with Dr. Rukhsana Gordon who agrees with outpatient follow-up. Pt reports drainage has reduced to little to none. Have discussed proper surrounding wound care with pt. Patient states he will follow-up with his surgeon in [facility] tomorrow. Patient is afebrile, without white count and CT was reassuring. Patient agrees with the plan and management and states all questions have been thoroughly answered and there are no more remaining questions. MED RECONCILIATION:  No current facility-administered medications for this encounter. Current Outpatient Medications   Medication Sig    aspirin delayed-release 81 mg tablet Take 1 Tab by mouth every six (6) hours as needed for Pain.  Indications: treatment to prevent a heart attack    lipase-protease-amylase (CREON) 6,000-19,000 -30,000 unit capsule Take 1 Cap by mouth three (3) times daily (with meals).  promethazine (PHENERGAN) 25 mg tablet Take 1 Tab by mouth every six (6) hours as needed for Nausea. Indications: motion sickness    sucralfate (CARAFATE) 1 gram tablet Take 1 Tab by mouth four (4) times daily.  apixaban (ELIQUIS) 5 mg tablet Take 1 Tab by mouth two (2) times a day. Indications: blood clot in a deep vein of the extremities    levETIRAcetam (KEPPRA) 750 mg tablet Take 1 Tab by mouth two (2) times a day. Indications: Additional Medication to Treat Partial Seizures    insulin glargine (LANTUS,BASAGLAR) 100 unit/mL (3 mL) inpn 30 Units by SubCUTAneous route daily. Indications: type 2 diabetes mellitus    mirtazapine (REMERON) 15 mg tablet Take 1 Tab by mouth nightly. Indications: major depressive disorder    HYDROmorphone (DILAUDID) 2 mg tablet Take 1 Tab by mouth every six (6) hours as needed for Pain for up to 5 days. Max Daily Amount: 8 mg. Indications: pain, excessive pain    pantoprazole (PROTONIX) 40 mg tablet Take 1 Tab by mouth Before breakfast and dinner. Indications: indigestion, inflammation of the esophagus with erosion    insulin aspart U-100 (NOVOLOG) 100 unit/mL (3 mL) inpn 5 Units by SubCUTAneous route.  melatonin 3 mg tablet Take 3 mg by mouth.  naloxone (NARCAN) 4 mg/actuation nasal spray Use 1 spray intranasally, then discard. Repeat with new spray every 2 min as needed for opioid overdose symptoms, alternating nostrils.  Insulin Syringe-Needle U-100 0.3 mL 29 gauge syrg 2-14 Units by Does Not Apply route Before breakfast, lunch, and dinner.  therapeutic multivitamin (THERAGRAN) tablet Take 1 Tab by mouth daily.  OTHER NO DRIVING OR OPERATING HEAVY MACHINERY FOR 6 MONTHS OR UNTIL CLEARED BY NEUROLOGY.  Blood-Glucose Meter (FREESTYLE LITE METER) monitoring kit Test blood sugars 4-6 times a day    glucose blood VI test strips (FREESTYLE TEST) strip 1 Each by Does Not Apply route See Admin Instructions.  Test blood sugars 4-6 times a day. Disposition:  Home     DISCHARGE NOTE:   Pt has been reexamined. Patient has no new complaints, changes, or physical findings. Care plan outlined and precautions discussed. Results of workup were reviewed with the patient. All medications were reviewed with the patient. All of pt's questions and concerns were addressed. Patient was instructed and agrees to follow up with Your Surgeon  as well as to return to the ED upon further deterioration. Patient is ready to go home. Follow-up Information     Follow up With Specialties Details Why Contact Info    [facility] EMERGENCY DEPT Emergency Medicine  As needed [address] [facility] [address]  [phone]    [address] [address] [phone]       Call your surgeon in the morning           Current Discharge Medication List              Diagnosis     Clinical Impression:   1. Abdominal pain, right lower quadrant    2. Enterocutaneous fistula    3.  Hyperglycemia

## 2019-11-10 NOTE — ED TRIAGE NOTES
Patient A/O x 4, complaining of drainage from previous Hayder drain site to right side of abdomen. Patient states this has occurred in the past and a \"pouch\" was placed on it. Patient also complaining of burning sensation to site.

## 2019-11-10 NOTE — DISCHARGE INSTRUCTIONS
Patient Education        Learning About High Blood Sugar  What is high blood sugar? Your body turns the food you eat into glucose (sugar), which it uses for energy. But if your body isn't able to use the sugar right away, it can build up in your blood and lead to high blood sugar. When the amount of sugar in your blood stays too high for too much of the time, you may have diabetes. Diabetes is a disease that can cause serious health problems. The good news is that lifestyle changes may help you get your blood sugar back to normal and avoid or delay diabetes. What causes high blood sugar? Sugar (glucose) can build up in your blood if you:  · Are overweight. · Have a family history of diabetes. · Take certain medicines, such as steroids. What are the symptoms? Having high blood sugar may not cause any symptoms at all. Or it may make you feel very thirsty or very hungry. You may also urinate more often than usual, have blurry vision, or lose weight without trying. How is high blood sugar treated? You can take steps to lower your blood sugar level if you understand what makes it get higher. Your doctor may want you to learn how to test your blood sugar level at home. Then you can see how illness, stress, or different kinds of food or medicine raise or lower your blood sugar level. Other tests may be needed to see if you have diabetes. How can you prevent high blood sugar? · Watch your weight. If you're overweight, losing just a small amount of weight may help. Reducing fat around your waist is most important. · Limit the amount of calories, sweets, and unhealthy fat you eat. Ask your doctor if a dietitian can help you. A registered dietitian can help you create meal plans that fit your lifestyle. · Get at least 30 minutes of exercise on most days of the week. Exercise helps control your blood sugar. It also helps you maintain a healthy weight. Walking is a good choice.  You also may want to do other activities, such as running, swimming, cycling, or playing tennis or team sports. · If your doctor prescribed medicines, take them exactly as prescribed. Call your doctor if you think you are having a problem with your medicine. You will get more details on the specific medicines your doctor prescribes. Follow-up care is a key part of your treatment and safety. Be sure to make and go to all appointments, and call your doctor if you are having problems. It's also a good idea to know your test results and keep a list of the medicines you take. Where can you learn more? Go to http://thiago-mikhail.info/. Enter O108 in the search box to learn more about \"Learning About High Blood Sugar. \"  Current as of: April 16, 2019  Content Version: 12.2  © 0258-3008 [employer]. Care instructions adapted under license by [employer] (which disclaims liability or warranty for this information). If you have questions about a medical condition or this instruction, always ask your healthcare professional. [address] any warranty or liability for your use of this information.

## 2019-11-10 NOTE — ED PROVIDER NOTES
EMERGENCY DEPARTMENT HISTORY AND PHYSICAL EXAM  This was created with voice recognition software and transcription errors may be present. 10:04 PM  Date: 11/9/2019  Patient Name: Gautam Zaragoza    History of Presenting Illness     Chief Complaint:    History Provided By:     HPI: Gautam Zaragoza is a 47 y.o. male past medical history of abdominal pain chronic pancreatitis diabetes alcohol abuse heart failure hyponatremia pancreatitis anxiety who presents with a focal seizure per patient describes the left arm shaking and left facial twitching which he states is due to his diabetes. Patient states he has a history of similar. No nausea no vomiting no weakness. PCP: Other, MD Zachary      Past History     Past Medical History:  Past Medical History:   Diagnosis Date    Abdominal pain, generalized     Chronic pancreatitis ([address]     Diabetes ([address]     hypothyroid    Encounter for long-term (current) use of other medications     Essential hypertension     ETOH abuse     GERD (gastroesophageal reflux disease)     Heart failure (HCC)     Hyponatremia     Pain in the abdomen 11/2/2010    Pancreatitis     w/ abscess and pseudocyst    Pancreatitis     Psychiatric disorder     depression, anxiety    Tobacco abuse        Past Surgical History:  Past Surgical History:   Procedure Laterality Date    HX ABDOMINAL LAPAROSCOPY      PANCREATIC STENT    HX CHOLECYSTECTOMY         Family History:  Family History   Problem Relation Age of Onset    Heart Disease Father        Social History:  Social History     Tobacco Use    Smoking status: Former Smoker     Packs/day: 0.50     Years: 0.00     Pack years: 0.00     Types: Cigarettes    Smokeless tobacco: Never Used   Substance Use Topics    Alcohol use: Yes    Drug use: No       Allergies:   Allergies   Allergen Reactions    Ampicillin-Sulbactam Rash    Pcn [Penicillins] Itching and Hives    Piperacillin-Tazobactam Rash    Pollen Extracts Rash    Seafood [Shellfish Containing Products] Hives     Other reaction(s): unknown  Has tolerated iohexol (iodine-containing contrast)  Only shrimp  Shrimp       Review of Systems     Review of Systems   Constitutional: Negative for fever. HENT: Negative for congestion. Respiratory: Negative for chest tightness. Cardiovascular: Negative for chest pain. 10 point review of systems otherwise negative unless noted in HPI. Physical Exam       Physical Exam   Constitutional: He is oriented to person, place, and time. He appears well-developed. HENT:   Head: Normocephalic and atraumatic. Eyes: Pupils are equal, round, and reactive to light. EOM are normal.   Neck: Normal range of motion. Neck supple. Cardiovascular: Normal rate, regular rhythm and normal heart sounds. Exam reveals no friction rub. No murmur heard. Pulmonary/Chest: Effort normal and breath sounds normal. No respiratory distress. He has no wheezes. Abdominal: Soft. He exhibits no distension. There is no tenderness. There is no rebound and no guarding. Musculoskeletal: Normal range of motion. Neurological: He is alert and oriented to person, place, and time. Skin: Skin is warm and dry. Psychiatric: He has a normal mood and affect. His behavior is normal. Thought content normal.       Diagnostic Study Results     Vital Signs  EKG: nsr at 82; left axis. Nl int. No liza/d no hypertrophyh   Labs:   Imaging:     Medical Decision Making     ED Course: Progress Notes, Reevaluation, and Consults:      Provider Notes (Medical Decision Making):   51-year-old male presents with left-sided focal seizure. Reportedly secondary to his glucose. Glucose noted to be in the 900s. Will start glucose-stabilizer. 1.  Hyperglycemia improved no DKA. #2 partial seizures on Keppra. No change to meds. #3 draining wound. Change by nursing signs of infection; hx of NATALIE drain there, notes it drains from time to time. Noted on prior admission. 4. Hx of dvt.  On eliquis. Diagnosis     Clinical Impression: No diagnosis found. Disposition:    Patient's Medications   Start Taking    No medications on file   Continue Taking    APIXABAN (ELIQUIS) 5 MG TABLET    Take 1 Tab by mouth two (2) times a day. Indications: blood clot in a deep vein of the extremities    ASPIRIN DELAYED-RELEASE 81 MG TABLET    Take 1 Tab by mouth every six (6) hours as needed for Pain. Indications: treatment to prevent a heart attack    BLOOD-GLUCOSE METER (FREESTYLE LITE METER) MONITORING KIT    Test blood sugars 4-6 times a day    GLUCOSE BLOOD VI TEST STRIPS (FREESTYLE TEST) STRIP    1 Each by Does Not Apply route See Admin Instructions. Test blood sugars 4-6 times a day. HYDROMORPHONE (DILAUDID) 2 MG TABLET    Take 1 Tab by mouth every six (6) hours as needed for Pain for up to 5 days. Max Daily Amount: 8 mg. Indications: pain, excessive pain    INSULIN ASPART U-100 (NOVOLOG) 100 UNIT/ML (3 ML) INPN    5 Units by SubCUTAneous route. INSULIN GLARGINE (LANTUS,BASAGLAR) 100 UNIT/ML (3 ML) INPN    30 Units by SubCUTAneous route daily. Indications: type 2 diabetes mellitus    INSULIN SYRINGE-NEEDLE U-100 0.3 ML 29 GAUGE SYRG    2-14 Units by Does Not Apply route Before breakfast, lunch, and dinner. LEVETIRACETAM (KEPPRA) 750 MG TABLET    Take 1 Tab by mouth two (2) times a day. Indications: Additional Medication to Treat Partial Seizures    LIPASE-PROTEASE-AMYLASE (CREON) 6,000-19,000 -30,000 UNIT CAPSULE    Take 1 Cap by mouth three (3) times daily (with meals). MELATONIN 3 MG TABLET    Take 3 mg by mouth. MIRTAZAPINE (REMERON) 15 MG TABLET    Take 1 Tab by mouth nightly. Indications: major depressive disorder    NALOXONE (NARCAN) 4 MG/ACTUATION NASAL SPRAY    Use 1 spray intranasally, then discard. Repeat with new spray every 2 min as needed for opioid overdose symptoms, alternating nostrils.     OTHER    NO DRIVING OR OPERATING HEAVY MACHINERY FOR 6 MONTHS OR UNTIL CLEARED BY NEUROLOGY. PANTOPRAZOLE (PROTONIX) 40 MG TABLET    Take 1 Tab by mouth Before breakfast and dinner. Indications: indigestion, inflammation of the esophagus with erosion    PROMETHAZINE (PHENERGAN) 25 MG TABLET    Take 1 Tab by mouth every six (6) hours as needed for Nausea. Indications: motion sickness    SUCRALFATE (CARAFATE) 1 GRAM TABLET    Take 1 Tab by mouth four (4) times daily. THERAPEUTIC MULTIVITAMIN (THERAGRAN) TABLET    Take 1 Tab by mouth daily.    These Medications have changed    No medications on file   Stop Taking    No medications on file

## 2019-11-10 NOTE — ED NOTES
I have reviewed discharge instructions with the patient. The patient verbalized understanding. Patient armband removed and given to patient to take home. Patient was informed of the privacy risks if armband lost or stolen. Pt returning to family members room on [address].

## 2019-11-10 NOTE — ED NOTES
Medical response called on pt. Was on [address] with mother, who is a pt. Pt states he was sitting watching TV when suddenly his left side started twitching and he \"lost control\" Denies LOC. States he was recently dx with \"diabetic seizure\" when his BS gets too high. Pt has been upstairs with his mother for over 24 hours and has not had any of his meds in at least that long. Supposed to take 30 units Lantus q AM, has not taken since the day before yesterday. Has not taken ACHS sliding scale Humalog since that time either.

## 2019-12-23 NOTE — PROGRESS NOTES
Problem: Pressure Injury - Risk of  Goal: *Prevention of pressure injury  Document Austin Scale and appropriate interventions in the flowsheet.    Outcome: Progressing Towards Goal  Pressure Injury Interventions:       Moisture Interventions: Absorbent underpads, Contain wound drainage    Activity Interventions: Pressure redistribution bed/mattress(bed type)    Mobility Interventions: HOB 30 degrees or less, Pressure redistribution bed/mattress (bed type)    Nutrition Interventions: Document food/fluid/supplement intake C/a ASA and Lipitor 40

## 2020-08-07 PROBLEM — R11.10 VOMITING: Status: ACTIVE | Noted: 2020-08-07

## 2020-08-07 PROBLEM — E11.10 DKA (DIABETIC KETOACIDOSES): Status: ACTIVE | Noted: 2020-08-07

## 2023-04-06 NOTE — ROUTINE PROCESS
Chart review for MEWS monitoring. posterior cervical L/posterior cervical R/anterior cervical L/anterior cervical R